# Patient Record
Sex: MALE | Race: WHITE | Employment: OTHER | URBAN - METROPOLITAN AREA
[De-identification: names, ages, dates, MRNs, and addresses within clinical notes are randomized per-mention and may not be internally consistent; named-entity substitution may affect disease eponyms.]

---

## 2017-08-23 ENCOUNTER — HOSPITAL ENCOUNTER (INPATIENT)
Age: 72
LOS: 21 days | Discharge: NURSING, REHAB OR LONG-TERM CARE | DRG: 308 | End: 2017-09-13
Attending: EMERGENCY MEDICINE | Admitting: INTERNAL MEDICINE
Payer: MEDICARE

## 2017-08-23 ENCOUNTER — APPOINTMENT (OUTPATIENT)
Dept: GENERAL RADIOLOGY | Age: 72
DRG: 308 | End: 2017-08-23
Attending: EMERGENCY MEDICINE
Payer: MEDICARE

## 2017-08-23 DIAGNOSIS — I51.9 LV DYSFUNCTION: Chronic | ICD-10-CM

## 2017-08-23 DIAGNOSIS — E87.1 HYPONATREMIA: ICD-10-CM

## 2017-08-23 DIAGNOSIS — I95.9 HYPOTENSION, UNSPECIFIED HYPOTENSION TYPE: ICD-10-CM

## 2017-08-23 DIAGNOSIS — J43.2 CENTRILOBULAR EMPHYSEMA (HCC): ICD-10-CM

## 2017-08-23 DIAGNOSIS — Z87.891 HISTORY OF TOBACCO USE: Chronic | ICD-10-CM

## 2017-08-23 DIAGNOSIS — D69.6 THROMBOCYTOPENIA (HCC): ICD-10-CM

## 2017-08-23 DIAGNOSIS — D69.3 ACUTE ITP (HCC): ICD-10-CM

## 2017-08-23 DIAGNOSIS — Z22.322 MRSA NASAL COLONIZATION: ICD-10-CM

## 2017-08-23 DIAGNOSIS — I73.9 PERIPHERAL VASCULAR DISEASE (HCC): Chronic | ICD-10-CM

## 2017-08-23 DIAGNOSIS — I25.5 ISCHEMIC CARDIOMYOPATHY: Chronic | ICD-10-CM

## 2017-08-23 DIAGNOSIS — Z79.01 ANTICOAGULANT LONG-TERM USE: Chronic | ICD-10-CM

## 2017-08-23 DIAGNOSIS — J96.02 ACUTE RESPIRATORY FAILURE WITH HYPERCAPNIA (HCC): ICD-10-CM

## 2017-08-23 DIAGNOSIS — I50.23 ACUTE ON CHRONIC SYSTOLIC CONGESTIVE HEART FAILURE (HCC): ICD-10-CM

## 2017-08-23 DIAGNOSIS — I48.92 ATRIAL FLUTTER WITH RAPID VENTRICULAR RESPONSE (HCC): Primary | ICD-10-CM

## 2017-08-23 DIAGNOSIS — J44.9 CHRONIC OBSTRUCTIVE PULMONARY DISEASE, UNSPECIFIED COPD TYPE (HCC): ICD-10-CM

## 2017-08-23 DIAGNOSIS — Z95.2 HISTORY OF MECHANICAL AORTIC VALVE REPLACEMENT: Chronic | ICD-10-CM

## 2017-08-23 DIAGNOSIS — G93.40 ACUTE ENCEPHALOPATHY: ICD-10-CM

## 2017-08-23 LAB
ALBUMIN SERPL-MCNC: 3.6 G/DL (ref 3.2–4.6)
ALBUMIN/GLOB SERPL: 0.9 {RATIO} (ref 1.2–3.5)
ALP SERPL-CCNC: 86 U/L (ref 50–136)
ALT SERPL-CCNC: 30 U/L (ref 12–65)
ANION GAP SERPL CALC-SCNC: 11 MMOL/L (ref 7–16)
AST SERPL-CCNC: 25 U/L (ref 15–37)
ATRIAL RATE: 151 BPM
ATRIAL RATE: 294 BPM
ATRIAL RATE: 357 BPM
BASOPHILS # BLD: 0 K/UL (ref 0–0.2)
BASOPHILS NFR BLD: 0 % (ref 0–2)
BILIRUB SERPL-MCNC: 0.8 MG/DL (ref 0.2–1.1)
BNP SERPL-MCNC: 371 PG/ML
BUN SERPL-MCNC: 26 MG/DL (ref 8–23)
CALCIUM SERPL-MCNC: 9.1 MG/DL (ref 8.3–10.4)
CALCULATED P AXIS, ECG09: -81 DEGREES
CALCULATED P AXIS, ECG09: 88 DEGREES
CALCULATED R AXIS, ECG10: 117 DEGREES
CALCULATED R AXIS, ECG10: 122 DEGREES
CALCULATED R AXIS, ECG10: 125 DEGREES
CALCULATED T AXIS, ECG11: -24 DEGREES
CALCULATED T AXIS, ECG11: -33 DEGREES
CALCULATED T AXIS, ECG11: -55 DEGREES
CHLORIDE SERPL-SCNC: 100 MMOL/L (ref 98–107)
CO2 SERPL-SCNC: 29 MMOL/L (ref 21–32)
CREAT SERPL-MCNC: 1.5 MG/DL (ref 0.8–1.5)
DIAGNOSIS, 93000: NORMAL
DIFFERENTIAL METHOD BLD: ABNORMAL
EOSINOPHIL # BLD: 0 K/UL (ref 0–0.8)
EOSINOPHIL NFR BLD: 1 % (ref 0.5–7.8)
ERYTHROCYTE [DISTWIDTH] IN BLOOD BY AUTOMATED COUNT: 15.9 % (ref 11.9–14.6)
GLOBULIN SER CALC-MCNC: 3.8 G/DL (ref 2.3–3.5)
GLUCOSE BLD STRIP.AUTO-MCNC: 111 MG/DL (ref 65–100)
GLUCOSE BLD STRIP.AUTO-MCNC: 78 MG/DL (ref 65–100)
GLUCOSE SERPL-MCNC: 117 MG/DL (ref 65–100)
HCT VFR BLD AUTO: 32.3 % (ref 41.1–50.3)
HGB BLD-MCNC: 10.2 G/DL (ref 13.6–17.2)
IMM GRANULOCYTES # BLD: 0 K/UL (ref 0–0.5)
IMM GRANULOCYTES NFR BLD: 0.3 % (ref 0–5)
INR PPP: 3.4 (ref 0.9–1.2)
LYMPHOCYTES # BLD: 0.7 K/UL (ref 0.5–4.6)
LYMPHOCYTES NFR BLD: 12 % (ref 13–44)
MAGNESIUM SERPL-MCNC: 2.1 MG/DL (ref 1.8–2.4)
MCH RBC QN AUTO: 28.8 PG (ref 26.1–32.9)
MCHC RBC AUTO-ENTMCNC: 31.6 G/DL (ref 31.4–35)
MCV RBC AUTO: 91.2 FL (ref 79.6–97.8)
MONOCYTES # BLD: 0.6 K/UL (ref 0.1–1.3)
MONOCYTES NFR BLD: 9 % (ref 4–12)
NEUTS SEG # BLD: 4.5 K/UL (ref 1.7–8.2)
NEUTS SEG NFR BLD: 78 % (ref 43–78)
PLATELET # BLD AUTO: 53 K/UL (ref 150–450)
PMV BLD AUTO: ABNORMAL FL (ref 10.8–14.1)
POTASSIUM SERPL-SCNC: 4.4 MMOL/L (ref 3.5–5.1)
PROT SERPL-MCNC: 7.4 G/DL (ref 6.3–8.2)
PROTHROMBIN TIME: 37 SEC (ref 9.6–12)
Q-T INTERVAL, ECG07: 346 MS
Q-T INTERVAL, ECG07: 350 MS
Q-T INTERVAL, ECG07: 358 MS
QRS DURATION, ECG06: 144 MS
QRS DURATION, ECG06: 144 MS
QRS DURATION, ECG06: 150 MS
QTC CALCULATION (BEZET), ECG08: 509 MS
QTC CALCULATION (BEZET), ECG08: 514 MS
QTC CALCULATION (BEZET), ECG08: 547 MS
RBC # BLD AUTO: 3.54 M/UL (ref 4.23–5.67)
SODIUM SERPL-SCNC: 140 MMOL/L (ref 136–145)
TROPONIN I BLD-MCNC: 0.1 NG/ML (ref 0–0.08)
TROPONIN I SERPL-MCNC: 0.12 NG/ML (ref 0.02–0.05)
VENTRICULAR RATE, ECG03: 124 BPM
VENTRICULAR RATE, ECG03: 130 BPM
VENTRICULAR RATE, ECG03: 147 BPM
WBC # BLD AUTO: 5.9 K/UL (ref 4.3–11.1)

## 2017-08-23 PROCEDURE — 99153 MOD SED SAME PHYS/QHP EA: CPT

## 2017-08-23 PROCEDURE — 74011250636 HC RX REV CODE- 250/636: Performed by: EMERGENCY MEDICINE

## 2017-08-23 PROCEDURE — 96365 THER/PROPH/DIAG IV INF INIT: CPT | Performed by: EMERGENCY MEDICINE

## 2017-08-23 PROCEDURE — 85025 COMPLETE CBC W/AUTO DIFF WBC: CPT | Performed by: EMERGENCY MEDICINE

## 2017-08-23 PROCEDURE — 74011250637 HC RX REV CODE- 250/637: Performed by: INTERNAL MEDICINE

## 2017-08-23 PROCEDURE — 82962 GLUCOSE BLOOD TEST: CPT

## 2017-08-23 PROCEDURE — 74011000258 HC RX REV CODE- 258: Performed by: EMERGENCY MEDICINE

## 2017-08-23 PROCEDURE — 83735 ASSAY OF MAGNESIUM: CPT | Performed by: EMERGENCY MEDICINE

## 2017-08-23 PROCEDURE — 77030009397 HC ELECTRD ECG PACE ZOLL -B

## 2017-08-23 PROCEDURE — B24BZZ4 ULTRASONOGRAPHY OF HEART WITH AORTA, TRANSESOPHAGEAL: ICD-10-PCS | Performed by: INTERNAL MEDICINE

## 2017-08-23 PROCEDURE — 71010 XR CHEST PORT: CPT

## 2017-08-23 PROCEDURE — 5A2204Z RESTORATION OF CARDIAC RHYTHM, SINGLE: ICD-10-PCS | Performed by: INTERNAL MEDICINE

## 2017-08-23 PROCEDURE — 93312 ECHO TRANSESOPHAGEAL: CPT

## 2017-08-23 PROCEDURE — 65660000000 HC RM CCU STEPDOWN

## 2017-08-23 PROCEDURE — 74011000250 HC RX REV CODE- 250: Performed by: EMERGENCY MEDICINE

## 2017-08-23 PROCEDURE — 93005 ELECTROCARDIOGRAM TRACING: CPT | Performed by: EMERGENCY MEDICINE

## 2017-08-23 PROCEDURE — 99285 EMERGENCY DEPT VISIT HI MDM: CPT | Performed by: EMERGENCY MEDICINE

## 2017-08-23 PROCEDURE — 94760 N-INVAS EAR/PLS OXIMETRY 1: CPT

## 2017-08-23 PROCEDURE — 77010033678 HC OXYGEN DAILY

## 2017-08-23 PROCEDURE — 74011250636 HC RX REV CODE- 250/636

## 2017-08-23 PROCEDURE — 74011000250 HC RX REV CODE- 250: Performed by: INTERNAL MEDICINE

## 2017-08-23 PROCEDURE — 83880 ASSAY OF NATRIURETIC PEPTIDE: CPT | Performed by: EMERGENCY MEDICINE

## 2017-08-23 PROCEDURE — 85610 PROTHROMBIN TIME: CPT | Performed by: EMERGENCY MEDICINE

## 2017-08-23 PROCEDURE — 77030013140 HC MSK NEB VYRM -A

## 2017-08-23 PROCEDURE — 92960 CARDIOVERSION ELECTRIC EXT: CPT | Performed by: NURSE PRACTITIONER

## 2017-08-23 PROCEDURE — 99152 MOD SED SAME PHYS/QHP 5/>YRS: CPT | Performed by: NURSE PRACTITIONER

## 2017-08-23 PROCEDURE — 80053 COMPREHEN METABOLIC PANEL: CPT | Performed by: EMERGENCY MEDICINE

## 2017-08-23 PROCEDURE — 84484 ASSAY OF TROPONIN QUANT: CPT | Performed by: EMERGENCY MEDICINE

## 2017-08-23 PROCEDURE — 94640 AIRWAY INHALATION TREATMENT: CPT

## 2017-08-23 PROCEDURE — 74011000258 HC RX REV CODE- 258: Performed by: INTERNAL MEDICINE

## 2017-08-23 PROCEDURE — 74011250636 HC RX REV CODE- 250/636: Performed by: INTERNAL MEDICINE

## 2017-08-23 RX ORDER — SODIUM CHLORIDE 9 MG/ML
125 INJECTION, SOLUTION INTRAVENOUS CONTINUOUS
Status: DISCONTINUED | OUTPATIENT
Start: 2017-08-23 | End: 2017-08-23

## 2017-08-23 RX ORDER — GABAPENTIN 300 MG/1
600 CAPSULE ORAL 3 TIMES DAILY
Status: DISCONTINUED | OUTPATIENT
Start: 2017-08-23 | End: 2017-09-13 | Stop reason: HOSPADM

## 2017-08-23 RX ORDER — PANTOPRAZOLE SODIUM 40 MG/1
40 TABLET, DELAYED RELEASE ORAL
Status: DISCONTINUED | OUTPATIENT
Start: 2017-08-24 | End: 2017-08-30

## 2017-08-23 RX ORDER — MORPHINE SULFATE 2 MG/ML
2 INJECTION, SOLUTION INTRAMUSCULAR; INTRAVENOUS
Status: DISCONTINUED | OUTPATIENT
Start: 2017-08-23 | End: 2017-09-03 | Stop reason: SDUPTHER

## 2017-08-23 RX ORDER — ONDANSETRON 2 MG/ML
4 INJECTION INTRAMUSCULAR; INTRAVENOUS
Status: DISCONTINUED | OUTPATIENT
Start: 2017-08-23 | End: 2017-09-13 | Stop reason: HOSPADM

## 2017-08-23 RX ORDER — DILTIAZEM HYDROCHLORIDE 5 MG/ML
10 INJECTION INTRAVENOUS ONCE
Status: COMPLETED | OUTPATIENT
Start: 2017-08-23 | End: 2017-08-23

## 2017-08-23 RX ORDER — INSULIN LISPRO 100 [IU]/ML
INJECTION, SOLUTION INTRAVENOUS; SUBCUTANEOUS
Status: DISCONTINUED | OUTPATIENT
Start: 2017-08-23 | End: 2017-09-01 | Stop reason: ALTCHOICE

## 2017-08-23 RX ORDER — GUAIFENESIN 100 MG/5ML
81 LIQUID (ML) ORAL DAILY
Status: DISCONTINUED | OUTPATIENT
Start: 2017-08-24 | End: 2017-08-28

## 2017-08-23 RX ORDER — MIDAZOLAM HYDROCHLORIDE 1 MG/ML
.5-5 INJECTION, SOLUTION INTRAMUSCULAR; INTRAVENOUS
Status: DISCONTINUED | OUTPATIENT
Start: 2017-08-23 | End: 2017-08-25 | Stop reason: HOSPADM

## 2017-08-23 RX ORDER — GLIPIZIDE 5 MG/1
10 TABLET ORAL 2 TIMES DAILY
Status: DISCONTINUED | OUTPATIENT
Start: 2017-08-23 | End: 2017-08-25

## 2017-08-23 RX ORDER — SODIUM CHLORIDE 0.9 % (FLUSH) 0.9 %
5-10 SYRINGE (ML) INJECTION AS NEEDED
Status: DISCONTINUED | OUTPATIENT
Start: 2017-08-23 | End: 2017-09-13 | Stop reason: HOSPADM

## 2017-08-23 RX ORDER — FENTANYL CITRATE 50 UG/ML
25-50 INJECTION, SOLUTION INTRAMUSCULAR; INTRAVENOUS
Status: DISCONTINUED | OUTPATIENT
Start: 2017-08-23 | End: 2017-08-25 | Stop reason: HOSPADM

## 2017-08-23 RX ORDER — ALBUTEROL SULFATE 90 UG/1
2 AEROSOL, METERED RESPIRATORY (INHALATION)
Status: DISCONTINUED | OUTPATIENT
Start: 2017-08-23 | End: 2017-09-13 | Stop reason: HOSPADM

## 2017-08-23 RX ORDER — NITROGLYCERIN 0.4 MG/1
0.4 TABLET SUBLINGUAL
Status: DISCONTINUED | OUTPATIENT
Start: 2017-08-23 | End: 2017-09-13 | Stop reason: HOSPADM

## 2017-08-23 RX ORDER — ALBUTEROL SULFATE 2.5 MG/.5ML
2.5 SOLUTION RESPIRATORY (INHALATION)
Status: DISCONTINUED | OUTPATIENT
Start: 2017-08-23 | End: 2017-09-13 | Stop reason: HOSPADM

## 2017-08-23 RX ORDER — FUROSEMIDE 10 MG/ML
40 INJECTION INTRAMUSCULAR; INTRAVENOUS ONCE
Status: COMPLETED | OUTPATIENT
Start: 2017-08-23 | End: 2017-08-23

## 2017-08-23 RX ORDER — BUDESONIDE 0.5 MG/2ML
500 INHALANT ORAL
Status: DISCONTINUED | OUTPATIENT
Start: 2017-08-23 | End: 2017-09-13 | Stop reason: HOSPADM

## 2017-08-23 RX ORDER — DILTIAZEM HYDROCHLORIDE 5 MG/ML
10 INJECTION INTRAVENOUS ONCE
Status: DISCONTINUED | OUTPATIENT
Start: 2017-08-23 | End: 2017-08-23

## 2017-08-23 RX ORDER — AMIODARONE HYDROCHLORIDE 150 MG/3ML
150 INJECTION, SOLUTION INTRAVENOUS
Status: DISCONTINUED | OUTPATIENT
Start: 2017-08-23 | End: 2017-08-25 | Stop reason: HOSPADM

## 2017-08-23 RX ORDER — SODIUM CHLORIDE 0.9 % (FLUSH) 0.9 %
5-10 SYRINGE (ML) INJECTION EVERY 8 HOURS
Status: DISCONTINUED | OUTPATIENT
Start: 2017-08-23 | End: 2017-09-13 | Stop reason: HOSPADM

## 2017-08-23 RX ADMIN — AMIODARONE HYDROCHLORIDE 150 MG: 50 INJECTION, SOLUTION INTRAVENOUS at 16:07

## 2017-08-23 RX ADMIN — SODIUM CHLORIDE 1000 ML: 900 INJECTION, SOLUTION INTRAVENOUS at 13:31

## 2017-08-23 RX ADMIN — GABAPENTIN 600 MG: 300 CAPSULE ORAL at 21:04

## 2017-08-23 RX ADMIN — SODIUM CHLORIDE 125 ML/HR: 900 INJECTION, SOLUTION INTRAVENOUS at 17:58

## 2017-08-23 RX ADMIN — Medication 10 ML: at 14:42

## 2017-08-23 RX ADMIN — MIDAZOLAM HYDROCHLORIDE 3 MG: 1 INJECTION, SOLUTION INTRAMUSCULAR; INTRAVENOUS at 16:07

## 2017-08-23 RX ADMIN — Medication 10 ML: at 21:11

## 2017-08-23 RX ADMIN — AMIODARONE HYDROCHLORIDE 1 MG/MIN: 50 INJECTION, SOLUTION INTRAVENOUS at 17:57

## 2017-08-23 RX ADMIN — SODIUM CHLORIDE 5 MG/HR: 900 INJECTION, SOLUTION INTRAVENOUS at 13:29

## 2017-08-23 RX ADMIN — ALBUTEROL SULFATE 2.5 MG: 2.5 SOLUTION RESPIRATORY (INHALATION) at 20:12

## 2017-08-23 RX ADMIN — FENTANYL CITRATE 75 MCG: 50 INJECTION, SOLUTION INTRAMUSCULAR; INTRAVENOUS at 16:07

## 2017-08-23 RX ADMIN — GLIPIZIDE 10 MG: 5 TABLET ORAL at 18:17

## 2017-08-23 RX ADMIN — FUROSEMIDE 40 MG: 10 INJECTION, SOLUTION INTRAMUSCULAR; INTRAVENOUS at 21:04

## 2017-08-23 RX ADMIN — DILTIAZEM HYDROCHLORIDE 10 MG: 5 INJECTION INTRAVENOUS at 13:18

## 2017-08-23 RX ADMIN — BUDESONIDE 500 MCG: 0.5 INHALANT RESPIRATORY (INHALATION) at 20:12

## 2017-08-23 NOTE — ED PROVIDER NOTES
HPI Comments: Presents complaining to shortness of breath. He has a history of a \"irregular\" heartbeat. He also has a history of COPD  And noted on his pulse oximeter that his heart rate was 148. He states it stayed that way all day and all night. He thought he was having a COPD exacerbation so used albuterol nebs yesterday. Presented today because his heart rate was still high and he felt very short of breath. He denies chest pain nausea or vomiting. He feels like his heart is racing. Patient is a 67 y.o. male presenting with shortness of breath and rapid heart beat. The history is provided by the patient. Shortness of Breath   This is a new problem. The problem occurs continuously. The current episode started yesterday. The problem has not changed since onset. Pertinent negatives include no fever, no cough, no wheezing, no chest pain, no rash, no leg pain and no leg swelling. He has tried beta-agonist inhalers for the symptoms. The treatment provided no relief. He has had prior hospitalizations. He has had prior ED visits. Associated medical issues include COPD, chronic lung disease, CAD and heart failure. Associated medical issues do not include past MI. Rapid Heart Rate   Associated symptoms include shortness of breath. Pertinent negatives include no chest pain. Past Medical History:   Diagnosis Date    Arthritis     Chicken pox     Coronary artery disease 4/16/2014    DJD (degenerative joint disease)     Emphysema     Hx of aortic valve replacement, mechanical       doing well,. Had to hold 3 days for removal of skin ca. Will have to have add. resection.  Hypercholesterolemia     Hypertension     Palpitations      Holter showed  PVC's, PAC's, one short run SVT.     Pneumonia        Past Surgical History:   Procedure Laterality Date    HX AORTIC VALVE REPLACEMENT N/A 2000    Aortic Valve Replacement w/ Mechanical Valve    HX HEART CATHETERIZATION  07/21/2004    S         No family history on file. Social History     Social History    Marital status:      Spouse name: N/A    Number of children: N/A    Years of education: N/A     Occupational History    Not on file. Social History Main Topics    Smoking status: Former Smoker     Packs/day: 1.50     Years: 40.00    Smokeless tobacco: Never Used    Alcohol use No    Drug use: No    Sexual activity: No     Other Topics Concern    Not on file     Social History Narrative         ALLERGIES: Levaquin [levofloxacin] and Metformin    Review of Systems   Constitutional: Negative for chills and fever. Respiratory: Positive for shortness of breath. Negative for cough and wheezing. Cardiovascular: Negative for chest pain and leg swelling. Skin: Negative for rash and wound. All other systems reviewed and are negative. Vitals:    08/23/17 1317 08/23/17 1318 08/23/17 1329 08/23/17 1344   BP: 131/79 131/79 (!) 83/53 90/61   Pulse: (!) 150 (!) 148  (!) 119   Resp:    17   Temp:       SpO2: 100%   100%   Weight:       Height:                Physical Exam   Constitutional: He is oriented to person, place, and time. He appears well-developed and well-nourished. He appears distressed. HENT:   Head: Normocephalic and atraumatic. Neck: Normal range of motion. Neck supple. Cardiovascular: Regular rhythm. Tachycardia present. Pulmonary/Chest: He is in respiratory distress. He has wheezes. Tight wheezes   Abdominal: Soft. He exhibits no distension. There is no tenderness. There is no rebound and no guarding. Musculoskeletal: Normal range of motion. He exhibits no edema. Neurological: He is alert and oriented to person, place, and time. No cranial nerve deficit. Skin: Skin is warm and dry. He is not diaphoretic. Psychiatric: He has a normal mood and affect. His behavior is normal.   Nursing note and vitals reviewed.        MDM  Number of Diagnoses or Management Options  Acute on chronic systolic congestive heart failure [facility]):   Atrial flutter with rapid ventricular response [facility]):   Chronic obstructive pulmonary disease, unspecified COPD type [facility]):   Diagnosis management comments: Patient's lung sounds improved but more crackly with slowing his heart rate. Feels better. Difficulty keeping BP and HR balanced with cardizem. Suspect his COPD/BT tipped him over. Spoke with [employer]. Will take him for cardioversion. CBC hemolyzed. Amount and/or Complexity of Data Reviewed  Clinical lab tests: ordered and reviewed  Review and summarize past medical records: yes (Hx of arrhythmia but no aflutter.   ICM with CAD)  Discuss the patient with other providers: yes  Independent visualization of images, tracings, or specimens: yes (Aflutter with RVR  )    Risk of Complications, Morbidity, and/or Mortality  Presenting problems: high  Diagnostic procedures: moderate  Management options: high    Patient Progress  Patient progress: improved    ED Course       Procedures

## 2017-08-23 NOTE — PROGRESS NOTES
Bedside and Verbal shift change report given to Diana Gutiérrez RN (oncoming nurse) by Diana Archer RN (offgoing nurse). Report included the following information SBAR, Kardex, MAR and Recent Results. Verified Amiodarone gtt with hourly BP's.

## 2017-08-23 NOTE — PROCEDURES
Cheryle Pankaj 43       Name:  Maritza Zhong   MR#:  [id number]   :  1945   Account #:  [de-identified]   Date of Adm:  2017       DATE OF PROCEDURE: 2017    BRIEF HISTORY: The patient is a very pleasant gentleman with   history of mechanical aortic valve replacement who presents with   hypotension and rapid atrial flutter. Given the patient's   unstable nature, he is brought over for MERLYN-guided   cardioversion. He is chronically anticoagulated with warfarin,   but unable to document a continuous anticoagulation with   adequate INRs since the patient's blood is monitored at the [facility]. So MERLYN was felt warranted prior to DC cardioversion. PROCEDURE: After informed consent, the patient underwent   oropharyngeal anesthesia with benzocaine spray and viscous   lidocaine. MERLYN probe was then passed without complications. The   MERLYN demonstrated no evidence of left atrial, left atrial   appendage, right atrium, right atrial appendage thrombus. There   appeared to be normal function of the mechanical aortic valve   and ejection fraction estimated at 40% to 45% with anterior   anteroapical hypokinesis present. Upon completion of the MERLYN, the patient remained adequately   sedated with Versed and fentanyl. He received 200 joules of   synchronized biphasic energy with restoration of sinus rhythm. CONSCIOUS SEDATION     Start time 3:30 p.m., end time 4 p.m. MEDICATIONS: 3 mg of Versed and 75 mcg of fentanyl. MONITORING RN: Betzaida Galvin. CONCLUSION: Successful transesophageal echocardiogram guided   cardioversion with restoration of sinus rhythm with frequent   premature atrial contractions. RECOMMENDATIONS: Continue oral anticoagulation with warfarin and   initiate antiarrhythmic therapy with amiodarone. Thank you for allowing us to participate in the care of this   patient.  If questions or concerns, please feel free to contact me.        MD RODOLFO Melvin / Behzad Lynn   D:  08/23/2017   17:13   T:  08/23/2017   17:42   Job #:  [id number]

## 2017-08-23 NOTE — IP AVS SNAPSHOT
Amy Felix 
 
 
 [address] [facility] 
[phone] Patient: Bennie Ontiveros MRN: [id number] RBC:2/55/2264 You are allergic to the following Allergen Reactions Levaquin (Levofloxacin) Other (comments) GI upset Metformin Other (comments) Gi upset Immunizations Administered for This Admission Name Date  
 TB Skin Test (PPD) Intradermal 8/26/2017 Recent Documentation Height Weight BMI Smoking Status 1.676 m 78.6 kg 27.97 kg/m2 Former Smoker Unresulted Labs Order Current Status CULTURE, BLOOD Preliminary result CULTURE, BLOOD Preliminary result CULTURE, URINE Preliminary result Emergency Contacts Name Discharge Info Relation Home Work Mobile Marci Cluster  Spouse [3] [phone] About your hospitalization You were admitted on:  August 23, 2017 You last received care in the:  [facility] 3 TELEMETRY You were discharged on:  September 13, 2017 Unit phone number:  [phone] Why you were hospitalized Your primary diagnosis was:  Atrial Flutter With Rapid Ventricular Response (Hcc) Your diagnoses also included:  Anticoagulant Long-Term Use, Htn (Hypertension), Benign, Peripheral Vascular Disease (Hcc), Dyslipidemia, History Of Mechanical Aortic Valve Replacement, Centrilobular Emphysema (Hcc), Ischemic Cardiomyopathy, Thrombocytopenia (Hcc), Acute Respiratory Failure With Hypercapnia (Hcc), History Of Tobacco Use, Hyponatremia, Acute Encephalopathy, Copd (Chronic Obstructive Pulmonary Disease) (Hcc), Mrsa Nasal Colonization Providers Seen During Your Hospitalizations Provider Role Specialty Primary office phone Cristine Toledo MD Attending Provider Emergency Medicine [phone] Pura Hankins MD Attending Provider Cardiology [phone] Your Primary Care Physician (PCP) Primary Care Physician Office Phone Office Fax Alley Navarrete [phone] Follow-up Information Follow up With Details Comments Contact Info Sommre Barnes MD   [address] [place] 
[phone] Shelbie Sanchez MD  call this afternoon for a follow up appointment to see Dr. Bhupinder Gonzalez in one week. (9th floor rehab to call when discharged) [address] [facility] [address] [facility] [address] 
[address] [facility] OFFICE Call make appointment for 2-3 weeks after discharge from 9th floor rehab [address] 
[address] [address] [facility] 
[phone] Current Discharge Medication List  
  
START taking these medications Dose & Instructions Dispensing Information Comments Morning Noon Evening Bedtime  
 aztreonam 2 gram 2 g IVPB Dose:  2 g  
2 g by IntraVENous route every eight (8) hours. Indications: +GNR in [facility] Quantity:  1 Dose Refills:  0  
     
   
   
   
  
 carvedilol 3.125 mg tablet Commonly known as:  Maybelle Pallas Dose:  3.125 mg Take 1 Tab by mouth two (2) times daily (with meals). Quantity:  60 Tab Refills:  1  
     
  
   
   
  
   
  
 digoxin 0.25 mg tablet Commonly known as:  LANOXIN Dose:  0.25 mg Take 1 Tab by mouth daily for 30 days. Quantity:  30 Tab Refills:  2  
     
   
   
   
  
 ferrous sulfate 325 mg (65 mg iron) tablet Dose:  325 mg Take 1 Tab by mouth daily (with breakfast). Quantity:  30 Tab Refills:  0  
     
  
   
   
   
  
 guaiFENesin 1,200 mg Ta12 ER tablet Commonly known as:  Obie & Obie Dose:  1200 mg Take 1 Tab by mouth two (2) times a day for 14 days. Quantity:  28 Tab Refills:  1  
     
  
   
   
  
   
  
 insulin lispro 100 unit/mL injection Commonly known as:  HUMALOG See above Quantity:  1 Vial  
Refills:  0 povidone-iodine 10 % external solution Commonly known as:  BETADINE Dose:  1 mL Apply 1 mL to affected area daily for 7 days. Quantity:  1 Bottle Refills:  0  
     
   
   
   
  
 * predniSONE 20 mg tablet Commonly known as:  Graydon George Dose:  40 mg Take 2 Tabs by mouth daily (with breakfast) for 1 day. Quantity:  2 Tab Refills:  0  
     
  
   
   
   
  
 * predniSONE 10 mg tablet Commonly known as:  Graydon Zenda Start taking on:  9/15/2017 Dose:  30 mg Take 3 Tabs by mouth daily (with breakfast) for 6 days. Quantity:  18 Tab Refills:  0  
     
  
   
   
   
  
 * predniSONE 20 mg tablet Commonly known as:  Graydon Zenda Start taking on:  9/22/2017 Dose:  20 mg Take 1 Tab by mouth daily (with breakfast) for 6 days. Quantity:  6 Tab Refills:  0  
     
  
   
   
   
  
 * predniSONE 10 mg tablet Commonly known as:  Graydon George Start taking on:  9/29/2017 Dose:  10 mg Take 1 Tab by mouth daily (with breakfast) for 6 days. Quantity:  6 Tab Refills:  0  
     
  
   
   
   
  
 * sodium chloride 0.9 % Commonly known as:  NS  
   
 Dose:  20 mL  
20 mL by InterCATHeter route every eight (8) hours. Quantity:  1 Syringe Refills:  0  
     
   
   
   
  
 * sodium chloride 0.9 % Commonly known as:  NS  
   
 Dose:  20 mL  
20 mL by InterCATHeter route as needed. Quantity:  1 Syringe Refills:  0  
     
   
   
   
  
 traZODone 50 mg tablet Commonly known as:  Samule Grills Dose:  50 mg Take 1 Tab by mouth nightly as needed for Sleep. Quantity:  40 Tab Refills:  0  
     
   
   
   
  
  
 vancomycin in 0.9% Sodium Cl 1.25 gram/250 mL Soln Dose:  1250 mg  
250 mL by IntraVENous route every twelve (12) hours every twelve (12) hours. Indications: +GNR in [facility] Quantity:  1 mL Refills:  0  
     
  
   
   
   
  
 * Notice:   This list has 6 medication(s) that are the same as other medications prescribed for you. Read the directions carefully, and ask your doctor or other care provider to review them with you. CONTINUE these medications which have NOT CHANGED Dose & Instructions Dispensing Information Comments Morning Noon Evening Bedtime  
 budesonide-formoterol 160-4.5 mcg/actuation HFA inhaler Commonly known as:  SYMBICORT Dose:  2 Puff Take 2 Puffs by inhalation. Refills:  0  
     
   
   
   
  
 flunisolide 25 mcg (0.025 %) Spry Commonly known as:  NASAREL Dose:  2 Spray 2 Sprays by Nasal route. Refills:  0  
     
   
   
   
  
 furosemide 40 mg tablet Commonly known as:  LASIX Dose:  40 mg Take 1 Tab by mouth daily. Quantity:  30 Tab Refills:  1  
     
  
   
   
   
  
 gabapentin 300 mg capsule Commonly known as:  NEURONTIN Dose:  600 mg Take 2 Caps by mouth three (3) times daily for 30 days. Quantity:  180 Cap Refills:  2 Omeprazole delayed release 20 mg tablet Commonly known as:  PRILOSEC D/R Dose:  20 mg Take 20 mg by mouth. Refills:  0  
     
  
   
   
   
  
 potassium chloride 10 mEq tablet Commonly known as:  KLOR-CON Dose:  10 mEq Take 1 Tab by mouth daily. Quantity:  30 Tab Refills:  1 PROVENTIL HFA 90 mcg/actuation inhaler Generic drug:  albuterol Dose:  2 Puff Take 2 Puffs by inhalation. Refills:  0  
     
   
   
   
  
 simvastatin 80 mg tablet Commonly known as:  ZOCOR Dose:  80 mg Take 1 Tab by mouth nightly. Quantity:  90 Tab Refills:  1  
     
   
   
   
  
  
 tiotropium 18 mcg inhalation capsule Commonly known as:  Kassandra Leavens Dose:  1 Puff Take 1 Puff by inhalation. Refills:  0 STOP taking these medications   
 glipiZIDE 10 mg tablet Commonly known as:  GLUCOTROL  
   
  
 hydroCHLOROthiazide 12.5 mg tablet Commonly known as:  HYDRODIURIL  
   
  
 losartan 100 mg tablet Commonly known as:  COZAAR  
   
  
 warfarin 5 mg tablet Commonly known as:  COUMADIN Where to Get Your Medications Information on where to get these meds will be given to you by the nurse or doctor. ! Ask your nurse or doctor about these medications  
  aztreonam 2 gram 2 g IVPB  
 carvedilol 3.125 mg tablet  
 digoxin 0.25 mg tablet  
 ferrous sulfate 325 mg (65 mg iron) tablet  
 gabapentin 300 mg capsule  
 guaiFENesin 1,200 mg Ta12 ER tablet  
 insulin lispro 100 unit/mL injection  
 povidone-iodine 10 % external solution  
 predniSONE 10 mg tablet  
 predniSONE 10 mg tablet  
 predniSONE 20 mg tablet  
 predniSONE 20 mg tablet  
 sodium chloride 0.9 %  
 sodium chloride 0.9 %  
 traZODone 50 mg tablet  
 vancomycin in 0.9% Sodium Cl 1.25 gram/250 mL Soln Discharge Instructions DISCHARGE SUMMARY from Nurse The following personal items are in your possession at time of discharge: 
 
Dental Appliances: Uppers, Lowers, With patient Visual Aid: With patient, Glasses Clothing: Pants, Shirt, Undergarments, With patient PATIENT INSTRUCTIONS: 
 
 
F-face looks uneven A-arms unable to move or move unevenly S-speech slurred or non-existent T-time-call 911 as soon as signs and symptoms begin-DO NOT go Back to bed or wait to see if you get better-TIME IS BRAIN. Warning Signs of HEART ATTACK Call 911 if you have these symptoms: 
? Chest discomfort. Most heart attacks involve discomfort in the center of the chest that lasts more than a few minutes, or that goes away and comes back. It can feel like uncomfortable pressure, squeezing, fullness, or pain. ? Discomfort in other areas of the upper body.  Symptoms can include pain or discomfort in one or both arms, the back, neck, jaw, or stomach. ? Shortness of breath with or without chest discomfort. ? Other signs may include breaking out in a cold sweat, nausea, or lightheadedness. Don't wait more than five minutes to call [address]! Fast action can save your life. Calling 911 is almost always the fastest way to get lifesaving treatment. Emergency Medical Services staff can begin treatment when they arrive  up to an hour sooner than if someone gets to the hospital by car. Atrial Fibrillation: Care Instructions Your Care Instructions Atrial fibrillation is an irregular and often fast heartbeat. Treating this condition is important for several reasons. It can cause blood clots, which can travel from your heart to your brain and cause a stroke. If you have a fast heartbeat, you may feel lightheaded, dizzy, and weak. An irregular heartbeat can also increase your risk for heart failure. Atrial fibrillation is often the result of another heart condition, such as high blood pressure or coronary artery disease. Making changes to improve your heart condition will help you stay healthy and active. Follow-up care is a key part of your treatment and safety. Be sure to make and go to all appointments, and call your doctor if you are having problems. It's also a good idea to know your test results and keep a list of the medicines you take. How can you care for yourself at home? Medicines · Take your medicines exactly as prescribed. Call your doctor if you think you are having a problem with your medicine. You will get more details on the specific medicines your doctor prescribes. · If your doctor has given you a blood thinner to prevent a stroke, be sure you get instructions about how to take your medicine safely. Blood thinners can cause serious bleeding problems.  
· Do not take any vitamins, over-the-counter drugs, or herbal products without talking to your doctor first. 
Lifestyle changes · Do not smoke. Smoking can increase your chance of a stroke and heart attack. If you need help quitting, talk to your doctor about stop-smoking programs and medicines. These can increase your chances of quitting for good. · Eat a heart-healthy diet. · Stay at a healthy weight. Lose weight if you need to. · Limit alcohol to 2 drinks a day for men and 1 drink a day for women. Too much alcohol can cause health problems. · Avoid colds and flu. Get a pneumococcal vaccine shot. If you have had one before, ask your doctor whether you need another dose. Get a flu shot every year. If you must be around people with colds or flu, wash your hands often. Activity · If your doctor recommends it, get more exercise. Walking is a good choice. Bit by bit, increase the amount you walk every day. Try for at least 30 minutes on most days of the week. You also may want to swim, bike, or do other activities. Your doctor may suggest that you join a cardiac rehabilitation program so that you can have help increasing your physical activity safely. · Start light exercise if your doctor says it is okay. Even a small amount will help you get stronger, have more energy, and manage stress. Walking is an easy way to get exercise. Start out by walking a little more than you did in the hospital. Gradually increase the amount you walk. · When you exercise, watch for signs that your heart is working too hard. You are pushing too hard if you cannot talk while you are exercising. If you become short of breath or dizzy or have chest pain, sit down and rest immediately. · Check your pulse regularly. Place two fingers on the artery at the palm side of your wrist, in line with your thumb. If your heartbeat seems uneven or fast, talk to your doctor. When should you call for help? Call 911 anytime you think you may need emergency care. For example, call if: · You have symptoms of a heart attack. These may include: ¨ Chest pain or pressure, or a strange feeling in the chest. 
¨ Sweating. ¨ Shortness of breath. ¨ Nausea or vomiting. ¨ Pain, pressure, or a strange feeling in the back, neck, jaw, or upper belly or in one or both shoulders or arms. ¨ Lightheadedness or sudden weakness. ¨ A fast or irregular heartbeat. After you call 911, the  may tell you to chew 1 adult-strength or 2 to 4 low-dose aspirin. Wait for an ambulance. Do not try to drive yourself. · You have symptoms of a stroke. These may include: 
¨ Sudden numbness, tingling, weakness, or loss of movement in your face, arm, or leg, especially on only one side of your body. ¨ Sudden vision changes. ¨ Sudden trouble speaking. ¨ Sudden confusion or trouble understanding simple statements. ¨ Sudden problems with walking or balance. ¨ A sudden, severe headache that is different from past headaches. · You passed out (lost consciousness). Call your doctor now or seek immediate medical care if: 
· You have new or increased shortness of breath. · You feel dizzy or lightheaded, or you feel like you may faint. · Your heart rate becomes irregular. · You can feel your heart flutter in your chest or skip heartbeats. Tell your doctor if these symptoms are new or worse. Watch closely for changes in your health, and be sure to contact your doctor if you have any problems. Where can you learn more? Go to http://kristina-karis.info/. Enter U020 in the search box to learn more about \"Atrial Fibrillation: Care Instructions. \" Current as of: September 21, 2016 Content Version: 11.3 © 0453-9677 [employer]. Care instructions adapted under license by [employer] (which disclaims liability or warranty for this information).  If you have questions about a medical condition or this instruction, always ask your healthcare professional. [employer] disclaims any warranty or liability for your use of this information. Heart Failure: Care Instructions Your Care Instructions Heart failure occurs when your heart does not pump as much blood as the body needs. Failure does not mean that the heart has stopped pumping but rather that it is not pumping as well as it should. Over time, this causes fluid buildup in your lungs and other parts of your body. Fluid buildup can cause shortness of breath, fatigue, swollen ankles, and other problems. By taking medicines regularly, reducing sodium (salt) in your diet, checking your weight every day, and making lifestyle changes, you can feel better and live longer. Follow-up care is a key part of your treatment and safety. Be sure to make and go to all appointments, and call your doctor if you are having problems. It's also a good idea to know your test results and keep a list of the medicines you take. How can you care for yourself at home? Medicines · Be safe with medicines. Take your medicines exactly as prescribed. Call your doctor if you think you are having a problem with your medicine. · Do not take any vitamins, over-the-counter medicine, or herbal products without talking to your doctor first. Francis Rogerss not take ibuprofen (Advil or Motrin) and naproxen (Aleve) without talking to your doctor first. They could make your heart failure worse. · You may be taking some of the following medicine. ¨ Beta-blockers can slow heart rate, decrease blood pressure, and improve your condition. Taking a beta-blocker may lower your chance of needing to be hospitalized. ¨ Angiotensin-converting enzyme inhibitors (ACEIs) reduce the heart's workload, lower blood pressure, and reduce swelling. Taking an ACEI may lower your chance of needing to be hospitalized again. ¨ Angiotensin II receptor blockers (ARBs) work like ACEIs. Your doctor may prescribe them instead of ACEIs. ¨ Diuretics, also called water pills, reduce swelling. ¨ Potassium supplements replace this important mineral, which is sometimes lost with diuretics. ¨ Aspirin and other blood thinners prevent blood clots, which can cause a stroke or heart attack. You will get more details on the specific medicines your doctor prescribes. Diet · Your doctor may suggest that you limit sodium to 2,000 milligrams (mg) a day or less. That is less than 1 teaspoon of salt a day, including all the salt you eat in cooking or in packaged foods. People get most of their sodium from processed foods. Fast food and restaurant meals also tend to be very high in sodium. · Ask your doctor how much liquid you can drink each day. You may have to limit liquids. Weight · Weigh yourself without clothing at the same time each day. Record your weight. Call your doctor if you have a sudden weight gain, such as more than 2 to 3 pounds in a day or 5 pounds in a week. (Your doctor may suggest a different range of weight gain.) A sudden weight gain may mean that your heart failure is getting worse. Activity level · Start light exercise (if your doctor says it is okay). Even if you can only do a small amount, exercise will help you get stronger, have more energy, and manage your weight and your stress. Walking is an easy way to get exercise. Start out by walking a little more than you did before. Bit by bit, increase the amount you walk. · When you exercise, watch for signs that your heart is working too hard. You are pushing yourself too hard if you cannot talk while you are exercising. If you become short of breath or dizzy or have chest pain, stop, sit down, and rest. 
· If you feel \"wiped out\" the day after you exercise, walk slower or for a shorter distance until you can work up to a better pace. · Get enough rest at night. Sleeping with 1 or 2 pillows under your upper body and head may help you breathe easier. Lifestyle changes · Do not smoke. Smoking can make a heart condition worse. If you need help quitting, talk to your doctor about stop-smoking programs and medicines. These can increase your chances of quitting for good. Quitting smoking may be the most important step you can take to protect your heart. · Limit alcohol to 2 drinks a day for men and 1 drink a day for women. Too much alcohol can cause health problems. · Avoid getting sick from colds and the flu. Get a pneumococcal vaccine shot. If you have had one before, ask your doctor whether you need another dose. Get a flu shot each year. If you must be around people with colds or the flu, wash your hands often. When should you call for help? Call 911 if you have symptoms of sudden heart failure such as: 
· You have severe trouble breathing. · You cough up pink, foamy mucus. · You have a new irregular or rapid heartbeat. Call your doctor now or seek immediate medical care if: 
· You have new or increased shortness of breath. · You are dizzy or lightheaded, or you feel like you may faint. · You have sudden weight gain, such as more than 2 to 3 pounds in a day or 5 pounds in a week. (Your doctor may suggest a different range of weight gain.) · You have increased swelling in your legs, ankles, or feet. · You are suddenly so tired or weak that you cannot do your usual activities. Watch closely for changes in your health, and be sure to contact your doctor if: 
· You develop new symptoms. Avoiding Triggers With Heart Failure: Care Instructions Your Care Instructions Triggers are anything that make your heart failure flare up. A flare-up is also called \"sudden heart failure\" or \"acute heart failure. \" When you have a flare-up, fluid builds up in your lungs, and you have problems breathing. You might need to go to the hospital. By watching for changes in your condition and avoiding triggers, you can prevent heart failure flare-ups. Follow-up care is a key part of your treatment and safety. Be sure to make and go to all appointments, and call your doctor if you are having problems. It's also a good idea to know your test results and keep a list of the medicines you take. How can you care for yourself at home? Watch for changes in your weight and condition · Weigh yourself without clothing at the same time each day. Record your weight. Call your doctor if you have sudden weight gain, such as more than 2 to 3 pounds in a day or 5 pounds in a week. (Your doctor may suggest a different range of weight gain.) A sudden weight gain may mean that your heart failure is getting worse. · Keep a daily record of your symptoms. Write down any changes in how you feel, such as new shortness of breath, cough, or problems eating. Also record if your ankles are more swollen than usual and if you feel more tired than usual. Note anything that you ate or did that could have triggered these changes. Limit sodium Sodium causes your body to hold on to extra water. This may cause your heart failure symptoms to get worse. People get most of their sodium from processed foods. Fast food and restaurant meals also tend to be very high in sodium. · Your doctor may suggest that you limit sodium to 2,000 milligrams (mg) a day or less. That is less than 1 teaspoon of salt a day, including all the salt you eat in cooking or in packaged foods. · Read food labels on cans and food packages. They tell you how much sodium you get in one serving. Check the serving size. If you eat more than one serving, you are getting more sodium. · Be aware that sodium can come in forms other than salt, including monosodium glutamate (MSG), sodium citrate, and sodium bicarbonate (baking soda). MSG is often added to Asian food. You can sometimes ask for food without MSG or salt. · Slowly reducing salt will help you adjust to the taste. Take the salt shaker off the table. · Flavor your food with garlic, lemon juice, onion, vinegar, herbs, and spices instead of salt. Do not use soy sauce, steak sauce, onion salt, garlic salt, mustard, or ketchup on your food, unless it is labeled \"low-sodium\" or \"low-salt. \" 
· Make your own salad dressings, sauces, and ketchup without adding salt. · Use fresh or frozen ingredients, instead of canned ones, whenever you can. Choose low-sodium canned goods. · Eat less processed food and food from restaurants, including fast food. Exercise as directed Moderate, regular exercise is very good for your heart. It improves your blood flow and helps control your weight. But too much exercise can stress your heart and cause a heart failure flare-up. · Check with your doctor before you start an exercise program. 
· Walking is an easy way to get exercise. Start out slowly. Gradually increase the length and pace of your walk. Swimming, riding a bike, and using a treadmill are also good forms of exercise. · When you exercise, watch for signs that your heart is working too hard. You are pushing yourself too hard if you cannot talk while you are exercising. If you become short of breath or dizzy or have chest pain, stop, sit down, and rest. 
· Do not exercise when you do not feel well. Take medicines correctly · Take your medicines exactly as prescribed. Call your doctor if you think you are having a problem with your medicine. · Make a list of all the medicines you take. Include those prescribed to you by other doctors and any over-the-counter medicines, vitamins, or supplements you take. Take this list with you when you go to any doctor. · Take your medicines at the same time every day. It may help you to post a list of all the medicines you take every day and what time of day you take them. · Make taking your medicine as simple as you can.  Plan times to take your medicines when you are doing other things, such as eating a meal or getting ready for bed. This will make it easier to remember to take your medicines. · Get organized. Use helpful tools, such as daily or weekly pill containers. When should you call for help? Call 911 if you have symptoms of sudden heart failure such as: 
· You have severe trouble breathing. · You cough up pink, foamy mucus. · You have a new irregular or rapid heartbeat. Call your doctor now or seek immediate medical care if: 
· You have new or increased shortness of breath. · You are dizzy or lightheaded, or you feel like you may faint. · You have sudden weight gain, such as more than 2 to 3 pounds in a day or 5 pounds in a week. (Your doctor may suggest a different range of weight gain.) · You have increased swelling in your legs, ankles, or feet. · You are suddenly so tired or weak that you cannot do your usual activities. Watch closely for changes in your health, and be sure to contact your doctor if you develop new symptoms. Where can you learn more? Go to http://kristina-karis.info/. Enter M051 in the search box to learn more about \"Avoiding Triggers With Heart Failure: Care Instructions. \" Current as of: February 23, 2017 Content Version: 11.3 © 9059-9098 [employer]. Care instructions adapted under license by [employer] (which disclaims liability or warranty for this information). If you have questions about a medical condition or this instruction, always ask your healthcare professional. [place] [address] any warranty or liability for your use of this information. Discharge Orders None ACO Transitions of Care Introducing [employer] [address] Jayla Sparks offers a voluntary care coordination program to provide high quality service and care to [facility] fee-for-service beneficiaries.   
 
Joshua Patton was designed to help you enhance your health and well-being through the following services: ? Transitions of Care  support for individuals who are transitioning from one care setting to another (example: Hospital to home). ? Chronic and Complex Care Coordination  support for individuals and caregivers of those with serious or chronic illnesses or with more than one chronic (ongoing) condition and those who take a number of different medications. If you meet specific medical criteria, a [facility] [address] may call you directly to coordinate your care with your primary care physician and your other care providers. For questions about the [facility] programs, please, contact your physicians office. For general questions or additional information about Accountable Care Organizations: 
Please visit www.medicare.gov/acos. html or call 1-800-MEDICARE (0-708.165.2894) TTY users should call 5-185.639.2566. MUBI Announcement We are excited to announce that we are making your provider's discharge notes available to you in MUBI. You will see these notes when they are completed and signed by the physician that discharged you from your recent hospital stay. If you have any questions or concerns about any information you see in MUBI, please call the Health Information Department where you were seen or reach out to your Primary Care Provider for more information about your plan of care. Introducing [facility] [place] [facility]! Jorge Santana introduces MUBI patient portal. Now you can access parts of your medical record, email your doctor's office, and request medication refills online. 1. In your internet browser, go to [email or web address]/Freak'n Geniust 2. Click on the First Time User? Click Here link in the Sign In box. You will see the New Member Sign Up page. 3. Enter your MUBI Access Code exactly as it appears below. You will not need to use this code after youve completed the sign-up process.  If you do not sign up before the expiration date, you must request a new code. · Cureatr Access Code: [email or web address] Expires: 11/16/2017 10:44 AM 
 
4. Enter the last four digits of your Social Security Number (xxxx) and Date of Birth (mm/dd/yyyy) as indicated and click Submit. You will be taken to the next sign-up page. 5. Create a Cureatr ID. This will be your Cureatr login ID and cannot be changed, so think of one that is secure and easy to remember. 6. Create a Cureatr password. You can change your password at any time. 7. Enter your Password Reset Question and Answer. This can be used at a later time if you forget your password. 8. Enter your e-mail address. You will receive e-mail notification when new information is available in [address] 9. Click Sign Up. You can now view and download portions of your medical record. 10. Click the Download Summary menu link to download a portable copy of your medical information. If you have questions, please visit the Frequently Asked Questions section of the Cureatr website. Remember, Cureatr is NOT to be used for urgent needs. For medical emergencies, dial 911. Now available from your iPhone and Android! General Information Please provide this summary of care documentation to your next provider. Patient Signature:  ____________________________________________________________ Date:  ____________________________________________________________  
  
Nilda New Provider Signature:  ____________________________________________________________ Date:  ____________________________________________________________

## 2017-08-23 NOTE — PROGRESS NOTES
Skin assessment completed. Sacrum intact. Scar from previous incision down mid sternum. Patient has devan colored skin which wife states is normal. Patient resting in bed, educated to call for assistance before getting OOB. Encouraged repositioning.

## 2017-08-23 NOTE — PROGRESS NOTES
Bedside and Verbal shift change report given to self (oncoming nurse) by Terell Hoskins (offgoing nurse). Report included the following information SBAR, Kardex, MAR and Recent Results.

## 2017-08-23 NOTE — PROGRESS NOTES
TRANSFER - IN REPORT:    Verbal report received from Chay Weinberg RN on Flor Olson being received from [facility] for routine progression of care      Report consisted of patients Situation, Background, Assessment and Recommendations(SBAR). Information from the following report(s) SBAR, Kardex, [facility] and Recent Results was reviewed with the receiving nurse. Opportunity for questions and clarification was provided.

## 2017-08-23 NOTE — PROGRESS NOTES
TRANSFER - OUT REPORT:    Verbal report given to 3rd Floor Tele(name) on Gokul Pitch  being transferred to 3rd Floor Tele(unit) for routine progression of care       Report consisted of patients Situation, Background, Assessment and   Recommendations(SBAR). Information from the following report(s) SBAR and Procedure Summary was reviewed with the receiving nurse. Opportunity for questions and clarification was provided. Procedure: MERLYN/CVN   Finding Summary: CVN synced 200 j x1 attempted(cath/pci/pacer settings)        Intra Procedure Meds:    Versed: 3 mg  Fentanyl: 75 mcg  Amiodarone: 150 mg             Peripheral IV 08/23/17 Left Antecubital (Active)   Site Assessment Clean, dry, & intact 8/23/2017  1:18 PM   Phlebitis Assessment 0 8/23/2017  1:18 PM   Infiltration Assessment 0 8/23/2017  1:18 PM   Dressing Status Clean, dry, & intact 8/23/2017  1:18 PM   Hub Color/Line Status Pink 8/23/2017  1:18 PM                                is allergic to levaquin [levofloxacin] and metformin. Past Medical History:   Diagnosis Date    Arthritis     Chicken pox     Coronary artery disease 4/16/2014    DJD (degenerative joint disease)     Emphysema     Hx of aortic valve replacement, mechanical       doing well,. Had to hold 3 days for removal of skin ca. Will have to have add. resection.  Hypercholesterolemia     Hypertension     Palpitations      Holter showed  PVC's, PAC's, one short run SVT.     Pneumonia      Visit Vitals    BP 96/63    Pulse (!) 148    Temp 97.9 °F (36.6 °C)    Resp 22    Ht 5' 6\" (1.676 m)    Wt 78 kg (172 lb)    SpO2 98%    BMI 27.76 kg/m2

## 2017-08-23 NOTE — ED TRIAGE NOTES
Pt. Complains of shortness of breath and chest pain starting last night. Pt. Is a pt. Of [facility] cardiology.

## 2017-08-23 NOTE — IP AVS SNAPSHOT
[address] [place] [address] 
[phone] Patient: Heather Gillespie MRN: [id number] SARAH:[id number] Current Discharge Medication List  
  
START taking these medications Dose & Instructions Dispensing Information Comments Morning Noon Evening Bedtime  
 aztreonam 2 gram 2 g IVPB Dose:  2 g  
2 g by IntraVENous route every eight (8) hours. Indications: +GNR in [facility] Quantity:  1 Dose Refills:  0  
     
   
   
   
  
 carvedilol 3.125 mg tablet Commonly known as:  Jim Lacrosse Dose:  3.125 mg Take 1 Tab by mouth two (2) times daily (with meals). Quantity:  60 Tab Refills:  1  
     
  
   
   
  
   
  
 digoxin 0.25 mg tablet Commonly known as:  LANOXIN Dose:  0.25 mg Take 1 Tab by mouth daily for 30 days. Quantity:  30 Tab Refills:  2  
     
   
   
   
  
 ferrous sulfate 325 mg (65 mg iron) tablet Dose:  325 mg Take 1 Tab by mouth daily (with breakfast). Quantity:  30 Tab Refills:  0  
     
  
   
   
   
  
 guaiFENesin 1,200 mg Ta12 ER tablet Commonly known as:  Obie & Obie Dose:  1200 mg Take 1 Tab by mouth two (2) times a day for 14 days. Quantity:  28 Tab Refills:  1  
     
  
   
   
  
   
  
 insulin lispro 100 unit/mL injection Commonly known as:  HUMALOG See above Quantity:  1 Vial  
Refills:  0  
     
   
   
   
  
 povidone-iodine 10 % external solution Commonly known as:  BETADINE Dose:  1 mL Apply 1 mL to affected area daily for 7 days. Quantity:  1 Bottle Refills:  0  
     
   
   
   
  
 * predniSONE 20 mg tablet Commonly known as:  Graydon George Dose:  40 mg Take 2 Tabs by mouth daily (with breakfast) for 1 day. Quantity:  2 Tab Refills:  0  
     
  
   
   
   
  
 * predniSONE 10 mg tablet Commonly known as:  Graydon George Start taking on:  9/15/2017  Dose:  30 mg  
 Take 3 Tabs by mouth daily (with breakfast) for 6 days. Quantity:  18 Tab Refills:  0  
     
  
   
   
   
  
 * predniSONE 20 mg tablet Commonly known as:  Kolby Hauser Start taking on:  9/22/2017 Dose:  20 mg Take 1 Tab by mouth daily (with breakfast) for 6 days. Quantity:  6 Tab Refills:  0  
     
  
   
   
   
  
 * predniSONE 10 mg tablet Commonly known as:  Kolby Hauser Start taking on:  9/29/2017 Dose:  10 mg Take 1 Tab by mouth daily (with breakfast) for 6 days. Quantity:  6 Tab Refills:  0  
     
  
   
   
   
  
 * sodium chloride 0.9 % Commonly known as:  NS  
   
 Dose:  20 mL  
20 mL by InterCATHeter route every eight (8) hours. Quantity:  1 Syringe Refills:  0  
     
   
   
   
  
 * sodium chloride 0.9 % Commonly known as:  NS  
   
 Dose:  20 mL  
20 mL by InterCATHeter route as needed. Quantity:  1 Syringe Refills:  0  
     
   
   
   
  
 traZODone 50 mg tablet Commonly known as:  Brooke Deeds Dose:  50 mg Take 1 Tab by mouth nightly as needed for Sleep. Quantity:  40 Tab Refills:  0  
     
   
   
   
  
  
 vancomycin in 0.9% Sodium Cl 1.25 gram/250 mL Soln Dose:  1250 mg  
250 mL by IntraVENous route every twelve (12) hours every twelve (12) hours. Indications: +GNR in [facility] Quantity:  1 mL Refills:  0  
     
  
   
   
   
  
 * Notice: This list has 6 medication(s) that are the same as other medications prescribed for you. Read the directions carefully, and ask your doctor or other care provider to review them with you. CONTINUE these medications which have NOT CHANGED Dose & Instructions Dispensing Information Comments Morning Noon Evening Bedtime  
 budesonide-formoterol 160-4.5 mcg/actuation HFA inhaler Commonly known as:  SYMBICORT Dose:  2 Puff Take 2 Puffs by inhalation. Refills:  0  
     
   
   
   
  
 flunisolide 25 mcg (0.025 %) Spry Commonly known as:  NASAREL  
   
 Dose:  2 Spray 2 Sprays by Nasal route. Refills:  0  
     
   
   
   
  
 furosemide 40 mg tablet Commonly known as:  LASIX Dose:  40 mg Take 1 Tab by mouth daily. Quantity:  30 Tab Refills:  1  
     
  
   
   
   
  
 gabapentin 300 mg capsule Commonly known as:  NEURONTIN Dose:  600 mg Take 2 Caps by mouth three (3) times daily for 30 days. Quantity:  180 Cap Refills:  2 Omeprazole delayed release 20 mg tablet Commonly known as:  PRILOSEC D/R Dose:  20 mg Take 20 mg by mouth. Refills:  0  
     
  
   
   
   
  
 potassium chloride 10 mEq tablet Commonly known as:  KLOR-CON Dose:  10 mEq Take 1 Tab by mouth daily. Quantity:  30 Tab Refills:  1 PROVENTIL HFA 90 mcg/actuation inhaler Generic drug:  albuterol Dose:  2 Puff Take 2 Puffs by inhalation. Refills:  0  
     
   
   
   
  
 simvastatin 80 mg tablet Commonly known as:  ZOCOR Dose:  80 mg Take 1 Tab by mouth nightly. Quantity:  90 Tab Refills:  1  
     
   
   
   
  
  
 tiotropium 18 mcg inhalation capsule Commonly known as:  Otilio Busman Dose:  1 Puff Take 1 Puff by inhalation. Refills:  0 STOP taking these medications   
 glipiZIDE 10 mg tablet Commonly known as:  GLUCOTROL  
   
  
 hydroCHLOROthiazide 12.5 mg tablet Commonly known as:  HYDRODIURIL  
   
  
 losartan 100 mg tablet Commonly known as:  COZAAR  
   
  
 warfarin 5 mg tablet Commonly known as:  COUMADIN Where to Get Your Medications Information on where to get these meds will be given to you by the nurse or doctor. ! Ask your nurse or doctor about these medications  
  aztreonam 2 gram 2 g IVPB  
 carvedilol 3.125 mg tablet  
 digoxin 0.25 mg tablet  
 ferrous sulfate 325 mg (65 mg iron) tablet  
 gabapentin 300 mg capsule  
 guaiFENesin 1,200 mg Ta12 ER tablet insulin lispro 100 unit/mL injection  
 povidone-iodine 10 % external solution  
 predniSONE 10 mg tablet  
 predniSONE 10 mg tablet  
 predniSONE 20 mg tablet  
 predniSONE 20 mg tablet  
 sodium chloride 0.9 %  
 sodium chloride 0.9 %  
 traZODone 50 mg tablet  
 vancomycin in 0.9% Sodium Cl 1.25 gram/250 mL Soln

## 2017-08-23 NOTE — H&P
[facility] History & Physical      Date of  Admission: 8/23/2017  1:11 PM     Primary Care Physician: [address] [place] [address]  Primary Cardiologist: Dr. Torrey Jones  Referring Physician: Dr. Noe Shock  Admitting Physician: Dr. Moriah Avilez    CC: dyspnea    HPI:  Trinidad Snow is a 67 y.o. WM with h/o mechanical AVR 2000 on chronic AC with coumadin, chronic systolic CHF/ICM EF 48%, COPD, PVD, DM II, HTN and dyslipidemia who developed worsening dyspnea on monday 8/21. He presented to the hospital today reporting worsening SOB and was found to be in atrial flutter with 's/ He was given Cardizem 10 mg IV bolus followed by IV drip 5 mg/hr. He is still in 140's with lower BP and [facility]iology was called to evaluate for admission. Pt reports his pulse oximetry monitor showed  on Monday and remained high until today. He felt he had COPD exacerbation and treated it with repeat inhaler and nebulizer treatments. He also reports h/o PRATIK with iron started last year by the 2000 [facility]. He has been having dark stools but yesterday had rapid onset of uncontrollable copious amount of diarrhea that was black. CBC still pending. He reportedly had a LHC prior to AVR in 2000 that showed no CAD. He had nuclear stress test 11/2016 showing EF 31%, large inferolateral and anteroapical scar. Past Medical History:   Diagnosis Date    Arthritis     Chicken pox     Coronary artery disease 4/16/2014    DJD (degenerative joint disease)     Emphysema     Hx of aortic valve replacement, mechanical       doing well,. Had to hold 3 days for removal of skin ca. Will have to have add. resection.  Hypercholesterolemia     Hypertension     Palpitations      Holter showed  PVC's, PAC's, one short run SVT.     Pneumonia       Past Surgical History:   Procedure Laterality Date    HX AORTIC VALVE REPLACEMENT N/A 2000    Aortic Valve Replacement w/ Mechanical Valve    HX HEART CATHETERIZATION  07/21/2004    [facility]       Allergies Allergen Reactions    Levaquin [Levofloxacin] Other (comments)     GI upset    Metformin Other (comments)     Gi upset      Social History     Social History    Marital status:      Spouse name: N/A    Number of children: N/A    Years of education: N/A     Occupational History    Not on file. Social History Main Topics    Smoking status: Former Smoker     Packs/day: 1.50     Years: 40.00    Smokeless tobacco: Never Used    Alcohol use No    Drug use: No    Sexual activity: No     Other Topics Concern    Not on file     Social History Narrative     No family history on file. Current Facility-Administered Medications   Medication Dose Route Frequency    dilTIAZem (CARDIZEM) injection 10 mg  10 mg IntraVENous ONCE    dilTIAZem (CARDIZEM) 100 mg in 0.9% sodium chloride (MBP/ADV) 100 mL infusion  0-15 mg/hr IntraVENous TITRATE    . PHARMACY TO SUBSTITUTE PER PROTOCOL    Per Protocol    gabapentin (NEURONTIN) capsule 600 mg  600 mg Oral TID    glipiZIDE (GLUCOTROL) tablet 10 mg  10 mg Oral BID    albuterol (PROVENTIL HFA, VENTOLIN HFA, PROAIR HFA) inhaler 2 Puff  2 Puff Inhalation Q4H PRN    [START ON 8/24/2017] pantoprazole (PROTONIX) tablet 40 mg  40 mg Oral ACB    [START ON 8/24/2017] tiotropium (SPIRIVA) inhalation capsule 18 mcg  1 Cap Inhalation DAILY    sodium chloride (NS) flush 5-10 mL  5-10 mL IntraVENous Q8H    sodium chloride (NS) flush 5-10 mL  5-10 mL IntraVENous PRN    [START ON 8/24/2017] aspirin chewable tablet 81 mg  81 mg Oral DAILY    nitroglycerin (NITROSTAT) tablet 0.4 mg  0.4 mg SubLINGual Q5MIN PRN    morphine injection 2 mg  2 mg IntraVENous Q4H PRN    ondansetron (ZOFRAN) injection 4 mg  4 mg IntraVENous Q4H PRN    insulin lispro (HUMALOG) injection   SubCUTAneous AC&HS    0.9% sodium chloride infusion  75 mL/hr IntraVENous CONTINUOUS     Current Outpatient Prescriptions   Medication Sig    gabapentin (NEURONTIN) 300 mg capsule Take 600 mg by mouth three (3) times daily.  glipiZIDE (GLUCOTROL) 10 mg tablet Take 10 mg by mouth two (2) times a day.  furosemide (LASIX) 40 mg tablet Take 1 Tab by mouth daily.  potassium chloride (K-DUR, KLOR-CON) 10 mEq tablet Take 1 Tab by mouth daily.  budesonide-formoterol (SYMBICORT) 160-4.5 mcg/actuation HFA inhaler Take 2 Puffs by inhalation.  Omeprazole delayed release (PRILOSEC D/R) 20 mg tablet Take 20 mg by mouth.  flunisolide (NASAREL) 25 mcg (0.025 %) spry 2 Sprays by Nasal route.  albuterol (PROVENTIL HFA) 90 mcg/actuation inhaler Take 2 Puffs by inhalation.  tiotropium (SPIRIVA) 18 mcg inhalation capsule Take 1 Puff by inhalation.  warfarin (COUMADIN) 5 mg tablet Take one 5 mg tablet daily (except 1-1/2 tablet on Wednesday    losartan (COZAAR) 100 mg tablet Take 1 Tab by mouth daily.  Hydrochlorothiazide 12.5 mg tablet Take 12.5 mg by mouth daily.  simvastatin (ZOCOR) 80 mg tablet Take 1 Tab by mouth nightly.        Review of symptoms:  General: no recent weight loss/gain, +weakness/fatigue, no fever or chills   Skin: no rashes, lumps, or other skin changes   HEENT: no headache, dizziness, lightheadedness, vision changes, hearing changes, tinnitus, vertigo, sinus pressure/pain, bleeding gums, sore throat, or hoarseness   Neck: no swollen glands, goiter, pain or stiffness   Respiratory: no cough, sputum, hemoptysis, +dyspnea, +wheezing   Cardiovascular: no chest pain or discomfort, +palpitations, +dyspnea, +orthopnea, no paroxysmal nocturnal dyspnea,+ peripheral edema   Gastrointestinal: no trouble swallowing, heartburn, change of appetite, nausea, change in bowel habits, pain with defecation, rectal bleeding or +black/tarry stools, hemorrhoids, constipation, +diarrhea, abdominal pain, jaundice, liver or gallbladder problems   Urinary: no frequency, urgency , hematuria, burning/pain with urination, recent flank pain, polyuria, nocturia, or difficulty urinating   Peripheral Vascular: +claudication, leg cramps, prior DVTs, swelling of calves, legs, or feet, color change, or swelling with redness or tenderness   Musculoskeletal: no muscle or joint pain/stiffness, joint swelling, erythema of joints, or back pain   Psychiatric: no depression, mental disorders, or excessive stress   Neurological: no history of CVA, dizziness, no sensory or motor loss, seizures, syncope, tremors, numbness, tingling, no changes in mood, attention, or speech, no changes in orientation, memory, insight, or judgment. no headache, vertigo. Hematologic: + ID anemia, easy bruising or bleeding   Endocrine: +diabetes, thyroid problems, heat or cold intolerance, excessive sweating, polyuria, polydipsia      Subjective:   Physical Exam    Visit Vitals    BP 96/63    Pulse (!) 148    Temp 97.9 °F (36.6 °C)    Resp 22    Ht 5' 6\" (1.676 m)    Wt 78 kg (172 lb)    SpO2 98%    BMI 27.76 kg/m2     General Appearance:  Well developed, well nourished, alert and oriented x 3, and individual in no acute distress. Appears dyspneic   Ears/Nose/Mouth/Throat:   Hearing grossly normal.         Neck: Supple. Chest:   Lungs with diminished BS bilaterally. Mild wheezing   Cardiovascular:  Rapid regular rate and rhythm, S1, S2 normal, +mechanical click   Abdomen:   Soft, non-tender, bowel sounds are active. Extremities: 1+ edema bilaterally. Skin: Warm and dry.                  Cardiographics  Telemetry: atrial flutter  ECG: atrial flutter with RVR RBBB  Echocardiogram: 8/2017 EF 40%    Labs:   Recent Results (from the past 24 hour(s))   EKG, 12 LEAD, INITIAL    Collection Time: 08/23/17 12:58 PM   Result Value Ref Range    Ventricular Rate 147 BPM    Atrial Rate 294 BPM    QRS Duration 144 ms    Q-T Interval 350 ms    QTC Calculation (Bezet) 547 ms    Calculated P Axis -81 degrees    Calculated R Axis 117 degrees    Calculated T Axis -55 degrees    Diagnosis       Atrial flutter with 2:1 A-V conduction  Right bundle branch block  Abnormal ECG  No previous ECGs available     METABOLIC PANEL, COMPREHENSIVE    Collection Time: 08/23/17  1:05 PM   Result Value Ref Range    Sodium 140 136 - 145 mmol/L    Potassium 4.4 3.5 - 5.1 mmol/L    Chloride 100 98 - 107 mmol/L    CO2 29 21 - 32 mmol/L    Anion gap 11 7 - 16 mmol/L    Glucose 117 (H) 65 - 100 mg/dL    BUN 26 (H) 8 - 23 MG/DL    Creatinine 1.50 0.8 - 1.5 MG/DL    GFR est AA 59 (L) >60 ml/min/1.73m2    GFR est non-AA 49 (L) >60 ml/min/1.73m2    Calcium 9.1 8.3 - 10.4 MG/DL    Bilirubin, total 0.8 0.2 - 1.1 MG/DL    ALT (SGPT) 30 12 - 65 U/L    AST (SGOT) 25 15 - 37 U/L    Alk. phosphatase 86 50 - 136 U/L    Protein, total 7.4 6.3 - 8.2 g/dL    Albumin 3.6 3.2 - 4.6 g/dL    Globulin 3.8 (H) 2.3 - 3.5 g/dL    A-G Ratio 0.9 (L) 1.2 - 3.5     TROPONIN I    Collection Time: 08/23/17  1:05 PM   Result Value Ref Range    Troponin-I, Qt. 0.12 (HH) 0.02 - 0.05 NG/ML   BNP    Collection Time: 08/23/17  1:05 PM   Result Value Ref Range     pg/mL   PROTHROMBIN TIME + INR    Collection Time: 08/23/17  1:05 PM   Result Value Ref Range    Prothrombin time 37.0 (H) 9.6 - 12.0 sec    INR 3.4 (H) 0.9 - 1.2     POC TROPONIN-I    Collection Time: 08/23/17  1:10 PM   Result Value Ref Range    Troponin-I (POC) 0.1 (H) 0.0 - 0.08 ng/ml   EKG, 12 LEAD, INITIAL    Collection Time: 08/23/17  1:27 PM   Result Value Ref Range    Ventricular Rate 130 BPM    Atrial Rate 357 BPM    QRS Duration 144 ms    Q-T Interval 346 ms    QTC Calculation (Bezet) 509 ms    Calculated R Axis 122 degrees    Calculated T Axis -24 degrees    Diagnosis       !! AGE AND GENDER SPECIFIC ECG ANALYSIS !! Atrial fibrillation with rapid ventricular response with premature   ventricular or aberrantly conducted complexes  Right bundle branch block  Left posterior fascicular block  !!!  Bifascicular block !!!  T wave abnormality, consider inferior ischemia or digitalis effect  Abnormal ECG  When compared with ECG of 23-AUG-2017 12:58,  Atrial fibrillation has replaced Atrial flutter  ST no longer depressed in Inferior leads  T wave inversion more evident in Inferior leads     EKG, 12 LEAD, INITIAL    Collection Time: 08/23/17  1:29 PM   Result Value Ref Range    Ventricular Rate 124 BPM    Atrial Rate 151 BPM    QRS Duration 150 ms    Q-T Interval 358 ms    QTC Calculation (Bezet) 514 ms    Calculated P Axis 88 degrees    Calculated R Axis 125 degrees    Calculated T Axis -33 degrees    Diagnosis       !! AGE AND GENDER SPECIFIC ECG ANALYSIS !! Sinus tachycardia  Right bundle branch block  Left posterior fascicular block  !!! Bifascicular block !!!   Possible Inferior infarct , age undetermined  Abnormal ECG  When compared with ECG of 23-AUG-2017 13:27,  Sinus rhythm has replaced Atrial fibrillation         Pt has been seen and examined by Dr. Marycruz Vasquez and he agrees with the following assessment and plan:     Assessment/Plan:          Diagnosis    Atrial flutter with rapid ventricular response ([facility] [address]- new diagnosis, admit to telemetry with plan for MERLYN eCV today, on IV Cardizem, may treat with Sotalol post eCV, on coumadin with INR 3.4 today will hold for possible LHC, heparin with KQJ<9.2    Systolic CHF, chronic ([address]- EF 40%- holding ARB and lasix with cardizem drip and dehydration with diarrhea, monitor    Ischemic cardiomyopathy EF 40%- holding ARB and lasix with cardizem drip and dehydration with diarrhea, monitor    History of mechanical aortic valve replacement- on coumadin with INR 3.4 today will hold for possible LHC soon, will need heparin when INR<2.5    Diarrhea- dark stools yesterday, IVF, CBC pending    Chronic bronchitis ([facility] [address]- continue home Spiriva, inhaler and nebs    Dyslipidemia- statin    HTN (hypertension), benign- low BP with cardizem bolus and drip, IVF, hold home Cozaar and diuretics    Peripheral vascular disease ([address]- ASA    PRATIK- on iron with black stools, CBC pending       Marilu Santos PA-C    Patient seen and examined by me. Agree with above note by physician extender. Key findings are:    CV- IRR with mechanical click  Lungs- Clear bilaterally  Abdomen- soft, non-tender, normal bowel sounds  Extremities- no edema    Plan:  MERLYN/eCV as hypotensive with rate control therapy.   Start amio if converts      Alicja Rios MD

## 2017-08-24 LAB
GLUCOSE BLD STRIP.AUTO-MCNC: 109 MG/DL (ref 65–100)
GLUCOSE BLD STRIP.AUTO-MCNC: 72 MG/DL (ref 65–100)
GLUCOSE BLD STRIP.AUTO-MCNC: 94 MG/DL (ref 65–100)
GLUCOSE BLD STRIP.AUTO-MCNC: 99 MG/DL (ref 65–100)
INR PPP: 3.4 (ref 0.9–1.2)
PROTHROMBIN TIME: 37 SEC (ref 9.6–12)

## 2017-08-24 PROCEDURE — 74011250636 HC RX REV CODE- 250/636: Performed by: INTERNAL MEDICINE

## 2017-08-24 PROCEDURE — 94760 N-INVAS EAR/PLS OXIMETRY 1: CPT

## 2017-08-24 PROCEDURE — 65660000000 HC RM CCU STEPDOWN

## 2017-08-24 PROCEDURE — 82962 GLUCOSE BLOOD TEST: CPT

## 2017-08-24 PROCEDURE — 74011000250 HC RX REV CODE- 250: Performed by: INTERNAL MEDICINE

## 2017-08-24 PROCEDURE — 36415 COLL VENOUS BLD VENIPUNCTURE: CPT | Performed by: PHYSICIAN ASSISTANT

## 2017-08-24 PROCEDURE — 85610 PROTHROMBIN TIME: CPT | Performed by: PHYSICIAN ASSISTANT

## 2017-08-24 PROCEDURE — 94640 AIRWAY INHALATION TREATMENT: CPT

## 2017-08-24 PROCEDURE — 74011250637 HC RX REV CODE- 250/637: Performed by: INTERNAL MEDICINE

## 2017-08-24 PROCEDURE — 74011250637 HC RX REV CODE- 250/637: Performed by: NURSE PRACTITIONER

## 2017-08-24 PROCEDURE — 74011000258 HC RX REV CODE- 258: Performed by: INTERNAL MEDICINE

## 2017-08-24 PROCEDURE — 77010033678 HC OXYGEN DAILY

## 2017-08-24 RX ORDER — WARFARIN 2.5 MG/1
5 TABLET ORAL
Status: DISCONTINUED | OUTPATIENT
Start: 2017-08-24 | End: 2017-08-30

## 2017-08-24 RX ORDER — AMIODARONE HYDROCHLORIDE 200 MG/1
200 TABLET ORAL 2 TIMES DAILY
Status: DISCONTINUED | OUTPATIENT
Start: 2017-08-24 | End: 2017-08-30

## 2017-08-24 RX ORDER — ACETAMINOPHEN 325 MG/1
650 TABLET ORAL
Status: DISCONTINUED | OUTPATIENT
Start: 2017-08-24 | End: 2017-09-11

## 2017-08-24 RX ORDER — FUROSEMIDE 10 MG/ML
40 INJECTION INTRAMUSCULAR; INTRAVENOUS 2 TIMES DAILY
Status: DISCONTINUED | OUTPATIENT
Start: 2017-08-24 | End: 2017-08-27

## 2017-08-24 RX ADMIN — ALBUTEROL SULFATE 2.5 MG: 2.5 SOLUTION RESPIRATORY (INHALATION) at 19:20

## 2017-08-24 RX ADMIN — GLIPIZIDE 10 MG: 5 TABLET ORAL at 08:50

## 2017-08-24 RX ADMIN — ACETAMINOPHEN 650 MG: 325 TABLET ORAL at 11:58

## 2017-08-24 RX ADMIN — ALBUTEROL SULFATE 2.5 MG: 2.5 SOLUTION RESPIRATORY (INHALATION) at 06:07

## 2017-08-24 RX ADMIN — AMIODARONE HYDROCHLORIDE 200 MG: 200 TABLET ORAL at 21:25

## 2017-08-24 RX ADMIN — GABAPENTIN 600 MG: 300 CAPSULE ORAL at 21:23

## 2017-08-24 RX ADMIN — WARFARIN SODIUM 5 MG: 2.5 TABLET ORAL at 21:26

## 2017-08-24 RX ADMIN — AMIODARONE HYDROCHLORIDE 200 MG: 200 TABLET ORAL at 09:01

## 2017-08-24 RX ADMIN — FUROSEMIDE 40 MG: 10 INJECTION, SOLUTION INTRAMUSCULAR; INTRAVENOUS at 09:01

## 2017-08-24 RX ADMIN — GABAPENTIN 600 MG: 300 CAPSULE ORAL at 14:48

## 2017-08-24 RX ADMIN — WARFARIN SODIUM 5 MG: 2.5 TABLET ORAL at 00:15

## 2017-08-24 RX ADMIN — Medication 5 ML: at 21:27

## 2017-08-24 RX ADMIN — BUDESONIDE 500 MCG: 0.5 INHALANT RESPIRATORY (INHALATION) at 06:06

## 2017-08-24 RX ADMIN — ALBUTEROL SULFATE 2.5 MG: 2.5 SOLUTION RESPIRATORY (INHALATION) at 13:48

## 2017-08-24 RX ADMIN — BUDESONIDE 500 MCG: 0.5 INHALANT RESPIRATORY (INHALATION) at 19:20

## 2017-08-24 RX ADMIN — AMIODARONE HYDROCHLORIDE 1 MG/MIN: 50 INJECTION, SOLUTION INTRAVENOUS at 02:07

## 2017-08-24 RX ADMIN — GABAPENTIN 600 MG: 300 CAPSULE ORAL at 05:40

## 2017-08-24 RX ADMIN — GLIPIZIDE 10 MG: 5 TABLET ORAL at 17:29

## 2017-08-24 RX ADMIN — FUROSEMIDE 40 MG: 10 INJECTION, SOLUTION INTRAMUSCULAR; INTRAVENOUS at 17:29

## 2017-08-24 RX ADMIN — PANTOPRAZOLE SODIUM 40 MG: 40 TABLET, DELAYED RELEASE ORAL at 09:01

## 2017-08-24 RX ADMIN — Medication 10 ML: at 14:00

## 2017-08-24 NOTE — PROGRESS NOTES
8/24/2017 6:42 AM    Admit Date: 8/23/2017    Admit Diagnosis: Atrial flutter with rapid ventricular response (HCC)      Subjective:    Patient remains in nsr. He is sob.     Objective:      Visit Vitals    /79 (BP 1 Location: Left arm, BP Patient Position: At rest)    Pulse 84    Temp 97.5 °F (36.4 °C)    Resp 16    Ht 5' 6\" (1.676 m)    Wt 79.4 kg (175 lb)    SpO2 98%    BMI 28.25 kg/m2       ROS:  General ROS: negative for - chills  Hematological and Lymphatic ROS: negative for - blood clots or jaundice  Respiratory ROS: positive for - shortness of breath  Cardiovascular ROS: positive for - dyspnea on exertion  Gastrointestinal ROS: no abdominal pain, change in bowel habits, or black or bloody stools  Neurological ROS: no TIA or stroke symptoms    Physical Exam:    Physical Examination: General appearance - alert, well appearing, and in no distress  Mental status - alert, oriented to person, place, and time  Eyes - pupils equal and reactive, extraocular eye movements intact  Neck/lymph - supple, no significant adenopathy  Chest/CV - clear to auscultation, no wheezes, rales or rhonchi, symmetric air entry  Heart - normal rate, regular rhythm, normal S1, S2, no murmurs, rubs, clicks or gallops  Abdomen/GI - soft, nontender, nondistended, no masses or organomegaly  Musculoskeletal - no joint tenderness, deformity or swelling  Extremities - peripheral pulses normal, no pedal edema, no clubbing or cyanosis  Skin - normal coloration and turgor, no rashes, no suspicious skin lesions noted    Current Facility-Administered Medications   Medication Dose Route Frequency    warfarin (COUMADIN) tablet 5 mg  5 mg Oral QHS    gabapentin (NEURONTIN) capsule 600 mg  600 mg Oral TID    glipiZIDE (GLUCOTROL) tablet 10 mg  10 mg Oral BID    albuterol (PROVENTIL HFA, VENTOLIN HFA, PROAIR HFA) inhaler 2 Puff  2 Puff Inhalation Q4H PRN    pantoprazole (PROTONIX) tablet 40 mg  40 mg Oral ACB    tiotropium (SPIRIVA) inhalation capsule 18 mcg  1 Cap Inhalation DAILY    sodium chloride (NS) flush 5-10 mL  5-10 mL IntraVENous Q8H    sodium chloride (NS) flush 5-10 mL  5-10 mL IntraVENous PRN    aspirin chewable tablet 81 mg  81 mg Oral DAILY    nitroglycerin (NITROSTAT) tablet 0.4 mg  0.4 mg SubLINGual Q5MIN PRN    morphine injection 2 mg  2 mg IntraVENous Q4H PRN    ondansetron (ZOFRAN) injection 4 mg  4 mg IntraVENous Q4H PRN    insulin lispro (HUMALOG) injection   SubCUTAneous AC&HS    midazolam (VERSED) injection 0.5-5 mg  0.5-5 mg IntraVENous Multiple    fentaNYL citrate (PF) injection 25-50 mcg  25-50 mcg IntraVENous Multiple    amiodarone (CORDARONE) injection 150 mg  150 mg IntraVENous Multiple    budesonide (PULMICORT) 500 mcg/2 ml nebulizer suspension  500 mcg Nebulization BID RT    And    albuterol CONCENTRATE 2.5mg/0.5 mL neb soln  2.5 mg Nebulization Q6H RT    amiodarone (CORDARONE) 450 mg in dextrose 5% 250 mL infusion  1 mg/min IntraVENous CONTINUOUS       Data Review:   @LABRCNT(Na,K,BUN,CREA,WBC,HGB,HCT,PLT,INR,TRP,TCHOL*,Triglyceride*,LDL*,LDLCPOC HDL*,HDL])@    TELEMETRY: nsr PAC    Assessment/Plan:     Principal Problem:    Atrial flutter with rapid ventricular response (HCC) (8/23/2017) sp cardioversion. Amiodarone load    Active Problems:    Anticoagulant long-term use (4/15/2014)      HTN (hypertension), benign (4/15/2014)The current medical regimen is effective;  continue present plan and medications.         Peripheral vascular disease ([facility] [address] (4/15/2014)      Dyslipidemia (4/16/2014)      History of mechanical aortic valve replacement (9/15/2016) on coumadin      Chronic bronchitis ([facility] [address] (9/15/2016)      Ischemic cardiomyopathy (11/15/2016)      Atrial flutter with rapid ventricular response ([facility] [address] (8/23/2017)      Change to po amiodarone and add lasix for sob    Venus Delaney MD

## 2017-08-24 NOTE — PROGRESS NOTES
Bedside and Verbal shift change report given to rAt Carver (oncoming nurse) by self (offgoing nurse). Report included the following information SBAR, Kardex, MAR and Recent Results.

## 2017-08-24 NOTE — PROGRESS NOTES
Bedside and Verbal shift change report given to Vin Sotelo RN (oncoming nurse) by Shawn Yarbrough RN (offgoing nurse). Report included the following information SBAR, Kardex, MAR and Recent Results.

## 2017-08-24 NOTE — PROGRESS NOTES
Bedside and Verbal shift change report given to self (oncoming nurse) by Claudia Tobias (offgoing nurse). Report included the following information SBAR, Kardex, MAR and Recent Results.

## 2017-08-24 NOTE — PROGRESS NOTES
Problem: Falls - Risk of  Goal: *Absence of Falls  Document Lauren Fall Risk and appropriate interventions in the flowsheet. Outcome: Progressing Towards Goal  Fall precautions in place. Gripper socks on. Instructed to only get OOB with assistance. Voiced understanding. Call light in reach.      Fall Risk Interventions:  Mobility Interventions: Bed/chair exit alarm, Communicate number of staff needed for ambulation/transfer, Patient to call before getting OOB, PT Consult for mobility concerns, PT Consult for assist device competence, Strengthening exercises (ROM-active/passive), Utilize walker, cane, or other assitive device     Medication Interventions: Bed/chair exit alarm, Patient to call before getting OOB, Evaluate medications/consider consulting pharmacy, Teach patient to arise slowly     Elimination Interventions: Bed/chair exit alarm, Call light in reach, Patient to call for help with toileting needs, Toilet paper/wipes in reach, Toileting schedule/hourly rounds, Urinal in reach

## 2017-08-24 NOTE — PROGRESS NOTES
Patient had MERLYN/CV today and was previously on coumadin, which was on hold for possible heart cath. Per the last note from MD Macario Sparks patient was to be put back on warfarin, however warfarin had not been started back. NISREEN Celaya Monday notified, order to start warfarin 5 mg daily and include a now dose, orders for amiodarone clarified and patient is to run at 1mg/min continuously.

## 2017-08-24 NOTE — PROGRESS NOTES
Spiritual Care visit. Initial Visit. Visited and prayed with patient and family member.     Visit by Joan Choe M.Ed., Th.B. ,Staff

## 2017-08-25 ENCOUNTER — APPOINTMENT (OUTPATIENT)
Dept: ULTRASOUND IMAGING | Age: 72
DRG: 308 | End: 2017-08-25
Attending: NURSE PRACTITIONER
Payer: MEDICARE

## 2017-08-25 LAB
ANION GAP SERPL CALC-SCNC: 7 MMOL/L (ref 7–16)
APPEARANCE UR: ABNORMAL
BACTERIA URNS QL MICRO: 0 /HPF
BILIRUB UR QL: NEGATIVE
BUN SERPL-MCNC: 34 MG/DL (ref 8–23)
CALCIUM SERPL-MCNC: 8.8 MG/DL (ref 8.3–10.4)
CASTS URNS QL MICRO: ABNORMAL /LPF
CHLORIDE SERPL-SCNC: 96 MMOL/L (ref 98–107)
CO2 SERPL-SCNC: 31 MMOL/L (ref 21–32)
COLOR UR: YELLOW
CREAT SERPL-MCNC: 1.83 MG/DL (ref 0.8–1.5)
D DIMER PPP FEU-MCNC: 0.33 UG/ML(FEU)
EPI CELLS #/AREA URNS HPF: 0 /HPF
ERYTHROCYTE [DISTWIDTH] IN BLOOD BY AUTOMATED COUNT: 16 % (ref 11.9–14.6)
FERRITIN SERPL-MCNC: 96 NG/ML (ref 8–388)
FIBRINOGEN PPP-MCNC: 498 MG/DL (ref 172–437)
GLUCOSE BLD STRIP.AUTO-MCNC: 108 MG/DL (ref 65–100)
GLUCOSE BLD STRIP.AUTO-MCNC: 38 MG/DL (ref 65–100)
GLUCOSE BLD STRIP.AUTO-MCNC: 44 MG/DL (ref 65–100)
GLUCOSE BLD STRIP.AUTO-MCNC: 47 MG/DL (ref 65–100)
GLUCOSE BLD STRIP.AUTO-MCNC: 82 MG/DL (ref 65–100)
GLUCOSE BLD STRIP.AUTO-MCNC: 88 MG/DL (ref 65–100)
GLUCOSE BLD STRIP.AUTO-MCNC: 97 MG/DL (ref 65–100)
GLUCOSE SERPL-MCNC: 77 MG/DL (ref 65–100)
GLUCOSE UR STRIP.AUTO-MCNC: NEGATIVE MG/DL
HAPTOGLOB SERPL-MCNC: 47 MG/DL (ref 30–200)
HCT VFR BLD AUTO: 40.3 % (ref 41.1–50.3)
HGB BLD-MCNC: 12.7 G/DL (ref 13.6–17.2)
HGB UR QL STRIP: ABNORMAL
INR PPP: 4 (ref 0.9–1.2)
IRON SATN MFR SERPL: 13 %
IRON SERPL-MCNC: 47 UG/DL (ref 35–150)
KETONES UR QL STRIP.AUTO: NEGATIVE MG/DL
LDH SERPL L TO P-CCNC: 444 U/L (ref 110–210)
LEUKOCYTE ESTERASE UR QL STRIP.AUTO: NEGATIVE
MCH RBC QN AUTO: 29 PG (ref 26.1–32.9)
MCHC RBC AUTO-ENTMCNC: 31.5 G/DL (ref 31.4–35)
MCV RBC AUTO: 92 FL (ref 79.6–97.8)
NITRITE UR QL STRIP.AUTO: NEGATIVE
PH UR STRIP: 6 [PH] (ref 5–9)
PLATELET # BLD AUTO: 30 K/UL (ref 150–450)
PMV BLD AUTO: ABNORMAL FL (ref 10.8–14.1)
POTASSIUM SERPL-SCNC: 4.5 MMOL/L (ref 3.5–5.1)
PROT UR STRIP-MCNC: NEGATIVE MG/DL
PROTHROMBIN TIME: 43.6 SEC (ref 9.6–12)
RBC # BLD AUTO: 4.38 M/UL (ref 4.23–5.67)
RBC #/AREA URNS HPF: ABNORMAL /HPF
SODIUM SERPL-SCNC: 134 MMOL/L (ref 136–145)
SP GR UR REFRACTOMETRY: 1.02 (ref 1–1.02)
TIBC SERPL-MCNC: 369 UG/DL (ref 250–450)
UROBILINOGEN UR QL STRIP.AUTO: 0.2 EU/DL (ref 0.2–1)
WBC # BLD AUTO: 8.3 K/UL (ref 4.3–11.1)
WBC URNS QL MICRO: ABNORMAL /HPF

## 2017-08-25 PROCEDURE — 76700 US EXAM ABDOM COMPLETE: CPT

## 2017-08-25 PROCEDURE — 87040 BLOOD CULTURE FOR BACTERIA: CPT | Performed by: PHYSICIAN ASSISTANT

## 2017-08-25 PROCEDURE — 85610 PROTHROMBIN TIME: CPT | Performed by: PHYSICIAN ASSISTANT

## 2017-08-25 PROCEDURE — 80048 BASIC METABOLIC PNL TOTAL CA: CPT | Performed by: PHYSICIAN ASSISTANT

## 2017-08-25 PROCEDURE — 74011000250 HC RX REV CODE- 250: Performed by: INTERNAL MEDICINE

## 2017-08-25 PROCEDURE — 74011250637 HC RX REV CODE- 250/637: Performed by: INTERNAL MEDICINE

## 2017-08-25 PROCEDURE — 83010 ASSAY OF HAPTOGLOBIN QUANT: CPT | Performed by: NURSE PRACTITIONER

## 2017-08-25 PROCEDURE — 85384 FIBRINOGEN ACTIVITY: CPT | Performed by: NURSE PRACTITIONER

## 2017-08-25 PROCEDURE — 81001 URINALYSIS AUTO W/SCOPE: CPT | Performed by: PHYSICIAN ASSISTANT

## 2017-08-25 PROCEDURE — 97162 PT EVAL MOD COMPLEX 30 MIN: CPT

## 2017-08-25 PROCEDURE — 85027 COMPLETE CBC AUTOMATED: CPT | Performed by: PHYSICIAN ASSISTANT

## 2017-08-25 PROCEDURE — 94640 AIRWAY INHALATION TREATMENT: CPT

## 2017-08-25 PROCEDURE — 83540 ASSAY OF IRON: CPT | Performed by: NURSE PRACTITIONER

## 2017-08-25 PROCEDURE — 77010033678 HC OXYGEN DAILY

## 2017-08-25 PROCEDURE — 77030012341 HC CHMB SPCR OPTC MDI VYRM -A

## 2017-08-25 PROCEDURE — 36415 COLL VENOUS BLD VENIPUNCTURE: CPT | Performed by: PHYSICIAN ASSISTANT

## 2017-08-25 PROCEDURE — 74011000250 HC RX REV CODE- 250

## 2017-08-25 PROCEDURE — 85379 FIBRIN DEGRADATION QUANT: CPT | Performed by: NURSE PRACTITIONER

## 2017-08-25 PROCEDURE — 82728 ASSAY OF FERRITIN: CPT | Performed by: NURSE PRACTITIONER

## 2017-08-25 PROCEDURE — 65660000000 HC RM CCU STEPDOWN

## 2017-08-25 PROCEDURE — 87086 URINE CULTURE/COLONY COUNT: CPT | Performed by: PHYSICIAN ASSISTANT

## 2017-08-25 PROCEDURE — 94760 N-INVAS EAR/PLS OXIMETRY 1: CPT

## 2017-08-25 PROCEDURE — 99223 1ST HOSP IP/OBS HIGH 75: CPT | Performed by: INTERNAL MEDICINE

## 2017-08-25 PROCEDURE — 82962 GLUCOSE BLOOD TEST: CPT

## 2017-08-25 PROCEDURE — 97110 THERAPEUTIC EXERCISES: CPT

## 2017-08-25 PROCEDURE — 83615 LACTATE (LD) (LDH) ENZYME: CPT | Performed by: NURSE PRACTITIONER

## 2017-08-25 RX ORDER — LANOLIN ALCOHOL/MO/W.PET/CERES
1 CREAM (GRAM) TOPICAL
Status: DISCONTINUED | OUTPATIENT
Start: 2017-08-26 | End: 2017-09-13 | Stop reason: HOSPADM

## 2017-08-25 RX ORDER — GLIPIZIDE 5 MG/1
5 TABLET ORAL 2 TIMES DAILY
Status: DISCONTINUED | OUTPATIENT
Start: 2017-08-25 | End: 2017-08-25

## 2017-08-25 RX ORDER — DEXTROSE 50 % IN WATER (D50W) INTRAVENOUS SYRINGE
25 AS NEEDED
Status: DISCONTINUED | OUTPATIENT
Start: 2017-08-25 | End: 2017-09-13 | Stop reason: HOSPADM

## 2017-08-25 RX ORDER — DEXTROSE 50 % IN WATER (D50W) INTRAVENOUS SYRINGE
Status: COMPLETED
Start: 2017-08-25 | End: 2017-08-25

## 2017-08-25 RX ORDER — CARVEDILOL 3.12 MG/1
3.12 TABLET ORAL 2 TIMES DAILY WITH MEALS
Status: DISCONTINUED | OUTPATIENT
Start: 2017-08-25 | End: 2017-08-26

## 2017-08-25 RX ADMIN — DEXTROSE MONOHYDRATE 12.5 G: 25 INJECTION, SOLUTION INTRAVENOUS at 11:45

## 2017-08-25 RX ADMIN — ALBUTEROL SULFATE 2.5 MG: 2.5 SOLUTION RESPIRATORY (INHALATION) at 21:18

## 2017-08-25 RX ADMIN — AMIODARONE HYDROCHLORIDE 200 MG: 200 TABLET ORAL at 08:43

## 2017-08-25 RX ADMIN — SACUBITRIL AND VALSARTAN 1 TABLET: 24; 26 TABLET, FILM COATED ORAL at 08:42

## 2017-08-25 RX ADMIN — BUDESONIDE 500 MCG: 0.5 INHALANT RESPIRATORY (INHALATION) at 21:18

## 2017-08-25 RX ADMIN — Medication 5 ML: at 06:03

## 2017-08-25 RX ADMIN — ALBUTEROL SULFATE 2.5 MG: 2.5 SOLUTION RESPIRATORY (INHALATION) at 13:34

## 2017-08-25 RX ADMIN — ALBUTEROL SULFATE 2.5 MG: 2.5 SOLUTION RESPIRATORY (INHALATION) at 08:10

## 2017-08-25 RX ADMIN — SACUBITRIL AND VALSARTAN 1 TABLET: 24; 26 TABLET, FILM COATED ORAL at 22:13

## 2017-08-25 RX ADMIN — GABAPENTIN 600 MG: 300 CAPSULE ORAL at 06:03

## 2017-08-25 RX ADMIN — CARVEDILOL 3.12 MG: 3.12 TABLET, FILM COATED ORAL at 08:43

## 2017-08-25 RX ADMIN — ALBUTEROL SULFATE 2.5 MG: 2.5 SOLUTION RESPIRATORY (INHALATION) at 04:15

## 2017-08-25 RX ADMIN — BUDESONIDE 500 MCG: 0.5 INHALANT RESPIRATORY (INHALATION) at 08:10

## 2017-08-25 RX ADMIN — GABAPENTIN 600 MG: 300 CAPSULE ORAL at 17:24

## 2017-08-25 RX ADMIN — GABAPENTIN 600 MG: 300 CAPSULE ORAL at 22:13

## 2017-08-25 RX ADMIN — PANTOPRAZOLE SODIUM 40 MG: 40 TABLET, DELAYED RELEASE ORAL at 08:43

## 2017-08-25 RX ADMIN — DEXTROSE MONOHYDRATE 25 G: 25 INJECTION, SOLUTION INTRAVENOUS at 16:18

## 2017-08-25 RX ADMIN — Medication 5 ML: at 22:14

## 2017-08-25 RX ADMIN — CARVEDILOL 3.12 MG: 3.12 TABLET, FILM COATED ORAL at 17:24

## 2017-08-25 RX ADMIN — GLIPIZIDE 10 MG: 5 TABLET ORAL at 08:43

## 2017-08-25 RX ADMIN — TIOTROPIUM BROMIDE 18 MCG: 18 CAPSULE ORAL; RESPIRATORY (INHALATION) at 08:11

## 2017-08-25 RX ADMIN — AMIODARONE HYDROCHLORIDE 200 MG: 200 TABLET ORAL at 22:12

## 2017-08-25 NOTE — PROGRESS NOTES
Verbal orders received from THE [facility] to hold PM Lasix dose, tonight's Coumadin dose, and to decrease Glipizide dose to 5mg BID.

## 2017-08-25 NOTE — CONSULTS
[address] Hematology Oncology    Inpatient Hematology / Oncology Consult Note    Reason for Consult:  Atrial flutter with rapid ventricular response [facility])  Referring Physician:  Akshat Torres MD    History of Present Illness:  Mr. Richard Beaver is a 67 y.o. male admitted on 8/23/2017 with a primary diagnosis of atrial flutter with RVR and acute on chronic systolic congestive heart failure. He has a history of mechanical AVR in 2000. He continues on chronic anticoagulation with coumadin, chronic systolic CHF/ICM EF 70%, COPD, PVD, DM II, HTN, iron deficiency, and dyslipidemia who developed worsening dyspnea at the beginning of the week. He presented to the ER and was found to be in atrial flutter with RVR now status post cardioversion and amiodarone. Of note, his platelets were 33,074 on admission and 30,000 today. No other lab comparisons here but can see through care everywhere that his platelets were 54,353 at [facility] about a year ago. He has no recollection of ever being told he has low platelets. He takes iron daily for his history of PRATIK and reports black stools due to this. Hgb on admission was 10.2 and 12.7 today. We have been consulted for his thrombocytopenia. Review of Systems:  Constitutional Denies fever, chills, weight loss, appetite changes, fatigue, night sweats. HEENT Denies trauma, blurry vision, hearing loss, ear pain, nosebleeds, sore throat, neck pain and ear discharge. Skin Denies lesions or rashes. Lungs Denies dyspnea, cough, sputum production or hemoptysis. Cardiovascular Denies chest pain, palpitations. + lower extremity edema. Gastrointestinal Denies nausea, vomiting, changes in bowel habits, bloody or abdominal pain. + chronic black stools due to iron.  Denies dysuria, frequency or hesitancy of urination. Neuro Denies headaches, visual changes or ataxia. Denies dizziness, tingling, tremors, sensory change, speech change, focal weakness or headaches. Hematology Denies gingival bleeding or epistaxis. + easy bruising/bleeding. Endo Denies heat/cold intolerance, denies thyroid abnormalities. + history of DM II.    MSK Denies back pain, arthralgias, myalgias or frequent falls. Psychiatric/Behavioral Denies depression and substance abuse. The patient is not nervous/anxious. Allergies   Allergen Reactions    Levaquin [Levofloxacin] Other (comments)     GI upset    Metformin Other (comments)     Gi upset     Past Medical History:   Diagnosis Date    Arthritis     Chicken pox     Coronary artery disease 4/16/2014    DJD (degenerative joint disease)     Emphysema     Hx of aortic valve replacement, mechanical       doing well,. Had to hold 3 days for removal of skin ca. Will have to have add. resection.  Hypercholesterolemia     Hypertension     Palpitations      Holter showed  PVC's, PAC's, one short run SVT.  Pneumonia      Past Surgical History:   Procedure Laterality Date    HX AORTIC VALVE REPLACEMENT N/A 2000    Aortic Valve Replacement w/ Mechanical Valve    HX HEART CATHETERIZATION  07/21/2004    [facility]     No family history on file. Social History     Social History    Marital status:      Spouse name: N/A    Number of children: N/A    Years of education: N/A     Occupational History    Not on file.      Social History Main Topics    Smoking status: Former Smoker     Packs/day: 1.50     Years: 40.00    Smokeless tobacco: Never Used    Alcohol use No    Drug use: No    Sexual activity: No     Other Topics Concern    Not on file     Social History Narrative     Current Facility-Administered Medications   Medication Dose Route Frequency Provider Last Rate Last Dose    carvedilol (COREG) tablet 3.125 mg  3.125 mg Oral BID WITH MEALS Jacqui Garner MD   3.125 mg at 08/25/17 0843    sacubitril-valsartan (ENTRESTO) 24-26 mg tablet 1 Tab  1 Tab Oral Q12H Jacqui Garner MD   1 Tab at 08/25/17 0842    dextrose (D50W) injection syrg 25 g  25 g IntraVENous PRN Gabriela Bhakta MD   12.5 g at 08/25/17 1145    glipiZIDE (GLUCOTROL) tablet 5 mg  5 mg Oral BID Felicity Livers, PA        warfarin (COUMADIN) tablet 5 mg  5 mg Oral QHS Tracy Pepe NP   Stopped at 08/25/17 2200    amiodarone (CORDARONE) tablet 200 mg  200 mg Oral BID Gabriela Bhakta MD   200 mg at 08/25/17 8055    furosemide (LASIX) injection 40 mg  40 mg IntraVENous BID Gabriela Bhakta MD   Stopped at 08/25/17 0900    acetaminophen (TYLENOL) tablet 650 mg  650 mg Oral Q6H PRN Gabriela Bhakta MD   650 mg at 08/24/17 1158    gabapentin (NEURONTIN) capsule 600 mg  600 mg Oral TID Livia Brown MD   600 mg at 08/25/17 0603    albuterol (PROVENTIL HFA, VENTOLIN HFA, PROAIR HFA) inhaler 2 Puff  2 Puff Inhalation Q4H PRN Livia Brown MD        pantoprazole (PROTONIX) tablet 40 mg  40 mg Oral ACB Livia Brown MD   40 mg at 08/25/17 0843    tiotropium (SPIRIVA) inhalation capsule 18 mcg  1 Cap Inhalation DAILY Livia Brown MD   18 mcg at 08/25/17 8140    sodium chloride (NS) flush 5-10 mL  5-10 mL IntraVENous Reinaldo Nayak MD   5 mL at 08/25/17 0603    sodium chloride (NS) flush 5-10 mL  5-10 mL IntraVENous PRN Livia Brown MD        aspirin chewable tablet 81 mg  81 mg Oral DAILY Livia Brown MD   Stopped at 08/25/17 0900    nitroglycerin (NITROSTAT) tablet 0.4 mg  0.4 mg SubLINGual Q5MIN PRN Livia Brown MD        morphine injection 2 mg  2 mg IntraVENous Q4H PRN Livia Brown MD        ondansetron [facility]) injection 4 mg  4 mg IntraVENous Q4H PRN Livia Brown MD        insulin lispro (HUMALOG) injection   SubCUTAneous AC&HS Livia Brown MD   Stopped at 08/23/17 1630    budesonide (PULMICORT) 500 mcg/2 ml nebulizer suspension  500 mcg Nebulization BID RT Livia Brown MD   500 mcg at 08/25/17 0810    And    albuterol CONCENTRATE 2.5mg/0.5 mL neb soln  2.5 mg Nebulization Teresita Franco MD   2.5 mg at 17 1334       OBJECTIVE:  Patient Vitals for the past 8 hrs:   BP Temp Pulse Resp SpO2   17 1336 - - - - 100 %   17 1330 123/59 97.6 °F (36.4 °C) 78 22 100 %   17 0841 133/68 98.3 °F (36.8 °C) 93 18 99 %   17 0813 - - - - 95 %     Temp (24hrs), Av.9 °F (36.6 °C), Min:97.2 °F (36.2 °C), Max:98.6 °F (37 °C)     0701 -  1900  In: -   Out: 300 [Urine:300]    Physical Exam:  Constitutional: Well developed, well nourished male in no acute distress, sitting comfortably in the bedside chair. HEENT: Normocephalic and atraumatic. Oropharynx is clear, mucous membranes are moist. Extraocular muscles are intact. Sclerae anicteric. Neck supple. Lymph node   No palpable submandibular, cervical, supraclavicular, axillary lymph nodes. Skin Warm and dry. No bruising and no rash noted. No erythema. No pallor. Respiratory Lungs are clear to auscultation bilaterally without wheezes, rales or rhonchi, normal air exchange without accessory muscle use. CVS Normal rate, regular rhythm and normal S1 and S2.  + mechanical click. Abdomen Soft, nontender and nondistended, normoactive bowel sounds. No palpable mass. No hepatosplenomegaly. Neuro Grossly nonfocal with no obvious sensory or motor deficits. MSK Normal range of motion in general.  + edema in lower extremities. No tenderness. Psych Appropriate mood and affect.         Labs:    Recent Results (from the past 24 hour(s))   GLUCOSE, POC    Collection Time: 17  4:35 PM   Result Value Ref Range    Glucose (POC) 72 65 - 100 mg/dL   GLUCOSE, POC    Collection Time: 17  9:10 PM   Result Value Ref Range    Glucose (POC) 94 65 - 100 mg/dL   GLUCOSE, POC    Collection Time: 17  6:32 AM   Result Value Ref Range    Glucose (POC) 47 (L) 65 - 100 mg/dL   GLUCOSE, POC    Collection Time: 17  6:58 AM   Result Value Ref Range    Glucose (POC) 97 65 - 100 mg/dL METABOLIC PANEL, BASIC    Collection Time: 08/25/17  7:07 AM   Result Value Ref Range    Sodium 134 (L) 136 - 145 mmol/L    Potassium 4.5 3.5 - 5.1 mmol/L    Chloride 96 (L) 98 - 107 mmol/L    CO2 31 21 - 32 mmol/L    Anion gap 7 7 - 16 mmol/L    Glucose 77 65 - 100 mg/dL    BUN 34 (H) 8 - 23 MG/DL    Creatinine 1.83 (H) 0.8 - 1.5 MG/DL    GFR est AA 47 (L) >60 ml/min/1.73m2    GFR est non-AA 39 (L) >60 ml/min/1.73m2    Calcium 8.8 8.3 - 10.4 MG/DL   CBC W/O DIFF    Collection Time: 08/25/17  7:07 AM   Result Value Ref Range    WBC 8.3 4.3 - 11.1 K/uL    RBC 4.38 4.23 - 5.67 M/uL    HGB 12.7 (L) 13.6 - 17.2 g/dL    HCT 40.3 (L) 41.1 - 50.3 %    MCV 92.0 79.6 - 97.8 FL    MCH 29.0 26.1 - 32.9 PG    MCHC 31.5 31.4 - 35.0 g/dL    RDW 16.0 (H) 11.9 - 14.6 %    PLATELET 30 (LL) 270 - 450 K/uL    MPV Cannot be calculated 10.8 - 14.1 FL   PROTHROMBIN TIME + INR    Collection Time: 08/25/17  7:07 AM   Result Value Ref Range    Prothrombin time 43.6 (H) 9.6 - 12.0 sec    INR 4.0 (HH) 0.9 - 1.2     LD    Collection Time: 08/25/17  7:07 AM   Result Value Ref Range     (H) 110 - 210 U/L   HAPTOGLOBIN    Collection Time: 08/25/17  7:07 AM   Result Value Ref Range    Haptoglobin 47 30 - 200 mg/dL   FERRITIN    Collection Time: 08/25/17  7:07 AM   Result Value Ref Range    Ferritin 96 8 - 388 NG/ML   TRANSFERRIN SATURATION    Collection Time: 08/25/17  7:07 AM   Result Value Ref Range    Iron 47 35 - 150 ug/dL    TIBC 369 250 - 450 ug/dL    Transferrin Saturation 13 (L) >20 %   GLUCOSE, POC    Collection Time: 08/25/17 11:37 AM   Result Value Ref Range    Glucose (POC) 38 (LL) 65 - 100 mg/dL   GLUCOSE, POC    Collection Time: 08/25/17 12:15 PM   Result Value Ref Range    Glucose (POC) 108 (H) 65 - 100 mg/dL       Imaging:     Exam: portable chest 08/23/17     Comparison: None     Findings: Median sternotomy wires are present without focal alveolar infiltrate  or pleural effusion.  There is mild coarsening of the interstitial lung markings. No pneumothorax.     Impressions: Postsurgical change without acute abnormality.       ASSESSMENT:  Problem List  Date Reviewed: 8/18/2017          Codes Class Noted    Atrial flutter with rapid ventricular response ([facility] ICD-10-CM: I48.92  ICD-9-CM: 427.32  8/23/2017        * (Principal)Atrial flutter with rapid ventricular response ([facility] ICD-10-CM: I48.92  ICD-9-CM: 427.32  4/63/4569        Systolic CHF, acute ([facility] ICD-10-CM: I50.21  ICD-9-CM: 428.21, 428.0  11/15/2016        Ischemic cardiomyopathy ICD-10-CM: I25.5  ICD-9-CM: 414.8  11/15/2016        LV dysfunction ICD-10-CM: I51.9  ICD-9-CM: 429.9  10/19/2016        History of mechanical aortic valve replacement ICD-10-CM: Z95.2  ICD-9-CM: V43.3  9/15/2016        Acute diastolic CHF (congestive heart failure) ([facility] ICD-10-CM: I50.31  ICD-9-CM: 428.31, 428.0  9/15/2016        Chronic bronchitis ([place] [address].) ICD-10-CM: J42  ICD-9-CM: 491.9  9/15/2016        Dyslipidemia ICD-10-CM: E78.5  ICD-9-CM: 272.4  4/16/2014        Coronary artery disease ICD-10-CM: I25.10  ICD-9-CM: 414.00  4/16/2014        Arrhythmia ICD-10-CM: I49.9  ICD-9-CM: 427.9  4/16/2014        Acute sinusitis ICD-10-CM: J01.90  ICD-9-CM: 461.9  4/16/2014        Anticoagulant long-term use ICD-10-CM: Z79.01  ICD-9-CM: V58.61  4/15/2014        HLD (hyperlipidemia) ICD-10-CM: E78.5  ICD-9-CM: 272.4  4/15/2014        HTN (hypertension), benign ICD-10-CM: I10  ICD-9-CM: 401.1  4/15/2014        Peripheral vascular disease ([facility] ICD-10-CM: I73.9  ICD-9-CM: 443.9  4/15/2014        Atherosclerosis of native arteries of the extremities with intermittent claudication ICD-10-CM: I70.219  ICD-9-CM: 440.21  4/15/2014                RECOMMENDATIONS:  - Thrombocytopenia  * Appears possibly chronic in nature. Only CBC found for comparison is from [facility] a year ago with platelets of 63,992. No bleeding noted. * Check fibrinogen, d-dimer to complete DIC labs. * Haptoglobin, LDH.    * Check peripheral smear. Could have abnormalities related to his artifical valve though. * Ultrasound liver/spleen  * CBC daily     - Iron Deficiency Anemia  * Iron stores sufficient. Continue daily iron supplement. Lab studies and imaging studies were personally reviewed. Pertinent old records were reviewed. Thank you for allowing us to participate in the care of Mr. Jessy Bragg.       Armin Collado NP   [address] Hematology Oncology  [address] [place] [address]  Office : [phone]  Fax : [phone]

## 2017-08-25 NOTE — PROGRESS NOTES
8/25/2017 6:42 AM    Admit Date: 8/23/2017    Admit Diagnosis: Atrial flutter with rapid ventricular response (HCC)      Subjective:    Patient remains in nsr. He is sob.     Objective:      Visit Vitals    /68 (BP 1 Location: Right arm, BP Patient Position: At rest)    Pulse 95    Temp 98.2 °F (36.8 °C)    Resp 18    Ht 5' 6\" (1.676 m)    Wt 80.9 kg (178 lb 4.8 oz)    SpO2 100%    BMI 28.78 kg/m2       ROS:  General ROS: negative for - chills  Hematological and Lymphatic ROS: negative for - blood clots or jaundice  Respiratory ROS: positive for - shortness of breath  Cardiovascular ROS: positive for - dyspnea on exertion  Gastrointestinal ROS: no abdominal pain, change in bowel habits, or black or bloody stools  Neurological ROS: no TIA or stroke symptoms    Physical Exam:    Physical Examination: General appearance - alert, well appearing, and in no distress  Mental status - alert, oriented to person, place, and time  Eyes - pupils equal and reactive, extraocular eye movements intact  Neck/lymph - supple, no significant adenopathy  Chest/CV - clear to auscultation, no wheezes, rales or rhonchi, symmetric air entry  Heart - normal rate, regular rhythm, normal S1, S2, no murmurs, rubs, clicks or gallops  Abdomen/GI - soft, nontender, nondistended, no masses or organomegaly  Musculoskeletal - no joint tenderness, deformity or swelling  Extremities - peripheral pulses normal, no pedal edema, no clubbing or cyanosis  Skin - normal coloration and turgor, no rashes, no suspicious skin lesions noted    Current Facility-Administered Medications   Medication Dose Route Frequency    warfarin (COUMADIN) tablet 5 mg  5 mg Oral QHS    amiodarone (CORDARONE) tablet 200 mg  200 mg Oral BID    furosemide (LASIX) injection 40 mg  40 mg IntraVENous BID    acetaminophen (TYLENOL) tablet 650 mg  650 mg Oral Q6H PRN    gabapentin (NEURONTIN) capsule 600 mg  600 mg Oral TID    glipiZIDE (GLUCOTROL) tablet 10 mg  10 mg Oral BID    albuterol (PROVENTIL HFA, VENTOLIN HFA, PROAIR HFA) inhaler 2 Puff  2 Puff Inhalation Q4H PRN    pantoprazole (PROTONIX) tablet 40 mg  40 mg Oral ACB    tiotropium (SPIRIVA) inhalation capsule 18 mcg  1 Cap Inhalation DAILY    sodium chloride (NS) flush 5-10 mL  5-10 mL IntraVENous Q8H    sodium chloride (NS) flush 5-10 mL  5-10 mL IntraVENous PRN    aspirin chewable tablet 81 mg  81 mg Oral DAILY    nitroglycerin (NITROSTAT) tablet 0.4 mg  0.4 mg SubLINGual Q5MIN PRN    morphine injection 2 mg  2 mg IntraVENous Q4H PRN    ondansetron (ZOFRAN) injection 4 mg  4 mg IntraVENous Q4H PRN    insulin lispro (HUMALOG) injection   SubCUTAneous AC&HS    midazolam (VERSED) injection 0.5-5 mg  0.5-5 mg IntraVENous Multiple    fentaNYL citrate (PF) injection 25-50 mcg  25-50 mcg IntraVENous Multiple    amiodarone (CORDARONE) injection 150 mg  150 mg IntraVENous Multiple    budesonide (PULMICORT) 500 mcg/2 ml nebulizer suspension  500 mcg Nebulization BID RT    And    albuterol CONCENTRATE 2.5mg/0.5 mL neb soln  2.5 mg Nebulization Q6H RT       Data Review:   @LABRCNT(Na,K,BUN,CREA,WBC,HGB,HCT,PLT,INR,TRP,TCHOL*,Triglyceride*,LDL*,LDLCPOC HDL*,HDL])@    TELEMETRY: nsr PAC    Assessment/Plan:     Principal Problem:    Atrial flutter with rapid ventricular response (HCC) (8/23/2017) sp cardioversion. Amiodarone load    Active Problems:    Anticoagulant long-term use (4/15/2014)      HTN (hypertension), benign (4/15/2014)The current medical regimen is effective;  continue present plan and medications. Peripheral vascular disease ([facility] (4/15/2014)      Dyslipidemia (4/16/2014)      History of mechanical aortic valve replacement (9/15/2016) on coumadin      Chronic bronchitis ([address] (9/15/2016)      Ischemic cardiomyopathy (11/15/2016)The current medical regimen is effective;  continue present plan and medications.         Atrial flutter with rapid ventricular response ([address] (8/23/2017)      Change to po amiodarone and add lasix for sob    Seems less sob today.  Lasix may be working    Will add chf meds coreg and Charanjit Trujillo MD

## 2017-08-25 NOTE — PROGRESS NOTES
Patient states he feels like he \"can't get enough air. \" 0200 breathing treatment not given. Respiratory notified patient feels he needs treatment now.

## 2017-08-25 NOTE — PROGRESS NOTES
Critical labs of Platelets 30 and INR 4.0. Verbal order by THE [facility] to hold Aspirin 81mg this AM. Hematology consult placed. Creat 1.83- order to hold AM lasix.

## 2017-08-25 NOTE — PROGRESS NOTES
Verbal bedside report given to Laura Gallo RN and Mike Mcmahon RN, oncoming. Patient's situation, background, assessment and recommendations provided. Opportunity for questions provided. Oncoming RN assumed care of patient.

## 2017-08-25 NOTE — PROGRESS NOTES
Bedside and Verbal shift change report given to Jessica Desouza (oncoming nurse) by self (offgoing nurse). Report included the following information SBAR, Kardex, MAR and Recent Results.

## 2017-08-25 NOTE — PROGRESS NOTES
SQBS 38. 8oz Orange Juice, Peanut Butter/Mati crackers given, as well at 12.5g D50. Pt states he feels \"jittery inside, shaky, and has a headache. \" LONNIE Solorio notified. Repeat SQBS 108. Pt states he isn't very hungry. Encouraged him to try and eat something on his tray.

## 2017-08-25 NOTE — PROGRESS NOTES
Bedside and Verbal shift change report given to self (oncoming nurse) by Gemma Check (offgoing nurse). Report included the following information SBAR, Kardex, MAR and Recent Results.

## 2017-08-26 ENCOUNTER — APPOINTMENT (OUTPATIENT)
Dept: CT IMAGING | Age: 72
DRG: 308 | End: 2017-08-26
Attending: INTERNAL MEDICINE
Payer: MEDICARE

## 2017-08-26 PROBLEM — D69.6 THROMBOCYTOPENIA (HCC): Status: ACTIVE | Noted: 2017-08-26

## 2017-08-26 LAB
ANION GAP SERPL CALC-SCNC: 4 MMOL/L (ref 7–16)
BUN SERPL-MCNC: 27 MG/DL (ref 8–23)
CALCIUM SERPL-MCNC: 8.9 MG/DL (ref 8.3–10.4)
CHLORIDE SERPL-SCNC: 96 MMOL/L (ref 98–107)
CO2 SERPL-SCNC: 34 MMOL/L (ref 21–32)
CREAT SERPL-MCNC: 1.03 MG/DL (ref 0.8–1.5)
ERYTHROCYTE [DISTWIDTH] IN BLOOD BY AUTOMATED COUNT: 15.6 % (ref 11.9–14.6)
GLUCOSE BLD STRIP.AUTO-MCNC: 115 MG/DL (ref 65–100)
GLUCOSE BLD STRIP.AUTO-MCNC: 126 MG/DL (ref 65–100)
GLUCOSE BLD STRIP.AUTO-MCNC: 129 MG/DL (ref 65–100)
GLUCOSE BLD STRIP.AUTO-MCNC: 130 MG/DL (ref 65–100)
GLUCOSE BLD STRIP.AUTO-MCNC: 162 MG/DL (ref 65–100)
GLUCOSE SERPL-MCNC: 119 MG/DL (ref 65–100)
HCT VFR BLD AUTO: 41.5 % (ref 41.1–50.3)
HGB BLD-MCNC: 12.4 G/DL (ref 13.6–17.2)
INR PPP: 3.5 (ref 0.9–1.2)
MCH RBC QN AUTO: 28.4 PG (ref 26.1–32.9)
MCHC RBC AUTO-ENTMCNC: 29.9 G/DL (ref 31.4–35)
MCV RBC AUTO: 95.2 FL (ref 79.6–97.8)
PLATELET # BLD AUTO: 49 K/UL (ref 150–450)
PMV BLD AUTO: ABNORMAL FL (ref 10.8–14.1)
POTASSIUM SERPL-SCNC: 5.1 MMOL/L (ref 3.5–5.1)
PROTHROMBIN TIME: 38.2 SEC (ref 9.6–12)
RBC # BLD AUTO: 4.36 M/UL (ref 4.23–5.67)
SODIUM SERPL-SCNC: 134 MMOL/L (ref 136–145)
WBC # BLD AUTO: 7.6 K/UL (ref 4.3–11.1)

## 2017-08-26 PROCEDURE — 94640 AIRWAY INHALATION TREATMENT: CPT

## 2017-08-26 PROCEDURE — 74011000258 HC RX REV CODE- 258: Performed by: INTERNAL MEDICINE

## 2017-08-26 PROCEDURE — 70450 CT HEAD/BRAIN W/O DYE: CPT

## 2017-08-26 PROCEDURE — 36415 COLL VENOUS BLD VENIPUNCTURE: CPT | Performed by: PHYSICIAN ASSISTANT

## 2017-08-26 PROCEDURE — 74011250636 HC RX REV CODE- 250/636: Performed by: INTERNAL MEDICINE

## 2017-08-26 PROCEDURE — 74011250637 HC RX REV CODE- 250/637: Performed by: NURSE PRACTITIONER

## 2017-08-26 PROCEDURE — 77010033678 HC OXYGEN DAILY

## 2017-08-26 PROCEDURE — 74011250637 HC RX REV CODE- 250/637: Performed by: INTERNAL MEDICINE

## 2017-08-26 PROCEDURE — 74011000250 HC RX REV CODE- 250: Performed by: INTERNAL MEDICINE

## 2017-08-26 PROCEDURE — 85027 COMPLETE CBC AUTOMATED: CPT | Performed by: PHYSICIAN ASSISTANT

## 2017-08-26 PROCEDURE — 80048 BASIC METABOLIC PNL TOTAL CA: CPT | Performed by: PHYSICIAN ASSISTANT

## 2017-08-26 PROCEDURE — 85610 PROTHROMBIN TIME: CPT | Performed by: PHYSICIAN ASSISTANT

## 2017-08-26 PROCEDURE — 94760 N-INVAS EAR/PLS OXIMETRY 1: CPT

## 2017-08-26 PROCEDURE — 86580 TB INTRADERMAL TEST: CPT | Performed by: INTERNAL MEDICINE

## 2017-08-26 PROCEDURE — 82962 GLUCOSE BLOOD TEST: CPT

## 2017-08-26 PROCEDURE — 65660000000 HC RM CCU STEPDOWN

## 2017-08-26 PROCEDURE — 99232 SBSQ HOSP IP/OBS MODERATE 35: CPT | Performed by: INTERNAL MEDICINE

## 2017-08-26 PROCEDURE — 74011000302 HC RX REV CODE- 302: Performed by: INTERNAL MEDICINE

## 2017-08-26 RX ORDER — DILTIAZEM HYDROCHLORIDE 5 MG/ML
20 INJECTION INTRAVENOUS ONCE
Status: COMPLETED | OUTPATIENT
Start: 2017-08-26 | End: 2017-08-26

## 2017-08-26 RX ORDER — CARVEDILOL 3.12 MG/1
3.12 TABLET ORAL ONCE
Status: COMPLETED | OUTPATIENT
Start: 2017-08-26 | End: 2017-08-26

## 2017-08-26 RX ORDER — CARVEDILOL 6.25 MG/1
6.25 TABLET ORAL 2 TIMES DAILY WITH MEALS
Status: DISCONTINUED | OUTPATIENT
Start: 2017-08-26 | End: 2017-08-27

## 2017-08-26 RX ADMIN — IMMUNE GLOBULIN (HUMAN) 65 G: 10 INJECTION INTRAVENOUS; SUBCUTANEOUS at 21:24

## 2017-08-26 RX ADMIN — Medication 10 ML: at 21:23

## 2017-08-26 RX ADMIN — IMMUNE GLOBULIN (HUMAN) 65 G: 10 INJECTION INTRAVENOUS; SUBCUTANEOUS at 14:54

## 2017-08-26 RX ADMIN — CARVEDILOL 3.12 MG: 3.12 TABLET, FILM COATED ORAL at 08:54

## 2017-08-26 RX ADMIN — CARVEDILOL 6.25 MG: 6.25 TABLET, FILM COATED ORAL at 17:37

## 2017-08-26 RX ADMIN — AMIODARONE HYDROCHLORIDE 200 MG: 200 TABLET ORAL at 21:26

## 2017-08-26 RX ADMIN — BUDESONIDE 500 MCG: 0.5 INHALANT RESPIRATORY (INHALATION) at 07:34

## 2017-08-26 RX ADMIN — SACUBITRIL AND VALSARTAN 1 TABLET: 24; 26 TABLET, FILM COATED ORAL at 08:54

## 2017-08-26 RX ADMIN — GABAPENTIN 600 MG: 300 CAPSULE ORAL at 21:26

## 2017-08-26 RX ADMIN — ALBUTEROL SULFATE 2.5 MG: 2.5 SOLUTION RESPIRATORY (INHALATION) at 14:42

## 2017-08-26 RX ADMIN — DILTIAZEM HYDROCHLORIDE 20 MG: 5 INJECTION INTRAVENOUS at 12:16

## 2017-08-26 RX ADMIN — BUDESONIDE 500 MCG: 0.5 INHALANT RESPIRATORY (INHALATION) at 19:41

## 2017-08-26 RX ADMIN — TIOTROPIUM BROMIDE 18 MCG: 18 CAPSULE ORAL; RESPIRATORY (INHALATION) at 07:34

## 2017-08-26 RX ADMIN — AMIODARONE HYDROCHLORIDE 200 MG: 200 TABLET ORAL at 08:54

## 2017-08-26 RX ADMIN — SACUBITRIL AND VALSARTAN 1 TABLET: 24; 26 TABLET, FILM COATED ORAL at 21:26

## 2017-08-26 RX ADMIN — SODIUM CHLORIDE 2.5 MG/HR: 900 INJECTION, SOLUTION INTRAVENOUS at 23:49

## 2017-08-26 RX ADMIN — Medication 5 ML: at 05:28

## 2017-08-26 RX ADMIN — GABAPENTIN 600 MG: 300 CAPSULE ORAL at 12:17

## 2017-08-26 RX ADMIN — CARVEDILOL 3.12 MG: 3.12 TABLET, FILM COATED ORAL at 12:17

## 2017-08-26 RX ADMIN — ALBUTEROL SULFATE 2.5 MG: 2.5 SOLUTION RESPIRATORY (INHALATION) at 19:41

## 2017-08-26 RX ADMIN — FERROUS SULFATE TAB 325 MG (65 MG ELEMENTAL FE) 325 MG: 325 (65 FE) TAB at 08:54

## 2017-08-26 RX ADMIN — PANTOPRAZOLE SODIUM 40 MG: 40 TABLET, DELAYED RELEASE ORAL at 08:54

## 2017-08-26 RX ADMIN — FUROSEMIDE 40 MG: 10 INJECTION, SOLUTION INTRAMUSCULAR; INTRAVENOUS at 08:53

## 2017-08-26 RX ADMIN — ALBUTEROL SULFATE 2.5 MG: 2.5 SOLUTION RESPIRATORY (INHALATION) at 02:19

## 2017-08-26 RX ADMIN — FUROSEMIDE 40 MG: 10 INJECTION, SOLUTION INTRAMUSCULAR; INTRAVENOUS at 17:36

## 2017-08-26 RX ADMIN — Medication 10 ML: at 12:23

## 2017-08-26 RX ADMIN — TUBERCULIN PURIFIED PROTEIN DERIVATIVE 5 UNITS: 5 INJECTION, SOLUTION INTRADERMAL at 17:36

## 2017-08-26 RX ADMIN — GABAPENTIN 600 MG: 300 CAPSULE ORAL at 05:28

## 2017-08-26 RX ADMIN — ALBUTEROL SULFATE 2.5 MG: 2.5 SOLUTION RESPIRATORY (INHALATION) at 07:34

## 2017-08-26 NOTE — PROGRESS NOTES
Patient with continued altered thoughts on occasion. Delayed in his speech and actions. Unable to  things well. Oriented to person and sometimes place. Dr. Francesco Powell paged.  Order received for CT Head without contrast.

## 2017-08-26 NOTE — PROGRESS NOTES
8/26/2017 8:24 AM    Admit Date: 8/23/2017        Subjective:     Fernando Callahan denies chest pain, palpitations, He is weak Has not been up. Objective:      Visit Vitals    /60 (BP 1 Location: Right arm, BP Patient Position: At rest)    Pulse 78    Temp 97.4 °F (36.3 °C)    Resp 18    Ht 5' 6\" (1.676 m)    Wt 82.5 kg (181 lb 14.4 oz)    SpO2 98%    BMI 29.36 kg/m2       Physical Exam:  Heart: regular rate and rhythm  Lungs: clear to auscultation bilaterally  Abdomen: soft, non-tender.  Bowel sounds normal. No masses,  no organomegaly    Data Review:   Labs:    Recent Results (from the past 24 hour(s))   GLUCOSE, POC    Collection Time: 08/25/17 11:37 AM   Result Value Ref Range    Glucose (POC) 38 (LL) 65 - 100 mg/dL   GLUCOSE, POC    Collection Time: 08/25/17 12:15 PM   Result Value Ref Range    Glucose (POC) 108 (H) 65 - 100 mg/dL   FIBRINOGEN    Collection Time: 08/25/17  2:29 PM   Result Value Ref Range    Fibrinogen 498 (H) 172 - 437 mg/dL   D DIMER    Collection Time: 08/25/17  2:29 PM   Result Value Ref Range    D DIMER 0.33 <0.55 ug/ml(FEU)   CULTURE, BLOOD    Collection Time: 08/25/17  4:00 PM   Result Value Ref Range    Special Requests: LEFT HAND      Culture result: NO GROWTH AFTER 12 HOURS     GLUCOSE, POC    Collection Time: 08/25/17  4:15 PM   Result Value Ref Range    Glucose (POC) 44 (L) 65 - 100 mg/dL   GLUCOSE, POC    Collection Time: 08/25/17  5:04 PM   Result Value Ref Range    Glucose (POC) 88 65 - 100 mg/dL   URINALYSIS W/ RFLX MICROSCOPIC    Collection Time: 08/25/17  8:11 PM   Result Value Ref Range    Color YELLOW      Appearance CLOUDY      Specific gravity 1.019 1.001 - 1.023      pH (UA) 6.0 5.0 - 9.0      Protein NEGATIVE  NEG mg/dL    Glucose NEGATIVE  mg/dL    Ketone NEGATIVE  NEG mg/dL    Bilirubin NEGATIVE  NEG      Blood LARGE (A) NEG      Urobilinogen 0.2 0.2 - 1.0 EU/dL    Nitrites NEGATIVE  NEG      Leukocyte Esterase NEGATIVE  NEG      WBC 0-3 0 /hpf RBC  0 /hpf    Epithelial cells 0 0 /hpf    Bacteria 0 0 /hpf    Casts 0-3 0 /lpf   CULTURE, BLOOD    Collection Time: 08/25/17  9:45 PM   Result Value Ref Range    Special Requests: RIGHT HAND      Culture result: NO GROWTH AFTER 9 HOURS     GLUCOSE, POC    Collection Time: 08/25/17  9:47 PM   Result Value Ref Range    Glucose (POC) 82 65 - 100 mg/dL   GLUCOSE, POC    Collection Time: 08/26/17  2:17 AM   Result Value Ref Range    Glucose (POC) 130 (H) 65 - 100 mg/dL   GLUCOSE, POC    Collection Time: 08/26/17  6:17 AM   Result Value Ref Range    Glucose (POC) 115 (H) 65 - 408 mg/dL   METABOLIC PANEL, BASIC    Collection Time: 08/26/17  6:18 AM   Result Value Ref Range    Sodium 134 (L) 136 - 145 mmol/L    Potassium 5.1 3.5 - 5.1 mmol/L    Chloride 96 (L) 98 - 107 mmol/L    CO2 34 (H) 21 - 32 mmol/L    Anion gap 4 (L) 7 - 16 mmol/L    Glucose 119 (H) 65 - 100 mg/dL    BUN 27 (H) 8 - 23 MG/DL    Creatinine 1.03 0.8 - 1.5 MG/DL    GFR est AA >60 >60 ml/min/1.73m2    GFR est non-AA >60 >60 ml/min/1.73m2    Calcium 8.9 8.3 - 10.4 MG/DL   PROTHROMBIN TIME + INR    Collection Time: 08/26/17  6:18 AM   Result Value Ref Range    Prothrombin time 38.2 (H) 9.6 - 12.0 sec    INR 3.5 (H) 0.9 - 1.2     CBC W/O DIFF    Collection Time: 08/26/17  6:18 AM   Result Value Ref Range    WBC 7.6 4.3 - 11.1 K/uL    RBC 4.36 4.23 - 5.67 M/uL    HGB 12.4 (L) 13.6 - 17.2 g/dL    HCT 41.5 41.1 - 50.3 %    MCV 95.2 79.6 - 97.8 FL    MCH 28.4 26.1 - 32.9 PG    MCHC 29.9 (L) 31.4 - 35.0 g/dL    RDW 15.6 (H) 11.9 - 14.6 %    PLATELET 49 (L) 775 - 450 K/uL    MPV Cannot be calculated 10.8 - 14.1 FL       Telemetry: normal sinus rhythm    ECHO:EF 40-45% stable AVR        Assessment:     Patient Active Problem List    Diagnosis Date Noted    Atrial flutter with rapid ventricular response ([address] converted to sinus On amio 08/23/2017    Atrial flutter with rapid ventricular response ([address] 63/36/6592    Systolic CHF, acute ([address] 11/15/2016  Ischemic cardiomyopathy 11/15/2016    LV dysfunction 10/19/2016    History of mechanical aortic valve replacement stable 09/15/2016    Acute diastolic CHF (congestive heart failure) (HCC) 09/15/2016    Chronic bronchitis ([facility] 09/15/2016    Dyslipidemia 04/16/2014    Coronary artery disease 04/16/2014    Arrhythmia 04/16/2014    Acute sinusitis 04/16/2014    Anticoagulant long-term use 04/15/2014    HLD (hyperlipidemia) 04/15/2014    HTN (hypertension), benign 04/15/2014    Peripheral vascular disease ([facility] 04/15/2014    Atherosclerosis of native arteries of the extremities with intermittent claudication 04/15/2014   Thrombocytopenia Nl spleen on US Hem seeing    Plan:     ambulate

## 2017-08-26 NOTE — PROGRESS NOTES
[facility] CRITICAL CARE OUTREACH NURSE PROGRESS REPORT      SUBJECTIVE: Called to assess patient secondary to nurse concern. MEWS Score: 5 (08/26/17 1628)  Vitals:    08/26/17 1459 08/26/17 1503 08/26/17 1509 08/26/17 1628   BP: 97/50 102/60 100/62 100/57   Pulse: (!) 116 (!) 117 (!) 118 (!) 118   Resp:    24   Temp:    97.4 °F (36.3 °C)   SpO2:    100%   Weight:       Height:              LAB DATA:    Recent Labs      08/26/17   0618  08/25/17   0707   NA  134*  134*   K  5.1  4.5   CL  96*  96*   CO2  34*  31   AGAP  4*  7   GLU  119*  77   BUN  27*  34*   CREA  1.03  1.83*   GFRAA  >60  47*   GFRNA  >60  39*   CA  8.9  8.8        Recent Labs      08/26/17   0618  08/25/17   0707   WBC  7.6  8.3   HGB  12.4*  12.7*   HCT  41.5  40.3*   PLT  49*  30*          OBJECTIVE: On arrival to room, I found patient to be resting in bed. Pain Assessment  Pain Intensity 1: 0 (08/26/17 1509)  Pain Location 1:  (headache)  Pain Intervention(s) 1: Medication (see MAR)  Patient Stated Pain Goal: 0                                 ASSESSMENT:  Called to room d/t RN concern of pt mental status changing progressively throughout day. Upon arrival pt is alert and oriented to person, can answer questions correctly most of the time (thought he was in [place] at one point),  equal bilaterally as well as pedal movements but requires multiple prompts to do simple tasks. Seems slightly \"foggy\". Per RN pt slid out of bed last night and takes blood thinners at home, PT 38.2, INR 3.5, platelets 49. Cardiologist paged, STAT head CT ordered at this time. Pt currently hemodynamically stable and is on 3L NC. Will add patient to rounding list and await CT results. Possible tele-neuro consult? Will follow along with primary MD decision. PLAN:        Will continue to follow up per outreach protocol.     Signed By:   Jl Navarro RN    August 26, 2017 7:01 PM

## 2017-08-26 NOTE — PROGRESS NOTES
Verbal bedside report given to Ny Alston oncoming RN. Patient's situation, background, assessment and recommendations provided. Opportunity for questions provided. Oncoming RN assumed care of patient. IVIG continues to infuse at 78mL/hr.

## 2017-08-26 NOTE — PROGRESS NOTES
Problem: Falls - Risk of  Goal: *Absence of Falls  Document Lauren Fall Risk and appropriate interventions in the flowsheet.    Outcome: Progressing Towards Goal  Fall Risk Interventions:  Mobility Interventions: Communicate number of staff needed for ambulation/transfer, Patient to call before getting OOB           Medication Interventions: Evaluate medications/consider consulting pharmacy, Patient to call before getting OOB, Teach patient to arise slowly     Elimination Interventions: Call light in reach, Patient to call for help with toileting needs

## 2017-08-26 NOTE — PROGRESS NOTES
Bedside and Verbal shift change report given to Gemma Check (oncoming nurse) by self (offgoing nurse). Report included the following information SBAR, Kardex, MAR and Recent Results.

## 2017-08-26 NOTE — PROGRESS NOTES
Monitor room called to say that patient is back in Atrial Fibrillation in 120s. Patient states he \"just doesn't feel right. \"-- can't explain it. VSS. /78, T 97.7 oral, O2 sat 98% on 2L. Denies any pain or difficulty breathing. Patient oriented x4 when questions are asked, but also says things that are out of the norm. Wife agrees that patient is just not himself and she is worried about him. Verbal order received to increase Coreg to 6.25mg BID, with additional 3.125mg to be given now, as well as Cardizem 20mg bolus. Will enter orders and continue to monitor.

## 2017-08-26 NOTE — PROGRESS NOTES
Daily Progress Note -- Hematology/ Medical Oncology        Patient Name: Flor Olson    Date of Visit: 2017  : 1945  Age:72 y.o. Initial consult HPI:   He has a history of mechanical AVR in . He continues on chronic anticoagulation with coumadin, chronic systolic CHF/ICM EF 99%, COPD, PVD, DM II, HTN, iron deficiency, and dyslipidemia who developed worsening dyspnea at the beginning of the week. He presented to the ER and was found to be in atrial flutter with RVR now status post cardioversion and amiodarone. Of note, his platelets were 25,290 on admission and 30,000 today. No other lab comparisons here but can see through care everywhere that his platelets were 46,601 at [facility] about a year ago. He has no recollection of ever being told he has low platelets. He takes iron daily for his history of PRATIK and reports black stools due to this. Hgb on admission was 10.2 and 12.7 today. We have been consulted for his thrombocytopenia    Interval history:  No major new issues. Our workup has not been particularly enlightening   There is no clear cause for the thrombocytopenia on labs. He has a confirmed platelet count of 53,493 from last year with few intervening labs. No overt bleeding. He had a fall since last visit.    Medications:   Current Facility-Administered Medications   Medication Dose Route Frequency Provider Last Rate Last Dose    carvedilol (COREG) tablet 3.125 mg  3.125 mg Oral BID WITH MEALS Franklin Bey MD   3.125 mg at 17 0854    sacubitril-valsartan (ENTRESTO) 24-26 mg tablet 1 Tab  1 Tab Oral Q12H Franklin Bey MD   1 Tab at 17 0854    dextrose (D50W) injection syrg 25 g  25 g IntraVENous PRN Franklin Bey MD   25 g at 17 1618    ferrous sulfate tablet 325 mg  1 Tab Oral DAILY WITH BREAKFAST Pat Tong NP   325 mg at 17 0634    warfarin (COUMADIN) tablet 5 mg  5 mg Oral QHS Shayna Thomas NP   Stopped at 17 2200    amiodarone (CORDARONE) tablet 200 mg  200 mg Oral BID Jacqui Garner MD   200 mg at 08/26/17 0854    furosemide (LASIX) injection 40 mg  40 mg IntraVENous BID Jacqui Garner MD   40 mg at 08/26/17 0868    acetaminophen (TYLENOL) tablet 650 mg  650 mg Oral Q6H PRN Jacqui Garner MD   650 mg at 08/24/17 1158    gabapentin (NEURONTIN) capsule 600 mg  600 mg Oral TID Pearl Jacinto MD   600 mg at 08/26/17 0528    albuterol (PROVENTIL HFA, VENTOLIN HFA, PROAIR HFA) inhaler 2 Puff  2 Puff Inhalation Q4H PRN Pearl Jacinto MD        pantoprazole (PROTONIX) tablet 40 mg  40 mg Oral ACB Pearl Jacinto MD   40 mg at 08/26/17 0854    tiotropium (SPIRIVA) inhalation capsule 18 mcg  1 Cap Inhalation DAILY Pearl Jacinto MD   18 mcg at 08/26/17 0734    sodium chloride (NS) flush 5-10 mL  5-10 mL IntraVENous Leonela Venegas MD   5 mL at 08/26/17 0528    sodium chloride (NS) flush 5-10 mL  5-10 mL IntraVENous PRN Pearl Jacinto MD        aspirin chewable tablet 81 mg  81 mg Oral DAILY Pearl Jacinto MD   Stopped at 08/25/17 0900    nitroglycerin (NITROSTAT) tablet 0.4 mg  0.4 mg SubLINGual Q5MIN PRN Pearl Jacinto MD        morphine injection 2 mg  2 mg IntraVENous Q4H PRN Pearl Jacinto MD        ondansetron [facility]) injection 4 mg  4 mg IntraVENous Q4H PRN Pearl Jacinto MD        insulin lispro (HUMALOG) injection   SubCUTAneous AC&HS Pearl Jacinto MD   Stopped at 08/23/17 1630    budesonide (PULMICORT) 500 mcg/2 ml nebulizer suspension  500 mcg Nebulization BID RT Pearl Jacinto MD   500 mcg at 08/26/17 0734    And    albuterol CONCENTRATE 2.5mg/0.5 mL neb soln  2.5 mg Nebulization Q6H RT Pearl Jacinto MD   2.5 mg at 08/26/17 0734       Allergies: Allergies   Allergen Reactions    Levaquin [Levofloxacin] Other (comments)     GI upset    Metformin Other (comments)     Gi upset       Review of Systems:   The Review of Systems is documented in full in the internal medical record. All systems are negative other than for those noted above. Physical Examination:  General Appearance: Mildly ill appearing patient in no acute distress. Vital signs:   Visit Vitals    /71 (BP 1 Location: Right arm, BP Patient Position: At rest)    Pulse 75    Temp 97.6 °F (36.4 °C)    Resp 18    Ht 5' 6\" (1.676 m)    Wt 181 lb 14.4 oz (82.5 kg)    SpO2 96%    BMI 29.36 kg/m2     HEENT: No oral or pharyngeal masses. There is no ulceration or thrush. Lymph nodes: There is no cervical, supraclavicular, axillary or inguinal adenopathy. Lungs: There lungs are clear to auscultation and percussion. There is no egophony. Heart: There is no jugular venous distention. Irregular  Abdomen: Soft, non-tender, bowel sounds present and normal, no appreciated hepatosplenomegaly. No palpable masses. Skin: No rash, petechiae or ecchymoses. No evidence of malignancy. .     Labs/Imaging:  Lab Results   Component Value Date/Time    WBC 7.6 08/26/2017 06:18 AM    HGB 12.4 08/26/2017 06:18 AM    HCT 41.5 08/26/2017 06:18 AM    PLATELET 49 12/39/1431 06:18 AM    MCV 95.2 08/26/2017 06:18 AM       Lab Results   Component Value Date/Time    Sodium 134 08/26/2017 06:18 AM    Potassium 5.1 08/26/2017 06:18 AM    Chloride 96 08/26/2017 06:18 AM    CO2 34 08/26/2017 06:18 AM    Anion gap 4 08/26/2017 06:18 AM    Glucose 119 08/26/2017 06:18 AM    BUN 27 08/26/2017 06:18 AM    Creatinine 1.03 08/26/2017 06:18 AM    GFR est AA >60 08/26/2017 06:18 AM    GFR est non-AA >60 08/26/2017 06:18 AM    Calcium 8.9 08/26/2017 06:18 AM    AST (SGOT) 25 08/23/2017 01:05 PM    Alk.  phosphatase 86 08/23/2017 01:05 PM    Protein, total 7.4 08/23/2017 01:05 PM    Albumin 3.6 08/23/2017 01:05 PM    Globulin 3.8 08/23/2017 01:05 PM    A-G Ratio 0.9 08/23/2017 01:05 PM    ALT (SGPT) 30 08/23/2017 01:05 PM           ASSESSMENT:  This gentleman has a thrombocytopenia which is isolated (normal RBC and WBC) that has been present for at least one year. The most likely diagnosis is ITP, valvular consumption and/or medications. The level of platelet count in and of itself is not concerning. It is the requirement for warfarin given his mechanical valve which creates difficulties when combined with this thrombocytopenia. His risk for bleeding is substantially increased especially with platelet counts below 50,000. Couple that with falling episodes and there is cause for concern. PLAN:  We can consider a trial to increase his platelet counts with either steroids or IVIG. Unfortunately he is an insulin requiring diabetic and the former is not ideal while the latter may provide only transient benefit. I will place him on IVIG to determine how responsive his platelets are to therapy. Be aware that this will be a fluid load for him ,.                   Reynaldo [facility]. and Hematology   [address]  P [phone]  F [phone]  [email or web address]    Elements of this note have been dictated using speech recognition software. As a result, errors of speech recognition may have occurred.

## 2017-08-26 NOTE — PROGRESS NOTES
Verbal bedside report received from Bianka Reyna RN and Otilio Garcia RN. Assumed care of patient. Patient oriented x4 upon waking him up. Denies any complaints at this time.

## 2017-08-26 NOTE — PROGRESS NOTES
In the process of 0200 rounds, RT found patient in floor by the bed and called out for help. Upon entering room, patient was in the floor, leaning against bottom of bed, between bed and restroom, alert x 3. Patient states he was trying to go to the bathroom. Patient had urinated and defecated on himself. Patient stated he slid to floor. When asked if he hit his head, patient stated no, but has a skin tear to right elbow. ROM completed and WNL. Vital signs /55, HR 89, SQBS 130, O2 sat 85 with out oxygen, patient placed on 4L of Oxygen O2 sat 95%. Patient helped back into bed, was cleaned up and bed alarm engaged. Patient instructed to call if he needs to get up and verbalized understanding. Call light within reach, BLL. Falls form completed, [place] [address] will be completed, and MD notified.

## 2017-08-27 LAB
ERYTHROCYTE [DISTWIDTH] IN BLOOD BY AUTOMATED COUNT: 15.3 % (ref 11.9–14.6)
GLUCOSE BLD STRIP.AUTO-MCNC: 111 MG/DL (ref 65–100)
GLUCOSE BLD STRIP.AUTO-MCNC: 140 MG/DL (ref 65–100)
GLUCOSE BLD STRIP.AUTO-MCNC: 164 MG/DL (ref 65–100)
GLUCOSE BLD STRIP.AUTO-MCNC: 200 MG/DL (ref 65–100)
HCT VFR BLD AUTO: 39.6 % (ref 41.1–50.3)
HGB BLD-MCNC: 11.8 G/DL (ref 13.6–17.2)
INR PPP: 2.5 (ref 0.9–1.2)
MCH RBC QN AUTO: 28.3 PG (ref 26.1–32.9)
MCHC RBC AUTO-ENTMCNC: 29.8 G/DL (ref 31.4–35)
MCV RBC AUTO: 95 FL (ref 79.6–97.8)
MM INDURATION POC: 0 MM (ref 0–5)
PLATELET # BLD AUTO: 49 K/UL (ref 150–450)
PMV BLD AUTO: ABNORMAL FL (ref 10.8–14.1)
PPD POC: NEGATIVE NEGATIVE
PROTHROMBIN TIME: 27.5 SEC (ref 9.6–12)
RBC # BLD AUTO: 4.17 M/UL (ref 4.23–5.67)
WBC # BLD AUTO: 6.3 K/UL (ref 4.3–11.1)

## 2017-08-27 PROCEDURE — 74011250637 HC RX REV CODE- 250/637: Performed by: NURSE PRACTITIONER

## 2017-08-27 PROCEDURE — 85610 PROTHROMBIN TIME: CPT | Performed by: PHYSICIAN ASSISTANT

## 2017-08-27 PROCEDURE — 82962 GLUCOSE BLOOD TEST: CPT

## 2017-08-27 PROCEDURE — 74011250637 HC RX REV CODE- 250/637: Performed by: INTERNAL MEDICINE

## 2017-08-27 PROCEDURE — 99231 SBSQ HOSP IP/OBS SF/LOW 25: CPT | Performed by: INTERNAL MEDICINE

## 2017-08-27 PROCEDURE — 94640 AIRWAY INHALATION TREATMENT: CPT

## 2017-08-27 PROCEDURE — 94760 N-INVAS EAR/PLS OXIMETRY 1: CPT

## 2017-08-27 PROCEDURE — 85027 COMPLETE CBC AUTOMATED: CPT | Performed by: PHYSICIAN ASSISTANT

## 2017-08-27 PROCEDURE — 74011636637 HC RX REV CODE- 636/637: Performed by: INTERNAL MEDICINE

## 2017-08-27 PROCEDURE — 77010033678 HC OXYGEN DAILY

## 2017-08-27 PROCEDURE — 74011250636 HC RX REV CODE- 250/636: Performed by: INTERNAL MEDICINE

## 2017-08-27 PROCEDURE — 65660000000 HC RM CCU STEPDOWN

## 2017-08-27 PROCEDURE — 74011000258 HC RX REV CODE- 258: Performed by: INTERNAL MEDICINE

## 2017-08-27 PROCEDURE — 74011000250 HC RX REV CODE- 250: Performed by: INTERNAL MEDICINE

## 2017-08-27 PROCEDURE — 36415 COLL VENOUS BLD VENIPUNCTURE: CPT | Performed by: PHYSICIAN ASSISTANT

## 2017-08-27 RX ORDER — FUROSEMIDE 40 MG/1
40 TABLET ORAL DAILY
Status: DISCONTINUED | OUTPATIENT
Start: 2017-08-27 | End: 2017-08-30

## 2017-08-27 RX ORDER — CARVEDILOL 12.5 MG/1
12.5 TABLET ORAL 2 TIMES DAILY WITH MEALS
Status: DISCONTINUED | OUTPATIENT
Start: 2017-08-27 | End: 2017-08-30

## 2017-08-27 RX ADMIN — SODIUM CHLORIDE 5 MG/HR: 900 INJECTION, SOLUTION INTRAVENOUS at 09:03

## 2017-08-27 RX ADMIN — ALBUTEROL SULFATE 2.5 MG: 2.5 SOLUTION RESPIRATORY (INHALATION) at 13:12

## 2017-08-27 RX ADMIN — FUROSEMIDE 40 MG: 40 TABLET ORAL at 12:41

## 2017-08-27 RX ADMIN — GABAPENTIN 600 MG: 300 CAPSULE ORAL at 14:06

## 2017-08-27 RX ADMIN — IMMUNE GLOBULIN (HUMAN) 60 G: 10 INJECTION INTRAVENOUS; SUBCUTANEOUS at 12:20

## 2017-08-27 RX ADMIN — INSULIN LISPRO 4 UNITS: 100 INJECTION, SOLUTION INTRAVENOUS; SUBCUTANEOUS at 22:03

## 2017-08-27 RX ADMIN — ASPIRIN 81 MG 81 MG: 81 TABLET ORAL at 08:56

## 2017-08-27 RX ADMIN — AMIODARONE HYDROCHLORIDE 200 MG: 200 TABLET ORAL at 09:00

## 2017-08-27 RX ADMIN — TIOTROPIUM BROMIDE 18 MCG: 18 CAPSULE ORAL; RESPIRATORY (INHALATION) at 07:20

## 2017-08-27 RX ADMIN — CARVEDILOL 12.5 MG: 12.5 TABLET, FILM COATED ORAL at 17:32

## 2017-08-27 RX ADMIN — SACUBITRIL AND VALSARTAN 1 TABLET: 24; 26 TABLET, FILM COATED ORAL at 21:49

## 2017-08-27 RX ADMIN — SACUBITRIL AND VALSARTAN 1 TABLET: 24; 26 TABLET, FILM COATED ORAL at 08:56

## 2017-08-27 RX ADMIN — FUROSEMIDE 40 MG: 10 INJECTION, SOLUTION INTRAMUSCULAR; INTRAVENOUS at 09:01

## 2017-08-27 RX ADMIN — WARFARIN SODIUM 5 MG: 2.5 TABLET ORAL at 21:49

## 2017-08-27 RX ADMIN — ALBUTEROL SULFATE 2.5 MG: 2.5 SOLUTION RESPIRATORY (INHALATION) at 20:32

## 2017-08-27 RX ADMIN — BUDESONIDE 500 MCG: 0.5 INHALANT RESPIRATORY (INHALATION) at 20:32

## 2017-08-27 RX ADMIN — FERROUS SULFATE TAB 325 MG (65 MG ELEMENTAL FE) 325 MG: 325 (65 FE) TAB at 09:00

## 2017-08-27 RX ADMIN — CARVEDILOL 6.25 MG: 6.25 TABLET, FILM COATED ORAL at 09:00

## 2017-08-27 RX ADMIN — AMIODARONE HYDROCHLORIDE 200 MG: 200 TABLET ORAL at 21:49

## 2017-08-27 RX ADMIN — GABAPENTIN 600 MG: 300 CAPSULE ORAL at 05:18

## 2017-08-27 RX ADMIN — GABAPENTIN 600 MG: 300 CAPSULE ORAL at 21:49

## 2017-08-27 RX ADMIN — ALBUTEROL SULFATE 2.5 MG: 2.5 SOLUTION RESPIRATORY (INHALATION) at 07:20

## 2017-08-27 RX ADMIN — BUDESONIDE 500 MCG: 0.5 INHALANT RESPIRATORY (INHALATION) at 07:19

## 2017-08-27 RX ADMIN — PANTOPRAZOLE SODIUM 40 MG: 40 TABLET, DELAYED RELEASE ORAL at 05:19

## 2017-08-27 RX ADMIN — ALBUTEROL SULFATE 2.5 MG: 2.5 SOLUTION RESPIRATORY (INHALATION) at 02:07

## 2017-08-27 NOTE — PROGRESS NOTES
Verbal bedside report received from Ny Alston RN. Assumed care of patient. Cardizem IV drip verified at bedside with outgoing RN.

## 2017-08-27 NOTE — PROGRESS NOTES
[facility] CRITICAL CARE OUTREACH NURSE PROGRESS REPORT      SUBJECTIVE: Called to assess patient secondary to nurse concern. MEWS Score: 2 (08/27/17 1307)  Vitals:    08/27/17 1242 08/27/17 1244 08/27/17 1307 08/27/17 1313   BP: 99/40 91/59 93/45    Pulse: 80 82 90    Resp:   18    Temp:   96.6 °F (35.9 °C)    SpO2:   94% 95%   Weight:       Height:            LAB DATA:    Recent Labs      08/26/17   0618  08/25/17   0707   NA  134*  134*   K  5.1  4.5   CL  96*  96*   CO2  34*  31   AGAP  4*  7   GLU  119*  77   BUN  27*  34*   CREA  1.03  1.83*   GFRAA  >60  47*   GFRNA  >60  39*   CA  8.9  8.8        Recent Labs      08/27/17   0549  08/26/17   0618  08/25/17   0707   WBC  6.3  7.6  8.3   HGB  11.8*  12.4*  12.7*   HCT  39.6*  41.5  40.3*   PLT  49*  49*  30*          OBJECTIVE: On arrival to room, I found patient to be sitting up eating lunch. Pain Assessment  Pain Intensity 1: 0 (08/27/17 1220)  Pain Location 1:  (headache)  Pain Intervention(s) 1: Medication (see MAR)  Patient Stated Pain Goal: 0      ASSESSMENT: Patient alert. Respirations even and unlabored. Speech clear and appropriate for age. Doing better today per primary RN. About to receive IVIG. Labs and vitals reviewed. PLAN:  Will continue to follow per outreach protocol.

## 2017-08-27 NOTE — PROGRESS NOTES
Date of Outreach Update:  Tali Almodovar was seen and assessed. MEWS Score: 2 (08/27/17 1307)    Vitals:    08/27/17 1307 08/27/17 1313 08/27/17 1652 08/27/17 1732   BP: 93/45  90/44 97/60   Pulse: 90  71 75   Resp: 18  18    Temp: 96.6 °F (35.9 °C)  96.8 °F (36 °C)    SpO2: 94% 95% 100%    Weight:       Height:            Pain Intensity 1: 0 (08/27/17 1220)  Pain Location 1:  (headache)  Pain Intervention(s) 1: Medication (see MAR)  Patient Stated Pain Goal: 0    Previous Outreach assessment has been reviewed. There have been no significant clinical changes since the completion of the last dated Outreach assessment. Will continue to follow up per outreach protocol.     Signed By:   Tessa Salomon RN    August 27, 2017 6:09 PM

## 2017-08-27 NOTE — PROGRESS NOTES
[facility] CRITICAL CARE OUTREACH NURSE PROGRESS REPORT      SUBJECTIVE: Called to assess patient secondary to nursing concern. MEWS Score: 2 (08/26/17 2133)  Vitals:    08/26/17 1628 08/26/17 1941 08/26/17 2047 08/26/17 2133   BP: 100/57  114/66 112/56   Pulse: (!) 118  (!) 124 (!) 106   Resp: 24  22 20   Temp: 97.4 °F (36.3 °C)  99.2 °F (37.3 °C) 98.9 °F (37.2 °C)   SpO2: 100% 96% 99% 98%   Weight:       Height:          LAB DATA:    Recent Labs      08/26/17 0618 08/25/17   0707   NA  134*  134*   K  5.1  4.5   CL  96*  96*   CO2  34*  31   AGAP  4*  7   GLU  119*  77   BUN  27*  34*   CREA  1.03  1.83*   GFRAA  >60  47*   GFRNA  >60  39*   CA  8.9  8.8        Recent Labs      08/26/17 0618 08/25/17   0707   WBC  7.6  8.3   HGB  12.4*  12.7*   HCT  41.5  40.3*   PLT  49*  30*          OBJECTIVE: On arrival to room, I found patient to be resting in bed. ASSESSMENT:  Pt resting in bed NAD. Pt alert, confused. Pt disoriented to time and situation. Respirations even and unlabored. Pt with no complaints at this time. PLAN:  Continue to follow per outreach protocol.

## 2017-08-27 NOTE — PROGRESS NOTES
Daily Progress Note -- Hematology/ Medical Oncology        Patient Name: Ambreen Duran    Date of Visit: 2017  : 1945  Age:72 y.o. Initial consult HPI:   He has a history of mechanical AVR in . He continues on chronic anticoagulation with coumadin, chronic systolic CHF/ICM EF 39%, COPD, PVD, DM II, HTN, iron deficiency, and dyslipidemia who developed worsening dyspnea at the beginning of the week. He presented to the ER and was found to be in atrial flutter with RVR now status post cardioversion and amiodarone. Of note, his platelets were 54,618 on admission and 30,000 today. No other lab comparisons here but can see through care everywhere that his platelets were 69,402 at [facility] about a year ago. He has no recollection of ever being told he has low platelets. He takes iron daily for his history of PRATIK and reports black stools due to this. Hgb on admission was 10.2 and 12.7 today. We have been consulted for his thrombocytopenia    Interval history:  Head CT negative following fall. A fib on cardizem  Plts 49,000-received first dose of IVIG yesterday and tolerated it well.        Medications:   Current Facility-Administered Medications   Medication Dose Route Frequency Provider Last Rate Last Dose    immune globulin 10% (GAMUNEX-C) infusion 60 g  60 g IntraVENous ONCE Gurpreet David MD        carvedilol (COREG) tablet 12.5 mg  12.5 mg Oral BID WITH MEALS Kisha Mccormick MD        furosemide (LASIX) tablet 40 mg  40 mg Oral DAILY Kisha Mccormick MD        dilTIAZem (CARDIZEM) 100 mg in 0.9% sodium chloride (MBP/ADV) 100 mL infusion  0-15 mg/hr IntraVENous TITRATE Kisha Mccormick MD 5 mL/hr at 17 0903 5 mg/hr at 17 0903    sacubitril-valsartan (ENTRESTO) 24-26 mg tablet 1 Tab  1 Tab Oral Q12H Nghia Goldstein MD   1 Tab at 17 0856    dextrose (D50W) injection syrg 25 g  25 g IntraVENous PRN Nghia Goldstein MD   25 g at 17 1618    ferrous sulfate tablet 325 mg  1 Tab Oral DAILY WITH BREAKFAST Pam Dorado NP   325 mg at 08/27/17 0900    warfarin (COUMADIN) tablet 5 mg  5 mg Oral QHS Sly Galarza NP   Stopped at 08/25/17 2200    amiodarone (CORDARONE) tablet 200 mg  200 mg Oral BID Dino Licona MD   200 mg at 08/27/17 0900    acetaminophen (TYLENOL) tablet 650 mg  650 mg Oral Q6H PRN Dino Licona MD   650 mg at 08/24/17 1158    gabapentin (NEURONTIN) capsule 600 mg  600 mg Oral TID Maureen King MD   600 mg at 08/27/17 0518    albuterol (PROVENTIL HFA, VENTOLIN HFA, PROAIR HFA) inhaler 2 Puff  2 Puff Inhalation Q4H PRN Maureen King MD        pantoprazole (PROTONIX) tablet 40 mg  40 mg Oral ACB Maureen King MD   40 mg at 08/27/17 0519    tiotropium (SPIRIVA) inhalation capsule 18 mcg  1 Cap Inhalation DAILY Maureen King MD   18 mcg at 08/27/17 0720    sodium chloride (NS) flush 5-10 mL  5-10 mL IntraVENous Berta Dooley MD   10 mL at 08/26/17 2123    sodium chloride (NS) flush 5-10 mL  5-10 mL IntraVENous PRN Maureen King MD        aspirin chewable tablet 81 mg  81 mg Oral DAILY Maureen King MD   81 mg at 08/27/17 0856    nitroglycerin (NITROSTAT) tablet 0.4 mg  0.4 mg SubLINGual Q5MIN PRN Maureen King MD        morphine injection 2 mg  2 mg IntraVENous Q4H PRN Maureen King MD        ondansetron [facility]) injection 4 mg  4 mg IntraVENous Q4H PRN Maureen King MD        insulin lispro (HUMALOG) injection   SubCUTAneous AC&HS Maureen King MD   Stopped at 08/23/17 1630    budesonide (PULMICORT) 500 mcg/2 ml nebulizer suspension  500 mcg Nebulization BID RT Maureen King MD   500 mcg at 08/27/17 0719    And    albuterol CONCENTRATE 2.5mg/0.5 mL neb soln  2.5 mg Nebulization Q6H RT Maureen King MD   2.5 mg at 08/27/17 0720       Allergies:   Allergies   Allergen Reactions    Levaquin [Levofloxacin] Other (comments)     GI upset    Metformin Other (comments)     Gi upset Review of Systems: The Review of Systems is documented in full in the internal medical record. All systems are negative other than for those noted above. Physical Examination:  General Appearance: Mildly ill appearing patient in no acute distress. Vital signs:   Visit Vitals    /56    Pulse 95    Temp 96.8 °F (36 °C)    Resp 18    Ht 5' 6\" (1.676 m)    Wt 181 lb 14.4 oz (82.5 kg)    SpO2 97%    BMI 29.36 kg/m2     HEENT: No oral or pharyngeal masses. There is no ulceration or thrush. Lymph nodes: There is no cervical, supraclavicular, axillary or inguinal adenopathy. Lungs: There lungs are clear to auscultation and percussion. There is no egophony. Heart: There is no jugular venous distention. Irregular  Abdomen: Soft, non-tender, bowel sounds present and normal, no appreciated hepatosplenomegaly. No palpable masses. Skin: No rash, petechiae or ecchymoses. No evidence of malignancy. .     Labs/Imaging:  Lab Results   Component Value Date/Time    WBC 6.3 08/27/2017 05:49 AM    HGB 11.8 08/27/2017 05:49 AM    HCT 39.6 08/27/2017 05:49 AM    PLATELET 49 02/27/9819 05:49 AM    MCV 95.0 08/27/2017 05:49 AM       Lab Results   Component Value Date/Time    Sodium 134 08/26/2017 06:18 AM    Potassium 5.1 08/26/2017 06:18 AM    Chloride 96 08/26/2017 06:18 AM    CO2 34 08/26/2017 06:18 AM    Anion gap 4 08/26/2017 06:18 AM    Glucose 119 08/26/2017 06:18 AM    BUN 27 08/26/2017 06:18 AM    Creatinine 1.03 08/26/2017 06:18 AM    GFR est AA >60 08/26/2017 06:18 AM    GFR est non-AA >60 08/26/2017 06:18 AM    Calcium 8.9 08/26/2017 06:18 AM    AST (SGOT) 25 08/23/2017 01:05 PM    Alk.  phosphatase 86 08/23/2017 01:05 PM    Protein, total 7.4 08/23/2017 01:05 PM    Albumin 3.6 08/23/2017 01:05 PM    Globulin 3.8 08/23/2017 01:05 PM    A-G Ratio 0.9 08/23/2017 01:05 PM    ALT (SGPT) 30 08/23/2017 01:05 PM           ASSESSMENT:  This gentleman has a thrombocytopenia which is isolated (normal RBC and WBC) that has been present for at least one year. The most likely diagnosis is ITP, valvular consumption and/or medications. The level of platelet count in and of itself is not concerning. It is the requirement for warfarin given his mechanical valve which creates difficulties when combined with this thrombocytopenia. His risk for bleeding is substantially increased especially with platelet counts below 50,000. Situation currently stable with platelet count of 25,524 after one dose of planned two of IVIG. PLAN:  Proceed with day 2 of IVIG today. Should he respond, the major issue is how to maintain the counts above 50,000, since responses are almost always temporary to IVIG. Sue Nieto MD FACP    Oncology and Hematology   [address]  P [phone]  F [phone]  [email or web address]    Elements of this note have been dictated using speech recognition software. As a result, errors of speech recognition may have occurred.

## 2017-08-27 NOTE — PROGRESS NOTES
Patient repeatedly removing heart monitor, gown, and pulling at iv site. Monitor and gown replaced. IV site wrapped with kerlex. Patient oriented to self only. Will continue to monitor.

## 2017-08-27 NOTE — PROGRESS NOTES
Date of Outreach Update:  Jackqulyn Paget was seen and assessed. MEWS Score: 5 (08/27/17 0050)  Vitals:    08/26/17 2133 08/27/17 0050 08/27/17 0057 08/27/17 0207   BP: 112/56 117/75 121/64    Pulse: (!) 106 (!) 137 (!) 105    Resp: 20 22     Temp: 98.9 °F (37.2 °C) 99.3 °F (37.4 °C)     SpO2: 98% 92%  98%   Weight:       Height:             Pain Assessment  Pain Intensity 1: 0 (08/27/17 0010)  Pain Location 1:  (headache)  Pain Intervention(s) 1: Medication (see MAR)  Patient Stated Pain Goal: 0      Previous Outreach assessment has been reviewed. There have been no significant clinical changes since the completion of the last dated Outreach assessment. Pt responding well to Cardizem gtt. Will continue to follow up per outreach protocol.     Signed By:   Negra Oliveros RN    August 27, 2017 2:48 AM

## 2017-08-27 NOTE — PROGRESS NOTES
8/27/2017 9:51 AM    Admit Date: 8/23/2017        Subjective:     Sandra Cassette reports feeling better this AM Last night had confusion CT was done which was neg for bleeding Very weak He went back into a fib . Objective:      Visit Vitals    /56    Pulse 95    Temp 96.8 °F (36 °C)    Resp 18    Ht 5' 6\" (1.676 m)    Wt 82.5 kg (181 lb 14.4 oz)    SpO2 97%    BMI 29.36 kg/m2       Physical Exam:  Heart: irregularly irregular rhythm  Lungs: clear to auscultation bilaterally  Abdomen: soft, non-tender.  Bowel sounds normal. No masses,  no organomegaly  Extremities: no edema    Data Review:   Labs:    Recent Results (from the past 24 hour(s))   GLUCOSE, POC    Collection Time: 08/26/17 11:37 AM   Result Value Ref Range    Glucose (POC) 162 (H) 65 - 100 mg/dL   GLUCOSE, POC    Collection Time: 08/26/17  4:21 PM   Result Value Ref Range    Glucose (POC) 129 (H) 65 - 100 mg/dL   GLUCOSE, POC    Collection Time: 08/26/17  9:31 PM   Result Value Ref Range    Glucose (POC) 126 (H) 65 - 100 mg/dL   PROTHROMBIN TIME + INR    Collection Time: 08/27/17  5:49 AM   Result Value Ref Range    Prothrombin time 27.5 (H) 9.6 - 12.0 sec    INR 2.5 (H) 0.9 - 1.2     CBC W/O DIFF    Collection Time: 08/27/17  5:49 AM   Result Value Ref Range    WBC 6.3 4.3 - 11.1 K/uL    RBC 4.17 (L) 4.23 - 5.67 M/uL    HGB 11.8 (L) 13.6 - 17.2 g/dL    HCT 39.6 (L) 41.1 - 50.3 %    MCV 95.0 79.6 - 97.8 FL    MCH 28.3 26.1 - 32.9 PG    MCHC 29.8 (L) 31.4 - 35.0 g/dL    RDW 15.3 (H) 11.9 - 14.6 %    PLATELET 49 (L) 724 - 450 K/uL    MPV Cannot be calculated 10.8 - 14.1 FL   GLUCOSE, POC    Collection Time: 08/27/17  6:34 AM   Result Value Ref Range    Glucose (POC) 111 (H) 65 - 100 mg/dL       Telemetry: AFIB    Radiology: CT neg      Assessment:     Patient Active Problem List    Diagnosis Date Noted    Thrombocytopenia ([facility] appreciate Hem Onc 08/26/2017    Atrial flutter with rapid ventricular response ([facility] did not stay in sinus after cardioversion Will continue to load with amio and later ? Cardiovert again 08/23/2017    Atrial flutter with rapid ventricular response ([address] 58/64/7452    Systolic CHF, acute ([address] will stop IV lasix 11/15/2016    Ischemic cardiomyopathy 11/15/2016    LV dysfunction 10/19/2016    History of mechanical aortic valve replacement stable 09/15/2016    Acute diastolic CHF (congestive heart failure) ([address] 09/15/2016    Chronic bronchitis ([address] 09/15/2016    Dyslipidemia 04/16/2014    Coronary artery disease 04/16/2014    Arrhythmia 04/16/2014    Acute sinusitis 04/16/2014    Anticoagulant long-term use 04/15/2014    HLD (hyperlipidemia) 04/15/2014    HTN (hypertension), benign 04/15/2014    Peripheral vascular disease ([address] 04/15/2014    Atherosclerosis of native arteries of the extremities with intermittent claudication 04/15/2014   Confusion ?  Cause CT head OK better now    Plan:   Taper cardiazem Increase coreg

## 2017-08-27 NOTE — PROGRESS NOTES
Problem: Falls - Risk of  Goal: *Absence of Falls  Document Lauren Fall Risk and appropriate interventions in the flowsheet.    Outcome: Progressing Towards Goal  Fall Risk Interventions:  Mobility Interventions: Bed/chair exit alarm     Mentation Interventions: Adequate sleep, hydration, pain control, Bed/chair exit alarm     Medication Interventions: Patient to call before getting OOB     Elimination Interventions: Bed/chair exit alarm, Call light in reach     History of Falls Interventions: Bed/chair exit alarm

## 2017-08-27 NOTE — PROGRESS NOTES
Date of Outreach Update:  Dimitris Hamilton was seen and assessed. MEWS Score: 3 (08/27/17 0446)  Vitals:    08/27/17 0447 08/27/17 0456 08/27/17 0458 08/27/17 0519   BP: 111/68 113/65  113/65   Pulse:   89 97   Resp:       Temp:       SpO2:       Weight:       Height:             Pain Assessment  Pain Intensity 1: 0 (08/27/17 0433)  Pain Location 1:  (headache)  Pain Intervention(s) 1: Medication (see MAR)  Patient Stated Pain Goal: 0      Previous Outreach assessment has been reviewed. There have been no significant clinical changes since the completion of the last dated Outreach assessment. Will continue to follow up per outreach protocol.     Signed By:   Santiago Norwood RN    August 27, 2017 5:39 AM

## 2017-08-27 NOTE — PROGRESS NOTES
Spoke with Dr Lyle Acevedo to discuss patient. Patient HR has been sustaining 130s-140s despite med changes earlier today and patient remains very confused. Verbal order with readback to start [place] gtt without bolus.

## 2017-08-27 NOTE — PROGRESS NOTES
Patient remains confused, consistently removing heart monitor, pulling off brief, and pulling off wrap around IV site. VSS. Cardizem gtt infusing at 10mh/hr. No changes to neuro checks. Bed alarm in place. Will continue to monitor.

## 2017-08-27 NOTE — PROGRESS NOTES
Verbal bedside report given to Terry Kincaid oncoming RN. Patient's situation, background, assessment and recommendations provided. Opportunity for questions provided. Oncoming RN assumed care of patient.

## 2017-08-28 ENCOUNTER — APPOINTMENT (OUTPATIENT)
Dept: MRI IMAGING | Age: 72
DRG: 308 | End: 2017-08-28
Attending: PHYSICAL MEDICINE & REHABILITATION
Payer: MEDICARE

## 2017-08-28 LAB
ANION GAP SERPL CALC-SCNC: 2 MMOL/L (ref 7–16)
BACTERIA SPEC CULT: NORMAL
BUN SERPL-MCNC: 45 MG/DL (ref 8–23)
CALCIUM SERPL-MCNC: 8.3 MG/DL (ref 8.3–10.4)
CHLORIDE SERPL-SCNC: 89 MMOL/L (ref 98–107)
CO2 SERPL-SCNC: 37 MMOL/L (ref 21–32)
CREAT SERPL-MCNC: 1.48 MG/DL (ref 0.8–1.5)
ERYTHROCYTE [DISTWIDTH] IN BLOOD BY AUTOMATED COUNT: 15.6 % (ref 11.9–14.6)
GLUCOSE BLD STRIP.AUTO-MCNC: 127 MG/DL (ref 65–100)
GLUCOSE BLD STRIP.AUTO-MCNC: 149 MG/DL (ref 65–100)
GLUCOSE BLD STRIP.AUTO-MCNC: 163 MG/DL (ref 65–100)
GLUCOSE BLD STRIP.AUTO-MCNC: 208 MG/DL (ref 65–100)
GLUCOSE SERPL-MCNC: 147 MG/DL (ref 65–100)
HCT VFR BLD AUTO: 39.6 % (ref 41.1–50.3)
HGB BLD-MCNC: 12.8 G/DL (ref 13.6–17.2)
INR PPP: 2.3 (ref 0.9–1.2)
MCH RBC QN AUTO: 28.9 PG (ref 26.1–32.9)
MCHC RBC AUTO-ENTMCNC: 32.2 G/DL (ref 31.4–35)
MCV RBC AUTO: 89.8 FL (ref 79.6–97.8)
MM INDURATION POC: NORMAL MM (ref 0–5)
PLATELET # BLD AUTO: 65 K/UL (ref 150–450)
PMV BLD AUTO: 11.4 FL (ref 10.8–14.1)
POTASSIUM SERPL-SCNC: 4.4 MMOL/L (ref 3.5–5.1)
PPD POC: NEGATIVE NEGATIVE
PROTHROMBIN TIME: 24.8 SEC (ref 9.6–12)
RBC # BLD AUTO: 4.41 M/UL (ref 4.23–5.67)
SERVICE CMNT-IMP: NORMAL
SODIUM SERPL-SCNC: 128 MMOL/L (ref 136–145)
WBC # BLD AUTO: 5.2 K/UL (ref 4.3–11.1)

## 2017-08-28 PROCEDURE — 74011250637 HC RX REV CODE- 250/637: Performed by: INTERNAL MEDICINE

## 2017-08-28 PROCEDURE — 97110 THERAPEUTIC EXERCISES: CPT

## 2017-08-28 PROCEDURE — 77010033678 HC OXYGEN DAILY

## 2017-08-28 PROCEDURE — 74011636637 HC RX REV CODE- 636/637: Performed by: INTERNAL MEDICINE

## 2017-08-28 PROCEDURE — 77030019605

## 2017-08-28 PROCEDURE — 80048 BASIC METABOLIC PNL TOTAL CA: CPT | Performed by: PHYSICIAN ASSISTANT

## 2017-08-28 PROCEDURE — 85610 PROTHROMBIN TIME: CPT | Performed by: PHYSICIAN ASSISTANT

## 2017-08-28 PROCEDURE — 92610 EVALUATE SWALLOWING FUNCTION: CPT

## 2017-08-28 PROCEDURE — 82962 GLUCOSE BLOOD TEST: CPT

## 2017-08-28 PROCEDURE — 85027 COMPLETE CBC AUTOMATED: CPT | Performed by: PHYSICIAN ASSISTANT

## 2017-08-28 PROCEDURE — 70551 MRI BRAIN STEM W/O DYE: CPT

## 2017-08-28 PROCEDURE — 94760 N-INVAS EAR/PLS OXIMETRY 1: CPT

## 2017-08-28 PROCEDURE — 99233 SBSQ HOSP IP/OBS HIGH 50: CPT | Performed by: INTERNAL MEDICINE

## 2017-08-28 PROCEDURE — 74011636637 HC RX REV CODE- 636/637: Performed by: NURSE PRACTITIONER

## 2017-08-28 PROCEDURE — 74011250637 HC RX REV CODE- 250/637: Performed by: NURSE PRACTITIONER

## 2017-08-28 PROCEDURE — 74011000250 HC RX REV CODE- 250: Performed by: INTERNAL MEDICINE

## 2017-08-28 PROCEDURE — 94640 AIRWAY INHALATION TREATMENT: CPT

## 2017-08-28 PROCEDURE — 74011250636 HC RX REV CODE- 250/636: Performed by: NURSE PRACTITIONER

## 2017-08-28 PROCEDURE — 36415 COLL VENOUS BLD VENIPUNCTURE: CPT | Performed by: PHYSICIAN ASSISTANT

## 2017-08-28 PROCEDURE — 97530 THERAPEUTIC ACTIVITIES: CPT

## 2017-08-28 PROCEDURE — 97165 OT EVAL LOW COMPLEX 30 MIN: CPT

## 2017-08-28 PROCEDURE — 65660000000 HC RM CCU STEPDOWN

## 2017-08-28 RX ORDER — PREDNISONE 20 MG/1
80 TABLET ORAL
Status: DISCONTINUED | OUTPATIENT
Start: 2017-08-28 | End: 2017-08-30

## 2017-08-28 RX ADMIN — BUDESONIDE 500 MCG: 0.5 INHALANT RESPIRATORY (INHALATION) at 08:22

## 2017-08-28 RX ADMIN — SACUBITRIL AND VALSARTAN 1 TABLET: 24; 26 TABLET, FILM COATED ORAL at 09:19

## 2017-08-28 RX ADMIN — GABAPENTIN 600 MG: 300 CAPSULE ORAL at 05:00

## 2017-08-28 RX ADMIN — ALBUTEROL SULFATE 2.5 MG: 2.5 SOLUTION RESPIRATORY (INHALATION) at 20:37

## 2017-08-28 RX ADMIN — Medication 10 ML: at 14:49

## 2017-08-28 RX ADMIN — IMMUNE GLOBULIN (HUMAN) 60 G: 10 INJECTION INTRAVENOUS; SUBCUTANEOUS at 16:45

## 2017-08-28 RX ADMIN — PREDNISONE 80 MG: 20 TABLET ORAL at 14:49

## 2017-08-28 RX ADMIN — INSULIN LISPRO 4 UNITS: 100 INJECTION, SOLUTION INTRAVENOUS; SUBCUTANEOUS at 21:08

## 2017-08-28 RX ADMIN — CARVEDILOL 12.5 MG: 12.5 TABLET, FILM COATED ORAL at 16:47

## 2017-08-28 RX ADMIN — ALBUTEROL SULFATE 2.5 MG: 2.5 SOLUTION RESPIRATORY (INHALATION) at 08:22

## 2017-08-28 RX ADMIN — FERROUS SULFATE TAB 325 MG (65 MG ELEMENTAL FE) 325 MG: 325 (65 FE) TAB at 09:19

## 2017-08-28 RX ADMIN — SACUBITRIL AND VALSARTAN 1 TABLET: 24; 26 TABLET, FILM COATED ORAL at 20:55

## 2017-08-28 RX ADMIN — ALBUTEROL SULFATE 2.5 MG: 2.5 SOLUTION RESPIRATORY (INHALATION) at 13:46

## 2017-08-28 RX ADMIN — TIOTROPIUM BROMIDE 18 MCG: 18 CAPSULE ORAL; RESPIRATORY (INHALATION) at 08:23

## 2017-08-28 RX ADMIN — PANTOPRAZOLE SODIUM 40 MG: 40 TABLET, DELAYED RELEASE ORAL at 05:00

## 2017-08-28 RX ADMIN — CARVEDILOL 12.5 MG: 12.5 TABLET, FILM COATED ORAL at 09:19

## 2017-08-28 RX ADMIN — Medication 10 ML: at 20:59

## 2017-08-28 RX ADMIN — GABAPENTIN 600 MG: 300 CAPSULE ORAL at 14:49

## 2017-08-28 RX ADMIN — FUROSEMIDE 40 MG: 40 TABLET ORAL at 09:18

## 2017-08-28 RX ADMIN — GABAPENTIN 600 MG: 300 CAPSULE ORAL at 20:55

## 2017-08-28 RX ADMIN — ASPIRIN 81 MG 81 MG: 81 TABLET ORAL at 09:19

## 2017-08-28 RX ADMIN — AMIODARONE HYDROCHLORIDE 200 MG: 200 TABLET ORAL at 20:55

## 2017-08-28 RX ADMIN — WARFARIN SODIUM 5 MG: 2.5 TABLET ORAL at 20:55

## 2017-08-28 RX ADMIN — Medication 5 ML: at 05:00

## 2017-08-28 RX ADMIN — BUDESONIDE 500 MCG: 0.5 INHALANT RESPIRATORY (INHALATION) at 20:37

## 2017-08-28 RX ADMIN — ALBUTEROL SULFATE 2.5 MG: 2.5 SOLUTION RESPIRATORY (INHALATION) at 01:57

## 2017-08-28 RX ADMIN — AMIODARONE HYDROCHLORIDE 200 MG: 200 TABLET ORAL at 09:18

## 2017-08-28 NOTE — PROGRESS NOTES
Verbal bedside report received from Kristin Zepeda and Maribel Horn RN. Assumed care of patient. IGG IV drip verified at bedside with outgoing RN.

## 2017-08-28 NOTE — PROGRESS NOTES
Date of Outreach Update:  Rosa George was seen and assessed. MEWS Score: 1 (08/28/17 1700)  Vitals:    08/28/17 1650 08/28/17 1655 08/28/17 1700 08/28/17 1740   BP: 94/67 104/60 105/56 122/72   Pulse: 93 98 94 (!) 110   Resp: 17 18 17 17   Temp:   97.1 °F (36.2 °C)    SpO2: 100% 98% 98% 95%   Weight:       Height:             Pain Assessment  Pain Intensity 1: 0 (08/28/17 1659)  Pain Location 1:  (headache)  Pain Intervention(s) 1: Medication (see MAR)  Patient Stated Pain Goal: 0      Previous Outreach assessment has been reviewed. There have been no significant clinical changes since the completion of the last dated Outreach assessment. Will discharge patient from outreach program, call back if needed.    Signed By:   Sharon Warner RN    August 28, 2017 6:04 PM

## 2017-08-28 NOTE — PROGRESS NOTES
Date of Outreach Update:  Daniel Courser was seen and assessed. MEWS Score: 2 (08/28/17 0108)  Vitals:    08/27/17 1732 08/27/17 2032 08/27/17 2100 08/28/17 0108   BP: 97/60  101/51 95/47   Pulse: 75  73 89   Resp:   18 17   Temp:   97.4 °F (36.3 °C) 97.4 °F (36.3 °C)   SpO2:  97% 94% 100%   Weight:       Height:             Pain Assessment  Pain Intensity 1: 0 (08/28/17 0033)  Pain Location 1:  (headache)  Pain Intervention(s) 1: Medication (see MAR)  Patient Stated Pain Goal: 0      Previous Outreach assessment has been reviewed. There have been no significant clinical changes since the completion of the last dated Outreach assessment. Will continue to follow up per outreach protocol.     Signed By:   Evelina Tejeda RN    August 28, 2017 1:26 AM

## 2017-08-28 NOTE — PROGRESS NOTES
Care Management Interventions  PCP Verified by CM: Yes  Transition of Care Consult (CM Consult): Discharge Planning  Physical Therapy Consult: Yes  Occupational Therapy Consult: Yes  Speech Therapy Consult: Yes  Current Support Network: Lives with Spouse  Confirm Follow Up Transport: Family    SW dc screening. Alert and oriented in all spheres. Indep and active PTA. Has a rollator at home. Has a PCP in the community but gets all his meds at the [address] Now with noticeable weakness, tremors since admission. Dr. Yudi Brandt evaluated for 9th floor today. Thinks he's appropriate but requested an MRI. Will await MRI results.

## 2017-08-28 NOTE — PROGRESS NOTES
[facility] CRITICAL CARE OUTREACH NURSE PROGRESS REPORT      SUBJECTIVE: Called to assess patient secondary to nurse concern. MEWS Score: 1 (08/28/17 6981)  Vitals:    08/28/17 0157 08/28/17 0505 08/28/17 0826 08/28/17 0856   BP:  92/56  133/66   Pulse:  82  98   Resp:  18  18   Temp:  97.3 °F (36.3 °C)  96.5 °F (35.8 °C)   SpO2: 98% 98% 98% 94%   Weight:  81.9 kg (180 lb 9.6 oz)     Height:          EKG: unchanged from previous tracings, atrial fibrillation, rate 90's. LAB DATA:    Recent Labs      08/28/17   0853  08/26/17   0618   NA  128*  134*   K  4.4  5.1   CL  89*  96*   CO2  37*  34*   AGAP  2*  4*   GLU  147*  119*   BUN  45*  27*   CREA  1.48  1.03   GFRAA  60*  >60   GFRNA  50*  >60   CA  8.3  8.9        Recent Labs      08/28/17   0853  08/27/17   0549  08/26/17   0618   WBC  5.2  6.3  7.6   HGB  12.8*  11.8*  12.4*   HCT  39.6*  39.6*  41.5   PLT  65*  49*  49*          OBJECTIVE: On arrival to room, I found patient to be in bed resting, spouse present. Visit Vitals    /66 (BP 1 Location: Left arm, BP Patient Position: Head of bed elevated (Comment degrees))    Pulse 98    Temp 96.5 °F (35.8 °C)    Resp 18    Ht 5' 6\" (1.676 m)    Wt 81.9 kg (180 lb 9.6 oz)    SpO2 94%    BMI 29.15 kg/m2     General appearance: cooperative, slight confusion  Lungs: clear to auscultation bilaterally       Pain Assessment  Pain Intensity 1: 0 (08/28/17 0722)  Pain Location 1:  (headache)  Pain Intervention(s) 1: Medication (see MAR)  Patient Stated Pain Goal: 0                                 ASSESSMENT:  Patient currently in 2l nc, sats 98%. Patient able to follow commands, equal bilateral movements. Patient orient to self and type of facility, but wrong hospital.    PLAN:  Spoke with spouse about patient's course of stay.  Will continue to follow patient per outreach program.

## 2017-08-28 NOTE — PROGRESS NOTES
Verbal bedside report given to Antonina Crowe oncoming RN. Patient's situation, background, assessment and recommendations provided. Opportunity for questions provided. Oncoming RN assumed care of patient.

## 2017-08-28 NOTE — PROGRESS NOTES
Problem: Mobility Impaired (Adult and Pediatric)  Goal: *Acute Goals and Plan of Care (Insert Text)  STG:  (1.)Mr. Jeffrey Madrid will move from supine to sit and sit to supine , scoot up and down and roll side to side with CONTACT GUARD ASSIST within 7 day(s). (2.)Mr. Jeffrey Madrid will transfer from bed to chair and chair to bed with STAND BY ASSIST using the least restrictive device within 7 day(s). (3.)Mr. Jeffrey Madrid will ambulate with CONTACT GUARD ASSIST for 100 feet with the least restrictive device within 7 day(s). LTG:  (1.)Mr. Jeffrey Madrid will move from supine to sit and sit to supine , scoot up and down and roll side to side in bed with SUPERVISION within 7 day(s). (2.)Mr. Jeffrey Madrid will transfer from bed to chair and chair to bed with SUPERVISION using the least restrictive device within 7 day(s). (3.)Mr. Jeffrey Madrid will ambulate with SUPERVISION for 250+ feet with the least restrictive device within 7 day(s). ________________________________________________________________________________________________      PHYSICAL THERAPY: Daily Note, Treatment Day: 1st and AM 8/28/2017  INPATIENT: Hospital Day: 6  [place] / Product Type: Medicare /      NAME/AGE/GENDER: Greyson Ruiz is a 67 y.o. male     PRIMARY DIAGNOSIS: Atrial flutter with rapid ventricular response (HCC) Atrial flutter with rapid ventricular response (HCC) Atrial flutter with rapid ventricular response (HCC)        ICD-10: Treatment Diagnosis:       · Generalized Muscle Weakness (M62.81)  · Difficulty in walking, Not elsewhere classified (R26.2)   Precaution/Allergies:  Levaquin [levofloxacin] and Metformin       ASSESSMENT:      Mr. Jeffrey Madrid was supine in bed and very drowsy. He required stimulus to wake up and was lethargic throughout treatment. Pt performed bed mobility with SBA and stood with CGA-[place]ar,  He has fair balance and was given [place] for ambulating in room with RW.   Pt shows decreased attention and command following today. He required max cues for TE in bedside chair due to drowsiness. Pt has progressed in activity tolerance. Will continue POC. This section established at most recent assessment   PROBLEM LIST (Impairments causing functional limitations):  1. Decreased Strength  2. Decreased ADL/Functional Activities  3. Decreased Transfer Abilities  4. Decreased Ambulation Ability/Technique  5. Decreased Balance  6. Decreased Activity Tolerance  7. Decreased Pacing Skills  8. Increased Fatigue  9. Increased Shortness of Breath  10. Decreased [place] with Home Exercise Program    INTERVENTIONS PLANNED: (Benefits and precautions of physical therapy have been discussed with the patient.)  1. Balance Exercise  2. Bed Mobility  3. Family Education  4. Gait Training  5. Home Exercise Program (HEP)  6. Range of Motion (ROM)  7. Therapeutic Activites  8. Therapeutic Exercise/Strengthening  9. Transfer Training  10. Group Therapy      TREATMENT PLAN: Frequency/Duration: 3 times a week for duration of hospital stay  Rehabilitation Potential For Stated Goals: FAIR      RECOMMENDED REHABILITATION/EQUIPMENT: (at time of discharge pending progress): Continue Skilled Therapy and rehab and HHPT- dependent on pt progress. HISTORY:   History of Present Injury/Illness (Reason for Referral):  See H&P below  Ronak Hanson is a 67 y.o. WM with h/o mechanical AVR 2000 on chronic AC with coumadin, chronic systolic CHF/ICM EF 00%, COPD, PVD, DM II, HTN and dyslipidemia who developed worsening dyspnea on monday 8/21. He presented to the hospital today reporting worsening SOB and was found to be in atrial flutter with 's/ He was given Cardizem 10 mg IV bolus followed by IV drip 5 mg/hr. He is still in 140's with lower BP and [facility] was called to evaluate for admission. Pt reports his pulse oximetry monitor showed  on Monday and remained high until today.  He felt he had COPD exacerbation and treated it with repeat inhaler and nebulizer treatments. He also reports h/o PRATIK with iron started last year by the [place]. He has been having dark stools but yesterday had rapid onset of uncontrollable copious amount of diarrhea that was black. CBC still pending. He reportedly had a LHC prior to AVR in 2000 that showed no CAD. He had nuclear stress test 11/2016 showing EF 31%, large inferolateral and anteroapical scar. Past Medical History/Comorbidities:   Mr. Marimar Rodas  has a past medical history of Arthritis; Chicken pox; Coronary artery disease (4/16/2014); DJD (degenerative joint disease); Emphysema; aortic valve replacement, mechanical; Hypercholesterolemia; Hypertension; Palpitations; and Pneumonia. Mr. Marimar Rodas  has a past surgical history that includes aortic valve replacement (N/A, 2000) and heart catheterization (07/21/2004).   Social History/Living Environment:   Home Environment: Private residence  # Steps to Enter: 3  Rails to Enter: Yes  Hand Rails : Bilateral  One/Two Story Residence: One story  Living Alone: No  Support Systems: Spouse/Significant Other/Partner, Child(devyn)  Patient Expects to be Discharged to[de-identified] Private residence  Current DME Used/Available at Home: Walker, rollator  Tub or Shower Type: Tub/Shower combination  Prior Level of Function/Work/Activity:  Lives with wife, indep with gait and ADLs, 0 falls, driving   Number of Personal Factors/Comorbidities that affect the Plan of Care: 3+: HIGH COMPLEXITY   EXAMINATION:   Most Recent Physical Functioning:   Gross Assessment:                  Posture:     Balance:  Sitting: Intact  Standing: Impaired  Standing - Static: Fair  Standing - Dynamic : Fair Bed Mobility:     Wheelchair Mobility:     Transfers:  Sit to Stand: Contact guard assistance;Minimum assistance  Stand to Sit: Contact guard assistance  Bed to Chair: Contact guard assistance;Minimum assistance  Interventions: Safety awareness training;Verbal cues;Visual cues;Manual cues  Gait:     Base of Support: Center of gravity altered  Speed/Evelin: Slow;Shuffled  Step Length: Right shortened;Left shortened  Gait Abnormalities: Decreased step clearance;Shuffling gait  Distance (ft): 24 Feet (ft)  Assistive Device: Walker, rolling  Ambulation - Level of Assistance: Minimal assistance  Interventions: Safety awareness training;Manual cues; Verbal cues; Visual/Demos       Body Structures Involved:  1. Nerves  2. Heart  3. Lungs  4. Bones  5. Joints  6. Muscles Body Functions Affected:  1. Cardio  2. Respiratory  3. Neuromusculoskeletal  4. Movement Related Activities and Participation Affected:  1. General Tasks and Demands  2. Mobility  3. Self Care  4. Domestic Life  5. Interpersonal Interactions and Relationships  6. Community, Social and [place]   Number of elements that affect the Plan of Care: 4+: HIGH COMPLEXITY   CLINICAL PRESENTATION:   Presentation: Evolving clinical presentation with changing clinical characteristics: MODERATE COMPLEXITY   CLINICAL DECISION MAKIN [facility] Mobility Inpatient Short Form  How much difficulty does the patient currently have. .. Unable A Lot A Little None   1. Turning over in bed (including adjusting bedclothes, sheets and blankets)? [ ] 1   [ ] 2   [X] 3   [ ] 4   2. Sitting down on and standing up from a chair with arms ( e.g., wheelchair, bedside commode, etc.)   [ ] 1   [ ] 2   [X] 3   [ ] 4   3. Moving from lying on back to sitting on the side of the bed? [ ] 1   [ ] 2   [X] 3   [ ] 4   How much help from another person does the patient currently need. .. Total A Lot A Little None   4. Moving to and from a bed to a chair (including a wheelchair)? [ ] 1   [ ] 2   [X] 3   [ ] 4   5. Need to walk in hospital room? [ ] 1   [X] 2   [ ] 3   [ ] 4   6. Climbing 3-5 steps with a railing?    [ ] 1   [X] 2   [ ] 3   [ ] 4   © , Trustees of [address] [place] [address] [address] [id number], under license to [employer]. All rights reserved    Score:  Initial: 16 Most Recent: X (Date: -- )     Interpretation of Tool:  Represents activities that are increasingly more difficult (i.e. Bed mobility, Transfers, Gait). Score 24 23 22-20 19-15 14-10 9-7 6       Modifier CH CI CJ CK CL CM CN         · Mobility - Walking and Moving Around:               - CURRENT STATUS:    CK - 40%-59% impaired, limited or restricted               - GOAL STATUS:           CJ - 20%-39% impaired, limited or restricted               - D/C STATUS:                       ---------------To be determined---------------  [place] / Product Type: Medicare /       Medical Necessity:     · Patient is expected to demonstrate progress in strength, balance, coordination and functional technique to decrease assistance required with gait, transfers, and functional mobility. Reason for Services/Other Comments:  · Patient continues to require skilled intervention due to decreased strength, decreased balance, decreased functional tolerance, decreased cardiopulmonary endurance affecting participation in basic ADLs and functional tasks. Use of outcome tool(s) and clinical judgement create a POC that gives a: Questionable prediction of patient's progress: MODERATE COMPLEXITY                 TREATMENT:   (In addition to Assessment/Re-Assessment sessions the following treatments were rendered)   Pre-treatment Symptoms/Complaints:  none  Pain: Initial:   Pain Intensity 1: 0  Post Session:  None      Therapeutic Activity: (    10 minutes): Therapeutic activities including Bed transfers, Chair transfers and Ambulation on level ground to improve mobility, strength, balance and coordination. Required minimal Safety awareness training;Manual cues; Verbal cues; Visual/Demos to promote static and dynamic balance in standing and promote coordination of bilateral, upper extremity(s), lower extremity(s). Therapeutic Exercise: (13 Minutes):  Exercises per grid below to improve mobility and strength. Required mod-max visual, verbal and tactile cues to promote proper body alignment and promote proper body mechanics. Progressed range and repetitions as indicated. Date:  8/25/17 Date:   8/28/17 Date:      Activity/Exercise Parameters Parameters Parameters   LAQ 10 X B  2 x 20 B     Alternating marches 10 X B       Ankle pumps 10 X B   2 X 20 B     Quad set 10 X B       Glute set 10 X        Heel slides 10 X B       Hip abduction 10 X B 1 x 20 B    Heel taps  2 x 20 B    Toe taps  1 x 20 B    Marching  2 x 20 B                           Braces/Orthotics/Lines/Etc:   · O2 Device: Nasal cannula  Treatment/Session Assessment:    · Response to Treatment:  See above. · Interdisciplinary Collaboration:  · Physical Therapist, Registered Nurse and Physician  · After treatment position/precautions:  · Up in chair, Bed/Chair-wheels locked, Call light within reach, RN notified, Family at bedside, MD at bedside and [place] alarm activated  · Compliance with Program/Exercises: Will assess as treatment progresses. · Recommendations/Intent for next treatment session: \"Next visit will focus on advancements to more challenging activities and reduction in assistance provided\".   Total Treatment Duration:  PT Patient Time In/Time Out  Time In: 1005  Time Out: [address] Puja Kumar, PT, DPT

## 2017-08-28 NOTE — PROGRESS NOTES
Daily Progress Note -- Hematology/ Medical Oncology        Patient Name: Joel Hernandez    Date of Visit: 2017  : 1945  Age:72 y.o. Initial consult HPI:   He has a history of mechanical AVR in . He continues on chronic anticoagulation with coumadin, chronic systolic CHF/ICM EF 06%, COPD, PVD, DM II, HTN, iron deficiency, and dyslipidemia who developed worsening dyspnea at the beginning of the week. He presented to the ER and was found to be in atrial flutter with RVR now status post cardioversion and amiodarone. Of note, his platelets were 81,636 on admission and 30,000 today. No other lab comparisons here but can see through care everywhere that his platelets were 65,385 at [facility] about a year ago. He has no recollection of ever being told he has low platelets. He takes iron daily for his history of PRATIK and reports black stools due to this. Hgb on admission was 10.2 and 12.7 today. We have been consulted for his thrombocytopenia    Interval history:  A-fib on cardizem. Plts 65,000 today - status post IVIG x 2.   Lethargic this morning.         Medications:   Current Facility-Administered Medications   Medication Dose Route Frequency Provider Last Rate Last Dose    immune globulin 10% infusion 60 g  60 g IntraVENous ONCE TITR Luis Blocker, NP        predniSONE (DELTASONE) tablet 80 mg  80 mg Oral DAILY WITH BREAKFAST Luis Blocker, NP        carvedilol (COREG) tablet 12.5 mg  12.5 mg Oral BID WITH MEALS Jael Timmons MD   12.5 mg at 17 0919    furosemide (LASIX) tablet 40 mg  40 mg Oral DAILY Jael Timmons MD   40 mg at 17 0918    Read PPD  1 Each Other Q24H Livia Brown MD        sacubitril-valsartan (ENTRESTO) 24-26 mg tablet 1 Tab  1 Tab Oral Q12H Gabriela Bhakta MD   1 Tab at 17 0919    dextrose (D50W) injection syrg 25 g  25 g IntraVENous PRN Gabriela Bhakta MD   25 g at 17 1618    ferrous sulfate tablet 325 mg  1 Tab Oral DAILY WITH BREAKFAST Tyler Solid, NP   325 mg at 08/28/17 0731    warfarin (COUMADIN) tablet 5 mg  5 mg Oral QHS Evi Jett, NP   5 mg at 08/27/17 2149    amiodarone (CORDARONE) tablet 200 mg  200 mg Oral BID Blaine Espino MD   200 mg at 08/28/17 0202    acetaminophen (TYLENOL) tablet 650 mg  650 mg Oral Q6H PRN Blaine Espino MD   650 mg at 08/24/17 1158    gabapentin (NEURONTIN) capsule 600 mg  600 mg Oral TID Concha Park MD   600 mg at 08/28/17 0500    albuterol (PROVENTIL HFA, VENTOLIN HFA, PROAIR HFA) inhaler 2 Puff  2 Puff Inhalation Q4H PRN Concha Park MD        pantoprazole (PROTONIX) tablet 40 mg  40 mg Oral ACB Concha Park MD   40 mg at 08/28/17 0500    tiotropium (SPIRIVA) inhalation capsule 18 mcg  1 Cap Inhalation DAILY Concha Park MD   18 mcg at 08/28/17 0405    sodium chloride (NS) flush 5-10 mL  5-10 mL IntraVENous Vonda Martínez MD   5 mL at 08/28/17 0500    sodium chloride (NS) flush 5-10 mL  5-10 mL IntraVENous PRN Concha Park MD        nitroglycerin (NITROSTAT) tablet 0.4 mg  0.4 mg SubLINGual Q5MIN PRN Concha Park MD        morphine injection 2 mg  2 mg IntraVENous Q4H PRN Concha Park MD        ondansetron [facility]) injection 4 mg  4 mg IntraVENous Q4H PRN Concha Park MD        insulin lispro (HUMALOG) injection   SubCUTAneous AC&HS Concha Park MD   Stopped at 08/28/17 0730    budesonide (PULMICORT) 500 mcg/2 ml nebulizer suspension  500 mcg Nebulization BID RT Concha Park MD   500 mcg at 08/28/17 8693    And    albuterol CONCENTRATE 2.5mg/0.5 mL neb soln  2.5 mg Nebulization Q6H RT Concha Park MD   2.5 mg at 08/28/17 7273       Allergies: Allergies   Allergen Reactions    Levaquin [Levofloxacin] Other (comments)     GI upset    Metformin Other (comments)     Gi upset       Review of Systems: The Review of Systems is documented in full in the internal medical record.  All systems are negative other than for those noted above. Physical Examination:  General Appearance: Mildly ill appearing patient in no acute distress. Vital signs:   Visit Vitals    BP 96/49 (BP 1 Location: Left arm, BP Patient Position: Sitting)    Pulse (!) 113    Temp 97.6 °F (36.4 °C)    Resp 18    Ht 5' 6\" (1.676 m)    Wt 180 lb 9.6 oz (81.9 kg)    SpO2 100%    BMI 29.15 kg/m2     HEENT: No oral or pharyngeal masses. There is no ulceration or thrush. Lymph nodes: There is no cervical, supraclavicular, axillary or inguinal adenopathy. Lungs: There lungs are clear to auscultation. Heart: There is no jugular venous distention. Irregular, irregular. Abdomen: Soft, non-tender, bowel sounds present and normal, no appreciated hepatosplenomegaly. No palpable masses. Skin: No rash, petechiae or ecchymoses. No evidence of malignancy. Neuro: Lethargic today. .     Labs/Imaging:  Lab Results   Component Value Date/Time    WBC 5.2 08/28/2017 08:53 AM    HGB 12.8 08/28/2017 08:53 AM    HCT 39.6 08/28/2017 08:53 AM    PLATELET 65 20/91/8279 08:53 AM    MCV 89.8 08/28/2017 08:53 AM       Lab Results   Component Value Date/Time    Sodium 128 08/28/2017 08:53 AM    Potassium 4.4 08/28/2017 08:53 AM    Chloride 89 08/28/2017 08:53 AM    CO2 37 08/28/2017 08:53 AM    Anion gap 2 08/28/2017 08:53 AM    Glucose 147 08/28/2017 08:53 AM    BUN 45 08/28/2017 08:53 AM    Creatinine 1.48 08/28/2017 08:53 AM    GFR est AA 60 08/28/2017 08:53 AM    GFR est non-AA 50 08/28/2017 08:53 AM    Calcium 8.3 08/28/2017 08:53 AM    AST (SGOT) 25 08/23/2017 01:05 PM    Alk.  phosphatase 86 08/23/2017 01:05 PM    Protein, total 7.4 08/23/2017 01:05 PM    Albumin 3.6 08/23/2017 01:05 PM    Globulin 3.8 08/23/2017 01:05 PM    A-G Ratio 0.9 08/23/2017 01:05 PM    ALT (SGPT) 30 08/23/2017 01:05 PM           ASSESSMENT:  This gentleman has a thrombocytopenia which is isolated (normal RBC and WBC) that has been present for at least one year. The most likely diagnosis is ITP, valvular consumption and/or medications. The level of platelet count in and of itself is not concerning. It is the requirement for warfarin given his mechanical valve which creates difficulties when combined with this thrombocytopenia. His risk for bleeding is substantially increased especially with platelet counts below 50,000. Situation currently stable with platelet count improving up to 65,000 after two doses of a planned three of IVIG. PLAN:  Proceed with day 3 of IVIG today. Start Prednisone 80 mg daily today - already on GI prophylaxis with Protonix. Monitor blood sugars closely - may need sliding scale. Pat Tong NP  [address]  P [phone]      Attending Addendum:  I personally evaluated the patient with Kiki Moya N.P.,  and agree with the assessment, findings and plan as documented. Appears stable, heart regular without murmur, lungs clear, abdomen benign. Proceed w Dose 3 IVIG today. DM history discussed in detail, will initiate prednisone w close monitoring of his BS. PPI for GI prophylaxis.                Kaleb Quezada MD  [facility]  [address] [facility]  Office : [phone]  Fax : [phone]

## 2017-08-28 NOTE — PROGRESS NOTES
Problem: Self Care Deficits Care Plan (Adult)  Goal: *Acute Goals and Plan of Care (Insert Text)  1. Ronak Hanson will follow 1-2 step commands at least 75% of the time in prep for ADL. 2. Ronak Hanson will complete self-feeding with standby assistance. 3. Ronak Hanson will complete grooming/oral care with standby assistance. 4. Ronak Hanson will complete toilet transfers with minimal assistance. Time frame: 4 - 7 visits      OCCUPATIONAL THERAPY: Initial Assessment 8/28/2017  INPATIENT: Hospital Day: 6  [place] / [place] / Product Type: Medicare /      NAME/AGE/GENDER: Ronak Hanson is a 67 y.o. male     PRIMARY DIAGNOSIS:  Atrial flutter with rapid ventricular response (HCC) Atrial flutter with rapid ventricular response (HCC) Atrial flutter with rapid ventricular response (HCC)        ICD-10: Treatment Diagnosis:        · Generalized Muscle Weakness (M62.81)  · Other lack of cordination (R27.8)   Precautions/Allergies:         Levaquin [levofloxacin] and Metformin       ASSESSMENT:      Mr. Cristi Moctezuma presents for the above and very somnolent this AM - headed to MRI Brain due to confusion? Assessment was limited due to somnolence, though he was able to lean forward and maintain sitting balance with occasional assistance. He currently requires significant assistance with activities of daily living and instrumental activities of daily living  (normally independent with these). Ronak Hanson presents with the following problems and would benefit from occupational therapy to maximize independence with self-care,instrumental activities of daily living, and functional transfers/mobility for activities of daily living/instrumental activities of daily living via the stated goals. This section established at most recent assessment   PROBLEM LIST (Impairments causing functional limitations):  1. Decreased Strength  2.  Decreased ADL/Functional Activities  3. Decreased Transfer Abilities  4. Decreased Balance  5. Decreased Activity Tolerance  6. Decreased Flexibility/Joint Mobility  7. Decreased Cognition    INTERVENTIONS PLANNED: (Benefits and precautions of occupational therapy have been discussed with the patient.)  1. Activities of daily living training  2. Cognitive training  3. Neuromuscular re-eduation  4. Therapeutic activity  5. Therapeutic exercise      TREATMENT PLAN: Frequency/Duration: Follow patient 3 times/week to address above goals. Rehabilitation Potential For Stated Goals: GOOD      RECOMMENDED REHABILITATION/EQUIPMENT: (at time of discharge pending progress): Continue Skilled Therapy. OCCUPATIONAL PROFILE AND HISTORY:   History of Present Injury/Illness (Reason for Referral):  Per MD H & P: Joel Hernandez is a 67 y.o. WM with h/o mechanical AVR 2000 on chronic AC with coumadin, chronic systolic CHF/ICM EF 73%, COPD, PVD, DM II, HTN and dyslipidemia who developed worsening dyspnea on monday 8/21. He presented to the hospital today reporting worsening SOB and was found to be in atrial flutter with 's/ He was given Cardizem 10 mg IV bolus followed by IV drip 5 mg/hr. He is still in 140's with lower BP and [address] Cardiology was called to evaluate for admission. Pt reports his pulse oximetry monitor showed  on Monday and remained high until today. He felt he had COPD exacerbation and treated it with repeat inhaler and nebulizer treatments. He also reports h/o PRATIK with iron started last year by the [place]. He has been having dark stools but yesterday had rapid onset of uncontrollable copious amount of diarrhea that was black. CBC still pending. He reportedly had a LHC prior to AVR in 2000 that showed no CAD. He had nuclear stress test 11/2016 showing EF 31%, large inferolateral and anteroapical scar. Past Medical History/Comorbidities:   Mr. Nyla Jackson  has a past medical history of Arthritis;  Chicken pox; Coronary artery disease (4/16/2014); DJD (degenerative joint disease); Emphysema; aortic valve replacement, mechanical; Hypercholesterolemia; Hypertension; Palpitations; and Pneumonia. Mr. Marjorie Arevalo  has a past surgical history that includes aortic valve replacement (N/A, 2000) and heart catheterization (07/21/2004). Social History/Living Environment: Lives with wife. Home Environment: Private residence  # Steps to Enter: 3  Rails to Enter: Yes  Hand Rails : Bilateral  One/Two Story Residence: One story  Living Alone: No  Support Systems: Spouse/Significant Other/Partner, Child(devyn)  Patient Expects to be Discharged to[de-identified] Private residence  Current DME Used/Available at Home: Monica Lang, rollator  Tub or Shower Type: Tub/Shower combination  Prior Level of Function/Work/Activity:  Typically independent with ADL/instrumental ADL including driving. Number of Personal Factors/Comorbidities that affect the Plan of Care: Brief history (0):  LOW COMPLEXITY   ASSESSMENT OF OCCUPATIONAL PERFORMANCE[de-identified]   Activities of Daily Living:           Basic ADLs (From Assessment) Complex ADLs (From Assessment)   Basic ADL  Feeding: Total assistance  Oral Facial Hygiene/Grooming: Total assistance  Bathing: Total assistance  Upper Body Dressing: Total assistance  Lower Body Dressing: Total assistance  Toileting: Total assistance Instrumental ADL  Meal Preparation: Total assistance  Homemaking:  Total assistance  Medication Management: Total assistance  Financial Management: Total assistance   Grooming/Bathing/Dressing Activities of Daily Living     Cognitive Retraining  Safety/Judgement: Fall prevention;Decreased insight into deficits                                  Most Recent Physical Functioning:   Gross Assessment:  AROM: Generally decreased, functional               Posture:     Balance:  Sitting: Impaired  Sitting - Static: Fair (occasional)  Sitting - Dynamic: Fair (occasional)  Standing: (P) Impaired  Standing - Static: (P) Fair  Standing - Dynamic : (P) Fair Bed Mobility:     Wheelchair Mobility:     Transfers:                       Patient Vitals for the past 6 hrs:       BP BP Patient Position SpO2 O2 Flow Rate (L/min) Pulse   17 0722 - - - 3 l/min -   17 0826 - - 98 % 1 l/min -   17 0856 133/66 Head of bed elevated (Comment degrees) 94 % - 98   17 1005 - - 94 % - -        Mental Status  Neurologic State: Drowsy, Eyes open to stimulus  Orientation Level: Oriented to place (disoriented to date)  Cognition: Decreased attention/concentration, Decreased command following  Perception: Appears intact  Perseveration: No perseveration noted  Safety/Judgement: Fall prevention, Decreased insight into deficits                               Physical Skills Involved:  1. Range of Motion  2. Balance  3. Strength  4. Activity Tolerance  5. Gross Motor Control Cognitive Skills Affected (resulting in the inability to perform in a timely and safe manner):  1. Executive Function  2. Sustained Attention  3. Divided Attention Psychosocial Skills Affected:  1. Habits/Routines  2. Social Interaction  3. Awareness of Others   Number of elements that affect the Plan of Care: 3-5:  MODERATE COMPLEXITY   CLINICAL DECISION MAKIN [facility] [address] [address] AM-PAC 6 Clicks   Daily Activity Inpatient Short Form  How much help from another person does the patient currently need. .. Total A Lot A Little None   1. Putting on and taking off regular lower body clothing? [X] 1   [ ] 2   [ ] 3   [ ] 4   2. Bathing (including washing, rinsing, drying)? [X] 1   [ ] 2   [ ] 3   [ ] 4   3. Toileting, which includes using toilet, bedpan or urinal?   [X] 1   [ ] 2   [ ] 3   [ ] 4   4. Putting on and taking off regular upper body clothing? [X] 1   [ ] 2   [ ] 3   [ ] 4   5. Taking care of personal grooming such as brushing teeth? [X] 1   [ ] 2   [ ] 3   [ ] 4   6. Eating meals?    [ ] 1   [X] 2   [ ] 3   [ ] 4   © , Trustees of [employer], under license to [employer]. All rights reserved    Score:  Initial: 7 Most Recent: X (Date: -- )     Interpretation of Tool:  Represents activities that are increasingly more difficult (i.e. Bed mobility, Transfers, Gait). Score 24 23 22-20 19-15 14-10 9-7 6       Modifier CH CI CJ CK CL CM CN         · Self Care:               - CURRENT STATUS:    CM - 80%-99% impaired, limited or restricted               - GOAL STATUS:           CK - 40%-59% impaired, limited or restricted               - D/C STATUS:                       ---------------To be determined---------------  [place] / [place] / Product Type: Medicare /       Medical Necessity:     · Patient demonstrates good rehab potential due to higher previous functional level. Reason for Services/Other Comments:  · Patient continues to require skilled intervention due to decreased independence with ADL, instrumental ADL, and functional mobility for ADL. Use of outcome tool(s) and clinical judgement create a POC that gives a: MODERATE COMPLEXITY             TREATMENT:   (In addition to Assessment/Re-Assessment sessions the following treatments were rendered)      Pre-treatment Symptoms/Complaints:    Pain: Initial:   Pain Intensity 1:  (no complaints of pain)  Post Session:  same      Assessment/Reassessment only, no treatment provided today     Braces/Orthotics/Lines/Etc:   · IV  · O2 Device: Nasal cannula  Treatment/Session Assessment:    · Response to Treatment:  No treatment rendered. Assessment only. · Interdisciplinary Collaboration:  · Occupational Therapist  · Registered Nurse  · After treatment position/precautions:  · Up in chair  · Bed/Chair-wheels locked  · Call light within reach  · RN notified  · Family at bedside  · Compliance with Program/Exercises: Will assess as treatment progresses. · Recommendations/Intent for next treatment session:   \"Next visit will focus on advancements to more challenging activities\".   Total Treatment Duration:  OT Patient Time In/Time Out  Time In: 1157  Time Out: KAVITHA Bae, OTR/L

## 2017-08-28 NOTE — PROGRESS NOTES
Problem: Falls - Risk of  Goal: *Absence of Falls  Document Lauren Fall Risk and appropriate interventions in the flowsheet.    Outcome: Progressing Towards Goal  Fall Risk Interventions:  Mobility Interventions: Bed/chair exit alarm     Mentation Interventions: Bed/chair exit alarm     Medication Interventions: Bed/chair exit alarm     Elimination Interventions: Bed/chair exit alarm     History of Falls Interventions: Bed/chair exit alarm

## 2017-08-28 NOTE — PROGRESS NOTES
No cp or inc sob. VSS, afib hr ok but somnolent and less responsive than on admission. Valve sounds crisp. Labs reviewed  Imp:  Afib, recurrent after amy eCv 8/23  mAVR  Icm  Low platelets  ? Iron deficient  Plan:  Stop order for iv cardiazem  Watch K+  Stop aspirin  ?  Iv iron  Consult social service

## 2017-08-28 NOTE — PROGRESS NOTES
LTG: Patient will tolerate least restrictive diet without overt signs or symptoms of airway compromise. STG: Patient will tolerate mechanical soft and thin liquids without overt signs or symptoms of airway compromise. STG: Patient will participate in modified barium swallow study as clinically indicated. Speech language pathology: bedside swallow note: Initial Assessment    NAME/AGE/GENDER: Stevenson Lyons is a 67 y.o. male  DATE: 8/28/2017  PRIMARY DIAGNOSIS: Atrial flutter with rapid ventricular response ([facility] [address]       ICD-10: Treatment Diagnosis: R13.12 Oropharyngeal Dysphagia. INTERDISCIPLINARY COLLABORATION: Registered Nurse  PRECAUTIONS/ALLERGIES: Levaquin [levofloxacin] and Metformin ASSESSMENT:Per RN report, patient with decline in functional abilities over last several days. Wife at bedside and states patient is less responsive and with reduced po intake. She occasionally grinds meats at home due to ill fitting dentures. He was oriented to self, but perseverated on name when asked birthdate. Unable to states place, situation, or time. Slow with following commands. Based on the objective data described below, Mr. Deborah Gatica presents with mild-moderate oropharyngeal dysphagia. Patient was presented with thin via cup and straw, puree, and mixed consistency. Patient unable to feed self due to UE tremor. Immediate throat clear and eventual cough with thin via straw. No throat clear, cough, or change in vocal quality with single sips of thin via cup. No difficulty with puree observed. Increased mastication time with mixed consistency, resulting in fatigue with po intake. Solids deferred secondary to increased mastication with soft chewable textures. Recommend  Mechanical soft diet and thin liquids. No straws. Medications one at a time as tolerated. Upright seating with all po intake. Patient may benefit from nutritional supplements due to poor po intake.  ST follow regarding diet tolerance and assessment for upgrade. Patient will also benefit from full speech-language/cognitive assessment. Patient will benefit from skilled intervention to address the below impairments. ?????? ? ? This section established at most recent assessment??????????  PROBLEM LIST (Impairments causing functional limitations):  1. Dysphagia  REHABILITATION POTENTIAL FOR STATED GOALS: Good  PLAN OF CARE:   Patient will benefit from skilled intervention to address the following impairments. RECOMMENDATIONS AND PLANNED INTERVENTIONS (Benefits and precautions of therapy have been discussed with the patient.):  · PO:  Mechanical soft  · Liquids:  regular thin  MEDICATIONS:  · one at a time with thin liquid  COMPENSATORY STRATEGIES/MODIFICATIONS INCLUDING:  · Alternate liquids/solids  · Small sips and bites  OTHER RECOMMENDATIONS (including follow up treatment recommendations): · Family training/education  · Patient education  RECOMMENDED DIET MODIFICATIONS DISCUSSED WITH:  · Nursing  · Family  · Patient  FREQUENCY/DURATION: Continue to follow patient 3 times a week for duration of hospital stay to address above goals. RECOMMENDED REHABILITATION/EQUIPMENT: (at time of discharge pending progress):   Continue Skilled Therapy. SUBJECTIVE:   Drowsy, confused  History of Present Injury/Illness: Mr. Maria Elena Vogel  has a past medical history of Arthritis; Chicken pox; Coronary artery disease (4/16/2014); DJD (degenerative joint disease); Emphysema; aortic valve replacement, mechanical; Hypercholesterolemia; Hypertension; Palpitations; and Pneumonia. Hugh Michael He also  has a past surgical history that includes aortic valve replacement (N/A, 2000) and heart catheterization (07/21/2004). Present Symptoms: Coughing with thin liquids   Pain Intensity 1: 0  Pain Location 1:  (headache)  Pain Intervention(s) 1: Medication (see MAR)  Current Medications:   No current facility-administered medications on file prior to encounter.       Current Outpatient Prescriptions on File Prior to Encounter   Medication Sig Dispense Refill    gabapentin (NEURONTIN) 300 mg capsule Take 600 mg by mouth three (3) times daily.  glipiZIDE (GLUCOTROL) 10 mg tablet Take 10 mg by mouth two (2) times a day.  furosemide (LASIX) 40 mg tablet Take 1 Tab by mouth daily. 30 Tab 1    potassium chloride (K-DUR, KLOR-CON) 10 mEq tablet Take 1 Tab by mouth daily. 30 Tab 1    budesonide-formoterol (SYMBICORT) 160-4.5 mcg/actuation HFA inhaler Take 2 Puffs by inhalation.  Omeprazole delayed release (PRILOSEC D/R) 20 mg tablet Take 20 mg by mouth.  flunisolide (NASAREL) 25 mcg (0.025 %) spry 2 Sprays by Nasal route.  albuterol (PROVENTIL HFA) 90 mcg/actuation inhaler Take 2 Puffs by inhalation.  tiotropium (SPIRIVA) 18 mcg inhalation capsule Take 1 Puff by inhalation.  warfarin (COUMADIN) 5 mg tablet Take one 5 mg tablet daily (except 1-1/2 tablet on Wednesday 90 Tab 1    losartan (COZAAR) 100 mg tablet Take 1 Tab by mouth daily. 90 Tab 1    Hydrochlorothiazide 12.5 mg tablet Take 12.5 mg by mouth daily. 90 Tab 1    simvastatin (ZOCOR) 80 mg tablet Take 1 Tab by mouth nightly.  90 Tab 1     Current Dietary Status:  Regular/thin      Social History/Home Situation:    Home Environment: Private residence  # Steps to Enter: 3  Rails to Enter: Yes  Hand Rails : Bilateral  One/Two Story Residence: One story  Living Alone: No  Support Systems: Spouse/Significant Other/Partner, Child(devyn)  Patient Expects to be Discharged to[de-identified] Private residence  Current DME Used/Available at Home: Perla An, rollator  Tub or Shower Type: Tub/Shower combination  OBJECTIVE:   Respiratory Status:  Nasal cannula  1 l/min  CXR Results:Postsurgical change without acute abnormality  MRI/CT Results:MRI pending  Oral Motor Structure/Speech:  Oral-Motor Structure/Motor Speech  Labial: No impairment  Dentition: Upper & lower dentures  Oral Hygiene: Adeuate  Lingual: No impairment    Cognitive and Communication Status:  Neurologic State: Drowsy  Orientation Level: Oriented to person;Disoriented to place; Disoriented to time;Disoriented to situation  Cognition: Decreased attention/concentration; Follows commands  Perception: Appears intact  Perseveration: No perseveration noted  Safety/Judgement: Fall prevention;Decreased insight into deficits    BEDSIDE SWALLOW EVALUATION  Oral Assessment:  Oral Assessment  Labial: No impairment  Dentition: Upper & lower dentures  Oral Hygiene: Adeuate  Lingual: No impairment  P.O. Trials:  Patient Position: Bedside chair    The patient was given tsp-small bite amounts of the following:   Consistency Presented: Thin liquid;Mechanical soft;Mixed consistency;Puree  How Presented: SLP-fed/presented;Spoon;Straw;Cup/sip    ORAL PHASE:  Bolus Acceptance: No impairment  Bolus Formation/Control: Impaired  Propulsion: Delayed (# of seconds); Discoordination  Type of Impairment: Delayed;Mastication  Oral Residue: None    PHARYNGEAL PHASE:  Initiation of Swallow: No impairment  Laryngeal Elevation: Functional  Aspiration Signs/Symptoms: Weak cough;Clear throat  Vocal Quality: No impairment           Pharyngeal Phase Characteristics: Easily fatigued ; Poor endurance    OTHER OBSERVATIONS:  Rate/bite size: Impaired   Endurance:  Impaired   Comments:       Tool Used: Dysphagia Outcome and Severity Scale (KERRIE)    Score Comments   Normal Diet  [] 7 With no strategies or extra time needed   Functional Swallow  [] 6 May have mild oral or pharyngeal delay       Mild Dysphagia    [] 5 Which may require one diet consistency restricted (those who demonstrate penetration which is entirely cleared on MBS would be included)   Mild-Moderate Dysphagia  [x] 4 With 1-2 diet consistencies restricted       Moderate Dysphagia  [] 3 With 2 or more diet consistencies restricted       Moderately Severe Dysphagia  [] 2 With partial PO strategies (trials with ST only)       Severe Dysphagia  [] 1 With inability to tolerate any PO safely          Score:  Initial: 4 Most Recent: X (Date: -- )   Interpretation of Tool: The Dysphagia Outcome and Severity Scale (KERRIE) is a simple, easy-to-use, 7-point scale developed to systematically rate the functional severity of dysphagia based on objective assessment and make recommendations for diet level, independence level, and type of nutrition. Score 7 6 5 4 3 2 1   Modifier CH CI CJ CK CL CM CN   ? Swallowing:     - CURRENT STATUS: CK - 40%-59% impaired, limited or restricted    - GOAL STATUS:  CI - 1%-19% impaired, limited or restricted    - D/C STATUS:  ---------------To be determined---------------  [place] / [place] / Product Type: Medicare /     TREATMENT:    (In addition to Assessment/Re-Assessment sessions the following treatments were rendered)  Assessment/Reassessment only, no treatment provided today  MODALITIES:                                                                    ORAL MOTOR  EXERCISES:                                                                                                                                                                      LARYNGEAL / PHARYNGEAL EXERCISES:                                                                                                                                     __________________________________________________________________________________________________  Safety:   After treatment position/precautions:  · Upright in Bed    Progression/Medical Necessity:   · Patient is expected to demonstrate progress in swallow function, diet tolerance and swallow safety to improve swallow safety and work toward diet advancement. Compliance with Program/Exercises: Will assess as treatment progresses. Reason for Continuation of Services/Other Comments:  · Patient continues to require skilled intervention due to dysphagia.   Recommendations/Intent for next treatment session: \"Treatment next visit will focus on diet tolerance, assessment for upgrade, speech-language/cognitive assessment\".     Total Treatment Duration:  Time In: 1305  Time Out: 1325    ABBEY Elizalde, CCC-SLP, CBIS

## 2017-08-28 NOTE — CONSULTS
MD Gilberto,   Medical Director  [address] [place] [address], [address] [facility]  Tel: [phone]     Physical Medicine & Rehabilitation Note-consult    Patient: Trinidad Snow MRN: [id number]  SSN: [id number]    YOB: 1945  Age: 67 y.o. Sex: male      Admit Date: 8/23/2017  Admitting Physician: Yamile Carver MD    Medical Decision Making/Plan/Recommend: Functional deficit, mobility, gait impairment. Patient appears confused, encephalopathic. He appears to have significant UMN signs and symptoms. Will obtain a MRI. Likely he will benefit from admission to inpatient rehab program is reasonable and necessary due to ongoing medical conditions described below and functional deficits. Continue acute PT, OT. Will ask ST to evaluate and treat. Continue gait training, transfer training, balance activities, exercises to improve strength and balance to maximize ADLs and functional mobility. Will follow closely. Thank you for the opportunity to participate in the care of this patient. Chief Complaint : Gait dysfunction secondary to below. Admit Diagnosis: Atrial flutter with rapid ventricular response (HCC)  Atrial flutter with rapid ventricular response (HCC) (8/23/2017) sp cardioversion  Anticoagulant long-term use  HTN (hypertension)  Peripheral vascular disease   Dyslipidemia   History of mechanical aortic valve replacement 2000, on coumadin  Ischemic cardiomyopathy    Thrombocytopenia/ ITP  Hypoglycemia  COPD  DM II   iron deficiency anemia  hyponatremia  Acute Rehab Dx:  cognitive deficit  Weakness  spasticity  Debility    deconditioning  Mobility and ambulation deficits  Self Care/ADL deficits    Medical Dx:  Past Medical History:   Diagnosis Date    Arthritis     Chicken pox     Coronary artery disease 4/16/2014    DJD (degenerative joint disease)     Emphysema     Hx of aortic valve replacement, mechanical       doing well,.  Had to hold 3 days for removal of skin ca. Will have to have add. resection.  Hypercholesterolemia     Hypertension     Palpitations      Holter showed  PVC's, PAC's, one short run SVT.  Pneumonia      Subjective:     Date of Evaluation:  August 28, 2017    HPI: Les Ma is a 67 y.o. male patient at [address] [place] [address] who was admitted on 8/23/2017  by Luma Valerio MD with below mentioned medical history ,is being seen for Physical Medicine and Rehabilitation consult. Les Ma presented with worsening dyspnea. He was found to be in atrial flutter with 's, was started on treatment and was admitted. Patient continued to have a.fib with RVR, therefore underwent a MERLYN-guided   Cardioversion with success. EE demonstrated no evidence of left atrial, left atrial appendage, right atrium, right atrial appendage thrombus. Patient's admission course was complicated by thrombocytopenia. Hematology diagnosed possible ITP, and started on trial IVIG. On third day of admission. He was noted to be more confused, weak andunable to carry out his normal activity. We are consulted to assist with rehab needs and placement. Patient has been seen and evaluated by acute PT. he requires CGA, minimum assist for transfers and gait. started to participate in therapies with acute PT, OT and ST. He shows significant right sided deficits, limiting his mobility, ambulation and ADLs. He is managing his gait with minimal assist.    Les Ma is seen and examined today. Medical Records reviewed. Patient was active and independent with all activities. He had no gait  Problems prior to this admission. Current Level of Function:  bed mobility - min A, transfers - CGA, decreased balance , ambulating 30' with RW and min A.     Prior Level of Function/Work/Activity:     Lives with wife, indep with gait and ADLs, 0 falls, driving      No family history on file.    Social History   Substance Use Topics    Smoking status: Former Smoker     Packs/day: 1.50     Years: 40.00    Smokeless tobacco: Never Used    Alcohol use No     Past Surgical History:   Procedure Laterality Date    HX AORTIC VALVE REPLACEMENT N/A 2000    Aortic Valve Replacement w/ Mechanical Valve    HX HEART CATHETERIZATION  07/21/2004    S      Prior to Admission medications    Medication Sig Start Date End Date Taking? Authorizing Provider   gabapentin (NEURONTIN) 300 mg capsule Take 600 mg by mouth three (3) times daily. Historical Provider   glipiZIDE (GLUCOTROL) 10 mg tablet Take 10 mg by mouth two (2) times a day. Historical Provider   furosemide (LASIX) 40 mg tablet Take 1 Tab by mouth daily. 9/15/16   Anel Perez MD   potassium chloride (K-DUR, KLOR-CON) 10 mEq tablet Take 1 Tab by mouth daily. 9/15/16   Anel Perez MD   budesonide-formoterol [facility]) 160-4.5 mcg/actuation HFA inhaler Take 2 Puffs by inhalation. Historical Provider   Omeprazole delayed release (PRILOSEC D/R) 20 mg tablet Take 20 mg by mouth. Historical Provider   flunisolide (NASAREL) 25 mcg (0.025 %) spry 2 Sprays by Nasal route. Historical Provider   albuterol (PROVENTIL HFA) 90 mcg/actuation inhaler Take 2 Puffs by inhalation. Historical Provider   tiotropium (SPIRIVA) 18 mcg inhalation capsule Take 1 Puff by inhalation. Historical Provider   warfarin (COUMADIN) 5 mg tablet Take one 5 mg tablet daily (except 1-1/2 tablet on Wednesday 6/23/14   Rikki Cadet MD   losartan (COZAAR) 100 mg tablet Take 1 Tab by mouth daily. 6/23/14   Rikki Caedt MD   Hydrochlorothiazide 12.5 mg tablet Take 12.5 mg by mouth daily. 6/23/14   Rikki Cadet MD   simvastatin (ZOCOR) 80 mg tablet Take 1 Tab by mouth nightly.  6/23/14   Rikki Cadet MD     Allergies   Allergen Reactions    Levaquin [Levofloxacin] Other (comments)     GI upset    Metformin Other (comments)     Gi upset        Review of Systems: Denies chest pain, shortness of breath, cough, headache, visual problems, abdominal pain, dysurea, calf pain. Pertinent positives are as noted in the medical records and unremarkable otherwise. Objective:     Vitals:  Blood pressure 133/66, pulse 98, temperature 96.5 °F (35.8 °C), resp. rate 18, height 5' 6\" (1.676 m), weight 180 lb 9.6 oz (81.9 kg), SpO2 94 %. Temp (24hrs), Av °F (36.1 °C), Min:96.5 °F (35.8 °C), Max:97.4 °F (36.3 °C)    BMI (calculated): 29.2 (17 0505)   Intake and Output:   1901 -  0700  In: 0 [P.O.:50; I.V.:600]  Out: 200 [Urine:200]    Physical Exam:   General: Alert and age appropriately oriented x 2. No acute cardio respiratory distress. HEENT: Normocephalic,no scleral icterus  Oral mucosa moist without cyanosis, No bruit, No JVD. Lungs: Clear to auscultation  bilaterally. Respiration even and unlabored   Heart: Regular rate and rhythm, S1, S2   No  murmurs, clicks, rub or gallops   Abdomen: Soft, non-tender, nondistended. Bowel sounds present. No organomegaly. Genitourinary: defered   Neuromuscular:      PERRL, EOMI  Speech slow, dysarthric. Focus, attention poor. Follows simple commands consistently, with delay, corrections. Able to identify, recall repeat. LUE     Shoulder abduction  5- /5              Elbow flexion:  5- /5               Wrist extension:   5-/5              Finger flexion;  5- /5   ADMQ:   4+/5  RUE    Shoulder abduction:  5-/5                Elbow flexion:   5-/5    Wrist extension:  5-/5   Finger flexion:   /5-5   ADMQ:  5- /5  LLE     Hip flexion:  3 /5              Knee extension:   4/5    Ankle dorsiflexion: 4+ /5   Ankle plantarflexion:  5- /5        RLE     Hip flexion:  3 /5   Knee extension:  4+ /5    Ankle dorsiflexion:  5- /5   Ankle plantarflexion:   5/5  Sensory - intact  No cerebellar signs. Plantars - down going  DTR Right  B 3+ T 3, BR 3, Knee 3, Ach 2           Left    B 3+, T 3, BR 3, Knee 3, Ach 2  + spasticity, + ton BUE, BLE, greatest LUE.    No atrophy, no fasciculations, no tremors. Skin/extremity: No rashes, no erythema. Calf non tender BLE.                                                                                         Labs/Studies:  Recent Results (from the past 72 hour(s))   GLUCOSE, POC    Collection Time: 08/25/17 12:15 PM   Result Value Ref Range    Glucose (POC) 108 (H) 65 - 100 mg/dL   FIBRINOGEN    Collection Time: 08/25/17  2:29 PM   Result Value Ref Range    Fibrinogen 498 (H) 172 - 437 mg/dL   D DIMER    Collection Time: 08/25/17  2:29 PM   Result Value Ref Range    D DIMER 0.33 <0.55 ug/ml(FEU)   CULTURE, BLOOD    Collection Time: 08/25/17  4:00 PM   Result Value Ref Range    Special Requests: LEFT HAND      Culture result: NO GROWTH 3 DAYS     GLUCOSE, POC    Collection Time: 08/25/17  4:15 PM   Result Value Ref Range    Glucose (POC) 44 (L) 65 - 100 mg/dL   GLUCOSE, POC    Collection Time: 08/25/17  5:04 PM   Result Value Ref Range    Glucose (POC) 88 65 - 100 mg/dL   CULTURE, URINE    Collection Time: 08/25/17  8:11 PM   Result Value Ref Range    Special Requests: NO SPECIAL REQUESTS      Culture result: 12051 COLONIES/mL MIXED SKIN VANNESSA ISOLATED     URINALYSIS W/ RFLX MICROSCOPIC    Collection Time: 08/25/17  8:11 PM   Result Value Ref Range    Color YELLOW      Appearance CLOUDY      Specific gravity 1.019 1.001 - 1.023      pH (UA) 6.0 5.0 - 9.0      Protein NEGATIVE  NEG mg/dL    Glucose NEGATIVE  mg/dL    Ketone NEGATIVE  NEG mg/dL    Bilirubin NEGATIVE  NEG      Blood LARGE (A) NEG      Urobilinogen 0.2 0.2 - 1.0 EU/dL    Nitrites NEGATIVE  NEG      Leukocyte Esterase NEGATIVE  NEG      WBC 0-3 0 /hpf    RBC  0 /hpf    Epithelial cells 0 0 /hpf    Bacteria 0 0 /hpf    Casts 0-3 0 /lpf   CULTURE, BLOOD    Collection Time: 08/25/17  9:45 PM   Result Value Ref Range    Special Requests: RIGHT HAND      Culture result: NO GROWTH 3 DAYS     GLUCOSE, POC    Collection Time: 08/25/17  9:47 PM   Result Value Ref Range    Glucose (POC) 82 65 - 100 mg/dL   GLUCOSE, POC    Collection Time: 08/26/17  2:17 AM   Result Value Ref Range    Glucose (POC) 130 (H) 65 - 100 mg/dL   GLUCOSE, POC    Collection Time: 08/26/17  6:17 AM   Result Value Ref Range    Glucose (POC) 115 (H) 65 - 500 mg/dL   METABOLIC PANEL, BASIC    Collection Time: 08/26/17  6:18 AM   Result Value Ref Range    Sodium 134 (L) 136 - 145 mmol/L    Potassium 5.1 3.5 - 5.1 mmol/L    Chloride 96 (L) 98 - 107 mmol/L    CO2 34 (H) 21 - 32 mmol/L    Anion gap 4 (L) 7 - 16 mmol/L    Glucose 119 (H) 65 - 100 mg/dL    BUN 27 (H) 8 - 23 MG/DL    Creatinine 1.03 0.8 - 1.5 MG/DL    GFR est AA >60 >60 ml/min/1.73m2    GFR est non-AA >60 >60 ml/min/1.73m2    Calcium 8.9 8.3 - 10.4 MG/DL   PROTHROMBIN TIME + INR    Collection Time: 08/26/17  6:18 AM   Result Value Ref Range    Prothrombin time 38.2 (H) 9.6 - 12.0 sec    INR 3.5 (H) 0.9 - 1.2     CBC W/O DIFF    Collection Time: 08/26/17  6:18 AM   Result Value Ref Range    WBC 7.6 4.3 - 11.1 K/uL    RBC 4.36 4.23 - 5.67 M/uL    HGB 12.4 (L) 13.6 - 17.2 g/dL    HCT 41.5 41.1 - 50.3 %    MCV 95.2 79.6 - 97.8 FL    MCH 28.4 26.1 - 32.9 PG    MCHC 29.9 (L) 31.4 - 35.0 g/dL    RDW 15.6 (H) 11.9 - 14.6 %    PLATELET 49 (L) 988 - 450 K/uL    MPV Cannot be calculated 10.8 - 14.1 FL   GLUCOSE, POC    Collection Time: 08/26/17 11:37 AM   Result Value Ref Range    Glucose (POC) 162 (H) 65 - 100 mg/dL   GLUCOSE, POC    Collection Time: 08/26/17  4:21 PM   Result Value Ref Range    Glucose (POC) 129 (H) 65 - 100 mg/dL   GLUCOSE, POC    Collection Time: 08/26/17  9:31 PM   Result Value Ref Range    Glucose (POC) 126 (H) 65 - 100 mg/dL   PROTHROMBIN TIME + INR    Collection Time: 08/27/17  5:49 AM   Result Value Ref Range    Prothrombin time 27.5 (H) 9.6 - 12.0 sec    INR 2.5 (H) 0.9 - 1.2     CBC W/O DIFF    Collection Time: 08/27/17  5:49 AM   Result Value Ref Range    WBC 6.3 4.3 - 11.1 K/uL    RBC 4.17 (L) 4.23 - 5.67 M/uL    HGB 11.8 (L) 13.6 - 17.2 g/dL HCT 39.6 (L) 41.1 - 50.3 %    MCV 95.0 79.6 - 97.8 FL    MCH 28.3 26.1 - 32.9 PG    MCHC 29.8 (L) 31.4 - 35.0 g/dL    RDW 15.3 (H) 11.9 - 14.6 %    PLATELET 49 (L) 526 - 450 K/uL    MPV Cannot be calculated 10.8 - 14.1 FL   GLUCOSE, POC    Collection Time: 08/27/17  6:34 AM   Result Value Ref Range    Glucose (POC) 111 (H) 65 - 100 mg/dL   GLUCOSE, POC    Collection Time: 08/27/17 11:52 AM   Result Value Ref Range    Glucose (POC) 140 (H) 65 - 100 mg/dL   GLUCOSE, POC    Collection Time: 08/27/17  5:02 PM   Result Value Ref Range    Glucose (POC) 164 (H) 65 - 100 mg/dL   PLEASE READ & DOCUMENT PPD TEST IN 24 HRS    Collection Time: 08/27/17  6:28 PM   Result Value Ref Range    PPD negative Negative    mm Induration 0 mm   GLUCOSE, POC    Collection Time: 08/27/17 10:00 PM   Result Value Ref Range    Glucose (POC) 200 (H) 65 - 100 mg/dL   GLUCOSE, POC    Collection Time: 08/28/17  6:18 AM   Result Value Ref Range    Glucose (POC) 127 (H) 65 - 100 mg/dL   PROTHROMBIN TIME + INR    Collection Time: 08/28/17  8:53 AM   Result Value Ref Range    Prothrombin time 24.8 (H) 9.6 - 12.0 sec    INR 2.3 (H) 0.9 - 1.2     CBC W/O DIFF    Collection Time: 08/28/17  8:53 AM   Result Value Ref Range    WBC 5.2 4.3 - 11.1 K/uL    RBC 4.41 4.23 - 5.67 M/uL    HGB 12.8 (L) 13.6 - 17.2 g/dL    HCT 39.6 (L) 41.1 - 50.3 %    MCV 89.8 79.6 - 97.8 FL    MCH 28.9 26.1 - 32.9 PG    MCHC 32.2 31.4 - 35.0 g/dL    RDW 15.6 (H) 11.9 - 14.6 %    PLATELET 65 (L) 371 - 450 K/uL    MPV 11.4 10.8 - 05.0 FL   METABOLIC PANEL, BASIC    Collection Time: 08/28/17  8:53 AM   Result Value Ref Range    Sodium 128 (L) 136 - 145 mmol/L    Potassium 4.4 3.5 - 5.1 mmol/L    Chloride 89 (L) 98 - 107 mmol/L    CO2 37 (H) 21 - 32 mmol/L    Anion gap 2 (L) 7 - 16 mmol/L    Glucose 147 (H) 65 - 100 mg/dL    BUN 45 (H) 8 - 23 MG/DL    Creatinine 1.48 0.8 - 1.5 MG/DL    GFR est AA 60 (L) >60 ml/min/1.73m2    GFR est non-AA 50 (L) >60 ml/min/1.73m2    Calcium 8.3 8.3 - 10.4 MG/DL   GLUCOSE, POC    Collection Time: 08/28/17 10:55 AM   Result Value Ref Range    Glucose (POC) 163 (H) 65 - 100 mg/dL       Functional Assessment:  Reviewed participation and progress in therapies  Gross Assessment  AROM: Generally decreased, functional (08/25/17 1200)  Strength: Generally decreased, functional (08/25/17 1200)  Coordination: Generally decreased, functional (08/25/17 1200)  Sensation: Intact (08/25/17 1200)       Balance  Sitting: Intact; Without support (08/25/17 1200)  Standing: Impaired; With support (08/25/17 1200)               Bed/Mat Mobility  Sit to Stand: Contact guard assistance (08/25/17 1200)     Ambulation:  Gait  Base of Support: Narrowed; Center of gravity altered (08/25/17 1200)  Speed/Evelin: Fluctuations (08/25/17 1200)  Step Length: Left shortened;Right shortened (08/25/17 1200)  Gait Abnormalities: Decreased step clearance; Path deviations;Trunk sway increased (08/25/17 1200)  Ambulation - Level of Assistance: Minimal assistance;Contact guard assistance (08/25/17 1200)  Distance (ft): 30 Feet (ft) (08/25/17 1200)  Assistive Device: Walker, rolling (08/25/17 1200)  Interventions: Safety awareness training; Tactile cues; Verbal cues (08/25/17 1200)    Impression/Plan:     Principal Problem:    Atrial flutter with rapid ventricular response ([address] (8/23/2017)    Active Problems:    Anticoagulant long-term use (4/15/2014)      HTN (hypertension), benign (4/15/2014)      Peripheral vascular disease ([address] (4/15/2014)      Dyslipidemia (4/16/2014)      History of mechanical aortic valve replacement (9/15/2016)      Chronic bronchitis ([address] (9/15/2016)      Ischemic cardiomyopathy (11/15/2016)      Atrial flutter with rapid ventricular response ([address] (8/23/2017)      Thrombocytopenia ([address] (8/26/2017)        Current Facility-Administered Medications   Medication Dose Route Frequency Provider Last Rate Last Dose    carvedilol (COREG) tablet 12.5 mg  12.5 mg Oral BID WITH MEALS Rodolfo Hernandez MD   12.5 mg at 08/28/17 0919    furosemide (LASIX) tablet 40 mg  40 mg Oral DAILY Rodolfo Hernandez MD   40 mg at 08/28/17 3152    Read PPD  1 Each Other Q24H Fatmata Alvarez MD        sacubitril-valsartan (ENTRESTO) 24-26 mg tablet 1 Tab  1 Tab Oral Q12H Denise Snow MD   1 Tab at 08/28/17 0919    dextrose (D50W) injection syrg 25 g  25 g IntraVENous PRN Denise Snow MD   25 g at 08/25/17 1618    ferrous sulfate tablet 325 mg  1 Tab Oral DAILY WITH BREAKFAST Josephine Monae, NP   325 mg at 08/28/17 7545    warfarin (COUMADIN) tablet 5 mg  5 mg Oral QHS Yvodarline Mcelroy NP   5 mg at 08/27/17 2149    amiodarone (CORDARONE) tablet 200 mg  200 mg Oral BID Denise Snow MD   200 mg at 08/28/17 0432    acetaminophen (TYLENOL) tablet 650 mg  650 mg Oral Q6H PRN Denise Snow MD   650 mg at 08/24/17 1158    gabapentin (NEURONTIN) capsule 600 mg  600 mg Oral TID Fatmata Alvarez MD   600 mg at 08/28/17 0500    albuterol (PROVENTIL HFA, VENTOLIN HFA, PROAIR HFA) inhaler 2 Puff  2 Puff Inhalation Q4H PRN Fatmata Alvarez MD        pantoprazole (PROTONIX) tablet 40 mg  40 mg Oral ACB Fatmata Alvarez MD   40 mg at 08/28/17 0500    tiotropium (SPIRIVA) inhalation capsule 18 mcg  1 Cap Inhalation DAILY Fatmata Alvarez MD   18 mcg at 08/28/17 6694    sodium chloride (NS) flush 5-10 mL  5-10 mL IntraVENous Samantha Pascal MD   5 mL at 08/28/17 0500    sodium chloride (NS) flush 5-10 mL  5-10 mL IntraVENous PRN Fatmata Alvarez MD        nitroglycerin (NITROSTAT) tablet 0.4 mg  0.4 mg SubLINGual Q5MIN PRN Fatmata Alvarez MD        morphine injection 2 mg  2 mg IntraVENous Q4H PRN Fatmata Alvarez MD        ondansetron [facility]) injection 4 mg  4 mg IntraVENous Q4H PRN Fatmata Alvarez MD        insulin lispro (HUMALOG) injection   SubCUTAneous AC&HS Fatmata Alvarez MD   Stopped at 08/28/17 0730    budesonide (PULMICORT) 500 mcg/2 ml nebulizer suspension 500 mcg Nebulization BID RT Concha Park MD   500 mcg at 08/28/17 0214    And    albuterol CONCENTRATE 2.5mg/0.5 mL neb soln  2.5 mg Nebulization Q6H RT Concha Park MD   2.5 mg at 08/28/17 9770        Recommendations: Recommend acute rehab at [address] [place] [address].  Will follow along with you for PM&R issues and provide you follow up. Will follow with SW/ /Admissions Coordinators regarding insurance approvals and acceptance. Rehabilitation Management: 1. Devices: Adaptive equipments, Walkers, Type: Rolling Walker  2. Consult:Rehab team including PT, OT, recreational therapy, and  and rehab team including PT,OT, recreational therapy, and   3. Disposition Rehab-discussed with patient/  4. Plexipulse when in bed  5. Thigh-high or knee-high DAVID's when out of bed  6. DVT Prophylaxis per primary     Medical Management: Per primary  1. Vital signs per routine  2. Incentive spirometer Q1H while awake  3. Potential for neurogenic bladder. -Check post-void residual every shift; In and Out catheterize if post-void residual is morethan 400ml  4. Potential for neurogenic bowel - adequate bowel program recommmended. keep stool soft. Hilaria colace bid, suppositories prn.   4. Activity: as tolerated; fall precautions. 5. Pain Control: stable, mild-to-moderate joint symptoms intermittently, reasonably well controlled by PRN meds     Follow up with Dr Louie Foster  2 weeks after discharge from rehab. Thank you for the opportunity to participate in the care of this patient.   Signed By: Ryley Kim MD     August 28, 2017

## 2017-08-28 NOTE — PROGRESS NOTES
Verbal bedside report received from Kati Menjivar [place]. Assumed care of patient.

## 2017-08-29 ENCOUNTER — APPOINTMENT (OUTPATIENT)
Dept: GENERAL RADIOLOGY | Age: 72
DRG: 308 | End: 2017-08-29
Attending: INTERNAL MEDICINE
Payer: MEDICARE

## 2017-08-29 PROBLEM — J96.00 ACUTE RESPIRATORY FAILURE (HCC): Status: ACTIVE | Noted: 2017-08-29

## 2017-08-29 PROBLEM — E87.1 HYPONATREMIA: Status: ACTIVE | Noted: 2017-08-29

## 2017-08-29 PROBLEM — G93.40 ACUTE ENCEPHALOPATHY: Status: ACTIVE | Noted: 2017-08-29

## 2017-08-29 PROBLEM — J96.02 ACUTE RESPIRATORY FAILURE WITH HYPERCAPNIA (HCC): Status: ACTIVE | Noted: 2017-08-29

## 2017-08-29 PROBLEM — Z87.891 HISTORY OF TOBACCO USE: Chronic | Status: ACTIVE | Noted: 2017-08-29

## 2017-08-29 LAB
ANION GAP SERPL CALC-SCNC: 5 MMOL/L (ref 7–16)
ARTERIAL PATENCY WRIST A: POSITIVE
ARTERIAL PATENCY WRIST A: POSITIVE
BACTERIA SPEC CULT: ABNORMAL
BACTERIA SPEC CULT: ABNORMAL
BASE EXCESS BLDA CALC-SCNC: 7.9 MMOL/L (ref 0–3)
BASE EXCESS BLDA CALC-SCNC: 9.4 MMOL/L (ref 0–3)
BDY SITE: ABNORMAL
BDY SITE: ABNORMAL
BUN SERPL-MCNC: 47 MG/DL (ref 8–23)
CALCIUM SERPL-MCNC: 8.5 MG/DL (ref 8.3–10.4)
CHLORIDE SERPL-SCNC: 88 MMOL/L (ref 98–107)
CO2 SERPL-SCNC: 35 MMOL/L (ref 21–32)
COHGB MFR BLD: 1.1 % (ref 0.5–1.5)
COHGB MFR BLD: 1.2 % (ref 0.5–1.5)
CREAT SERPL-MCNC: 1.34 MG/DL (ref 0.8–1.5)
DO-HGB BLD-MCNC: 1 % (ref 0–5)
DO-HGB BLD-MCNC: 6 % (ref 0–5)
ERYTHROCYTE [DISTWIDTH] IN BLOOD BY AUTOMATED COUNT: 15.5 % (ref 11.9–14.6)
FIO2 ON VENT: 25 %
GAS FLOW.O2 O2 DELIVERY SYS: 4 L/MIN
GLUCOSE BLD STRIP.AUTO-MCNC: 138 MG/DL (ref 65–100)
GLUCOSE BLD STRIP.AUTO-MCNC: 152 MG/DL (ref 65–100)
GLUCOSE BLD STRIP.AUTO-MCNC: 173 MG/DL (ref 65–100)
GLUCOSE BLD STRIP.AUTO-MCNC: 175 MG/DL (ref 65–100)
GLUCOSE SERPL-MCNC: 204 MG/DL (ref 65–100)
HCO3 BLDA-SCNC: 37 MMOL/L (ref 22–26)
HCO3 BLDA-SCNC: 37 MMOL/L (ref 22–26)
HCT VFR BLD AUTO: 36.4 % (ref 41.1–50.3)
HGB BLD-MCNC: 12.1 G/DL (ref 13.6–17.2)
HGB BLDMV-MCNC: 12.7 GM/DL (ref 11.7–15)
HGB BLDMV-MCNC: 13.5 GM/DL (ref 11.7–15)
INR PPP: 2.3 (ref 0.9–1.2)
MCH RBC QN AUTO: 29.5 PG (ref 26.1–32.9)
MCHC RBC AUTO-ENTMCNC: 33.2 G/DL (ref 31.4–35)
MCV RBC AUTO: 88.8 FL (ref 79.6–97.8)
METHGB MFR BLD: 0.4 % (ref 0–1.5)
METHGB MFR BLD: 0.5 % (ref 0–1.5)
MM INDURATION POC: 0 MM (ref 0–5)
OXYHGB MFR BLDA: 92.1 % (ref 94–97)
OXYHGB MFR BLDA: 97.1 % (ref 94–97)
PCO2 BLDA: 65 MMHG (ref 35–45)
PCO2 BLDA: 76 MMHG (ref 35–45)
PH BLDA: 7.3 [PH] (ref 7.35–7.45)
PH BLDA: 7.37 [PH] (ref 7.35–7.45)
PLATELET # BLD AUTO: 50 K/UL (ref 150–450)
PMV BLD AUTO: ABNORMAL FL (ref 10.8–14.1)
PO2 BLDA: 148 MMHG (ref 80–105)
PO2 BLDA: 68 MMHG (ref 80–105)
POTASSIUM SERPL-SCNC: 5.4 MMOL/L (ref 3.5–5.1)
PPD POC: NEGATIVE NEGATIVE
PROTHROMBIN TIME: 25.4 SEC (ref 9.6–12)
RBC # BLD AUTO: 4.1 M/UL (ref 4.23–5.67)
SAO2 % BLD: 94 % (ref 92–98.5)
SAO2 % BLD: 99 % (ref 92–98.5)
SERVICE CMNT-IMP: ABNORMAL
SODIUM SERPL-SCNC: 128 MMOL/L (ref 136–145)
TSH SERPL DL<=0.005 MIU/L-ACNC: 0.57 UIU/ML (ref 0.36–3.74)
VENTILATION MODE VENT: ABNORMAL
VENTILATION MODE VENT: ABNORMAL
VIT B12 SERPL-MCNC: 487 PG/ML (ref 193–986)
WBC # BLD AUTO: 4 K/UL (ref 4.3–11.1)

## 2017-08-29 PROCEDURE — 77010033678 HC OXYGEN DAILY

## 2017-08-29 PROCEDURE — 87641 MR-STAPH DNA AMP PROBE: CPT | Performed by: INTERNAL MEDICINE

## 2017-08-29 PROCEDURE — 92526 ORAL FUNCTION THERAPY: CPT

## 2017-08-29 PROCEDURE — 77030019605

## 2017-08-29 PROCEDURE — 85027 COMPLETE CBC AUTOMATED: CPT | Performed by: PHYSICIAN ASSISTANT

## 2017-08-29 PROCEDURE — 65620000000 HC RM CCU GENERAL

## 2017-08-29 PROCEDURE — 71010 XR CHEST PORT: CPT

## 2017-08-29 PROCEDURE — 74011636637 HC RX REV CODE- 636/637: Performed by: NURSE PRACTITIONER

## 2017-08-29 PROCEDURE — 94760 N-INVAS EAR/PLS OXIMETRY 1: CPT

## 2017-08-29 PROCEDURE — 36415 COLL VENOUS BLD VENIPUNCTURE: CPT | Performed by: PHYSICIAN ASSISTANT

## 2017-08-29 PROCEDURE — 77030013140 HC MSK NEB VYRM -A

## 2017-08-29 PROCEDURE — 74011250637 HC RX REV CODE- 250/637: Performed by: INTERNAL MEDICINE

## 2017-08-29 PROCEDURE — 74011000250 HC RX REV CODE- 250: Performed by: INTERNAL MEDICINE

## 2017-08-29 PROCEDURE — 99233 SBSQ HOSP IP/OBS HIGH 50: CPT | Performed by: INTERNAL MEDICINE

## 2017-08-29 PROCEDURE — 36600 WITHDRAWAL OF ARTERIAL BLOOD: CPT

## 2017-08-29 PROCEDURE — 74011250637 HC RX REV CODE- 250/637: Performed by: NURSE PRACTITIONER

## 2017-08-29 PROCEDURE — 85610 PROTHROMBIN TIME: CPT | Performed by: PHYSICIAN ASSISTANT

## 2017-08-29 PROCEDURE — 74011250636 HC RX REV CODE- 250/636: Performed by: NURSE PRACTITIONER

## 2017-08-29 PROCEDURE — 82607 VITAMIN B-12: CPT | Performed by: INTERNAL MEDICINE

## 2017-08-29 PROCEDURE — 74011636637 HC RX REV CODE- 636/637: Performed by: INTERNAL MEDICINE

## 2017-08-29 PROCEDURE — 80048 BASIC METABOLIC PNL TOTAL CA: CPT | Performed by: INTERNAL MEDICINE

## 2017-08-29 PROCEDURE — 82803 BLOOD GASES ANY COMBINATION: CPT

## 2017-08-29 PROCEDURE — 84443 ASSAY THYROID STIM HORMONE: CPT | Performed by: INTERNAL MEDICINE

## 2017-08-29 PROCEDURE — 99223 1ST HOSP IP/OBS HIGH 75: CPT | Performed by: INTERNAL MEDICINE

## 2017-08-29 PROCEDURE — 82962 GLUCOSE BLOOD TEST: CPT

## 2017-08-29 PROCEDURE — 94640 AIRWAY INHALATION TREATMENT: CPT

## 2017-08-29 RX ORDER — LORAZEPAM 2 MG/ML
0.5 INJECTION INTRAMUSCULAR ONCE
Status: ACTIVE | OUTPATIENT
Start: 2017-08-29 | End: 2017-08-30

## 2017-08-29 RX ORDER — MORPHINE SULFATE 2 MG/ML
2 INJECTION, SOLUTION INTRAMUSCULAR; INTRAVENOUS ONCE
Status: DISPENSED | OUTPATIENT
Start: 2017-08-29 | End: 2017-08-30

## 2017-08-29 RX ADMIN — SACUBITRIL AND VALSARTAN 1 TABLET: 24; 26 TABLET, FILM COATED ORAL at 20:32

## 2017-08-29 RX ADMIN — CARVEDILOL 12.5 MG: 12.5 TABLET, FILM COATED ORAL at 17:51

## 2017-08-29 RX ADMIN — SACUBITRIL AND VALSARTAN 1 TABLET: 24; 26 TABLET, FILM COATED ORAL at 10:19

## 2017-08-29 RX ADMIN — INSULIN LISPRO 2 UNITS: 100 INJECTION, SOLUTION INTRAVENOUS; SUBCUTANEOUS at 22:22

## 2017-08-29 RX ADMIN — GABAPENTIN 600 MG: 300 CAPSULE ORAL at 05:26

## 2017-08-29 RX ADMIN — INSULIN LISPRO 2 UNITS: 100 INJECTION, SOLUTION INTRAVENOUS; SUBCUTANEOUS at 17:32

## 2017-08-29 RX ADMIN — WARFARIN SODIUM 5 MG: 2.5 TABLET ORAL at 22:16

## 2017-08-29 RX ADMIN — Medication 10 ML: at 06:00

## 2017-08-29 RX ADMIN — ACETAMINOPHEN 650 MG: 325 TABLET ORAL at 05:26

## 2017-08-29 RX ADMIN — AMIODARONE HYDROCHLORIDE 200 MG: 200 TABLET ORAL at 20:32

## 2017-08-29 RX ADMIN — ALBUTEROL SULFATE 2.5 MG: 2.5 SOLUTION RESPIRATORY (INHALATION) at 01:12

## 2017-08-29 RX ADMIN — ALBUTEROL SULFATE 2.5 MG: 2.5 SOLUTION RESPIRATORY (INHALATION) at 06:06

## 2017-08-29 RX ADMIN — TIOTROPIUM BROMIDE 18 MCG: 18 CAPSULE ORAL; RESPIRATORY (INHALATION) at 07:12

## 2017-08-29 RX ADMIN — Medication 10 ML: at 16:58

## 2017-08-29 RX ADMIN — ALBUTEROL SULFATE 2.5 MG: 2.5 SOLUTION RESPIRATORY (INHALATION) at 13:47

## 2017-08-29 RX ADMIN — CARVEDILOL 12.5 MG: 12.5 TABLET, FILM COATED ORAL at 10:17

## 2017-08-29 RX ADMIN — FUROSEMIDE 40 MG: 40 TABLET ORAL at 10:18

## 2017-08-29 RX ADMIN — FERROUS SULFATE TAB 325 MG (65 MG ELEMENTAL FE) 325 MG: 325 (65 FE) TAB at 10:18

## 2017-08-29 RX ADMIN — MORPHINE SULFATE 2 MG: 2 INJECTION, SOLUTION INTRAMUSCULAR; INTRAVENOUS at 20:31

## 2017-08-29 RX ADMIN — PANTOPRAZOLE SODIUM 40 MG: 40 TABLET, DELAYED RELEASE ORAL at 10:17

## 2017-08-29 RX ADMIN — BUDESONIDE 500 MCG: 0.5 INHALANT RESPIRATORY (INHALATION) at 06:06

## 2017-08-29 RX ADMIN — GABAPENTIN 600 MG: 300 CAPSULE ORAL at 22:16

## 2017-08-29 RX ADMIN — BUDESONIDE 500 MCG: 0.5 INHALANT RESPIRATORY (INHALATION) at 20:40

## 2017-08-29 RX ADMIN — Medication 10 ML: at 22:38

## 2017-08-29 RX ADMIN — PREDNISONE 80 MG: 20 TABLET ORAL at 10:18

## 2017-08-29 RX ADMIN — AMIODARONE HYDROCHLORIDE 200 MG: 200 TABLET ORAL at 10:18

## 2017-08-29 RX ADMIN — ALBUTEROL SULFATE 2.5 MG: 2.5 SOLUTION RESPIRATORY (INHALATION) at 20:40

## 2017-08-29 NOTE — PROGRESS NOTES
Problem: Falls - Risk of  Goal: *Absence of Falls  Document Luaren Fall Risk and appropriate interventions in the flowsheet. Outcome: Progressing Towards Goal  Fall precautions in place. Yellow gripper socks on. Instructed to only get OOB with assistance. Voiced understanding. Call light in reach.      Fall Risk Interventions:  Mobility Interventions: Bed/chair exit alarm, Communicate number of staff needed for ambulation/transfer, Patient to call before getting OOB, PT Consult for mobility concerns, PT Consult for assist device competence, Strengthening exercises (ROM-active/passive), Utilize walker, cane, or other assitive device     Mentation Interventions: Adequate sleep, hydration, pain control, Bed/chair exit alarm, Door open when patient unattended, Evaluate medications/consider consulting pharmacy, Family/sitter at bedside, Familiar objects from home, Increase mobility, More frequent rounding, Reorient patient, Toileting rounds, Update white board     Medication Interventions: Bed/chair exit alarm, Evaluate medications/consider consulting pharmacy, Patient to call before getting OOB, Teach patient to arise slowly     Elimination Interventions: Bed/chair exit alarm, Call light in reach, Elevated toilet seat, Patient to call for help with toileting needs, Toilet paper/wipes in reach, Toileting schedule/hourly rounds, Urinal in reach     History of Falls Interventions: Bed/chair exit alarm, Consult care management for discharge planning, Door open when patient unattended, Evaluate medications/consider consulting pharmacy, Investigate reason for fall, Room close to nurse's station, Utilize gait belt for transfer/ambulation

## 2017-08-29 NOTE — PROGRESS NOTES
TRANSFER - IN REPORT:    Verbal report received from [facility] [place]  being received from tele for routine progression of care      Report consisted of patients Situation, Background, Assessment and   Recommendations(SBAR). Information from the following report(s) SBAR, Kardex, MAR, Med Rec Status and Cardiac Rhythm A. fib was reviewed with the receiving nurse. Opportunity for questions and clarification was provided. Assessment completed upon patients arrival to unit and care assumed.

## 2017-08-29 NOTE — PROGRESS NOTES
PT Note:  Therapist is discontinuing physical therapy at this time due to decline in medical status and transfer to unit. Mr. Dhillon Sivacharles physical therapy goals were not met. Please reorder PT when our services are again appropriate. Thank you.   Te Hannah, PT, DPT  8/29/2017

## 2017-08-29 NOTE — PROGRESS NOTES
8/29/2017 6:39 AM    Admit Date: 8/23/2017    Admit Diagnosis: Atrial flutter with rapid ventricular response (HCC)      Subjective:    Patient not feeling well. Hot uncomfortable.      Objective:      Visit Vitals    /82 (BP 1 Location: Left arm, BP Patient Position: At rest)    Pulse (!) 115    Temp 97.5 °F (36.4 °C)    Resp 18    Ht 5' 6\" (1.676 m)    Wt 82.3 kg (181 lb 6.4 oz)    SpO2 100%    BMI 29.28 kg/m2       ROS:  General ROS: negative for - chills  Hematological and Lymphatic ROS: negative for - blood clots or jaundice  Respiratory ROS: no cough, shortness of breath, or wheezing  Cardiovascular ROS: no chest pain or dyspnea on exertion  Gastrointestinal ROS: no abdominal pain, change in bowel habits, or black or bloody stools  Neurological ROS: no TIA or stroke symptoms    Physical Exam:    Physical Examination: General appearance - alert, well appearing, and in no distress  Mental status - alert, oriented to person, place, and time  Eyes - pupils equal and reactive, extraocular eye movements intact  Neck/lymph - supple, no significant adenopathy  Chest/CV - clear to auscultation, no wheezes, rales or rhonchi, symmetric air entry  Heart - normal rate, regular rhythm, normal S1, S2, no murmurs, rubs, clicks or gallops  Abdomen/GI - soft, nontender, nondistended, no masses or organomegaly  Musculoskeletal - no joint tenderness, deformity or swelling  Extremities - peripheral pulses normal, no pedal edema, no clubbing or cyanosis  Skin - normal coloration and turgor, no rashes, no suspicious skin lesions noted    Current Facility-Administered Medications   Medication Dose Route Frequency    predniSONE (DELTASONE) tablet 80 mg  80 mg Oral DAILY WITH BREAKFAST    carvedilol (COREG) tablet 12.5 mg  12.5 mg Oral BID WITH MEALS    furosemide (LASIX) tablet 40 mg  40 mg Oral DAILY    Read PPD  1 Each Other Q24H    sacubitril-valsartan (ENTRESTO) 24-26 mg tablet 1 Tab  1 Tab Oral Q12H    dextrose (D50W) injection syrg 25 g  25 g IntraVENous PRN    ferrous sulfate tablet 325 mg  1 Tab Oral DAILY WITH BREAKFAST    warfarin (COUMADIN) tablet 5 mg  5 mg Oral QHS    amiodarone (CORDARONE) tablet 200 mg  200 mg Oral BID    acetaminophen (TYLENOL) tablet 650 mg  650 mg Oral Q6H PRN    gabapentin (NEURONTIN) capsule 600 mg  600 mg Oral TID    albuterol (PROVENTIL HFA, VENTOLIN HFA, PROAIR HFA) inhaler 2 Puff  2 Puff Inhalation Q4H PRN    pantoprazole (PROTONIX) tablet 40 mg  40 mg Oral ACB    tiotropium (SPIRIVA) inhalation capsule 18 mcg  1 Cap Inhalation DAILY    sodium chloride (NS) flush 5-10 mL  5-10 mL IntraVENous Q8H    sodium chloride (NS) flush 5-10 mL  5-10 mL IntraVENous PRN    nitroglycerin (NITROSTAT) tablet 0.4 mg  0.4 mg SubLINGual Q5MIN PRN    morphine injection 2 mg  2 mg IntraVENous Q4H PRN    ondansetron (ZOFRAN) injection 4 mg  4 mg IntraVENous Q4H PRN    insulin lispro (HUMALOG) injection   SubCUTAneous AC&HS    budesonide (PULMICORT) 500 mcg/2 ml nebulizer suspension  500 mcg Nebulization BID RT    And    albuterol CONCENTRATE 2.5mg/0.5 mL neb soln  2.5 mg Nebulization Q6H RT       Data Review:   @LABRCNT(Na,K,BUN,CREA,WBC,HGB,HCT,PLT,INR,TRP,TCHOL*,Triglyceride*,LDL*,LDLCPOC HDL*,HDL])@    TELEMETRY: nsr    Assessment/Plan:     Principal Problem:    Atrial flutter with rapid ventricular response (HCC) (8/23/2017)    Active Problems:    Anticoagulant long-term use (4/15/2014)      HTN (hypertension), benign (4/15/2014) The current medical regimen is effective;  continue present plan and medications. Peripheral vascular disease ([facility] [address] (4/15/2014)      Dyslipidemia (4/16/2014)      History of mechanical aortic valve replacement (9/15/2016) The current medical regimen is effective;  continue present plan and medications.         Chronic bronchitis ([facility] [address] (9/15/2016)      Ischemic cardiomyopathy (11/15/2016)      Atrial flutter with rapid ventricular response ([address] (8/23/2017)      Thrombocytopenia ([facility] (8/26/2017) getting infusion          Gabriela Bhakta MD

## 2017-08-29 NOTE — PROGRESS NOTES
Patient transferred into [facility] [id number], placed on monitors and BiPap. Sinus Tach on monitor, mechanical valve click on auscultation. O2 sat 95%. Patient is confused but can follow some commands. Bed alarm activated, patient's wife at bedside. Dual skin assessment performed, bruise noted to right abd, scab to right elbow. Sacrum assessed, no breakdown noted, Allevyn present and intact.

## 2017-08-29 NOTE — PROGRESS NOTES
TRANSFER - OUT REPORT:    Verbal report given to PEREZ Gautam on Rosa George  being transferred to [facility] [id number] for change in patient condition(Order for bi-pap after change in mental status)       Report consisted of patients Situation, Background, Assessment and Recommendations(SBAR). Information from the following report(s) SBAR, Kardex, MAR, Recent Results and Cardiac Rhythm A fib was reviewed with the receiving nurse. Oncoming RN notified of patient's bone marrow biopsy scheduled for 8/30/17 at 9 AM.     Patient order NPO (medications crushed with applesauce) until ST does a swallow study. Opportunity for questions and clarification was provided.

## 2017-08-29 NOTE — PROGRESS NOTES
LTG: Patient will tolerate least restrictive diet without overt signs or symptoms of airway compromise. STG: Patient will tolerate mechanical soft and thin liquids without overt signs or symptoms of airway compromise. STG: Patient will participate in modified barium swallow study as clinically indicated. Speech language pathology: bedside swallow note: Daily Note 1    NAME/AGE/GENDER: Giuseppe Montelongo is a 67 y.o. male  DATE: 8/29/2017  PRIMARY DIAGNOSIS: Atrial flutter with rapid ventricular response ([facility]       ICD-10: Treatment Diagnosis: R13.12 Oropharyngeal Dysphagia. INTERDISCIPLINARY COLLABORATION: Registered Nurse  PRECAUTIONS/ALLERGIES: Levaquin [levofloxacin] and Metformin ASSESSMENT:Patient seen this date for follow up swallowing assessment. Patient with decreased responsiveness this AM. Per wife's report, patient minimally conversing with her this morning. Minimal po intake. He required moderate verbal and tactile prompts to communicate with clinician. He communicated at single word/short phrase level. Not oriented to place or time this date. Decline in functional status this date. RN at bedside throughout session. MRI completed 8/28/17 and revealed \"Mild supratentorial microischemia\". Patient was presented with thin and nectar thick liquid via cup sip. Max assist with self-feeding. Incoordinated with taking sip from cup. Delayed swallow initiation. Patient with audible, wet breath sounds immediately following thin and nectar thick liquid sips. No involuntary cough, but wet cued cough. No additional trials presented secondary to high concern for aspiration. Recommend  NPO at this time. Modified barium swallow study is recommended to further evaluate swallow function. Discussed recommendations with patient's wife, who expressed concern with patient's decline in status and poor nutritional intake. Educated her on NPO recommendations to identify safest po options.  Also discussed case with Dr. Alex Ascencio. Recommend hospitalist consult secondary to progressive decline in functional status over last several days. ?????? ? ? This section established at most recent assessment??????????  PROBLEM LIST (Impairments causing functional limitations):  1. Dysphagia  REHABILITATION POTENTIAL FOR STATED GOALS: Good  PLAN OF CARE:   Patient will benefit from skilled intervention to address the following impairments. RECOMMENDATIONS AND PLANNED INTERVENTIONS (Benefits and precautions of therapy have been discussed with the patient.):  · NPO  MEDICATIONS:  · Non-oral  COMPENSATORY STRATEGIES/MODIFICATIONS INCLUDING:  · Alternate liquids/solids  · Small sips and bites  OTHER RECOMMENDATIONS (including follow up treatment recommendations): · Family training/education  · Patient education  RECOMMENDED DIET MODIFICATIONS DISCUSSED WITH:  · Nursing  · Family  · Patient  FREQUENCY/DURATION: Continue to follow patient 3 times a week for duration of hospital stay to address above goals. RECOMMENDED REHABILITATION/EQUIPMENT: (at time of discharge pending progress):   Continue Skilled Therapy. SUBJECTIVE:   Decreased alertness this date  History of Present Injury/Illness: Mr. Claudell Circle  has a past medical history of Arthritis; Chicken pox; Coronary artery disease (4/16/2014); DJD (degenerative joint disease); Emphysema; aortic valve replacement, mechanical; Hypercholesterolemia; Hypertension; Palpitations; and Pneumonia. 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 Present Symptoms: Coughing with thin liquids   Pain Intensity 1: 0  Pain Location 1:  (headache)  Pain Intervention(s) 1: Medication (see MAR)  Current Medications:   No current facility-administered medications on file prior to encounter.       Current Outpatient Prescriptions on File Prior to Encounter   Medication Sig Dispense Refill    gabapentin (NEURONTIN) 300 mg capsule Take 600 mg by mouth three (3) times daily.  glipiZIDE (GLUCOTROL) 10 mg tablet Take 10 mg by mouth two (2) times a day.  furosemide (LASIX) 40 mg tablet Take 1 Tab by mouth daily. 30 Tab 1    potassium chloride (K-DUR, KLOR-CON) 10 mEq tablet Take 1 Tab by mouth daily. 30 Tab 1    budesonide-formoterol (SYMBICORT) 160-4.5 mcg/actuation HFA inhaler Take 2 Puffs by inhalation.  Omeprazole delayed release (PRILOSEC D/R) 20 mg tablet Take 20 mg by mouth.  flunisolide (NASAREL) 25 mcg (0.025 %) spry 2 Sprays by Nasal route.  albuterol (PROVENTIL HFA) 90 mcg/actuation inhaler Take 2 Puffs by inhalation.  tiotropium (SPIRIVA) 18 mcg inhalation capsule Take 1 Puff by inhalation.  warfarin (COUMADIN) 5 mg tablet Take one 5 mg tablet daily (except 1-1/2 tablet on Wednesday 90 Tab 1    losartan (COZAAR) 100 mg tablet Take 1 Tab by mouth daily. 90 Tab 1    Hydrochlorothiazide 12.5 mg tablet Take 12.5 mg by mouth daily. 90 Tab 1    simvastatin (ZOCOR) 80 mg tablet Take 1 Tab by mouth nightly. 90 Tab 1     Current Dietary Status:  Regular/thin      Social History/Home Situation:    Home Environment: Private residence  # Steps to Enter: 3  Rails to Enter: Yes  Hand Rails : Bilateral  One/Two Story Residence: One story  Living Alone: No  Support Systems: Spouse/Significant Other/Partner, Child(devyn)  Patient Expects to be Discharged to[de-identified] Private residence  Current DME Used/Available at Home: Maren Gu, rollator  Tub or Shower Type: Tub/Shower combination  OBJECTIVE:   Respiratory Status:  Nasal cannula  2 l/min  CXR Results:Postsurgical change without acute abnormality  MRI/CT Results:MRI pending  Oral Motor Structure/Speech:  Oral-Motor Structure/Motor Speech  Labial: Decreased rate, Decreased seal  Dentition: Upper & lower dentures  Oral Hygiene: adequate  Lingual: No impairment    Cognitive and Communication Status:  Neurologic State: Drowsy; Confused  Orientation Level: Oriented to person;Disoriented to place; Disoriented to situation;Disoriented to time  Cognition: Decreased attention/concentration;Decreased command following     Perseveration: No perseveration noted  Safety/Judgement: Decreased insight into deficits    BEDSIDE SWALLOW EVALUATION  Oral Assessment:  Oral Assessment  Labial: Decreased rate;Decreased seal  Dentition: Upper & lower dentures  Oral Hygiene: adequate  Lingual: No impairment  P.O. Trials:  Patient Position: Upright in bed    The patient was given tsp-small bite amounts of the following:   Consistency Presented: Thin liquid; Nectar thick liquid  How Presented: SLP-fed/presented;Cup/sip    ORAL PHASE:  Bolus Acceptance: Impaired  Bolus Formation/Control: Impaired  Propulsion: Delayed (# of seconds)  Type of Impairment: Delayed  Oral Residue: None    PHARYNGEAL PHASE:  Initiation of Swallow: Delayed (# of seconds)  Laryngeal Elevation: Decreased; Other (comment)  Aspiration Signs/Symptoms: Weak cough (audible breath sounds)  Vocal Quality: Low volume                OTHER OBSERVATIONS:  Rate/bite size: Impaired   Endurance:  Impaired   Comments:       Tool Used: Dysphagia Outcome and Severity Scale (KERRIE)    Score Comments   Normal Diet  [] 7 With no strategies or extra time needed   Functional Swallow  [] 6 May have mild oral or pharyngeal delay       Mild Dysphagia    [] 5 Which may require one diet consistency restricted (those who demonstrate penetration which is entirely cleared on MBS would be included)   Mild-Moderate Dysphagia  [x] 4 With 1-2 diet consistencies restricted       Moderate Dysphagia  [] 3 With 2 or more diet consistencies restricted       Moderately Severe Dysphagia  [] 2 With partial PO strategies (trials with ST only)       Severe Dysphagia  [] 1 With inability to tolerate any PO safely          Score:  Initial: 4 Most Recent: X (Date: -- )   Interpretation of Tool: The Dysphagia Outcome and Severity Scale (KERRIE) is a simple, easy-to-use, 7-point scale developed to systematically rate the functional severity of dysphagia based on objective assessment and make recommendations for diet level, independence level, and type of nutrition. Score 7 6 5 4 3 2 1   Modifier CH CI CJ CK CL CM CN   ? Swallowing:     - CURRENT STATUS: CM - 80%-99% impaired, limited or restricted    - GOAL STATUS:  CI - 1%-19% impaired, limited or restricted    - D/C STATUS:  ---------------To be determined---------------  [place] / [place] / Product Type: Medicare /     TREATMENT:    (In addition to Assessment/Re-Assessment sessions the following treatments were rendered)  Assessment/Reassessment only, no treatment provided today  MODALITIES:                                                                    ORAL MOTOR  EXERCISES:                                                                                                                                                                      LARYNGEAL / PHARYNGEAL EXERCISES:                                                                                                                                     __________________________________________________________________________________________________  Safety:   After treatment position/precautions:  · Upright in Bed    Progression/Medical Necessity:   · Patient is expected to demonstrate progress in swallow function, diet tolerance and swallow safety to improve swallow safety and work toward diet advancement. Compliance with Program/Exercises: Will assess as treatment progresses. Reason for Continuation of Services/Other Comments:  · Patient continues to require skilled intervention due to dysphagia. Recommendations/Intent for next treatment session: \"Treatment next visit will focus on diet tolerance, assessment for upgrade, speech-language/cognitive assessment\".     Total Treatment Duration:  Time In: 0920  Time Out: [address], MSP, CCC-SLP, CBIS

## 2017-08-29 NOTE — PROGRESS NOTES
Problem: Falls - Risk of  Goal: *Absence of Falls  Document Lauren Fall Risk and appropriate interventions in the flowsheet.    Outcome: Progressing Towards Goal  Fall Risk Interventions:  Mobility Interventions: Bed/chair exit alarm     Mentation Interventions: Bed/chair exit alarm     Medication Interventions: Bed/chair exit alarm     Elimination Interventions: Call light in reach     History of Falls Interventions: Bed/chair exit alarm

## 2017-08-29 NOTE — PROGRESS NOTES
SPEECH PATHOLOGY NOTE:    Modified barium swallow study cancelled for today as patient is experiencing difficulty breathing. Continue NPO. Plan for modified barium swallow study to be completed on 8/30/17; however, ST will re-assess patient at bedside before completing study.      ABBEY Galvan, CCC-SLP, CBIS

## 2017-08-29 NOTE — PROGRESS NOTES
SPEECH PATHOLOGY NOTE:    Discontinuing ST orders at this time due to decline in medical status requiring transfer to CCU. Please re-consult as appropriate. Thank you.     Tahira Hanna MS, CCC-SLP

## 2017-08-29 NOTE — CONSULTS
PULMONARY/CCM CONSULT :  8/29/2017    Date of Admission:  8/23/2017    The patient's chart has been reviewed and the chart has been discussed with nursing staff. Subjective: This patient has been seen and evaluated at the request of Dr. Lenard Skelton. Patient is a 67 y.o.  male presents with atrial flutter with RVR and hypotension. He was seen by cardiology and had a MERLYN and cardioverted. He has had intermittent confusion since a fall with head CT and MRI showing no acute abnormality. He has been on lasix and his sodium is now 128. He has since converted to atrial fibrillation. His heart rate is controlled. He was scheduled today for MBS but was more confused prompting an ABG. ABG showed hypercapnic respiratory failure for which he is being transferred to the ICU. He also has thrombocytopenia and been followed by Hem/Onc while here. He is anticoagulated on coumadin. He has reported COPD managed at [place] and long term history of tobacco abuse. Past Medical History:   Diagnosis Date    Acute diastolic CHF (congestive heart failure) ([facility] 9/15/2016    Arthritis     Chicken pox     Coronary artery disease 4/16/2014    DJD (degenerative joint disease)     Emphysema     Hx of aortic valve replacement, mechanical       doing well,. Had to hold 3 days for removal of skin ca. Will have to have add. resection.  Hypercholesterolemia     Hypertension     Palpitations      Holter showed  PVC's, PAC's, one short run SVT.  Pneumonia     Systolic CHF, acute ([address] 11/15/2016      Past Surgical History:   Procedure Laterality Date    HX AORTIC VALVE REPLACEMENT N/A 2000    Aortic Valve Replacement w/ Mechanical Valve    HX HEART CATHETERIZATION  07/21/2004    [facility]      Social History   Substance Use Topics    Smoking status: Former Smoker     Packs/day: 1.50     Years: 40.00    Smokeless tobacco: Never Used    Alcohol use No      No family history on file.    Allergies   Allergen Reactions  Levaquin [Levofloxacin] Other (comments)     GI upset    Metformin Other (comments)     Gi upset      Prior to Admission Medications   Prescriptions Last Dose Informant Patient Reported? Taking? Hydrochlorothiazide 12.5 mg tablet   No No   Sig: Take 12.5 mg by mouth daily. Omeprazole delayed release (PRILOSEC D/R) 20 mg tablet   Yes No   Sig: Take 20 mg by mouth. albuterol (PROVENTIL HFA) 90 mcg/actuation inhaler   Yes No   Sig: Take 2 Puffs by inhalation. budesonide-formoterol (SYMBICORT) 160-4.5 mcg/actuation HFA inhaler   Yes No   Sig: Take 2 Puffs by inhalation. flunisolide (NASAREL) 25 mcg (0.025 %) spry   Yes No   Si Sprays by Nasal route. furosemide (LASIX) 40 mg tablet   No No   Sig: Take 1 Tab by mouth daily. gabapentin (NEURONTIN) 300 mg capsule   Yes No   Sig: Take 600 mg by mouth three (3) times daily. glipiZIDE (GLUCOTROL) 10 mg tablet   Yes No   Sig: Take 10 mg by mouth two (2) times a day. losartan (COZAAR) 100 mg tablet   No No   Sig: Take 1 Tab by mouth daily. potassium chloride (K-DUR, KLOR-CON) 10 mEq tablet   No No   Sig: Take 1 Tab by mouth daily. simvastatin (ZOCOR) 80 mg tablet   No No   Sig: Take 1 Tab by mouth nightly. tiotropium (SPIRIVA) 18 mcg inhalation capsule   Yes No   Sig: Take 1 Puff by inhalation.    warfarin (COUMADIN) 5 mg tablet   No No   Sig: Take one 5 mg tablet daily (except 1-1/2 tablet on Wednesday      Facility-Administered Medications: None       MEDS SCHEDULED:    Current Facility-Administered Medications   Medication Dose Route Frequency    LORazepam (ATIVAN) injection 0.5 mg  0.5 mg IntraVENous ONCE    morphine injection 2 mg  2 mg IntraVENous ONCE    predniSONE (DELTASONE) tablet 80 mg  80 mg Oral DAILY WITH BREAKFAST    carvedilol (COREG) tablet 12.5 mg  12.5 mg Oral BID WITH MEALS    furosemide (LASIX) tablet 40 mg  40 mg Oral DAILY    Read PPD  1 Each Other Q24H    sacubitril-valsartan (ENTRESTO) 24-26 mg tablet 1 Tab  1 Tab Oral Q12H    dextrose (D50W) injection syrg 25 g  25 g IntraVENous PRN    ferrous sulfate tablet 325 mg  1 Tab Oral DAILY WITH BREAKFAST    warfarin (COUMADIN) tablet 5 mg  5 mg Oral QHS    amiodarone (CORDARONE) tablet 200 mg  200 mg Oral BID    acetaminophen (TYLENOL) tablet 650 mg  650 mg Oral Q6H PRN    gabapentin (NEURONTIN) capsule 600 mg  600 mg Oral TID    albuterol (PROVENTIL HFA, VENTOLIN HFA, PROAIR HFA) inhaler 2 Puff  2 Puff Inhalation Q4H PRN    pantoprazole (PROTONIX) tablet 40 mg  40 mg Oral ACB    tiotropium (SPIRIVA) inhalation capsule 18 mcg  1 Cap Inhalation DAILY    sodium chloride (NS) flush 5-10 mL  5-10 mL IntraVENous Q8H    sodium chloride (NS) flush 5-10 mL  5-10 mL IntraVENous PRN    nitroglycerin (NITROSTAT) tablet 0.4 mg  0.4 mg SubLINGual Q5MIN PRN    morphine injection 2 mg  2 mg IntraVENous Q4H PRN    ondansetron (ZOFRAN) injection 4 mg  4 mg IntraVENous Q4H PRN    insulin lispro (HUMALOG) injection   SubCUTAneous AC&HS    budesonide (PULMICORT) 500 mcg/2 ml nebulizer suspension  500 mcg Nebulization BID RT    And    albuterol CONCENTRATE 2.5mg/0.5 mL neb soln  2.5 mg Nebulization Q6H RT         Review of Systems  Unable to obtain due to the patient's condition. Objective:     Vitals:    08/29/17 1338 08/29/17 1349 08/29/17 1558 08/29/17 1600   BP:    116/79   Pulse:    (!) 117   Resp:    28   Temp:    97.9 °F (36.6 °C)   SpO2:  93% 100% 95%   Weight: 181 lb (82.1 kg)   177 lb 14.6 oz (80.7 kg)   Height: 5' 6\" (1.676 m)        08/29 0701 - 08/29 1900  In: 180 [P.O.:180]  Out: 400 [Urine:400]         PHYSICAL EXAM     Physical Exam:   General:  Alert, cooperative, no acute distress, appears stated age. Eyes:  Conjunctivae/corneas clear. Nose: Nares patent and moist. Septum midline. Mouth/Throat: Lips, mucosa, and tongue pink and intact. Neck: Supple, symmetrical.   Respiratory:   Mild R basal crackles. Scattered anterior rhonchi.     Cardiovascular: Regular rhythm, tachy. , S1, S2, no murmur, rub or gallop. GI:   Abdomen soft, non-tender. Bowel sounds active X 4 Q. Musculoskeletal: Extremities symmetrical, atraumatic, no cyanosis, noedema. Pulses: 2+ and symmetric all extremities. Skin: Skin color, texture, turgor normal. No rashes or lesions       Neurologic: 2+ strength bilateral upper and lower extremities, sensation throughout appropriate. Alert but oriented only to person. Activity: limited  Nutrition: NPO, speech following.  MBS was planned for today    CHEST X-RAYS: ordered    CULTURES:  Blood - ngtd  Urine- skin deidre    LABS    Recent Labs      08/29/17   0739  08/28/17   0853  08/27/17   0549   WBC  4.0*  5.2  6.3   HGB  12.1*  12.8*  11.8*   HCT  36.4*  39.6*  39.6*   PLT  50*  65*  49*     Recent Labs      08/29/17   1348  08/29/17   0739  08/28/17   0853  08/27/17   0549   NA  128*   --   128*   --    K  5.4*   --   4.4   --    CL  88*   --   89*   --    GLU  204*   --   147*   --    CO2  35*   --   37*   --    BUN  47*   --   45*   --    CREA  1.34   --   1.48   --    INR   --   2.3*  2.3*  2.5*     Recent Labs      08/29/17   1200   PH  7.30*   PCO2  76*   PO2  148*   HCO3  37*       Assessment:     Hospital Problems  Date Reviewed: 8/18/2017          Codes Class Noted POA    Acute respiratory failure with hypercapnia ([facility] ICD-10-CM: J96.02  ICD-9-CM: 518.81  8/29/2017 No        History of tobacco use (Chronic) ICD-10-CM: T73.281  ICD-9-CM: V15.82  8/29/2017 Yes        Hyponatremia ICD-10-CM: E87.1  ICD-9-CM: 276.1  8/29/2017 Yes        Acute encephalopathy ICD-10-CM: G93.40  ICD-9-CM: 348.30  8/29/2017 Yes        Thrombocytopenia ([facility] ICD-10-CM: D69.6  ICD-9-CM: 287.5  8/26/2017 Yes        * (Principal)Atrial flutter with rapid ventricular response (HCC) ICD-10-CM: I48.92  ICD-9-CM: 427.32  8/23/2017 Yes        Ischemic cardiomyopathy (Chronic) ICD-10-CM: I25.5  ICD-9-CM: 414.8  11/15/2016 Yes        History of mechanical aortic valve replacement (Chronic) ICD-10-CM: Z95.2  ICD-9-CM: V43.3  9/15/2016 Yes        Centrilobular emphysema ([facility] [address] ICD-10-CM: J43.2  ICD-9-CM: 492.8  9/15/2016 Yes        Dyslipidemia (Chronic) ICD-10-CM: E78.5  ICD-9-CM: 272.4  4/16/2014 Yes        Anticoagulant long-term use (Chronic) ICD-10-CM: Z79.01  ICD-9-CM: V58.61  4/15/2014 Yes        HTN (hypertension), benign (Chronic) ICD-10-CM: I10  ICD-9-CM: 401.1  4/15/2014 Yes        Peripheral vascular disease ([facility] [address] (Chronic) ICD-10-CM: I73.9  ICD-9-CM: 443.9  4/15/2014 Yes            Patient with AMS and hypercarbia. Recent fall but CT and MRI without acute changes to explain his mental status. Feel like most likely causes are hypercarbia and hyponatremia. Does not appear to have received any sedating medications recently. No wheeze on exam to suggest COPD exacerbation. Plan:     Limit sedatives  BiPAP for hypercapnic respiratory failure. Repeat ABG in 1 hour. CXR now  Continue diuresis (on lasix 40 mg daily orally) for now but with no LE edema if CXR does not show edema may want to try gental NS for hyponatremia. If this does not work or unable to give fluids, could consider tolvaptan. Daily BMP's. Prednisone 80 mg oral daily per hem/onc for ITP    [employer], NP-C    More than 50% of time documented was spent in face-to-face contact with the patient and in the care of the patient on the floor/unit where the patient is located. Lungs:  R basal crackles. Scattered rhonchi. Heart:  Tachy, regular. with no Murmur/Rubs/Gallops    Additional Comments:  Agree with above plan. I have spoken with and examined the patient. I agree with the above assessment and plan as documented.     Erwin Breen MD

## 2017-08-29 NOTE — PROGRESS NOTES
Transfer orders placed by Dr. Steffen Buitrago to ICU for bi-pap. Pasquale Banerjee with Hematology notified of transfer. Hematology lab notified of transfer.

## 2017-08-29 NOTE — PROGRESS NOTES
Bedside shift change report given to Bailey Gill RN (oncoming nurse) by 1200 Albert Wiggins RN and Julia Wilde RN (offgoing nurse). Report included the following information SBAR, Kardex, ED Summary, Procedure Summary, Intake/Output, MAR, Recent Results and Cardiac Rhythm sinus tach with BBB.

## 2017-08-29 NOTE — PROGRESS NOTES
Daily Progress Note -- Hematology/ Medical Oncology        Patient Name: Joel Hernandez    Date of Visit: 2017  : 1945  Age:72 y.o. Initial consult HPI:   He has a history of mechanical AVR in . He continues on chronic anticoagulation with coumadin, chronic systolic CHF/ICM EF 08%, COPD, PVD, DM II, HTN, iron deficiency, and dyslipidemia who developed worsening dyspnea at the beginning of the week. He presented to the ER and was found to be in atrial flutter with RVR now status post cardioversion and amiodarone. Of note, his platelets were 55,959 on admission and 30,000 today. No other lab comparisons here but can see through care everywhere that his platelets were 31,670 at [facility] about a year ago. He has no recollection of ever being told he has low platelets. He takes iron daily for his history of PRATIK and reports black stools due to this. Hgb on admission was 10.2 and 12.7 today. We have been consulted for his thrombocytopenia    Interval history:  A-fib on cardizem. Plts back down to 50,000 today from 65,000 yesterday - status post IVIG x 3. Started steroids as well. Lethargic this morning.         Medications:   Current Facility-Administered Medications   Medication Dose Route Frequency Provider Last Rate Last Dose    LORazepam (ATIVAN) injection 0.5 mg  0.5 mg IntraVENous ONCE Luis Blocker, NP        morphine injection 2 mg  2 mg IntraVENous ONCE Luis Blocker, NP        predniSONE (DELTASONE) tablet 80 mg  80 mg Oral DAILY WITH BREAKFAST Luis Blocker, NP   80 mg at 17 1018    carvedilol (COREG) tablet 12.5 mg  12.5 mg Oral BID WITH MEALS Jael Timmons MD   12.5 mg at 17 1017    furosemide (LASIX) tablet 40 mg  40 mg Oral DAILY Jael Timmons MD   40 mg at 17 1018    Read PPD  1 Each Other Q24H Livia Brown MD        sacubitril-valsartan (ENTRESTO) 24-26 mg tablet 1 Tab  1 Tab Oral Q12H Gabriela Bhakta MD   1 Tab at 17 1019    dextrose (D50W) injection syrg 25 g  25 g IntraVENous PRN Salvatore Parker MD   25 g at 08/25/17 1618    ferrous sulfate tablet 325 mg  1 Tab Oral DAILY WITH BREAKFAST Alveda Angel, NP   325 mg at 08/29/17 1018    warfarin (COUMADIN) tablet 5 mg  5 mg Oral QHS Juanis Garcia, NP   5 mg at 08/28/17 2055    amiodarone (CORDARONE) tablet 200 mg  200 mg Oral BID Salvatore Parker MD   200 mg at 08/29/17 1018    acetaminophen (TYLENOL) tablet 650 mg  650 mg Oral Q6H PRN Salvatore Parker MD   650 mg at 08/29/17 6773    gabapentin (NEURONTIN) capsule 600 mg  600 mg Oral TID Pura Hankins MD   600 mg at 08/29/17 0526    albuterol (PROVENTIL HFA, VENTOLIN HFA, PROAIR HFA) inhaler 2 Puff  2 Puff Inhalation Q4H PRN Pura Hankins MD        pantoprazole (PROTONIX) tablet 40 mg  40 mg Oral ACB Pura Hankins MD   40 mg at 08/29/17 1017    tiotropium (SPIRIVA) inhalation capsule 18 mcg  1 Cap Inhalation DAILY Pura Hankins MD   18 mcg at 08/29/17 9618    sodium chloride (NS) flush 5-10 mL  5-10 mL IntraVENous Genaro Nugent MD   10 mL at 08/29/17 0600    sodium chloride (NS) flush 5-10 mL  5-10 mL IntraVENous PRN Pura Hankins MD        nitroglycerin (NITROSTAT) tablet 0.4 mg  0.4 mg SubLINGual Q5MIN PRN Pura Hankins MD        morphine injection 2 mg  2 mg IntraVENous Q4H PRN Pura Hankins MD        ondansetron [facility]) injection 4 mg  4 mg IntraVENous Q4H PRN Pura Hankins MD        insulin lispro (HUMALOG) injection   SubCUTAneous AC&HS Pura Hankins MD   4 Units at 08/28/17 2108    budesonide (PULMICORT) 500 mcg/2 ml nebulizer suspension  500 mcg Nebulization BID RT Pura Hankins MD   500 mcg at 08/29/17 0606    And    albuterol CONCENTRATE 2.5mg/0.5 mL neb soln  2.5 mg Nebulization Q6H RT Pura Hankins MD   2.5 mg at 08/29/17 4237       Allergies:   Allergies   Allergen Reactions    Levaquin [Levofloxacin] Other (comments)     GI upset    Metformin Other (comments)     Gi upset       Review of Systems: The Review of Systems is documented in full in the internal medical record. All systems are negative other than for those noted above. Physical Examination:  General Appearance: Mildly ill appearing patient in no acute distress. Vital signs:   Visit Vitals    /54 (BP 1 Location: Left arm, BP Patient Position: Head of bed elevated (Comment degrees))    Pulse (!) 110    Temp 96.2 °F (35.7 °C)    Resp 18    Ht 5' 6\" (1.676 m)    Wt 181 lb 6.4 oz (82.3 kg)    SpO2 91%    BMI 29.28 kg/m2     HEENT: No oral or pharyngeal masses. There is no ulceration or thrush. Lymph nodes: There is no cervical, supraclavicular, axillary adenopathy. Lungs: There lungs are clear to auscultation. Heart: There is no jugular venous distention. Irregular, irregular. Abdomen: Soft, non-tender, bowel sounds present and normal, no appreciated hepatosplenomegaly. No palpable masses. Skin: No rash, petechiae or ecchymoses. No evidence of malignancy. Neuro: Lethargic today. .     Labs/Imaging:  Lab Results   Component Value Date/Time    WBC 4.0 08/29/2017 07:39 AM    HGB 12.1 08/29/2017 07:39 AM    HCT 36.4 08/29/2017 07:39 AM    PLATELET 50 77/17/8158 07:39 AM    MCV 88.8 08/29/2017 07:39 AM       Lab Results   Component Value Date/Time    Sodium 128 08/28/2017 08:53 AM    Potassium 4.4 08/28/2017 08:53 AM    Chloride 89 08/28/2017 08:53 AM    CO2 37 08/28/2017 08:53 AM    Anion gap 2 08/28/2017 08:53 AM    Glucose 147 08/28/2017 08:53 AM    BUN 45 08/28/2017 08:53 AM    Creatinine 1.48 08/28/2017 08:53 AM    GFR est AA 60 08/28/2017 08:53 AM    GFR est non-AA 50 08/28/2017 08:53 AM    Calcium 8.3 08/28/2017 08:53 AM    AST (SGOT) 25 08/23/2017 01:05 PM    Alk.  phosphatase 86 08/23/2017 01:05 PM    Protein, total 7.4 08/23/2017 01:05 PM    Albumin 3.6 08/23/2017 01:05 PM    Globulin 3.8 08/23/2017 01:05 PM    A-G Ratio 0.9 08/23/2017 01:05 PM ALT (SGPT) 30 08/23/2017 01:05 PM           ASSESSMENT:  This gentleman has a thrombocytopenia which is isolated (normal RBC and WBC) that has been present for at least one year. The most likely diagnosis is ITP, valvular consumption and/or medications. The level of platelet count in and of itself is not concerning. It is the requirement for warfarin given his mechanical valve which creates difficulties when combined with this thrombocytopenia. His risk for bleeding is substantially increased especially with platelet counts below 50,000. Situation currently stable overall with platelet count down to 50,000 today despite three doses of IVIG and steroids. PLAN:  Completed 3 days of IVIG. Started Prednisone 80 mg daily on 08/29. Monitoring blood sugars closely - may need sliding scale. Will need BMBx to rule out other causes of thrombocytopenia. Discussed with Dr. Ivor Duverney and staff, will perform at 1:00 today at the bedside. Morphine and Ativan prior to procedure. Pat Tong NP  [address] [phone]    Attending Addendum:  I personally evaluated the patient with Kiki Moya N.P.,  and agree with the assessment, findings and plan as documented. Appears stable, heart regular without murmur, lungs clear, abdomen benign. Continue current care. Will get BM biopsy.                Kaleb Quezada MD  [facility]  [address] [address]  Office : [phone]  Fax : [phone]

## 2017-08-29 NOTE — CONSULTS
Hospitalist Consult    Patient: Grisel Lai MRN: [id number]  SSN: [id number]    YOB: 1945  Age: 67 y.o. Sex: male      Subjective:      Grisel Lai is a 67 y.o. male who is being seen for metabolic encephalopathy. The patient was admitted for atrial flutter with RVR and hypotension. He subsequently got a MERLYN and dCV by cardiology. He took a fall the night of hospital day #2 and has been slowly getting more lethargic and confused over the past 4 days. Per nursing and the patient's wife, the patient was lucid and laughing at admission and since the fall has not been getting better. A CT and MRI of the brain was done, which was unremarkable. Currently, the patient is sleepy, but wakes up and answers questions. He is confused, but does not have any acute complaints. Past Medical History:   Diagnosis Date    Arthritis     Chicken pox     Coronary artery disease 4/16/2014    DJD (degenerative joint disease)     Emphysema     Hx of aortic valve replacement, mechanical       doing well,. Had to hold 3 days for removal of skin ca. Will have to have add. resection.  Hypercholesterolemia     Hypertension     Palpitations      Holter showed  PVC's, PAC's, one short run SVT.  Pneumonia      Past Surgical History:   Procedure Laterality Date    HX AORTIC VALVE REPLACEMENT N/A 2000    Aortic Valve Replacement w/ Mechanical Valve    [facility]  [facility]/21/[facility]    [facility]      No family history on file.   Social History   Substance Use Topics    Smoking status: Former Smoker     Packs/day: 1.50     Years: 40.00    Smokeless tobacco: Never Used    Alcohol use No      Current Facility-Administered Medications   Medication Dose Route Frequency Provider Last Rate Last Dose    predniSONE (DELTASONE) tablet 80 mg  80 mg Oral DAILY WITH BREAKFAST Ham Monreal NP   80 mg at 08/29/17 1018    carvedilol (COREG) tablet 12.5 mg  12.5 mg Oral BID WITH Joann Ramirez MD 12.5 mg at 08/29/17 1017    furosemide (LASIX) tablet 40 mg  40 mg Oral DAILY Yolie Flores MD   40 mg at 08/29/17 1018    Read PPD  1 Each Other Q24H Amy Capps MD        sacubitril-valsartan (ENTRESTO) 24-26 mg tablet 1 Tab  1 Tab Oral Q12H Marshall Santos MD   1 Tab at 08/29/17 1019    dextrose (D50W) injection syrg 25 g  25 g IntraVENous PRN Marshall Santos MD   25 g at 08/25/17 1618    ferrous sulfate tablet 325 mg  1 Tab Oral DAILY WITH BREAKFAST Isabela Doss NP   325 mg at 08/29/17 1018    warfarin (COUMADIN) tablet 5 mg  5 mg Oral QHS Dusty Mccoy NP   5 mg at 08/28/17 2055    amiodarone (CORDARONE) tablet 200 mg  200 mg Oral BID Marshall Santos MD   200 mg at 08/29/17 1018    acetaminophen (TYLENOL) tablet 650 mg  650 mg Oral Q6H PRN Marshall Santos MD   650 mg at 08/29/17 0526    gabapentin (NEURONTIN) capsule 600 mg  600 mg Oral TID Amy Capps MD   600 mg at 08/29/17 0526    albuterol (PROVENTIL HFA, VENTOLIN HFA, PROAIR HFA) inhaler 2 Puff  2 Puff Inhalation Q4H PRN Amy Capps MD        pantoprazole (PROTONIX) tablet 40 mg  40 mg Oral ACB Amy Capps MD   40 mg at 08/29/17 1017    tiotropium (SPIRIVA) inhalation capsule 18 mcg  1 Cap Inhalation DAILY Amy Capps MD   18 mcg at 08/29/17 3429    sodium chloride (NS) flush 5-10 mL  5-10 mL IntraVENous Trey Monroy MD   10 mL at 08/29/17 0600    sodium chloride (NS) flush 5-10 mL  5-10 mL IntraVENous PRN Amy Capps MD        nitroglycerin (NITROSTAT) tablet 0.4 mg  0.4 mg SubLINGual Q5MIN PRN Amy Capps MD        morphine injection 2 mg  2 mg IntraVENous Q4H PRN Amy Capps MD        ondansetron [facility]) injection 4 mg  4 mg IntraVENous Q4H PRN Amy Capps MD        insulin lispro (HUMALOG) injection   SubCUTAneous AC&HS Amy Capps MD   4 Units at 08/28/17 2108    budesonide (PULMICORT) 500 mcg/2 ml nebulizer suspension  500 mcg Nebulization BID RT Cindy Nazario MD   500 mcg at 08/29/17 8152    And    albuterol CONCENTRATE 2.5mg/0.5 mL neb soln  2.5 mg Nebulization Q6H RT Cindy Nazario MD   2.5 mg at 08/29/17 0606        Allergies   Allergen Reactions    Levaquin [Levofloxacin] Other (comments)     GI upset    Metformin Other (comments)     Gi upset       Review of Systems:  A comprehensive review of systems was negative except for that written in the History of Present Illness. Objective:     Vitals:    08/29/17 0606 08/29/17 0715 08/29/17 0919 08/29/17 1017   BP:   124/60 137/78   Pulse:   (!) 116 (!) 116   Resp:   18    Temp:   96.7 °F (35.9 °C)    SpO2: 100% 96% 98%    Weight:       Height:            Physical Exam:  GENERAL: alert, cooperative, no distress, appears stated age  EYE: conjunctivae/corneas clear. PERRL, EOM's intact.  Fundi benign  LUNG: clear to auscultation bilaterally  CARDIAC: RRR, no murmur  GI: soft, non-tender, non-distended, bowel sounds normal  : No suprapubic tenderness  EXTREMITIES: Trace edema  SKIN: No rashes  NEURO: A&Ox2, CN II-XII intact        Assessment:     Hospital Problems  Date Reviewed: 8/18/2017          Codes Class Noted POA    Thrombocytopenia ([facility] ICD-10-CM: D69.6  ICD-9-CM: 287.5  8/26/2017 Unknown        Atrial flutter with rapid ventricular response ([facility] ICD-10-CM: I48.92  ICD-9-CM: 427.32  8/23/2017 Unknown        * (Principal)Atrial flutter with rapid ventricular response ([facility] ICD-10-CM: I48.92  ICD-9-CM: 427.32  8/23/2017 Unknown        Ischemic cardiomyopathy ICD-10-CM: I25.5  ICD-9-CM: 414.8  11/15/2016 Yes        History of mechanical aortic valve replacement ICD-10-CM: Z95.2  ICD-9-CM: V43.3  9/15/2016 Yes        Chronic bronchitis ([facility] ICD-10-CM: J42  ICD-9-CM: 491.9  9/15/2016 Yes        Dyslipidemia ICD-10-CM: E78.5  ICD-9-CM: 272.4  4/16/2014 Yes        Anticoagulant long-term use ICD-10-CM: Z79.01  ICD-9-CM: V58.61  4/15/2014 Yes        HTN (hypertension), benign ICD-10-CM: I10  ICD-9-CM: 401.1  4/15/2014 Yes        Peripheral vascular disease ([facility] ICD-10-CM: I73.9  ICD-9-CM: 443.9  4/15/2014 Yes              Plan:     1. Metabolic Encephalopathy - likely multifactorial - check ABG, BMP HFP, B12, TSH. MRI Brain ok. 2. Hyponatremia - Mild - Repeat BMP. May start some fluids and stop Lasix if worse today    3. Prerenal Azotemia - likely from Lasix. May stop if worse today. 4. Iron Deficiency Anemia - Stable. Hematology following    5. Thrombocytopenia - Hematology following. May do BMB soon. 6. Chronic sCHF - Cardiology primary. Continue Entresto & Carvedilol. Lasix. 7. Aflutter - was in RVR at admit. No stable. Monitor.     Signed By: Usman Washington DO     August 29, 2017

## 2017-08-29 NOTE — PROGRESS NOTES
Patient was transferred to [facility] by transport and PCT for scheduled swallow study. Prior to leaving floor, patient sats were 91%, pale/ purple skin coloration, dyspnea at rest, audible crackles and wheezing present. Patient was assessed by primary RN and brought back to room. HOB elevated. Dr. Jose A Baker notified that swallow study was not completed and current changes in patient condition. No orders received at this time. MD to reevaluate patient.

## 2017-08-29 NOTE — PROGRESS NOTES
Bedside and Verbal shift change report given to self (oncoming nurse) by Fariba Nielsen RN (offgoing nurse). Report included the following information SBAR, Kardex, MAR and Recent Results.

## 2017-08-30 ENCOUNTER — APPOINTMENT (OUTPATIENT)
Dept: GENERAL RADIOLOGY | Age: 72
DRG: 308 | End: 2017-08-30
Attending: INTERNAL MEDICINE
Payer: MEDICARE

## 2017-08-30 PROBLEM — I25.5 ISCHEMIC CARDIOMYOPATHY: Chronic | Status: ACTIVE | Noted: 2017-08-23

## 2017-08-30 PROBLEM — J43.2 CENTRILOBULAR EMPHYSEMA (HCC): Chronic | Status: ACTIVE | Noted: 2017-08-23

## 2017-08-30 PROBLEM — I10 HTN (HYPERTENSION), BENIGN: Chronic | Status: ACTIVE | Noted: 2017-08-23

## 2017-08-30 PROBLEM — Z79.01 ANTICOAGULANT LONG-TERM USE: Chronic | Status: ACTIVE | Noted: 2017-08-23

## 2017-08-30 PROBLEM — I73.9 PERIPHERAL VASCULAR DISEASE (HCC): Chronic | Status: ACTIVE | Noted: 2017-08-23

## 2017-08-30 PROBLEM — Z95.2 HISTORY OF MECHANICAL AORTIC VALVE REPLACEMENT: Chronic | Status: ACTIVE | Noted: 2017-08-23

## 2017-08-30 PROBLEM — E78.5 DYSLIPIDEMIA: Chronic | Status: ACTIVE | Noted: 2017-08-23

## 2017-08-30 PROBLEM — J44.9 COPD (CHRONIC OBSTRUCTIVE PULMONARY DISEASE) (HCC): Chronic | Status: ACTIVE | Noted: 2017-08-30

## 2017-08-30 LAB
ABO + RH BLD: NORMAL
AMMONIA PLAS-SCNC: 23 UMOL/L (ref 11–32)
ANION GAP SERPL CALC-SCNC: 3 MMOL/L (ref 7–16)
ARTERIAL PATENCY WRIST A: POSITIVE
BACTERIA SPEC CULT: NORMAL
BACTERIA SPEC CULT: NORMAL
BASE EXCESS BLDA CALC-SCNC: 8.7 MMOL/L (ref 0–3)
BDY SITE: ABNORMAL
BUN SERPL-MCNC: 46 MG/DL (ref 8–23)
CALCIUM SERPL-MCNC: 8.8 MG/DL (ref 8.3–10.4)
CHLORIDE SERPL-SCNC: 91 MMOL/L (ref 98–107)
CO2 SERPL-SCNC: 36 MMOL/L (ref 21–32)
COHGB MFR BLD: 1.3 % (ref 0.5–1.5)
CREAT SERPL-MCNC: 1.26 MG/DL (ref 0.8–1.5)
DO-HGB BLD-MCNC: 3 % (ref 0–5)
ERYTHROCYTE [DISTWIDTH] IN BLOOD BY AUTOMATED COUNT: 15.8 % (ref 11.9–14.6)
GAS FLOW.O2 O2 DELIVERY SYS: 2 L/MIN
GLUCOSE BLD STRIP.AUTO-MCNC: 102 MG/DL (ref 65–100)
GLUCOSE BLD STRIP.AUTO-MCNC: 102 MG/DL (ref 65–100)
GLUCOSE BLD STRIP.AUTO-MCNC: 148 MG/DL (ref 65–100)
GLUCOSE SERPL-MCNC: 121 MG/DL (ref 65–100)
HCO3 BLDA-SCNC: 38 MMOL/L (ref 22–26)
HCT VFR BLD AUTO: 39.1 % (ref 41.1–50.3)
HGB BLD-MCNC: 13 G/DL (ref 13.6–17.2)
HGB BLDMV-MCNC: 13.3 GM/DL (ref 11.7–15)
INR PPP: 3.2 (ref 0.9–1.2)
MCH RBC QN AUTO: 29.5 PG (ref 26.1–32.9)
MCHC RBC AUTO-ENTMCNC: 33.2 G/DL (ref 31.4–35)
MCV RBC AUTO: 88.9 FL (ref 79.6–97.8)
METHGB MFR BLD: 0.4 % (ref 0–1.5)
OXYHGB MFR BLDA: 95 % (ref 94–97)
PCO2 BLDA: 75 MMHG (ref 35–45)
PH BLDA: 7.32 [PH] (ref 7.35–7.45)
PLATELET # BLD AUTO: 18 K/UL (ref 150–450)
PLATELET # BLD AUTO: 19 K/UL (ref 150–450)
PMV BLD AUTO: ABNORMAL FL (ref 10.8–14.1)
PO2 BLDA: 95 MMHG (ref 80–105)
POTASSIUM SERPL-SCNC: 5.4 MMOL/L (ref 3.5–5.1)
PROTHROMBIN TIME: 35.4 SEC (ref 9.6–12)
RBC # BLD AUTO: 4.4 M/UL (ref 4.23–5.67)
SAO2 % BLD: 97 % (ref 92–98.5)
SERVICE CMNT-IMP: ABNORMAL
SERVICE CMNT-IMP: NORMAL
SERVICE CMNT-IMP: NORMAL
SODIUM SERPL-SCNC: 130 MMOL/L (ref 136–145)
VENTILATION MODE VENT: ABNORMAL
WBC # BLD AUTO: 5.3 K/UL (ref 4.3–11.1)

## 2017-08-30 PROCEDURE — 74011250637 HC RX REV CODE- 250/637: Performed by: INTERNAL MEDICINE

## 2017-08-30 PROCEDURE — 36569 INSJ PICC 5 YR+ W/O IMAGING: CPT | Performed by: INTERNAL MEDICINE

## 2017-08-30 PROCEDURE — 65620000000 HC RM CCU GENERAL

## 2017-08-30 PROCEDURE — 02HV33Z INSERTION OF INFUSION DEVICE INTO SUPERIOR VENA CAVA, PERCUTANEOUS APPROACH: ICD-10-PCS | Performed by: INTERNAL MEDICINE

## 2017-08-30 PROCEDURE — 82803 BLOOD GASES ANY COMBINATION: CPT

## 2017-08-30 PROCEDURE — 77030018719 HC DRSG PTCH ANTIMIC J&J -A

## 2017-08-30 PROCEDURE — 86900 BLOOD TYPING SEROLOGIC ABO: CPT | Performed by: INTERNAL MEDICINE

## 2017-08-30 PROCEDURE — 82140 ASSAY OF AMMONIA: CPT | Performed by: INTERNAL MEDICINE

## 2017-08-30 PROCEDURE — 74011000258 HC RX REV CODE- 258: Performed by: INTERNAL MEDICINE

## 2017-08-30 PROCEDURE — 85027 COMPLETE CBC AUTOMATED: CPT | Performed by: PHYSICIAN ASSISTANT

## 2017-08-30 PROCEDURE — 74011000250 HC RX REV CODE- 250: Performed by: INTERNAL MEDICINE

## 2017-08-30 PROCEDURE — 36415 COLL VENOUS BLD VENIPUNCTURE: CPT | Performed by: PHYSICIAN ASSISTANT

## 2017-08-30 PROCEDURE — 99233 SBSQ HOSP IP/OBS HIGH 50: CPT | Performed by: INTERNAL MEDICINE

## 2017-08-30 PROCEDURE — 36600 WITHDRAWAL OF ARTERIAL BLOOD: CPT

## 2017-08-30 PROCEDURE — 77030013131 HC IV BLD ST ICUM -A

## 2017-08-30 PROCEDURE — 94660 CPAP INITIATION&MGMT: CPT

## 2017-08-30 PROCEDURE — P9037 PLATE PHERES LEUKOREDU IRRAD: HCPCS | Performed by: NURSE PRACTITIONER

## 2017-08-30 PROCEDURE — 71010 XR CHEST SNGL V: CPT

## 2017-08-30 PROCEDURE — 85610 PROTHROMBIN TIME: CPT | Performed by: PHYSICIAN ASSISTANT

## 2017-08-30 PROCEDURE — 82962 GLUCOSE BLOOD TEST: CPT

## 2017-08-30 PROCEDURE — 74011250636 HC RX REV CODE- 250/636: Performed by: NURSE PRACTITIONER

## 2017-08-30 PROCEDURE — 76937 US GUIDE VASCULAR ACCESS: CPT

## 2017-08-30 PROCEDURE — 74011000250 HC RX REV CODE- 250: Performed by: HOSPITALIST

## 2017-08-30 PROCEDURE — 94640 AIRWAY INHALATION TREATMENT: CPT

## 2017-08-30 PROCEDURE — 85049 AUTOMATED PLATELET COUNT: CPT | Performed by: INTERNAL MEDICINE

## 2017-08-30 PROCEDURE — 80048 BASIC METABOLIC PNL TOTAL CA: CPT | Performed by: PHYSICIAN ASSISTANT

## 2017-08-30 PROCEDURE — 74011250636 HC RX REV CODE- 250/636: Performed by: HOSPITALIST

## 2017-08-30 PROCEDURE — 77030010547 HC BG URIN W/UMETER -A

## 2017-08-30 PROCEDURE — 74011250636 HC RX REV CODE- 250/636: Performed by: INTERNAL MEDICINE

## 2017-08-30 PROCEDURE — 30233R1 TRANSFUSION OF NONAUTOLOGOUS PLATELETS INTO PERIPHERAL VEIN, PERCUTANEOUS APPROACH: ICD-10-PCS | Performed by: NURSE PRACTITIONER

## 2017-08-30 PROCEDURE — 36430 TRANSFUSION BLD/BLD COMPNT: CPT

## 2017-08-30 PROCEDURE — C1751 CATH, INF, PER/CENT/MIDLINE: HCPCS

## 2017-08-30 PROCEDURE — 86644 CMV ANTIBODY: CPT | Performed by: NURSE PRACTITIONER

## 2017-08-30 PROCEDURE — 77030018786 HC NDL GD F/USND BARD -B

## 2017-08-30 RX ORDER — FUROSEMIDE 10 MG/ML
40 INJECTION INTRAMUSCULAR; INTRAVENOUS DAILY
Status: DISCONTINUED | OUTPATIENT
Start: 2017-08-30 | End: 2017-09-05

## 2017-08-30 RX ORDER — SODIUM CHLORIDE 0.9 % (FLUSH) 0.9 %
20 SYRINGE (ML) INJECTION AS NEEDED
Status: DISCONTINUED | OUTPATIENT
Start: 2017-08-30 | End: 2017-09-13 | Stop reason: HOSPADM

## 2017-08-30 RX ORDER — WARFARIN 1 MG/1
2 TABLET ORAL
Status: DISCONTINUED | OUTPATIENT
Start: 2017-08-30 | End: 2017-09-03

## 2017-08-30 RX ORDER — AMIODARONE HYDROCHLORIDE 200 MG/1
400 TABLET ORAL 2 TIMES DAILY
Status: DISCONTINUED | OUTPATIENT
Start: 2017-08-30 | End: 2017-08-30

## 2017-08-30 RX ORDER — HEPARIN 100 UNIT/ML
600 SYRINGE INTRAVENOUS AS NEEDED
Status: DISCONTINUED | OUTPATIENT
Start: 2017-08-30 | End: 2017-09-13 | Stop reason: HOSPADM

## 2017-08-30 RX ORDER — CARVEDILOL 25 MG/1
25 TABLET ORAL 2 TIMES DAILY WITH MEALS
Status: DISCONTINUED | OUTPATIENT
Start: 2017-08-30 | End: 2017-09-04

## 2017-08-30 RX ORDER — FUROSEMIDE 10 MG/ML
40 INJECTION INTRAMUSCULAR; INTRAVENOUS DAILY
Status: DISCONTINUED | OUTPATIENT
Start: 2017-08-31 | End: 2017-08-30

## 2017-08-30 RX ORDER — ACETAMINOPHEN 650 MG/1
650 SUPPOSITORY RECTAL
Status: DISCONTINUED | OUTPATIENT
Start: 2017-08-30 | End: 2017-09-13 | Stop reason: HOSPADM

## 2017-08-30 RX ORDER — HEPARIN 100 UNIT/ML
600 SYRINGE INTRAVENOUS EVERY 8 HOURS
Status: DISCONTINUED | OUTPATIENT
Start: 2017-08-30 | End: 2017-09-13 | Stop reason: HOSPADM

## 2017-08-30 RX ORDER — MUPIROCIN 20 MG/G
OINTMENT TOPICAL 2 TIMES DAILY
Status: DISCONTINUED | OUTPATIENT
Start: 2017-08-30 | End: 2017-09-10

## 2017-08-30 RX ORDER — ALBUTEROL SULFATE 0.83 MG/ML
2.5 SOLUTION RESPIRATORY (INHALATION)
Status: DISCONTINUED | OUTPATIENT
Start: 2017-08-30 | End: 2017-09-13 | Stop reason: HOSPADM

## 2017-08-30 RX ORDER — SODIUM CHLORIDE 0.9 % (FLUSH) 0.9 %
20 SYRINGE (ML) INJECTION EVERY 8 HOURS
Status: DISCONTINUED | OUTPATIENT
Start: 2017-08-30 | End: 2017-09-13 | Stop reason: HOSPADM

## 2017-08-30 RX ORDER — SODIUM CHLORIDE 9 MG/ML
250 INJECTION, SOLUTION INTRAVENOUS AS NEEDED
Status: DISCONTINUED | OUTPATIENT
Start: 2017-08-30 | End: 2017-09-13 | Stop reason: HOSPADM

## 2017-08-30 RX ADMIN — FUROSEMIDE 40 MG: 10 INJECTION, SOLUTION INTRAMUSCULAR; INTRAVENOUS at 13:53

## 2017-08-30 RX ADMIN — WARFARIN SODIUM 2 MG: 1 TABLET ORAL at 22:00

## 2017-08-30 RX ADMIN — SODIUM CHLORIDE 12.5 MG/HR: 900 INJECTION, SOLUTION INTRAVENOUS at 23:46

## 2017-08-30 RX ADMIN — SODIUM CHLORIDE 5 MG/HR: 900 INJECTION, SOLUTION INTRAVENOUS at 18:08

## 2017-08-30 RX ADMIN — GABAPENTIN 600 MG: 300 CAPSULE ORAL at 05:08

## 2017-08-30 RX ADMIN — MUPIROCIN: 20 OINTMENT TOPICAL at 12:51

## 2017-08-30 RX ADMIN — MORPHINE SULFATE 2 MG: 2 INJECTION, SOLUTION INTRAMUSCULAR; INTRAVENOUS at 05:08

## 2017-08-30 RX ADMIN — FAMOTIDINE 20 MG: 10 INJECTION, SOLUTION INTRAVENOUS at 13:52

## 2017-08-30 RX ADMIN — BUDESONIDE 500 MCG: 0.5 INHALANT RESPIRATORY (INHALATION) at 19:43

## 2017-08-30 RX ADMIN — FAMOTIDINE 20 MG: 10 INJECTION, SOLUTION INTRAVENOUS at 21:59

## 2017-08-30 RX ADMIN — ALBUTEROL SULFATE 2.5 MG: 2.5 SOLUTION RESPIRATORY (INHALATION) at 13:08

## 2017-08-30 RX ADMIN — ALBUTEROL SULFATE 2.5 MG: 2.5 SOLUTION RESPIRATORY (INHALATION) at 08:09

## 2017-08-30 RX ADMIN — Medication 20 ML: at 16:14

## 2017-08-30 RX ADMIN — Medication 10 ML: at 05:07

## 2017-08-30 RX ADMIN — SODIUM CHLORIDE 10 MG/HR: 900 INJECTION, SOLUTION INTRAVENOUS at 21:18

## 2017-08-30 RX ADMIN — Medication 20 ML: at 22:00

## 2017-08-30 RX ADMIN — AMIODARONE HYDROCHLORIDE 1 MG/MIN: 50 INJECTION, SOLUTION INTRAVENOUS at 09:59

## 2017-08-30 RX ADMIN — SODIUM CHLORIDE, PRESERVATIVE FREE 600 UNITS: 5 INJECTION INTRAVENOUS at 22:00

## 2017-08-30 RX ADMIN — SODIUM CHLORIDE 5 MG/HR: 900 INJECTION, SOLUTION INTRAVENOUS at 15:45

## 2017-08-30 RX ADMIN — METHYLPREDNISOLONE SODIUM SUCCINATE 60 MG: 125 INJECTION, POWDER, FOR SOLUTION INTRAMUSCULAR; INTRAVENOUS at 13:52

## 2017-08-30 RX ADMIN — Medication 10 ML: at 16:13

## 2017-08-30 RX ADMIN — GABAPENTIN 600 MG: 300 CAPSULE ORAL at 21:59

## 2017-08-30 RX ADMIN — ALBUTEROL SULFATE 2.5 MG: 2.5 SOLUTION RESPIRATORY (INHALATION) at 02:40

## 2017-08-30 RX ADMIN — SODIUM CHLORIDE 10 MG/HR: 900 INJECTION, SOLUTION INTRAVENOUS at 18:37

## 2017-08-30 RX ADMIN — SACUBITRIL AND VALSARTAN 1 TABLET: 24; 26 TABLET, FILM COATED ORAL at 21:59

## 2017-08-30 RX ADMIN — ACETAMINOPHEN 650 MG: 650 SUPPOSITORY RECTAL at 15:55

## 2017-08-30 RX ADMIN — MUPIROCIN: 20 OINTMENT TOPICAL at 18:38

## 2017-08-30 RX ADMIN — Medication 10 ML: at 22:00

## 2017-08-30 RX ADMIN — AMIODARONE HYDROCHLORIDE 150 MG: 50 INJECTION, SOLUTION INTRAVENOUS at 09:58

## 2017-08-30 RX ADMIN — MORPHINE SULFATE 2 MG: 2 INJECTION, SOLUTION INTRAMUSCULAR; INTRAVENOUS at 07:44

## 2017-08-30 RX ADMIN — MORPHINE SULFATE 2 MG: 2 INJECTION, SOLUTION INTRAMUSCULAR; INTRAVENOUS at 18:57

## 2017-08-30 RX ADMIN — ALBUTEROL SULFATE 2 PUFF: 90 AEROSOL, METERED RESPIRATORY (INHALATION) at 17:24

## 2017-08-30 RX ADMIN — ALBUTEROL SULFATE 2.5 MG: 2.5 SOLUTION RESPIRATORY (INHALATION) at 19:43

## 2017-08-30 RX ADMIN — BUDESONIDE 500 MCG: 0.5 INHALANT RESPIRATORY (INHALATION) at 08:09

## 2017-08-30 NOTE — PROGRESS NOTES
Pt more alert and oriented at this time, states name, , and that he is in the hospital although he is not sure which hospital or why he was admitted. VSS. Resting comfortably.

## 2017-08-30 NOTE — PROGRESS NOTES
Problem: Nutrition Deficit  Goal: *Optimize nutritional status  Nutrition:  Assessment based on LOS. Assessment:  Anthropometrics:  Ht - 5'6\", wgt - 79.2 kg (CCU bed 8/30/17), BMI - 28.2 c/w \"overweight\", edema - trace, non-pitting. Macronutrient Needs:  Estimated calorie needs - 1246-3167 rina/day (20-25 rina/kg/day)   Estimated protein needs - 72-78 gm pro/day (1.1-1.2 gm pro/kgIBW/day) (GFR >60 ml/min)  Max CHO/day - 247 gm CHO/day (50% rina/day)  Fluid/day - 1.6-2 liters/day (1 ml/rina/day)  Intake/Comparative Standards: This patient's average intake of CCHO diet for the past 4 days/6 meals: 50%. This potentially meets 56% of calorie and 63% of protein goals. Pertinent Labs:              Sodium 130, potassium 5.4, BUN 46, creatinine 1.26. Pertinent Medications:              Lasix, Solumedrol; Cordarone drip. Diet:              NPO. Food/Nutrition History:              67year old gentleman with a h/o diabetes admitted with AF with RVR. Mental status has deteriorated to the point that he is now having difficulty swallowing. His MBS was cancelled yesterday when his respiratory status declined requiring admission to the CCU. He has been NPO since yesterday. Diagnosis (Nutrition):  Difficulty swallowing related to decline in mental status as evidenced by need for MBS to determine correct diet texture/consistency to minimize the risk of aspiration. Intervention:  Meals and Snacks: NPO. Enteral Nutrition: If TF becomes the necessary delivery route of nutrition, his needs should be met with 55 ml/hr Glucerna 1.2 with 20 ml/hr water flush - 1584 calories/day (100% calorie goal), 79 grams protein/day (100% protein goal), 152 grams CHO/day (does not exceed max CHO limit) and 1589 ml water/day (100% fluid goal). Coordination of Nutrition Care by a Nutrition Professional: AM CCU rounds, collaboration with Mira Trejo RN. Nutrition Discharge Plan: Too soon to determine. Emerald Hatfield.  Surinder Krishna SILVERIO,CARLO,[facility]  [phone]

## 2017-08-30 NOTE — PROGRESS NOTES
Bedside and Verbal shift change report given to Archana Worthignton RN by Rehan Ma RN. Report included the following information SBAR, Kardex, ED Summary, Procedure Summary, Intake/Output, MAR, Accordion, Recent Results, Med Rec Status and Cardiac Rhythm Archana Worthington noted pt to be in Afib.

## 2017-08-30 NOTE — INTERDISCIPLINARY ROUNDS
Interdisciplinary team rounds were held 8/30/2017 with the following team members:Nursing, Nurse Practitioner, Nutrition, Palliative Care, Pastoral Care, Pharmacy, Physician, Quality, Respiratory Therapy and Clinical Coordinator and the patient. Plan of care discussed. See clinical pathway and/or care plan for interventions and desired outcomes.

## 2017-08-30 NOTE — PROGRESS NOTES
OT NOTE:    Therapist is discontinuing occupational therapy at this time due to decline in medical status and transfer to unit. Mr. Dhillon Sivacharles occupational therapy goals were not met. Please reorder OT when our services are again appropriate. Thank you.     Kamila Huntley, MS, OTR/L  8/30/2017

## 2017-08-30 NOTE — PROGRESS NOTES
Report received from Augusta Oropeza RN;  Bedside handoff completed. Care assumed; assessment completed, see doc flowsheets.

## 2017-08-30 NOTE — PROGRESS NOTES
Critical Care Daily Progress Note: 8/30/2017    [place] Courser   Admission Date: 8/23/2017         The patient's chart is reviewed and the patient is discussed with the staff.    67 y.o.  CM presents with atrial flutter with RVR and hypotension. Was seen by cardiology, had a MERLYN and cardioverted. Has had intermittent confusion since a fall with head CT and MRI showing no acute abnormality. Had AVR 2000 and been on Coumadin. Has been on Lasix and his Na is 128. Has converted to afib and HR is controlled. Was scheduled for MBS but was more confused prompting an ABG showing hypercapnic respiratory failure and transferred to the ICU. Placed on BIPAP but kept pulling off mask and changed to NC. He also has thrombocytopenia and been followed by Hem/Onc while here. He is anticoagulated on coumadin.  Virgen Sparks has reported COPD managed at [place] and long term history of tobacco abuse. Subjective:     Lying in bed and with ongoing confusion. Back on BIPAP and wife at the bedside. Sleeping, arouses and following some commands. Hard of hearing.     Current Facility-Administered Medications   Medication Dose Route Frequency    mupirocin (BACTROBAN) 2 % ointment   Both Nostrils BID    warfarin (COUMADIN) tablet 2 mg  2 mg Oral QHS    carvedilol (COREG) tablet 25 mg  25 mg Oral BID WITH MEALS    amiodarone (CORDARONE) 450 mg in dextrose 5% 250 mL infusion  0.5-1 mg/min IntraVENous TITRATE    predniSONE (DELTASONE) tablet 80 mg  80 mg Oral DAILY WITH BREAKFAST    furosemide (LASIX) tablet 40 mg  40 mg Oral DAILY    Read PPD  1 Each Other Q24H    sacubitril-valsartan (ENTRESTO) 24-26 mg tablet 1 Tab  1 Tab Oral Q12H    dextrose (D50W) injection syrg 25 g  25 g IntraVENous PRN    ferrous sulfate tablet 325 mg  1 Tab Oral DAILY WITH BREAKFAST    acetaminophen (TYLENOL) tablet 650 mg  650 mg Oral Q6H PRN    gabapentin (NEURONTIN) capsule 600 mg  600 mg Oral TID    albuterol (PROVENTIL HFA, VENTOLIN HFA, PROAIR HFA) inhaler 2 Puff  2 Puff Inhalation Q4H PRN    pantoprazole (PROTONIX) tablet 40 mg  40 mg Oral ACB    tiotropium (SPIRIVA) inhalation capsule 18 mcg  1 Cap Inhalation DAILY    sodium chloride (NS) flush 5-10 mL  5-10 mL IntraVENous Q8H    sodium chloride (NS) flush 5-10 mL  5-10 mL IntraVENous PRN    nitroglycerin (NITROSTAT) tablet 0.4 mg  0.4 mg SubLINGual Q5MIN PRN    morphine injection 2 mg  2 mg IntraVENous Q4H PRN    ondansetron (ZOFRAN) injection 4 mg  4 mg IntraVENous Q4H PRN    insulin lispro (HUMALOG) injection   SubCUTAneous AC&HS    budesonide (PULMICORT) 500 mcg/2 ml nebulizer suspension  500 mcg Nebulization BID RT    And    albuterol CONCENTRATE 2.5mg/0.5 mL neb soln  2.5 mg Nebulization Q6H RT       Review of Systems  Unobtainable due to patient status. Objective:     Vitals:    08/30/17 0731 08/30/17 0759 08/30/17 0810 08/30/17 0813   BP: 152/81 110/69     Pulse: (!) 116 (!) 121     Resp: 26 14     Temp:       SpO2: 93% 95% 96% 97%   Weight:       Height:           Intake and Output:   08/28 1901 - 08/30 0700  In: 230 [P.O.:230]  Out: 400 [Urine:400]       Physical Exam:          Constitutional:  the patient is well developed and in no acute distress, on BIPAP 30%, sat 97%   EENMT:  Sclera clear, pupils equal, oral mucosa moist, hard of hearing  Respiratory: few anterior crackles, no wheezing  Cardiovascular:  Irregular, tachycardia with -130s without M,G,R  Gastrointestinal: soft and non-tender; with positive bowel sounds. Musculoskeletal: warm without cyanosis. There is trace lower leg edema.   Skin:  no jaundice or rashes, no wounds   Neurologic: no gross neuro deficits     Psychiatric:  Arouses and following some commands    LINES:  peripheral site    DRIPS:   Amiodarone    CXR 8/30/17:       LAB  Recent Labs      08/30/17   0850  08/29/17   2214  08/29/17   1716  08/29/17   1110  08/29/17   0614   GLUCPOC  102*  175*  173*  138*  152*      Recent Labs 08/30/17   0523  08/29/17   0739  08/28/17   0853   WBC  5.3  4.0*  5.2   HGB  13.0*  12.1*  12.8*   HCT  39.1*  36.4*  39.6*   PLT  18*  50*  65*   INR  3.2*  2.3*  2.3*     Recent Labs      08/30/17   0523  08/29/17   1348  08/28/17   0853   NA  130*  128*  128*   K  5.4*  5.4*  4.4   CL  91*  88*  89*   CO2  36*  35*  37*   GLU  121*  204*  147*   BUN  46*  47*  45*   CREA  1.26  1.34  1.48   CA  8.8  8.5  8.3     Recent Labs      08/30/17   0738  08/29/17   1742  08/29/17   1200   PH  7.32*  7.37  7.30*   PCO2  75*  65*  76*   PO2  95  68*  148*   HCO3  38*  37*  37*     No results for input(s): LCAD, LAC in the last 72 hours.     Assessment:  (Medical Decision Making)     Patient Active Problem List   Diagnosis Code    Anticoagulant long-term use Z79.01    HTN (hypertension), benign I10    Peripheral vascular disease ([facility] I73.9    Atherosclerosis of native arteries of the extremities with intermittent claudication I70.219    Dyslipidemia E78.5    History of mechanical aortic valve replacement Z95.2    Centrilobular emphysema (HCC) J43.2    LV dysfunction I51.9    Ischemic cardiomyopathy I25.5    Atrial flutter with rapid ventricular response (HCC) I48.92    Thrombocytopenia (HCC) D69.6    Acute respiratory failure with hypercapnia (HCC) J96.02    History of tobacco use Z87.891    Hyponatremia E87.1    Acute encephalopathy G93.40    COPD (chronic obstructive pulmonary disease) (HCC) J44.9         Plan:  (Medical Decision Making)     Hospital Problems  Date Reviewed: 8/30/2017          Codes Class Noted POA    COPD (chronic obstructive pulmonary disease) (HCC) (Chronic) ICD-10-CM: J44.9  ICD-9-CM: 426  8/30/2017 Yes    Chronic--on Albuterol and Pulmicort, chronically followed at the [address] [place] [address]    Acute respiratory failure with hypercapnia ([facility] ICD-10-CM: J96.02  ICD-9-CM: 518.81  8/29/2017 No    On BIPAP    History of tobacco use (Chronic) ICD-10-CM: X58.389  ICD-9-CM: V15.82  8/29/2017 Yes    chronic Hyponatremia ICD-10-CM: E87.1  ICD-9-CM: 276.1  8/29/2017 Yes    Na up to 130    Acute encephalopathy ICD-10-CM: G93.40  ICD-9-CM: 348.30  8/29/2017 Yes    Ongoing confusion    Thrombocytopenia ([facility] ICD-10-CM: D69.6  ICD-9-CM: 287.5  8/26/2017 Yes    Platelets dropped to 18--per nurse has been follosed by [place] for low platelets, obtaining records. On Prednisone 80mg daily and IVIG x 3 days  Considering bone marrow biopsy    Anticoagulant long-term use (Chronic) ICD-10-CM: Z79.01  ICD-9-CM: V58.61  8/23/2017 Yes    Coumadin for mechanical aortic valve--INR 3.2 today    HTN (hypertension), benign (Chronic) ICD-10-CM: I10  ICD-9-CM: 401.1  8/23/2017 Yes    chronic    Peripheral vascular disease ([facility] (Chronic) ICD-10-CM: I73.9  ICD-9-CM: 443.9  8/23/2017 Yes        Dyslipidemia (Chronic) ICD-10-CM: E78.5  ICD-9-CM: 272.4  8/23/2017 Yes    Chronic--on Zocor prior to admission per chat    History of mechanical aortic valve replacement (Chronic) ICD-10-CM: Z95.2  ICD-9-CM: V43.3  8/23/2017 Yes     Placement 2000, on chronicCoumadin         Chronic--on Coumadin     Centrilobular emphysema ([facility] (Chronic) ICD-10-CM: J43.2  ICD-9-CM: 492.8  8/23/2017 Yes        Ischemic cardiomyopathy (Chronic) ICD-10-CM: I25.5  ICD-9-CM: 414.8  8/23/2017 Yes     8/23/17 ECHO:  EF 40 % to 45 %. There were no regional wall motion abnormalities. Moderate hypokinesis of the mid-apical anterior and apical wall(s). * (Principal)Atrial flutter with rapid ventricular response (HCC) ICD-10-CM: I48.92  ICD-9-CM: 427.32  8/23/2017 Yes    Back in AFib--on Amiodarone and Coreg added        More than 50% of the time documented was spent in face-to-face contact with the patient and in the care of the patient on the floor/unit where the patient is located. Wendie Rueda NP      The patient has been seen and examined by me personally, the chart,labs, and radiographic studies have been reviewed.     Chest: CTA on NIPPV  Extremities: Trace edema    I agree with the above assessment and plan.     Yudi Darden MD.

## 2017-08-30 NOTE — PROGRESS NOTES
Bedside and Verbal shift change report given to Steffanie Moses by Hernan Torres RN. Report included the following information SBAR, Kardex, ED Summary, Procedure Summary, Intake/Output, MAR, Accordion, Recent Results, Med Rec Status and Cardiac Rhythm ST, PVC. Upon entering room, pt had removed nasal cannula and O2 sat 71%, placed on BIPAP 30% fio2 and sat now 94%. Diaphoretic skin. Pt confused at this time, able to state name and follow simple commands. Brief and linen changed after BM. ST on monitor. Will continue to monitor.

## 2017-08-30 NOTE — PROGRESS NOTES
Pt in more visible resp distress. BLS very diminished. Pt is confused and tachycardic. NC at 2L and SpO2 94%. Dr Sree Villalta notified and orders received for ABG and CXR. Replaced Bipap on previous settings and notifed Dr Hernán Chavarria re HR controlling meds, order for IV Amio.

## 2017-08-30 NOTE — PROGRESS NOTES
Per Cardiology, pt has to maintain Coumadin for mechanical heart valve.   Also, if HR maintains above 110, D/C Coreg and start cardizem infusion for HR

## 2017-08-30 NOTE — PROGRESS NOTES
PICC PLACEMENT NOTE    PRE-PROCEDURE VERIFICATION    Correct Patient: Yes (Time out preformed)BENJAMÍN VEGA RN IN AGREEMENT WITH TIME OUT. Consent Procedure: Yes  Appropriate Site: Yes    Temperature: Temp: 97.5 °F (36.4 °C), Temperature Source: Temp Source: Oral    Recent Labs      08/30/17   0523   BUN  46*   CREA  1.26   PLT  18*   INR  3.2*   WBC  5.3       Allergies: Levaquin [levofloxacin] and Metformin    Education materials for PICC Care given to family: yes. See Patient Education activity for further details. PICC Booklet placed on bedside table. PROCEDURE DETAIL  A double lumen PICC line was started for vascular access. The following documentation is in addition to the PICC properties in the lines/airways flowsheet :  Lot #: [id number]  xylocaine used: yes  Mid-Arm Circumference: 30 (cm)  Internal Catheter Length: 39 (cm)  Internal Catheter Total Length: 39 (cm)  Vein Selection for PICC:right basilic  Central Line Bundle followed yes  Complication Related to Insertion: none  Ports flushed with positive blood return in each port. Guidewires removed intact. The placement was verified by ecg technology: yes. The ecg technology results state the tip location is on the right side and the tip overlies the lower  superior vena cava. Primary nurse notified.     Line is okay to use: yes      Rosendo Song RN

## 2017-08-30 NOTE — PROGRESS NOTES
Hospitalist Progress Note    2017  Admit Date: 2017  1:11 PM   NAME: Ace Worrell   :  1945   MRN:  [id number]   Attending: Crissy Elizondo MD  PCP:  Reed Hoover MD    SUBJECTIVE:   As previously documented:  'Ace Worrell is a 67 y.o. male who is being seen for metabolic encephalopathy.   The patient was admitted for atrial flutter with RVR and hypotension. He subsequently got a MERLYN and dCV by cardiology. He took a fall the night of hospital day #2 and has been slowly getting more lethargic and confused over the past 4 days. Per nursing and the patient's wife, the patient was lucid and laughing at admission and since the fall has not been getting better. A CT and MRI of the brain was done, which was unremarkable.'    17- he was noted to be very lethargic and confused yesterday. He had ABG showing acute hypercapnic respiratory failure and was transferred to CCU for BiPAP. He is also being evaluated for possible ITP per hematology and is on prednisone and has received 3 days of IVIG. Today, he briefly awakens. Nods yes to breathing okay. Will not try to talk over BiPAP. Followed commands to give thumbs up.       Review of Systems negative with exception of pertinent positives noted above  PHYSICAL EXAM     Visit Vitals    /69    Pulse (!) 121    Temp 97.5 °F (36.4 °C)    Resp 14    Ht 5' 6\" (1.676 m)    Wt 79.2 kg (174 lb 9.7 oz)    SpO2 97%    BMI 28.18 kg/m2      Temp (24hrs), Av.1 °F (36.2 °C), Min:96.2 °F (35.7 °C), Max:97.9 °F (36.6 °C)    Oxygen Therapy  O2 Sat (%): 97 % (17)  Pulse via Oximetry: 121 beats per minute (17)  O2 Device: BIPAP (17)  O2 Flow Rate (L/min): 2 l/min (17 0600)  FIO2 (%): 30 % (17)    Intake/Output Summary (Last 24 hours) at 17 0860  Last data filed at 17 1731   Gross per 24 hour   Intake              230 ml   Output              400 ml   Net             -170 ml General: No acute distress, BiPAP mask in place   Lungs:  Diminished breath sounds but clear. Heart:  Tachycardic  Abdomen: Soft, Non distended, Non tender, Positive bowel sounds  Extremities: No cyanosis, clubbing or edema, changes consistent with chronic venous insufficiency  Neurologic:  Lethargic    Recent Results (from the past 24 hour(s))   GLUCOSE, POC    Collection Time: 08/29/17 11:10 AM   Result Value Ref Range    Glucose (POC) 138 (H) 65 - 100 mg/dL   BLOOD GAS, ARTERIAL    Collection Time: 08/29/17 12:00 PM   Result Value Ref Range    pH 7.30 (L) 7.35 - 7.45      PCO2 76 (H) 35 - 45 mmHg    PO2 148 (H) 80 - 105 mmHg    BICARBONATE 37 (H) 22 - 26 mmol/L    BASE EXCESS 7.9 (H) 0 - 3 mmol/L    TOTAL HEMOGLOBIN 12.7 11.7 - 15.0 GM/DL    O2 SAT 99 (H) 92 - 98.5 %    Arterial O2 Hgb 97.1 (H) 94 - 97 %    CARBOXYHEMOGLOBIN 1.1 0.5 - 1.5 %    METHEMOGLOBIN 0.5 0.0 - 1.5 %    DEOXYHEMOGLOBIN 1 0.0 - 5.0 %    SITE LR     ALLENS TEST POSITIVE      MODE NC     O2 FLOW 4.00 L/min    Respiratory comment: Dr. Sanya Varela at 8 29 2017 12 08 41 PM. Read back.     METABOLIC PANEL, BASIC    Collection Time: 08/29/17  1:48 PM   Result Value Ref Range    Sodium 128 (L) 136 - 145 mmol/L    Potassium 5.4 (H) 3.5 - 5.1 mmol/L    Chloride 88 (L) 98 - 107 mmol/L    CO2 35 (H) 21 - 32 mmol/L    Anion gap 5 (L) 7 - 16 mmol/L    Glucose 204 (H) 65 - 100 mg/dL    BUN 47 (H) 8 - 23 MG/DL    Creatinine 1.34 0.8 - 1.5 MG/DL    GFR est AA >60 >60 ml/min/1.73m2    GFR est non-AA 56 (L) >60 ml/min/1.73m2    Calcium 8.5 8.3 - 10.4 MG/DL   VITAMIN B12    Collection Time: 08/29/17  1:48 PM   Result Value Ref Range    Vitamin B12 487 193 - 986 pg/mL   TSH 3RD GENERATION    Collection Time: 08/29/17  1:48 PM   Result Value Ref Range    TSH 0.566 0.358 - 3.740 uIU/mL   MRSA SCREEN - PCR (NASAL)    Collection Time: 08/29/17  3:56 PM   Result Value Ref Range    Special Requests: NO SPECIAL REQUESTS      Culture result: (A)       MRSA target DNA is detected (presumptive positive for MRSA colonization). Culture result:        RESULTS VERIFIED, PHONED TO AND READ BACK BY  DAMARIS GARNICA RN ON 08/29/2017 AT 1851 WMR     GLUCOSE, POC    Collection Time: 08/29/17  5:16 PM   Result Value Ref Range    Glucose (POC) 173 (H) 65 - 100 mg/dL   PLEASE READ & DOCUMENT PPD TEST IN 72 HRS    Collection Time: 08/29/17  5:36 PM   Result Value Ref Range    PPD negative Negative    mm Induration 0 mm   BLOOD GAS, ARTERIAL    Collection Time: 08/29/17  5:42 PM   Result Value Ref Range    pH 7.37 7.35 - 7.45      PCO2 65 (H) 35 - 45 mmHg    PO2 68 (L) 80 - 105 mmHg    BICARBONATE 37 (H) 22 - 26 mmol/L    BASE EXCESS 9.4 (H) 0 - 3 mmol/L    TOTAL HEMOGLOBIN 13.5 11.7 - 15.0 GM/DL    O2 SAT 94 92 - 98.5 %    Arterial O2 Hgb 92.1 (L) 94 - 97 %    CARBOXYHEMOGLOBIN 1.2 0.5 - 1.5 %    METHEMOGLOBIN 0.4 0.0 - 1.5 %    DEOXYHEMOGLOBIN 6 (H) 0.0 - 5.0 %    SITE LR     ALLENS TEST POSITIVE      MODE BIPAP14 8     FIO2 25.0 %    Respiratory comment: Dr. Dylan Vizcarra at 8 29 2017 5 52 29 PM. Read back.     GLUCOSE, POC    Collection Time: 08/29/17 10:14 PM   Result Value Ref Range    Glucose (POC) 175 (H) 65 - 100 mg/dL   PROTHROMBIN TIME + INR    Collection Time: 08/30/17  5:23 AM   Result Value Ref Range    Prothrombin time 35.4 (H) 9.6 - 12.0 sec    INR 3.2 (H) 0.9 - 1.2     METABOLIC PANEL, BASIC    Collection Time: 08/30/17  5:23 AM   Result Value Ref Range    Sodium 130 (L) 136 - 145 mmol/L    Potassium 5.4 (H) 3.5 - 5.1 mmol/L    Chloride 91 (L) 98 - 107 mmol/L    CO2 36 (H) 21 - 32 mmol/L    Anion gap 3 (L) 7 - 16 mmol/L    Glucose 121 (H) 65 - 100 mg/dL    BUN 46 (H) 8 - 23 MG/DL    Creatinine 1.26 0.8 - 1.5 MG/DL    GFR est AA >60 >60 ml/min/1.73m2    GFR est non-AA 60 (L) >60 ml/min/1.73m2    Calcium 8.8 8.3 - 10.4 MG/DL   CBC W/O DIFF    Collection Time: 08/30/17  5:23 AM   Result Value Ref Range    WBC 5.3 4.3 - 11.1 K/uL    RBC 4.40 4.23 - 5.67 M/uL    HGB 13.0 (L) 13.6 - 17.2 g/dL HCT 39.1 (L) 41.1 - 50.3 %    MCV 88.9 79.6 - 97.8 FL    MCH 29.5 26.1 - 32.9 PG    MCHC 33.2 31.4 - 35.0 g/dL    RDW 15.8 (H) 11.9 - 14.6 %    PLATELET 18 (LL) 677 - 450 K/uL    MPV Cannot be calculated 10.8 - 14.1 FL   BLOOD GAS, ARTERIAL    Collection Time: 08/30/17  7:38 AM   Result Value Ref Range    pH 7.32 (L) 7.35 - 7.45      PCO2 75 (H) 35 - 45 mmHg    PO2 95 80 - 105 mmHg    BICARBONATE 38 (H) 22 - 26 mmol/L    BASE EXCESS 8.7 (H) 0 - 3 mmol/L    TOTAL HEMOGLOBIN 13.3 11.7 - 15.0 GM/DL    O2 SAT 97 92 - 98.5 %    Arterial O2 Hgb 95.0 94 - 97 %    CARBOXYHEMOGLOBIN 1.3 0.5 - 1.5 %    METHEMOGLOBIN 0.4 0.0 - 1.5 %    DEOXYHEMOGLOBIN 3 0.0 - 5.0 %    SITE RR     ALLENS TEST POSITIVE      MODE NC     O2 FLOW 2.00 L/min    Respiratory comment: Tereso Sheffield at 8 30 2017 7 42 31 AM. Read back. Imaging:    XR CHEST SNGL V   Final Result   IMPRESSION: No new consolidation. XR CHEST PORT   Final Result   IMPRESSION: Cardiac silhouette remains enlarged. MRI BRAIN WO CONT   Final Result   IMPRESSION: Mild supratentorial microischemia      CT HEAD WO CONT   Final Result   Impression:    1. No acute intracranial process. US ABD COMP   Final Result   IMPRESSION:      1. No significant splenomegaly to account for reported thrombocytopenia. 2.  Technically limited evaluation of the gallbladder and biliary tree on this   nonfasting examination, with no definite abnormality identified.             XR CHEST PORT   Final Result            ASSESSMENT      Active Hospital Problems    Diagnosis Date Noted    Acute respiratory failure with hypercapnia ([address] 08/29/2017    History of tobacco use 08/29/2017    Hyponatremia 08/29/2017    Acute encephalopathy 08/29/2017    Thrombocytopenia (HCC) 08/26/2017    Atrial flutter with rapid ventricular response ([address] 08/23/2017    Ischemic cardiomyopathy 11/15/2016    History of mechanical aortic valve replacement 09/15/2016    Centrilobular emphysema ([facility] 09/15/2016    Dyslipidemia 04/16/2014    Anticoagulant long-term use 04/15/2014    HTN (hypertension), benign 04/15/2014    Peripheral vascular disease ([facility] 04/15/2014     Plan:  · AMS- likely multifactorial but acute hypercapnia most likely contributor. Check ammonia level. · Acute hypercapnic respiratory failure- Continue BiPAP per pulmonology. · Thrombocytopenia- platelets continue to drop. Management per heme/onc. · Hyponatremia- mild and stable. · Chronic systolic CHF- Entesto, carvedilol, lasix per cards (primary). · Atrial flutter- per cardiology. S/p MERLYN cardioversion but back in atrial flutter. Amio per cards. DVT Prophylaxis: Coumadin    Signed By: Abril Leija.  Malik Salter MD     August 30, 2017

## 2017-08-30 NOTE — PROGRESS NOTES
Pt pulled off bipap and is visably SOB, but refuses bipap and morphine. NC placed at 2L.   Pt shakes head to wanting a ventilator, but wife at bedside worried he's not in right mind

## 2017-08-30 NOTE — PROGRESS NOTES
[facility] CARDIOLOGY PROGRESS NOTE    8/30/2017 7:33 AM    Admit Date: 8/23/2017    Admit Diagnosis: Atrial flutter with rapid ventricular response (HCC)      Subjective:   Back in AF with mildly increased HR. Stable otherwise overnight without angina, CHF, or palpitations. No other complaints overnight. Tolerating meds well. Objective:      Vitals:    08/30/17 0530 08/30/17 0600 08/30/17 0638 08/30/17 0659   BP: 154/79 144/83 135/71 139/87   Pulse: (!) 119 (!) 116 (!) 113 (!) 113   Resp: 17 18 22 20   Temp:       SpO2: 95% 95% 94% 96%   Weight:       Height:           Physical Exam:  Neck- supple, no JVD at 60 deg  CV- irregular rate and rhythm no MRG  Lung- clear bilaterally anteriorly, decreased bibasilar  Abd- soft, nontender, nondistended  Ext- no edema  Skin- warm and dry    Data Review:   Recent Labs      08/30/17   0523  08/29/17   1348  08/29/17   0739   NA  130*  128*   --    K  5.4*  5.4*   --    BUN  46*  47*   --    CREA  1.26  1.34   --    GLU  121*  204*   --    WBC  5.3   --   4.0*   HGB  13.0*   --   12.1*   HCT  39.1*   --   36.4*   PLT  18*   --   50*   INR  3.2*   --   2.3*       Assessment and Plan:     Principal Problem:    Atrial flutter with rapid ventricular response (HCC) (8/23/2017)- s/p MERLYN/cardioversion, but now back in AF with 's now- INR 3.2 on warfarin 5mg QHS.    Increase amiodarone, decrease coumadin to 2.5mg QHS today, recheck INR in AM.      Active Problems:    Anticoagulant long-term use (4/15/2014)       HTN (hypertension), benign- stable, continue meds, titrate as needed          Peripheral vascular disease ([facility] (4/15/2014)       Dyslipidemia- stable, continue meds       History of mechanical aortic valve replacement - see above, decrease warfarin, recheck INR in AM       Chronic bronchitis (HCC)-coarse, continue meds       Ischemic cardiomyopathy (11/15/2016)       Atrial flutter with rapid ventricular response ([facility] (8/23/2017)       [facility] (8/26/2017)- recheck in AM, per heme, no spontaneous bleeding at present-  getting infusion per Heme    Decrease coumadin to 2mg QHS on higher amio dose  Increase amio to 400mg bid today  Increase coreg to 25mg BID today  Recheck labs in AM       KRISTEL Masters MD  [facility] Cardiology  Pager [phone]

## 2017-08-30 NOTE — PROGRESS NOTES
Daily Progress Note -- Hematology/ Medical Oncology        Patient Name: Jas Mills    Date of Visit: 2017  : 1945  Age:72 y.o. Initial consult HPI:   He has a history of mechanical AVR in . He continues on chronic anticoagulation with coumadin, chronic systolic CHF/ICM EF 27%, COPD, PVD, DM II, HTN, iron deficiency, and dyslipidemia who developed worsening dyspnea at the beginning of the week. He presented to the ER and was found to be in atrial flutter with RVR now status post cardioversion and amiodarone. Of note, his platelets were 21,052 on admission and 30,000 today. No other lab comparisons here but can see through care everywhere that his platelets were 52,706 at [facility] about a year ago. He has no recollection of ever being told he has low platelets. He takes iron daily for his history of PRATIK and reports black stools due to this. Hgb on admission was 10.2 and 12.7 today. We have been consulted for his thrombocytopenia    Interval history:  A-fib on cardizem. Plts 18,000 today - status post IVIG x 3.    Transferred to ICU for hypercapnic resp failure requiring BiPAP  BMBx unable to be performed yesterday due to patient's declining condition      Medications:   Current Facility-Administered Medications   Medication Dose Route Frequency Provider Last Rate Last Dose    mupirocin (BACTROBAN) 2 % ointment   Both Nostrils BID Sabas Buck MD        warfarin (COUMADIN) tablet 2 mg  2 mg Oral QHS Lyle Mei MD        carvedilol (COREG) tablet 25 mg  25 mg Oral BID WITH MEALS Lyle Mei MD   Stopped at 17 0800    amiodarone (CORDARONE) 450 mg in dextrose 5% 250 mL infusion  0.5-1 mg/min IntraVENous TITRATE Lyle Mei MD 33.3 mL/hr at 17 0959 1 mg/min at 17 0959    methylPREDNISolone (PF) (SOLU-MEDROL) injection 60 mg  60 mg IntraVENous DAILY Eliel Dacosta NP        0.9% sodium chloride infusion 250 mL  250 mL IntraVENous PRN Estrella Rivera, NISREEN        famotidine (PF) (PEPCID) 20 mg in sodium chloride 0.9 % 10 mL injection  20 mg IntraVENous Q12H Fransisca Valladares MD        furosemide (LASIX) injection 40 mg  40 mg IntraVENous DAILY Fransisca Valladares MD        Read PPD  1 Each Other Q24H Ynes Daily MD   1 Each at 08/29/17 1800    sacubitril-valsartan (ENTRESTO) 24-26 mg tablet 1 Tab  1 Tab Oral Q12H Sandra Cherry MD   Stopped at 08/30/17 0900    dextrose (D50W) injection syrg 25 g  25 g IntraVENous PRN Sandra Cherry MD   25 g at 08/25/17 1618    ferrous sulfate tablet 325 mg  1 Tab Oral DAILY WITH BREAKFAST Tre Gentile NP   Stopped at 08/30/17 0800    acetaminophen (TYLENOL) tablet 650 mg  650 mg Oral Q6H PRN Sandra Cherry MD   650 mg at 08/29/17 0526    gabapentin (NEURONTIN) capsule 600 mg  600 mg Oral TID Ynes Daily MD   600 mg at 08/30/17 0508    albuterol (PROVENTIL HFA, VENTOLIN HFA, PROAIR HFA) inhaler 2 Puff  2 Puff Inhalation Q4H PRN Ynes Daily MD        tiotropium (SPIRIVA) inhalation capsule 18 mcg  1 Cap Inhalation DAILY Ynes Daily MD   Stopped at 08/30/17 0900    sodium chloride (NS) flush 5-10 mL  5-10 mL IntraVENous Freedom Oviedo MD   10 mL at 08/30/17 0507    sodium chloride (NS) flush 5-10 mL  5-10 mL IntraVENous PRN Ynes Daily MD        nitroglycerin (NITROSTAT) tablet 0.4 mg  0.4 mg SubLINGual Q5MIN PRN Ynes Daily MD        morphine injection 2 mg  2 mg IntraVENous Q4H PRN Ynes Daily MD   2 mg at 08/30/17 0744    ondansetron (ZOFRAN) injection 4 mg  4 mg IntraVENous Q4H PRN Ynes Daily MD        insulin lispro (HUMALOG) injection   SubCUTAneous AC&HS Ynes Daily MD   Stopped at 08/30/17 0730    budesonide (PULMICORT) 500 mcg/2 ml nebulizer suspension  500 mcg Nebulization BID RT Ynes Daily MD   500 mcg at 08/30/17 0809    And    albuterol CONCENTRATE 2.5mg/0.5 mL neb soln  2.5 mg Nebulization Q6H RT Sarbjit A MD Nhan   2.5 mg at 08/30/17 1308       Allergies: Allergies   Allergen Reactions    Levaquin [Levofloxacin] Other (comments)     GI upset    Metformin Other (comments)     Gi upset       Review of Systems: The Review of Systems is documented in full in the internal medical record. All systems are negative other than for those noted above. Physical Examination:  General Appearance: Mildly ill appearing patient in no acute distress. Vital signs:   Visit Vitals    BP (!) 88/67    Pulse (!) 108    Temp 97.5 °F (36.4 °C)    Resp 19    Ht 5' 6\" (1.676 m)    Wt 174 lb 9.7 oz (79.2 kg)    SpO2 94%    BMI 28.18 kg/m2     HEENT: No oral or pharyngeal masses. There is no ulceration or thrush. Lymph nodes: There is no cervical, supraclavicular, axillary or inguinal adenopathy. Lungs: Scattered crackles bilaterally. On BiPAP. Heart: There is no jugular venous distention. Irregular, irregular. Tachycardic. Abdomen: Soft, non-tender, bowel sounds present and normal, no appreciated hepatosplenomegaly. No palpable masses. Skin: No rash, petechiae or ecchymoses. No evidence of malignancy. Neuro: Lethargic today. .     Labs/Imaging:  Lab Results   Component Value Date/Time    WBC 5.3 08/30/2017 05:23 AM    HGB 13.0 08/30/2017 05:23 AM    HCT 39.1 08/30/2017 05:23 AM    PLATELET 18 12/38/4509 05:23 AM    MCV 88.9 08/30/2017 05:23 AM       Lab Results   Component Value Date/Time    Sodium 130 08/30/2017 05:23 AM    Potassium 5.4 08/30/2017 05:23 AM    Chloride 91 08/30/2017 05:23 AM    CO2 36 08/30/2017 05:23 AM    Anion gap 3 08/30/2017 05:23 AM    Glucose 121 08/30/2017 05:23 AM    BUN 46 08/30/2017 05:23 AM    Creatinine 1.26 08/30/2017 05:23 AM    GFR est AA >60 08/30/2017 05:23 AM    GFR est non-AA 60 08/30/2017 05:23 AM    Calcium 8.8 08/30/2017 05:23 AM    AST (SGOT) 25 08/23/2017 01:05 PM    Alk.  phosphatase 86 08/23/2017 01:05 PM    Protein, total 7.4 08/23/2017 01:05 PM    Albumin 3.6 08/23/2017 01:05 PM    Globulin 3.8 08/23/2017 01:05 PM    A-G Ratio 0.9 08/23/2017 01:05 PM    ALT (SGPT) 30 08/23/2017 01:05 PM           ASSESSMENT:  This gentleman has a thrombocytopenia which is isolated (normal RBC and WBC) that has been present for at least one year. The most likely diagnosis is ITP, valvular consumption and/or medications. The level of platelet count in and of itself is not concerning. It is the requirement for warfarin given his mechanical valve which creates difficulties when combined with this thrombocytopenia. His risk for bleeding is substantially increased especially with platelet counts below 50,000. Dr. Maria L Hussein was unable to perform bedside BMbx yesterday due to the decline in patient's condition. He developed hypercapnic respiratiory failure requiring him to be placed on BiPAP. CXR showed no new consolidation. Platelets down to 81,536 today s/p IVIG x 3 and steroids. Unable to swallow oral prednisone today. PLAN:  Change oral prednisone to solumedrol IV. Monitor blood sugars closely - may need sliding scale. Transfuse 1 unit platelets. Check platelet level BID and transfuse with platelets as needed. Will defer to cardiology if patient needs to remain on Coumadin. If he needs to remain on Coumadin, then transfuse platelets prn to keep plt >50,000. If Coumadin is DC'd, then will only need to transfuse to keep platelets >69I unless he has active bleeding. Eliel Dacosta NP  [facility] Hematology & Oncology  [address] [place] [address]  P [phone]      Attending Addendum:  I personally evaluated the patient with Eliel Dacosta N.P.,  and agree with the assessment, findings and plan as documented. Appears stable, heart regular without murmur, lungs clear, abdomen benign. Continue steroids. Thrombocytopenia worse likely related to current clinical decline. BM biopsy could not be performed.  Clarify w cardiology need for continued a/c given significant thrombocytopenia. Transfuse 1 unit plt today.                Samantha Infante MD  [address]  Office : [phone]  Fax : [phone]

## 2017-08-31 ENCOUNTER — APPOINTMENT (OUTPATIENT)
Dept: GENERAL RADIOLOGY | Age: 72
DRG: 308 | End: 2017-08-31
Attending: INTERNAL MEDICINE
Payer: MEDICARE

## 2017-08-31 LAB
ANION GAP SERPL CALC-SCNC: 3 MMOL/L (ref 7–16)
ARTERIAL PATENCY WRIST A: POSITIVE
BASE EXCESS BLDA CALC-SCNC: 11.9 MMOL/L (ref 0–3)
BASOPHILS # BLD: 0 K/UL (ref 0–0.2)
BASOPHILS NFR BLD: 0 % (ref 0–2)
BDY SITE: ABNORMAL
BUN SERPL-MCNC: 58 MG/DL (ref 8–23)
CALCIUM SERPL-MCNC: 9 MG/DL (ref 8.3–10.4)
CHLORIDE SERPL-SCNC: 89 MMOL/L (ref 98–107)
CO2 SERPL-SCNC: 39 MMOL/L (ref 21–32)
COHGB MFR BLD: 1.8 % (ref 0.5–1.5)
CREAT SERPL-MCNC: 1.42 MG/DL (ref 0.8–1.5)
DIFFERENTIAL METHOD BLD: ABNORMAL
DO-HGB BLD-MCNC: 1 % (ref 0–5)
EOSINOPHIL # BLD: 0 K/UL (ref 0–0.8)
EOSINOPHIL NFR BLD: 0 % (ref 0.5–7.8)
ERYTHROCYTE [DISTWIDTH] IN BLOOD BY AUTOMATED COUNT: 15.8 % (ref 11.9–14.6)
FIO2 ON VENT: 32 %
GAS FLOW.O2 O2 DELIVERY SYS: 3 L/MIN
GLUCOSE BLD STRIP.AUTO-MCNC: 134 MG/DL (ref 65–100)
GLUCOSE BLD STRIP.AUTO-MCNC: 151 MG/DL (ref 65–100)
GLUCOSE BLD STRIP.AUTO-MCNC: 169 MG/DL (ref 65–100)
GLUCOSE BLD STRIP.AUTO-MCNC: 172 MG/DL (ref 65–100)
GLUCOSE SERPL-MCNC: 154 MG/DL (ref 65–100)
HCO3 BLDA-SCNC: 41 MMOL/L (ref 22–26)
HCT VFR BLD AUTO: 36.3 % (ref 41.1–50.3)
HGB BLD-MCNC: 11.9 G/DL (ref 13.6–17.2)
HGB BLDMV-MCNC: 11.9 GM/DL (ref 11.7–15)
IMM GRANULOCYTES # BLD: 0 K/UL (ref 0–0.5)
IMM GRANULOCYTES NFR BLD: 0.2 % (ref 0–5)
INR PPP: 2.9 (ref 0.9–1.2)
LYMPHOCYTES # BLD: 0.3 K/UL (ref 0.5–4.6)
LYMPHOCYTES NFR BLD: 5 % (ref 13–44)
MAGNESIUM SERPL-MCNC: 2.7 MG/DL (ref 1.8–2.4)
MCH RBC QN AUTO: 29 PG (ref 26.1–32.9)
MCHC RBC AUTO-ENTMCNC: 32.7 G/DL (ref 31.4–35)
MCV RBC AUTO: 88.5 FL (ref 79.6–97.8)
METHGB MFR BLD: 0.3 % (ref 0–1.5)
MONOCYTES # BLD: 0.3 K/UL (ref 0.1–1.3)
MONOCYTES NFR BLD: 7 % (ref 4–12)
NEUTS SEG # BLD: 4.5 K/UL (ref 1.7–8.2)
NEUTS SEG NFR BLD: 88 % (ref 43–78)
OXYHGB MFR BLDA: 97.2 % (ref 94–97)
PCO2 BLDA: 82 MMHG (ref 35–45)
PH BLDA: 7.32 [PH] (ref 7.35–7.45)
PLATELET # BLD AUTO: 208 K/UL (ref 150–450)
PLATELET # BLD AUTO: 208 K/UL (ref 150–450)
PMV BLD AUTO: 10.8 FL (ref 10.8–14.1)
PO2 BLDA: 187 MMHG (ref 80–105)
POTASSIUM SERPL-SCNC: 5.5 MMOL/L (ref 3.5–5.1)
PROTHROMBIN TIME: 31.4 SEC (ref 9.6–12)
RBC # BLD AUTO: 4.1 M/UL (ref 4.23–5.67)
SAO2 % BLD: 99 % (ref 92–98.5)
SERVICE CMNT-IMP: ABNORMAL
SODIUM SERPL-SCNC: 131 MMOL/L (ref 136–145)
VENTILATION MODE VENT: ABNORMAL
WBC # BLD AUTO: 5.6 K/UL (ref 4.3–11.1)

## 2017-08-31 PROCEDURE — 74011636637 HC RX REV CODE- 636/637: Performed by: INTERNAL MEDICINE

## 2017-08-31 PROCEDURE — 74011000258 HC RX REV CODE- 258: Performed by: INTERNAL MEDICINE

## 2017-08-31 PROCEDURE — 74011250636 HC RX REV CODE- 250/636: Performed by: NURSE PRACTITIONER

## 2017-08-31 PROCEDURE — 77030018798 HC PMP KT ENTRL FED COVD -A

## 2017-08-31 PROCEDURE — 74011250637 HC RX REV CODE- 250/637: Performed by: INTERNAL MEDICINE

## 2017-08-31 PROCEDURE — 74011000250 HC RX REV CODE- 250: Performed by: HOSPITALIST

## 2017-08-31 PROCEDURE — 74011250636 HC RX REV CODE- 250/636: Performed by: INTERNAL MEDICINE

## 2017-08-31 PROCEDURE — 94660 CPAP INITIATION&MGMT: CPT

## 2017-08-31 PROCEDURE — 74011250636 HC RX REV CODE- 250/636: Performed by: HOSPITALIST

## 2017-08-31 PROCEDURE — 82962 GLUCOSE BLOOD TEST: CPT

## 2017-08-31 PROCEDURE — 36600 WITHDRAWAL OF ARTERIAL BLOOD: CPT

## 2017-08-31 PROCEDURE — 65620000000 HC RM CCU GENERAL

## 2017-08-31 PROCEDURE — 85049 AUTOMATED PLATELET COUNT: CPT | Performed by: INTERNAL MEDICINE

## 2017-08-31 PROCEDURE — 77030004950 HC CATH ENTRL NG COVD -A

## 2017-08-31 PROCEDURE — 85610 PROTHROMBIN TIME: CPT | Performed by: PHYSICIAN ASSISTANT

## 2017-08-31 PROCEDURE — 80048 BASIC METABOLIC PNL TOTAL CA: CPT | Performed by: PHYSICIAN ASSISTANT

## 2017-08-31 PROCEDURE — 99233 SBSQ HOSP IP/OBS HIGH 50: CPT | Performed by: INTERNAL MEDICINE

## 2017-08-31 PROCEDURE — 74011000250 HC RX REV CODE- 250: Performed by: INTERNAL MEDICINE

## 2017-08-31 PROCEDURE — 74000 XR ABD (KUB): CPT

## 2017-08-31 PROCEDURE — 36415 COLL VENOUS BLD VENIPUNCTURE: CPT | Performed by: INTERNAL MEDICINE

## 2017-08-31 PROCEDURE — 82803 BLOOD GASES ANY COMBINATION: CPT

## 2017-08-31 PROCEDURE — 83735 ASSAY OF MAGNESIUM: CPT | Performed by: PHYSICIAN ASSISTANT

## 2017-08-31 PROCEDURE — 92526 ORAL FUNCTION THERAPY: CPT

## 2017-08-31 PROCEDURE — 85025 COMPLETE CBC W/AUTO DIFF WBC: CPT | Performed by: PHYSICIAN ASSISTANT

## 2017-08-31 PROCEDURE — 94640 AIRWAY INHALATION TREATMENT: CPT

## 2017-08-31 PROCEDURE — 77010033678 HC OXYGEN DAILY

## 2017-08-31 RX ADMIN — MORPHINE SULFATE 2 MG: 2 INJECTION, SOLUTION INTRAMUSCULAR; INTRAVENOUS at 01:22

## 2017-08-31 RX ADMIN — ALBUTEROL SULFATE 2.5 MG: 2.5 SOLUTION RESPIRATORY (INHALATION) at 02:17

## 2017-08-31 RX ADMIN — ALBUTEROL SULFATE 2.5 MG: 2.5 SOLUTION RESPIRATORY (INHALATION) at 07:40

## 2017-08-31 RX ADMIN — GABAPENTIN 600 MG: 300 CAPSULE ORAL at 22:50

## 2017-08-31 RX ADMIN — Medication 10 ML: at 23:03

## 2017-08-31 RX ADMIN — BUDESONIDE 500 MCG: 0.5 INHALANT RESPIRATORY (INHALATION) at 20:27

## 2017-08-31 RX ADMIN — INSULIN LISPRO 2 UNITS: 100 INJECTION, SOLUTION INTRAVENOUS; SUBCUTANEOUS at 17:03

## 2017-08-31 RX ADMIN — SODIUM CHLORIDE, PRESERVATIVE FREE 600 UNITS: 5 INJECTION INTRAVENOUS at 05:49

## 2017-08-31 RX ADMIN — BUDESONIDE 500 MCG: 0.5 INHALANT RESPIRATORY (INHALATION) at 07:41

## 2017-08-31 RX ADMIN — FAMOTIDINE 20 MG: 10 INJECTION, SOLUTION INTRAVENOUS at 08:59

## 2017-08-31 RX ADMIN — ALBUTEROL SULFATE 2.5 MG: 2.5 SOLUTION RESPIRATORY (INHALATION) at 20:27

## 2017-08-31 RX ADMIN — MUPIROCIN: 20 OINTMENT TOPICAL at 17:09

## 2017-08-31 RX ADMIN — WARFARIN SODIUM 2 MG: 1 TABLET ORAL at 22:50

## 2017-08-31 RX ADMIN — AMIODARONE HYDROCHLORIDE 0.5 MG/MIN: 50 INJECTION, SOLUTION INTRAVENOUS at 11:52

## 2017-08-31 RX ADMIN — SACUBITRIL AND VALSARTAN 1 TABLET: 24; 26 TABLET, FILM COATED ORAL at 22:50

## 2017-08-31 RX ADMIN — SODIUM CHLORIDE 10 MG/HR: 900 INJECTION, SOLUTION INTRAVENOUS at 09:13

## 2017-08-31 RX ADMIN — SODIUM CHLORIDE, PRESERVATIVE FREE 600 UNITS: 5 INJECTION INTRAVENOUS at 22:50

## 2017-08-31 RX ADMIN — Medication 20 ML: at 15:36

## 2017-08-31 RX ADMIN — Medication 10 ML: at 05:49

## 2017-08-31 RX ADMIN — Medication 20 ML: at 22:50

## 2017-08-31 RX ADMIN — SODIUM CHLORIDE, PRESERVATIVE FREE 600 UNITS: 5 INJECTION INTRAVENOUS at 15:35

## 2017-08-31 RX ADMIN — Medication 20 ML: at 05:49

## 2017-08-31 RX ADMIN — MUPIROCIN: 20 OINTMENT TOPICAL at 09:07

## 2017-08-31 RX ADMIN — METHYLPREDNISOLONE SODIUM SUCCINATE 60 MG: 125 INJECTION, POWDER, FOR SOLUTION INTRAMUSCULAR; INTRAVENOUS at 09:00

## 2017-08-31 RX ADMIN — GABAPENTIN 600 MG: 300 CAPSULE ORAL at 05:48

## 2017-08-31 RX ADMIN — INSULIN LISPRO 2 UNITS: 100 INJECTION, SOLUTION INTRAVENOUS; SUBCUTANEOUS at 08:54

## 2017-08-31 RX ADMIN — ALBUTEROL SULFATE 2.5 MG: 2.5 SOLUTION RESPIRATORY (INHALATION) at 14:17

## 2017-08-31 RX ADMIN — INSULIN LISPRO 2 UNITS: 100 INJECTION, SOLUTION INTRAVENOUS; SUBCUTANEOUS at 23:09

## 2017-08-31 RX ADMIN — CARVEDILOL 25 MG: 25 TABLET, FILM COATED ORAL at 17:03

## 2017-08-31 RX ADMIN — CEFTRIAXONE 1 G: 1 INJECTION, POWDER, FOR SOLUTION INTRAMUSCULAR; INTRAVENOUS at 11:45

## 2017-08-31 RX ADMIN — FUROSEMIDE 40 MG: 10 INJECTION, SOLUTION INTRAMUSCULAR; INTRAVENOUS at 09:00

## 2017-08-31 NOTE — PROGRESS NOTES
Attempted to wean diltiazem gtt from 10mg/hr to 7.5mg/hr. Pts heartrate 58-59 at 10mg/hr.   When decreased to 7.5, pts heartrate increased to 115bpm.  gttrate increased back to 10mg/hr

## 2017-08-31 NOTE — PROGRESS NOTES
Daily Progress Note -- Hematology/ Medical Oncology        Patient Name: Selena Ortiz    Date of Visit: 2017  : 1945  Age:72 y.o. Initial consult HPI:   He has a history of mechanical AVR in . He continues on chronic anticoagulation with coumadin, chronic systolic CHF/ICM EF 58%, COPD, PVD, DM II, HTN, iron deficiency, and dyslipidemia who developed worsening dyspnea at the beginning of the week. He presented to the ER and was found to be in atrial flutter with RVR now status post cardioversion and amiodarone. Of note, his platelets were 26,548 on admission and 30,000 today. No other lab comparisons here but can see through care everywhere that his platelets were 74,432 at [facility] about a year ago. He has no recollection of ever being told he has low platelets. He takes iron daily for his history of PRATIK and reports black stools due to this. Hgb on admission was 10.2 and 12.7 today. We have been consulted for his thrombocytopenia    Interval history:  A-fib on cardizem. Plts supposedly 18,000 yesterday - status post IVIG x 3, steroids. One unit platelets given and today 208,000. Transferred to ICU for hypercapnic resp failure requiring BiPAP. Just de-escalated to high flow. BMBx unable to be performed yesterday due to patient's condition. Medications:   Current Facility-Administered Medications   Medication Dose Route Frequency Provider Last Rate Last Dose    cefTRIAXone (ROCEPHIN) 1 g in 0.9% sodium chloride (MBP/ADV) 50 mL  1 g IntraVENous Q24H Brook Luz.  Susanne Can  mL/hr at 17 1145 1 g at 17 1145    [START ON 2017] famotidine (PF) (PEPCID) 20 mg in sodium chloride 0.9 % 10 mL injection  20 mg IntraVENous DAILY Erwin Breen MD        mupirocin (BACTROBAN) 2 % ointment   Both Nostrils BID Nathalia Valencia MD        warfarin (COUMADIN) tablet 2 mg  2 mg Oral QHS Giuseppe Bosch MD   2 mg at 17 2200    carvedilol (COREG) tablet 25 mg  25 mg Oral BID WITH MEALS Willian Garnica MD   Stopped at 08/30/17 0800    amiodarone (CORDARONE) 450 mg in dextrose 5% 250 mL infusion  0.5-1 mg/min IntraVENous TITRATE Willian Garnica MD 16.7 mL/hr at 08/31/17 1152 0.5 mg/min at 08/31/17 1152    methylPREDNISolone (PF) (SOLU-MEDROL) injection 60 mg  60 mg IntraVENous DAILY Thien Bauer NP   60 mg at 08/31/17 0900    0.9% sodium chloride infusion 250 mL  250 mL IntraVENous PRN Thien Bauer NP        furosemide (LASIX) injection 40 mg  40 mg IntraVENous DAILY Eulogio Mark MD   40 mg at 08/31/17 0900    dilTIAZem (CARDIZEM) 100 mg in 0.9% sodium chloride (MBP/ADV) 100 mL infusion  0-15 mg/hr IntraVENous TITRATE Willian Garnica MD 10 mL/hr at 08/31/17 0955 10 mg/hr at 08/31/17 0955    acetaminophen (TYLENOL) suppository 650 mg  650 mg Rectal Q4H PRN Howard Alves MD   650 mg at 08/30/17 1555    sodium chloride (NS) flush 20 mL  20 mL InterCATHeter Chula Russo MD   20 mL at 08/31/17 0549    heparin (porcine) pf 600 Units  600 Units InterCATHeter Q8H Corene Spurling, MD   600 Units at 08/31/17 0549    sodium chloride (NS) flush 20 mL  20 mL InterCATHeter PRN Corene Spurling, MD        heparin (porcine) pf 600 Units  600 Units InterCATHeter PRN Corene Spurling, MD        albuterol (PROVENTIL VENTOLIN) nebulizer solution 2.5 mg  2.5 mg Nebulization Q4H PRN Iván Del Real MD        sacubitril-valsartan (ENTRESTO) 24-26 mg tablet 1 Tab  1 Tab Oral Q12H Amador Buitrago MD   Stopped at 08/31/17 0900    dextrose (D50W) injection syrg 25 g  25 g IntraVENous PRN Amador Buitrago MD   25 g at 08/25/17 1618    ferrous sulfate tablet 325 mg  1 Tab Oral DAILY WITH BREAKFAST Parish Jimenez NP   Stopped at 08/30/17 0800    acetaminophen (TYLENOL) tablet 650 mg  650 mg Oral Q6H PRN Amador Buitrago MD   650 mg at 08/29/17 0526    gabapentin (NEURONTIN) capsule 600 mg  600 mg Oral TID Misael Christopher MD   600 mg at 08/31/17 0548    albuterol (PROVENTIL HFA, VENTOLIN HFA, PROAIR HFA) inhaler 2 Puff  2 Puff Inhalation Q4H PRN Farshad Rowley MD   2 Puff at 08/30/17 1724    tiotropium (SPIRIVA) inhalation capsule 18 mcg  1 Cap Inhalation DAILY Farshad Rowley MD   Stopped at 08/30/17 0900    sodium chloride (NS) flush 5-10 mL  5-10 mL IntraVENous Monie Toribio MD   10 mL at 08/31/17 0549    sodium chloride (NS) flush 5-10 mL  5-10 mL IntraVENous PRN Farshad Rowley MD        nitroglycerin (NITROSTAT) tablet 0.4 mg  0.4 mg SubLINGual Q5MIN PRN Farshad Rowley MD        morphine injection 2 mg  2 mg IntraVENous Q4H PRN Farshad Rowley MD   2 mg at 08/31/17 0122    ondansetron (ZOFRAN) injection 4 mg  4 mg IntraVENous Q4H PRN Farshad Rowley MD        insulin lispro (HUMALOG) injection   SubCUTAneous AC&HS Farshad Rowley MD   Stopped at 08/31/17 1130    budesonide (PULMICORT) 500 mcg/2 ml nebulizer suspension  500 mcg Nebulization BID RT Farshad Rowley MD   500 mcg at 08/31/17 0741    And    albuterol CONCENTRATE 2.5mg/0.5 mL neb soln  2.5 mg Nebulization Q6H RT Farshad Rowley MD   2.5 mg at 08/31/17 0740       Allergies: Allergies   Allergen Reactions    Levaquin [Levofloxacin] Other (comments)     GI upset    Metformin Other (comments)     Gi upset       Review of Systems: The Review of Systems is documented in full in the internal medical record. All systems are negative other than for those noted above. Physical Examination:  General Appearance: Mildly ill appearing patient in no acute distress. Vital signs:   Visit Vitals    /86    Pulse 79    Temp 97 °F (36.1 °C)    Resp (!) 38    Ht 5' 6\" (1.676 m)    Wt 168 lb 14 oz (76.6 kg)    SpO2 97%    BMI 27.26 kg/m2     HEENT: No oral or pharyngeal masses. There is no ulceration or thrush. Lymph nodes: There is no cervical, supraclavicular, axillary adenopathy. Lungs: Scattered crackles bilaterally.  Off BiPAP, on hi-flow. Heart: There is no jugular venous distention. Regular, irregular. Abdomen: Soft, non-tender, bowel sounds present and normal, no appreciated hepatosplenomegaly. No palpable masses. Skin: No rash, petechiae or ecchymoses. No evidence of malignancy. Neuro: Lethargic today. .     Labs/Imaging:  Lab Results   Component Value Date/Time    WBC 5.6 08/31/2017 02:59 AM    HGB 11.9 08/31/2017 02:59 AM    HCT 36.3 08/31/2017 02:59 AM    PLATELET 968 76/73/1437 11:44 AM    MCV 88.5 08/31/2017 02:59 AM       Lab Results   Component Value Date/Time    Sodium 131 08/31/2017 02:59 AM    Potassium 5.5 08/31/2017 02:59 AM    Chloride 89 08/31/2017 02:59 AM    CO2 39 08/31/2017 02:59 AM    Anion gap 3 08/31/2017 02:59 AM    Glucose 154 08/31/2017 02:59 AM    BUN 58 08/31/2017 02:59 AM    Creatinine 1.42 08/31/2017 02:59 AM    GFR est AA >60 08/31/2017 02:59 AM    GFR est non-AA 52 08/31/2017 02:59 AM    Calcium 9.0 08/31/2017 02:59 AM    AST (SGOT) 25 08/23/2017 01:05 PM    Alk. phosphatase 86 08/23/2017 01:05 PM    Protein, total 7.4 08/23/2017 01:05 PM    Albumin 3.6 08/23/2017 01:05 PM    Globulin 3.8 08/23/2017 01:05 PM    A-G Ratio 0.9 08/23/2017 01:05 PM    ALT (SGPT) 30 08/23/2017 01:05 PM           ASSESSMENT:  This gentleman has a thrombocytopenia which is isolated (normal RBC and WBC) that has been present for at least one year. The most likely diagnosis is ITP, valvular consumption and/or medications. The level of platelet count in and of itself is not concerning. It is the requirement for warfarin given his mechanical valve which creates difficulties when combined with this thrombocytopenia. His risk for bleeding is substantially increased especially with platelet counts below 50,000. Dr. Wesley Tapia was unable to be performed at bedside BMbx due to the decline in patient's condition on Tuesday. He developed hypercapnic respiratiory failure requiring him to be placed on BiPAP.   Platelets were down to 18,000 yesterday so he was given one unit of platelets. Today 208,000 which seems like a big just, repeated platelet count was 672,874 again. Yesterday's level was likely a mistake. He is s/p IVIG x 3. Steroids switched to IV. Now off BiPAP and on hi-flow oxygen. PLAN:  Continue solumedrol IV. Monitor blood sugars closely - may need sliding scale. Check platelet level BID and transfuse with platelets as needed - keep above 50,000. Coumadin necessary due to mechanical heart valve. Continue daily CBC to monitor platelet count. BMBx can be done as outpatient or when out of ICU. [facility], [facility]REEN [facility] Hematology & Oncology  [address] [place] [address], [address] [place] [address]  P [phone]      Attending Addendum:  I personally evaluated the patient with Ana Moreno N.P.,  and agree with the assessment, findings and plan as documented. Appears stable, heart regular without murmur, lungs clear, abdomen benign. Continue steroids, Plt count significantly improved (more so than would be expected from a single unit plt transfusion).                Iram Martin MD  [address] [facility]  [address]  [address] [facility]  Office : [phone]  Fax : [phone]

## 2017-08-31 NOTE — PROGRESS NOTES
Physical Therapy Note:    Participated in interdisciplinary rounds including Diane, Wound Care, Palliative Care NP, RN staff, MD, Respiratory therapy, and Nutrition. Patient at this time is on BIPAP and not appropriate for PT. Will continue to participate in rounds to determine appropriateness of PT/OT.     Thank you,  Baldev Kim, PT, DPT

## 2017-08-31 NOTE — PROGRESS NOTES
Tube feeding started per Dr Claudy Posey at nutritionist recommendation. Tube feeding rate 55ml/hr Glucerna 1.2.   H20 flushes 20ml Qhr.

## 2017-08-31 NOTE — PROGRESS NOTES
LTG: Patient will tolerate least restrictive diet without overt signs or symptoms of airway compromise. STG: Patient will tolerate mechanical soft and thin liquids without overt signs or symptoms of airway compromise. STG: Patient will participate in modified barium swallow study as clinically indicated. Speech language pathology: bedside swallow note: Re-evaluation    NAME/AGE/GENDER: Jas Mills is a 67 y.o. male  DATE: 8/31/2017  PRIMARY DIAGNOSIS: Atrial flutter with rapid ventricular response ([facility] [address]       ICD-10: Treatment Diagnosis: R13.12 Oropharyngeal Dysphagia. INTERDISCIPLINARY COLLABORATION: Registered Nurse  PRECAUTIONS/ALLERGIES: Levaquin [levofloxacin] and Metformin ASSESSMENT:Patient's swallowing function re-assessed this date. Speech therapy previously followed patient during this admission. At last session on 8/29/17, NPO and modified barium swallow study were recommended. Unable to complete swallow study due to respiratory decline and transfer to CCU. Patient is currently on airvo and requesting po. He was presented with thin via tsp and cup, nectar by cup, honey by cup, and puree. O2 at 97% and respiratory rate at 35 prior to presentation of po trials. He exhibited wet vocal quality following thin liquid trials. Delayed throat clear with nectar thick liquids. Patient's work of breathing was noted to increase with throughout session, increasing to 66 during honey thick and puree trials. He complained of difficulty breathing, requesting to recline in bed. No additional trials were presented due to respiratory changes with po. Recommend  Continue NPO at this time. Modified barium swallow study is recommended to further evaluate swallow function. Results and recommendations communicated to patient, wife, and RN. Wife expressed concerns regarding nutrition with continued NPO status. ST to follow up with MD regarding recommendations.  Plan for modified barium swallow study to be completed on 9/1/17; however, patient must be off airvo in order to participate in evaluation. ?????? ? ? This section established at most recent assessment??????????  PROBLEM LIST (Impairments causing functional limitations):  1. Dysphagia  REHABILITATION POTENTIAL FOR STATED GOALS: Good  PLAN OF CARE:   Patient will benefit from skilled intervention to address the following impairments. RECOMMENDATIONS AND PLANNED INTERVENTIONS (Benefits and precautions of therapy have been discussed with the patient.):  · NPO  MEDICATIONS:  · Non-oral  COMPENSATORY STRATEGIES/MODIFICATIONS INCLUDING:  · Alternate liquids/solids  · Small sips and bites  OTHER RECOMMENDATIONS (including follow up treatment recommendations): · Family training/education  · Patient education  RECOMMENDED DIET MODIFICATIONS DISCUSSED WITH:  · Nursing  · Family  · Patient  FREQUENCY/DURATION: Continue to follow patient 3 times a week for duration of hospital stay to address above goals. RECOMMENDED REHABILITATION/EQUIPMENT: (at time of discharge pending progress):   Continue Skilled Therapy. SUBJECTIVE:   Alert, requesting po. Wife at bedside. History of Present Injury/Illness: Mr. Carter Roth  has a past medical history of Acute diastolic CHF (congestive heart failure) ([facility] [address] (9/15/2016); Arthritis; Chicken pox; Coronary artery disease (4/16/2014); DJD (degenerative joint disease); Emphysema; aortic valve replacement, mechanical; Hypercholesterolemia; Hypertension; Palpitations; Pneumonia; and Systolic CHF, acute ([facility] [address] (11/15/2016). Sultana Marks He also  has a past surgical history that includes aortic valve replacement (N/A, 2000) and heart catheterization (07/21/2004). Present Symptoms: Coughing with thin liquids   Pain Intensity 1: 0  Pain Location 1:  (headache)  Pain Intervention(s) 1: Medication (see MAR)  Current Medications:   No current facility-administered medications on file prior to encounter.       Current Outpatient Prescriptions on File Prior to Encounter   Medication Sig Dispense Refill    gabapentin (NEURONTIN) 300 mg capsule Take 600 mg by mouth three (3) times daily.  glipiZIDE (GLUCOTROL) 10 mg tablet Take 10 mg by mouth two (2) times a day.  furosemide (LASIX) 40 mg tablet Take 1 Tab by mouth daily. 30 Tab 1    potassium chloride (K-DUR, KLOR-CON) 10 mEq tablet Take 1 Tab by mouth daily. 30 Tab 1    budesonide-formoterol (SYMBICORT) 160-4.5 mcg/actuation HFA inhaler Take 2 Puffs by inhalation.  Omeprazole delayed release (PRILOSEC D/R) 20 mg tablet Take 20 mg by mouth.  flunisolide (NASAREL) 25 mcg (0.025 %) spry 2 Sprays by Nasal route.  albuterol (PROVENTIL HFA) 90 mcg/actuation inhaler Take 2 Puffs by inhalation.  tiotropium (SPIRIVA) 18 mcg inhalation capsule Take 1 Puff by inhalation.  warfarin (COUMADIN) 5 mg tablet Take one 5 mg tablet daily (except 1-1/2 tablet on Wednesday 90 Tab 1    losartan (COZAAR) 100 mg tablet Take 1 Tab by mouth daily. 90 Tab 1    Hydrochlorothiazide 12.5 mg tablet Take 12.5 mg by mouth daily. 90 Tab 1    simvastatin (ZOCOR) 80 mg tablet Take 1 Tab by mouth nightly. 90 Tab 1     Current Dietary Status:  Regular/thin      Social History/Home Situation:    Home Environment: Private residence  # Steps to Enter: 3  Rails to Enter: Yes  Hand Rails : Bilateral  One/Two Story Residence: One story  Living Alone: No  Support Systems: Spouse/Significant Other/Partner, Child(devyn)  Patient Expects to be Discharged to[de-identified] Private residence  Current DME Used/Available at Home: Mendy Chung, rollator  Tub or Shower Type: Tub/Shower combination  OBJECTIVE:   Respiratory Status:  Heated; Hi flow nasal cannula;Humidifier (placed patient on opti flow at 40L/40% per doc)  40 l/min  CXR Results:Postsurgical change without acute abnormality  MRI/CT Results:MRI pending  Oral Motor Structure/Speech:  Oral-Motor Structure/Motor Speech  Labial: Impaired coordination, Decreased rate  Dentition: Upper & lower dentures  Oral Hygiene: dry  Lingual: No impairment    Cognitive and Communication Status:  Neurologic State: Alert;Confused; Restless  Orientation Level: Disoriented to situation;Disoriented to place; Disoriented to time;Oriented to person  Cognition: (P) Follows commands;Poor safety awareness  Perception: Appears intact  Perseveration: No perseveration noted  Safety/Judgement: Decreased insight into deficits    BEDSIDE SWALLOW EVALUATION  Oral Assessment:  Oral Assessment  Labial: Impaired coordination;Decreased rate  Dentition: Upper & lower dentures  Oral Hygiene: dry  Lingual: No impairment  P.O. Trials:  Patient Position: upright in bed    The patient was given tsp-small bite amounts of the following:   Consistency Presented: Thin liquid; Nectar thick liquid;Honey thick liquid;Puree  How Presented: SLP-fed/presented;Spoon;Cup/sip    ORAL PHASE:  Bolus Acceptance: No impairment  Bolus Formation/Control: Impaired  Propulsion: Delayed (# of seconds)  Type of Impairment: Delayed  Oral Residue: None    PHARYNGEAL PHASE:  Initiation of Swallow: Delayed (# of seconds)  Laryngeal Elevation: Decreased;Weak  Aspiration Signs/Symptoms: Clear throat; Change vocal quality  Vocal Quality: No impairment           Pharyngeal Phase Characteristics: Poor endurance;Easily fatigued     OTHER OBSERVATIONS:  Rate/bite size: Impaired   Endurance:  Impaired   Comments:       Tool Used: Dysphagia Outcome and Severity Scale (KERRIE)    Score Comments   Normal Diet  [] 7 With no strategies or extra time needed   Functional Swallow  [] 6 May have mild oral or pharyngeal delay       Mild Dysphagia    [] 5 Which may require one diet consistency restricted (those who demonstrate penetration which is entirely cleared on MBS would be included)   Mild-Moderate Dysphagia  [x] 4 With 1-2 diet consistencies restricted       Moderate Dysphagia  [] 3 With 2 or more diet consistencies restricted       Moderately Severe Dysphagia  [] 2 With partial PO strategies (trials with ST only)       Severe Dysphagia  [] 1 With inability to tolerate any PO safely          Score:  Initial: 4 Most Recent: X (Date: -- )   Interpretation of Tool: The Dysphagia Outcome and Severity Scale (KERRIE) is a simple, easy-to-use, 7-point scale developed to systematically rate the functional severity of dysphagia based on objective assessment and make recommendations for diet level, independence level, and type of nutrition. Score 7 6 5 4 3 2 1   Modifier CH CI CJ CK CL CM CN   ? Swallowing:     - CURRENT STATUS: CM - 80%-99% impaired, limited or restricted    - GOAL STATUS:  CI - 1%-19% impaired, limited or restricted    - D/C STATUS:  ---------------To be determined---------------  [place] / [place] / Product Type: Medicare /     TREATMENT:    (In addition to Assessment/Re-Assessment sessions the following treatments were rendered)  Assessment/Reassessment only, no treatment provided today  MODALITIES:                                                                    ORAL MOTOR  EXERCISES:                                                                                                                                                                      LARYNGEAL / PHARYNGEAL EXERCISES:                                                                                                                                     __________________________________________________________________________________________________  Safety:   After treatment position/precautions:  · Upright in Bed    Progression/Medical Necessity:   · Patient is expected to demonstrate progress in swallow function, diet tolerance and swallow safety to improve swallow safety and work toward diet advancement. Compliance with Program/Exercises: Will assess as treatment progresses.    Reason for Continuation of Services/Other Comments:  · Patient continues to require skilled intervention due to dysphagia. Recommendations/Intent for next treatment session: \"Treatment next visit will focus on diet tolerance, assessment for upgrade, speech-language/cognitive assessment\".     Total Treatment Duration:  Time In: 1250  Time Out: 1313    [place] ABBEY Moise, CCC-SLP, CBIS

## 2017-08-31 NOTE — PROGRESS NOTES
Dobhoff tube placed for tube feeding. External renetta at 65cm. KUB reading suggested advancement of 10cm.   Tub e advanced to 75cm

## 2017-08-31 NOTE — PROGRESS NOTES
Patient pulled optiflow nasal cannula out of his nose and quickly desaturated into the 60s and became extremely cyanotic. BVM used immediately with 100% O2 and respiratory therapy called. Patient HR became bradycardic in 30s while hypoxic. O2 saturation quickly rebounded into high 90s and skin tone returned to normal.  Cardizem gtt stopped d/t persistent bradycardia in low 50s with occasional short pauses. Pt now responding appropriately but still lethargic. RT placed patient on BiPAP.

## 2017-08-31 NOTE — PROGRESS NOTES
8/31/2017 6:32 AM    Admit Date: 8/23/2017    Admit Diagnosis: Atrial flutter with rapid ventricular response (HCC)      Subjective:    Patient with BiPAP. Seems less confused. AF rate controlled.      Objective:      Visit Vitals    /80    Pulse 60    Temp 97.2 °F (36.2 °C)    Resp 17    Ht 5' 6\" (1.676 m)    Wt 76.6 kg (168 lb 14 oz)    SpO2 93%    BMI 27.26 kg/m2       ROS:  General ROS: negative for - chills  Hematological and Lymphatic ROS: negative for - blood clots or jaundice  Respiratory ROS: no cough, shortness of breath, or wheezing  Cardiovascular ROS: no chest pain or dyspnea on exertion  Gastrointestinal ROS: no abdominal pain, change in bowel habits, or black or bloody stools  Neurological ROS: no TIA or stroke symptoms    Physical Exam:    Physical Examination: General appearance - alert, well appearing, and in no distress  Mental status - alert, oriented to person, place, and time  Eyes - pupils equal and reactive, extraocular eye movements intact  Neck/lymph - supple, no significant adenopathy  Chest/CV - clear to auscultation, no wheezes, rales or rhonchi, symmetric air entry  Heart - normal rate, regular rhythm, normal S1, S2, no murmurs, rubs, clicks or gallops  Abdomen/GI - soft, nontender, nondistended, no masses or organomegaly  Musculoskeletal - no joint tenderness, deformity or swelling  Extremities - peripheral pulses normal, no pedal edema, no clubbing or cyanosis  Skin - normal coloration and turgor, no rashes, no suspicious skin lesions noted    Current Facility-Administered Medications   Medication Dose Route Frequency    mupirocin (BACTROBAN) 2 % ointment   Both Nostrils BID    warfarin (COUMADIN) tablet 2 mg  2 mg Oral QHS    carvedilol (COREG) tablet 25 mg  25 mg Oral BID WITH MEALS    amiodarone (CORDARONE) 450 mg in dextrose 5% 250 mL infusion  0.5-1 mg/min IntraVENous TITRATE    methylPREDNISolone (PF) (SOLU-MEDROL) injection 60 mg  60 mg IntraVENous DAILY    0.9% sodium chloride infusion 250 mL  250 mL IntraVENous PRN    famotidine (PF) (PEPCID) 20 mg in sodium chloride 0.9 % 10 mL injection  20 mg IntraVENous Q12H    furosemide (LASIX) injection 40 mg  40 mg IntraVENous DAILY    dilTIAZem (CARDIZEM) 100 mg in 0.9% sodium chloride (MBP/ADV) 100 mL infusion  0-15 mg/hr IntraVENous TITRATE    acetaminophen (TYLENOL) suppository 650 mg  650 mg Rectal Q4H PRN    sodium chloride (NS) flush 20 mL  20 mL InterCATHeter Q8H    heparin (porcine) pf 600 Units  600 Units InterCATHeter Q8H    sodium chloride (NS) flush 20 mL  20 mL InterCATHeter PRN    heparin (porcine) pf 600 Units  600 Units InterCATHeter PRN    albuterol (PROVENTIL VENTOLIN) nebulizer solution 2.5 mg  2.5 mg Nebulization Q4H PRN    sacubitril-valsartan (ENTRESTO) 24-26 mg tablet 1 Tab  1 Tab Oral Q12H    dextrose (D50W) injection syrg 25 g  25 g IntraVENous PRN    ferrous sulfate tablet 325 mg  1 Tab Oral DAILY WITH BREAKFAST    acetaminophen (TYLENOL) tablet 650 mg  650 mg Oral Q6H PRN    gabapentin (NEURONTIN) capsule 600 mg  600 mg Oral TID    albuterol (PROVENTIL HFA, VENTOLIN HFA, PROAIR HFA) inhaler 2 Puff  2 Puff Inhalation Q4H PRN    tiotropium (SPIRIVA) inhalation capsule 18 mcg  1 Cap Inhalation DAILY    sodium chloride (NS) flush 5-10 mL  5-10 mL IntraVENous Q8H    sodium chloride (NS) flush 5-10 mL  5-10 mL IntraVENous PRN    nitroglycerin (NITROSTAT) tablet 0.4 mg  0.4 mg SubLINGual Q5MIN PRN    morphine injection 2 mg  2 mg IntraVENous Q4H PRN    ondansetron (ZOFRAN) injection 4 mg  4 mg IntraVENous Q4H PRN    insulin lispro (HUMALOG) injection   SubCUTAneous AC&HS    budesonide (PULMICORT) 500 mcg/2 ml nebulizer suspension  500 mcg Nebulization BID RT    And    albuterol CONCENTRATE 2.5mg/0.5 mL neb soln  2.5 mg Nebulization Q6H RT       Data Review:   @LABRCNT(Na,K,BUN,CREA,WBC,HGB,HCT,PLT,INR,TRP,TCHOL*,Triglyceride*,LDL*,LDLCPOC HDL*,HDL])@    TELEMETRY: AF    Assessment/Plan:     Principal Problem:    Atrial flutter with rapid ventricular response ([address] (8/23/2017) rate control    Active Problems:    Anticoagulant long-term use (8/23/2017)      HTN (hypertension), benign (8/23/2017)The current medical regimen is effective;  continue present plan and medications. Peripheral vascular disease ([address] (8/23/2017)      Dyslipidemia (8/23/2017)      History of mechanical aortic valve replacement (8/23/2017)      Overview: Placement 2000, on chronicCoumadin      Centrilobular emphysema ([address] (8/23/2017)      Ischemic cardiomyopathy (8/23/2017)      Overview: 8/23/17 ECHO:      EF 40 % to 45 %. There were no regional wall motion abnormalities. Moderate hypokinesis of the mid-apical anterior and apical wall(s). Thrombocytopenia ([address] (8/26/2017)      Acute respiratory failure with hypercapnia ([address] (8/29/2017)getting Bipap      History of tobacco use (8/29/2017)      Hyponatremia (8/29/2017) monitor labs.  Up a little      Acute encephalopathy (8/29/2017)      COPD (chronic obstructive pulmonary disease) ([address] (8/30/2017)          Blaine Espino MD

## 2017-08-31 NOTE — PROGRESS NOTES
Hospitalist Progress Note    2017  Admit Date: 2017  1:11 PM   NAME: Jackqulyn Paget   :  1945   MRN:  [id number]   Attending: Concha Park MD  PCP:  Ronald Barreto MD    SUBJECTIVE:   As previously documented:  'Jackqulyn Paget is a 67 y.o. male who is being seen for metabolic encephalopathy.   The patient was admitted for atrial flutter with RVR and hypotension. He subsequently got a MERLYN and dCV by cardiology. He took a fall the night of hospital day #2 and has been slowly getting more lethargic and confused over the past 4 days. Per nursing and the patient's wife, the patient was lucid and laughing at admission and since the fall has not been getting better. A CT and MRI of the brain was done, which was unremarkable.'    17- he was noted to be very lethargic and confused yesterday. He had ABG showing acute hypercapnic respiratory failure and was transferred to CCU for BiPAP. He is also being evaluated for possible ITP per hematology and is on prednisone and has received 3 days of IVIG. Today, he briefly awakens. Nods yes to breathing okay. Will not try to talk over BiPAP. Followed commands to give thumbs up.  - seen with his wife at bedside. He is much more alert today. BiPAP mask removed during evaluation and he states he does not feel well. States 'I feel like I'm dying.'  He complains of some back discomfort and weakness but generally denies other concerns. He states he is hungry and has not eaten for several days due to being NPO for modified barium swallow that was never done due to respiratory decompensation. He is alert and oriented to self/location/year.       Review of Systems negative with exception of pertinent positives noted above  PHYSICAL EXAM     Visit Vitals    /68    Pulse 60    Temp 97 °F (36.1 °C)    Resp 15    Ht 5' 6\" (1.676 m)    Wt 76.6 kg (168 lb 14 oz)    SpO2 97%    BMI 27.26 kg/m2      Temp (24hrs), Av.2 °F (36.2 °C), Min:96.8 °F (36 °C), Max:97.8 °F (36.6 °C)    Oxygen Therapy  O2 Sat (%): 97 % (08/31/17 0859)  Pulse via Oximetry: 68 beats per minute (08/31/17 0859)  O2 Device: BIPAP (08/31/17 0743)  O2 Flow Rate (L/min): 3 l/min (08/31/17 0400)  O2 Temperature: 87.8 °F (31 °C) (08/31/17 0743)  FIO2 (%): 30 % (08/31/17 0743)    Intake/Output Summary (Last 24 hours) at 08/31/17 0950  Last data filed at 08/31/17 0445   Gross per 24 hour   Intake          1543.83 ml   Output              425 ml   Net          1118.83 ml      General: No acute distress, BiPAP mask in place   Lungs:  Diminished breath sounds, wheezing noted posteriorly.    Heart:  Tachycardic  Abdomen: Soft, Non distended, Non tender, Positive bowel sounds  Extremities: No cyanosis, clubbing or edema, changes consistent with chronic venous insufficiency  Neurologic:  Awake, more alert, oriented to self/location/year    Recent Results (from the past 24 hour(s))   AMMONIA    Collection Time: 08/30/17 10:29 AM   Result Value Ref Range    Ammonia 23 11 - 32 UMOL/L   PLATELETS, ALLOCATE    Collection Time: 08/30/17 12:30 PM   Result Value Ref Range    Unit number [id number]     Blood component type PLTPH LRIR,1     Unit division 00     Status of unit TRANSFUSED    GLUCOSE, POC    Collection Time: 08/30/17 12:34 PM   Result Value Ref Range    Glucose (POC) 102 (H) 65 - 100 mg/dL   TYPE, ABO & RH    Collection Time: 08/30/17  6:41 PM   Result Value Ref Range    ABO/Rh(D) A NEGATIVE    PLATELET    Collection Time: 08/30/17  6:41 PM   Result Value Ref Range    PLATELET 19 (LL) 698 - 450 K/uL   GLUCOSE, POC    Collection Time: 08/30/17  9:55 PM   Result Value Ref Range    Glucose (POC) 148 (H) 65 - 100 mg/dL   PROTHROMBIN TIME + INR    Collection Time: 08/31/17  2:59 AM   Result Value Ref Range    Prothrombin time 31.4 (H) 9.6 - 12.0 sec    INR 2.9 (H) 0.9 - 1.2     METABOLIC PANEL, BASIC    Collection Time: 08/31/17  2:59 AM   Result Value Ref Range    Sodium 131 (L) 136 - 145 mmol/L    Potassium 5.5 (H) 3.5 - 5.1 mmol/L    Chloride 89 (L) 98 - 107 mmol/L    CO2 39 (H) 21 - 32 mmol/L    Anion gap 3 (L) 7 - 16 mmol/L    Glucose 154 (H) 65 - 100 mg/dL    BUN 58 (H) 8 - 23 MG/DL    Creatinine 1.42 0.8 - 1.5 MG/DL    GFR est AA >60 >60 ml/min/1.73m2    GFR est non-AA 52 (L) >60 ml/min/1.73m2    Calcium 9.0 8.3 - 10.4 MG/DL   MAGNESIUM    Collection Time: 08/31/17  2:59 AM   Result Value Ref Range    Magnesium 2.7 (H) 1.8 - 2.4 mg/dL   CBC WITH AUTOMATED DIFF    Collection Time: 08/31/17  2:59 AM   Result Value Ref Range    WBC 5.6 4.3 - 11.1 K/uL    RBC 4.10 (L) 4.23 - 5.67 M/uL    HGB 11.9 (L) 13.6 - 17.2 g/dL    HCT 36.3 (L) 41.1 - 50.3 %    MCV 88.5 79.6 - 97.8 FL    MCH 29.0 26.1 - 32.9 PG    MCHC 32.7 31.4 - 35.0 g/dL    RDW 15.8 (H) 11.9 - 14.6 %    PLATELET 907 741 - 881 K/uL    MPV 10.8 10.8 - 14.1 FL    DF AUTOMATED      NEUTROPHILS 88 (H) 43 - 78 %    LYMPHOCYTES 5 (L) 13 - 44 %    MONOCYTES 7 4.0 - 12.0 %    EOSINOPHILS 0 (L) 0.5 - 7.8 %    BASOPHILS 0 0.0 - 2.0 %    IMMATURE GRANULOCYTES 0.2 0.0 - 5.0 %    ABS. NEUTROPHILS 4.5 1.7 - 8.2 K/UL    ABS. LYMPHOCYTES 0.3 (L) 0.5 - 4.6 K/UL    ABS. MONOCYTES 0.3 0.1 - 1.3 K/UL    ABS. EOSINOPHILS 0.0 0.0 - 0.8 K/UL    ABS. BASOPHILS 0.0 0.0 - 0.2 K/UL    ABS. IMM. GRANS. 0.0 0.0 - 0.5 K/UL   BLOOD GAS, ARTERIAL    Collection Time: 08/31/17  3:57 AM   Result Value Ref Range    pH 7.32 (L) 7.35 - 7.45      PCO2 82 (H) 35 - 45 mmHg    PO2 187 (H) 80 - 105 mmHg    BICARBONATE 41 (H) 22 - 26 mmol/L    BASE EXCESS 11.9 (H) 0 - 3 mmol/L    TOTAL HEMOGLOBIN 11.9 11.7 - 15.0 GM/DL    O2 SAT 99 (H) 92 - 98.5 %    Arterial O2 Hgb 97.2 (H) 94 - 97 %    CARBOXYHEMOGLOBIN 1.8 (H) 0.5 - 1.5 %    METHEMOGLOBIN 0.3 0.0 - 1.5 %    DEOXYHEMOGLOBIN 1 0.0 - 5.0 %    SITE LR     ALLENS TEST POSITIVE      MODE Nasal Cannula     FIO2 32.0 %    O2 FLOW 3.00 L/min    Respiratory comment: OSMANI Wooten at 8 31 2017 4 02 59 AM. Read back.     GLUCOSE, POC Collection Time: 08/31/17  8:52 AM   Result Value Ref Range    Glucose (POC) 151 (H) 65 - 100 mg/dL     Imaging:    XR CHEST SNGL V   Final Result   IMPRESSION: No new consolidation. XR CHEST PORT   Final Result   IMPRESSION: Cardiac silhouette remains enlarged. MRI BRAIN WO CONT   Final Result   IMPRESSION: Mild supratentorial microischemia      CT HEAD WO CONT   Final Result   Impression:    1. No acute intracranial process. US ABD COMP   Final Result   IMPRESSION:      1. No significant splenomegaly to account for reported thrombocytopenia. 2.  Technically limited evaluation of the gallbladder and biliary tree on this   nonfasting examination, with no definite abnormality identified. XR CHEST PORT   Final Result            ASSESSMENT      Active Hospital Problems    Diagnosis Date Noted    COPD (chronic obstructive pulmonary disease) ([address] 08/30/2017    Acute respiratory failure with hypercapnia (HCC) 08/29/2017    History of tobacco use 08/29/2017    Hyponatremia 08/29/2017    Acute encephalopathy 08/29/2017    Thrombocytopenia ([address] 08/26/2017    Anticoagulant long-term use 08/23/2017    HTN (hypertension), benign 08/23/2017    Peripheral vascular disease ([address] 08/23/2017    Dyslipidemia 08/23/2017    History of mechanical aortic valve replacement 08/23/2017     Placement 2000, on chronicCoumadin      Centrilobular emphysema ([address] 08/23/2017    Ischemic cardiomyopathy 08/23/2017 8/23/17 ECHO:  EF 40 % to 45 %. There were no regional wall motion abnormalities. Moderate hypokinesis of the mid-apical anterior and apical wall(s).  Atrial flutter with rapid ventricular response ([address] 08/23/2017     Plan:  · AMS- likely multifactorial but acute hypercapnia most likely contributor. Check ammonia level. · Mental status improving. · COPD exacerbation/Acute hypercapnic respiratory failure- Continue BiPAP per pulmonology. On steroids. Not on abx.   Due to continued wheezing, will add on   · Start rocephin to see if this improves his respiratory status. · Thrombocytopenia- platelets continue to drop. Management per heme/onc. Considering bone marrow bx. Platelets impro  · Hyponatremia- mild and stable. · Chronic systolic CHF- Entesto, carvedilol, lasix per cards (primary). · Atrial flutter- per cardiology. S/p MERLYN cardioversion but back in atrial flutter. Amio per cards. · FEN- discussed with RN to have speech therapy re-evaluate him to see if he can safely swallow/eat. He is much more alert today. DVT Prophylaxis: Coumadin    Signed By: Florecita Jeffrey.  Nadia Heller MD     August 31, 2017

## 2017-08-31 NOTE — PROGRESS NOTES
Spiritual Care visit. Follow up visit.  visited at bedside. Patient was not sufficiently awake to respond; however, his wife and  were there.  gave support to them, and left patient and his wife in the hands of the .     Visit by Kyle Morales M.Ed., Th.B. ,Staff

## 2017-08-31 NOTE — PROGRESS NOTES
Critical Care Daily Progress Note: 8/31/2017    Baylee Layton   Admission Date: 8/23/2017         The patient's chart is reviewed and the patient is discussed with the staff.    67 y.o.  CM presents with atrial flutter with RVR and hypotension. Was seen by cardiology, had a MERLYN and cardioverted. Has had intermittent confusion since a fall with head CT and MRI showing no acute abnormality. Had mechanical AVR 2000 and on Coumadin. Has been on Lasix and his Na 128. Has converted to afib and HR is controlled. Was scheduled for MBS but was more confused prompting an ABG showing hypercapnic respiratory failure and transferred to the ICU. Placed on BIPAP but kept pulling off mask and changed to NC. He also has thrombocytopenia and been followed by Hem/Onc while here. He is anticoagulated on Coumadin for mechanical valve. Has severe COPD. Thrombocytopenia management through [place]. Long term history of tobacco abuse. Subjective:     More alert today and conversant. Remains on BIPAP but is with jerking movements and states he cannot get his breath. \"I'm not getting enough oxygen\". Wife at the bedside. Hard of hearing.     Current Facility-Administered Medications   Medication Dose Route Frequency    cefTRIAXone (ROCEPHIN) 1 g in 0.9% sodium chloride (MBP/ADV) 50 mL  1 g IntraVENous Q24H    mupirocin (BACTROBAN) 2 % ointment   Both Nostrils BID    warfarin (COUMADIN) tablet 2 mg  2 mg Oral QHS    carvedilol (COREG) tablet 25 mg  25 mg Oral BID WITH MEALS    amiodarone (CORDARONE) 450 mg in dextrose 5% 250 mL infusion  0.5-1 mg/min IntraVENous TITRATE    methylPREDNISolone (PF) (SOLU-MEDROL) injection 60 mg  60 mg IntraVENous DAILY    0.9% sodium chloride infusion 250 mL  250 mL IntraVENous PRN    famotidine (PF) (PEPCID) 20 mg in sodium chloride 0.9 % 10 mL injection  20 mg IntraVENous Q12H    furosemide (LASIX) injection 40 mg  40 mg IntraVENous DAILY    dilTIAZem (CARDIZEM) 100 mg in 0.9% sodium chloride (MBP/ADV) 100 mL infusion  0-15 mg/hr IntraVENous TITRATE    acetaminophen (TYLENOL) suppository 650 mg  650 mg Rectal Q4H PRN    sodium chloride (NS) flush 20 mL  20 mL InterCATHeter Q8H    heparin (porcine) pf 600 Units  600 Units InterCATHeter Q8H    sodium chloride (NS) flush 20 mL  20 mL InterCATHeter PRN    heparin (porcine) pf 600 Units  600 Units InterCATHeter PRN    albuterol (PROVENTIL VENTOLIN) nebulizer solution 2.5 mg  2.5 mg Nebulization Q4H PRN    sacubitril-valsartan (ENTRESTO) 24-26 mg tablet 1 Tab  1 Tab Oral Q12H    dextrose (D50W) injection syrg 25 g  25 g IntraVENous PRN    ferrous sulfate tablet 325 mg  1 Tab Oral DAILY WITH BREAKFAST    acetaminophen (TYLENOL) tablet 650 mg  650 mg Oral Q6H PRN    gabapentin (NEURONTIN) capsule 600 mg  600 mg Oral TID    albuterol (PROVENTIL HFA, VENTOLIN HFA, PROAIR HFA) inhaler 2 Puff  2 Puff Inhalation Q4H PRN    tiotropium (SPIRIVA) inhalation capsule 18 mcg  1 Cap Inhalation DAILY    sodium chloride (NS) flush 5-10 mL  5-10 mL IntraVENous Q8H    sodium chloride (NS) flush 5-10 mL  5-10 mL IntraVENous PRN    nitroglycerin (NITROSTAT) tablet 0.4 mg  0.4 mg SubLINGual Q5MIN PRN    morphine injection 2 mg  2 mg IntraVENous Q4H PRN    ondansetron (ZOFRAN) injection 4 mg  4 mg IntraVENous Q4H PRN    insulin lispro (HUMALOG) injection   SubCUTAneous AC&HS    budesonide (PULMICORT) 500 mcg/2 ml nebulizer suspension  500 mcg Nebulization BID RT    And    albuterol CONCENTRATE 2.5mg/0.5 mL neb soln  2.5 mg Nebulization Q6H RT       Review of Systems  Constitutional: negative for fever, chills, sweats  Cardiovascular: negative for chest pain, palpitations, syncope, edema  Gastrointestinal: negative for dysphagia, reflux, vomiting, diarrhea, abdominal pain, or melena  Neurologic: negative for focal weakness, numbness, headache      Objective:     Vitals:    08/31/17 0814 08/31/17 0830 08/31/17 0844 08/31/17 0859   BP: 146/63 151/78 145/67 165/68   Pulse: (!) 57 62 61 60   Resp: 20 21 21 15   Temp:       SpO2: 97% 97% 98% 97%   Weight:       Height:           Intake and Output:   08/29 1901 - 08/31 0700  In: 1543.8 [I.V.:1305]  Out: 425 [Urine:425]       Physical Exam:          Constitutional:  the patient is well developed and in no acute distress, on BIPAP 30%, sat 97%   EENMT:  Sclera clear, pupils equal, oral mucosa moist, hard of hearing  Respiratory: anterior crackles, no wheezing  Cardiovascular:  Irregular, without M,G,R  Gastrointestinal: soft and non-tender; with positive bowel sounds. Musculoskeletal: warm without cyanosis. There is trace lower leg edema. Skin:  no jaundice or rashes, no wounds   Neurologic: no gross neuro deficits     Psychiatric:  Awake, conversant and following commands    LINES:  Right PICC, peripheral site, condom catheter    DRIPS:   Amiodarone, Cardizem    CXR 8/30/17:       LAB  Recent Labs      08/31/17   0852  08/30/17   2155  08/30/17   1234  08/30/17   0850  08/29/17   2214   GLUCPOC  151*  148*  102*  102*  175*      Recent Labs      08/31/17   0259  08/30/17   1841  08/30/17   0523  08/29/17   0739   WBC  5.6   --   5.3  4.0*   HGB  11.9*   --   13.0*  12.1*   HCT  36.3*   --   39.1*  36.4*   PLT  208  19*  18*  50*   INR  2.9*   --   3.2*  2.3*     Recent Labs      08/31/17   0259  08/30/17   0523  08/29/17   1348   NA  131*  130*  128*   K  5.5*  5.4*  5.4*   CL  89*  91*  88*   CO2  39*  36*  35*   GLU  154*  121*  204*   BUN  58*  46*  47*   CREA  1.42  1.26  1.34   MG  2.7*   --    --    CA  9.0  8.8  8.5     Recent Labs      08/31/17   0357  08/30/17   0738  08/29/17   1742   PH  7.32*  7.32*  7.37   PCO2  82*  75*  65*   PO2  187*  95  68*   HCO3  41*  38*  37*     No results for input(s): LCAD, LAC in the last 72 hours.     Assessment:  (Medical Decision Making)     Patient Active Problem List   Diagnosis Code    Anticoagulant long-term use Z79.01    HTN (hypertension), benign I10    Peripheral vascular disease (HCC) I73.9    Atherosclerosis of native arteries of the extremities with intermittent claudication I70.219    Dyslipidemia E78.5    History of mechanical aortic valve replacement Z95.2    Centrilobular emphysema (HCC) J43.2    LV dysfunction I51.9    Ischemic cardiomyopathy I25.5    Atrial flutter with rapid ventricular response (HCC) I48.92    Thrombocytopenia (HCC) D69.6    Acute respiratory failure with hypercapnia (HCC) J96.02    History of tobacco use Z87.891    Hyponatremia E87.1    Acute encephalopathy G93.40    COPD (chronic obstructive pulmonary disease) (HCC) J44.9         Plan:  (Medical Decision Making)     Hospital Problems  Date Reviewed: 8/31/2017          Codes Class Noted POA    COPD (chronic obstructive pulmonary disease) (HCC) (Chronic) ICD-10-CM: J44.9  ICD-9-CM: 496  8/30/2017 Yes    Albuterol, Pulmicort, Spiriva    Acute respiratory failure with hypercapnia (HCC) ICD-10-CM: J96.02  ICD-9-CM: 518.81  8/29/2017 No    On BIPAP but feels he cannot breath and not getting enough O2    History of tobacco use (Chronic) ICD-10-CM: F03.663  ICD-9-CM: V15.82  8/29/2017 Yes    chronic    Hyponatremia ICD-10-CM: E87.1  ICD-9-CM: 276.1  8/29/2017 Yes    Na trending up--131 today    Acute encephalopathy ICD-10-CM: G93.40  ICD-9-CM: 348.30  8/29/2017 Yes    Less confusion, conversant and following commands    Thrombocytopenia (HCC) ICD-10-CM: D69.6  ICD-9-CM: 287.5  8/26/2017 Yes    Platelets today  241  Solu Medrol 60mg daily and had IVIG x 3 days  Heme/onc following and planning bone marrow biopsy    Anticoagulant long-term use (Chronic) ICD-10-CM: Z79.01  ICD-9-CM: V58.61  8/23/2017 Yes    Coumadin for mechanical aortic valve--INR 2.9 today    HTN (hypertension), benign (Chronic) ICD-10-CM: I10  ICD-9-CM: 401.1  8/23/2017 Yes    chronic    Peripheral vascular disease (HCC) (Chronic) ICD-10-CM: I73.9  ICD-9-CM: 443.9  8/23/2017 Yes    chronic    Dyslipidemia (Chronic) ICD-10-CM: E78.5  ICD-9-CM: 272.4  8/23/2017 Yes    Chronic--on Zocor prior to admission per chart    History of mechanical aortic valve replacement (Chronic) ICD-10-CM: Z95.2  ICD-9-CM: V43.3  8/23/2017 Yes     Placement 2000, on chronic Coumadin         INR  2.9 today    Centrilobular emphysema (HCC) (Chronic) ICD-10-CM: J43.2  ICD-9-CM: 492.8  8/23/2017 Yes    chronic    Ischemic cardiomyopathy (Chronic) ICD-10-CM: I25.5  ICD-9-CM: 414.8  8/23/2017 Yes     8/23/17 ECHO:  EF 40 % to 45 %. There were no regional wall motion abnormalities. Moderate hypokinesis of the mid-apical anterior and apical wall(s). Per cardiology    * (Principal)Atrial flutter with rapid ventricular response [facility]) ICD-10-CM: I48.92  ICD-9-CM: 427.32  8/23/2017 Yes    AFib--on Amiodarone and Cardizem drips, rate controlled        --Rocephin added by hospitalist  --Change to Opti-flow    More than 50% of the time documented was spent in face-to-face contact with the patient and in the care of the patient on the floor/unit where the patient is located. Marko Toscano NP    The patient has been seen and examined by me personally, the chart,labs, and radiographic studies have been reviewed. Chest: CTA  Extremities: trace edema  Feels much better- alert and oriented, taken off NIPPV- continue supportive care- discussed with oncology  For now no plans for BMBx during this admission. Responding to Rx for ITP  I agree with the above assessment and plan.     Crow Luna MD.

## 2017-08-31 NOTE — PROGRESS NOTES
Bedside and Verbal shift change report given to Davian RN by Malaika Kiran RN. Report included the following information SBAR, Kardex, ED Summary, Procedure Summary, Intake/Output, MAR, Accordion, Recent Results, Med Rec Status and Cardiac Rhythm Afib. Pt is awake and alert at this time, still confused. Able to state name, , and that he is in the hospital but confused to situation. VSS, on cardizem and amiodarone IV. PICC placed today.

## 2017-09-01 ENCOUNTER — APPOINTMENT (OUTPATIENT)
Dept: GENERAL RADIOLOGY | Age: 72
DRG: 308 | End: 2017-09-01
Attending: EMERGENCY MEDICINE
Payer: MEDICARE

## 2017-09-01 LAB
ANION GAP SERPL CALC-SCNC: 3 MMOL/L (ref 7–16)
ARTERIAL PATENCY WRIST A: ABNORMAL
BASE EXCESS BLDA CALC-SCNC: 12.2 MMOL/L (ref 0–3)
BASOPHILS # BLD: 0 K/UL (ref 0–0.2)
BASOPHILS NFR BLD: 0 % (ref 0–2)
BDY SITE: ABNORMAL
BLD PROD TYP BPU: NORMAL
BPU ID: NORMAL
BUN SERPL-MCNC: 72 MG/DL (ref 8–23)
CALCIUM SERPL-MCNC: 8.8 MG/DL (ref 8.3–10.4)
CHLORIDE SERPL-SCNC: 91 MMOL/L (ref 98–107)
CO2 SERPL-SCNC: 39 MMOL/L (ref 21–32)
COHGB MFR BLD: 0.8 % (ref 0.5–1.5)
CREAT SERPL-MCNC: 1.43 MG/DL (ref 0.8–1.5)
DIFFERENTIAL METHOD BLD: ABNORMAL
DO-HGB BLD-MCNC: 2 % (ref 0–5)
EOSINOPHIL # BLD: 0 K/UL (ref 0–0.8)
EOSINOPHIL NFR BLD: 0 % (ref 0.5–7.8)
ERYTHROCYTE [DISTWIDTH] IN BLOOD BY AUTOMATED COUNT: 16.1 % (ref 11.9–14.6)
FIO2 ON VENT: 40 %
GLUCOSE BLD STRIP.AUTO-MCNC: 146 MG/DL (ref 65–100)
GLUCOSE BLD STRIP.AUTO-MCNC: 174 MG/DL (ref 65–100)
GLUCOSE BLD STRIP.AUTO-MCNC: 186 MG/DL (ref 65–100)
GLUCOSE BLD STRIP.AUTO-MCNC: 189 MG/DL (ref 65–100)
GLUCOSE SERPL-MCNC: 159 MG/DL (ref 65–100)
HCO3 BLDA-SCNC: 40 MMOL/L (ref 22–26)
HCT VFR BLD AUTO: 34.5 % (ref 41.1–50.3)
HGB BLD-MCNC: 11.4 G/DL (ref 13.6–17.2)
HGB BLDMV-MCNC: 12.2 GM/DL (ref 11.7–15)
IMM GRANULOCYTES # BLD: 0 K/UL (ref 0–0.5)
IMM GRANULOCYTES NFR BLD: 0.2 % (ref 0–5)
INR PPP: 3.4 (ref 0.9–1.2)
LYMPHOCYTES # BLD: 0.4 K/UL (ref 0.5–4.6)
LYMPHOCYTES NFR BLD: 8 % (ref 13–44)
MCH RBC QN AUTO: 29.5 PG (ref 26.1–32.9)
MCHC RBC AUTO-ENTMCNC: 33.1 G/DL (ref 31.4–35)
MCV RBC AUTO: 89 FL (ref 79.6–97.8)
METHGB MFR BLD: 0.4 % (ref 0–1.5)
MONOCYTES # BLD: 0.7 K/UL (ref 0.1–1.3)
MONOCYTES NFR BLD: 13 % (ref 4–12)
NEUTS SEG # BLD: 3.9 K/UL (ref 1.7–8.2)
NEUTS SEG NFR BLD: 79 % (ref 43–78)
OXYHGB MFR BLDA: 96.8 % (ref 94–97)
PCO2 BLDA: 71 MMHG (ref 35–45)
PEEP RESPIRATORY: 8 CM[H2O]
PH BLDA: 7.37 [PH] (ref 7.35–7.45)
PLATELET # BLD AUTO: 196 K/UL (ref 150–450)
PMV BLD AUTO: 10.5 FL (ref 10.8–14.1)
PO2 BLDA: 121 MMHG (ref 80–105)
POTASSIUM SERPL-SCNC: 5.4 MMOL/L (ref 3.5–5.1)
PROTHROMBIN TIME: 36.9 SEC (ref 9.6–12)
RBC # BLD AUTO: 3.88 M/UL (ref 4.23–5.67)
RESP RATE: 16
SAO2 % BLD: 98 % (ref 92–98.5)
SERVICE CMNT-IMP: ABNORMAL
SODIUM SERPL-SCNC: 133 MMOL/L (ref 136–145)
STATUS OF UNIT,%ST: NORMAL
UNIT DIVISION, %UDIV: 0
VENTILATION MODE VENT: ABNORMAL
WBC # BLD AUTO: 5.5 K/UL (ref 4.3–11.1)

## 2017-09-01 PROCEDURE — 82962 GLUCOSE BLOOD TEST: CPT

## 2017-09-01 PROCEDURE — 94640 AIRWAY INHALATION TREATMENT: CPT

## 2017-09-01 PROCEDURE — 74011250637 HC RX REV CODE- 250/637: Performed by: INTERNAL MEDICINE

## 2017-09-01 PROCEDURE — 85025 COMPLETE CBC W/AUTO DIFF WBC: CPT | Performed by: INTERNAL MEDICINE

## 2017-09-01 PROCEDURE — 77010033711 HC HIGH FLOW OXYGEN

## 2017-09-01 PROCEDURE — 74011000250 HC RX REV CODE- 250: Performed by: INTERNAL MEDICINE

## 2017-09-01 PROCEDURE — 36600 WITHDRAWAL OF ARTERIAL BLOOD: CPT

## 2017-09-01 PROCEDURE — 65620000000 HC RM CCU GENERAL

## 2017-09-01 PROCEDURE — 74011636637 HC RX REV CODE- 636/637: Performed by: NURSE PRACTITIONER

## 2017-09-01 PROCEDURE — 99233 SBSQ HOSP IP/OBS HIGH 50: CPT | Performed by: INTERNAL MEDICINE

## 2017-09-01 PROCEDURE — 99232 SBSQ HOSP IP/OBS MODERATE 35: CPT | Performed by: INTERNAL MEDICINE

## 2017-09-01 PROCEDURE — 74011250637 HC RX REV CODE- 250/637: Performed by: NURSE PRACTITIONER

## 2017-09-01 PROCEDURE — 74011250636 HC RX REV CODE- 250/636: Performed by: INTERNAL MEDICINE

## 2017-09-01 PROCEDURE — 71010 XR CHEST SNGL V: CPT

## 2017-09-01 PROCEDURE — 77030018798 HC PMP KT ENTRL FED COVD -A

## 2017-09-01 PROCEDURE — 80048 BASIC METABOLIC PNL TOTAL CA: CPT | Performed by: PHYSICIAN ASSISTANT

## 2017-09-01 PROCEDURE — 94660 CPAP INITIATION&MGMT: CPT

## 2017-09-01 PROCEDURE — 85610 PROTHROMBIN TIME: CPT | Performed by: PHYSICIAN ASSISTANT

## 2017-09-01 PROCEDURE — 82803 BLOOD GASES ANY COMBINATION: CPT

## 2017-09-01 PROCEDURE — 77030011256 HC DRSG MEPILEX <16IN NO BORD MOLN -A

## 2017-09-01 PROCEDURE — 74011250636 HC RX REV CODE- 250/636: Performed by: HOSPITALIST

## 2017-09-01 PROCEDURE — 74011250636 HC RX REV CODE- 250/636: Performed by: NURSE PRACTITIONER

## 2017-09-01 PROCEDURE — 74011000258 HC RX REV CODE- 258: Performed by: INTERNAL MEDICINE

## 2017-09-01 RX ORDER — INSULIN LISPRO 100 [IU]/ML
INJECTION, SOLUTION INTRAVENOUS; SUBCUTANEOUS EVERY 6 HOURS
Status: DISCONTINUED | OUTPATIENT
Start: 2017-09-01 | End: 2017-09-11

## 2017-09-01 RX ORDER — GUAIFENESIN 600 MG/1
1200 TABLET, EXTENDED RELEASE ORAL EVERY 12 HOURS
Status: DISCONTINUED | OUTPATIENT
Start: 2017-09-01 | End: 2017-09-04

## 2017-09-01 RX ADMIN — CEFTRIAXONE 1 G: 1 INJECTION, POWDER, FOR SOLUTION INTRAMUSCULAR; INTRAVENOUS at 12:05

## 2017-09-01 RX ADMIN — FAMOTIDINE 20 MG: 10 INJECTION, SOLUTION INTRAVENOUS at 08:56

## 2017-09-01 RX ADMIN — GABAPENTIN 600 MG: 300 CAPSULE ORAL at 14:06

## 2017-09-01 RX ADMIN — SODIUM CHLORIDE, PRESERVATIVE FREE 300 UNITS: 5 INJECTION INTRAVENOUS at 21:35

## 2017-09-01 RX ADMIN — GUAIFENESIN 1200 MG: 600 TABLET, EXTENDED RELEASE ORAL at 12:05

## 2017-09-01 RX ADMIN — CARVEDILOL 25 MG: 25 TABLET, FILM COATED ORAL at 08:56

## 2017-09-01 RX ADMIN — WARFARIN SODIUM 2 MG: 1 TABLET ORAL at 21:35

## 2017-09-01 RX ADMIN — MUPIROCIN: 20 OINTMENT TOPICAL at 18:09

## 2017-09-01 RX ADMIN — Medication 10 ML: at 06:06

## 2017-09-01 RX ADMIN — ALBUTEROL SULFATE 2.5 MG: 2.5 SOLUTION RESPIRATORY (INHALATION) at 13:26

## 2017-09-01 RX ADMIN — BUDESONIDE 500 MCG: 0.5 INHALANT RESPIRATORY (INHALATION) at 07:48

## 2017-09-01 RX ADMIN — INSULIN LISPRO 2 UNITS: 100 INJECTION, SOLUTION INTRAVENOUS; SUBCUTANEOUS at 23:45

## 2017-09-01 RX ADMIN — BUDESONIDE 500 MCG: 0.5 INHALANT RESPIRATORY (INHALATION) at 19:32

## 2017-09-01 RX ADMIN — METHYLPREDNISOLONE SODIUM SUCCINATE 60 MG: 125 INJECTION, POWDER, FOR SOLUTION INTRAMUSCULAR; INTRAVENOUS at 08:57

## 2017-09-01 RX ADMIN — SODIUM CHLORIDE, PRESERVATIVE FREE 600 UNITS: 5 INJECTION INTRAVENOUS at 13:57

## 2017-09-01 RX ADMIN — ALBUTEROL SULFATE 2.5 MG: 2.5 SOLUTION RESPIRATORY (INHALATION) at 01:20

## 2017-09-01 RX ADMIN — Medication 20 ML: at 21:34

## 2017-09-01 RX ADMIN — Medication 20 ML: at 13:56

## 2017-09-01 RX ADMIN — GUAIFENESIN 1200 MG: 600 TABLET, EXTENDED RELEASE ORAL at 21:35

## 2017-09-01 RX ADMIN — SACUBITRIL AND VALSARTAN 1 TABLET: 24; 26 TABLET, FILM COATED ORAL at 21:35

## 2017-09-01 RX ADMIN — ALBUTEROL SULFATE 2.5 MG: 2.5 SOLUTION RESPIRATORY (INHALATION) at 19:32

## 2017-09-01 RX ADMIN — Medication 10 ML: at 21:36

## 2017-09-01 RX ADMIN — CARVEDILOL 25 MG: 25 TABLET, FILM COATED ORAL at 18:07

## 2017-09-01 RX ADMIN — AMIODARONE HYDROCHLORIDE 0.5 MG/MIN: 50 INJECTION, SOLUTION INTRAVENOUS at 02:15

## 2017-09-01 RX ADMIN — GABAPENTIN 600 MG: 300 CAPSULE ORAL at 06:05

## 2017-09-01 RX ADMIN — FERROUS SULFATE TAB 325 MG (65 MG ELEMENTAL FE) 325 MG: 325 (65 FE) TAB at 08:55

## 2017-09-01 RX ADMIN — ALBUTEROL SULFATE 2.5 MG: 2.5 SOLUTION RESPIRATORY (INHALATION) at 07:48

## 2017-09-01 RX ADMIN — GABAPENTIN 600 MG: 300 CAPSULE ORAL at 21:34

## 2017-09-01 RX ADMIN — INSULIN LISPRO 2 UNITS: 100 INJECTION, SOLUTION INTRAVENOUS; SUBCUTANEOUS at 18:07

## 2017-09-01 RX ADMIN — SODIUM CHLORIDE, PRESERVATIVE FREE 600 UNITS: 5 INJECTION INTRAVENOUS at 06:05

## 2017-09-01 RX ADMIN — MUPIROCIN: 20 OINTMENT TOPICAL at 08:57

## 2017-09-01 RX ADMIN — AMIODARONE HYDROCHLORIDE 0.5 MG/MIN: 50 INJECTION, SOLUTION INTRAVENOUS at 19:59

## 2017-09-01 RX ADMIN — FUROSEMIDE 40 MG: 10 INJECTION, SOLUTION INTRAMUSCULAR; INTRAVENOUS at 08:57

## 2017-09-01 RX ADMIN — TIOTROPIUM BROMIDE 18 MCG: 18 CAPSULE ORAL; RESPIRATORY (INHALATION) at 09:00

## 2017-09-01 RX ADMIN — Medication 20 ML: at 06:05

## 2017-09-01 RX ADMIN — INSULIN LISPRO 2 UNITS: 100 INJECTION, SOLUTION INTRAVENOUS; SUBCUTANEOUS at 12:11

## 2017-09-01 RX ADMIN — SACUBITRIL AND VALSARTAN 1 TABLET: 24; 26 TABLET, FILM COATED ORAL at 08:56

## 2017-09-01 NOTE — PROGRESS NOTES
SPEECH PATHOLOGY NOTE:    Modified barium swallow study planned for this date; however, patient remains on optiflow and cannot participate in study. Will hold ST this date per Dr. Franny Webb. ST to continue to follow regarding dysphagia.      ABBEY Hyatt, CCC-SLP, CBIS

## 2017-09-01 NOTE — PROGRESS NOTES
Small skin tear on bridge on Pt's Nose. Pt in now on Opti flow (Pt says he is comfortable) and New BIPAP mask ready for use.

## 2017-09-01 NOTE — PROGRESS NOTES
[facility] CARDIOLOGY PROGRESS NOTE    9/1/2017 8:03 AM    Admit Date: 8/23/2017    Admit Diagnosis: Atrial flutter with rapid ventricular response (HCC)      Subjective:   Stable overnight in AF, rate controlled, INR therapeutic. On IV amiodarone. BP stable. Still requiring BiPAP for resp support but denies angina, SOB,     Objective:      Vitals:    09/01/17 0614 09/01/17 0629 09/01/17 0659 09/01/17 0759   BP: 131/68 151/72 126/69    Pulse: 66 88 (!) 110    Resp: 29 17 (!) 46    Temp:   97.4 °F (36.3 °C)    SpO2: 98% 98% 98% 97%   Weight:       Height:           Physical Exam:  Neck- supple, no JVD  CV- irregular rate and rhythm, crisp mech AVR click  Lung- fairly clear bilaterally anteriorly, decreased bibasilar  Abd- soft, nontender, nondistended  Ext- no edema  Skin- warm and dry    Data Review:   Recent Labs      09/01/17   0512  09/01/17   0406  08/31/17   1144  08/31/17   0259   NA   --   133*   --   131*   K   --   5.4*   --   5.5*   MG   --    --    --   2.7*   BUN   --   72*   --   58*   CREA   --   1.43   --   1.42   GLU   --   159*   --   154*   WBC  5.5   --    --   5.6   HGB  11.4*   --    --   11.9*   HCT  34.5*   --    --   36.3*   PLT  196   --   208  208   INR   --   3.4*   --   2.9*       Assessment and Plan:     Principal Problem:    Atrial flutter with rapid ventricular response (HCC) (8/23/2017)- s/p MERLYN/cardioversion last week, but now back in AF with 's now- INR 3.4 on warfarin 5mg QHS.   Off cardizem gtt now,  continue IV amiodarone for now, recheck INR in AM.       Active Problems:    Anticoagulant long-term use (4/15/2014)- daily INR's        HTN (hypertension), benign- stable, continue meds, titrate as needed        Peripheral vascular disease ([facility] [address] [facility] (4/15/2014)        Dyslipidemia- stable, continue meds        History of mechanical aortic valve replacement - see above, decrease warfarin, recheck INR in AM        Chronic bronchitis (HCC)-coarse, continue meds        Ischemic cardiomyopathy (11/15/2016)        Atrial flutter with rapid ventricular response ([address] (8/23/2017)        Thrombocytopenia ([facility] [address] (8/26/2017)- recheck in AM, per heme, no spontaneous bleeding at present-  getting infusion per Heme    Decreased coumadin to 2mg QHS on higher amio dose  Convert amio to 400mg BID with oral taper when off BiPAP- ? Consider repeat MERLYN/cardioversion next week if resp status improves? Recheck labs in AM    A.  Sydney Lobato MD  [facility] Cardiology  Pager [phone]

## 2017-09-01 NOTE — PROGRESS NOTES
Patient is resting quietly on BiPAP, responds to voice, follows commands, remains confused. Slow a flutter, rate 50s on monitor, Amiodarone gtt infusing @ 0.5 mg/min via PICC line JUANJOSE. /59, O2 sat 100%. Lung sounds coarse upper, diminished lower. Abdomen appears distended, hypoactive bowel sounds, but tolerating Glucerna 1.2 @ 55 mL/hr via dobhoff, no gastric residuals. BLE cool with varicose veins, pedal pulses palpable. Sacrum intact, allevyn in place. Condom cath in place, draining clear yellow urine. No family present at this time. Will continue to monitor.

## 2017-09-01 NOTE — PROGRESS NOTES
Daily Progress Note -- Hematology/ Medical Oncology        Patient Name: Joel Hernandez    Date of Visit: 2017  : 1945  Age:72 y.o. Initial consult HPI:   He has a history of mechanical AVR in . He continues on chronic anticoagulation with coumadin, chronic systolic CHF/ICM EF 42%, COPD, PVD, DM II, HTN, iron deficiency, and dyslipidemia who developed worsening dyspnea at the beginning of the week. He presented to the ER and was found to be in atrial flutter with RVR now status post cardioversion and amiodarone. Of note, his platelets were 81,234 on admission and 30,000 today. No other lab comparisons here but can see through care everywhere that his platelets were 76,000 at [facility] about a year ago. He has no recollection of ever being told he has low platelets. He takes iron daily for his history of PRATIK and reports black stools due to this. Hgb on admission was 10.2 and 12.7 today. We have been consulted for his thrombocytopenia    Interval history:  A-fib on amiodarone. Rate controlled. Plts stable today 196,000 - status post IVIG x 3, steroids. One unit platelets given . Still requiring BiPAP. BMBx unable to be performed due to patient's condition. Medications:   Current Facility-Administered Medications   Medication Dose Route Frequency Provider Last Rate Last Dose    cefTRIAXone (ROCEPHIN) 1 g in 0.9% sodium chloride (MBP/ADV) 50 mL  1 g IntraVENous Q24H L[place] Oar.  Josie Waterman  mL/hr at 17 1145 1 g at 17 1145    famotidine (PF) (PEPCID) 20 mg in sodium chloride 0.9 % 10 mL injection  20 mg IntraVENous DAILY Ebb MD Kimberly   20 mg at 17 0856    mupirocin (BACTROBAN) 2 % ointment   Both Nostrils BID Livia Brown MD        warfarin (COUMADIN) tablet 2 mg  2 mg Oral QHS Kyra Hidalgo MD   2 mg at 17 8790    carvedilol (COREG) tablet 25 mg  25 mg Oral BID WITH MEALS Kyra Hidalgo MD   25 mg at 17 5246    amiodarone (CORDARONE) 450 mg in dextrose 5% 250 mL infusion  0.5-1 mg/min IntraVENous TITRATE Louie Lima MD 16.7 mL/hr at 09/01/17 0215 0.5 mg/min at 09/01/17 0215    methylPREDNISolone (PF) (SOLU-MEDROL) injection 60 mg  60 mg IntraVENous DAILY Uma Cuadra NP   60 mg at 09/01/17 0857    0.9% sodium chloride infusion 250 mL  250 mL IntraVENous PRN Uma Cuadra NP        furosemide (LASIX) injection 40 mg  40 mg IntraVENous DAILY Lionel Blake MD   40 mg at 09/01/17 0857    dilTIAZem (CARDIZEM) 100 mg in 0.9% sodium chloride (MBP/ADV) 100 mL infusion  0-15 mg/hr IntraVENous TITRATE Louie Lima MD   Stopped at 08/31/17 1836    acetaminophen (TYLENOL) suppository 650 mg  650 mg Rectal Q4H PRN Howard Alves MD   650 mg at 08/30/17 1555    sodium chloride (NS) flush 20 mL  20 mL InterCATHeter Alok Carroll MD   20 mL at 09/01/17 0605    heparin (porcine) pf 600 Units  600 Units InterCATHeter Q8H Jay Putnam MD   600 Units at 09/01/17 0273    sodium chloride (NS) flush 20 mL  20 mL InterCATHeter PRN Jay Putnam MD        heparin (porcine) pf 600 Units  600 Units InterCATHeter PRN Jay Putnam MD        albuterol (PROVENTIL VENTOLIN) nebulizer solution 2.5 mg  2.5 mg Nebulization Q4H PRN Salvador Suazo MD        sacubitril-valsartan (ENTRESTO) 24-26 mg tablet 1 Tab  1 Tab Oral Q12H Elena Moss MD   1 Tab at 09/01/17 0856    dextrose (D50W) injection syrg 25 g  25 g IntraVENous PRN Elena Moss MD   25 g at 08/25/17 1618    ferrous sulfate tablet 325 mg  1 Tab Oral DAILY WITH BREAKFAST Onelia Cordova NP   325 mg at 09/01/17 0855    acetaminophen (TYLENOL) tablet 650 mg  650 mg Oral Q6H PRN Elena Moss MD   650 mg at 08/29/17 0526    gabapentin (NEURONTIN) capsule 600 mg  600 mg Oral TID Rodolfo Correa MD   600 mg at 09/01/17 0605    albuterol (PROVENTIL HFA, VENTOLIN HFA, PROAIR HFA) inhaler 2 Puff  2 Puff Inhalation Q4H PRN Lui Martinez Manju Betts MD   2 Puff at 08/30/17 1724    tiotropium (SPIRIVA) inhalation capsule 18 mcg  1 Cap Inhalation DAILY Pura Hankins MD   Stopped at 08/30/17 0900    sodium chloride (NS) flush 5-10 mL  5-10 mL IntraVENous Genaro Nugent MD   10 mL at 09/01/17 0606    sodium chloride (NS) flush 5-10 mL  5-10 mL IntraVENous PRN Pura Hankins MD        nitroglycerin (NITROSTAT) tablet 0.4 mg  0.4 mg SubLINGual Q5MIN PRN Pura Hankins MD        morphine injection 2 mg  2 mg IntraVENous Q4H PRN Pura Hankins MD   2 mg at 08/31/17 0122    ondansetron (ZOFRAN) injection 4 mg  4 mg IntraVENous Q4H PRN Pura Hankins MD        insulin lispro (HUMALOG) injection   SubCUTAneous AC&HS Pura Hankins MD   Stopped at 09/01/17 0730    budesonide (PULMICORT) 500 mcg/2 ml nebulizer suspension  500 mcg Nebulization BID RT Pura Hankins MD   500 mcg at 09/01/17 0748    And    albuterol CONCENTRATE 2.5mg/0.5 mL neb soln  2.5 mg Nebulization Q6H RT Pura Hankins MD   2.5 mg at 09/01/17 0748       Allergies: Allergies   Allergen Reactions    Levaquin [Levofloxacin] Other (comments)     GI upset    Metformin Other (comments)     Gi upset       Review of Systems: The Review of Systems is documented in full in the internal medical record. All systems are negative other than for those noted above. Physical Examination:  General Appearance: Ill appearing patient in no acute distress. Vital signs:   Visit Vitals    /81    Pulse (!) 110    Temp 97.4 °F (36.3 °C)    Resp 19    Ht 5' 6\" (1.676 m)    Wt 172 lb 13.5 oz (78.4 kg)    SpO2 98%    BMI 27.9 kg/m2     HEENT: No oral or pharyngeal masses. There is no ulceration or thrush. Lymph nodes: There is no cervical, supraclavicular, axillary adenopathy. Lungs: Scattered crackles bilaterally. On BiPAP. Heart: There is no jugular venous distention. Regular, irregular. Generalized edema.    Abdomen: Soft, non-tender, bowel sounds present and normal, no appreciated hepatosplenomegaly. No palpable masses. Skin: No rash, petechiae or ecchymoses. No evidence of malignancy. Neuro: Alert today with no obvious focal deficits. .     Labs/Imaging:  Lab Results   Component Value Date/Time    WBC 5.5 09/01/2017 05:12 AM    HGB 11.4 09/01/2017 05:12 AM    HCT 34.5 09/01/2017 05:12 AM    PLATELET 683 37/64/0708 05:12 AM    MCV 89.0 09/01/2017 05:12 AM       Lab Results   Component Value Date/Time    Sodium 133 09/01/2017 04:06 AM    Potassium 5.4 09/01/2017 04:06 AM    Chloride 91 09/01/2017 04:06 AM    CO2 39 09/01/2017 04:06 AM    Anion gap 3 09/01/2017 04:06 AM    Glucose 159 09/01/2017 04:06 AM    BUN 72 09/01/2017 04:06 AM    Creatinine 1.43 09/01/2017 04:06 AM    GFR est AA >60 09/01/2017 04:06 AM    GFR est non-AA 52 09/01/2017 04:06 AM    Calcium 8.8 09/01/2017 04:06 AM    AST (SGOT) 25 08/23/2017 01:05 PM    Alk. phosphatase 86 08/23/2017 01:05 PM    Protein, total 7.4 08/23/2017 01:05 PM    Albumin 3.6 08/23/2017 01:05 PM    Globulin 3.8 08/23/2017 01:05 PM    A-G Ratio 0.9 08/23/2017 01:05 PM    ALT (SGPT) 30 08/23/2017 01:05 PM           ASSESSMENT:  This gentleman has a thrombocytopenia which is isolated (normal RBC and WBC) that has been present for at least one year. The most likely diagnosis is ITP, valvular consumption and/or medications. The level of platelet count in and of itself is not concerning. It is the requirement for warfarin given his mechanical valve which creates difficulties when combined with this thrombocytopenia. His risk for bleeding is substantially increased especially with platelet counts below 50,000. Dr. Milton Grullon was unable to be performed at bedside BMbx due to the decline in patient's condition on Tuesday. He developed hypercapnic respiratiory failure requiring him to be placed on BiPAP - continues BiPAP. Platelets were down to 18,000 on 08/29 so he was given one unit of platelets.   Today they remain normal at 196,000. He is s/p IVIG x 3 and he continues IV steroids daily. PLAN:  Continue solumedrol IV. Monitor blood sugars closely - may need sliding scale. Platelet level stable at 196,000. Transfuse platelets as needed - keep above 50,000. Coumadin necessary due to mechanical heart valve. Continue daily CBC to monitor platelet count. BMBx can be done as outpatient or when out of ICU. Constantine Morris NP  [facility]  [address] [place] [address]  P [phone]          Attending Addendum:  I personally evaluated the patient with Steven Jefferson N.P.,  and agree with the assessment, findings and plan as documented. Appears stable, heart regular without murmur, lungs clear, abdomen benign. Thrombocytopenia much improved. Continue current steroids.  Remains on coumadin              Ace Buchanan MD  [address] [place] [address]  Office : [phone]  Fax : [phone]

## 2017-09-01 NOTE — PROGRESS NOTES
Care assumed, bedside report received from [facility]. Pt resting quietly on Bipap. Wakes and nods/answers yes/no questions appropriately. Also follows commands. HR 58 remains in Aflutter, Amio gtt at 0.5 mg/min.

## 2017-09-01 NOTE — PROGRESS NOTES
Problem: Nutrition Deficit  Goal: *Optimize nutritional status  Nutrition F/U:  TF Management for [facility]. Assessment:  Weight 78.4 kg (CCU bed 9/1/17), edema - trace. Noted the results of his interaction with ST yesterday and he remains unable to participate in the MBS scheduled for today. TF was started yesterday with my suggestion of Glucerna 1.2 @ 55 ml/hr with a 20 ml/hr water flush. This regimen over a 24-hour period would contribute 68 meq potassium/day. Since it appears that he is remaining hyperkalemic, will change TF formula to Nepro at 40 ml/hr with a 45 ml/hr water flush. Labs are remarkable for AM glucose 159 (currently on steroids), BUN 72, creatinine 1.43 and potassium 5.4. Macronutrient Needs:  Estimated calorie needs - 1301-1424 rina/day (20-25 rina/kg/day)   Estimated protein needs - 72-78 gm pro/day (1.1-1.2 gm pro/kgIBW/day) (GFR 52 ml/min)  Max CHO/day - 247 gm CHO/day (50% rina/day)  Fluid/day - 1.6-2 liters/day (1 ml/rina/day)  Intake/Comparative Standards:  Current intake of TF (Glucerna 1.2 with 20 ml/hr water flush) provides 1584 calories/day (100% calorie goal), 79 grams protein/day (100% protein goal), 152 grams CHO/day (does not exceed max CHO limit) and 1589 ml water/day (100% fluid goal). Potassium - 68 meq/day. Intervention:   Meals and Snacks: NPO. Enteral Nutrition: Change TF to Nepro @ 40 ml/hr with a 45 ml/hr water flush - 1728 calories/day (100% calorie goal), 78 grams protein/day (100% protein goal), 160 grams CHO/day (does not exceed max CHO limit) and 1776 ml water/day (100% fluid goal). Potassium - 26 meq/day. Mineral Supplement Therapy: Nutrition Support Orders/Electrolyte Management Replacement Protocols are active on the MAR. Coordination of Nutrition Care by a Nutrition Professional: AM CCU rounds, collaboration with Adeline Falcon, [place]. Nutrition Discharge Plan: Too soon to determine. Subha Tarango.  Orlando Massed  [phone]

## 2017-09-01 NOTE — PROGRESS NOTES
Critical Care Daily Progress Note: 9/1/2017    Jackqulyn Paget   Admission Date: 8/23/2017         The patient's chart is reviewed and the patient is discussed with the staff.    72 y.o.  CM presents with atrial flutter with RVR and hypotension. Was seen by cardiology, had a MERLYN and cardioverted.  Has had intermittent confusion since a fall with head CT and MRI showing no acute abnormality.  Had mechanical AVR 2000 and on Coumadin. Has been on Lasix and his Na 128.  Has converted to afib and HR is controlled. Was scheduled for MBS but was more confused prompting an ABG showing hypercapnic respiratory failure and transferred to the ICU. Placed on BIPAP but kept pulling off mask and changed to NC. He also has thrombocytopenia and been followed by Hem/Onc while here. He is anticoagulated on Coumadin for mechanical valve. Has severe COPD managed on Spiriva, Symbicort and Albuterol at home. Thrombocytopenia management through [place]. Long term history of tobacco abuse. Subjective:     Resting in bed, on Optiflow 40L, 35% with O2 sat 95%. Cough with milky green sputum.  Wife at bedside     Current Facility-Administered Medications   Medication Dose Route Frequency    cefTRIAXone (ROCEPHIN) 1 g in 0.9% sodium chloride (MBP/ADV) 50 mL  1 g IntraVENous Q24H    famotidine (PF) (PEPCID) 20 mg in sodium chloride 0.9 % 10 mL injection  20 mg IntraVENous DAILY    mupirocin (BACTROBAN) 2 % ointment   Both Nostrils BID    warfarin (COUMADIN) tablet 2 mg  2 mg Oral QHS    carvedilol (COREG) tablet 25 mg  25 mg Oral BID WITH MEALS    amiodarone (CORDARONE) 450 mg in dextrose 5% 250 mL infusion  0.5-1 mg/min IntraVENous TITRATE    methylPREDNISolone (PF) (SOLU-MEDROL) injection 60 mg  60 mg IntraVENous DAILY    0.9% sodium chloride infusion 250 mL  250 mL IntraVENous PRN    furosemide (LASIX) injection 40 mg  40 mg IntraVENous DAILY    dilTIAZem (CARDIZEM) 100 mg in 0.9% sodium chloride (MBP/ADV) 100 mL infusion  0-15 mg/hr IntraVENous TITRATE    acetaminophen (TYLENOL) suppository 650 mg  650 mg Rectal Q4H PRN    sodium chloride (NS) flush 20 mL  20 mL InterCATHeter Q8H    heparin (porcine) pf 600 Units  600 Units InterCATHeter Q8H    sodium chloride (NS) flush 20 mL  20 mL InterCATHeter PRN    heparin (porcine) pf 600 Units  600 Units InterCATHeter PRN    albuterol (PROVENTIL VENTOLIN) nebulizer solution 2.5 mg  2.5 mg Nebulization Q4H PRN    sacubitril-valsartan (ENTRESTO) 24-26 mg tablet 1 Tab  1 Tab Oral Q12H    dextrose (D50W) injection syrg 25 g  25 g IntraVENous PRN    ferrous sulfate tablet 325 mg  1 Tab Oral DAILY WITH BREAKFAST    acetaminophen (TYLENOL) tablet 650 mg  650 mg Oral Q6H PRN    gabapentin (NEURONTIN) capsule 600 mg  600 mg Oral TID    albuterol (PROVENTIL HFA, VENTOLIN HFA, PROAIR HFA) inhaler 2 Puff  2 Puff Inhalation Q4H PRN    tiotropium (SPIRIVA) inhalation capsule 18 mcg  1 Cap Inhalation DAILY    sodium chloride (NS) flush 5-10 mL  5-10 mL IntraVENous Q8H    sodium chloride (NS) flush 5-10 mL  5-10 mL IntraVENous PRN    nitroglycerin (NITROSTAT) tablet 0.4 mg  0.4 mg SubLINGual Q5MIN PRN    morphine injection 2 mg  2 mg IntraVENous Q4H PRN    ondansetron (ZOFRAN) injection 4 mg  4 mg IntraVENous Q4H PRN    insulin lispro (HUMALOG) injection   SubCUTAneous AC&HS    budesonide (PULMICORT) 500 mcg/2 ml nebulizer suspension  500 mcg Nebulization BID RT    And    albuterol CONCENTRATE 2.5mg/0.5 mL neb soln  2.5 mg Nebulization Q6H RT       Review of Systems  Constitutional:  negative for fever, chills, sweats  Cardiovascular:  negative for chest pain, palpitations, syncope, edema  Gastrointestinal:  negative for dysphagia, reflux, vomiting, diarrhea, abdominal pain, or melena  Neurologic:  negative for focal weakness, numbness, headache      Objective:     Vitals:    09/01/17 0629 09/01/17 0659 09/01/17 0759 09/01/17 0844   BP: 151/72 126/69  140/81   Pulse: 88 (!) 110 (!) 110   Resp: 17 20 19   Temp:  97.4 °F (36.3 °C)     SpO2: 98% 98% 97% 98%   Weight:       Height:           Intake and Output:   08/30 1901 - 09/01 0700  In: 3087.3 [I.V.:1887.5]  Out: 2782 [Urine:1075]  09/01 0701 - 09/01 1900  In: -   Out: 400 [Urine:400]    Physical Exam:          Constitutional:  the patient is well developed and in no acute distress, on Optiflow 40L, 35% with O2 sat 95%  EENMT:  Sclera clear, pupils equal, oral mucosa moist  Respiratory: decreased breath sounds, no wheezing, few scattered crackles. Cardiovascular:  RRR without M,G,R  Gastrointestinal: soft and non-tender; with positive bowel sounds. Musculoskeletal: warm without cyanosis. There is no lower leg edema. Skin:  no jaundice or rashes, no open wounds   Neurologic: no gross neuro deficits     Psychiatric:  alert and oriented x 3     LINES:  PICC, peripheral, condom cath, NG tube    DRIPS:   Amiodarone     CXR: 8/30/17        LAB  Recent Labs      09/01/17   0708  08/31/17   2308  08/31/17   1701  08/31/17   1138  08/31/17   0852   GLUCPOC  146*  172*  169*  134*  151*      Recent Labs      09/01/17   0512  09/01/17   0406  08/31/17   1144  08/31/17   0259   08/30/17   0523   WBC  5.5   --    --   5.6   --   5.3   HGB  11.4*   --    --   11.9*   --   13.0*   HCT  34.5*   --    --   36.3*   --   39.1*   PLT  196   --   208  208   < >  18*   INR   --   3.4*   --   2.9*   --   3.2*    < > = values in this interval not displayed. Recent Labs      09/01/17   0406  08/31/17   0259  08/30/17   0523   NA  133*  131*  130*   K  5.4*  5.5*  5.4*   CL  91*  89*  91*   CO2  39*  39*  36*   GLU  159*  154*  121*   BUN  72*  58*  46*   CREA  1.43  1.42  1.26   MG   --   2.7*   --    CA  8.8  9.0  8.8     Recent Labs      09/01/17   0318  08/31/17   0357  08/30/17   0738   PH  7.37  7.32*  7.32*   PCO2  71*  82*  75*   PO2  121*  187*  95   HCO3  40*  41*  38*     No results for input(s): LCAD, LAC in the last 72 hours.     Assessment: (Medical Decision Making)      Patient Active Problem List   Diagnosis Code    Anticoagulant long-term use Z79.01    HTN (hypertension), benign I10    Peripheral vascular disease ([facility] [address] I73.9    Atherosclerosis of native arteries of the extremities with intermittent claudication I70.219    Dyslipidemia E78.5    History of mechanical aortic valve replacement Z95.2    Centrilobular emphysema (HCC) J43.2    LV dysfunction I51.9    Ischemic cardiomyopathy I25.5    Atrial flutter with rapid ventricular response (HCC) I48.92    Thrombocytopenia (HCC) D69.6    Acute respiratory failure with hypercapnia (HCC) J96.02    History of tobacco use Z87.891    Hyponatremia E87.1    Acute encephalopathy G93.40    COPD (chronic obstructive pulmonary disease) (HCC) J44.9         Plan:  (Medical Decision Making)     Hospital Problems  Date Reviewed: 9/1/2017          Codes Class Noted POA    COPD (chronic obstructive pulmonary disease) (HCC) (Chronic) ICD-10-CM: J44.9  ICD-9-CM: 496  8/30/2017 Yes    Continue Albuterol, Pulmicort, Spiriva. Continue Rocephin, repeat CXR to assess for new infiltrate   Solu-Medrol 60mg IV daily-- will wean  Add mucinex       Acute respiratory failure with hypercapnia ([facility] ICD-10-CM: J96.02  ICD-9-CM: 518.81  8/29/2017 No    Weaned to Optiflow 35%, 40L     History of tobacco use (Chronic) ICD-10-CM: E54.026  ICD-9-CM: V15.82  8/29/2017 Yes    Unchanged     Hyponatremia ICD-10-CM: E87.1  ICD-9-CM: 276.1  8/29/2017 Yes    Na up to 133 today from 131 yesterday    Acute encephalopathy ICD-10-CM: G93.40  ICD-9-CM: 348.30  8/29/2017 Yes    Better, alert and conversant, answering questions appropriately     Thrombocytopenia ([facility] ICD-10-CM: D69.6  ICD-9-CM: 287.5  8/26/2017 Yes    Hematology following, platelets 927 .  Not planning bone marrow biopsy this admission    Anticoagulant long-term use (Chronic) ICD-10-CM: Z79.01  ICD-9-CM: V58.61  8/23/2017 Yes    On coumadin    HTN (hypertension), benign (Chronic) ICD-10-CM: I10  ICD-9-CM: 401.1  8/23/2017 Yes    chronic     Peripheral vascular disease ([facility] (Chronic) ICD-10-CM: I73.9  ICD-9-CM: 443.9  8/23/2017 Yes    Chronic     Dyslipidemia (Chronic) ICD-10-CM: E78.5  ICD-9-CM: 272.4  8/23/2017 Yes    Chronic     History of mechanical aortic valve replacement (Chronic) ICD-10-CM: Z95.2  ICD-9-CM: V43.3  8/23/2017 Yes     Placement 2000, on chronicCoumadin             Centrilobular emphysema ([facility] (Chronic) ICD-10-CM: J43.2  ICD-9-CM: 492.8  8/23/2017 Yes    With last PFTs FVC 1.70 L or 44% predicted, FEV1 0.86 L or 29% predicted, FEV1/FVC 51%. This study was a postbronchodilator study [facility] 8/22/2016      Ischemic cardiomyopathy (Chronic) ICD-10-CM: I25.5  ICD-9-CM: 414.8  8/23/2017 Yes     8/23/17 ECHO:  EF 40 % to 45 %. There were no regional wall motion abnormalities. Moderate hypokinesis of the mid-apical anterior and apical wall(s). On daily lasix, cardiology following     * (Principal)Atrial flutter with rapid ventricular response ([facility] ICD-10-CM: I48.92  ICD-9-CM: 427.32  8/23/2017 Yes    On IV amiodarone and coumadin, per cardiology         More than 50% of the time documented was spent in face-to-face contact with the patient and in the care of the patient on the floor/unit where the patient is located. Kimberlee Rodríguez NP    The patient has been seen and examined by me personally, the chart,labs, and radiographic studies have been reviewed. Chest: Faint end expiratory wheezing and prolonged expiratory phase  Extremities: 1+ edema    I agree with the above assessment and plan.     Mayo Sanchez MD.

## 2017-09-01 NOTE — PROGRESS NOTES
Shift report received from Britt Ferrara, [facility]. Pt resting in bed quietly. VSS. See flowsheet for full assessment.

## 2017-09-01 NOTE — PROGRESS NOTES
Hospitalist Progress Note    2017  Admit Date: 2017  1:11 PM   NAME: Giuseppe Montelongo   :  1945   MRN:  [id number]   Attending: Jonh Lerma MD  PCP:  Nasim Mar MD    SUBJECTIVE:   As previously documented:  'Giuseppe Montelongo is a 67 y.o. male who is being seen for metabolic encephalopathy.   The patient was admitted for atrial flutter with RVR and hypotension. He subsequently got a MERLYN and dCV by cardiology. He took a fall the night of hospital day #2 and has been slowly getting more lethargic and confused over the past 4 days. Per nursing and the patient's wife, the patient was lucid and laughing at admission and since the fall has not been getting better. A CT and MRI of the brain was done, which was unremarkable.'    17- he was noted to be very lethargic and confused yesterday. He had ABG showing acute hypercapnic respiratory failure and was transferred to CCU for BiPAP. He is also being evaluated for possible ITP per hematology and is on prednisone and has received 3 days of IVIG. Today, he briefly awakens. Nods yes to breathing okay. Will not try to talk over BiPAP. Followed commands to give thumbs up.  - seen with his wife at bedside. He is much more alert today. BiPAP mask removed during evaluation and he states he does not feel well. States 'I feel like I'm dying.'  He complains of some back discomfort and weakness but generally denies other concerns. He states he is hungry and has not eaten for several days due to being NPO for modified barium swallow that was never done due to respiratory decompensation. He is alert and oriented to self/location/year. - seen with wife at bedside. He reports he is feeling about the same. Off BiPAP and on optiflow. According to RN, he desats quickly into the 62s when optiflow removed. He reports worsening cough now productive of yellow-green sputum.       Review of Systems negative with exception of pertinent positives noted above  PHYSICAL EXAM     Visit Vitals    /65    Pulse (!) 129    Temp 97.4 °F (36.3 °C)    Resp 25    Ht 5' 6\" (1.676 m)    Wt 78.4 kg (172 lb 13.5 oz)    SpO2 93%    BMI 27.9 kg/m2      Temp (24hrs), Av.7 °F (36.5 °C), Min:97.3 °F (36.3 °C), Max:98.3 °F (36.8 °C)    Oxygen Therapy  O2 Sat (%): 93 % (17)  Pulse via Oximetry: 129 beats per minute (17)  O2 Device: Hi flow nasal cannula;Humidifier (17)  O2 Flow Rate (L/min): 40 l/min (17)  O2 Temperature: 87.8 °F (31 °C) (17 0334)  FIO2 (%): 35 % (17)    Intake/Output Summary (Last 24 hours) at 17 1147  Last data filed at 17 0952   Gross per 24 hour   Intake          1543.43 ml   Output             1050 ml   Net           493.43 ml      General: No acute distress, on optiflow   Lungs:  Diminished breath sounds, wheezing noted posteriorly.    Heart:  Tachycardic  Abdomen: Soft, Non distended, Non tender, Positive bowel sounds  Extremities: No cyanosis, clubbing or edema, changes consistent with chronic venous insufficiency  Neurologic:  Alert and oriented    Recent Results (from the past 24 hour(s))   GLUCOSE, POC    Collection Time: 17  5:01 PM   Result Value Ref Range    Glucose (POC) 169 (H) 65 - 100 mg/dL   GLUCOSE, POC    Collection Time: 17 11:08 PM   Result Value Ref Range    Glucose (POC) 172 (H) 65 - 100 mg/dL   BLOOD GAS, ARTERIAL    Collection Time: 17  3:18 AM   Result Value Ref Range    pH 7.37 7.35 - 7.45      PCO2 71 (H) 35 - 45 mmHg    PO2 121 (H) 80 - 105 mmHg    BICARBONATE 40 (H) 22 - 26 mmol/L    BASE EXCESS 12.2 (H) 0 - 3 mmol/L    TOTAL HEMOGLOBIN 12.2 11.7 - 15.0 GM/DL    O2 SAT 98 92 - 98.5 %    Arterial O2 Hgb 96.8 94 - 97 %    CARBOXYHEMOGLOBIN 0.8 0.5 - 1.5 %    METHEMOGLOBIN 0.4 0.0 - 1.5 %    DEOXYHEMOGLOBIN 2 0.0 - 5.0 %    SITE RB     ALLENS TEST NA     MODE BIPAP 14 8     FIO2 40.0 %    RATE 16.0      PEEP/CPAP 8.0 Respiratory comment: Carissa Garza, RRT at 9 1 2017 3 23 28 AM. Read back. PROTHROMBIN TIME + INR    Collection Time: 09/01/17  4:06 AM   Result Value Ref Range    Prothrombin time 36.9 (H) 9.6 - 12.0 sec    INR 3.4 (H) 0.9 - 1.2     METABOLIC PANEL, BASIC    Collection Time: 09/01/17  4:06 AM   Result Value Ref Range    Sodium 133 (L) 136 - 145 mmol/L    Potassium 5.4 (H) 3.5 - 5.1 mmol/L    Chloride 91 (L) 98 - 107 mmol/L    CO2 39 (H) 21 - 32 mmol/L    Anion gap 3 (L) 7 - 16 mmol/L    Glucose 159 (H) 65 - 100 mg/dL    BUN 72 (H) 8 - 23 MG/DL    Creatinine 1.43 0.8 - 1.5 MG/DL    GFR est AA >60 >60 ml/min/1.73m2    GFR est non-AA 52 (L) >60 ml/min/1.73m2    Calcium 8.8 8.3 - 10.4 MG/DL   CBC WITH AUTOMATED DIFF    Collection Time: 09/01/17  5:12 AM   Result Value Ref Range    WBC 5.5 4.3 - 11.1 K/uL    RBC 3.88 (L) 4.23 - 5.67 M/uL    HGB 11.4 (L) 13.6 - 17.2 g/dL    HCT 34.5 (L) 41.1 - 50.3 %    MCV 89.0 79.6 - 97.8 FL    MCH 29.5 26.1 - 32.9 PG    MCHC 33.1 31.4 - 35.0 g/dL    RDW 16.1 (H) 11.9 - 14.6 %    PLATELET 311 236 - 141 K/uL    MPV 10.5 (L) 10.8 - 14.1 FL    DF AUTOMATED      NEUTROPHILS 79 (H) 43 - 78 %    LYMPHOCYTES 8 (L) 13 - 44 %    MONOCYTES 13 (H) 4.0 - 12.0 %    EOSINOPHILS 0 (L) 0.5 - 7.8 %    BASOPHILS 0 0.0 - 2.0 %    IMMATURE GRANULOCYTES 0.2 0.0 - 5.0 %    ABS. NEUTROPHILS 3.9 1.7 - 8.2 K/UL    ABS. LYMPHOCYTES 0.4 (L) 0.5 - 4.6 K/UL    ABS. MONOCYTES 0.7 0.1 - 1.3 K/UL    ABS. EOSINOPHILS 0.0 0.0 - 0.8 K/UL    ABS. BASOPHILS 0.0 0.0 - 0.2 K/UL    ABS. IMM. GRANS. 0.0 0.0 - 0.5 K/UL   GLUCOSE, POC    Collection Time: 09/01/17  7:08 AM   Result Value Ref Range    Glucose (POC) 146 (H) 65 - 100 mg/dL     Imaging:    XR ABD (KUB)   Final Result   IMPRESSION: Dobbhoff tube in a slightly proximal position in the left upper   abdominal quadrant. Recommend advancement of at least 10 cm. XR CHEST SNGL V   Final Result   IMPRESSION: No new consolidation.       XR CHEST PORT   Final Result IMPRESSION: Cardiac silhouette remains enlarged. MRI BRAIN WO CONT   Final Result   IMPRESSION: Mild supratentorial microischemia      CT HEAD WO CONT   Final Result   Impression:    1. No acute intracranial process. US ABD COMP   Final Result   IMPRESSION:      1. No significant splenomegaly to account for reported thrombocytopenia. 2.  Technically limited evaluation of the gallbladder and biliary tree on this   nonfasting examination, with no definite abnormality identified. XR CHEST PORT   Final Result      XR CHEST SNGL V    (Results Pending)         [place] Problems    Diagnosis Date Noted    COPD (chronic obstructive pulmonary disease) ([address] 08/30/2017    Acute respiratory failure with hypercapnia (HCC) 08/29/2017    History of tobacco use 08/29/2017    Hyponatremia 08/29/2017    Acute encephalopathy 08/29/2017    Thrombocytopenia ([address] 08/26/2017    Anticoagulant long-term use 08/23/2017    HTN (hypertension), benign 08/23/2017    Peripheral vascular disease ([address] 08/23/2017    Dyslipidemia 08/23/2017    History of mechanical aortic valve replacement 08/23/2017     Placement 2000, on chronicCoumadin      Centrilobular emphysema ([address] 08/23/2017    Ischemic cardiomyopathy 08/23/2017 8/23/17 ECHO:  EF 40 % to 45 %. There were no regional wall motion abnormalities. Moderate hypokinesis of the mid-apical anterior and apical wall(s).  Atrial flutter with rapid ventricular response ([address] 08/23/2017     Plan:  · AMS- Mental status improved. · COPD exacerbation/Acute hypercapnic respiratory failure- Continue BiPAP per pulmonology. On steroids. Not on abx. · Rocephin day 2. · Thrombocytopenia- Platelets improved after transfusion on 8/30 and relatively stable. · Bone marrow bx apparently on hold. · Hyponatremia- mild and stable. · Chronic systolic CHF- Entresto, carvedilol, lasix per cards (primary).   · Atrial flutter- per cardiology. S/p MERLYN cardioversion but back in atrial flutter. Amio per cards. · FEN- failed speech therapy again on 8/31. Started on tube feeds. DVT Prophylaxis: Coumadin    Signed By: Tejas Calhoun.  Radha Berry MD     September 1, 2017

## 2017-09-02 LAB
ANION GAP SERPL CALC-SCNC: 3 MMOL/L (ref 7–16)
BUN SERPL-MCNC: 66 MG/DL (ref 8–23)
CALCIUM SERPL-MCNC: 9.4 MG/DL (ref 8.3–10.4)
CHLORIDE SERPL-SCNC: 90 MMOL/L (ref 98–107)
CO2 SERPL-SCNC: 42 MMOL/L (ref 21–32)
CREAT SERPL-MCNC: 1.26 MG/DL (ref 0.8–1.5)
ERYTHROCYTE [DISTWIDTH] IN BLOOD BY AUTOMATED COUNT: 16.2 % (ref 11.9–14.6)
GLUCOSE BLD STRIP.AUTO-MCNC: 161 MG/DL (ref 65–100)
GLUCOSE BLD STRIP.AUTO-MCNC: 170 MG/DL (ref 65–100)
GLUCOSE BLD STRIP.AUTO-MCNC: 177 MG/DL (ref 65–100)
GLUCOSE SERPL-MCNC: 155 MG/DL (ref 65–100)
HCT VFR BLD AUTO: 34.1 % (ref 41.1–50.3)
HGB BLD-MCNC: 11.1 G/DL (ref 13.6–17.2)
INR PPP: 2.5 (ref 0.9–1.2)
MAGNESIUM SERPL-MCNC: 2.7 MG/DL (ref 1.8–2.4)
MCH RBC QN AUTO: 29.3 PG (ref 26.1–32.9)
MCHC RBC AUTO-ENTMCNC: 32.6 G/DL (ref 31.4–35)
MCV RBC AUTO: 89.9 FL (ref 79.6–97.8)
PHOSPHATE SERPL-MCNC: 3.5 MG/DL (ref 2.3–3.7)
PLATELET # BLD AUTO: 84 K/UL (ref 150–450)
PMV BLD AUTO: 11 FL (ref 10.8–14.1)
POTASSIUM SERPL-SCNC: 5 MMOL/L (ref 3.5–5.1)
PROTHROMBIN TIME: 27.5 SEC (ref 9.6–12)
RBC # BLD AUTO: 3.8 M/UL (ref 4.23–5.67)
SODIUM SERPL-SCNC: 135 MMOL/L (ref 136–145)
WBC # BLD AUTO: 8.4 K/UL (ref 4.3–11.1)

## 2017-09-02 PROCEDURE — 83735 ASSAY OF MAGNESIUM: CPT | Performed by: PHYSICIAN ASSISTANT

## 2017-09-02 PROCEDURE — 74011000250 HC RX REV CODE- 250: Performed by: INTERNAL MEDICINE

## 2017-09-02 PROCEDURE — 74011250637 HC RX REV CODE- 250/637: Performed by: INTERNAL MEDICINE

## 2017-09-02 PROCEDURE — 74011000258 HC RX REV CODE- 258: Performed by: INTERNAL MEDICINE

## 2017-09-02 PROCEDURE — 80048 BASIC METABOLIC PNL TOTAL CA: CPT | Performed by: PHYSICIAN ASSISTANT

## 2017-09-02 PROCEDURE — 82962 GLUCOSE BLOOD TEST: CPT

## 2017-09-02 PROCEDURE — 94660 CPAP INITIATION&MGMT: CPT

## 2017-09-02 PROCEDURE — 74011250636 HC RX REV CODE- 250/636: Performed by: INTERNAL MEDICINE

## 2017-09-02 PROCEDURE — 74011636637 HC RX REV CODE- 636/637: Performed by: NURSE PRACTITIONER

## 2017-09-02 PROCEDURE — 85027 COMPLETE CBC AUTOMATED: CPT | Performed by: INTERNAL MEDICINE

## 2017-09-02 PROCEDURE — 74011250636 HC RX REV CODE- 250/636: Performed by: NURSE PRACTITIONER

## 2017-09-02 PROCEDURE — 65620000000 HC RM CCU GENERAL

## 2017-09-02 PROCEDURE — 94640 AIRWAY INHALATION TREATMENT: CPT

## 2017-09-02 PROCEDURE — 36592 COLLECT BLOOD FROM PICC: CPT

## 2017-09-02 PROCEDURE — 85610 PROTHROMBIN TIME: CPT | Performed by: PHYSICIAN ASSISTANT

## 2017-09-02 PROCEDURE — 99232 SBSQ HOSP IP/OBS MODERATE 35: CPT | Performed by: INTERNAL MEDICINE

## 2017-09-02 PROCEDURE — 74011250636 HC RX REV CODE- 250/636: Performed by: HOSPITALIST

## 2017-09-02 PROCEDURE — 77030010547 HC BG URIN W/UMETER -A

## 2017-09-02 PROCEDURE — 84100 ASSAY OF PHOSPHORUS: CPT | Performed by: PHYSICIAN ASSISTANT

## 2017-09-02 PROCEDURE — 77010033711 HC HIGH FLOW OXYGEN

## 2017-09-02 PROCEDURE — 77030018798 HC PMP KT ENTRL FED COVD -A

## 2017-09-02 PROCEDURE — 74011250637 HC RX REV CODE- 250/637: Performed by: NURSE PRACTITIONER

## 2017-09-02 RX ORDER — AMIODARONE HYDROCHLORIDE 200 MG/1
400 TABLET ORAL EVERY 12 HOURS
Status: DISCONTINUED | OUTPATIENT
Start: 2017-09-02 | End: 2017-09-10

## 2017-09-02 RX ADMIN — ALBUTEROL SULFATE 2.5 MG: 2.5 SOLUTION RESPIRATORY (INHALATION) at 07:57

## 2017-09-02 RX ADMIN — INSULIN LISPRO 2 UNITS: 100 INJECTION, SOLUTION INTRAVENOUS; SUBCUTANEOUS at 13:00

## 2017-09-02 RX ADMIN — CARVEDILOL 25 MG: 25 TABLET, FILM COATED ORAL at 18:30

## 2017-09-02 RX ADMIN — METHYLPREDNISOLONE SODIUM SUCCINATE 40 MG: 125 INJECTION, POWDER, FOR SOLUTION INTRAMUSCULAR; INTRAVENOUS at 09:09

## 2017-09-02 RX ADMIN — SODIUM CHLORIDE, PRESERVATIVE FREE 300 UNITS: 5 INJECTION INTRAVENOUS at 05:28

## 2017-09-02 RX ADMIN — AMIODARONE HYDROCHLORIDE 400 MG: 200 TABLET ORAL at 21:29

## 2017-09-02 RX ADMIN — TIOTROPIUM BROMIDE 18 MCG: 18 CAPSULE ORAL; RESPIRATORY (INHALATION) at 07:58

## 2017-09-02 RX ADMIN — MORPHINE SULFATE 2 MG: 2 INJECTION, SOLUTION INTRAMUSCULAR; INTRAVENOUS at 14:52

## 2017-09-02 RX ADMIN — GABAPENTIN 600 MG: 300 CAPSULE ORAL at 05:27

## 2017-09-02 RX ADMIN — MORPHINE SULFATE 2 MG: 2 INJECTION, SOLUTION INTRAMUSCULAR; INTRAVENOUS at 10:49

## 2017-09-02 RX ADMIN — BUDESONIDE 500 MCG: 0.5 INHALANT RESPIRATORY (INHALATION) at 07:57

## 2017-09-02 RX ADMIN — INSULIN LISPRO 2 UNITS: 100 INJECTION, SOLUTION INTRAVENOUS; SUBCUTANEOUS at 17:23

## 2017-09-02 RX ADMIN — FAMOTIDINE 20 MG: 10 INJECTION, SOLUTION INTRAVENOUS at 09:09

## 2017-09-02 RX ADMIN — FERROUS SULFATE TAB 325 MG (65 MG ELEMENTAL FE) 325 MG: 325 (65 FE) TAB at 09:08

## 2017-09-02 RX ADMIN — FUROSEMIDE 40 MG: 10 INJECTION, SOLUTION INTRAMUSCULAR; INTRAVENOUS at 09:09

## 2017-09-02 RX ADMIN — MUPIROCIN: 20 OINTMENT TOPICAL at 09:10

## 2017-09-02 RX ADMIN — SODIUM CHLORIDE, PRESERVATIVE FREE 600 UNITS: 5 INJECTION INTRAVENOUS at 21:29

## 2017-09-02 RX ADMIN — SACUBITRIL AND VALSARTAN 1 TABLET: 24; 26 TABLET, FILM COATED ORAL at 09:10

## 2017-09-02 RX ADMIN — ACETAMINOPHEN 650 MG: 325 TABLET ORAL at 21:29

## 2017-09-02 RX ADMIN — CEFTRIAXONE 1 G: 1 INJECTION, POWDER, FOR SOLUTION INTRAMUSCULAR; INTRAVENOUS at 10:41

## 2017-09-02 RX ADMIN — Medication 20 ML: at 05:27

## 2017-09-02 RX ADMIN — BUDESONIDE 500 MCG: 0.5 INHALANT RESPIRATORY (INHALATION) at 19:54

## 2017-09-02 RX ADMIN — Medication 10 ML: at 05:28

## 2017-09-02 RX ADMIN — ALBUTEROL SULFATE 2.5 MG: 2.5 SOLUTION RESPIRATORY (INHALATION) at 13:49

## 2017-09-02 RX ADMIN — ALBUTEROL SULFATE 2.5 MG: 2.5 SOLUTION RESPIRATORY (INHALATION) at 01:58

## 2017-09-02 RX ADMIN — Medication 10 ML: at 13:48

## 2017-09-02 RX ADMIN — INSULIN LISPRO 2 UNITS: 100 INJECTION, SOLUTION INTRAVENOUS; SUBCUTANEOUS at 05:27

## 2017-09-02 RX ADMIN — Medication 20 ML: at 21:29

## 2017-09-02 RX ADMIN — SACUBITRIL AND VALSARTAN 1 TABLET: 24; 26 TABLET, FILM COATED ORAL at 21:29

## 2017-09-02 RX ADMIN — WARFARIN SODIUM 2 MG: 1 TABLET ORAL at 21:37

## 2017-09-02 RX ADMIN — GABAPENTIN 600 MG: 300 CAPSULE ORAL at 13:44

## 2017-09-02 RX ADMIN — Medication 20 ML: at 13:48

## 2017-09-02 RX ADMIN — GABAPENTIN 600 MG: 300 CAPSULE ORAL at 21:37

## 2017-09-02 RX ADMIN — CARVEDILOL 25 MG: 25 TABLET, FILM COATED ORAL at 09:08

## 2017-09-02 RX ADMIN — Medication 10 ML: at 21:29

## 2017-09-02 RX ADMIN — ALBUTEROL SULFATE 2.5 MG: 2.5 SOLUTION RESPIRATORY (INHALATION) at 19:54

## 2017-09-02 RX ADMIN — MUPIROCIN: 20 OINTMENT TOPICAL at 17:09

## 2017-09-02 RX ADMIN — AMIODARONE HYDROCHLORIDE 400 MG: 200 TABLET ORAL at 13:00

## 2017-09-02 RX ADMIN — SODIUM CHLORIDE, PRESERVATIVE FREE 600 UNITS: 5 INJECTION INTRAVENOUS at 13:44

## 2017-09-02 NOTE — PROGRESS NOTES
Hospitalist Progress Note     Admit Date:  2017  1:11 PM   Name:  Sugar Sherwood   Age:  67 y.o.  :  1945   MRN:  [id number]   PCP:  Fredrich Sandhoff, MD  Treatment Team: Attending Provider: Jennyfer Murray MD; Consulting Provider: Jennyfer Murray MD; Utilization Review: Jodi Emmanuel; Consulting Provider: Stephanie Villela MD; Consulting Provider: Rey Xavier MD; Care Manager: Sepideh Baltazar; Consulting Provider: Romaine Oviedo MD; Consulting Provider: Luana Salinas MD    Subjective:     Charley Rahman is a 66 yo male who presented with dyspnea on a background of mechanical AVR in , chronic anticoagulation with coumadin, chronic systolic CHF/ICM EF 50%, COPD, PVD, DM II, HTN, iron deficiency, and dyslipidemia. He is currently being treated for atrial fibrillation with RVR. This morning, he was able to nod yes or no to questions and denied the following: chest pain, fever or chills. He did report some dyspnea (unchanged from yesterday).     Objective:   Patient Vitals for the past 24 hrs:   Temp Pulse Resp BP SpO2   17 1359 - 76 21 109/61 98 %   17 1353 - - - - 95 %   17 1329 - 76 18 117/57 97 %   17 1300 97.5 °F (36.4 °C) 62 14 107/53 96 %   17 1159 - 77 14 (!) 85/53 98 %   17 1130 - (!) 58 15 (!) 89/54 97 %   17 1100 - (!) 58 19 104/56 97 %   17 1029 - (!) 58 29 (!) 88/54 96 %   17 0959 - (!) 59 28 91/52 97 %   17 0929 - 78 22 106/59 97 %   17 0908 - 81 - 123/76 -   17 0859 - 92 18 123/76 96 %   17 0830 - 90 22 121/59 99 %   17 0814 - (!) 113 22 95/55 99 %   17 0801 - - - - 96 %   17 0759 97.7 °F (36.5 °C) 97 29 117/63 96 %   17 0745 - 91 16 108/51 96 %   17 0730 - 78 21 103/65 94 %   17 0715 - 83 25 95/54 95 %   17 0700 - 97 17 129/69 95 %   17 0629 - 92 17 110/64 96 %   17 0545 - 99 19 114/57 97 %   17 0530 - 90 20 102/67 97 %   17 0514 97.4 °F (36.3 °C) 95 17 91/55 97 %   09/02/17 0459 - 94 21 114/62 96 %   09/02/17 0444 - 90 18 99/56 95 %   09/02/17 0429 - 88 18 119/61 96 %   09/02/17 0414 - 87 18 108/68 96 %   09/02/17 0359 - 90 26 112/59 96 %   09/02/17 0350 - 88 19 115/66 96 %   09/02/17 0330 - 86 16 130/66 97 %   09/02/17 0244 - 95 17 128/71 97 %   09/02/17 0229 - 98 17 119/75 97 %   09/02/17 0214 - 89 17 140/81 97 %   09/02/17 0200 - (!) 108 15 132/78 97 %   09/02/17 0158 - - - - 95 %   09/02/17 0145 - 66 15 112/55 94 %   09/02/17 0129 - (!) 57 14 101/50 94 %   09/02/17 0114 - (!) 57 15 101/54 95 %   09/02/17 0059 - 62 15 102/53 94 %   09/02/17 0044 - 66 14 100/50 94 %   09/02/17 0029 - 64 15 95/52 95 %   09/02/17 0015 - 67 14 (!) 88/52 95 %   09/02/17 0000 96.8 °F (36 °C) 75 16 128/59 97 %   09/01/17 2344 - 89 (!) 32 123/70 97 %   09/01/17 2329 - 85 22 115/66 97 %   09/01/17 2314 - 94 (!) 34 135/67 (!) 84 %   09/01/17 2300 - 86 17 132/68 98 %   09/01/17 2253 - - - - 98 %   09/01/17 2245 - 95 17 123/61 97 %   09/01/17 2229 - 89 30 130/73 98 %   09/01/17 2215 - 72 18 115/59 98 %   09/01/17 2200 - 89 17 116/64 98 %   09/01/17 2144 - 90 22 125/64 97 %   09/01/17 2129 - 88 17 126/60 96 %   09/01/17 2114 - 88 24 111/79 98 %   09/01/17 2059 - 83 17 99/58 97 %   09/01/17 2044 - 84 21 118/53 98 %   09/01/17 2030 - 91 18 119/56 99 %   09/01/17 2015 - 73 18 118/65 98 %   09/01/17 1959 - 73 26 106/71 98 %   09/1945 - 76 27 117/66 99 %   09/01/17 1932 - - - - 96 %   09/01/17 1929 - 60 (!) 53 95/55 98 %   09/01/17 1914 96.8 °F (36 °C) (!) 58 19 109/57 99 %   09/01/17 1859 - 75 19 98/62 99 %   09/01/17 1844 - 66 (!) 32 110/60 98 %   09/01/17 1830 - 90 (!) 34 117/59 98 %   09/01/17 1814 - (!) 109 18 128/69 98 %   09/01/17 1807 - 94 - 125/78 -   09/01/17 1759 - (!) 102 20 125/78 98 %   09/01/17 1744 - (!) 109 18 133/66 98 %   09/01/17 1729 - (!) 109 20 119/63 98 %   09/01/17 1714 - (!) 109 18 127/67 99 %   09/01/17 1700 - (!) 107 21 129/65 95 % 09/01/17 1645 - 89 16 136/71 98 %   09/01/17 1630 - 98 29 103/57 97 %     Oxygen Therapy  O2 Sat (%): 98 % (09/02/17 1359)  Pulse via Oximetry: 81 beats per minute (09/02/17 1359)  O2 Device: Humidifier;Nasal airway;Nasal mask (09/02/17 1353)  O2 Flow Rate (L/min): 40 l/min (09/02/17 1353)  O2 Temperature: 87.8 °F (31 °C) (09/02/17 1300)  FIO2 (%): 35 % (09/02/17 1353)    Intake/Output Summary (Last 24 hours) at 09/02/17 1627  Last data filed at 09/02/17 1330   Gross per 24 hour   Intake          2564.01 ml   Output             2135 ml   Net           429.01 ml           General appearance: NAD, conversant  Eyes: anicteric sclerae, moist conjunctivae; no lid-lag  ENT: Atraumatic; oropharynx clear with moist mucous membranes and no mucosal ulcerations; normal hard and soft palate, on Optiflow  Neck: Trachea midline   FROM, supple, no thyromegaly or lymphadenopathy  Lungs: CTA, with normal respiratory effort and no intercostal retractions +decreased breath sounds, +right sided expiratory wheezing  CV: S1,S2 present, no added murmurs  Abdomen: Soft, non-tender; no masses   Extremities: No peripheral edema or extremity lymphadenopathy  Skin: Normal temperature, turgor and texture; no subcutaneous nodules  Psych: Appropriate affect, alert and oriented to person, place and time    Data Review:  I have reviewed all labs, meds, telemetry events, and studies from the last 24 hours.     Recent Results (from the past 24 hour(s))   GLUCOSE, POC    Collection Time: 09/01/17  6:05 PM   Result Value Ref Range    Glucose (POC) 189 (H) 65 - 100 mg/dL   GLUCOSE, POC    Collection Time: 09/01/17 11:37 PM   Result Value Ref Range    Glucose (POC) 186 (H) 65 - 100 mg/dL   PROTHROMBIN TIME + INR    Collection Time: 09/02/17  4:00 AM   Result Value Ref Range    Prothrombin time 27.5 (H) 9.6 - 12.0 sec    INR 2.5 (H) 0.9 - 1.2     METABOLIC PANEL, BASIC    Collection Time: 09/02/17  4:00 AM   Result Value Ref Range    Sodium 135 (L) 136 - 145 mmol/L    Potassium 5.0 3.5 - 5.1 mmol/L    Chloride 90 (L) 98 - 107 mmol/L    CO2 42 (HH) 21 - 32 mmol/L    Anion gap 3 (L) 7 - 16 mmol/L    Glucose 155 (H) 65 - 100 mg/dL    BUN 66 (H) 8 - 23 MG/DL    Creatinine 1.26 0.8 - 1.5 MG/DL    GFR est AA >60 >60 ml/min/1.73m2    GFR est non-AA 60 (L) >60 ml/min/1.73m2    Calcium 9.4 8.3 - 10.4 MG/DL   MAGNESIUM    Collection Time: 09/02/17  4:00 AM   Result Value Ref Range    Magnesium 2.7 (H) 1.8 - 2.4 mg/dL   PHOSPHORUS    Collection Time: 09/02/17  4:00 AM   Result Value Ref Range    Phosphorus 3.5 2.3 - 3.7 MG/DL   CBC W/O DIFF    Collection Time: 09/02/17  4:27 AM   Result Value Ref Range    WBC 8.4 4.3 - 11.1 K/uL    RBC 3.80 (L) 4.23 - 5.67 M/uL    HGB 11.1 (L) 13.6 - 17.2 g/dL    HCT 34.1 (L) 41.1 - 50.3 %    MCV 89.9 79.6 - 97.8 FL    MCH 29.3 26.1 - 32.9 PG    MCHC 32.6 31.4 - 35.0 g/dL    RDW 16.2 (H) 11.9 - 14.6 %    PLATELET 84 (L) 471 - 450 K/uL    MPV 11.0 10.8 - 14.1 FL   GLUCOSE, POC    Collection Time: 09/02/17  5:11 AM   Result Value Ref Range    Glucose (POC) 161 (H) 65 - 100 mg/dL   GLUCOSE, POC    Collection Time: 09/02/17  1:43 PM   Result Value Ref Range    Glucose (POC) 177 (H) 65 - 100 mg/dL        All Micro Results     Procedure Component Value Units Date/Time    CULTURE, BLOOD [[id number]] Collected:  08/25/17 1600    Order Status:  Completed Specimen:  Blood from Blood Updated:  08/30/17 0674     Special Requests: LEFT HAND        Culture result: NO GROWTH 5 DAYS       CULTURE, BLOOD [[id number]] Collected:  08/25/17 2145    Order Status:  Completed Specimen:  Blood from Blood Updated:  08/30/17 0643     Special Requests: RIGHT HAND        Culture result: NO GROWTH 5 DAYS       MRSA SCREEN - PCR (NASAL) [[id number]]  (Abnormal) Collected:  08/29/17 1556    Order Status:  Completed Specimen:  Nasal from Nasal Swab Updated:  08/29/17 1900     Special Requests: NO SPECIAL REQUESTS        Culture result:         MRSA target DNA is detected (presumptive positive for MRSA colonization).  (A)            RESULTS VERIFIED, PHONED TO AND READ BACK BY  DAMARIS GARNICA RN ON 08/29/2017 AT [address] [facility]      CULTURE, URINE [[id number]] Collected:  08/25/17 2011    Order Status:  Completed Specimen:  Urine from Clean catch Updated:  08/28/17 0653     Special Requests: NO SPECIAL REQUESTS        Culture result:         [id number] COLONIES/mL MIXED SKIN VANNESSA ISOLATED          Current Meds:  Current Facility-Administered Medications   Medication Dose Route Frequency    amiodarone (CORDARONE) tablet 400 mg  400 mg Oral Q12H    [START ON 9/3/2017] methylPREDNISolone (PF) (SOLU-MEDROL) injection 60 mg  60 mg IntraVENous DAILY    insulin lispro (HUMALOG) injection   SubCUTAneous Q6H    guaiFENesin ER (MUCINEX) tablet 1,200 mg  1,200 mg Oral Q12H    NUTRITIONAL SUPPORT ELECTROLYTE PRN ORDERS   Does Not Apply PRN    cefTRIAXone (ROCEPHIN) 1 g in 0.9% sodium chloride (MBP/ADV) 50 mL  1 g IntraVENous Q24H    famotidine (PF) (PEPCID) 20 mg in sodium chloride 0.9 % 10 mL injection  20 mg IntraVENous DAILY    mupirocin (BACTROBAN) 2 % ointment   Both Nostrils BID    warfarin (COUMADIN) tablet 2 mg  2 mg Oral QHS    carvedilol (COREG) tablet 25 mg  25 mg Oral BID WITH MEALS    0.9% sodium chloride infusion 250 mL  250 mL IntraVENous PRN    furosemide (LASIX) injection 40 mg  40 mg IntraVENous DAILY    dilTIAZem (CARDIZEM) 100 mg in 0.9% sodium chloride (MBP/ADV) 100 mL infusion  0-15 mg/hr IntraVENous TITRATE    acetaminophen (TYLENOL) suppository 650 mg  650 mg Rectal Q4H PRN    sodium chloride (NS) flush 20 mL  20 mL InterCATHeter Q8H    heparin (porcine) pf 600 Units  600 Units InterCATHeter Q8H    sodium chloride (NS) flush 20 mL  20 mL InterCATHeter PRN    heparin (porcine) pf 600 Units  600 Units InterCATHeter PRN    albuterol (PROVENTIL VENTOLIN) nebulizer solution 2.5 mg  2.5 mg Nebulization Q4H PRN    sacubitril-valsartan (ENTRESTO) 24-26 mg tablet 1 Tab  1 Tab Oral Q12H    dextrose (D50W) injection syrg 25 g  25 g IntraVENous PRN    ferrous sulfate tablet 325 mg  1 Tab Oral DAILY WITH BREAKFAST    acetaminophen (TYLENOL) tablet 650 mg  650 mg Oral Q6H PRN    gabapentin (NEURONTIN) capsule 600 mg  600 mg Oral TID    albuterol (PROVENTIL HFA, VENTOLIN HFA, PROAIR HFA) inhaler 2 Puff  2 Puff Inhalation Q4H PRN    tiotropium (SPIRIVA) inhalation capsule 18 mcg  1 Cap Inhalation DAILY    sodium chloride (NS) flush 5-10 mL  5-10 mL IntraVENous Q8H    sodium chloride (NS) flush 5-10 mL  5-10 mL IntraVENous PRN    nitroglycerin (NITROSTAT) tablet 0.4 mg  0.4 mg SubLINGual Q5MIN PRN    morphine injection 2 mg  2 mg IntraVENous Q4H PRN    ondansetron (ZOFRAN) injection 4 mg  4 mg IntraVENous Q4H PRN    budesonide (PULMICORT) 500 mcg/2 ml nebulizer suspension  500 mcg Nebulization BID RT    And    albuterol CONCENTRATE 2.5mg/0.5 mL neb soln  2.5 mg Nebulization Q6H RT       Other Studies (last 24 hours):  No results found.     Assessment and Plan:     Hospital Problems as of 9/2/2017  Date Reviewed: 9/1/2017          Codes Class Noted - Resolved POA    COPD (chronic obstructive pulmonary disease) (HCC) (Chronic) ICD-10-CM: J44.9  ICD-9-CM: 496  8/30/2017 - Present Yes        Acute respiratory failure with hypercapnia ([facility] ICD-10-CM: J96.02  ICD-9-CM: 518.81  8/29/2017 - Present No        History of tobacco use (Chronic) ICD-10-CM: S73.146  ICD-9-CM: V15.82  8/29/2017 - Present Yes        Hyponatremia ICD-10-CM: E87.1  ICD-9-CM: 276.1  8/29/2017 - Present Yes        Acute encephalopathy ICD-10-CM: G93.40  ICD-9-CM: 348.30  8/29/2017 - Present Yes        Thrombocytopenia ([facility] ICD-10-CM: D69.6  ICD-9-CM: 287.5  8/26/2017 - Present Yes        Anticoagulant long-term use (Chronic) ICD-10-CM: Z79.01  ICD-9-CM: V58.61  8/23/2017 - Present Yes        HTN (hypertension), benign (Chronic) ICD-10-CM: I10  ICD-9-CM: 401.1  8/23/2017 - Present Yes        Peripheral vascular disease ([facility] [address] (Chronic) ICD-10-CM: I73.9  ICD-9-CM: 443.9  8/23/2017 - Present Yes        Dyslipidemia (Chronic) ICD-10-CM: E78.5  ICD-9-CM: 272.4  8/23/2017 - Present Yes        History of mechanical aortic valve replacement (Chronic) ICD-10-CM: Z95.2  ICD-9-CM: V43.3  8/23/2017 - Present Yes    Overview Signed 8/30/2017 10:30 AM by Sabrina Harrison NP     Placement 2000, on chronicCoumadin             Centrilobular emphysema ([facility] (Chronic) ICD-10-CM: J43.2  ICD-9-CM: 492.8  8/23/2017 - Present Yes        Ischemic cardiomyopathy (Chronic) ICD-10-CM: I25.5  ICD-9-CM: 414.8  8/23/2017 - Present Yes    Overview Signed 8/30/2017 10:29 AM by Sabrina Harrison NP     8/23/17 ECHO:  EF 40 % to 45 %. There were no regional wall motion abnormalities. Moderate hypokinesis of the mid-apical anterior and apical wall(s). * (Principal)Atrial flutter with rapid ventricular response [facility]) ICD-10-CM: I48.92  ICD-9-CM: 427.32  8/23/2017 - Present Yes              PLAN:    Atrial fibrillation with RVR  S/p Cardioversion and amiodarone  Plan  Continue amiodarone, Coumadin    Acute COPD exacerbation  +wheezing   Decreased O2 requirements  Plan  Continue albuterol, pulmicort, prednisone   Continue ceftriaxone    Thrombocytopenia  Daily CBC    Chronic systolic CHF  Continue Entresto, Carvedilol, lasix   Cards following    FEN- failed speech therapy again on 8/31. Started on tube feeds.     DC planning/Dispo:  home  DVT ppx:  heparin    Signed:  Silvia James MD

## 2017-09-02 NOTE — PROGRESS NOTES
Wife at bedside Patient placed on OptiFlow by RT. Placed in sitting position in bed. No distress noted.

## 2017-09-02 NOTE — PROGRESS NOTES
Bedside and Verbal shift change report given to [address] (oncoming nurse) by Emmie Licea RN (offgoing nurse). Report included the following information SBAR, Kardex, Intake/Output, MAR, Recent Results, Med Rec Status and Cardiac Rhythm AFib 79. Remains on Opti Flow 35% 40 liters SpO2 96%. No distress noted.

## 2017-09-02 NOTE — PROGRESS NOTES
Critical Care Daily Progress Note: 9/2/2017    Diane Go   Admission Date: 8/23/2017         The patient's chart is reviewed and the patient is discussed with the staff.    72 y.o.  CM presents with atrial flutter with RVR and hypotension. Was seen by cardiology, had a MERLYN and cardioverted.  Has had intermittent confusion since a fall with head CT and MRI showing no acute abnormality.  Had mechanical AVR 2000 and on Coumadin. Has been on Lasix and his Na 128.  Has converted to afib and HR is controlled. Was scheduled for MBS but was more confused prompting an ABG showing hypercapnic respiratory failure and transferred to the ICU. Placed on BIPAP but kept pulling off mask and changed to NC. He also has thrombocytopenia and been followed by Hem/Onc while here. He is anticoagulated on Coumadin for mechanical valve. Has severe COPD managed on Spiriva, Symbicort and Albuterol at home. Thrombocytopenia management through [place]. Long term history of tobacco abuse. Subjective:     Resting in bed, on Optiflow 40L, 35% with O2 sat 95%.   No events overnight slept with BiPAP    Current Facility-Administered Medications   Medication Dose Route Frequency    amiodarone (CORDARONE) tablet 400 mg  400 mg Oral Q12H    insulin lispro (HUMALOG) injection   SubCUTAneous Q6H    methylPREDNISolone (PF) (SOLU-MEDROL) injection 40 mg  40 mg IntraVENous DAILY    guaiFENesin ER (MUCINEX) tablet 1,200 mg  1,200 mg Oral Q12H    NUTRITIONAL SUPPORT ELECTROLYTE PRN ORDERS   Does Not Apply PRN    cefTRIAXone (ROCEPHIN) 1 g in 0.9% sodium chloride (MBP/ADV) 50 mL  1 g IntraVENous Q24H    famotidine (PF) (PEPCID) 20 mg in sodium chloride 0.9 % 10 mL injection  20 mg IntraVENous DAILY    mupirocin (BACTROBAN) 2 % ointment   Both Nostrils BID    warfarin (COUMADIN) tablet 2 mg  2 mg Oral QHS    carvedilol (COREG) tablet 25 mg  25 mg Oral BID WITH MEALS    0.9% sodium chloride infusion 250 mL  250 mL IntraVENous PRN    furosemide (LASIX) injection 40 mg  40 mg IntraVENous DAILY    dilTIAZem (CARDIZEM) 100 mg in 0.9% sodium chloride (MBP/ADV) 100 mL infusion  0-15 mg/hr IntraVENous TITRATE    acetaminophen (TYLENOL) suppository 650 mg  650 mg Rectal Q4H PRN    sodium chloride (NS) flush 20 mL  20 mL InterCATHeter Q8H    heparin (porcine) pf 600 Units  600 Units InterCATHeter Q8H    sodium chloride (NS) flush 20 mL  20 mL InterCATHeter PRN    heparin (porcine) pf 600 Units  600 Units InterCATHeter PRN    albuterol (PROVENTIL VENTOLIN) nebulizer solution 2.5 mg  2.5 mg Nebulization Q4H PRN    sacubitril-valsartan (ENTRESTO) 24-26 mg tablet 1 Tab  1 Tab Oral Q12H    dextrose (D50W) injection syrg 25 g  25 g IntraVENous PRN    ferrous sulfate tablet 325 mg  1 Tab Oral DAILY WITH BREAKFAST    acetaminophen (TYLENOL) tablet 650 mg  650 mg Oral Q6H PRN    gabapentin (NEURONTIN) capsule 600 mg  600 mg Oral TID    albuterol (PROVENTIL HFA, VENTOLIN HFA, PROAIR HFA) inhaler 2 Puff  2 Puff Inhalation Q4H PRN    tiotropium (SPIRIVA) inhalation capsule 18 mcg  1 Cap Inhalation DAILY    sodium chloride (NS) flush 5-10 mL  5-10 mL IntraVENous Q8H    sodium chloride (NS) flush 5-10 mL  5-10 mL IntraVENous PRN    nitroglycerin (NITROSTAT) tablet 0.4 mg  0.4 mg SubLINGual Q5MIN PRN    morphine injection 2 mg  2 mg IntraVENous Q4H PRN    ondansetron (ZOFRAN) injection 4 mg  4 mg IntraVENous Q4H PRN    budesonide (PULMICORT) 500 mcg/2 ml nebulizer suspension  500 mcg Nebulization BID RT    And    albuterol CONCENTRATE 2.5mg/0.5 mL neb soln  2.5 mg Nebulization Q6H RT       Review of Systems  Constitutional:  negative for fever, chills, sweats  Cardiovascular:  negative for chest pain, palpitations, syncope, edema  Gastrointestinal:  negative for dysphagia, reflux, vomiting, diarrhea, abdominal pain, or melena  Neurologic:  negative for focal weakness, numbness, headache      Objective:     Vitals:    09/02/17 0908 09/02/17 0929 09/02/17 0959 09/02/17 1029   BP: 123/76 106/59 91/52 (!) 88/54   Pulse: 81 78 (!) 59 (!) 58   Resp:  22 28 29   Temp:       SpO2:  97% 97% 96%   Weight:       Height:           Intake and Output:   08/31 1901 - 09/02 0700  In: 4383.8 [I.V.:1212.8]  Out: 8482 [Urine:2235]  09/02 0701 - 09/02 1900  In: 120 [I.V.:50]  Out: 500 [Urine:500]    Physical Exam:          Constitutional:  the patient is well developed and in no acute distress, on Optiflow 40L, 35% with O2 sat 95%  EENMT:  Sclera clear, pupils equal, oral mucosa moist  Respiratory: decreased breath sounds, no wheezing, few scattered crackles. Cardiovascular:  RRR without M,G,R  Gastrointestinal: soft and non-tender; with positive bowel sounds. Musculoskeletal: warm without cyanosis. There is no lower leg edema.   Skin:  no jaundice or rashes, no open wounds   Neurologic: no gross neuro deficits     Psychiatric:  alert and oriented x 3     LINES:  PICC, peripheral, condom cath, NG tube    DRIPS:   Amiodarone     CXR: 8/30/17        LAB  Recent Labs      09/02/17   0511  09/01/17   2337  09/01/17   1805  09/01/17   1209  09/01/17   0708   GLUCPOC  161*  186*  189*  174*  146*      Recent Labs      09/02/17   0427  09/02/17   0400  09/01/17   0512  09/01/17   0406  08/31/17   1144  08/31/17   0259   WBC  8.4   --   5.5   --    --   5.6   HGB  11.1*   --   11.4*   --    --   11.9*   HCT  34.1*   --   34.5*   --    --   36.3*   PLT  84*   --   196   --   208  208   INR   --   2.5*   --   3.4*   --   2.9*     Recent Labs      09/02/17   0400  09/01/17   0406  08/31/17   0259   NA  135*  133*  131*   K  5.0  5.4*  5.5*   CL  90*  91*  89*   CO2  42*  39*  39*   GLU  155*  159*  154*   BUN  66*  72*  58*   CREA  1.26  1.43  1.42   MG  2.7*   --   2.7*   PHOS  3.5   --    --    CA  9.4  8.8  9.0     Recent Labs      09/01/17 0318  08/31/17   0357   PH  7.37  7.32*   PCO2  71*  82*   PO2  121*  187*   HCO3  40*  41*     No results for input(s): LCAD, LAC in the last 72 hours. Assessment:  (Medical Decision Making)      Patient Active Problem List   Diagnosis Code    Anticoagulant long-term use Z79.01    HTN (hypertension), benign I10    Peripheral vascular disease ([facility] I73.9    Atherosclerosis of native arteries of the extremities with intermittent claudication I70.219    Dyslipidemia E78.5    History of mechanical aortic valve replacement Z95.2    Centrilobular emphysema (HCC) J43.2    LV dysfunction I51.9    Ischemic cardiomyopathy I25.5    Atrial flutter with rapid ventricular response (HCC) I48.92    Thrombocytopenia (HCC) D69.6    Acute respiratory failure with hypercapnia (HCC) J96.02    History of tobacco use Z87.891    Hyponatremia E87.1    Acute encephalopathy G93.40    COPD (chronic obstructive pulmonary disease) ([facility]) J44.9         Plan:  (Medical Decision Making)     Hospital Problems  Date Reviewed: 9/1/2017          Codes Class Noted POA    COPD (chronic obstructive pulmonary disease) (HCC) (Chronic) ICD-10-CM: J44.9  ICD-9-CM: 496  8/30/2017 Yes    Continue Albuterol, Pulmicort, Spiriva. Continue Rocephin,   Solu-Medrol 40mg IV daily  mucinex       Acute respiratory failure with hypercapnia (HCC) ICD-10-CM: J96.02  ICD-9-CM: 518.81  8/29/2017 No    Weaned to Optiflow 35%, 40L     History of tobacco use (Chronic) ICD-10-CM: K75.102  ICD-9-CM: V15.82  8/29/2017 Yes    Unchanged     Hyponatremia ICD-10-CM: E87.1  ICD-9-CM: 276.1  8/29/2017 Yes    Na up to 133 today from 131 yesterday    Acute encephalopathy ICD-10-CM: G93.40  ICD-9-CM: 348.30  8/29/2017 Yes    Better, alert and conversant, answering questions appropriately     Thrombocytopenia ([facility] ICD-10-CM: D69.6  ICD-9-CM: 287.5  8/26/2017 Yes    Hematology following, platelets 400 .  Not planning bone marrow biopsy this admission    Anticoagulant long-term use (Chronic) ICD-10-CM: Z79.01  ICD-9-CM: V58.61  8/23/2017 Yes    On coumadin    HTN (hypertension), benign (Chronic) ICD-10-CM: I10  ICD-9-CM: 401.1  8/23/2017 Yes    chronic     Peripheral vascular disease ([facility] (Chronic) ICD-10-CM: I73.9  ICD-9-CM: 443.9  8/23/2017 Yes    Chronic     Dyslipidemia (Chronic) ICD-10-CM: E78.5  ICD-9-CM: 272.4  8/23/2017 Yes    Chronic     History of mechanical aortic valve replacement (Chronic) ICD-10-CM: Z95.2  ICD-9-CM: V43.3  8/23/2017 Yes     Placement 2000, on chronicCoumadin             Centrilobular emphysema ([facility] (Chronic) ICD-10-CM: J43.2  ICD-9-CM: 492.8  8/23/2017 Yes    With last PFTs FVC 1.70 L or 44% predicted, FEV1 0.86 L or 29% predicted, FEV1/FVC 51%. This study was a postbronchodilator study performed at the [facility] in [facility] on 8/22/2016      Ischemic cardiomyopathy (Chronic) ICD-10-CM: I25.5  ICD-9-CM: 414.8  8/23/2017 Yes     8/23/17 ECHO:  EF 40 % to 45 %. There were no regional wall motion abnormalities. Moderate hypokinesis of the mid-apical anterior and apical wall(s). On daily lasix, cardiology following     * (Principal)Atrial flutter with rapid ventricular response ([facility] ICD-10-CM: I48.92  ICD-9-CM: 427.32  8/23/2017 Yes    On IV amiodarone and coumadin, per cardiology         More than 50% of the time documented was spent in face-to-face contact with the patient and in the care of the patient on the floor/unit where the patient is located.     Hira Lebron MD

## 2017-09-02 NOTE — PROGRESS NOTES
[facility] CARDIOLOGY PROGRESS NOTE      9/2/2017 10:39 AM    Admit Date: 8/23/2017    Admit Diagnosis: Atrial flutter with rapid ventricular response (HCC)      Subjective:   He has back pain sitting with continued dyspnea    no chest pains       Objective:      Vitals:    09/02/17 0814 09/02/17 0830 09/02/17 0859 09/02/17 0908   BP: 95/55 121/59 123/76 123/76   Pulse: (!) 113 90 92 81   Resp: 22 22 18    Temp:       SpO2: 99% 99% 96%    Weight:       Height:           Physical Exam:  Neck-   CV- rr+r  rate is 60  this am   Lungs-diminished   Ext-  Skin- warm and dry        Data Review:   Recent Labs      09/02/17   0427  09/02/17   0400  09/01/17   0512  09/01/17   0406   08/31/17   0259   NA   --   135*   --   133*   --   131*   K   --   5.0   --   5.4*   --   5.5*   MG   --   2.7*   --    --    --   2.7*   BUN   --   66*   --   72*   --   58*   CREA   --   1.26   --   1.43   --   1.42   GLU   --   155*   --   159*   --   154*   WBC  8.4   --   5.5   --    --   5.6   HGB  11.1*   --   11.4*   --    --   11.9*   HCT  34.1*   --   34.5*   --    --   36.3*   PLT  84*   --   196   --    < >  208   INR   --   2.5*   --   3.4*   --   2.9*    < > = values in this interval not displayed. Assessment and Plan:     Principal Problem:    Atrial flutter with rapid ventricular response ([address] (8/23/2017)   rate well controlled currently     Active Problems:    Anticoagulant long-term use (8/23/2017)   mechanical aortic valve   continue depending on platelet counts       HTN (hypertension), benign (8/23/2017)      Peripheral vascular disease ([address] (8/23/2017)      Dyslipidemia (8/23/2017)      History of mechanical aortic valve replacement (8/23/2017)      Overview: Placement 2000, on chronicCoumadin      Centrilobular emphysema ([address] (8/23/2017)      Ischemic cardiomyopathy (8/23/2017)      Overview: 8/23/17 ECHO:      EF 40 % to 45 %. There were no regional wall motion abnormalities.        Moderate hypokinesis of the mid-apical anterior and apical wall(s). Thrombocytopenia ([address] (8/26/2017)   ? etiology    80 K   is    satisfactory    INR 2.5        Acute respiratory failure with hypercapnia ([address] (8/29/2017)      History of tobacco use (8/29/2017)      Hyponatremia (8/29/2017)      Acute encephalopathy (8/29/2017)      COPD (chronic obstructive pulmonary disease) ([address] (8/30/2017)  some improvement in O2 requirements sitting up   Decreased coumadin to 2mg QHS on higher amio dose  Converted amio to 400mg BID with oral taper  off BiPAP- ? Consider repeat MERLYN/cardioversion next week if resp status improves?   Recheck labs in AM      Demetra Hawk MD

## 2017-09-03 ENCOUNTER — APPOINTMENT (OUTPATIENT)
Dept: GENERAL RADIOLOGY | Age: 72
DRG: 308 | End: 2017-09-03
Attending: INTERNAL MEDICINE
Payer: MEDICARE

## 2017-09-03 LAB
ANION GAP SERPL CALC-SCNC: ABNORMAL MMOL/L (ref 7–16)
BASOPHILS # BLD: 0 K/UL (ref 0–0.2)
BASOPHILS NFR BLD: 0 % (ref 0–2)
BUN SERPL-MCNC: 63 MG/DL (ref 8–23)
CALCIUM SERPL-MCNC: 9.4 MG/DL (ref 8.3–10.4)
CHLORIDE SERPL-SCNC: 90 MMOL/L (ref 98–107)
CO2 SERPL-SCNC: >45 MMOL/L (ref 21–32)
CREAT SERPL-MCNC: 1.16 MG/DL (ref 0.8–1.5)
DIFFERENTIAL METHOD BLD: ABNORMAL
EOSINOPHIL # BLD: 0 K/UL (ref 0–0.8)
EOSINOPHIL NFR BLD: 0 % (ref 0.5–7.8)
ERYTHROCYTE [DISTWIDTH] IN BLOOD BY AUTOMATED COUNT: 16.4 % (ref 11.9–14.6)
GLUCOSE BLD STRIP.AUTO-MCNC: 140 MG/DL (ref 65–100)
GLUCOSE BLD STRIP.AUTO-MCNC: 168 MG/DL (ref 65–100)
GLUCOSE BLD STRIP.AUTO-MCNC: 175 MG/DL (ref 65–100)
GLUCOSE BLD STRIP.AUTO-MCNC: 188 MG/DL (ref 65–100)
GLUCOSE SERPL-MCNC: 128 MG/DL (ref 65–100)
HCT VFR BLD AUTO: 34.3 % (ref 41.1–50.3)
HGB BLD-MCNC: 11.1 G/DL (ref 13.6–17.2)
IMM GRANULOCYTES # BLD: 0 K/UL (ref 0–0.5)
IMM GRANULOCYTES NFR BLD: 0.5 % (ref 0–5)
INR PPP: 2 (ref 0.9–1.2)
LYMPHOCYTES # BLD: 0.3 K/UL (ref 0.5–4.6)
LYMPHOCYTES NFR BLD: 4 % (ref 13–44)
MCH RBC QN AUTO: 29.5 PG (ref 26.1–32.9)
MCHC RBC AUTO-ENTMCNC: 32.4 G/DL (ref 31.4–35)
MCV RBC AUTO: 91.2 FL (ref 79.6–97.8)
MONOCYTES # BLD: 1 K/UL (ref 0.1–1.3)
MONOCYTES NFR BLD: 12 % (ref 4–12)
NEUTS SEG # BLD: 7.1 K/UL (ref 1.7–8.2)
NEUTS SEG NFR BLD: 84 % (ref 43–78)
PLATELET # BLD AUTO: 132 K/UL (ref 150–450)
PMV BLD AUTO: 11.4 FL (ref 10.8–14.1)
POTASSIUM SERPL-SCNC: 4.8 MMOL/L (ref 3.5–5.1)
PROTHROMBIN TIME: 21.4 SEC (ref 9.6–12)
RBC # BLD AUTO: 3.76 M/UL (ref 4.23–5.67)
SODIUM SERPL-SCNC: 137 MMOL/L (ref 136–145)
WBC # BLD AUTO: 8.4 K/UL (ref 4.3–11.1)

## 2017-09-03 PROCEDURE — 77030018798 HC PMP KT ENTRL FED COVD -A

## 2017-09-03 PROCEDURE — 74011250637 HC RX REV CODE- 250/637: Performed by: INTERNAL MEDICINE

## 2017-09-03 PROCEDURE — 74011250636 HC RX REV CODE- 250/636: Performed by: INTERNAL MEDICINE

## 2017-09-03 PROCEDURE — 80048 BASIC METABOLIC PNL TOTAL CA: CPT | Performed by: PHYSICIAN ASSISTANT

## 2017-09-03 PROCEDURE — 94640 AIRWAY INHALATION TREATMENT: CPT

## 2017-09-03 PROCEDURE — 74011636637 HC RX REV CODE- 636/637: Performed by: NURSE PRACTITIONER

## 2017-09-03 PROCEDURE — 65620000000 HC RM CCU GENERAL

## 2017-09-03 PROCEDURE — 82962 GLUCOSE BLOOD TEST: CPT

## 2017-09-03 PROCEDURE — 74011000250 HC RX REV CODE- 250: Performed by: INTERNAL MEDICINE

## 2017-09-03 PROCEDURE — 99233 SBSQ HOSP IP/OBS HIGH 50: CPT | Performed by: INTERNAL MEDICINE

## 2017-09-03 PROCEDURE — 74011250636 HC RX REV CODE- 250/636: Performed by: HOSPITALIST

## 2017-09-03 PROCEDURE — 77010033711 HC HIGH FLOW OXYGEN

## 2017-09-03 PROCEDURE — 85610 PROTHROMBIN TIME: CPT | Performed by: PHYSICIAN ASSISTANT

## 2017-09-03 PROCEDURE — 71010 XR CHEST SNGL V: CPT

## 2017-09-03 PROCEDURE — 94660 CPAP INITIATION&MGMT: CPT

## 2017-09-03 PROCEDURE — 99232 SBSQ HOSP IP/OBS MODERATE 35: CPT | Performed by: INTERNAL MEDICINE

## 2017-09-03 PROCEDURE — 36592 COLLECT BLOOD FROM PICC: CPT

## 2017-09-03 PROCEDURE — 74011000258 HC RX REV CODE- 258: Performed by: INTERNAL MEDICINE

## 2017-09-03 PROCEDURE — 85025 COMPLETE CBC W/AUTO DIFF WBC: CPT | Performed by: PHYSICIAN ASSISTANT

## 2017-09-03 PROCEDURE — 74011250637 HC RX REV CODE- 250/637: Performed by: NURSE PRACTITIONER

## 2017-09-03 RX ORDER — WARFARIN 2.5 MG/1
2.5 TABLET ORAL
Status: DISCONTINUED | OUTPATIENT
Start: 2017-09-03 | End: 2017-09-05

## 2017-09-03 RX ORDER — MORPHINE SULFATE 4 MG/ML
2 INJECTION, SOLUTION INTRAMUSCULAR; INTRAVENOUS
Status: DISCONTINUED | OUTPATIENT
Start: 2017-09-03 | End: 2017-09-13 | Stop reason: HOSPADM

## 2017-09-03 RX ADMIN — GABAPENTIN 600 MG: 300 CAPSULE ORAL at 14:30

## 2017-09-03 RX ADMIN — FUROSEMIDE 40 MG: 10 INJECTION, SOLUTION INTRAMUSCULAR; INTRAVENOUS at 08:20

## 2017-09-03 RX ADMIN — BUDESONIDE 500 MCG: 0.5 INHALANT RESPIRATORY (INHALATION) at 08:05

## 2017-09-03 RX ADMIN — MUPIROCIN: 20 OINTMENT TOPICAL at 08:20

## 2017-09-03 RX ADMIN — CARVEDILOL 25 MG: 25 TABLET, FILM COATED ORAL at 08:19

## 2017-09-03 RX ADMIN — SODIUM CHLORIDE, PRESERVATIVE FREE 600 UNITS: 5 INJECTION INTRAVENOUS at 05:59

## 2017-09-03 RX ADMIN — METHYLPREDNISOLONE SODIUM SUCCINATE 60 MG: 125 INJECTION, POWDER, FOR SOLUTION INTRAMUSCULAR; INTRAVENOUS at 08:19

## 2017-09-03 RX ADMIN — AMIODARONE HYDROCHLORIDE 400 MG: 200 TABLET ORAL at 08:18

## 2017-09-03 RX ADMIN — INSULIN LISPRO 2 UNITS: 100 INJECTION, SOLUTION INTRAVENOUS; SUBCUTANEOUS at 12:14

## 2017-09-03 RX ADMIN — TIOTROPIUM BROMIDE 18 MCG: 18 CAPSULE ORAL; RESPIRATORY (INHALATION) at 08:12

## 2017-09-03 RX ADMIN — SODIUM CHLORIDE, PRESERVATIVE FREE 600 UNITS: 5 INJECTION INTRAVENOUS at 14:30

## 2017-09-03 RX ADMIN — INSULIN LISPRO 2 UNITS: 100 INJECTION, SOLUTION INTRAVENOUS; SUBCUTANEOUS at 18:09

## 2017-09-03 RX ADMIN — BUDESONIDE 500 MCG: 0.5 INHALANT RESPIRATORY (INHALATION) at 19:58

## 2017-09-03 RX ADMIN — MORPHINE SULFATE 2 MG: 4 INJECTION, SOLUTION INTRAMUSCULAR; INTRAVENOUS at 13:42

## 2017-09-03 RX ADMIN — FAMOTIDINE 20 MG: 10 INJECTION, SOLUTION INTRAVENOUS at 08:19

## 2017-09-03 RX ADMIN — GABAPENTIN 600 MG: 300 CAPSULE ORAL at 05:59

## 2017-09-03 RX ADMIN — Medication 10 ML: at 21:33

## 2017-09-03 RX ADMIN — INSULIN LISPRO 2 UNITS: 100 INJECTION, SOLUTION INTRAVENOUS; SUBCUTANEOUS at 00:12

## 2017-09-03 RX ADMIN — CARVEDILOL 25 MG: 25 TABLET, FILM COATED ORAL at 17:41

## 2017-09-03 RX ADMIN — FERROUS SULFATE TAB 325 MG (65 MG ELEMENTAL FE) 325 MG: 325 (65 FE) TAB at 08:19

## 2017-09-03 RX ADMIN — WARFARIN SODIUM 2.5 MG: 2.5 TABLET ORAL at 21:32

## 2017-09-03 RX ADMIN — CEFTRIAXONE 1 G: 1 INJECTION, POWDER, FOR SOLUTION INTRAMUSCULAR; INTRAVENOUS at 12:00

## 2017-09-03 RX ADMIN — AMIODARONE HYDROCHLORIDE 400 MG: 200 TABLET ORAL at 21:32

## 2017-09-03 RX ADMIN — ALBUTEROL SULFATE 2.5 MG: 2.5 SOLUTION RESPIRATORY (INHALATION) at 14:04

## 2017-09-03 RX ADMIN — ALBUTEROL SULFATE 2.5 MG: 2.5 SOLUTION RESPIRATORY (INHALATION) at 02:22

## 2017-09-03 RX ADMIN — Medication 20 ML: at 05:59

## 2017-09-03 RX ADMIN — SACUBITRIL AND VALSARTAN 1 TABLET: 24; 26 TABLET, FILM COATED ORAL at 08:19

## 2017-09-03 RX ADMIN — Medication 20 ML: at 21:32

## 2017-09-03 RX ADMIN — GABAPENTIN 600 MG: 300 CAPSULE ORAL at 21:32

## 2017-09-03 RX ADMIN — ALBUTEROL SULFATE 2.5 MG: 2.5 SOLUTION RESPIRATORY (INHALATION) at 19:58

## 2017-09-03 RX ADMIN — Medication 20 ML: at 14:30

## 2017-09-03 RX ADMIN — SODIUM CHLORIDE, PRESERVATIVE FREE 600 UNITS: 5 INJECTION INTRAVENOUS at 21:32

## 2017-09-03 RX ADMIN — MUPIROCIN: 20 OINTMENT TOPICAL at 18:10

## 2017-09-03 RX ADMIN — Medication 10 ML: at 14:30

## 2017-09-03 RX ADMIN — ONDANSETRON 4 MG: 2 INJECTION INTRAMUSCULAR; INTRAVENOUS at 17:40

## 2017-09-03 RX ADMIN — Medication 10 ML: at 05:59

## 2017-09-03 RX ADMIN — SACUBITRIL AND VALSARTAN 1 TABLET: 24; 26 TABLET, FILM COATED ORAL at 21:32

## 2017-09-03 RX ADMIN — ALBUTEROL SULFATE 2.5 MG: 2.5 SOLUTION RESPIRATORY (INHALATION) at 08:05

## 2017-09-03 NOTE — PROGRESS NOTES
Problem: Falls - Risk of  Goal: *Absence of Falls  Document Lauren Fall Risk and appropriate interventions in the flowsheet.    Outcome: Progressing Towards Goal  Fall Risk Interventions:  Mobility Interventions: Bed/chair exit alarm, Patient to call before getting OOB     Mentation Interventions: Family/sitter at bedside, More frequent rounding, Increase mobility     Medication Interventions: Bed/chair exit alarm, Patient to call before getting OOB     Elimination Interventions: Call light in reach, Patient to call for help with toileting needs, Toileting schedule/hourly rounds     History of Falls Interventions: Bed/chair exit alarm, Door open when patient unattended

## 2017-09-03 NOTE — PROGRESS NOTES
Critical Care Daily Progress Note: 9/3/2017    Diane Go   Admission Date: 8/23/2017         The patient's chart is reviewed and the patient is discussed with the staff.    72 y.o.  CM presents with atrial flutter with RVR and hypotension. Was seen by cardiology, had a MERLYN and cardioverted.  Has had intermittent confusion since a fall with head CT and MRI showing no acute abnormality.  Had mechanical AVR 2000 and on Coumadin. Has been on Lasix and his Na 128.  Has converted to afib and HR is controlled. Was scheduled for MBS but was more confused prompting an ABG showing hypercapnic respiratory failure and transferred to the ICU. Placed on BIPAP but kept pulling off mask and changed to NC. He also has thrombocytopenia and been followed by Hem/Onc while here. He is anticoagulated on Coumadin for mechanical valve. Has severe COPD managed on Spiriva, Symbicort and Albuterol at home. Thrombocytopenia management through [place]. Long term history of tobacco abuse. Has ITP and received INIG and high dose steroids which are effective and managed by hematology.        Subjective:     No events overnight  AAOx3 off NIPPV  Current Facility-Administered Medications   Medication Dose Route Frequency    warfarin (COUMADIN) tablet 2.5 mg  2.5 mg Oral QHS    amiodarone (CORDARONE) tablet 400 mg  400 mg Oral Q12H    methylPREDNISolone (PF) (SOLU-MEDROL) injection 60 mg  60 mg IntraVENous DAILY    insulin lispro (HUMALOG) injection   SubCUTAneous Q6H    guaiFENesin ER (MUCINEX) tablet 1,200 mg  1,200 mg Oral Q12H    NUTRITIONAL SUPPORT ELECTROLYTE PRN ORDERS   Does Not Apply PRN    cefTRIAXone (ROCEPHIN) 1 g in 0.9% sodium chloride (MBP/ADV) 50 mL  1 g IntraVENous Q24H    famotidine (PF) (PEPCID) 20 mg in sodium chloride 0.9 % 10 mL injection  20 mg IntraVENous DAILY    mupirocin (BACTROBAN) 2 % ointment   Both Nostrils BID    carvedilol (COREG) tablet 25 mg  25 mg Oral BID WITH MEALS    0.9% sodium chloride infusion 250 mL  250 mL IntraVENous PRN    furosemide (LASIX) injection 40 mg  40 mg IntraVENous DAILY    dilTIAZem (CARDIZEM) 100 mg in 0.9% sodium chloride (MBP/ADV) 100 mL infusion  0-15 mg/hr IntraVENous TITRATE    acetaminophen (TYLENOL) suppository 650 mg  650 mg Rectal Q4H PRN    sodium chloride (NS) flush 20 mL  20 mL InterCATHeter Q8H    heparin (porcine) pf 600 Units  600 Units InterCATHeter Q8H    sodium chloride (NS) flush 20 mL  20 mL InterCATHeter PRN    heparin (porcine) pf 600 Units  600 Units InterCATHeter PRN    albuterol (PROVENTIL VENTOLIN) nebulizer solution 2.5 mg  2.5 mg Nebulization Q4H PRN    sacubitril-valsartan (ENTRESTO) 24-26 mg tablet 1 Tab  1 Tab Oral Q12H    dextrose (D50W) injection syrg 25 g  25 g IntraVENous PRN    ferrous sulfate tablet 325 mg  1 Tab Oral DAILY WITH BREAKFAST    acetaminophen (TYLENOL) tablet 650 mg  650 mg Oral Q6H PRN    gabapentin (NEURONTIN) capsule 600 mg  600 mg Oral TID    albuterol (PROVENTIL HFA, VENTOLIN HFA, PROAIR HFA) inhaler 2 Puff  2 Puff Inhalation Q4H PRN    tiotropium (SPIRIVA) inhalation capsule 18 mcg  1 Cap Inhalation DAILY    sodium chloride (NS) flush 5-10 mL  5-10 mL IntraVENous Q8H    sodium chloride (NS) flush 5-10 mL  5-10 mL IntraVENous PRN    nitroglycerin (NITROSTAT) tablet 0.4 mg  0.4 mg SubLINGual Q5MIN PRN    morphine injection 2 mg  2 mg IntraVENous Q4H PRN    ondansetron (ZOFRAN) injection 4 mg  4 mg IntraVENous Q4H PRN    budesonide (PULMICORT) 500 mcg/2 ml nebulizer suspension  500 mcg Nebulization BID RT    And    albuterol CONCENTRATE 2.5mg/0.5 mL neb soln  2.5 mg Nebulization Q6H RT       Review of Systems  Constitutional:  negative for fever, chills, sweats  Cardiovascular:  negative for chest pain, palpitations, syncope, edema  Gastrointestinal:  negative for dysphagia, reflux, vomiting, diarrhea, abdominal pain, or melena  Neurologic:  negative for focal weakness, numbness, headache      Objective:     Vitals:    09/03/17 0818 09/03/17 0959 09/03/17 1109 09/03/17 1200   BP: 117/71 106/55 115/55 123/61   Pulse: 77 77 69 79   Resp:  14 27 21   Temp:    96.8 °F (36 °C)   SpO2:  97% 95% 97%   Weight:       Height:           Intake and Output:   09/01 1901 - 09/03 0700  In: 4206.6 [I.V.:568.6]  Out: 7300 [Urine:2635]  09/03 0701 - 09/03 1900  In: 100   Out: 450 [Urine:450]    Physical Exam:          Constitutional:  the patient is well developed and in no acute distress, on Optiflow 40L, 35% with O2 sat 95%  EENMT:  Sclera clear, pupils equal, oral mucosa moist  Respiratory: decreased breath sounds, no wheezing. Cardiovascular:  RRR without M,G,R  Gastrointestinal: soft and non-tender; with positive bowel sounds. Musculoskeletal: warm without cyanosis. There is no lower leg edema. Skin:  no jaundice or rashes, no open wounds   Neurologic: no gross neuro deficits     Psychiatric:  alert and oriented x 3     LINES:  PICC, peripheral, condom cath, NG tube    DRIPS:   Amiodarone     CXR: 8/30/17        LAB  Recent Labs      09/03/17   1212  09/03/17   0600  09/03/17   0010  09/02/17   1723  09/02/17   1343   GLUCPOC  168*  140*  188*  170*  177*      Recent Labs      09/03/17   0447  09/02/17   0427  09/02/17   0400  09/01/17   0512  09/01/17   0406   WBC  8.4  8.4   --   5.5   --    HGB  11.1*  11.1*   --   11.4*   --    HCT  34.3*  34.1*   --   34.5*   --    PLT  132*  84*   --   196   --    INR  2.0*   --   2.5*   --   3.4*     Recent Labs      09/03/17   0447  09/02/17   0400  09/01/17   0406   NA  137  135*  133*   K  4.8  5.0  5.4*   CL  90*  90*  91*   CO2  >45*  42*  39*   GLU  128*  155*  159*   BUN  63*  66*  72*   CREA  1.16  1.26  1.43   MG   --   2.7*   --    PHOS   --   3.5   --    CA  9.4  9.4  8.8     Recent Labs      09/01/17   0318   PH  7.37   PCO2  71*   PO2  121*   HCO3  40*     No results for input(s): LCAD, LAC in the last 72 hours.     Assessment:  (Medical Decision Making)      Patient Active Problem List   Diagnosis Code    Anticoagulant long-term use Z79.01    HTN (hypertension), benign I10    Peripheral vascular disease ([facility] [address] I73.9    Atherosclerosis of native arteries of the extremities with intermittent claudication I70.219    Dyslipidemia E78.5    History of mechanical aortic valve replacement Z95.2    Centrilobular emphysema (HCC) J43.2    LV dysfunction I51.9    Ischemic cardiomyopathy I25.5    Atrial flutter with rapid ventricular response (HCC) I48.92    Thrombocytopenia (HCC) D69.6    Acute respiratory failure with hypercapnia ([facility]) J96.02    History of tobacco use Z87.891    Hyponatremia E87.1    Acute encephalopathy G93.40    COPD (chronic obstructive pulmonary disease) ([facility]) J44.9         Plan:  (Medical Decision Making)     Hospital Problems  Date Reviewed: 9/1/2017          Codes Class Noted POA    COPD (chronic obstructive pulmonary disease) ([facility]) (Chronic) ICD-10-CM: J44.9  ICD-9-CM: 496  8/30/2017 Yes    Continue Albuterol, Pulmicort, Spiriva. Continue Rocephin,   mucinex   On solumedrol for ITP see below    Acute respiratory failure with hypercapnia ([facility] ICD-10-CM: J96.02  ICD-9-CM: 518.81  8/29/2017 No    Weaned to Optiflow 35%, 40L     History of tobacco use (Chronic) ICD-10-CM: T27.352  ICD-9-CM: V15.82  8/29/2017 Yes    Unchanged     Hyponatremia ICD-10-CM: E87.1  ICD-9-CM: 276.1  8/29/2017 Yes    Na up to 133 today from 131 yesterday    Acute encephalopathy ICD-10-CM: G93.40  ICD-9-CM: 348.30  8/29/2017 Yes    Better, alert and conversant, answering questions appropriately     Thrombocytopenia ([facility] ICD-10-CM: D69.6  ICD-9-CM: 287.5  8/26/2017 Yes    Hematology following, platelets 281>>>40>>915 .  Not planning bone marrow biopsy this admission, on solumedrol(dose increased) for ITP continue and adjust dosing per hematology    Anticoagulant long-term use (Chronic) ICD-10-CM: Z79.01  ICD-9-CM: V58.61  8/23/2017 Yes    On coumadin HTN (hypertension), benign (Chronic) ICD-10-CM: I10  ICD-9-CM: 401.1  8/23/2017 Yes    chronic     Peripheral vascular disease ([facility] [address] (Chronic) ICD-10-CM: I73.9  ICD-9-CM: 443.9  8/23/2017 Yes    Chronic     Dyslipidemia (Chronic) ICD-10-CM: E78.5  ICD-9-CM: 272.4  8/23/2017 Yes    Chronic     History of mechanical aortic valve replacement (Chronic) ICD-10-CM: Z95.2  ICD-9-CM: V43.3  8/23/2017 Yes     Placement 2000, on chronicCoumadin             Centrilobular emphysema ([facility] [address] (Chronic) ICD-10-CM: J43.2  ICD-9-CM: 492.8  8/23/2017 Yes    With last PFTs FVC 1.70 L or 44% predicted, FEV1 0.86 L or 29% predicted, FEV1/FVC 51%. This study was a postbronchodilator study performed at the [place] in [facility] on 8/22/2016    The above results were discussed with both patient and his wife- they were not aware of the severity of his COPD which is ES GOLD stage IV and he will likely need O2 supplementation at home once discharged. Engage PT and OT   Proceed with MBS as previously planned. He can likely got to floor in AM    Ischemic cardiomyopathy (Chronic) ICD-10-CM: I25.5  ICD-9-CM: 414.8  8/23/2017 Yes     8/23/17 ECHO:  EF 40 % to 45 %. There were no regional wall motion abnormalities. Moderate hypokinesis of the mid-apical anterior and apical wall(s). On daily lasix, cardiology following     * (Principal)Atrial flutter with rapid ventricular response ([address] ICD-10-CM: I48.92  ICD-9-CM: 427.32  8/23/2017 Yes    On IV amiodarone and coumadin, per cardiology         More than 50% of the time documented was spent in face-to-face contact with the patient and in the care of the patient on the floor/unit where the patient is located.     Nathan Borjas MD

## 2017-09-03 NOTE — PROGRESS NOTES
Daily Progress Note -- Hematology/ Medical Oncology        Patient Name: Baylee Layton    Date of Visit: 9/3/2017  : 1945  Age:72 y.o. Initial consult HPI:   He has a history of mechanical AVR in . He continues on chronic anticoagulation with coumadin, chronic systolic CHF/ICM EF 97%, COPD, PVD, DM II, HTN, iron deficiency, and dyslipidemia who developed worsening dyspnea at the beginning of the week. He presented to the ER and was found to be in atrial flutter with RVR now status post cardioversion and amiodarone. Of note, his platelets were 28,257 on admission and 30,000 today. No other lab comparisons here but can see through care everywhere that his platelets were 37,412 at [facility] about a year ago. He has no recollection of ever being told he has low platelets. He takes iron daily for his history of PRATIK and reports black stools due to this. Hgb on admission was 10.2 and 12.7 today. We have been consulted for his thrombocytopenia    Interval history:  A-fib on amiodarone. Rate controlled. Thrombocytopenia better - status post IVIG x 3, steroids. One unit platelets given 70/63. BMBx unable to be performed due to patient's condition.        Medications:   Current Facility-Administered Medications   Medication Dose Route Frequency Provider Last Rate Last Dose    warfarin (COUMADIN) tablet 2.5 mg  2.5 mg Oral QHS Ruth Purvis MD        morphine injection 2 mg  2 mg IntraVENous Q4H PRN Hernandez Quick MD   2 mg at 17 1342    amiodarone (CORDARONE) tablet 400 mg  400 mg Oral Q12H Ruth Purvis MD   400 mg at 17 0818    methylPREDNISolone (PF) (SOLU-MEDROL) injection 60 mg  60 mg IntraVENous DAILY Ac Luque MD   60 mg at 17 0819    insulin lispro (HUMALOG) injection   SubCUTAneous Q6H Tita Abebe NP   2 Units at 17 1214    guaiFENesin ER (MUCINEX) tablet 1,200 mg  1,200 mg Oral Q12H Tita Abebe NP   Stopped at 17 0900    NUTRITIONAL SUPPORT ELECTROLYTE PRN ORDERS   Does Not Apply PRN Isidore [place]. Caro Dorado MD        cefTRIAXone (ROCEPHIN) 1 g in 0.9% sodium chloride (MBP/ADV) 50 mL  1 g IntraVENous Q24H Isidore [place].  Caro Dorado  mL/hr at 09/03/17 1200 1 g at 09/03/17 1200    famotidine (PF) (PEPCID) 20 mg in sodium chloride 0.9 % 10 mL injection  20 mg IntraVENous DAILY Yoon Orosco MD   20 mg at 09/03/17 0739    mupirocin (BACTROBAN) 2 % ointment   Both Nostrils BID Farshad Rowley MD        carvedilol (COREG) tablet 25 mg  25 mg Oral BID WITH MEALS Remy Coppola MD   25 mg at 09/03/17 2477    0.9% sodium chloride infusion 250 mL  250 mL IntraVENous PRN Catrachitayrchetna Herrera NP        furosemide (LASIX) injection 40 mg  40 mg IntraVENous DAILY Conner Macias MD   40 mg at 09/03/17 0820    dilTIAZem (CARDIZEM) 100 mg in 0.9% sodium chloride (MBP/ADV) 100 mL infusion  0-15 mg/hr IntraVENous TITRATE Remy Coppola MD   Stopped at 08/31/17 1836    acetaminophen (TYLENOL) suppository 650 mg  650 mg Rectal Q4H PRN Howard Alves MD   650 mg at 08/30/17 1555    sodium chloride (NS) flush 20 mL  20 mL InterCATHeter Flower Israel MD   20 mL at 09/03/17 1430    heparin (porcine) pf 600 Units  600 Units InterCATHeter Q8H Yoon Orosco MD   600 Units at 09/03/17 1430    sodium chloride (NS) flush 20 mL  20 mL InterCATHeter PRN Yoon Orosco MD        heparin (porcine) pf 600 Units  600 Units InterCATHeter PRN Yoon Orosco MD        albuterol (PROVENTIL VENTOLIN) nebulizer solution 2.5 mg  2.5 mg Nebulization Q4H PRN Karolina Butler MD        sacubitril-valsartan (ENTRESTO) 24-26 mg tablet 1 Tab  1 Tab Oral Q12H Naya Goldstein MD   1 Tab at 09/03/17 2199    dextrose (D50W) injection syrg 25 g  25 g IntraVENous PRN Naya Goldstein MD   25 g at 08/25/17 1618    ferrous sulfate tablet 325 mg  1 Tab Oral DAILY WITH BREAKFAST Boyd Gilman NP   325 mg at 09/03/17 0819    acetaminophen (TYLENOL) tablet 650 mg  650 mg Oral Q6H PRN Derik Garces MD   650 mg at 09/02/17 2129    gabapentin (NEURONTIN) capsule 600 mg  600 mg Oral TID Jesusita Hart MD   600 mg at 09/03/17 1430    albuterol (PROVENTIL HFA, VENTOLIN HFA, PROAIR HFA) inhaler 2 Puff  2 Puff Inhalation Q4H PRN Jesusita Hart MD   2 Puff at 08/30/17 1724    tiotropium (SPIRIVA) inhalation capsule 18 mcg  1 Cap Inhalation DAILY Jesusita Hart MD   18 mcg at 09/03/17 2545    sodium chloride (NS) flush 5-10 mL  5-10 mL IntraVENous Rolahlita Anderson MD   10 mL at 09/03/17 1430    sodium chloride (NS) flush 5-10 mL  5-10 mL IntraVENous PRN Jesusita Hart MD        nitroglycerin (NITROSTAT) tablet 0.4 mg  0.4 mg SubLINGual Q5MIN PRN Jesusita Hart MD        ondansetron [facility]) injection 4 mg  4 mg IntraVENous Q4H PRN Jesusita Hart MD        budesonide (PULMICORT) 500 mcg/2 ml nebulizer suspension  500 mcg Nebulization BID RT Jesusita Hart MD   500 mcg at 09/03/17 0805    And    albuterol CONCENTRATE 2.5mg/0.5 mL neb soln  2.5 mg Nebulization Q6H RT Jesusita Hart MD   2.5 mg at 09/03/17 1404       Allergies: Allergies   Allergen Reactions    Levaquin [Levofloxacin] Other (comments)     GI upset    Metformin Other (comments)     Gi upset       Review of Systems: The Review of Systems is documented in full in the internal medical record. All systems are negative other than for those noted above. Physical Examination:  General Appearance: Ill appearing patient in no acute distress. Vital signs:   Visit Vitals    /68 (BP 1 Location: Left arm, BP Patient Position: At rest)    Pulse 77    Temp 97.4 °F (36.3 °C)    Resp 17    Ht 5' 6\" (1.676 m)    Wt 173 lb 1 oz (78.5 kg)    SpO2 98%    BMI 27.93 kg/m2     HEENT: No oral or pharyngeal masses. There is no ulceration or thrush. Lymph nodes: There is no cervical, supraclavicular, axillary adenopathy. Lungs: Scattered crackles bilaterally. On BiPAP.   Heart: There is no jugular venous distention. Regular, irregular. Generalized edema. Abdomen: Soft, non-tender, bowel sounds present and normal, no appreciated hepatosplenomegaly. No palpable masses. Skin: No rash, petechiae or ecchymoses. No evidence of malignancy. Neuro: Alert today with no obvious focal deficits. .     Labs/Imaging:  Lab Results   Component Value Date/Time    WBC 8.4 09/03/2017 04:47 AM    HGB 11.1 09/03/2017 04:47 AM    HCT 34.3 09/03/2017 04:47 AM    PLATELET 096 46/93/1212 04:47 AM    MCV 91.2 09/03/2017 04:47 AM       Lab Results   Component Value Date/Time    Sodium 137 09/03/2017 04:47 AM    Potassium 4.8 09/03/2017 04:47 AM    Chloride 90 09/03/2017 04:47 AM    CO2 >45 09/03/2017 04:47 AM    Anion gap Cannot be calculated 09/03/2017 04:47 AM    Glucose 128 09/03/2017 04:47 AM    BUN 63 09/03/2017 04:47 AM    Creatinine 1.16 09/03/2017 04:47 AM    GFR est AA >60 09/03/2017 04:47 AM    GFR est non-AA >60 09/03/2017 04:47 AM    Calcium 9.4 09/03/2017 04:47 AM    AST (SGOT) 25 08/23/2017 01:05 PM    Alk. phosphatase 86 08/23/2017 01:05 PM    Protein, total 7.4 08/23/2017 01:05 PM    Albumin 3.6 08/23/2017 01:05 PM    Globulin 3.8 08/23/2017 01:05 PM    A-G Ratio 0.9 08/23/2017 01:05 PM    ALT (SGPT) 30 08/23/2017 01:05 PM           ASSESSMENT:  This gentleman has a thrombocytopenia which is isolated (normal RBC and WBC) that has been present for at least one year. The most likely diagnosis is ITP, valvular consumption and/or medications. The level of platelet count in and of itself is not concerning. It is the requirement for warfarin given his mechanical valve which creates difficulties when combined with this thrombocytopenia. His risk for bleeding is substantially increased especially with platelet counts below 50,000. Dr. Mariella Collins was unable to be performed at bedside BMbx due to the decline in patient's condition on Tuesday.   He developed hypercapnic respiratiory failure requiring him to be placed on BiPAP - continues BiPAP. Platelets were down to 18,000 on 08/29 so he was given one unit of platelets. Today they remain normal at 196,000. He is s/p IVIG x 3 and he continues IV steroids daily. PLAN:  Continue solumedrol IV. Monitor blood sugars closely - may need sliding scale. Platelet level stable at 196,000. Transfuse platelets as needed - keep above 50,000. Coumadin necessary due to mechanical heart valve. Continue daily CBC to monitor platelet count. BMBx can be done as outpatient or when out of ICU.     - Presumed ITP: Continue solumedrol (continue 60 mg daily). Monitor thrombocytopenia. Monitor blood sugars. Remains on coumadin.             MD Jacob [facility]  [address] [place] [address]  P [phone]

## 2017-09-03 NOTE — PROGRESS NOTES
Bedside and Verbal shift change report given to Danii TODD (oncoming nurse) by Angelita Concepcion (offgoing nurse). Report included the following information SBAR, Kardex, ED Summary, Intake/Output, MAR, Recent Results, Med Rec Status and Cardiac Rhythm Afib/flutter 59. Resting in bed sleeping no distress noted. Remains on Opti Flow 35% 40 liters.

## 2017-09-03 NOTE — PROGRESS NOTES
Hospitalist Progress Note     Admit Date:  2017  1:11 PM   Name:  Selena Ortiz   Age:  67 y.o.  :  1945   MRN:  [id number]   PCP:  Texie Holstein, MD  Treatment Team: Attending Provider: Nathalia Valencia MD; Consulting Provider: Nathalia Valencia MD; Utilization Review: Nhi ; Consulting Provider: Yashira Huston MD; Consulting Provider: Madeline Leary MD; Care Manager: Reyna Molina; Consulting Provider: Sindy Ng MD; Consulting Provider: Erwin Breen MD    Subjective:     Candido Ortiz is a 66 yo male who presented with dyspnea on a background of mechanical AVR in , chronic anticoagulation with coumadin, chronic systolic CHF/ICM EF 96%, COPD, PVD, DM II, HTN, iron deficiency, and dyslipidemia. He is currently being treated for atrial fibrillation with RVR. This morning, he denied the following: chest pain, cough. He did report dyspnea.     Objective:     Patient Vitals for the past 24 hrs:   Temp Pulse Resp BP SpO2   17 0818 - 77 - 117/71 -   17 0808 - - - - 94 %   17 0759 - 78 16 117/71 98 %   17 0729 - 63 16 116/62 98 %   17 0659 - 64 18 116/71 97 %   17 0630 - 77 15 147/63 96 %   17 0600 - 64 15 134/60 97 %   17 0530 - 83 16 134/64 96 %   17 0459 - 84 16 (!) 128/102 95 %   17 0429 97.8 °F (36.6 °C) 91 18 139/90 94 %   17 0359 - (!) 58 14 120/60 94 %   17 0259 - 73 23 116/58 96 %   17 0222 - - - - 97 %   17 0200 - 75 16 105/62 94 %   17 0059 - 76 14 113/54 94 %   17 0030 - 75 14 97/52 94 %   17 0012 96.9 °F (36.1 °C) - - - -   17 0000 - 76 15 117/65 95 %   17 2330 - 73 - 112/54 97 %   17 2300 - - - - 95 %   17 2259 - 75 17 128/59 100 %   17 2200 - (!) 57 13 (!) 85/50 96 %   170 - 72 18 98/53 96 %   17 - 71 14 113/53 96 %   17 - 64 13 109/58 96 %   17 -  14 122/58 96 %   17 - - - - 96 %   09/02/17 1929 97.7 °F (36.5 °C) 78 (!) 44 95/50 96 %   09/02/17 1859 - (!) 58 13 90/53 97 %   09/02/17 1830 - 79 18 112/65 97 %   09/02/17 1759 - 86 19 141/66 95 %   09/02/17 1736 - 92 24 123/68 94 %   09/02/17 1708 - 99 23 117/59 96 %   09/02/17 1700 97.8 °F (36.6 °C) 72 13 (!) 78/46 96 %   09/02/17 1630 - 71 13 108/53 96 %   09/02/17 1600 - 69 13 96/55 95 %   09/02/17 1529 - (!) 58 13 101/50 95 %   09/02/17 1459 - (!) 58 14 106/52 95 %   09/02/17 1359 - 76 21 109/61 98 %   09/02/17 1353 - - - - 95 %   09/02/17 1329 - 76 18 117/57 97 %   09/02/17 1300 97.5 °F (36.4 °C) 62 14 107/53 96 %   09/02/17 1159 - 77 14 (!) 85/53 98 %   09/02/17 1130 - (!) 58 15 (!) 89/54 97 %   09/02/17 1100 - (!) 58 19 104/56 97 %     Oxygen Therapy  O2 Sat (%): 94 % (09/03/17 0808)  Pulse via Oximetry: 63 beats per minute (09/03/17 0808)  O2 Device: Humidifier; Hi flow nasal cannula; Heated (09/03/17 0808)  O2 Flow Rate (L/min): 40 l/min (09/03/17 0808)  O2 Temperature: 87.8 °F (31 °C) (09/02/17 1954)  FIO2 (%): 35 % (09/03/17 0808)    Intake/Output Summary (Last 24 hours) at 09/03/17 1030  Last data filed at 09/03/17 4853   Gross per 24 hour   Intake          2231.61 ml   Output             1850 ml   Net           381.61 ml           General appearance: NAD, conversant  Eyes: anicteric sclerae, moist conjunctivae; no lid-lag  ENT: Atraumatic; oropharynx clear with moist mucous membranes and no mucosal ulcerations; normal hard and soft palate, on Optiflow  Neck: Trachea midline   FROM, supple, no thyromegaly or lymphadenopathy  Lungs: CTA, with normal respiratory effort and no intercostal retractions +decreased breath sounds, +right sided expiratory wheezing  CV: S1,S2 present, no added murmurs  Abdomen: Soft, non-tender; no masses   Extremities: No peripheral edema or extremity lymphadenopathy  Skin: Normal temperature, turgor and texture; no subcutaneous nodules  Psych: Appropriate affect, alert and oriented to person, place and time    Data Review:  I have reviewed all labs, meds, telemetry events, and studies from the last 24 hours. Recent Results (from the past 24 hour(s))   GLUCOSE, POC    Collection Time: 09/02/17  1:43 PM   Result Value Ref Range    Glucose (POC) 177 (H) 65 - 100 mg/dL   GLUCOSE, POC    Collection Time: 09/02/17  5:23 PM   Result Value Ref Range    Glucose (POC) 170 (H) 65 - 100 mg/dL   GLUCOSE, POC    Collection Time: 09/03/17 12:10 AM   Result Value Ref Range    Glucose (POC) 188 (H) 65 - 100 mg/dL   PROTHROMBIN TIME + INR    Collection Time: 09/03/17  4:47 AM   Result Value Ref Range    Prothrombin time 21.4 (H) 9.6 - 12.0 sec    INR 2.0 (H) 0.9 - 1.2     METABOLIC PANEL, BASIC    Collection Time: 09/03/17  4:47 AM   Result Value Ref Range    Sodium 137 136 - 145 mmol/L    Potassium 4.8 3.5 - 5.1 mmol/L    Chloride 90 (L) 98 - 107 mmol/L    CO2 >45 (HH) 21 - 32 mmol/L    Anion gap Cannot be calculated 7 - 16 mmol/L    Glucose 128 (H) 65 - 100 mg/dL    BUN 63 (H) 8 - 23 MG/DL    Creatinine 1.16 0.8 - 1.5 MG/DL    GFR est AA >60 >60 ml/min/1.73m2    GFR est non-AA >60 >60 ml/min/1.73m2    Calcium 9.4 8.3 - 10.4 MG/DL   CBC WITH AUTOMATED DIFF    Collection Time: 09/03/17  4:47 AM   Result Value Ref Range    WBC 8.4 4.3 - 11.1 K/uL    RBC 3.76 (L) 4.23 - 5.67 M/uL    HGB 11.1 (L) 13.6 - 17.2 g/dL    HCT 34.3 (L) 41.1 - 50.3 %    MCV 91.2 79.6 - 97.8 FL    MCH 29.5 26.1 - 32.9 PG    MCHC 32.4 31.4 - 35.0 g/dL    RDW 16.4 (H) 11.9 - 14.6 %    PLATELET 681 (L) 908 - 450 K/uL    MPV 11.4 10.8 - 14.1 FL    DF AUTOMATED      NEUTROPHILS 84 (H) 43 - 78 %    LYMPHOCYTES 4 (L) 13 - 44 %    MONOCYTES 12 4.0 - 12.0 %    EOSINOPHILS 0 (L) 0.5 - 7.8 %    BASOPHILS 0 0.0 - 2.0 %    IMMATURE GRANULOCYTES 0.5 0.0 - 5.0 %    ABS. NEUTROPHILS 7.1 1.7 - 8.2 K/UL    ABS. LYMPHOCYTES 0.3 (L) 0.5 - 4.6 K/UL    ABS. MONOCYTES 1.0 0.1 - 1.3 K/UL    ABS. EOSINOPHILS 0.0 0.0 - 0.8 K/UL    ABS. BASOPHILS 0.0 0.0 - 0.2 K/UL    ABS. IMM. GRANS. 0.0 0.0 - 0.5 K/UL   GLUCOSE, POC    Collection Time: 09/03/17  6:00 AM   Result Value Ref Range    Glucose (POC) 140 (H) 65 - 100 mg/dL        All Micro Results     Procedure Component Value Units Date/Time    CULTURE, BLOOD [[id number]] Collected:  08/25/17 1600    Order Status:  Completed Specimen:  Blood from Blood Updated:  08/30/17 0643     Special Requests: LEFT HAND        Culture result: NO GROWTH 5 DAYS       CULTURE, BLOOD [[id number]] Collected:  08/25/17 2145    Order Status:  Completed Specimen:  Blood from Blood Updated:  08/30/17 0643     Special Requests: RIGHT HAND        Culture result: NO GROWTH 5 DAYS       MRSA SCREEN - PCR (NASAL) [[id number]]  (Abnormal) Collected:  08/29/17 1556    Order Status:  Completed Specimen:  Nasal from Nasal Swab Updated:  08/29/17 1900     Special Requests: NO SPECIAL REQUESTS        Culture result:         MRSA target DNA is detected (presumptive positive for MRSA colonization).  (A)            RESULTS VERIFIED, PHONED TO AND READ BACK BY  DAMARIS GARNICA RN ON 08/29/2017 AT 1851 [facility]      CULTURE, URINE [[id number]] Collected:  08/25/17 2011    Order Status:  Completed Specimen:  Urine from Clean catch Updated:  08/28/17 0653     Special Requests: NO SPECIAL REQUESTS        Culture result:         [id number] COLONIES/mL MIXED SKIN VANNESSA ISOLATED          Current Meds:  Current Facility-Administered Medications   Medication Dose Route Frequency    warfarin (COUMADIN) tablet 2.5 mg  2.5 mg Oral QHS    amiodarone (CORDARONE) tablet 400 mg  400 mg Oral Q12H    methylPREDNISolone (PF) (SOLU-MEDROL) injection 60 mg  60 mg IntraVENous DAILY    insulin lispro (HUMALOG) injection   SubCUTAneous Q6H    guaiFENesin ER (MUCINEX) tablet 1,200 mg  1,200 mg Oral Q12H    NUTRITIONAL SUPPORT ELECTROLYTE PRN ORDERS   Does Not Apply PRN    cefTRIAXone (ROCEPHIN) 1 g in 0.9% sodium chloride (MBP/ADV) 50 mL  1 g IntraVENous Q24H    famotidine (PF) (PEPCID) 20 mg in sodium chloride 0.9 % 10 mL injection  20 mg IntraVENous DAILY    mupirocin (BACTROBAN) 2 % ointment   Both Nostrils BID    carvedilol (COREG) tablet 25 mg  25 mg Oral BID WITH MEALS    0.9% sodium chloride infusion 250 mL  250 mL IntraVENous PRN    furosemide (LASIX) injection 40 mg  40 mg IntraVENous DAILY    dilTIAZem (CARDIZEM) 100 mg in 0.9% sodium chloride (MBP/ADV) 100 mL infusion  0-15 mg/hr IntraVENous TITRATE    acetaminophen (TYLENOL) suppository 650 mg  650 mg Rectal Q4H PRN    sodium chloride (NS) flush 20 mL  20 mL InterCATHeter Q8H    heparin (porcine) pf 600 Units  600 Units InterCATHeter Q8H    sodium chloride (NS) flush 20 mL  20 mL InterCATHeter PRN    heparin (porcine) pf 600 Units  600 Units InterCATHeter PRN    albuterol (PROVENTIL VENTOLIN) nebulizer solution 2.5 mg  2.5 mg Nebulization Q4H PRN    sacubitril-valsartan (ENTRESTO) 24-26 mg tablet 1 Tab  1 Tab Oral Q12H    dextrose (D50W) injection syrg 25 g  25 g IntraVENous PRN    ferrous sulfate tablet 325 mg  1 Tab Oral DAILY WITH BREAKFAST    acetaminophen (TYLENOL) tablet 650 mg  650 mg Oral Q6H PRN    gabapentin (NEURONTIN) capsule 600 mg  600 mg Oral TID    albuterol (PROVENTIL HFA, VENTOLIN HFA, PROAIR HFA) inhaler 2 Puff  2 Puff Inhalation Q4H PRN    tiotropium (SPIRIVA) inhalation capsule 18 mcg  1 Cap Inhalation DAILY    sodium chloride (NS) flush 5-10 mL  5-10 mL IntraVENous Q8H    sodium chloride (NS) flush 5-10 mL  5-10 mL IntraVENous PRN    nitroglycerin (NITROSTAT) tablet 0.4 mg  0.4 mg SubLINGual Q5MIN PRN    morphine injection 2 mg  2 mg IntraVENous Q4H PRN    ondansetron (ZOFRAN) injection 4 mg  4 mg IntraVENous Q4H PRN    budesonide (PULMICORT) 500 mcg/2 ml nebulizer suspension  500 mcg Nebulization BID RT    And    albuterol CONCENTRATE 2.5mg/0.5 mL neb soln  2.5 mg Nebulization Q6H RT       Other Studies (last 24 hours):  No results found.     Assessment and Plan:     Hospital Problems as of 9/3/2017  Date Reviewed: 9/1/2017          Codes Class Noted - Resolved POA    COPD (chronic obstructive pulmonary disease) ([facility] (Chronic) ICD-10-CM: J44.9  ICD-9-CM: 918  8/30/2017 - Present Yes        Acute respiratory failure with hypercapnia ([facility] ICD-10-CM: J96.02  ICD-9-CM: 518.81  8/29/2017 - Present No        History of tobacco use (Chronic) ICD-10-CM: M07.670  ICD-9-CM: V15.82  8/29/2017 - Present Yes        Hyponatremia ICD-10-CM: E87.1  ICD-9-CM: 276.1  8/29/2017 - Present Yes        Acute encephalopathy ICD-10-CM: G93.40  ICD-9-CM: 348.30  8/29/2017 - Present Yes        Thrombocytopenia ([facility] ICD-10-CM: D69.6  ICD-9-CM: 287.5  8/26/2017 - Present Yes        Anticoagulant long-term use (Chronic) ICD-10-CM: Z79.01  ICD-9-CM: V58.61  8/23/2017 - Present Yes        HTN (hypertension), benign (Chronic) ICD-10-CM: I10  ICD-9-CM: 401.1  8/23/2017 - Present Yes        Peripheral vascular disease ([facility] (Chronic) ICD-10-CM: I73.9  ICD-9-CM: 443.9  8/23/2017 - Present Yes        Dyslipidemia (Chronic) ICD-10-CM: E78.5  ICD-9-CM: 272.4  8/23/2017 - Present Yes        History of mechanical aortic valve replacement (Chronic) ICD-10-CM: Z95.2  ICD-9-CM: V43.3  8/23/2017 - Present Yes    Overview Signed 8/30/2017 10:30 AM by Noe Jacobsen NP     Placement 2000, on chronicCoumadin             Centrilobular emphysema ([facility] (Chronic) ICD-10-CM: J43.2  ICD-9-CM: 492.8  8/23/2017 - Present Yes        Ischemic cardiomyopathy (Chronic) ICD-10-CM: I25.5  ICD-9-CM: 414.8  8/23/2017 - Present Yes    Overview Signed 8/30/2017 10:29 AM by Noe Jacobsen NP     8/23/17 ECHO:  EF 40 % to 45 %. There were no regional wall motion abnormalities. Moderate hypokinesis of the mid-apical anterior and apical wall(s).              * (Principal)Atrial flutter with rapid ventricular response (HCC) ICD-10-CM: I48.92  ICD-9-CM: 427.32  8/23/2017 - Present Yes              PLAN:    Atrial fibrillation with RVR  S/p Cardioversion and amiodarone  Plan  Continue amiodarone, Coumadin    Acute COPD exacerbation  +wheezing   Decreased O2 requirements  Plan  Continue albuterol, pulmicort, prednisone   Continue ceftriaxone  Repeat CXR Pending    Thrombocytopenia  Daily CBC    Chronic systolic CHF  Continue Entresto, Carvedilol, lasix   Cards following    FEN- failed speech therapy again on 8/31. Started on tube feeds.     DC planning/Dispo:  home  DVT ppx:  heparin    Signed:  Bonnie Sage MD

## 2017-09-03 NOTE — PROGRESS NOTES
[facility] [place] CARDIOLOGY PROGRESS NOTE      9/3/2017 7:44 AM    Admit Date: 8/23/2017    Admit Diagnosis: Atrial flutter with rapid ventricular response (HCC)      Subjective:   dyspnea  no chest pains    does not speak much       Objective:      Vitals:    09/03/17 0259 09/03/17 0359 09/03/17 0429 09/03/17 0443   BP: 116/58 120/60 139/90    Pulse: 73 (!) 58 91    Resp: 23 14 18    Temp:   97.8 °F (36.6 °C)    SpO2: 96% 94% 94%    Weight:    78.5 kg (173 lb 1 oz)   Height:           Physical Exam:  Neck-   CV-i rr+r  distant  Lungs- clear anteriorly   Ext-  Skin- warm and dry        Data Review:   Recent Labs      09/03/17 0447 09/02/17 0427 09/02/17   0400   NA  137   --   135*   K  4.8   --   5.0   MG   --    --   2.7*   BUN  63*   --   66*   CREA  1.16   --   1.26   GLU  128*   --   155*   WBC  8.4  8.4   --    HGB  11.1*  11.1*   --    HCT  34.3*  34.1*   --    PLT  132*  84*   --    INR  2.0*   --   2.5*       Assessment and Plan:     Principal Problem:    Atrial flutter with rapid ventricular response (HCC) (8/23/2017)  rate is now controlled on amiodarone loading ? ? cardioversion timing     Active Problems:    Anticoagulant long-term use (8/23/2017)      HTN (hypertension), benign (8/23/2017)      Peripheral vascular disease ([address] (8/23/2017)      Dyslipidemia (8/23/2017)      History of mechanical aortic valve replacement (8/23/2017)   INR only 2    increase warfarin       Overview: Placement 2000, on chronicCoumadin      Centrilobular emphysema ([address] (8/23/2017)      Ischemic cardiomyopathy (8/23/2017)      Overview: 8/23/17 ECHO:      EF 40 % to 45 %. There were no regional wall motion abnormalities. Moderate hypokinesis of the mid-apical anterior and apical wall(s).       Thrombocytopenia ([address] (8/26/2017)  resolved       Acute respiratory failure with hypercapnia ([address] (8/29/2017)    seems better   wean per pulmonary   CXR today       History of tobacco use (8/29/2017) Hyponatremia (8/29/2017)      Acute encephalopathy (8/29/2017)      COPD (chronic obstructive pulmonary disease) ([facility] (8/30/2017)        Dwaine Hare MD

## 2017-09-03 NOTE — PROGRESS NOTES
Patient is drowsy, oriented x3, follows commands, voice weak and hoarse. A fib with BBB rate 70s on monitor, BP 95/50, O2 sat 96% on OptiFlow. Lung sounds coarse with crackles, dyspnea on exertion. Abdomen distended, bowel sounds active, tolerating Nepro @ 40mL/hr via dobhoff L nare. Voiding clear niranjan urine. No family present at this time. Will continue to monitor.

## 2017-09-04 ENCOUNTER — APPOINTMENT (OUTPATIENT)
Dept: GENERAL RADIOLOGY | Age: 72
DRG: 308 | End: 2017-09-04
Attending: INTERNAL MEDICINE
Payer: MEDICARE

## 2017-09-04 LAB
ANION GAP SERPL CALC-SCNC: ABNORMAL MMOL/L (ref 7–16)
ARTERIAL PATENCY WRIST A: ABNORMAL
ARTERIAL PATENCY WRIST A: POSITIVE
BASE EXCESS BLDA CALC-SCNC: 16.6 MMOL/L (ref 0–3)
BASE EXCESS BLDA CALC-SCNC: 18.5 MMOL/L (ref 0–3)
BASE EXCESS BLDA CALC-SCNC: 18.5 MMOL/L (ref 0–3)
BASE EXCESS BLDA CALC-SCNC: 18.6 MMOL/L (ref 0–3)
BASE EXCESS BLDA CALC-SCNC: 19.1 MMOL/L (ref 0–3)
BASE EXCESS BLDA CALC-SCNC: 20.5 MMOL/L (ref 0–3)
BASOPHILS # BLD: 0 K/UL (ref 0–0.2)
BASOPHILS NFR BLD: 0 % (ref 0–2)
BDY SITE: ABNORMAL
BNP SERPL-MCNC: 642 PG/ML
BUN SERPL-MCNC: 74 MG/DL (ref 8–23)
CA-I BLD-SCNC: 1.24 MMOL/L (ref 1–1.3)
CALCIUM SERPL-MCNC: 9.4 MG/DL (ref 8.3–10.4)
CHLORIDE BLDA-SCNC: 85 MMOL/L (ref 98–106)
CHLORIDE SERPL-SCNC: 87 MMOL/L (ref 98–107)
CO2 SERPL-SCNC: >45 MMOL/L (ref 21–32)
COHGB MFR BLD: 1.6 % (ref 0.5–1.5)
COHGB MFR BLD: 1.8 % (ref 0.5–1.5)
COHGB MFR BLD: 2.3 % (ref 0.5–1.5)
COHGB MFR BLD: 2.4 % (ref 0.5–1.5)
COHGB MFR BLD: 2.4 % (ref 0.5–1.5)
COHGB MFR BLD: 2.5 % (ref 0.5–1.5)
CREAT SERPL-MCNC: 1.58 MG/DL (ref 0.8–1.5)
DIFFERENTIAL METHOD BLD: ABNORMAL
DO-HGB BLD-MCNC: 10 % (ref 0–5)
DO-HGB BLD-MCNC: 2 % (ref 0–5)
DO-HGB BLD-MCNC: 3 % (ref 0–5)
DO-HGB BLD-MCNC: 3 % (ref 0–5)
DO-HGB BLD-MCNC: 5 % (ref 0–5)
DO-HGB BLD-MCNC: 5 % (ref 0–5)
EOSINOPHIL # BLD: 0 K/UL (ref 0–0.8)
EOSINOPHIL NFR BLD: 0 % (ref 0.5–7.8)
ERYTHROCYTE [DISTWIDTH] IN BLOOD BY AUTOMATED COUNT: 16.4 % (ref 11.9–14.6)
FIO2 ON VENT: 30 %
FIO2 ON VENT: 35 %
FIO2 ON VENT: 40 %
GAS FLOW.O2 O2 DELIVERY SYS: 45 L/MIN
GLUCOSE BLD STRIP.AUTO-MCNC: 140 MG/DL (ref 65–100)
GLUCOSE BLD STRIP.AUTO-MCNC: 141 MG/DL (ref 65–100)
GLUCOSE BLD STRIP.AUTO-MCNC: 158 MG/DL (ref 65–100)
GLUCOSE BLD STRIP.AUTO-MCNC: 171 MG/DL (ref 65–100)
GLUCOSE BLD STRIP.AUTO-MCNC: 175 MG/DL (ref 65–100)
GLUCOSE SERPL-MCNC: 165 MG/DL (ref 65–100)
HCO3 BLDA-SCNC: 44 MMOL/L (ref 22–26)
HCO3 BLDA-SCNC: 45 MMOL/L (ref 22–26)
HCO3 BLDA-SCNC: 51 MMOL/L (ref 22–26)
HCO3 BLDA-SCNC: 53 MMOL/L (ref 22–26)
HCT VFR BLD AUTO: 33.7 % (ref 41.1–50.3)
HGB BLD-MCNC: 10.6 G/DL (ref 13.6–17.2)
HGB BLDMV-MCNC: 11.8 GM/DL (ref 11.7–15)
HGB BLDMV-MCNC: 12 GM/DL (ref 11.7–15)
HGB BLDMV-MCNC: 12.2 GM/DL (ref 11.7–15)
HGB BLDMV-MCNC: 12.3 GM/DL (ref 11.7–15)
HGB BLDMV-MCNC: 12.4 GM/DL (ref 11.7–15)
HGB BLDMV-MCNC: 12.5 GM/DL (ref 11.7–15)
IMM GRANULOCYTES # BLD: 0 K/UL (ref 0–0.5)
IMM GRANULOCYTES NFR BLD: 0.3 % (ref 0–5)
INR PPP: 1.6 (ref 0.9–1.2)
LYMPHOCYTES # BLD: 0.4 K/UL (ref 0.5–4.6)
LYMPHOCYTES NFR BLD: 5 % (ref 13–44)
MCH RBC QN AUTO: 29.6 PG (ref 26.1–32.9)
MCHC RBC AUTO-ENTMCNC: 31.5 G/DL (ref 31.4–35)
MCV RBC AUTO: 94.1 FL (ref 79.6–97.8)
METHGB MFR BLD: 0 % (ref 0–1.5)
METHGB MFR BLD: 0.3 % (ref 0–1.5)
METHGB MFR BLD: 0.5 % (ref 0–1.5)
MONOCYTES # BLD: 0.7 K/UL (ref 0.1–1.3)
MONOCYTES NFR BLD: 8 % (ref 4–12)
NEUTS SEG # BLD: 7.7 K/UL (ref 1.7–8.2)
NEUTS SEG NFR BLD: 87 % (ref 43–78)
OXYHGB MFR BLDA: 88 % (ref 94–97)
OXYHGB MFR BLDA: 92.7 % (ref 94–97)
OXYHGB MFR BLDA: 92.8 % (ref 94–97)
OXYHGB MFR BLDA: 94.3 % (ref 94–97)
OXYHGB MFR BLDA: 94.7 % (ref 94–97)
OXYHGB MFR BLDA: 95.7 % (ref 94–97)
PCO2 BLDA: 111 MMHG (ref 35–45)
PCO2 BLDA: 112 MMHG (ref 35–45)
PCO2 BLDA: 119 MMHG (ref 35–45)
PCO2 BLDA: 126 MMHG (ref 35–45)
PCO2 BLDA: 57 MMHG (ref 35–45)
PCO2 BLDA: 78 MMHG (ref 35–45)
PEEP RESPIRATORY: 10 CM[H2O]
PEEP RESPIRATORY: 10 CM[H2O]
PH BLDA: 7.23 [PH] (ref 7.35–7.45)
PH BLDA: 7.27 [PH] (ref 7.35–7.45)
PH BLDA: 7.27 [PH] (ref 7.35–7.45)
PH BLDA: 7.28 [PH] (ref 7.35–7.45)
PH BLDA: 7.38 [PH] (ref 7.35–7.45)
PH BLDA: 7.51 [PH] (ref 7.35–7.45)
PLATELET # BLD AUTO: 135 K/UL (ref 150–450)
PMV BLD AUTO: 11 FL (ref 10.8–14.1)
PO2 BLDA: 62 MMHG (ref 80–105)
PO2 BLDA: 77 MMHG (ref 80–105)
PO2 BLDA: 83 MMHG (ref 80–105)
PO2 BLDA: 86 MMHG (ref 80–105)
PO2 BLDA: 86 MMHG (ref 80–105)
PO2 BLDA: 90 MMHG (ref 80–105)
POTASSIUM BLDA-SCNC: 5.46 MMOL/L (ref 3.5–5.3)
POTASSIUM SERPL-SCNC: 5.4 MMOL/L (ref 3.5–5.1)
PROCALCITONIN SERPL-MCNC: 0.1 NG/ML
PROTHROMBIN TIME: 18 SEC (ref 9.6–12)
RBC # BLD AUTO: 3.58 M/UL (ref 4.23–5.67)
RESP RATE: 18
RESP RATE: 18
RESP RATE: 22
RESP RATE: 22
SAO2 % BLD: 90 % (ref 92–98.5)
SAO2 % BLD: 95 % (ref 92–98.5)
SAO2 % BLD: 95 % (ref 92–98.5)
SAO2 % BLD: 97 % (ref 92–98.5)
SAO2 % BLD: 97 % (ref 92–98.5)
SAO2 % BLD: 98 % (ref 92–98.5)
SERVICE CMNT-IMP: ABNORMAL
SODIUM BLDA-SCNC: 133.5 MMOL/L (ref 135–148)
SODIUM SERPL-SCNC: 135 MMOL/L (ref 136–145)
VENTILATION MODE VENT: 22 5
VENTILATION MODE VENT: ABNORMAL
VT SETTING VENT: 387 ML
WBC # BLD AUTO: 8.9 K/UL (ref 4.3–11.1)

## 2017-09-04 PROCEDURE — 0BJ08ZZ INSPECTION OF TRACHEOBRONCHIAL TREE, VIA NATURAL OR ARTIFICIAL OPENING ENDOSCOPIC: ICD-10-PCS | Performed by: INTERNAL MEDICINE

## 2017-09-04 PROCEDURE — 74011250636 HC RX REV CODE- 250/636: Performed by: INTERNAL MEDICINE

## 2017-09-04 PROCEDURE — 82803 BLOOD GASES ANY COMBINATION: CPT

## 2017-09-04 PROCEDURE — 94660 CPAP INITIATION&MGMT: CPT

## 2017-09-04 PROCEDURE — 71010 XR CHEST SNGL V: CPT

## 2017-09-04 PROCEDURE — 74011000250 HC RX REV CODE- 250: Performed by: NURSE PRACTITIONER

## 2017-09-04 PROCEDURE — 31500 INSERT EMERGENCY AIRWAY: CPT | Performed by: INTERNAL MEDICINE

## 2017-09-04 PROCEDURE — 65620000000 HC RM CCU GENERAL

## 2017-09-04 PROCEDURE — 76010000379 HC BRONCHOSCOPY DIAG/THERAPEUTIC

## 2017-09-04 PROCEDURE — 74011250637 HC RX REV CODE- 250/637: Performed by: INTERNAL MEDICINE

## 2017-09-04 PROCEDURE — 0T9B70Z DRAINAGE OF BLADDER WITH DRAINAGE DEVICE, VIA NATURAL OR ARTIFICIAL OPENING: ICD-10-PCS | Performed by: INTERNAL MEDICINE

## 2017-09-04 PROCEDURE — 74011000258 HC RX REV CODE- 258: Performed by: INTERNAL MEDICINE

## 2017-09-04 PROCEDURE — 77030037378 HC BRONCHOSCOPE DISP TRAN -D

## 2017-09-04 PROCEDURE — 77030011943

## 2017-09-04 PROCEDURE — 83880 ASSAY OF NATRIURETIC PEPTIDE: CPT | Performed by: INTERNAL MEDICINE

## 2017-09-04 PROCEDURE — 5A1935Z RESPIRATORY VENTILATION, LESS THAN 24 CONSECUTIVE HOURS: ICD-10-PCS | Performed by: INTERNAL MEDICINE

## 2017-09-04 PROCEDURE — 31622 DX BRONCHOSCOPE/WASH: CPT | Performed by: INTERNAL MEDICINE

## 2017-09-04 PROCEDURE — 84145 PROCALCITONIN (PCT): CPT | Performed by: INTERNAL MEDICINE

## 2017-09-04 PROCEDURE — 74011636637 HC RX REV CODE- 636/637: Performed by: NURSE PRACTITIONER

## 2017-09-04 PROCEDURE — 77030018798 HC PMP KT ENTRL FED COVD -A

## 2017-09-04 PROCEDURE — 94640 AIRWAY INHALATION TREATMENT: CPT

## 2017-09-04 PROCEDURE — 85025 COMPLETE CBC W/AUTO DIFF WBC: CPT | Performed by: PHYSICIAN ASSISTANT

## 2017-09-04 PROCEDURE — 99291 CRITICAL CARE FIRST HOUR: CPT | Performed by: INTERNAL MEDICINE

## 2017-09-04 PROCEDURE — 36600 WITHDRAWAL OF ARTERIAL BLOOD: CPT

## 2017-09-04 PROCEDURE — 74011000250 HC RX REV CODE- 250: Performed by: INTERNAL MEDICINE

## 2017-09-04 PROCEDURE — 74011250637 HC RX REV CODE- 250/637: Performed by: NURSE PRACTITIONER

## 2017-09-04 PROCEDURE — 94002 VENT MGMT INPAT INIT DAY: CPT

## 2017-09-04 PROCEDURE — 74011000250 HC RX REV CODE- 250

## 2017-09-04 PROCEDURE — 77030010547 HC BG URIN W/UMETER -A

## 2017-09-04 PROCEDURE — 80048 BASIC METABOLIC PNL TOTAL CA: CPT | Performed by: PHYSICIAN ASSISTANT

## 2017-09-04 PROCEDURE — 80051 ELECTROLYTE PANEL: CPT

## 2017-09-04 PROCEDURE — 51798 US URINE CAPACITY MEASURE: CPT

## 2017-09-04 PROCEDURE — 74011250636 HC RX REV CODE- 250/636

## 2017-09-04 PROCEDURE — 0BH17EZ INSERTION OF ENDOTRACHEAL AIRWAY INTO TRACHEA, VIA NATURAL OR ARTIFICIAL OPENING: ICD-10-PCS | Performed by: INTERNAL MEDICINE

## 2017-09-04 PROCEDURE — 82962 GLUCOSE BLOOD TEST: CPT

## 2017-09-04 PROCEDURE — 85610 PROTHROMBIN TIME: CPT | Performed by: PHYSICIAN ASSISTANT

## 2017-09-04 RX ORDER — MIDAZOLAM HYDROCHLORIDE 1 MG/ML
2 INJECTION, SOLUTION INTRAMUSCULAR; INTRAVENOUS
Status: DISCONTINUED | OUTPATIENT
Start: 2017-09-04 | End: 2017-09-13 | Stop reason: HOSPADM

## 2017-09-04 RX ORDER — ETOMIDATE 2 MG/ML
INJECTION INTRAVENOUS
Status: COMPLETED
Start: 2017-09-04 | End: 2017-09-04

## 2017-09-04 RX ORDER — CARVEDILOL 12.5 MG/1
12.5 TABLET ORAL 2 TIMES DAILY WITH MEALS
Status: DISCONTINUED | OUTPATIENT
Start: 2017-09-04 | End: 2017-09-05

## 2017-09-04 RX ORDER — FENTANYL CITRATE 50 UG/ML
75 INJECTION, SOLUTION INTRAMUSCULAR; INTRAVENOUS ONCE
Status: COMPLETED | OUTPATIENT
Start: 2017-09-04 | End: 2017-09-04

## 2017-09-04 RX ORDER — NALOXONE HYDROCHLORIDE 0.4 MG/ML
0.4 INJECTION, SOLUTION INTRAMUSCULAR; INTRAVENOUS; SUBCUTANEOUS ONCE
Status: COMPLETED | OUTPATIENT
Start: 2017-09-04 | End: 2017-09-04

## 2017-09-04 RX ORDER — METOPROLOL TARTRATE 5 MG/5ML
5 INJECTION INTRAVENOUS ONCE
Status: COMPLETED | OUTPATIENT
Start: 2017-09-04 | End: 2017-09-04

## 2017-09-04 RX ORDER — NOREPINEPHRINE BITARTRATE/D5W 4MG/250ML
PLASTIC BAG, INJECTION (ML) INTRAVENOUS
Status: COMPLETED
Start: 2017-09-04 | End: 2017-09-04

## 2017-09-04 RX ORDER — NALOXONE HYDROCHLORIDE 0.4 MG/ML
INJECTION, SOLUTION INTRAMUSCULAR; INTRAVENOUS; SUBCUTANEOUS
Status: COMPLETED
Start: 2017-09-04 | End: 2017-09-04

## 2017-09-04 RX ORDER — FENTANYL CITRATE 50 UG/ML
25-50 INJECTION, SOLUTION INTRAMUSCULAR; INTRAVENOUS
Status: DISCONTINUED | OUTPATIENT
Start: 2017-09-04 | End: 2017-09-04

## 2017-09-04 RX ORDER — FENTANYL CITRATE 50 UG/ML
25 INJECTION, SOLUTION INTRAMUSCULAR; INTRAVENOUS ONCE
Status: COMPLETED | OUTPATIENT
Start: 2017-09-04 | End: 2017-09-04

## 2017-09-04 RX ORDER — CHLORHEXIDINE GLUCONATE 1.2 MG/ML
15 RINSE ORAL 2 TIMES DAILY
Status: DISCONTINUED | OUTPATIENT
Start: 2017-09-04 | End: 2017-09-08

## 2017-09-04 RX ORDER — NOREPINEPHRINE BITARTRATE/D5W 4MG/250ML
2-16 PLASTIC BAG, INJECTION (ML) INTRAVENOUS
Status: DISCONTINUED | OUTPATIENT
Start: 2017-09-04 | End: 2017-09-06

## 2017-09-04 RX ORDER — FENTANYL CITRATE 50 UG/ML
INJECTION, SOLUTION INTRAMUSCULAR; INTRAVENOUS
Status: COMPLETED
Start: 2017-09-04 | End: 2017-09-04

## 2017-09-04 RX ORDER — ETOMIDATE 2 MG/ML
20 INJECTION INTRAVENOUS ONCE
Status: COMPLETED | OUTPATIENT
Start: 2017-09-04 | End: 2017-09-04

## 2017-09-04 RX ADMIN — AMIODARONE HYDROCHLORIDE 400 MG: 200 TABLET ORAL at 08:46

## 2017-09-04 RX ADMIN — GABAPENTIN 600 MG: 300 CAPSULE ORAL at 06:01

## 2017-09-04 RX ADMIN — SACUBITRIL AND VALSARTAN 1 TABLET: 24; 26 TABLET, FILM COATED ORAL at 21:09

## 2017-09-04 RX ADMIN — INSULIN LISPRO 2 UNITS: 100 INJECTION, SOLUTION INTRAVENOUS; SUBCUTANEOUS at 18:51

## 2017-09-04 RX ADMIN — FENTANYL CITRATE 75 MCG: 50 INJECTION, SOLUTION INTRAMUSCULAR; INTRAVENOUS at 12:29

## 2017-09-04 RX ADMIN — FERROUS SULFATE TAB 325 MG (65 MG ELEMENTAL FE) 325 MG: 325 (65 FE) TAB at 08:47

## 2017-09-04 RX ADMIN — GABAPENTIN 600 MG: 300 CAPSULE ORAL at 14:00

## 2017-09-04 RX ADMIN — SODIUM CHLORIDE, PRESERVATIVE FREE 600 UNITS: 5 INJECTION INTRAVENOUS at 06:00

## 2017-09-04 RX ADMIN — NALOXONE HYDROCHLORIDE 0.4 MG: 0.4 INJECTION, SOLUTION INTRAMUSCULAR; INTRAVENOUS; SUBCUTANEOUS at 13:04

## 2017-09-04 RX ADMIN — Medication 20 ML: at 14:00

## 2017-09-04 RX ADMIN — Medication 10 ML: at 21:12

## 2017-09-04 RX ADMIN — ETOMIDATE 20 MG: 2 INJECTION INTRAVENOUS at 12:32

## 2017-09-04 RX ADMIN — MIDAZOLAM 2 MG: 1 INJECTION INTRAMUSCULAR; INTRAVENOUS at 19:47

## 2017-09-04 RX ADMIN — Medication 20 ML: at 21:12

## 2017-09-04 RX ADMIN — SODIUM CHLORIDE 250 ML: 900 INJECTION, SOLUTION INTRAVENOUS at 12:41

## 2017-09-04 RX ADMIN — SODIUM CHLORIDE 250 ML: 900 INJECTION, SOLUTION INTRAVENOUS at 10:00

## 2017-09-04 RX ADMIN — FAMOTIDINE 20 MG: 10 INJECTION, SOLUTION INTRAVENOUS at 08:45

## 2017-09-04 RX ADMIN — INSULIN LISPRO 2 UNITS: 100 INJECTION, SOLUTION INTRAVENOUS; SUBCUTANEOUS at 05:59

## 2017-09-04 RX ADMIN — BUDESONIDE 500 MCG: 0.5 INHALANT RESPIRATORY (INHALATION) at 07:35

## 2017-09-04 RX ADMIN — Medication 20 ML: at 06:00

## 2017-09-04 RX ADMIN — MUPIROCIN: 20 OINTMENT TOPICAL at 08:47

## 2017-09-04 RX ADMIN — Medication 10 ML: at 06:00

## 2017-09-04 RX ADMIN — ETOMIDATE 20 MG: 2 INJECTION, SOLUTION INTRAVENOUS at 12:32

## 2017-09-04 RX ADMIN — INSULIN LISPRO 2 UNITS: 100 INJECTION, SOLUTION INTRAVENOUS; SUBCUTANEOUS at 11:54

## 2017-09-04 RX ADMIN — MUPIROCIN: 20 OINTMENT TOPICAL at 18:47

## 2017-09-04 RX ADMIN — FENTANYL CITRATE 25 MCG: 50 INJECTION, SOLUTION INTRAMUSCULAR; INTRAVENOUS at 12:31

## 2017-09-04 RX ADMIN — GABAPENTIN 600 MG: 300 CAPSULE ORAL at 21:09

## 2017-09-04 RX ADMIN — Medication 5 MCG/MIN: at 12:50

## 2017-09-04 RX ADMIN — SACUBITRIL AND VALSARTAN 1 TABLET: 24; 26 TABLET, FILM COATED ORAL at 08:48

## 2017-09-04 RX ADMIN — AMIODARONE HYDROCHLORIDE 400 MG: 200 TABLET ORAL at 21:09

## 2017-09-04 RX ADMIN — MIDAZOLAM 2 MG: 1 INJECTION INTRAMUSCULAR; INTRAVENOUS at 23:41

## 2017-09-04 RX ADMIN — METHYLPREDNISOLONE SODIUM SUCCINATE 60 MG: 125 INJECTION, POWDER, FOR SOLUTION INTRAMUSCULAR; INTRAVENOUS at 08:46

## 2017-09-04 RX ADMIN — SODIUM CHLORIDE, PRESERVATIVE FREE 600 UNITS: 5 INJECTION INTRAVENOUS at 14:00

## 2017-09-04 RX ADMIN — ALBUTEROL SULFATE 2.5 MG: 2.5 SOLUTION RESPIRATORY (INHALATION) at 01:56

## 2017-09-04 RX ADMIN — CHLORHEXIDINE GLUCONATE 15 ML: 1.2 RINSE ORAL at 22:34

## 2017-09-04 RX ADMIN — CEFTRIAXONE 1 G: 1 INJECTION, POWDER, FOR SOLUTION INTRAMUSCULAR; INTRAVENOUS at 11:15

## 2017-09-04 RX ADMIN — ALBUTEROL SULFATE 2.5 MG: 2.5 SOLUTION RESPIRATORY (INHALATION) at 07:35

## 2017-09-04 RX ADMIN — MIDAZOLAM 2 MG: 1 INJECTION INTRAMUSCULAR; INTRAVENOUS at 14:40

## 2017-09-04 RX ADMIN — Medication 10 ML: at 14:00

## 2017-09-04 RX ADMIN — WARFARIN SODIUM 2.5 MG: 2.5 TABLET ORAL at 21:09

## 2017-09-04 RX ADMIN — METOPROLOL TARTRATE 5 MG: 5 INJECTION INTRAVENOUS at 22:20

## 2017-09-04 RX ADMIN — SODIUM CHLORIDE, PRESERVATIVE FREE 600 UNITS: 5 INJECTION INTRAVENOUS at 21:12

## 2017-09-04 NOTE — H&P (VIEW-ONLY)
PULMONARY/CCM CONSULT :  8/29/2017    Date of Admission:  8/23/2017    The patient's chart has been reviewed and the chart has been discussed with nursing staff. Subjective: This patient has been seen and evaluated at the request of Dr. Merlinda Acre. Patient is a 67 y.o.  male presents with atrial flutter with RVR and hypotension. He was seen by cardiology and had a MERLYN and cardioverted. He has had intermittent confusion since a fall with head CT and MRI showing no acute abnormality. He has been on lasix and his sodium is now 128. He has since converted to atrial fibrillation. His heart rate is controlled. He was scheduled today for MBS but was more confused prompting an ABG. ABG showed hypercapnic respiratory failure for which he is being transferred to the ICU. He also has thrombocytopenia and been followed by Hem/Onc while here. He is anticoagulated on coumadin. He has reported COPD managed at [place] and long term history of tobacco abuse. Past Medical History:   Diagnosis Date    Acute diastolic CHF (congestive heart failure) ([address] 9/15/2016    Arthritis     Chicken pox     Coronary artery disease 4/16/2014    DJD (degenerative joint disease)     Emphysema     Hx of aortic valve replacement, mechanical       doing well,. Had to hold 3 days for removal of skin ca. Will have to have add. resection.  Hypercholesterolemia     Hypertension     Palpitations      Holter showed  PVC's, PAC's, one short run SVT.  Pneumonia     Systolic CHF, acute ([address] 11/15/2016      Past Surgical History:   Procedure Laterality Date    HX AORTIC VALVE REPLACEMENT N/A 2000    Aortic Valve Replacement w/ Mechanical Valve    HX HEART CATHETERIZATION  07/21/2004    [facility]      Social History   Substance Use Topics    Smoking status: Former Smoker     Packs/day: 1.50     Years: 40.00    Smokeless tobacco: Never Used    Alcohol use No      No family history on file.    Allergies   Allergen Reactions  Levaquin [Levofloxacin] Other (comments)     GI upset    Metformin Other (comments)     Gi upset      Prior to Admission Medications   Prescriptions Last Dose Informant Patient Reported? Taking? Hydrochlorothiazide 12.5 mg tablet   No No   Sig: Take 12.5 mg by mouth daily. Omeprazole delayed release (PRILOSEC D/R) 20 mg tablet   Yes No   Sig: Take 20 mg by mouth. albuterol (PROVENTIL HFA) 90 mcg/actuation inhaler   Yes No   Sig: Take 2 Puffs by inhalation. budesonide-formoterol (SYMBICORT) 160-4.5 mcg/actuation HFA inhaler   Yes No   Sig: Take 2 Puffs by inhalation. flunisolide (NASAREL) 25 mcg (0.025 %) spry   Yes No   Si Sprays by Nasal route. furosemide (LASIX) 40 mg tablet   No No   Sig: Take 1 Tab by mouth daily. gabapentin (NEURONTIN) 300 mg capsule   Yes No   Sig: Take 600 mg by mouth three (3) times daily. glipiZIDE (GLUCOTROL) 10 mg tablet   Yes No   Sig: Take 10 mg by mouth two (2) times a day. losartan (COZAAR) 100 mg tablet   No No   Sig: Take 1 Tab by mouth daily. potassium chloride (K-DUR, KLOR-CON) 10 mEq tablet   No No   Sig: Take 1 Tab by mouth daily. simvastatin (ZOCOR) 80 mg tablet   No No   Sig: Take 1 Tab by mouth nightly. tiotropium (SPIRIVA) 18 mcg inhalation capsule   Yes No   Sig: Take 1 Puff by inhalation.    warfarin (COUMADIN) 5 mg tablet   No No   Sig: Take one 5 mg tablet daily (except 1-1/2 tablet on Wednesday      Facility-Administered Medications: None       MEDS SCHEDULED:    Current Facility-Administered Medications   Medication Dose Route Frequency    LORazepam (ATIVAN) injection 0.5 mg  0.5 mg IntraVENous ONCE    morphine injection 2 mg  2 mg IntraVENous ONCE    predniSONE (DELTASONE) tablet 80 mg  80 mg Oral DAILY WITH BREAKFAST    carvedilol (COREG) tablet 12.5 mg  12.5 mg Oral BID WITH MEALS    furosemide (LASIX) tablet 40 mg  40 mg Oral DAILY    Read PPD  1 Each Other Q24H    sacubitril-valsartan (ENTRESTO) 24-26 mg tablet 1 Tab  1 Tab Oral Q12H    dextrose (D50W) injection syrg 25 g  25 g IntraVENous PRN    ferrous sulfate tablet 325 mg  1 Tab Oral DAILY WITH BREAKFAST    warfarin (COUMADIN) tablet 5 mg  5 mg Oral QHS    amiodarone (CORDARONE) tablet 200 mg  200 mg Oral BID    acetaminophen (TYLENOL) tablet 650 mg  650 mg Oral Q6H PRN    gabapentin (NEURONTIN) capsule 600 mg  600 mg Oral TID    albuterol (PROVENTIL HFA, VENTOLIN HFA, PROAIR HFA) inhaler 2 Puff  2 Puff Inhalation Q4H PRN    pantoprazole (PROTONIX) tablet 40 mg  40 mg Oral ACB    tiotropium (SPIRIVA) inhalation capsule 18 mcg  1 Cap Inhalation DAILY    sodium chloride (NS) flush 5-10 mL  5-10 mL IntraVENous Q8H    sodium chloride (NS) flush 5-10 mL  5-10 mL IntraVENous PRN    nitroglycerin (NITROSTAT) tablet 0.4 mg  0.4 mg SubLINGual Q5MIN PRN    morphine injection 2 mg  2 mg IntraVENous Q4H PRN    ondansetron (ZOFRAN) injection 4 mg  4 mg IntraVENous Q4H PRN    insulin lispro (HUMALOG) injection   SubCUTAneous AC&HS    budesonide (PULMICORT) 500 mcg/2 ml nebulizer suspension  500 mcg Nebulization BID RT    And    albuterol CONCENTRATE 2.5mg/0.5 mL neb soln  2.5 mg Nebulization Q6H RT         Review of Systems  Unable to obtain due to the patient's condition. Objective:     Vitals:    08/29/17 1338 08/29/17 1349 08/29/17 1558 08/29/17 1600   BP:    116/79   Pulse:    (!) 117   Resp:    28   Temp:    97.9 °F (36.6 °C)   SpO2:  93% 100% 95%   Weight: 181 lb (82.1 kg)   177 lb 14.6 oz (80.7 kg)   Height: 5' 6\" (1.676 m)        08/29 0701 - 08/29 1900  In: 180 [P.O.:180]  Out: 400 [Urine:400]         PHYSICAL EXAM     Physical Exam:   General:  Alert, cooperative, no acute distress, appears stated age. Eyes:  Conjunctivae/corneas clear. Nose: Nares patent and moist. Septum midline. Mouth/Throat: Lips, mucosa, and tongue pink and intact. Neck: Supple, symmetrical.   Respiratory:   Mild R basal crackles. Scattered anterior rhonchi.     Cardiovascular: Regular rhythm, tachy. , S1, S2, no murmur, rub or gallop. GI:   Abdomen soft, non-tender. Bowel sounds active X 4 Q. Musculoskeletal: Extremities symmetrical, atraumatic, no cyanosis, noedema. Pulses: 2+ and symmetric all extremities. Skin: Skin color, texture, turgor normal. No rashes or lesions       Neurologic: 2+ strength bilateral upper and lower extremities, sensation throughout appropriate. Alert but oriented only to person. Activity: limited  Nutrition: NPO, speech following.  MBS was planned for today    CHEST X-RAYS: ordered    CULTURES:  Blood - ngtd  Urine- skin deidre    LABS    Recent Labs      08/29/17   0739  08/28/17   0853  08/27/17   0549   WBC  4.0*  5.2  6.3   HGB  12.1*  12.8*  11.8*   HCT  36.4*  39.6*  39.6*   PLT  50*  65*  49*     Recent Labs      08/29/17   1348  08/29/17   0739  08/28/17   0853  08/27/17   0549   NA  128*   --   128*   --    K  5.4*   --   4.4   --    CL  88*   --   89*   --    GLU  204*   --   147*   --    CO2  35*   --   37*   --    BUN  47*   --   45*   --    CREA  1.34   --   1.48   --    INR   --   2.3*  2.3*  2.5*     Recent Labs      08/29/17   1200   PH  7.30*   PCO2  76*   PO2  148*   HCO3  37*       Assessment:     Hospital Problems  Date Reviewed: 8/18/2017          Codes Class Noted POA    Acute respiratory failure with hypercapnia ([facility] ICD-10-CM: J96.02  ICD-9-CM: 518.81  8/29/2017 No        History of tobacco use (Chronic) ICD-10-CM: Z10.705  ICD-9-CM: V15.82  8/29/2017 Yes        Hyponatremia ICD-10-CM: E87.1  ICD-9-CM: 276.1  8/29/2017 Yes        Acute encephalopathy ICD-10-CM: G93.40  ICD-9-CM: 348.30  8/29/2017 Yes        Thrombocytopenia ([facility] ICD-10-CM: D69.6  ICD-9-CM: 287.5  8/26/2017 Yes        * (Principal)Atrial flutter with rapid ventricular response (HCC) ICD-10-CM: I48.92  ICD-9-CM: 427.32  8/23/2017 Yes        Ischemic cardiomyopathy (Chronic) ICD-10-CM: I25.5  ICD-9-CM: 414.8  11/15/2016 Yes        History of mechanical aortic valve replacement (Chronic) ICD-10-CM: Z95.2  ICD-9-CM: V43.3  9/15/2016 Yes        Centrilobular emphysema ([facility] ICD-10-CM: J43.2  ICD-9-CM: 492.8  9/15/2016 Yes        Dyslipidemia (Chronic) ICD-10-CM: E78.5  ICD-9-CM: 272.4  4/16/2014 Yes        Anticoagulant long-term use (Chronic) ICD-10-CM: Z79.01  ICD-9-CM: V58.61  4/15/2014 Yes        HTN (hypertension), benign (Chronic) ICD-10-CM: I10  ICD-9-CM: 401.1  4/15/2014 Yes        Peripheral vascular disease ([facility] (Chronic) ICD-10-CM: I73.9  ICD-9-CM: 443.9  4/15/2014 Yes            Patient with AMS and hypercarbia. Recent fall but CT and MRI without acute changes to explain his mental status. Feel like most likely causes are hypercarbia and hyponatremia. Does not appear to have received any sedating medications recently. No wheeze on exam to suggest COPD exacerbation. Plan:     Limit sedatives  BiPAP for hypercapnic respiratory failure. Repeat ABG in 1 hour. CXR now  Continue diuresis (on lasix 40 mg daily orally) for now but with no LE edema if CXR does not show edema may want to try gental NS for hyponatremia. If this does not work or unable to give fluids, could consider tolvaptan. Daily BMP's. Prednisone 80 mg oral daily per hem/onc for ITP    [employer], NP-C    More than 50% of time documented was spent in face-to-face contact with the patient and in the care of the patient on the floor/unit where the patient is located. Lungs:  R basal crackles. Scattered rhonchi. Heart:  Tachy, regular. with no Murmur/Rubs/Gallops    Additional Comments:  Agree with above plan. I have spoken with and examined the patient. I agree with the above assessment and plan as documented.     Corene Spurling, MD

## 2017-09-04 NOTE — PROGRESS NOTES
Critical Care Daily Progress Note: 9/4/2017    Baylee Layton   Admission Date: 8/23/2017         The patient's chart is reviewed and the patient is discussed with the staff.    67 y.o.  CM with h/o severe COPD (baseline bicarb 37/PCO2 65), ITP with thrombocytopenia, s/p mechanical AVR 2000, atrial flutter who was admitted on 8/23 with rapid a. Flutter.   His course has been complicated by hyponatremia, confusion, hypercarbic respiratory failure. Subjective:   On BiPAP this morning for respiratory acidosis with PCO2 111.   BP lower with SBP in 60s and now getting small IVF bolus  CXR with small R effusion  + 500mL yesterday  Creatinine up to 1.58    Current Facility-Administered Medications   Medication Dose Route Frequency    warfarin (COUMADIN) tablet 2.5 mg  2.5 mg Oral QHS    morphine injection 2 mg  2 mg IntraVENous Q4H PRN    amiodarone (CORDARONE) tablet 400 mg  400 mg Oral Q12H    methylPREDNISolone (PF) (SOLU-MEDROL) injection 60 mg  60 mg IntraVENous DAILY    insulin lispro (HUMALOG) injection   SubCUTAneous Q6H    guaiFENesin ER (MUCINEX) tablet 1,200 mg  1,200 mg Oral Q12H    NUTRITIONAL SUPPORT ELECTROLYTE PRN ORDERS   Does Not Apply PRN    cefTRIAXone (ROCEPHIN) 1 g in 0.9% sodium chloride (MBP/ADV) 50 mL  1 g IntraVENous Q24H    famotidine (PF) (PEPCID) 20 mg in sodium chloride 0.9 % 10 mL injection  20 mg IntraVENous DAILY    mupirocin (BACTROBAN) 2 % ointment   Both Nostrils BID    carvedilol (COREG) tablet 25 mg  25 mg Oral BID WITH MEALS    0.9% sodium chloride infusion 250 mL  250 mL IntraVENous PRN    furosemide (LASIX) injection 40 mg  40 mg IntraVENous DAILY    dilTIAZem (CARDIZEM) 100 mg in 0.9% sodium chloride (MBP/ADV) 100 mL infusion  0-15 mg/hr IntraVENous TITRATE    acetaminophen (TYLENOL) suppository 650 mg  650 mg Rectal Q4H PRN    sodium chloride (NS) flush 20 mL  20 mL InterCATHeter Q8H    heparin (porcine) pf 600 Units  600 Units InterCATHeter Q8H  sodium chloride (NS) flush 20 mL  20 mL InterCATHeter PRN    heparin (porcine) pf 600 Units  600 Units InterCATHeter PRN    albuterol (PROVENTIL VENTOLIN) nebulizer solution 2.5 mg  2.5 mg Nebulization Q4H PRN    sacubitril-valsartan (ENTRESTO) 24-26 mg tablet 1 Tab  1 Tab Oral Q12H    dextrose (D50W) injection syrg 25 g  25 g IntraVENous PRN    ferrous sulfate tablet 325 mg  1 Tab Oral DAILY WITH BREAKFAST    acetaminophen (TYLENOL) tablet 650 mg  650 mg Oral Q6H PRN    gabapentin (NEURONTIN) capsule 600 mg  600 mg Oral TID    albuterol (PROVENTIL HFA, VENTOLIN HFA, PROAIR HFA) inhaler 2 Puff  2 Puff Inhalation Q4H PRN    tiotropium (SPIRIVA) inhalation capsule 18 mcg  1 Cap Inhalation DAILY    sodium chloride (NS) flush 5-10 mL  5-10 mL IntraVENous Q8H    sodium chloride (NS) flush 5-10 mL  5-10 mL IntraVENous PRN    nitroglycerin (NITROSTAT) tablet 0.4 mg  0.4 mg SubLINGual Q5MIN PRN    ondansetron (ZOFRAN) injection 4 mg  4 mg IntraVENous Q4H PRN    budesonide (PULMICORT) 500 mcg/2 ml nebulizer suspension  500 mcg Nebulization BID RT    And    albuterol CONCENTRATE 2.5mg/0.5 mL neb soln  2.5 mg Nebulization Q6H RT       Review of Systems  Constitutional:  negative for fever, chills, sweats  Cardiovascular:  negative for chest pain, palpitations, syncope, edema  Gastrointestinal:  negative for dysphagia, reflux, vomiting, diarrhea, abdominal pain, or melena  Neurologic:  negative for focal weakness, numbness, headache      Objective:     Vitals:    09/04/17 0929 09/04/17 0959 09/04/17 1010 09/04/17 1013   BP: 100/51 (!) 82/51 (!) 69/35 (!) 64/33   Pulse: 66 (!) 59 (!) 57 (!) 57   Resp: 22 22 22 22   Temp:       SpO2: 98% 98% 98% 97%   Weight:       Height:           Intake and Output:   09/02 1901 - 09/04 0700  In: 2821 [I.V.:50]  Out: 2475 [Urine:2475]  09/04 0701 - 09/04 1900  In: 80   Out: -     Physical Exam:          Constitutional:  the patient is well developed and in no acute distress, on Optiflow 40L, 35% with O2 sat 95%  EENMT:  Sclera clear, pupils equal, oral mucosa moist  Respiratory: decreased breath sounds, no wheezing. Cardiovascular:  RRR without M,G,R  Gastrointestinal: soft and non-tender; with positive bowel sounds. Musculoskeletal: warm without cyanosis. There is no lower leg edema. Skin:  no jaundice or rashes, no open wounds   Neurologic: no gross neuro deficits     Psychiatric:  alert and oriented x 3     LINES:  PICC, peripheral, condom cath, NG tube    DRIPS: none    CXR:   9/4/17- R basilar effusion      8/30/17        LAB  Recent Labs      09/04/17   0559  09/04/17   0110  09/03/17   1808  09/03/17   1212  09/03/17   0600   GLUCPOC  171*  141*  175*  168*  140*      Recent Labs      09/04/17   0408  09/03/17   0447  09/02/17   0427  09/02/17   0400   WBC  8.9  8.4  8.4   --    HGB  10.6*  11.1*  11.1*   --    HCT  33.7*  34.3*  34.1*   --    PLT  135*  132*  84*   --    INR  1.6*  2.0*   --   2.5*     Recent Labs      09/04/17   0408  09/03/17   0447  09/02/17   0400   NA  135*  137  135*   K  5.4*  4.8  5.0   CL  87*  90*  90*   CO2  >45*  >45*  42*   GLU  165*  128*  155*   BUN  74*  63*  66*   CREA  1.58*  1.16  1.26   MG   --    --   2.7*   PHOS   --    --   3.5   CA  9.4  9.4  9.4     Recent Labs      09/04/17   0858  09/04/17   0605  09/04/17   0300   PH  7.28*  7.27*  7.23*   PCO2  111*  112*  126*   PO2  77*  62*  83   HCO3  51*  51*  51*     No results for input(s): LCAD, LAC in the last 72 hours. Assessment:  (Medical Decision Making)          Hospital Problems  Date Reviewed: 9/1/2017          Codes Class Noted POA    COPD (chronic obstructive pulmonary disease) ([facility] (Chronic) ICD-10-CM: J44.9  ICD-9-CM: 874  8/30/2017 Yes    GOLD IV, severe- Continue Albuterol, Pulmicort, Spiriva.    Continue Rocephin,   mucinex   On solumedrol for ITP see below    Acute respiratory failure with hypercapnia (HCC) ICD-10-CM: J96.02  ICD-9-CM: 518.81  8/29/2017 No    Now requiring BiPAP. If no improvement on next gas may need intubation    History of tobacco use (Chronic) ICD-10-CM: V81.789  ICD-9-CM: V15.82  8/29/2017 Yes    Unchanged     Hyponatremia ICD-10-CM: E87.1  ICD-9-CM: 276.1  8/29/2017 Yes    Na up to 135 today    Acute encephalopathy ICD-10-CM: G93.40  ICD-9-CM: 348.30  8/29/2017 Yes    WORSE. Barely answers questions this morning. Thrombocytopenia ([facility] ICD-10-CM: D69.6  ICD-9-CM: 287.5  8/26/2017 Yes    Hematology following, platelets 157>>>66>>879 . Not planning bone marrow biopsy this admission, on solumedrol(dose increased) for ITP continue and adjust dosing per hematology    Anticoagulant long-term use (Chronic) ICD-10-CM: Z79.01  ICD-9-CM: V58.61  8/23/2017 Yes    On coumadin    HTN (hypertension), benign (Chronic) ICD-10-CM: I10  ICD-9-CM: 401.1  8/23/2017 Yes    chronic     Peripheral vascular disease (HCC) (Chronic) ICD-10-CM: I73.9  ICD-9-CM: 443.9  8/23/2017 Yes    Chronic     Dyslipidemia (Chronic) ICD-10-CM: E78.5  ICD-9-CM: 272.4  8/23/2017 Yes    Chronic     History of mechanical aortic valve replacement (Chronic) ICD-10-CM: Z95.2  ICD-9-CM: V43.3  8/23/2017 Yes     Placement 2000, on chronicCoumadin             Centrilobular emphysema ([facility] (Chronic) ICD-10-CM: J43.2  ICD-9-CM: 492.8  8/23/2017 Yes    With last PFTs FVC 1.70 L or 44% predicted, FEV1 0.86 L or 29% predicted, FEV1/FVC 51%. This study was a postbronchodilator study performed at the [place] in [facility] on 8/22/2016    The above results were discussed with both patient and his wife- they were not aware of the severity of his COPD which is ES GOLD stage IV. Discussed with family if he needs mechanical ventilation weaning may not be straightforward. Ischemic cardiomyopathy (Chronic) ICD-10-CM: I25.5  ICD-9-CM: 414.8  8/23/2017 Yes     8/23/17 ECHO:  EF 40 % to 45 %. There were no regional wall motion abnormalities.    Moderate hypokinesis of the mid-apical anterior and apical wall(s). On daily lasix, cardiology following     * (Principal)Atrial flutter with rapid ventricular response ([facility] ICD-10-CM: I48.92  ICD-9-CM: 427.32  8/23/2017 Yes    On IV amiodarone and coumadin, per cardiology       Hypotension:  Unclear etiology. Will support with small bolus and use levophed if needed. Survey for infection with procalcitonin. More than 50% of the time documented was spent in face-to-face contact with the patient and in the care of the patient on the floor/unit where the patient is located.   The patient is critically ill with respiratory failure, circulatory failure and requires high complexity decision making for assessment and support including frequent ventilator adjustment , frequent evaluation and titration of therapies , application of advanced monitoring technologies and extensive interpretation of multiple databases    Critical care time 35 minutes    Bennie Espinoza MD

## 2017-09-04 NOTE — PROCEDURES
Procedure: Endotracheal intubation    Indication: Acute respiratory failure 518.81    Anesthesia:  Rapid sequence:    Fentanyl 100mcg (1.5mcg/kg),  Etomidate 20mg (0.5mg'kg)     After assessing the airway, the patient underwent preoxygenation with 100% FiO2 for 5 min. Fentanyl was then given and after 3 min, and then etomidate  was given sequentially in rapid IV push. The Sellick maneuver was performed. After adequate sedation and paralysis intubation was performed. A Estephanie 3.0 laryngoscope was used to visualize the epiglottis and vocal cords. After positive identification of epiglottis and the vocal cords, a size 7.5 ET tube was placed into the trachea with direct visualization. Confirmation of endotracheal positionin. The ET tube was directly visualized passing through the vocal cords. 2.  CO2 colorimetry was employed immediately to verify tube in airway with appropriate color change indicating detection/lack of CO2.   3.  Water vapor was seen within the ET tube, and auscultation of the abdomen revealed no bubbling sounds. 4.  Auscultation  And inspection of the chest after intubation showed symmetric chest excursion and symmetric air entry bilaterally. 5.  Chest X-ray has been ordered and is pending. The tube was secured at 22cm at the lip with a tube gonzalez. The patient has been placed on a mechanical ventilator. There were no complications.     Fe Walden MD

## 2017-09-04 NOTE — INTERVAL H&P NOTE
H&P Update:  Phong Pham was seen and examined. History and physical has been reviewed. The patient has been examined.  There have been no significant clinical changes since the completion of the originally dated History and Physical.    Signed By: Jia Tony MD     September 4, 2017 12:36 PM

## 2017-09-04 NOTE — INTERVAL H&P NOTE
H&P Update:  Giuseppe Montelongo was seen and examined. History and physical has been reviewed. The patient has been examined.  There have been no significant clinical changes since the completion of the originally dated History and Physical.    Signed By: Arpita Jeff MD     September 4, 2017 1:32 PM

## 2017-09-04 NOTE — PROGRESS NOTES
Placed patient back on BiPAP post ABG, increased BiPAP settings and RR. Patient tolerating well at this time, will continue to monitor.

## 2017-09-04 NOTE — PROGRESS NOTES
[facility] PROGRESS NOTE      9/4/2017 2:36 PM    Admit Date: 8/23/2017    Admit Diagnosis: Atrial flutter with rapid ventricular response (HCC)      Subjective:   deteriorated from respiratory standpoint now intubated       Objective:      Vitals:    09/04/17 1303 09/04/17 1305 09/04/17 1308 09/04/17 1309   BP: 103/54 119/57 165/75 143/64   Pulse: 80 (!) 115 82 79   Resp: 22 19 26 24   Temp:       SpO2: 97% 99% 99% 100%   Weight:       Height:           Physical Exam:  Neck-   CV- irr+r  Lungs- very diminished   Ext-  Skin- warm and dry        Data Review:   Recent Labs      09/04/17   0408  09/03/17   0447   09/02/17   0400   NA  135*  137   --   135*   K  5.4*  4.8   --   5.0   MG   --    --    --   2.7*   BUN  74*  63*   --   66*   CREA  1.58*  1.16   --   1.26   GLU  165*  128*   --   155*   WBC  8.9  8.4   < >   --    HGB  10.6*  11.1*   < >   --    HCT  33.7*  34.3*   < >   --    PLT  135*  132*   < >   --    INR  1.6*  2.0*   --   2.5*    < > = values in this interval not displayed. Assessment and Plan:     Principal Problem:    Atrial flutter with rapid ventricular response ([address] (8/23/2017)  rate controlled on current regimen     Active Problems:    Anticoagulant long-term use (8/23/2017)      HTN (hypertension), benign (8/23/2017)      Peripheral vascular disease ([address] (8/23/2017)      Dyslipidemia (8/23/2017)      History of mechanical aortic valve replacement (8/23/2017)      Overview: Placement 2000, on chronicCoumadin      Centrilobular emphysema ([address] (8/23/2017)      Ischemic cardiomyopathy (8/23/2017)      Overview: 8/23/17 ECHO:      EF 40 % to 45 %. There were no regional wall motion abnormalities. Moderate hypokinesis of the mid-apical anterior and apical wall(s).        hypotensive overnight limiting medical treatment       Thrombocytopenia ([address] (8/26/2017)      Acute respiratory failure with hypercapnia ([facility] (8/29/2017)      History of tobacco use (8/29/2017)      Hyponatremia (8/29/2017)      Acute encephalopathy (8/29/2017)      COPD (chronic obstructive pulmonary disease) ([facility] (8/30/2017)        advanced with worsening respiratory status   now intubated  appreciate pulmonary care               prognosis guarded         WILL REDUCE COREG TO 12.5 BID WITH OVERNIGHT HYPOTENSION      ?  PULMONARY WILLING TO Nancy Larkin MD

## 2017-09-04 NOTE — PROGRESS NOTES
RT having difficulty ventilating patient questionale tube placement. Dr Juanis Abdi at bedside and positioning confirmed with bronchoscope. RT working to find best mode of ventilation for patient.

## 2017-09-04 NOTE — PROGRESS NOTES
SPEECH PATHOLOGY NOTE:    Patient on BiPap at this time and not appropriate for po trials. Will hold ST for today. Speech therapy will continue to follow for po trials and possible modified barium swallow study when patient is able to participate.      ABBEY Kumar, CCC-SLP, CBIS

## 2017-09-04 NOTE — PROGRESS NOTES
Repeat ABG results given verbal to Dr Ruby Foreman, new order to increase settings on BiPAP to 20/8 and repeat ABG in 2 hrs. No other changes at this time.

## 2017-09-04 NOTE — PROGRESS NOTES
Resting in bed presently Responds to verbal stimuli. SpO2 88-89% on 30 % FiO2 on BiPAP increased oxygen to 35%.

## 2017-09-04 NOTE — PROGRESS NOTES
Patient was intubated with a number 7.5 ET Tube. Tube placement verified by auscultation, by CXR and ETCO2 monitor. ET Tube is secured at the 26 cm renetta at the lip and on the bilateral side. Patient was intubated by DR Prashanth Mar on the 1 attempt. Breath sounds are diminished. Patient is Negative for subcutaneous air and chest excursion is symmetric. Trachea is midline. Patient is also Negative for cyanosis and is Negative for pitting edema. Patient placed on ventilator on documented settings. All alarms are set and audible. Resuscitation bag is at the head of the bed.       Ventilator Settings  Mode FIO2 Rate Tidal Volume Pressure PEEP I:E Ratio   Pressure control  40 %          8 cm H20  1:2.8      Peak airway pressure: 38 cm H2O   Minute ventilation: 6.1 l/min     ABG:   Recent Labs      09/04/17   1200  09/04/17   0858  09/04/17   0605   PH  7.27*  7.28*  7.27*   PCO2  119*  111*  112*   PO2  90  77*  62*   HCO3  53*  51*  51*

## 2017-09-04 NOTE — PROGRESS NOTES
PT Note:  PT orders received and chart reviewed. RN reports pt had a bad night and is no longer appropriate for PT. Orders will be cancelled at this time and please re-consult when pt deemed appropriate.   Thanks,  Kamla Garcia, PT, DPT

## 2017-09-04 NOTE — PROGRESS NOTES
Hospitalist Progress Note     Admit Date:  2017  1:11 PM   Name:  Radha Mccoy   Age:  67 y.o.  :  1945   MRN:  [id number]   PCP:  Norbert Caro MD  Treatment Team: Attending Provider: Samaria Jeff MD; Consulting Provider: Samaria Jeff MD; Utilization Review: Elizabeth Garcia; Consulting Provider: Shelbie Sanchez MD; Consulting Provider: Christine Schmitz MD; Care Manager: Tabitha Melvin; Consulting Provider: Davin Wallis MD; Consulting Provider: Trung Olson MD    Subjective:     Tripp Muir is a 66 yo male who presented with dyspnea on a background of mechanical AVR in , chronic anticoagulation with coumadin, chronic systolic CHF/ICM EF 15%, COPD, PVD, DM II, HTN, iron deficiency, and dyslipidemia. He is currently being treated for atrial fibrillation with RVR. This morning, he was intubated for acute respiratory failure.     Objective:   Patient Vitals for the past 24 hrs:   Temp Pulse Resp BP SpO2   17 1309 - 79 24 143/64 100 %   17 1308 - 82 26 165/75 99 %   17 1305 - (!) 115 19 119/57 99 %   17 1303 - 80 22 103/54 97 %   17 1259 - (!) 57 19 (!) 55/33 93 %   17 1255 - 70 15 (!) 65/35 (!) 72 %   17 1253 - (!) 51 8 (!) 57/34 (!) 85 %   17 1249 - 65 30 (!) 63/31 (!) 81 %   17 1244 - 63 20 (!) 56/34 100 %   17 1239 - 65 21 (!) 62/36 99 %   17 1235 - (!) 54 (!) 6 (!) 67/35 98 %   17 1230 - 61 (!) 33 97/51 97 %   17 1215 - 62 22 93/50 97 %   17 1159 - 61 26 105/51 97 %   17 1144 - 62 21 114/56 96 %   17 1140 97.5 °F (36.4 °C) 65 22 103/51 96 %   17 1129 - 66 29 103/51 95 %   17 1114 - 62 (!) 31 108/54 95 %   17 1059 - 61 20 105/51 94 %   17 1044 - 60 29 (!) 82/50 93 %   17 1030 - (!) 56 (!) 32 (!) 68/35 96 %   17 1013 - (!) 57 22 (!) 64/33 97 %   17 1010 - (!) 57 22 (!) 69/35 98 %   17 0959 - (!) 59 22 (!) 82/51 98 %   17 0929 - 66 22 100/51 98 %   09/04/17 0859 - 65 25 90/54 94 %   09/04/17 0845 - 66 25 (!) 89/54 96 %   09/04/17 0829 - 69 21 92/50 95 %   09/04/17 0814 - 68 23 100/53 95 %   09/04/17 0759 - 67 19 98/54 96 %   09/04/17 0744 - 70 24 97/55 97 %   09/04/17 0738 - - - - 92 %   09/04/17 0735 - - - - 93 %   09/04/17 0729 - 72 25 97/50 95 %   09/04/17 0714 - 71 20 97/51 (!) 88 %   09/04/17 0700 - 69 19 (!) 86/52 (!) 89 %   09/04/17 0646 - 68 20 99/54 (!) 89 %   09/04/17 0629 - 69 29 100/50 90 %   09/04/17 0614 - 71 17 90/52 90 %   09/04/17 0559 - 68 21 104/54 90 %   09/04/17 0544 - 65 21 113/54 90 %   09/04/17 0529 - 66 24 121/56 90 %   09/04/17 0514 - 66 19 110/54 90 %   09/04/17 0500 - 67 26 100/58 90 %   09/04/17 0444 - 76 27 112/55 91 %   09/04/17 0429 - 65 15 107/53 90 %   09/04/17 0419 - - - - 95 %   09/04/17 0415 - 66 15 104/59 92 %   09/04/17 0400 - 65 13 110/51 94 %   09/04/17 0359 - - - - 94 %   09/04/17 0358 - 64 14 104/55 96 %   09/04/17 0344 - 64 15 95/53 94 %   09/04/17 0330 - 68 15 107/58 92 %   09/04/17 0303 - 72 17 (!) 76/47 95 %   09/04/17 0301 - 72 28 (!) 76/41 (!) 89 %   09/04/17 0212 - 64 14 100/50 95 %   09/04/17 0156 - - - - 95 %   09/04/17 0059 - 62 13 106/59 97 %   09/04/17 0000 - (!) 58 13 103/55 97 %   09/03/17 2259 - 62 11 (!) 87/51 (!) 87 %   09/03/17 2201 - 63 14 96/54 93 %   09/03/17 2130 96.9 °F (36.1 °C) 60 15 115/57 95 %   09/03/17 2111 - 62 22 96/55 95 %   09/03/17 1959 - (!) 55 15 97/52 92 %   09/03/17 1958 - - - - 92 %   09/03/17 1859 - (!) 54 14 91/53 90 %   09/03/17 1800 - 75 14 91/53 (!) 88 %   09/03/17 1741 - 75 - 119/60 -   09/03/17 1659 - 79 16 119/60 97 %   09/03/17 1559 - 74 26 127/62 97 %   09/03/17 1518 97.4 °F (36.3 °C) 77 17 118/68 98 %   09/03/17 1459 - 89 25 118/68 98 %   09/03/17 1430 - 62 14 - 99 %     Oxygen Therapy  O2 Sat (%): 100 % (09/04/17 1309)  Pulse via Oximetry: 80 beats per minute (09/04/17 1309)  O2 Device: BIPAP (22/5 rate 22) (09/04/17 1140)  O2 Flow Rate (L/min): 45 l/min (09/04/17 0156)  O2 Temperature: 87.6 °F (30.9 °C) (09/04/17 1140)  FIO2 (%): 40 % (09/04/17 1235)    Intake/Output Summary (Last 24 hours) at 09/04/17 1410  Last data filed at 09/04/17 0845   Gross per 24 hour   Intake             2059 ml   Output              600 ml   Net             1459 ml           General appearance: NAD, conversant  Eyes: anicteric sclerae, moist conjunctivae; no lid-lag  ENT: Atraumatic; oropharynx clear with moist mucous membranes and no mucosal ulcerations; normal hard and soft palate  Neck: Trachea midline   FROM, supple, no thyromegaly or lymphadenopathy  Lungs: CTA, with normal respiratory effort and no intercostal retractions  CV: S1,S2 present, no added murmurs  Abdomen: Soft, non-tender; no masses   Extremities: No peripheral edema or extremity lymphadenopathy  Skin: Normal temperature, turgor and texture; no subcutaneous nodules  Psych: Appropriate affect, alert and oriented to person, place and time    Data Review:  I have reviewed all labs, meds, telemetry events, and studies from the last 24 hours.     Recent Results (from the past 24 hour(s))   GLUCOSE, POC    Collection Time: 09/03/17  6:08 PM   Result Value Ref Range    Glucose (POC) 175 (H) 65 - 100 mg/dL   GLUCOSE, POC    Collection Time: 09/04/17  1:10 AM   Result Value Ref Range    Glucose (POC) 141 (H) 65 - 100 mg/dL   BLOOD GAS, ARTERIAL    Collection Time: 09/04/17  3:00 AM   Result Value Ref Range    pH 7.23 (L) 7.35 - 7.45      PCO2 126 (H) 35 - 45 mmHg    PO2 83 80 - 105 mmHg    BICARBONATE 51 (H) 22 - 26 mmol/L    BASE EXCESS 18.5 (H) 0 - 3 mmol/L    TOTAL HEMOGLOBIN 12.2 11.7 - 15.0 GM/DL    O2 SAT 95 92 - 98.5 %    Arterial O2 Hgb 92.7 (L) 94 - 97 %    CARBOXYHEMOGLOBIN 2.4 (H) 0.5 - 1.5 %    METHEMOGLOBIN 0.3 0.0 - 1.5 %    DEOXYHEMOGLOBIN 5 0.0 - 5.0 %    SITE LR     ALLENS TEST POSITIVE      MODE OPTI FLOW     FIO2 40.0 %    O2 FLOW 45.00 L/min    Respiratory comment: Danii TODD at 9 4 2017 3 20 45 AM. Read back. PROTHROMBIN TIME + INR    Collection Time: 09/04/17  4:08 AM   Result Value Ref Range    Prothrombin time 18.0 (H) 9.6 - 12.0 sec    INR 1.6 (H) 0.9 - 1.2     METABOLIC PANEL, BASIC    Collection Time: 09/04/17  4:08 AM   Result Value Ref Range    Sodium 135 (L) 136 - 145 mmol/L    Potassium 5.4 (H) 3.5 - 5.1 mmol/L    Chloride 87 (L) 98 - 107 mmol/L    CO2 >45 (HH) 21 - 32 mmol/L    Anion gap Cannot be calculated 7 - 16 mmol/L    Glucose 165 (H) 65 - 100 mg/dL    BUN 74 (H) 8 - 23 MG/DL    Creatinine 1.58 (H) 0.8 - 1.5 MG/DL    GFR est AA 56 (L) >60 ml/min/1.73m2    GFR est non-AA 46 (L) >60 ml/min/1.73m2    Calcium 9.4 8.3 - 10.4 MG/DL   CBC WITH AUTOMATED DIFF    Collection Time: 09/04/17  4:08 AM   Result Value Ref Range    WBC 8.9 4.3 - 11.1 K/uL    RBC 3.58 (L) 4.23 - 5.67 M/uL    HGB 10.6 (L) 13.6 - 17.2 g/dL    HCT 33.7 (L) 41.1 - 50.3 %    MCV 94.1 79.6 - 97.8 FL    MCH 29.6 26.1 - 32.9 PG    MCHC 31.5 31.4 - 35.0 g/dL    RDW 16.4 (H) 11.9 - 14.6 %    PLATELET 734 (L) 969 - 450 K/uL    MPV 11.0 10.8 - 14.1 FL    DF AUTOMATED      NEUTROPHILS 87 (H) 43 - 78 %    LYMPHOCYTES 5 (L) 13 - 44 %    MONOCYTES 8 4.0 - 12.0 %    EOSINOPHILS 0 (L) 0.5 - 7.8 %    BASOPHILS 0 0.0 - 2.0 %    IMMATURE GRANULOCYTES 0.3 0.0 - 5.0 %    ABS. NEUTROPHILS 7.7 1.7 - 8.2 K/UL    ABS. LYMPHOCYTES 0.4 (L) 0.5 - 4.6 K/UL    ABS. MONOCYTES 0.7 0.1 - 1.3 K/UL    ABS. EOSINOPHILS 0.0 0.0 - 0.8 K/UL    ABS. BASOPHILS 0.0 0.0 - 0.2 K/UL    ABS. IMM.  GRANS. 0.0 0.0 - 0.5 K/UL   PROCALCITONIN    Collection Time: 09/04/17  4:08 AM   Result Value Ref Range    Procalcitonin 0.1 ng/mL   GLUCOSE, POC    Collection Time: 09/04/17  5:59 AM   Result Value Ref Range    Glucose (POC) 171 (H) 65 - 100 mg/dL   BLOOD GAS, ARTERIAL    Collection Time: 09/04/17  6:05 AM   Result Value Ref Range    pH 7.27 (L) 7.35 - 7.45      PCO2 112 (H) 35 - 45 mmHg    PO2 62 (L) 80 - 105 mmHg    BICARBONATE 51 (H) 22 - 26 mmol/L    BASE EXCESS 18.6 (H) 0 - 3 mmol/L    TOTAL HEMOGLOBIN 12.3 11.7 - 15.0 GM/DL    O2 SAT 90 (L) 92 - 98.5 %    Arterial O2 Hgb 88.0 (L) 94 - 97 %    CARBOXYHEMOGLOBIN 2.5 (H) 0.5 - 1.5 %    METHEMOGLOBIN 0.0 0.0 - 1.5 %    DEOXYHEMOGLOBIN 10 (H) 0.0 - 5.0 %    SITE LR     ALLENS TEST POSITIVE      MODE BIPAP20 6     FIO2 30.0 %    Respiratory comment: Danii TODD at 9 4 2017 6 32 32 AM. Read back. BLOOD GAS, ARTERIAL    Collection Time: 09/04/17  8:58 AM   Result Value Ref Range    pH 7.28 (L) 7.35 - 7.45      PCO2 111 (H) 35 - 45 mmHg    PO2 77 (L) 80 - 105 mmHg    BICARBONATE 51 (H) 22 - 26 mmol/L    BASE EXCESS 19.1 (H) 0 - 3 mmol/L    TOTAL HEMOGLOBIN 11.8 11.7 - 15.0 GM/DL    O2 SAT 95 92 - 98.5 %    Arterial O2 Hgb 92.8 (L) 94 - 97 %    CARBOXYHEMOGLOBIN 2.4 (H) 0.5 - 1.5 %    METHEMOGLOBIN 0.3 0.0 - 1.5 %    DEOXYHEMOGLOBIN 5 0.0 - 5.0 %    SITE LR     ALLENS TEST POSITIVE      MODE BIPAP  20 8     FIO2 35.0 %    RATE 22.0      Respiratory comment: Dr. Peri Sims at 9 4 2017 9 03 45 AM. Read back. BNP    Collection Time: 09/04/17 10:54 AM   Result Value Ref Range     pg/mL   GLUCOSE, POC    Collection Time: 09/04/17 11:53 AM   Result Value Ref Range    Glucose (POC) 158 (H) 65 - 100 mg/dL   BLOOD GAS, ARTERIAL    Collection Time: 09/04/17 12:00 PM   Result Value Ref Range    pH 7.27 (L) 7.35 - 7.45      PCO2 119 (H) 35 - 45 mmHg    PO2 90 80 - 105 mmHg    BICARBONATE 53 (H) 22 - 26 mmol/L    BASE EXCESS 20.5 (H) 0 - 3 mmol/L    TOTAL HEMOGLOBIN 12.4 11.7 - 15.0 GM/DL    O2 SAT 97 92 - 98.5 %    Arterial O2 Hgb 94.3 94 - 97 %    CARBOXYHEMOGLOBIN 1.8 (H) 0.5 - 1.5 %    METHEMOGLOBIN 0.5 0.0 - 1.5 %    DEOXYHEMOGLOBIN 3 0.0 - 5.0 %    SITE RR     ALLENS TEST POSITIVE      MODE 22 5     FIO2 40.0 %    Tidal volume 387.0      RATE 22.0      Respiratory comment: Peri Sims MD at 9 4 2017 12 13 25 PM. Not read back.          All Micro Results     Procedure Component Value Units Date/Time    CULTURE, BLOOD [[id number]] Collected: 08/25/17 1600    Order Status:  Completed Specimen:  Blood from Blood Updated:  08/30/17 0643     Special Requests: LEFT HAND        Culture result: NO GROWTH 5 DAYS       CULTURE, BLOOD [[id number]] Collected:  08/25/17 2145    Order Status:  Completed Specimen:  Blood from Blood Updated:  08/30/17 0643     Special Requests: RIGHT HAND        Culture result: NO GROWTH 5 DAYS       MRSA SCREEN - PCR (NASAL) [[id number]]  (Abnormal) Collected:  08/29/17 1556    Order Status:  Completed Specimen:  Nasal from Nasal Swab Updated:  08/29/17 1900     Special Requests: NO SPECIAL REQUESTS        Culture result:         MRSA target DNA is detected (presumptive positive for MRSA colonization).  (A)            RESULTS VERIFIED, PHONED TO AND READ BACK BY  DAMARIS GARNICA RN ON 08/29/2017 AT 1851 [facility]      CULTURE, URINE [[id number]] Collected:  08/25/17 2011    Order Status:  Completed Specimen:  Urine from Clean catch Updated:  08/28/17 0653     Special Requests: NO SPECIAL REQUESTS        Culture result:         [id number] COLONIES/mL MIXED SKIN VANNESSA ISOLATED          Current Meds:  Current Facility-Administered Medications   Medication Dose Route Frequency    midazolam (VERSED) injection 2 mg  2 mg IntraVENous Q2H PRN    norepinephrine (LEVOPHED) 4 mg in dextrose 5% 250 mL infusion  2-16 mcg/min IntraVENous TITRATE    norepinephrine (LEVOPHED) 4 mg 4 mg/250 mL (16 mcg/mL) in dextrose 5% 250 mL infusion        warfarin (COUMADIN) tablet 2.5 mg  2.5 mg Oral QHS    morphine injection 2 mg  2 mg IntraVENous Q4H PRN    amiodarone (CORDARONE) tablet 400 mg  400 mg Oral Q12H    methylPREDNISolone (PF) (SOLU-MEDROL) injection 60 mg  60 mg IntraVENous DAILY    insulin lispro (HUMALOG) injection   SubCUTAneous Q6H    guaiFENesin ER (MUCINEX) tablet 1,200 mg  1,200 mg Oral Q12H    NUTRITIONAL SUPPORT ELECTROLYTE PRN ORDERS   Does Not Apply PRN    cefTRIAXone (ROCEPHIN) 1 g in 0.9% sodium chloride (MBP/ADV) 50 mL  1 g IntraVENous Q24H  famotidine (PF) (PEPCID) 20 mg in sodium chloride 0.9 % 10 mL injection  20 mg IntraVENous DAILY    mupirocin (BACTROBAN) 2 % ointment   Both Nostrils BID    carvedilol (COREG) tablet 25 mg  25 mg Oral BID WITH MEALS    0.9% sodium chloride infusion 250 mL  250 mL IntraVENous PRN    furosemide (LASIX) injection 40 mg  40 mg IntraVENous DAILY    dilTIAZem (CARDIZEM) 100 mg in 0.9% sodium chloride (MBP/ADV) 100 mL infusion  0-15 mg/hr IntraVENous TITRATE    acetaminophen (TYLENOL) suppository 650 mg  650 mg Rectal Q4H PRN    sodium chloride (NS) flush 20 mL  20 mL InterCATHeter Q8H    heparin (porcine) pf 600 Units  600 Units InterCATHeter Q8H    sodium chloride (NS) flush 20 mL  20 mL InterCATHeter PRN    heparin (porcine) pf 600 Units  600 Units InterCATHeter PRN    albuterol (PROVENTIL VENTOLIN) nebulizer solution 2.5 mg  2.5 mg Nebulization Q4H PRN    sacubitril-valsartan (ENTRESTO) 24-26 mg tablet 1 Tab  1 Tab Oral Q12H    dextrose (D50W) injection syrg 25 g  25 g IntraVENous PRN    ferrous sulfate tablet 325 mg  1 Tab Oral DAILY WITH BREAKFAST    acetaminophen (TYLENOL) tablet 650 mg  650 mg Oral Q6H PRN    gabapentin (NEURONTIN) capsule 600 mg  600 mg Oral TID    albuterol (PROVENTIL HFA, VENTOLIN HFA, PROAIR HFA) inhaler 2 Puff  2 Puff Inhalation Q4H PRN    tiotropium (SPIRIVA) inhalation capsule 18 mcg  1 Cap Inhalation DAILY    sodium chloride (NS) flush 5-10 mL  5-10 mL IntraVENous Q8H    sodium chloride (NS) flush 5-10 mL  5-10 mL IntraVENous PRN    nitroglycerin (NITROSTAT) tablet 0.4 mg  0.4 mg SubLINGual Q5MIN PRN    ondansetron (ZOFRAN) injection 4 mg  4 mg IntraVENous Q4H PRN    budesonide (PULMICORT) 500 mcg/2 ml nebulizer suspension  500 mcg Nebulization BID RT    And    albuterol CONCENTRATE 2.5mg/0.5 mL neb soln  2.5 mg Nebulization Q6H RT       Other Studies (last 24 hours):  Xr Chest Sngl V    Result Date: 9/4/2017  Chest X-ray INDICATION:   Respiratory failure A portable AP view of the chest was obtained. FINDINGS: The tip of endotracheal tube is in the distal trachea just above the sudeep. There is improved aeration of both lungs . There are no infiltrates or effusions. There is stable cardiomegaly. Sternotomy changes are present.       IMPRESSION: Improved aeration of the lungs post intubation       Assessment and Plan:     Hospital Problems as of 9/4/2017  Date Reviewed: 9/1/2017          Codes Class Noted - Resolved POA    COPD (chronic obstructive pulmonary disease) (HCC) (Chronic) ICD-10-CM: J44.9  ICD-9-CM: 496  8/30/2017 - Present Yes        Acute respiratory failure with hypercapnia (HCC) ICD-10-CM: J96.02  ICD-9-CM: 518.81  8/29/2017 - Present No        History of tobacco use (Chronic) ICD-10-CM: A58.936  ICD-9-CM: V15.82  8/29/2017 - Present Yes        Hyponatremia ICD-10-CM: E87.1  ICD-9-CM: 276.1  8/29/2017 - Present Yes        Acute encephalopathy ICD-10-CM: G93.40  ICD-9-CM: 348.30  8/29/2017 - Present Yes        Thrombocytopenia ([facility] [address] ICD-10-CM: D69.6  ICD-9-CM: 287.5  8/26/2017 - Present Yes        Anticoagulant long-term use (Chronic) ICD-10-CM: Z79.01  ICD-9-CM: V58.61  8/23/2017 - Present Yes        HTN (hypertension), benign (Chronic) ICD-10-CM: I10  ICD-9-CM: 401.1  8/23/2017 - Present Yes        Peripheral vascular disease ([facility] [address] (Chronic) ICD-10-CM: I73.9  ICD-9-CM: 443.9  8/23/2017 - Present Yes        Dyslipidemia (Chronic) ICD-10-CM: E78.5  ICD-9-CM: 272.4  8/23/2017 - Present Yes        History of mechanical aortic valve replacement (Chronic) ICD-10-CM: Z95.2  ICD-9-CM: V43.3  8/23/2017 - Present Yes    Overview Signed 8/30/2017 10:30 AM by Brien Stern NP     Placement 2000, on chronicCoumadin             Centrilobular emphysema (HCC) (Chronic) ICD-10-CM: J43.2  ICD-9-CM: 492.8  8/23/2017 - Present Yes        Ischemic cardiomyopathy (Chronic) ICD-10-CM: I25.5  ICD-9-CM: 414.8  8/23/2017 - Present Yes    Overview Signed 8/30/2017 10:29 AM by Alisha Plummer NP     8/23/17 ECHO:  EF 40 % to 45 %. There were no regional wall motion abnormalities. Moderate hypokinesis of the mid-apical anterior and apical wall(s). * (Principal)Atrial flutter with rapid ventricular response (HCC) ICD-10-CM: I48.92  ICD-9-CM: 427.32  8/23/2017 - Present Yes              PLAN:    Atrial fibrillation with RVR  S/p Cardioversion and amiodarone  Plan  Continue amiodarone, Coumadin     Acute COPD exacerbation  +wheezing   Decreased O2 requirements  Plan  Continue albuterol, pulmicort, prednisone   Continue ceftriaxone  Intubation today  Pulmonology on board    Thrombocytopenia  Daily CBC     Chronic systolic CHF  Continue Entresto, Carvedilol, lasix   Cards following     ITP  Plan  Continue solumedrol IV  Hematology following    FEN- failed speech therapy again on 8/31.    DC planning/Dispo:  home  DVT ppx:  heparin    Signed:  Marcial Ledbetter MD

## 2017-09-04 NOTE — PROCEDURES
PROCEDURE  Bronchoscopy with airway inspection, check ETT positioning    INDICATION   Acute respiratory failure with hypoxia/hypercarbia    EQUIPMENT:  Olympus Q 180 Bronchoscope. ANESTHESIA  No conscious sedation was used. AIRWAY INSPECTION    Using an ET tube adapter, an disposable bronchoscope was  introduced into the trachea through the ET tube, without complication. RIGHT    LOCATION NORM/ABNORM DESCRIPTION   VOCAL CORDS NA Not visualized, ETT in proper position in trachea   TRACHEA NL    OLGA NL    RMSB NL Small amount of thick secretions, suctioned   RUL NL    BI NL    RML NL    SUP SEGM RLL NL    MED BASAL NL    ANTERIOR BASAL NL    LATERAL BASAL NL    POSTERIOR BASAL NL           LEFT    LOCATION NORMAL/ABNORMAL TYPE   LMSB NL    SHEILA NL    LINGULA NL    SUPERIOR DIVISION NL    SUPERIOR SEG LLL NL    BIRD-MEDIAL LLL NL    LATERAL LLL NL    POSTERIOR LLL NL      The following samples were obtained:    Samples were sent for:  None    Endotracheal position of ETT was confirmed and tube was advanced to 26cm at lip    The procedure was completed  without complication and the patient tolerated the procedure well. Conclusions:  Endotracheal tube positioning appropriate  CXR does not show pneumothorax  Narcan given to assist with chest wall restriction  Exchange ventilator given low volume alarms  Tube cuff testing does not suggest leak.     Will Vivas MD

## 2017-09-04 NOTE — PROGRESS NOTES
Bedside and Verbal shift change report given to [facility] [address] (oncoming nurse) by Roger Hope RN (offgoing nurse). Report included the following information SBAR, Kardex, ED Summary, Procedure Summary, Intake/Output, Recent Results, Med Rec Status and Cardiac Rhythm AFib 101. Levophed drip restarted forBP 80/48. Vent settings noted. Repositioned in bed. Patient responded to tactile stimuli Denies pain.

## 2017-09-05 ENCOUNTER — APPOINTMENT (OUTPATIENT)
Dept: GENERAL RADIOLOGY | Age: 72
DRG: 308 | End: 2017-09-05
Attending: INTERNAL MEDICINE
Payer: MEDICARE

## 2017-09-05 LAB
ANION GAP SERPL CALC-SCNC: 0 MMOL/L (ref 7–16)
ARTERIAL PATENCY WRIST A: POSITIVE
BASE EXCESS BLDA CALC-SCNC: 17.6 MMOL/L (ref 0–3)
BASOPHILS # BLD: 0 K/UL (ref 0–0.2)
BASOPHILS NFR BLD: 0 % (ref 0–2)
BDY SITE: ABNORMAL
BUN SERPL-MCNC: 70 MG/DL (ref 8–23)
CALCIUM SERPL-MCNC: 9 MG/DL (ref 8.3–10.4)
CHLORIDE SERPL-SCNC: 92 MMOL/L (ref 98–107)
CO2 SERPL-SCNC: 45 MMOL/L (ref 21–32)
COHGB MFR BLD: 1 % (ref 0.5–1.5)
CREAT SERPL-MCNC: 1.28 MG/DL (ref 0.8–1.5)
DIFFERENTIAL METHOD BLD: ABNORMAL
DO-HGB BLD-MCNC: 2 % (ref 0–5)
EOSINOPHIL # BLD: 0 K/UL (ref 0–0.8)
EOSINOPHIL NFR BLD: 0 % (ref 0.5–7.8)
ERYTHROCYTE [DISTWIDTH] IN BLOOD BY AUTOMATED COUNT: 16.1 % (ref 11.9–14.6)
FIO2 ON VENT: 40 %
GLUCOSE BLD STRIP.AUTO-MCNC: 152 MG/DL (ref 65–100)
GLUCOSE BLD STRIP.AUTO-MCNC: 157 MG/DL (ref 65–100)
GLUCOSE BLD STRIP.AUTO-MCNC: 159 MG/DL (ref 65–100)
GLUCOSE BLD STRIP.AUTO-MCNC: 189 MG/DL (ref 65–100)
GLUCOSE SERPL-MCNC: 129 MG/DL (ref 65–100)
HCO3 BLDA-SCNC: 44 MMOL/L (ref 22–26)
HCT VFR BLD AUTO: 34 % (ref 41.1–50.3)
HGB BLD-MCNC: 10.8 G/DL (ref 13.6–17.2)
HGB BLDMV-MCNC: 12.2 GM/DL (ref 11.7–15)
IMM GRANULOCYTES # BLD: 0 K/UL (ref 0–0.5)
IMM GRANULOCYTES NFR BLD: 0.4 % (ref 0–5)
INR PPP: 1.7 (ref 0.9–1.2)
LYMPHOCYTES # BLD: 0.3 K/UL (ref 0.5–4.6)
LYMPHOCYTES NFR BLD: 3 % (ref 13–44)
MCH RBC QN AUTO: 28.9 PG (ref 26.1–32.9)
MCHC RBC AUTO-ENTMCNC: 31.7 G/DL (ref 31.4–35)
MCV RBC AUTO: 91.2 FL (ref 79.6–97.8)
METHGB MFR BLD: 0.5 % (ref 0–1.5)
MONOCYTES # BLD: 1.4 K/UL (ref 0.1–1.3)
MONOCYTES NFR BLD: 12 % (ref 4–12)
NEUTS SEG # BLD: 9.2 K/UL (ref 1.7–8.2)
NEUTS SEG NFR BLD: 85 % (ref 43–78)
OXYHGB MFR BLDA: 96.9 % (ref 94–97)
PCO2 BLDA: 62 MMHG (ref 35–45)
PEEP RESPIRATORY: 10 CM[H2O]
PH BLDA: 7.47 [PH] (ref 7.35–7.45)
PLATELET # BLD AUTO: 79 K/UL (ref 150–450)
PMV BLD AUTO: 11.6 FL (ref 10.8–14.1)
PO2 BLDA: 127 MMHG (ref 80–105)
POTASSIUM SERPL-SCNC: 4.8 MMOL/L (ref 3.5–5.1)
PROTHROMBIN TIME: 18.9 SEC (ref 9.6–12)
RBC # BLD AUTO: 3.73 M/UL (ref 4.23–5.67)
RESP RATE: 18
SAO2 % BLD: 98 % (ref 92–98.5)
SERVICE CMNT-IMP: ABNORMAL
SODIUM SERPL-SCNC: 137 MMOL/L (ref 136–145)
VENTILATION MODE VENT: ABNORMAL
VT SETTING VENT: 320 ML
WBC # BLD AUTO: 12 K/UL (ref 4.3–11.1)

## 2017-09-05 PROCEDURE — 94003 VENT MGMT INPAT SUBQ DAY: CPT

## 2017-09-05 PROCEDURE — 36600 WITHDRAWAL OF ARTERIAL BLOOD: CPT

## 2017-09-05 PROCEDURE — 74011250636 HC RX REV CODE- 250/636: Performed by: INTERNAL MEDICINE

## 2017-09-05 PROCEDURE — 74011250637 HC RX REV CODE- 250/637: Performed by: INTERNAL MEDICINE

## 2017-09-05 PROCEDURE — 74011250637 HC RX REV CODE- 250/637: Performed by: NURSE PRACTITIONER

## 2017-09-05 PROCEDURE — 74011000258 HC RX REV CODE- 258: Performed by: INTERNAL MEDICINE

## 2017-09-05 PROCEDURE — 94660 CPAP INITIATION&MGMT: CPT

## 2017-09-05 PROCEDURE — 74011000250 HC RX REV CODE- 250: Performed by: INTERNAL MEDICINE

## 2017-09-05 PROCEDURE — 65620000000 HC RM CCU GENERAL

## 2017-09-05 PROCEDURE — 74011636637 HC RX REV CODE- 636/637: Performed by: NURSE PRACTITIONER

## 2017-09-05 PROCEDURE — 77030018798 HC PMP KT ENTRL FED COVD -A

## 2017-09-05 PROCEDURE — 94640 AIRWAY INHALATION TREATMENT: CPT

## 2017-09-05 PROCEDURE — 85025 COMPLETE CBC W/AUTO DIFF WBC: CPT | Performed by: PHYSICIAN ASSISTANT

## 2017-09-05 PROCEDURE — 99233 SBSQ HOSP IP/OBS HIGH 50: CPT | Performed by: INTERNAL MEDICINE

## 2017-09-05 PROCEDURE — 71010 XR CHEST SNGL V: CPT

## 2017-09-05 PROCEDURE — 80048 BASIC METABOLIC PNL TOTAL CA: CPT | Performed by: PHYSICIAN ASSISTANT

## 2017-09-05 PROCEDURE — 74011000250 HC RX REV CODE- 250: Performed by: NURSE PRACTITIONER

## 2017-09-05 PROCEDURE — 77030011256 HC DRSG MEPILEX <16IN NO BORD MOLN -A

## 2017-09-05 PROCEDURE — 85610 PROTHROMBIN TIME: CPT | Performed by: PHYSICIAN ASSISTANT

## 2017-09-05 PROCEDURE — 82803 BLOOD GASES ANY COMBINATION: CPT

## 2017-09-05 PROCEDURE — 82962 GLUCOSE BLOOD TEST: CPT

## 2017-09-05 RX ORDER — METOPROLOL TARTRATE 5 MG/5ML
5 INJECTION INTRAVENOUS ONCE
Status: COMPLETED | OUTPATIENT
Start: 2017-09-05 | End: 2017-09-05

## 2017-09-05 RX ORDER — WARFARIN 1 MG/1
3 TABLET ORAL
Status: DISCONTINUED | OUTPATIENT
Start: 2017-09-05 | End: 2017-09-07

## 2017-09-05 RX ORDER — CARVEDILOL 6.25 MG/1
6.25 TABLET ORAL 2 TIMES DAILY WITH MEALS
Status: DISCONTINUED | OUTPATIENT
Start: 2017-09-05 | End: 2017-09-06

## 2017-09-05 RX ADMIN — Medication 10 ML: at 21:00

## 2017-09-05 RX ADMIN — BUDESONIDE 500 MCG: 0.5 INHALANT RESPIRATORY (INHALATION) at 20:10

## 2017-09-05 RX ADMIN — Medication 20 ML: at 05:10

## 2017-09-05 RX ADMIN — SODIUM CHLORIDE, PRESERVATIVE FREE 600 UNITS: 5 INJECTION INTRAVENOUS at 21:04

## 2017-09-05 RX ADMIN — CHLORHEXIDINE GLUCONATE 15 ML: 1.2 RINSE ORAL at 08:53

## 2017-09-05 RX ADMIN — INSULIN LISPRO 2 UNITS: 100 INJECTION, SOLUTION INTRAVENOUS; SUBCUTANEOUS at 18:45

## 2017-09-05 RX ADMIN — FAMOTIDINE 20 MG: 10 INJECTION, SOLUTION INTRAVENOUS at 08:53

## 2017-09-05 RX ADMIN — MIDAZOLAM 2 MG: 1 INJECTION INTRAMUSCULAR; INTRAVENOUS at 05:01

## 2017-09-05 RX ADMIN — INSULIN LISPRO 2 UNITS: 100 INJECTION, SOLUTION INTRAVENOUS; SUBCUTANEOUS at 05:24

## 2017-09-05 RX ADMIN — CARVEDILOL 6.25 MG: 6.25 TABLET, FILM COATED ORAL at 18:30

## 2017-09-05 RX ADMIN — CHLORHEXIDINE GLUCONATE 15 ML: 1.2 RINSE ORAL at 18:39

## 2017-09-05 RX ADMIN — MUPIROCIN: 20 OINTMENT TOPICAL at 18:30

## 2017-09-05 RX ADMIN — INSULIN LISPRO 2 UNITS: 100 INJECTION, SOLUTION INTRAVENOUS; SUBCUTANEOUS at 23:35

## 2017-09-05 RX ADMIN — BUDESONIDE 500 MCG: 0.5 INHALANT RESPIRATORY (INHALATION) at 07:26

## 2017-09-05 RX ADMIN — SACUBITRIL AND VALSARTAN 1 TABLET: 24; 26 TABLET, FILM COATED ORAL at 20:59

## 2017-09-05 RX ADMIN — GABAPENTIN 600 MG: 300 CAPSULE ORAL at 05:09

## 2017-09-05 RX ADMIN — Medication 10 ML: at 13:26

## 2017-09-05 RX ADMIN — Medication 20 ML: at 21:00

## 2017-09-05 RX ADMIN — SACUBITRIL AND VALSARTAN 1 TABLET: 24; 26 TABLET, FILM COATED ORAL at 08:53

## 2017-09-05 RX ADMIN — Medication 20 ML: at 13:26

## 2017-09-05 RX ADMIN — SODIUM CHLORIDE, PRESERVATIVE FREE 600 UNITS: 5 INJECTION INTRAVENOUS at 13:26

## 2017-09-05 RX ADMIN — GABAPENTIN 600 MG: 300 CAPSULE ORAL at 13:25

## 2017-09-05 RX ADMIN — CEFTRIAXONE 1 G: 1 INJECTION, POWDER, FOR SOLUTION INTRAMUSCULAR; INTRAVENOUS at 11:10

## 2017-09-05 RX ADMIN — FERROUS SULFATE TAB 325 MG (65 MG ELEMENTAL FE) 325 MG: 325 (65 FE) TAB at 08:53

## 2017-09-05 RX ADMIN — METHYLPREDNISOLONE SODIUM SUCCINATE 60 MG: 125 INJECTION, POWDER, FOR SOLUTION INTRAMUSCULAR; INTRAVENOUS at 08:53

## 2017-09-05 RX ADMIN — AMIODARONE HYDROCHLORIDE 400 MG: 200 TABLET ORAL at 20:59

## 2017-09-05 RX ADMIN — INSULIN LISPRO 2 UNITS: 100 INJECTION, SOLUTION INTRAVENOUS; SUBCUTANEOUS at 11:37

## 2017-09-05 RX ADMIN — SODIUM CHLORIDE, PRESERVATIVE FREE 600 UNITS: 5 INJECTION INTRAVENOUS at 05:09

## 2017-09-05 RX ADMIN — GABAPENTIN 600 MG: 300 CAPSULE ORAL at 21:04

## 2017-09-05 RX ADMIN — MUPIROCIN: 20 OINTMENT TOPICAL at 08:53

## 2017-09-05 RX ADMIN — Medication 10 ML: at 05:10

## 2017-09-05 RX ADMIN — AMIODARONE HYDROCHLORIDE 400 MG: 200 TABLET ORAL at 08:52

## 2017-09-05 RX ADMIN — ALBUTEROL SULFATE 2.5 MG: 2.5 SOLUTION RESPIRATORY (INHALATION) at 14:27

## 2017-09-05 RX ADMIN — ALBUTEROL SULFATE 2.5 MG: 2.5 SOLUTION RESPIRATORY (INHALATION) at 07:26

## 2017-09-05 RX ADMIN — ALBUTEROL SULFATE 2.5 MG: 2.5 SOLUTION RESPIRATORY (INHALATION) at 20:10

## 2017-09-05 RX ADMIN — METHYLPREDNISOLONE SODIUM SUCCINATE 60 MG: 125 INJECTION, POWDER, FOR SOLUTION INTRAMUSCULAR; INTRAVENOUS at 20:59

## 2017-09-05 RX ADMIN — METOPROLOL TARTRATE 5 MG: 5 INJECTION INTRAVENOUS at 05:54

## 2017-09-05 RX ADMIN — WARFARIN SODIUM 3 MG: 1 TABLET ORAL at 21:05

## 2017-09-05 NOTE — PROGRESS NOTES
Patient previously in A fib, rate uncontrolled from  bpm.  Now converted to Sinus Tach, . NISREEN Anderson notified. Orders received for 5 mg dose of lopressor. Will continue to monitor.

## 2017-09-05 NOTE — PROGRESS NOTES
Critical Care Daily Progress Note: 9/5/2017    Radha Mccoy   Admission Date: 8/23/2017         The patient's chart is reviewed and the patient is discussed with the staff.    67 y.o.  CM with h/o severe COPD (baseline bicarb 37/PCO2 65), ITP with thrombocytopenia, s/p mechanical AVR 2000, atrial flutter who was admitted on 8/23 with rapid a. Flutter.   His course has been complicated by hyponatremia, confusion, hypercarbic respiratory failure.      Subjective:   Hypercarbia improved with mechanical ventilation  Levophed weaned off this morning        Current Facility-Administered Medications   Medication Dose Route Frequency    carvedilol (COREG) tablet 6.25 mg  6.25 mg Oral BID WITH MEALS    methylPREDNISolone (PF) (SOLU-MEDROL) injection 60 mg  60 mg IntraVENous Q12H    midazolam (VERSED) injection 2 mg  2 mg IntraVENous Q2H PRN    norepinephrine (LEVOPHED) 4 mg in dextrose 5% 250 mL infusion  2-16 mcg/min IntraVENous TITRATE    chlorhexidine (PERIDEX) 0.12 % mouthwash 15 mL  15 mL Oral BID    warfarin (COUMADIN) tablet 2.5 mg  2.5 mg Oral QHS    morphine injection 2 mg  2 mg IntraVENous Q4H PRN    amiodarone (CORDARONE) tablet 400 mg  400 mg Oral Q12H    insulin lispro (HUMALOG) injection   SubCUTAneous Q6H    NUTRITIONAL SUPPORT ELECTROLYTE PRN ORDERS   Does Not Apply PRN    cefTRIAXone (ROCEPHIN) 1 g in 0.9% sodium chloride (MBP/ADV) 50 mL  1 g IntraVENous Q24H    famotidine (PF) (PEPCID) 20 mg in sodium chloride 0.9 % 10 mL injection  20 mg IntraVENous DAILY    mupirocin (BACTROBAN) 2 % ointment   Both Nostrils BID    0.9% sodium chloride infusion 250 mL  250 mL IntraVENous PRN    dilTIAZem (CARDIZEM) 100 mg in 0.9% sodium chloride (MBP/ADV) 100 mL infusion  0-15 mg/hr IntraVENous TITRATE    acetaminophen (TYLENOL) suppository 650 mg  650 mg Rectal Q4H PRN    sodium chloride (NS) flush 20 mL  20 mL InterCATHeter Q8H    heparin (porcine) pf 600 Units  600 Units InterCATHeter Q8H    sodium chloride (NS) flush 20 mL  20 mL InterCATHeter PRN    heparin (porcine) pf 600 Units  600 Units InterCATHeter PRN    albuterol (PROVENTIL VENTOLIN) nebulizer solution 2.5 mg  2.5 mg Nebulization Q4H PRN    sacubitril-valsartan (ENTRESTO) 24-26 mg tablet 1 Tab  1 Tab Oral Q12H    dextrose (D50W) injection syrg 25 g  25 g IntraVENous PRN    ferrous sulfate tablet 325 mg  1 Tab Oral DAILY WITH BREAKFAST    acetaminophen (TYLENOL) tablet 650 mg  650 mg Oral Q6H PRN    gabapentin (NEURONTIN) capsule 600 mg  600 mg Oral TID    albuterol (PROVENTIL HFA, VENTOLIN HFA, PROAIR HFA) inhaler 2 Puff  2 Puff Inhalation Q4H PRN    tiotropium (SPIRIVA) inhalation capsule 18 mcg  1 Cap Inhalation DAILY    sodium chloride (NS) flush 5-10 mL  5-10 mL IntraVENous Q8H    sodium chloride (NS) flush 5-10 mL  5-10 mL IntraVENous PRN    nitroglycerin (NITROSTAT) tablet 0.4 mg  0.4 mg SubLINGual Q5MIN PRN    ondansetron (ZOFRAN) injection 4 mg  4 mg IntraVENous Q4H PRN    budesonide (PULMICORT) 500 mcg/2 ml nebulizer suspension  500 mcg Nebulization BID RT    And    albuterol CONCENTRATE 2.5mg/0.5 mL neb soln  2.5 mg Nebulization Q6H RT       Review of Systems  Constitutional:  negative for fever, chills, sweats  Cardiovascular:  negative for chest pain, palpitations, syncope, edema  Gastrointestinal:  negative for dysphagia, reflux, vomiting, diarrhea, abdominal pain, or melena  Neurologic:  negative for focal weakness, numbness, headache      Objective:     Vitals:    09/05/17 0945 09/05/17 0959 09/05/17 1014 09/05/17 1114   BP: 105/67 100/66 101/58 105/66   Pulse: (!) 105 81 77 83   Resp: 18 19 18 18   Temp:    97.3 °F (36.3 °C)   SpO2: 93% 93% 92% 92%   Weight:       Height:           Intake and Output:   09/03 1901 - 09/05 0700  In: 4082.1 [I.V.:808.1]  Out: 2150 [Urine:2150]  09/05 0701 - 09/05 1900  In: -   Out: 245 [Urine:245]    Physical Exam:          Constitutional:  the patient is well developed and in no acute distress, on Optiflow 40L, 35% with O2 sat 95%  EENMT:  Sclera clear, pupils equal, oral mucosa moist  Respiratory: decreased breath sounds  Cardiovascular:  RRR without M,G,R  Gastrointestinal: soft and non-tender; with positive bowel sounds. Musculoskeletal: warm without cyanosis. There is no lower leg edema. Skin:  no jaundice or rashes, no open wounds   Neurologic: no gross neuro deficits     Psychiatric:  alert and oriented x 3     LINES:  PICC, peripheral, condom cath, NG tube    DRIPS: none    CXR:   9/4/17- R basilar effusion      8/30/17        LAB  Recent Labs      09/05/17   1104  09/05/17   0523  09/04/17   2337  09/04/17   1851  09/04/17   1153   GLUCPOC  152*  157*  140*  175*  158*      Recent Labs      09/05/17   0352  09/04/17   0408  09/03/17   0447   WBC  12.0*  8.9  8.4   HGB  10.8*  10.6*  11.1*   HCT  34.0*  33.7*  34.3*   PLT  79*  135*  132*   INR  1.7*  1.6*  2.0*     Recent Labs      09/05/17   0352  09/04/17   0408  09/03/17   0447   NA  137  135*  137   K  4.8  5.4*  4.8   CL  92*  87*  90*   CO2  45*  >45*  >45*   GLU  129*  165*  128*   BUN  70*  74*  63*   CREA  1.28  1.58*  1.16   CA  9.0  9.4  9.4     Recent Labs      09/05/17   0320  09/04/17   2130  09/04/17   1445   PH  7.47*  7.51*  7.38   PCO2  62*  57*  78*   PO2  127*  86  86   HCO3  44*  44*  45*     No results for input(s): LCAD, LAC in the last 72 hours. Assessment:  (Medical Decision Making)          Hospital Problems  Date Reviewed: 9/1/2017          Codes Class Noted POA    COPD (chronic obstructive pulmonary disease) ([facility] [address] (Chronic) ICD-10-CM: J44.9  ICD-9-CM: 224  8/30/2017 Yes    GOLD IV, severe- Continue Albuterol, Pulmicort, Spiriva. Continue Rocephin,   mucinex   Increased solumedrol to bid    Acute respiratory failure with hypercapnia (HCC) ICD-10-CM: J96.02  ICD-9-CM: 518.81  8/29/2017 No    Now requiring BiPAP.  If no improvement on next gas may need intubation    History of tobacco use (Chronic) ICD-10-CM: R21.097  ICD-9-CM: V15.82  8/29/2017 Yes    Unchanged     Hyponatremia ICD-10-CM: E87.1  ICD-9-CM: 276.1  8/29/2017 Yes    Na up to 135 today    Acute encephalopathy ICD-10-CM: G93.40  ICD-9-CM: 348.30  8/29/2017 Yes    WORSE. Barely answers questions this morning. Thrombocytopenia ([facility] [address] ICD-10-CM: D69.6  ICD-9-CM: 287.5  8/26/2017 Yes    Hematology following, platelets [phone]>>>[id number] . Not planning bone marrow biopsy this admission, on solumedrol(dose increased) for ITP continue and adjust dosing per hematology    Anticoagulant long-term use (Chronic) ICD-10-CM: Z79.01  ICD-9-CM: V58.61  8/23/2017 Yes    On coumadin    HTN (hypertension), benign (Chronic) ICD-10-CM: I10  ICD-9-CM: 401.1  8/23/2017 Yes    chronic     Peripheral vascular disease (HCC) (Chronic) ICD-10-CM: I73.9  ICD-9-CM: 443.9  8/23/2017 Yes    Chronic     Dyslipidemia (Chronic) ICD-10-CM: E78.5  ICD-9-CM: 272.4  8/23/2017 Yes    Chronic     History of mechanical aortic valve replacement (Chronic) ICD-10-CM: Z95.2  ICD-9-CM: V43.3  8/23/2017 Yes     Placement 2000, on chronicCoumadin             [facility]rilobular [facility]se[facility] (Chronic) ICD-10-CM: J43.2  ICD-9-CM: 492.8  8/23/2017 Yes    With last PFTs FVC 1.70 L or 44% predicted, FEV1 0.86 L or 29% predicted, FEV1/FVC 51%. This study was a postbronchodilator study performed at the [place] in [facility] [place] on 8/22/2016    The above results were discussed with both patient and his wife- they were not aware of the severity of his COPD which is ES GOLD stage IV. Discussed with family if he needs mechanical ventilation weaning may not be straightforward. Ischemic cardiomyopathy (Chronic) ICD-10-CM: I25.5  ICD-9-CM: 414.8  8/23/2017 Yes     8/23/17 ECHO:  EF 40 % to 45 %. There were no regional wall motion abnormalities. Moderate hypokinesis of the mid-apical anterior and apical wall(s).          Lasix stopped yesterday    * (Principal)Atrial flutter with rapid ventricular response (HCC) ICD-10-CM: I48.92  ICD-9-CM: 427.32  8/23/2017 Yes    On IV amiodarone and coumadin, per cardiology       Hypotension: improved off levophed    --extubate to bipap  --increase solumedrol given severe COPD with acute respiratory failure  --stop ceftriaxone tomorrow at day 7    Julio Crooks MD

## 2017-09-05 NOTE — PROGRESS NOTES
Ventilator check complete; patient has a #7.5 ET tube secured at the 23 at the lip. Patient is sedated. Patient is not able to follow commands. Breath sounds are diminished. Trachea is midline, Negative for subcutaneous air, and chest excursion is symmetric. Patient is also Negative for cyanosis and is Negative for pitting edema. All alarms are set and audible. Resuscitation bag is at the head of the bed.       Ventilator Settings  Mode FIO2 Rate Tidal Volume Pressure PEEP I:E Ratio   Pressure control  40 %    350 ml     10 cm H20  1:3.13      Peak airway pressure: 39.9 cm H2O   Minute ventilation: 5.5 l/min     ABG:   Recent Labs      09/04/17   2130  09/04/17   1445  09/04/17   1200   PH  7.51*  7.38  7.27*   PCO2  57*  78*  119*   PO2  86  86  90   HCO3  44*  45*  53*         Padma Nguyen, RT

## 2017-09-05 NOTE — PROGRESS NOTES
Problem: Ventilator Management  Goal: *Normal spontaneous ventilation  Outcome: Progressing Towards Goal  Ventilator check complete; patient has a #7.5 ET tube secured at the 24 at the lip. Patient is not sedated. Patient is able to follow commands. Breath sounds are coarse and diminished. Trachea is midline, Negative for subcutaneous air, and chest excursion is symmetric. Patient is also Negative for cyanosis. All alarms are set and audible. Resuscitation bag is at the head of the bed.        Ventilator Settings  Mode FIO2 Rate Tidal Volume Pressure PEEP I:E Ratio   Pressure support  30 %   18 350 ml  5 cm H2O  12 cm H20  1:2.70       Peak airway pressure: 24.2 cm H2O   Minute ventilation: 3.6 l/min      ABG:   Recent Labs      09/05/17   0320  09/04/17   2130  09/04/17   1445   PH  7.47*  7.51*  7.38   PCO2  62*  57*  78*   PO2  127*  86  86   HCO3  44*  44*  45*           Juliano Trevino, RT

## 2017-09-05 NOTE — PROGRESS NOTES
9/5/2017 6:31 AM    Admit Date: 8/23/2017    Admit Diagnosis: Atrial flutter with rapid ventricular response (HCC)      Subjective:    Patient with low bp. On leviphed. HR still an issue.  Coreg held due to hypotension    Objective:      Visit Vitals    /52    Pulse 94    Temp 98 °F (36.7 °C)    Resp 20    Ht 5' 6\" (1.676 m)    Wt 78.7 kg (173 lb 8 oz)    SpO2 97%    BMI 28 kg/m2       ROS:  General ROS: negative for - chills  Hematological and Lymphatic ROS: negative for - blood clots or jaundice  Respiratory ROS: no cough, shortness of breath, or wheezing  Cardiovascular ROS: no chest pain or dyspnea on exertion  Gastrointestinal ROS: no abdominal pain, change in bowel habits, or black or bloody stools  Neurological ROS: no TIA or stroke symptoms    Physical Exam:    Physical Examination: General appearance - alert, well appearing, and in no distress  Mental status - alert, oriented to person, place, and time  Eyes - pupils equal and reactive, extraocular eye movements intact  Neck/lymph - supple, no significant adenopathy  Chest/CV - clear to auscultation, no wheezes, rales or rhonchi, symmetric air entry  Heart - normal rate, regular rhythm, normal S1, S2, no murmurs, rubs, clicks or gallops  Abdomen/GI - soft, nontender, nondistended, no masses or organomegaly  Musculoskeletal - no joint tenderness, deformity or swelling  Extremities - peripheral pulses normal, no pedal edema, no clubbing or cyanosis  Skin - normal coloration and turgor, no rashes, no suspicious skin lesions noted    Current Facility-Administered Medications   Medication Dose Route Frequency    midazolam (VERSED) injection 2 mg  2 mg IntraVENous Q2H PRN    norepinephrine (LEVOPHED) 4 mg in dextrose 5% 250 mL infusion  2-16 mcg/min IntraVENous TITRATE    carvedilol (COREG) tablet 12.5 mg  12.5 mg Oral BID WITH MEALS    chlorhexidine (PERIDEX) 0.12 % mouthwash 15 mL  15 mL Oral BID    warfarin (COUMADIN) tablet 2.5 mg  2.5 mg Oral QHS    morphine injection 2 mg  2 mg IntraVENous Q4H PRN    amiodarone (CORDARONE) tablet 400 mg  400 mg Oral Q12H    methylPREDNISolone (PF) (SOLU-MEDROL) injection 60 mg  60 mg IntraVENous DAILY    insulin lispro (HUMALOG) injection   SubCUTAneous Q6H    NUTRITIONAL SUPPORT ELECTROLYTE PRN ORDERS   Does Not Apply PRN    cefTRIAXone (ROCEPHIN) 1 g in 0.9% sodium chloride (MBP/ADV) 50 mL  1 g IntraVENous Q24H    famotidine (PF) (PEPCID) 20 mg in sodium chloride 0.9 % 10 mL injection  20 mg IntraVENous DAILY    mupirocin (BACTROBAN) 2 % ointment   Both Nostrils BID    0.9% sodium chloride infusion 250 mL  250 mL IntraVENous PRN    furosemide (LASIX) injection 40 mg  40 mg IntraVENous DAILY    dilTIAZem (CARDIZEM) 100 mg in 0.9% sodium chloride (MBP/ADV) 100 mL infusion  0-15 mg/hr IntraVENous TITRATE    acetaminophen (TYLENOL) suppository 650 mg  650 mg Rectal Q4H PRN    sodium chloride (NS) flush 20 mL  20 mL InterCATHeter Q8H    heparin (porcine) pf 600 Units  600 Units InterCATHeter Q8H    sodium chloride (NS) flush 20 mL  20 mL InterCATHeter PRN    heparin (porcine) pf 600 Units  600 Units InterCATHeter PRN    albuterol (PROVENTIL VENTOLIN) nebulizer solution 2.5 mg  2.5 mg Nebulization Q4H PRN    sacubitril-valsartan (ENTRESTO) 24-26 mg tablet 1 Tab  1 Tab Oral Q12H    dextrose (D50W) injection syrg 25 g  25 g IntraVENous PRN    ferrous sulfate tablet 325 mg  1 Tab Oral DAILY WITH BREAKFAST    acetaminophen (TYLENOL) tablet 650 mg  650 mg Oral Q6H PRN    gabapentin (NEURONTIN) capsule 600 mg  600 mg Oral TID    albuterol (PROVENTIL HFA, VENTOLIN HFA, PROAIR HFA) inhaler 2 Puff  2 Puff Inhalation Q4H PRN    tiotropium (SPIRIVA) inhalation capsule 18 mcg  1 Cap Inhalation DAILY    sodium chloride (NS) flush 5-10 mL  5-10 mL IntraVENous Q8H    sodium chloride (NS) flush 5-10 mL  5-10 mL IntraVENous PRN    nitroglycerin (NITROSTAT) tablet 0.4 mg  0.4 mg SubLINGual Q5MIN PRN    ondansetron (ZOFRAN) injection 4 mg  4 mg IntraVENous Q4H PRN    budesonide (PULMICORT) 500 mcg/2 ml nebulizer suspension  500 mcg Nebulization BID RT    And    albuterol CONCENTRATE 2.5mg/0.5 mL neb soln  2.5 mg Nebulization Q6H RT       Data Review:   @LABRCNT(Na,K,BUN,CREA,WBC,HGB,HCT,PLT,INR,TRP,TCHOL*,Triglyceride*,LDL*,LDLCPOC HDL*,HDL])@    TELEMETRY: atrial flutter    Assessment/Plan:     Principal Problem:    Atrial flutter with rapid ventricular response (HCC) (8/23/2017) difficult to control. May try higher dose of amiodarone    Active Problems:    Anticoagulant long-term use (8/23/2017)      HTN (hypertension), benign (8/23/2017)now low. On pressors      Peripheral vascular disease ([address] (8/23/2017)      Dyslipidemia (8/23/2017)The current medical regimen is effective;  continue present plan and medications. History of mechanical aortic valve replacement (8/23/2017)The current medical regimen is effective;  continue present plan and medications. Overview: Placement 2000, on chronicCoumadin      Centrilobular emphysema ([address] (8/23/2017)      Ischemic cardiomyopathy (8/23/2017)      Overview: 8/23/17 ECHO:      EF 40 % to 45 %. There were no regional wall motion abnormalities. Moderate hypokinesis of the mid-apical anterior and apical wall(s).       Thrombocytopenia ([address] (8/26/2017)      Acute respiratory failure with hypercapnia (HCC) (8/29/2017)      History of tobacco use (8/29/2017)      Hyponatremia (8/29/2017)      Acute encephalopathy (8/29/2017)      COPD (chronic obstructive pulmonary disease) ([address] (8/30/2017)          Nghia Goldstein MD

## 2017-09-05 NOTE — PROGRESS NOTES
Daily Progress Note -- Hematology/ Medical Oncology        Patient Name: Tona Bill    Date of Visit: 2017  : 1945  Age:72 y.o. Initial consult HPI:   He has a history of mechanical AVR in . He continues on chronic anticoagulation with coumadin, chronic systolic CHF/ICM EF 03%, COPD, PVD, DM II, HTN, iron deficiency, and dyslipidemia who developed worsening dyspnea at the beginning of the week. He presented to the ER and was found to be in atrial flutter with RVR now status post cardioversion and amiodarone. Of note, his platelets were 86,762 on admission and 30,000 today. No other lab comparisons here but can see through care everywhere that his platelets were 83,205 at [facility] about a year ago. He has no recollection of ever being told he has low platelets. He takes iron daily for his history of PRATIK and reports black stools due to this. Hgb on admission was 10.2 and 12.7 today. We have been consulted for his thrombocytopenia    Interval history:  A-fib on amiodarone. Rate controlled. Thrombocytopenia better - status post IVIG x 3, steroids. One unit platelets given 62/66. BMBx unable to be performed due to patient's condition.    Required intubation on , but trying to now wean off vent - seems to be tolerating it well    Medications:   Current Facility-Administered Medications   Medication Dose Route Frequency Provider Last Rate Last Dose    carvedilol (COREG) tablet 6.25 mg  6.25 mg Oral BID WITH MEALS Renan Hernandez MD   Stopped at 17 0800    methylPREDNISolone (PF) (SOLU-MEDROL) injection 60 mg  60 mg IntraVENous Q12H Robert Moran MD        midazolam (VERSED) injection 2 mg  2 mg IntraVENous Q2H PRN Robert Moran MD   2 mg at 17 0501    norepinephrine (LEVOPHED) 4 mg in dextrose 5% 250 mL infusion  2-16 mcg/min IntraVENous TITRATE Robert Moran MD   Stopped at 17 0852    chlorhexidine (PERIDEX) 0.12 % mouthwash 15 mL  15 mL Oral BID Robert Moran MD   15 mL at 09/05/17 0853    warfarin (COUMADIN) tablet 2.5 mg  2.5 mg Oral QHS Deshaun Gunn MD   2.5 mg at 09/04/17 2109    morphine injection 2 mg  2 mg IntraVENous Q4H PRN Akshat Torres MD   2 mg at 09/03/17 1342    amiodarone (CORDARONE) tablet 400 mg  400 mg Oral Q12H Deshaun Gunn MD   400 mg at 09/05/17 6971    insulin lispro (HUMALOG) injection   SubCUTAneous Q6H Phu Galan NP   2 Units at 09/05/17 1137    NUTRITIONAL SUPPORT ELECTROLYTE PRN ORDERS   Does Not Apply PRN Glenice Vadim. Darek Smallwood MD        cefTRIAXone (ROCEPHIN) 1 g in 0.9% sodium chloride (MBP/ADV) 50 mL  1 g IntraVENous Q24H Sabrina Fierro.  Darek Smallwood  mL/hr at 09/05/17 1110 1 g at 09/05/17 1110    famotidine (PF) (PEPCID) 20 mg in sodium chloride 0.9 % 10 mL injection  20 mg IntraVENous DAILY Brooks Rodriguez MD   20 mg at 09/05/17 0853    mupirocin (BACTROBAN) 2 % ointment   Both Nostrils BID Akshat Torres MD        0.9% sodium chloride infusion 250 mL  250 mL IntraVENous PRN Gareth Kim NP        dilTIAZem (CARDIZEM) 100 mg in 0.9% sodium chloride (MBP/ADV) 100 mL infusion  0-15 mg/hr IntraVENous TITRATE Chula Maya MD   Stopped at 08/31/17 1836    acetaminophen (TYLENOL) suppository 650 mg  650 mg Rectal Q4H PRN Howard Alves MD   650 mg at 08/30/17 1555    sodium chloride (NS) flush 20 mL  20 mL InterCATHeter Felicitas Shook MD   20 mL at 09/05/17 0510    heparin (porcine) pf 600 Units  600 Units InterCATHeter Q8H Brooks Rodriguez MD   600 Units at 09/05/17 0509    sodium chloride (NS) flush 20 mL  20 mL InterCATHeter PRN Brooks Rodriguez MD        heparin (porcine) pf 600 Units  600 Units InterCATHeter PRN Brooks Rodriguez MD        albuterol (PROVENTIL VENTOLIN) nebulizer solution 2.5 mg  2.5 mg Nebulization Q4H PRN Mikey Sena MD        sacubitril-valsartan (ENTRESTO) 24-26 mg tablet 1 Tab  1 Tab Oral Q12H Chanel Lagos MD   1 Tab at 09/05/17 0853    dextrose (D50W) injection syrg 25 g  25 g IntraVENous PRN Chanel Lagos MD   25 g at 08/25/17 1618    ferrous sulfate tablet 325 mg  1 Tab Oral DAILY WITH BREAKFAST Calin Zi, NP   325 mg at 09/05/17 0853    acetaminophen (TYLENOL) tablet 650 mg  650 mg Oral Q6H PRN Chanel Lagos MD   650 mg at 09/02/17 2129    gabapentin (NEURONTIN) capsule 600 mg  600 mg Oral TID Akshat Torres MD   600 mg at 09/05/17 0509    albuterol (PROVENTIL HFA, VENTOLIN HFA, PROAIR HFA) inhaler 2 Puff  2 Puff Inhalation Q4H PRN Akshat Torres MD   2 Puff at 08/30/17 1724    tiotropium (SPIRIVA) inhalation capsule 18 mcg  1 Cap Inhalation DAILY Akshat Torres MD   Stopped at 09/04/17 0900    sodium chloride (NS) flush 5-10 mL  5-10 mL IntraVENous Nancie Odonnell MD   10 mL at 09/05/17 0510    sodium chloride (NS) flush 5-10 mL  5-10 mL IntraVENous PRN Akshat Torres MD        nitroglycerin (NITROSTAT) tablet 0.4 mg  0.4 mg SubLINGual Q5MIN PRN Akshat Torres MD        [facility]ron [facility]) injection 4 mg  4 mg IntraVENous Q4H PRN Akshat Torres MD   4 mg at 09/03/17 1740    budesonide (PULMICORT) 500 mcg/2 ml nebulizer suspension  500 mcg Nebulization BID RT Akshat Torres MD   500 mcg at 09/05/17 0726    And    albuterol CONCENTRATE 2.5mg/0.5 mL neb soln  2.5 mg Nebulization Q6H RT Akshat Torres MD   2.5 mg at 09/05/17 8800       Allergies: Allergies   Allergen Reactions    Levaquin [Levofloxacin] Other (comments)     GI upset    Metformin Other (comments)     Gi upset       Review of Systems: The Review of Systems is documented in full in the internal medical record. All systems are negative other than for those noted above. Physical Examination:  General Appearance: Ill appearing patient in no acute distress.    Vital signs:   Visit Vitals    /68    Pulse 67    Temp 97.3 °F (36.3 °C)    Resp 17    Ht 5' 6\" (1.676 m)    Wt 173 lb 8 oz (78.7 kg)    SpO2 95%    BMI 28 kg/m2     HEENT: No oral or pharyngeal masses. There is no ulceration or thrush. Lymph nodes: There is no cervical, supraclavicular, axillary adenopathy. Lungs: Diminished breath sounds bilaterally. On ventilator. Heart: There is no jugular venous distention. Regular, irregular. Generalized edema. Abdomen: Soft, non-tender, bowel sounds present and normal, no appreciated hepatosplenomegaly. No palpable masses. Skin: No rash, petechiae or ecchymoses. No evidence of malignancy. Neuro: Alert today with no obvious focal deficits. .     Labs/Imaging:  Lab Results   Component Value Date/Time    WBC 12.0 09/05/2017 03:52 AM    HGB 10.8 09/05/2017 03:52 AM    HCT 34.0 09/05/2017 03:52 AM    PLATELET 79 46/80/2972 03:52 AM    MCV 91.2 09/05/2017 03:52 AM       Lab Results   Component Value Date/Time    Sodium 137 09/05/2017 03:52 AM    Potassium 4.8 09/05/2017 03:52 AM    Chloride 92 09/05/2017 03:52 AM    CO2 45 09/05/2017 03:52 AM    Anion gap 0 09/05/2017 03:52 AM    Glucose 129 09/05/2017 03:52 AM    BUN 70 09/05/2017 03:52 AM    Creatinine 1.28 09/05/2017 03:52 AM    GFR est AA >60 09/05/2017 03:52 AM    GFR est non-AA 59 09/05/2017 03:52 AM    Calcium 9.0 09/05/2017 03:52 AM    AST (SGOT) 25 08/23/2017 01:05 PM    Alk. phosphatase 86 08/23/2017 01:05 PM    Protein, total 7.4 08/23/2017 01:05 PM    Albumin 3.6 08/23/2017 01:05 PM    Globulin 3.8 08/23/2017 01:05 PM    A-G Ratio 0.9 08/23/2017 01:05 PM    ALT (SGPT) 30 08/23/2017 01:05 PM           ASSESSMENT:  This gentleman has a thrombocytopenia which is isolated (normal RBC and WBC) that has been present for at least one year. The most likely diagnosis is ITP, valvular consumption and/or medications. The level of platelet count in and of itself is not concerning. It is the requirement for warfarin given his mechanical valve which creates difficulties when combined with this thrombocytopenia.  His risk for bleeding is substantially increased especially with platelet counts below 50,000. Dr. Marie Vargas was unable to be performed at bedside BMbx due to the decline in patient's condition on Tuesday. He developed hypercapnic respiratiory failure requiring him to be placed on BiPAP - continues BiPAP. Platelets were down to 18,000 on 08/29 so he was given one unit of platelets. He is s/p IVIG x 3 and he continues IV steroids daily. PLAN:  - Presumed ITP:  9/5 Continue solumedrol IV. Monitor blood sugars closely - may need sliding scale. Platelet level down to 79,000. Transfuse platelets as needed - keep above 50,000. Coumadin necessary due to mechanical heart valve. Continue daily CBC to monitor platelet count. BMBx can be done as outpatient or when out of ICU. Continue solumedrol 60mg daily.            Nunu Mendoza NP  [facility]  [address] [address] [phone]

## 2017-09-05 NOTE — PROGRESS NOTES
Respiratory Mechanics completed and are as follows:  Weaning Parameters  Spontaneous Breathing Trial Complete: Yes  Resp Rate Observed: 20  Ve: 7  VT: 350  RSBI: 58  [place] Agitation Sedation Scale (RASS): Drowsy  Patient extubated to a 2L NC. Patient is able to communicate and is negative for stridor. Breath sounds are coarse. No complications with extubation.      Sharon Trevino, RT

## 2017-09-05 NOTE — PROGRESS NOTES
Hospitalist Progress Note     Admit Date:  2017  1:11 PM   Name:  Rosa Bro   Age:  67 y.o.  :  1945   MRN:  [id number]   PCP:  Nathalia Chau MD  Treatment Team: Attending Provider: Rodolfo Correa MD; Consulting Provider: Rodolfo Correa MD; Utilization Review: Clotsuzi Bryan; Consulting Provider: Mendez Johnson MD; Consulting Provider: Monique Matute MD; Care Manager: Sb Peterson; Consulting Provider: Lionel Blake MD; Consulting Provider: Jay Putnam MD    Subjective:     Suhail Melgoza is a 68 yo male who presented with dyspnea on a background of mechanical AVR in , chronic anticoagulation with coumadin, chronic systolic CHF/ICM EF 11%, COPD, PVD, DM II, HTN, iron deficiency, and dyslipidemia. He is currently being treated for atrial fibrillation with RVR. This morning, he was able to nod yes and no to questions. He denied any chest pain, fever or chills. He was extubated.     Objective:     Patient Vitals for the past 24 hrs:   Temp Pulse Resp BP SpO2   17 1521 - 76 24 - 99 %   17 1506 98.7 °F (37.1 °C) 98 20 134/63 95 %   17 1430 - 93 17 (!) 149/97 92 %   17 1427 - - - - 94 %   17 1400 - 81 18 128/70 96 %   17 1329 - 83 17 120/64 96 %   17 1303 - 68 21 122/70 96 %   17 1244 - (!) 102 20 122/56 96 %   17 1230 - 79 30 139/60 91 %   17 1223 - 91 28 108/62 95 %   17 1144 - 67 17 119/68 95 %   17 1133 - - - - 94 %   17 1129 - 94 (!) 32 112/84 93 %   17 1120 - (!) 104 23 - 92 %   17 1114 97.3 °F (36.3 °C) 83 18 105/66 92 %   17 1014 - 77 18 101/58 92 %   17 0959 - 81 19 100/66 93 %   17 0945 - (!) 105 18 105/67 93 %   17 0929 - (!) 110 25 93/59 93 %   17 0914 - (!) 115 15 99/55 95 %   17 0900 - (!) 115 18 134/74 95 %   17 0845 - (!) 115 (!) 32 103/58 93 %   17 0830 - (!) 113 17 90/54 92 %   17 0814 - (!) 106 16 (!) 86/50 97 %   09/05/17 0759 - (!) 115 24 97/55 96 %   09/05/17 0744 - (!) 114 18 106/53 96 %   09/05/17 0729 97.8 °F (36.6 °C) (!) 115 20 110/54 97 %   09/05/17 0727 - (!) 115 18 - 99 %   09/05/17 0714 - (!) 115 15 108/55 97 %   09/05/17 0659 - (!) 115 23 112/59 98 %   09/05/17 0645 - (!) 115 (!) 32 111/61 98 %   09/05/17 0629 - 94 20 104/52 97 %   09/05/17 0614 - (!) 102 16 (!) 82/50 97 %   09/05/17 0559 - (!) 108 14 (!) 87/57 97 %   09/05/17 0544 - (!) 117 18 95/55 97 %   09/05/17 0529 - (!) 117 18 101/58 97 %   09/05/17 0514 - (!) 117 18 96/52 96 %   09/05/17 0500 - (!) 117 18 104/59 96 %   09/05/17 0445 - (!) 117 18 95/57 96 %   09/05/17 0429 - 100 18 (!) 85/51 96 %   09/05/17 0414 - 95 18 92/50 97 %   09/05/17 0359 - 95 23 106/53 97 %   09/05/17 0344 - (!) 103 22 101/56 97 %   09/05/17 0329 - (!) 116 (!) 33 100/67 99 %   09/05/17 0328 - (!) 116 22 - 98 %   09/05/17 0315 - (!) 115 16 99/72 98 %   09/05/17 0300 - (!) 115 23 123/64 99 %   09/05/17 0258 98 °F (36.7 °C) - - - -   09/05/17 0129 - 92 14 112/60 98 %   09/05/17 0115 - 96 15 120/63 99 %   09/05/17 0059 - 88 18 102/62 98 %   09/05/17 0044 - 94 18 90/51 98 %   09/05/17 0030 - 82 18 - 98 %   09/05/17 0029 - 85 18 106/55 98 %   09/05/17 0019 - 85 18 107/71 98 %   09/05/17 0014 - 82 18 (!) 79/50 98 %   09/04/17 2359 - 89 23 (!) 87/53 97 %   09/04/17 2344 - 97 25 117/55 97 %   09/04/17 2341 98.4 °F (36.9 °C) - - - -   09/04/17 2330 - 85 18 105/52 98 %   09/04/17 2315 - 92 18 96/44 99 %   09/04/17 2259 - 90 16 108/53 98 %   09/04/17 2244 - 89 19 103/57 98 %   09/04/17 2229 - (!) 116 18 91/53 97 %   09/04/17 2220 - (!) 119 - 99/50 -   09/04/17 2144 - (!) 128 (!) 37 123/77 95 %   09/04/17 2116 - (!) 107 22 - 98 %   09/04/17 2109 - 91 - 94/69 -   09/04/17 2031 - 99 17 (!) 82/49 96 %   09/04/17 2019 - 83 18 96/65 96 %   09/04/17 2010 - (!) 103 17 - 97 %   09/04/17 2000 - 89 21 126/71 98 %   09/04/17 1946 - (!) 108 (!) 32 138/78 98 %   09/04/17 1933 98.4 °F (36.9 °C) (!) 105 18 (!) 80/48 98 %   09/04/17 1929 - 88 15 (!) 80/48 97 %   09/04/17 1914 - 82 27 (!) 87/53 97 %   09/04/17 1859 - 87 22 100/56 97 %   09/04/17 1844 - (!) 102 25 92/47 97 %   09/04/17 1829 - 94 17 117/60 97 %   09/04/17 1814 - 94 15 107/63 97 %   09/04/17 1801 - 85 14 107/58 97 %   09/04/17 1800 - 83 15 - 97 %   09/04/17 1745 - 86 14 121/56 97 %   09/04/17 1729 - (!) 102 22 117/66 98 %   09/04/17 1714 - 87 14 110/83 96 %   09/04/17 1659 - 87 14 114/58 96 %   09/04/17 1644 - 83 14 115/59 95 %   09/04/17 1629 - 82 14 108/61 94 %   09/04/17 1614 - 82 14 109/66 95 %   09/04/17 1600 97.8 °F (36.6 °C) 92 15 119/58 100 %     Oxygen Therapy  O2 Sat (%): 99 % (09/05/17 1521)  Pulse via Oximetry: 80 beats per minute (09/05/17 1521)  O2 Device: Nasal cannula (09/05/17 1521)  O2 Flow Rate (L/min): 2 l/min (09/05/17 1521)  O2 Temperature: 87.6 °F (30.9 °C) (09/04/17 1140)  FIO2 (%): 28 % (09/05/17 1427)    Intake/Output Summary (Last 24 hours) at 09/05/17 1545  Last data filed at 09/05/17 1521   Gross per 24 hour   Intake          3121.05 ml   Output             2845 ml   Net           276.05 ml           General appearance: NAD, conversant  Eyes: anicteric sclerae, moist conjunctivae; no lid-lag  ENT: Atraumatic; oropharynx clear with moist mucous membranes and no mucosal ulcerations; normal hard and soft palate  Neck: Trachea midline   FROM, supple, no thyromegaly or lymphadenopathy  Lungs: CTA, with normal respiratory effort and no intercostal retractions  CV: S1,S2 present, no added murmurs  Abdomen: Soft, non-tender; no masses   Extremities: No peripheral edema or extremity lymphadenopathy  Skin: Normal temperature, turgor and texture; no subcutaneous nodules  Psych: Appropriate affect, alert and oriented to person, place and time    Data Review:  I have reviewed all labs, meds, telemetry events, and studies from the last 24 hours.     Recent Results (from the past 24 hour(s))   GLUCOSE, POC    Collection Time: 09/04/17  6:51 PM   Result Value Ref Range    Glucose (POC) 175 (H) 65 - 100 mg/dL   BLOOD GAS, ARTERIAL    Collection Time: 09/04/17  9:30 PM   Result Value Ref Range    pH 7.51 (H) 7.35 - 7.45      PCO2 57 (H) 35 - 45 mmHg    PO2 86 80 - 105 mmHg    BICARBONATE 44 (H) 22 - 26 mmol/L    BASE EXCESS 18.5 (H) 0 - 3 mmol/L    TOTAL HEMOGLOBIN 12.0 11.7 - 15.0 GM/DL    O2 SAT 98 92 - 98.5 %    Arterial O2 Hgb 95.7 94 - 97 %    CARBOXYHEMOGLOBIN 1.6 (H) 0.5 - 1.5 %    METHEMOGLOBIN 0.3 0.0 - 1.5 %    DEOXYHEMOGLOBIN 2 0.0 - 5.0 %    SITE RB     ALLENS TEST NA     MODE PCV 30     FIO2 40.0 %    RATE 18.0      PEEP/CPAP 10.0      Respiratory comment: PEREZ Reyes at 9 4 2017 9 35 39 PM. Read back. GLUCOSE, POC    Collection Time: 09/04/17 11:37 PM   Result Value Ref Range    Glucose (POC) 140 (H) 65 - 100 mg/dL   BLOOD GAS, ARTERIAL    Collection Time: 09/05/17  3:20 AM   Result Value Ref Range    pH 7.47 (H) 7.35 - 7.45      PCO2 62 (H) 35 - 45 mmHg    PO2 127 (H) 80 - 105 mmHg    BICARBONATE 44 (H) 22 - 26 mmol/L    BASE EXCESS 17.6 (H) 0 - 3 mmol/L    TOTAL HEMOGLOBIN 12.2 11.7 - 15.0 GM/DL    O2 SAT 98 92 - 98.5 %    Arterial O2 Hgb 96.9 94 - 97 %    CARBOXYHEMOGLOBIN 1.0 0.5 - 1.5 %    METHEMOGLOBIN 0.5 0.0 - 1.5 %    DEOXYHEMOGLOBIN 2 0.0 - 5.0 %    SITE LR     ALLENS TEST POSITIVE      MODE PCV     FIO2 40.0 %    Tidal volume 320.0      RATE 18.0      PEEP/CPAP 10.0      Respiratory comment: RN at 9 5 2017 3 24 56 AM. Read back.     PROTHROMBIN TIME + INR    Collection Time: 09/05/17  3:52 AM   Result Value Ref Range    Prothrombin time 18.9 (H) 9.6 - 12.0 sec    INR 1.7 (H) 0.9 - 1.2     METABOLIC PANEL, BASIC    Collection Time: 09/05/17  3:52 AM   Result Value Ref Range    Sodium 137 136 - 145 mmol/L    Potassium 4.8 3.5 - 5.1 mmol/L    Chloride 92 (L) 98 - 107 mmol/L    CO2 45 (HH) 21 - 32 mmol/L    Anion gap 0 (L) 7 - 16 mmol/L    Glucose 129 (H) 65 - 100 mg/dL    BUN 70 (H) 8 - 23 MG/DL    Creatinine 1.28 0.8 - 1.5 MG/DL GFR est AA >60 >60 ml/min/1.73m2    GFR est non-AA 59 (L) >60 ml/min/1.73m2    Calcium 9.0 8.3 - 10.4 MG/DL   CBC WITH AUTOMATED DIFF    Collection Time: 09/05/17  3:52 AM   Result Value Ref Range    WBC 12.0 (H) 4.3 - 11.1 K/uL    RBC 3.73 (L) 4.23 - 5.67 M/uL    HGB 10.8 (L) 13.6 - 17.2 g/dL    HCT 34.0 (L) 41.1 - 50.3 %    MCV 91.2 79.6 - 97.8 FL    MCH 28.9 26.1 - 32.9 PG    MCHC 31.7 31.4 - 35.0 g/dL    RDW 16.1 (H) 11.9 - 14.6 %    PLATELET 79 (L) 291 - 450 K/uL    MPV 11.6 10.8 - 14.1 FL    DF AUTOMATED      NEUTROPHILS 85 (H) 43 - 78 %    LYMPHOCYTES 3 (L) 13 - 44 %    MONOCYTES 12 4.0 - 12.0 %    EOSINOPHILS 0 (L) 0.5 - 7.8 %    BASOPHILS 0 0.0 - 2.0 %    IMMATURE GRANULOCYTES 0.4 0.0 - 5.0 %    ABS. NEUTROPHILS 9.2 (H) 1.7 - 8.2 K/UL    ABS. LYMPHOCYTES 0.3 (L) 0.5 - 4.6 K/UL    ABS. MONOCYTES 1.4 (H) 0.1 - 1.3 K/UL    ABS. EOSINOPHILS 0.0 0.0 - 0.8 K/UL    ABS. BASOPHILS 0.0 0.0 - 0.2 K/UL    ABS. IMM.  GRANS. 0.0 0.0 - 0.5 K/UL   GLUCOSE, POC    Collection Time: 09/05/17  5:23 AM   Result Value Ref Range    Glucose (POC) 157 (H) 65 - 100 mg/dL   GLUCOSE, POC    Collection Time: 09/05/17 11:04 AM   Result Value Ref Range    Glucose (POC) 152 (H) 65 - 100 mg/dL        All Micro Results     Procedure Component Value Units Date/Time    CULTURE, BLOOD [[id number]] Collected:  08/25/17 1600    Order Status:  Completed Specimen:  Blood from Blood Updated:  08/30/17 0643     Special Requests: LEFT HAND        Culture result: NO GROWTH 5 DAYS       CULTURE, BLOOD [[id number]] Collected:  08/25/17 2145    Order Status:  Completed Specimen:  Blood from Blood Updated:  08/30/17 0643     Special Requests: RIGHT HAND        Culture result: NO GROWTH 5 DAYS       MRSA SCREEN - PCR (NASAL) [[id number]]  (Abnormal) Collected:  08/29/17 1556    Order Status:  Completed Specimen:  Nasal from Nasal Swab Updated:  08/29/17 1900     Special Requests: NO SPECIAL REQUESTS        Culture result:         MRSA target DNA is detected (presumptive positive for MRSA colonization).  (A)            RESULTS VERIFIED, PHONED TO AND READ BACK BY  DAMARIS GARNICA RN ON 08/29/2017 AT 1851 [facility]      CULTURE, URINE [[id number]] Collected:  08/25/17 2011    Order Status:  Completed Specimen:  Urine from Clean catch Updated:  08/28/17 0653     Special Requests: NO SPECIAL REQUESTS        Culture result:         [id number] COLONIES/mL MIXED SKIN VANNESSA ISOLATED          Current Meds:  Current Facility-Administered Medications   Medication Dose Route Frequency    carvedilol (COREG) tablet 6.25 mg  6.25 mg Oral BID WITH MEALS    methylPREDNISolone (PF) (SOLU-MEDROL) injection 60 mg  60 mg IntraVENous Q12H    warfarin (COUMADIN) tablet 3 mg  3 mg Oral QHS    midazolam (VERSED) injection 2 mg  2 mg IntraVENous Q2H PRN    norepinephrine (LEVOPHED) 4 mg in dextrose 5% 250 mL infusion  2-16 mcg/min IntraVENous TITRATE    chlorhexidine (PERIDEX) 0.12 % mouthwash 15 mL  15 mL Oral BID    morphine injection 2 mg  2 mg IntraVENous Q4H PRN    amiodarone (CORDARONE) tablet 400 mg  400 mg Oral Q12H    insulin lispro (HUMALOG) injection   SubCUTAneous Q6H    NUTRITIONAL SUPPORT ELECTROLYTE PRN ORDERS   Does Not Apply PRN    cefTRIAXone (ROCEPHIN) 1 g in 0.9% sodium chloride (MBP/ADV) 50 mL  1 g IntraVENous Q24H    famotidine (PF) (PEPCID) 20 mg in sodium chloride 0.9 % 10 mL injection  20 mg IntraVENous DAILY    mupirocin (BACTROBAN) 2 % ointment   Both Nostrils BID    0.9% sodium chloride infusion 250 mL  250 mL IntraVENous PRN    dilTIAZem (CARDIZEM) 100 mg in 0.9% sodium chloride (MBP/ADV) 100 mL infusion  0-15 mg/hr IntraVENous TITRATE    acetaminophen (TYLENOL) suppository 650 mg  650 mg Rectal Q4H PRN    sodium chloride (NS) flush 20 mL  20 mL InterCATHeter Q8H    heparin (porcine) pf 600 Units  600 Units InterCATHeter Q8H    sodium chloride (NS) flush 20 mL  20 mL InterCATHeter PRN    heparin (porcine) pf 600 Units  600 Units InterCATHeter PRN    albuterol (PROVENTIL VENTOLIN) nebulizer solution 2.5 mg  2.5 mg Nebulization Q4H PRN    sacubitril-valsartan (ENTRESTO) 24-26 mg tablet 1 Tab  1 Tab Oral Q12H    dextrose (D50W) injection syrg 25 g  25 g IntraVENous PRN    ferrous sulfate tablet 325 mg  1 Tab Oral DAILY WITH BREAKFAST    acetaminophen (TYLENOL) tablet 650 mg  650 mg Oral Q6H PRN    gabapentin (NEURONTIN) capsule 600 mg  600 mg Oral TID    albuterol (PROVENTIL HFA, VENTOLIN HFA, PROAIR HFA) inhaler 2 Puff  2 Puff Inhalation Q4H PRN    tiotropium (SPIRIVA) inhalation capsule 18 mcg  1 Cap Inhalation DAILY    sodium chloride (NS) flush 5-10 mL  5-10 mL IntraVENous Q8H    sodium chloride (NS) flush 5-10 mL  5-10 mL IntraVENous PRN    nitroglycerin (NITROSTAT) tablet 0.4 mg  0.4 mg SubLINGual Q5MIN PRN    ondansetron (ZOFRAN) injection 4 mg  4 mg IntraVENous Q4H PRN    budesonide (PULMICORT) 500 mcg/2 ml nebulizer suspension  500 mcg Nebulization BID RT    And    albuterol CONCENTRATE 2.5mg/0.5 mL neb soln  2.5 mg Nebulization Q6H RT       Other Studies (last 24 hours):  Xr Chest Sngl V    Result Date: 9/5/2017   Portable view of the chest COMPARISON: September 4, 2017. CLINICAL HISTORY: Ventilator. FINDINGS: Endotracheal tube, right-sided PICC, and enteric tube are stable. Cardiac mediastinal contour and the surrounding bones are stable. Lungs are underinflated. Minimal bibasilar densities, likely subsegmental atelectasis. No well-defined consolidation. No pneumothorax or pulmonary edema. Sternotomy wires are stable.      IMPRESSION: No significant change compared to prior exam.      Assessment and Plan:     Hospital Problems as of 9/5/2017  Date Reviewed: 9/1/2017          Codes Class Noted - Resolved POA    COPD (chronic obstructive pulmonary disease) ([facility] [address] (Chronic) ICD-10-CM: J44.9  ICD-9-CM: 496  8/30/2017 - Present Yes        Acute respiratory failure with hypercapnia ([facility] [address] ICD-10-CM: J96.02  ICD-9-CM: 518.81  8/29/2017 - Present No History of tobacco use (Chronic) ICD-10-CM: Y23.898  ICD-9-CM: V15.82  8/29/2017 - Present Yes        Hyponatremia ICD-10-CM: E87.1  ICD-9-CM: 276.1  8/29/2017 - Present Yes        Acute encephalopathy ICD-10-CM: G93.40  ICD-9-CM: 348.30  8/29/2017 - Present Yes        Thrombocytopenia ([facility] [address] ICD-10-CM: D69.6  ICD-9-CM: 287.5  8/26/2017 - Present Yes        Anticoagulant long-term use (Chronic) ICD-10-CM: Z79.01  ICD-9-CM: V58.61  8/23/2017 - Present Yes        HTN (hypertension), benign (Chronic) ICD-10-CM: I10  ICD-9-CM: 401.1  8/23/2017 - Present Yes        Peripheral vascular disease ([facility] [address] (Chronic) ICD-10-CM: I73.9  ICD-9-CM: 443.9  8/23/2017 - Present Yes        Dyslipidemia (Chronic) ICD-10-CM: E78.5  ICD-9-CM: 272.4  8/23/2017 - Present Yes        History of mechanical aortic valve replacement (Chronic) ICD-10-CM: Z95.2  ICD-9-CM: V43.3  8/23/2017 - Present Yes    Overview Signed 8/30/2017 10:30 AM by Brien Stern NP     Placement 2000, on chronicCoumadin             Centrilobular emphysema ([facility] [address] (Chronic) ICD-10-CM: J43.2  ICD-9-CM: 492.8  8/23/2017 - Present Yes        Ischemic cardiomyopathy (Chronic) ICD-10-CM: I25.5  ICD-9-CM: 414.8  8/23/2017 - Present Yes    Overview Signed 8/30/2017 10:29 AM by Brien Stern NP     8/23/17 ECHO:  EF 40 % to 45 %. There were no regional wall motion abnormalities. Moderate hypokinesis of the mid-apical anterior and apical wall(s).              * (Principal)Atrial flutter with rapid ventricular response [facility]) ICD-10-CM: I48.92  ICD-9-CM: 427.32  8/23/2017 - Present Yes              PLAN:    Atrial fibrillation with RVR  S/p Cardioversion and amiodarone  Plan  Continue amiodarone, Coumadin     Acute COPD exacerbation  +wheezing   Decreased O2 requirements  Plan  Continue albuterol, pulmicort, prednisone   Continue ceftriaxone (day 6/7)  Extubated today, continue increased solumedrol  Pulmonology on board    Thrombocytopenia  Daily CBC     Chronic systolic CHF  Continue Entresto, Carvedilol, lasix        ITP  Plan  Continue solumedrol IV      FEN- failed speech therapy again on 8/31.    DC planning/Dispo:  home in 48 hours  DVT ppx:  Coumadin    Signed:  Ne Torres MD

## 2017-09-05 NOTE — PROGRESS NOTES
Bedside shift change report given to Apollo Arroyo, PEREZ and Nola RN (oncoming nurse) by Fabien Fraga RN (offgoing nurse). Report included the following information SBAR, Kardex, ED Summary, Procedure Summary, Intake/Output, MAR, Recent Results and Cardiac Rhythm atrial flutter with bundle brach block. Levophed drip adjusted, dual skin assessment performed, patient turned.

## 2017-09-05 NOTE — PROGRESS NOTES
SPEECH PATHOLOGY NOTE:    Patient with change in status, now intubated. ST to sign off at this time. Please re-consult when patient is medically appropriate.      ABBEY Medel, CCC-SLP, CBIS

## 2017-09-05 NOTE — PROGRESS NOTES
Patient doing well on 2L NC. O2 sats wnl, respirations even and unlabored. Patient is performing his own oral suctioning of secretions.

## 2017-09-05 NOTE — PROGRESS NOTES
Bedside report received from Figueroa gomez, [facility]. Patient responds to voice, follows commands. Denies pain. A fib/a flutter on monitor. Intubated, ett 23 at the lips. BP 80s/40s, levophed restarted. Safety measures in place, will continue to monitor.

## 2017-09-05 NOTE — PROGRESS NOTES
Bedside report received from [facility], [address] [facility] and [place], [facility]. Patient alert and oriented X3, a fib/flutter on monitor, rate controlled. BP stable off Levophed. O2 Sat 98% on 4L NC, tolerating well. Wife at bedside, will continue to monitor.

## 2017-09-05 NOTE — INTERDISCIPLINARY ROUNDS
Interdisciplinary team rounds were held 9/5/2017 with the following team members:Care Management, Nursing, Nurse Practitioner, Nutrition, Palliative Care, Pharmacy, Physical Therapy, Physician, Respiratory Therapy and Clinical Coordinator and the patient. Plan of care discussed. See clinical pathway and/or care plan for interventions and desired outcomes.

## 2017-09-05 NOTE — PROGRESS NOTES
Patient in a flutter with . Versed given to agitation, HR still 117. Monica, NP notified. Orders received for another dose of 5 mg metoprolol.

## 2017-09-06 LAB
ANION GAP SERPL CALC-SCNC: 5 MMOL/L (ref 7–16)
BASOPHILS # BLD: 0 K/UL (ref 0–0.2)
BASOPHILS NFR BLD: 0 % (ref 0–2)
BUN SERPL-MCNC: 49 MG/DL (ref 8–23)
CALCIUM SERPL-MCNC: 9.4 MG/DL (ref 8.3–10.4)
CHLORIDE SERPL-SCNC: 94 MMOL/L (ref 98–107)
CO2 SERPL-SCNC: 38 MMOL/L (ref 21–32)
CREAT SERPL-MCNC: 0.92 MG/DL (ref 0.8–1.5)
DIFFERENTIAL METHOD BLD: ABNORMAL
EOSINOPHIL # BLD: 0 K/UL (ref 0–0.8)
EOSINOPHIL NFR BLD: 0 % (ref 0.5–7.8)
ERYTHROCYTE [DISTWIDTH] IN BLOOD BY AUTOMATED COUNT: 15.9 % (ref 11.9–14.6)
GLUCOSE BLD STRIP.AUTO-MCNC: 158 MG/DL (ref 65–100)
GLUCOSE BLD STRIP.AUTO-MCNC: 177 MG/DL (ref 65–100)
GLUCOSE BLD STRIP.AUTO-MCNC: 198 MG/DL (ref 65–100)
GLUCOSE SERPL-MCNC: 169 MG/DL (ref 65–100)
HCT VFR BLD AUTO: 31.7 % (ref 41.1–50.3)
HGB BLD-MCNC: 10 G/DL (ref 13.6–17.2)
IMM GRANULOCYTES # BLD: 0 K/UL (ref 0–0.5)
IMM GRANULOCYTES NFR BLD: 0.3 % (ref 0–5)
INR PPP: 1.3 (ref 0.9–1.2)
LYMPHOCYTES # BLD: 0.5 K/UL (ref 0.5–4.6)
LYMPHOCYTES NFR BLD: 4 % (ref 13–44)
MCH RBC QN AUTO: 29.5 PG (ref 26.1–32.9)
MCHC RBC AUTO-ENTMCNC: 31.5 G/DL (ref 31.4–35)
MCV RBC AUTO: 93.5 FL (ref 79.6–97.8)
MONOCYTES # BLD: 0.2 K/UL (ref 0.1–1.3)
MONOCYTES NFR BLD: 2 % (ref 4–12)
NEUTS SEG # BLD: 10.4 K/UL (ref 1.7–8.2)
NEUTS SEG NFR BLD: 94 % (ref 43–78)
PLATELET # BLD AUTO: 97 K/UL (ref 150–450)
PMV BLD AUTO: 11.7 FL (ref 10.8–14.1)
POTASSIUM SERPL-SCNC: 4.6 MMOL/L (ref 3.5–5.1)
PROTHROMBIN TIME: 14.7 SEC (ref 9.6–12)
RBC # BLD AUTO: 3.39 M/UL (ref 4.23–5.67)
SODIUM SERPL-SCNC: 137 MMOL/L (ref 136–145)
WBC # BLD AUTO: 11.1 K/UL (ref 4.3–11.1)

## 2017-09-06 PROCEDURE — 74011636637 HC RX REV CODE- 636/637: Performed by: NURSE PRACTITIONER

## 2017-09-06 PROCEDURE — 77010033678 HC OXYGEN DAILY

## 2017-09-06 PROCEDURE — 74011250637 HC RX REV CODE- 250/637: Performed by: INTERNAL MEDICINE

## 2017-09-06 PROCEDURE — 82962 GLUCOSE BLOOD TEST: CPT

## 2017-09-06 PROCEDURE — 99233 SBSQ HOSP IP/OBS HIGH 50: CPT | Performed by: INTERNAL MEDICINE

## 2017-09-06 PROCEDURE — 74011250636 HC RX REV CODE- 250/636: Performed by: INTERNAL MEDICINE

## 2017-09-06 PROCEDURE — 85610 PROTHROMBIN TIME: CPT | Performed by: PHYSICIAN ASSISTANT

## 2017-09-06 PROCEDURE — 97164 PT RE-EVAL EST PLAN CARE: CPT

## 2017-09-06 PROCEDURE — 97110 THERAPEUTIC EXERCISES: CPT

## 2017-09-06 PROCEDURE — 94640 AIRWAY INHALATION TREATMENT: CPT

## 2017-09-06 PROCEDURE — 85025 COMPLETE CBC W/AUTO DIFF WBC: CPT | Performed by: PHYSICIAN ASSISTANT

## 2017-09-06 PROCEDURE — 74011250637 HC RX REV CODE- 250/637: Performed by: NURSE PRACTITIONER

## 2017-09-06 PROCEDURE — 74011000258 HC RX REV CODE- 258: Performed by: INTERNAL MEDICINE

## 2017-09-06 PROCEDURE — 80048 BASIC METABOLIC PNL TOTAL CA: CPT | Performed by: PHYSICIAN ASSISTANT

## 2017-09-06 PROCEDURE — 74011000250 HC RX REV CODE- 250: Performed by: INTERNAL MEDICINE

## 2017-09-06 PROCEDURE — 92526 ORAL FUNCTION THERAPY: CPT

## 2017-09-06 PROCEDURE — 65620000000 HC RM CCU GENERAL

## 2017-09-06 RX ORDER — ACETAZOLAMIDE 250 MG/1
250 TABLET ORAL ONCE
Status: COMPLETED | OUTPATIENT
Start: 2017-09-06 | End: 2017-09-06

## 2017-09-06 RX ORDER — CARVEDILOL 12.5 MG/1
12.5 TABLET ORAL 2 TIMES DAILY WITH MEALS
Status: DISCONTINUED | OUTPATIENT
Start: 2017-09-06 | End: 2017-09-11

## 2017-09-06 RX ORDER — FUROSEMIDE 40 MG/1
40 TABLET ORAL DAILY
Status: DISCONTINUED | OUTPATIENT
Start: 2017-09-06 | End: 2017-09-06

## 2017-09-06 RX ADMIN — GABAPENTIN 600 MG: 300 CAPSULE ORAL at 21:57

## 2017-09-06 RX ADMIN — Medication 20 ML: at 21:57

## 2017-09-06 RX ADMIN — SACUBITRIL AND VALSARTAN 1 TABLET: 24; 26 TABLET, FILM COATED ORAL at 08:40

## 2017-09-06 RX ADMIN — CHLORHEXIDINE GLUCONATE 15 ML: 1.2 RINSE ORAL at 08:42

## 2017-09-06 RX ADMIN — SACUBITRIL AND VALSARTAN 1 TABLET: 24; 26 TABLET, FILM COATED ORAL at 21:58

## 2017-09-06 RX ADMIN — MORPHINE SULFATE 2 MG: 4 INJECTION, SOLUTION INTRAMUSCULAR; INTRAVENOUS at 06:30

## 2017-09-06 RX ADMIN — ALBUTEROL SULFATE 2.5 MG: 2.5 SOLUTION RESPIRATORY (INHALATION) at 14:14

## 2017-09-06 RX ADMIN — Medication 20 ML: at 05:05

## 2017-09-06 RX ADMIN — CHLORHEXIDINE GLUCONATE 15 ML: 1.2 RINSE ORAL at 17:14

## 2017-09-06 RX ADMIN — FAMOTIDINE 20 MG: 10 INJECTION, SOLUTION INTRAVENOUS at 08:41

## 2017-09-06 RX ADMIN — ALBUTEROL SULFATE 2.5 MG: 2.5 SOLUTION RESPIRATORY (INHALATION) at 08:36

## 2017-09-06 RX ADMIN — METHYLPREDNISOLONE SODIUM SUCCINATE 40 MG: 125 INJECTION, POWDER, FOR SOLUTION INTRAMUSCULAR; INTRAVENOUS at 21:57

## 2017-09-06 RX ADMIN — ACETAZOLAMIDE 250 MG: 250 TABLET ORAL at 14:06

## 2017-09-06 RX ADMIN — METHYLPREDNISOLONE SODIUM SUCCINATE 60 MG: 125 INJECTION, POWDER, FOR SOLUTION INTRAMUSCULAR; INTRAVENOUS at 08:41

## 2017-09-06 RX ADMIN — SODIUM CHLORIDE, PRESERVATIVE FREE 600 UNITS: 5 INJECTION INTRAVENOUS at 21:57

## 2017-09-06 RX ADMIN — INSULIN LISPRO 2 UNITS: 100 INJECTION, SOLUTION INTRAVENOUS; SUBCUTANEOUS at 17:14

## 2017-09-06 RX ADMIN — ALBUTEROL SULFATE 2.5 MG: 2.5 SOLUTION RESPIRATORY (INHALATION) at 03:15

## 2017-09-06 RX ADMIN — Medication 10 ML: at 14:01

## 2017-09-06 RX ADMIN — SODIUM CHLORIDE, PRESERVATIVE FREE 600 UNITS: 5 INJECTION INTRAVENOUS at 05:05

## 2017-09-06 RX ADMIN — Medication 10 ML: at 21:58

## 2017-09-06 RX ADMIN — CARVEDILOL 12.5 MG: 12.5 TABLET, FILM COATED ORAL at 08:40

## 2017-09-06 RX ADMIN — AMIODARONE HYDROCHLORIDE 400 MG: 200 TABLET ORAL at 21:58

## 2017-09-06 RX ADMIN — WARFARIN SODIUM 3 MG: 1 TABLET ORAL at 21:58

## 2017-09-06 RX ADMIN — BUDESONIDE 500 MCG: 0.5 INHALANT RESPIRATORY (INHALATION) at 20:30

## 2017-09-06 RX ADMIN — Medication 10 ML: at 05:06

## 2017-09-06 RX ADMIN — MUPIROCIN: 20 OINTMENT TOPICAL at 08:41

## 2017-09-06 RX ADMIN — GABAPENTIN 600 MG: 300 CAPSULE ORAL at 14:00

## 2017-09-06 RX ADMIN — GABAPENTIN 600 MG: 300 CAPSULE ORAL at 05:05

## 2017-09-06 RX ADMIN — FERROUS SULFATE TAB 325 MG (65 MG ELEMENTAL FE) 325 MG: 325 (65 FE) TAB at 08:40

## 2017-09-06 RX ADMIN — BUDESONIDE 500 MCG: 0.5 INHALANT RESPIRATORY (INHALATION) at 08:36

## 2017-09-06 RX ADMIN — ALBUTEROL SULFATE 2.5 MG: 2.5 SOLUTION RESPIRATORY (INHALATION) at 20:30

## 2017-09-06 RX ADMIN — INSULIN LISPRO 2 UNITS: 100 INJECTION, SOLUTION INTRAVENOUS; SUBCUTANEOUS at 05:11

## 2017-09-06 RX ADMIN — CEFTRIAXONE 1 G: 1 INJECTION, POWDER, FOR SOLUTION INTRAMUSCULAR; INTRAVENOUS at 11:23

## 2017-09-06 RX ADMIN — AMIODARONE HYDROCHLORIDE 400 MG: 200 TABLET ORAL at 08:55

## 2017-09-06 RX ADMIN — CARVEDILOL 12.5 MG: 12.5 TABLET, FILM COATED ORAL at 16:12

## 2017-09-06 RX ADMIN — MUPIROCIN: 20 OINTMENT TOPICAL at 17:15

## 2017-09-06 RX ADMIN — INSULIN LISPRO 2 UNITS: 100 INJECTION, SOLUTION INTRAVENOUS; SUBCUTANEOUS at 11:23

## 2017-09-06 RX ADMIN — Medication 20 ML: at 13:59

## 2017-09-06 RX ADMIN — SODIUM CHLORIDE, PRESERVATIVE FREE 600 UNITS: 5 INJECTION INTRAVENOUS at 14:00

## 2017-09-06 NOTE — PROGRESS NOTES
LTG: Patient will tolerate least restrictive diet without overt signs or symptoms of airway compromise. STG: Patient will tolerate mechanical soft and thin liquids without overt signs or symptoms of airway compromise. STG: Patient will participate in modified barium swallow study as clinically indicated. Speech language pathology: bedside swallow note: Re-evaluation    NAME/AGE/GENDER: Jase Houston is a 67 y.o. male  DATE: 9/6/2017  PRIMARY DIAGNOSIS: Atrial flutter with rapid ventricular response ([facility] [address]       ICD-10: Treatment Diagnosis: R13.12 Oropharyngeal Dysphagia. INTERDISCIPLINARY COLLABORATION: Registered Nurse  PRECAUTIONS/ALLERGIES: Levaquin [levofloxacin] and Metformin ASSESSMENT:Patient seen for swallowing re-assessment. He is well known to this service with previous recommendations for modified barium swallow study; however, unable to complete study secondary to respiratory issues. He is now s/p extubation, on nasal cannula. Low volume of speech with patient primarily communicating in a whisper. Poor initiation also noted when responding to clinician questions, requiring moderate prompts to communicate verbally. Single word and short phrase responses only. Wife at bedside  Patient was presented with ice chips, thin liquid via spoon, and honey via spoon and cup sip. Mild throat clear noted on first trial of ice, but improved with additional trials. Delayed swallow initiation with thin and honey, resulting in incoordinated swallow, multiple swallows, and immediate cough/throat clear with thin liquids. Inconsistent throat clear noted on honey via tsp and cup sip. No change in respiratory rate observed. Additional trials deferred due to overt signs and symptoms of airway compromise with honey thick liquids. Recommend continue NPO with alternative means of nutrition/hydration.  Anticipate modified barium swallow study to be completed prior to beginning po diet; however, it is not indicated at this time due to overt signs and symptoms of airway compromise with thin and honey thick liquids at bedside. Results and recommendations communicated to patient, wife, and RN. Patient will benefit from continued skilled speech therapy services to address dysphagia. ?????? ? ? This section established at most recent assessment??????????  PROBLEM LIST (Impairments causing functional limitations):  1. Dysphagia  REHABILITATION POTENTIAL FOR STATED GOALS: Good  PLAN OF CARE:   Patient will benefit from skilled intervention to address the following impairments. RECOMMENDATIONS AND PLANNED INTERVENTIONS (Benefits and precautions of therapy have been discussed with the patient.):  · NPO  MEDICATIONS:  · Non-oral  COMPENSATORY STRATEGIES/MODIFICATIONS INCLUDING:  · Alternate liquids/solids  · Small sips and bites  OTHER RECOMMENDATIONS (including follow up treatment recommendations): · Family training/education  · Patient education  RECOMMENDED DIET MODIFICATIONS DISCUSSED WITH:  · Nursing  · Family  · Patient  FREQUENCY/DURATION: Continue to follow patient 3 times a week for duration of hospital stay to address above goals. RECOMMENDED REHABILITATION/EQUIPMENT: (at time of discharge pending progress):   Continue Skilled Therapy. SUBJECTIVE:   Alert. Communicating at a whisper. Wife at bedside. History of Present Injury/Illness: Mr. Amber Meadows  has a past medical history of Acute diastolic CHF (congestive heart failure) ([facility] [address] (9/15/2016); Arthritis; Chicken pox; Coronary artery disease (4/16/2014); DJD (degenerative joint disease); Emphysema; aortic valve replacement, mechanical; Hypercholesterolemia; Hypertension; Palpitations; Pneumonia; and Systolic CHF, acute ([facility] [address] (11/15/2016). Yamilet Mixon He also  has a past surgical history that includes aortic valve replacement (N/A, 2000) and heart catheterization (07/21/2004).    Present Symptoms: Coughing with thin liquids   Pain Intensity 1: 0  Pain Location 1: Back  Pain Orientation 1: Lower  Pain Intervention(s) 1: Medication (see MAR)  Current Medications:   No current facility-administered medications on file prior to encounter. Current Outpatient Prescriptions on File Prior to Encounter   Medication Sig Dispense Refill    gabapentin (NEURONTIN) 300 mg capsule Take 600 mg by mouth three (3) times daily.  glipiZIDE (GLUCOTROL) 10 mg tablet Take 10 mg by mouth two (2) times a day.  furosemide (LASIX) 40 mg tablet Take 1 Tab by mouth daily. 30 Tab 1    potassium chloride (K-DUR, KLOR-CON) 10 mEq tablet Take 1 Tab by mouth daily. 30 Tab 1    budesonide-formoterol (SYMBICORT) 160-4.5 mcg/actuation HFA inhaler Take 2 Puffs by inhalation.  Omeprazole delayed release (PRILOSEC D/R) 20 mg tablet Take 20 mg by mouth.  flunisolide (NASAREL) 25 mcg (0.025 %) spry 2 Sprays by Nasal route.  albuterol (PROVENTIL HFA) 90 mcg/actuation inhaler Take 2 Puffs by inhalation.  tiotropium (SPIRIVA) 18 mcg inhalation capsule Take 1 Puff by inhalation.  warfarin (COUMADIN) 5 mg tablet Take one 5 mg tablet daily (except 1-1/2 tablet on Wednesday 90 Tab 1    losartan (COZAAR) 100 mg tablet Take 1 Tab by mouth daily. 90 Tab 1    Hydrochlorothiazide 12.5 mg tablet Take 12.5 mg by mouth daily. 90 Tab 1    simvastatin (ZOCOR) 80 mg tablet Take 1 Tab by mouth nightly.  90 Tab 1     Current Dietary Status:  Regular/thin      Social History/Home Situation:    Home Environment: Private residence  # Steps to Enter: 3  Rails to Enter: Yes  Hand Rails : Bilateral  One/Two Story Residence: One story  Living Alone: No  Support Systems: Spouse/Significant Other/Partner, Child(devyn)  Patient Expects to be Discharged to[de-identified] Private residence  Current DME Used/Available at Home: Elstephayn Olivas, rollator  Tub or Shower Type: Tub/Shower combination  OBJECTIVE:   Respiratory Status:  Nasal cannula  2 l/min  CXR Results:Postsurgical change without acute abnormality  MRI/CT Results:MRI pending  Oral Motor Structure/Speech:  Carlos Pih Structure/Motor Speech  Labial: Impaired coordination  Dentition: Edentulous  Oral Hygiene: Dry  Lingual: Incoordinated    Cognitive and Communication Status:  Neurologic State: Alert  Orientation Level: Oriented to person;Oriented to place; Disoriented to time  Cognition: Follows commands  Perception: Appears intact  Perseveration: No perseveration noted  Safety/Judgement: Decreased insight into deficits    BEDSIDE SWALLOW EVALUATION  Oral Assessment:  Oral Assessment  Labial: Impaired coordination  Dentition: Edentulous  Oral Hygiene: Dry  Lingual: Incoordinated  P.O. Trials:  Patient Position: Upright in bed    The patient was given tsp-small bite amounts of the following:   Consistency Presented: Thin liquid;Honey thick liquid  How Presented: SLP-fed/presented;Spoon;Cup/sip    ORAL PHASE:  Bolus Acceptance: No impairment  Bolus Formation/Control: Impaired  Propulsion: Delayed (# of seconds)  Type of Impairment: Delayed; Incomplete  Oral Residue: None    PHARYNGEAL PHASE:  Initiation of Swallow: Delayed (# of seconds)  Laryngeal Elevation: Decreased;Weak  Aspiration Signs/Symptoms: Clear throat;Weak cough  Vocal Quality: Low volume;Hoarse (Speaking in whisper)           Pharyngeal Phase Characteristics: Multiple swallows; Poor endurance    OTHER OBSERVATIONS:  Rate/bite size: Impaired   Endurance:  Impaired   Comments:       Tool Used: Dysphagia Outcome and Severity Scale (KERRIE)    Score Comments   Normal Diet  [] 7 With no strategies or extra time needed   Functional Swallow  [] 6 May have mild oral or pharyngeal delay       Mild Dysphagia    [] 5 Which may require one diet consistency restricted (those who demonstrate penetration which is entirely cleared on MBS would be included)   Mild-Moderate Dysphagia  [] 4 With 1-2 diet consistencies restricted       Moderate Dysphagia  [] 3 With 2 or more diet consistencies restricted       Moderately Severe Dysphagia  [x] 2 With partial PO strategies (trials with ST only)       Severe Dysphagia  [] 1 With inability to tolerate any PO safely          Score:  Initial: 2   Most Recent: X (Date: -- )   Interpretation of Tool: The Dysphagia Outcome and Severity Scale (KERRIE) is a simple, easy-to-use, 7-point scale developed to systematically rate the functional severity of dysphagia based on objective assessment and make recommendations for diet level, independence level, and type of nutrition. Score 7 6 5 4 3 2 1   Modifier CH CI CJ CK CL CM CN   ? Swallowing:     - CURRENT STATUS: CM - 80%-99% impaired, limited or restricted    - GOAL STATUS:  CI - 1%-19% impaired, limited or restricted    - D/C STATUS:  ---------------To be determined---------------  [place] / [place] / Product Type: Medicare /     TREATMENT:    (In addition to Assessment/Re-Assessment sessions the following treatments were rendered)  Assessment/Reassessment only, no treatment provided today  MODALITIES:                                                                    ORAL MOTOR  EXERCISES:                                                                                                                                                                      LARYNGEAL / PHARYNGEAL EXERCISES:                                                                                                                                     __________________________________________________________________________________________________  Safety:   After treatment position/precautions:  · Upright in Bed    Progression/Medical Necessity:   · Patient is expected to demonstrate progress in swallow function, diet tolerance and swallow safety to improve swallow safety and work toward diet advancement. Compliance with Program/Exercises: Will assess as treatment progresses.    Reason for Continuation of Services/Other Comments:  · Patient continues to require skilled intervention due to dysphagia. Recommendations/Intent for next treatment session: \"Treatment next visit will focus on po trials, possible modified barium swallow study\".     Total Treatment Duration:  Time In: 1113  Time Out: 1134    ABBEY Elizalde, CCC-SLP, CBIS

## 2017-09-06 NOTE — PROGRESS NOTES
Daily Progress Note -- Hematology/ Medical Oncology        Patient Name: Flor Olson    Date of Visit: 2017  : 1945  Age:72 y.o. Initial consult HPI:   He has a history of mechanical AVR in . He continues on chronic anticoagulation with coumadin, chronic systolic CHF/ICM EF 11%, COPD, PVD, DM II, HTN, iron deficiency, and dyslipidemia who developed worsening dyspnea at the beginning of the week. He presented to the ER and was found to be in atrial flutter with RVR now status post cardioversion and amiodarone. Of note, his platelets were 00,332 on admission and 30,000 today. No other lab comparisons here but can see through care everywhere that his platelets were 72,276 at [facility] about a year ago. He has no recollection of ever being told he has low platelets. He takes iron daily for his history of PRATIK and reports black stools due to this. Hgb on admission was 10.2 and 12.7 today. We have been consulted for his thrombocytopenia    Interval history:  Platelets up to 64,559 today  - status post IVIG x 3, steroids. BMBx unable to be performed on  due to patient's condition.    No longer intubated - now on BiPAP    Medications:   Current Facility-Administered Medications   Medication Dose Route Frequency Provider Last Rate Last Dose    carvedilol (COREG) tablet 12.5 mg  12.5 mg Oral BID WITH MEALS Franklin Bey MD   12.5 mg at 17 0840    acetaZOLAMIDE (DIAMOX) tablet 250 mg  250 mg Per NG tube Jag Cristobal MD        methylPREDNISolone (PF) (SOLU-MEDROL) injection 40 mg  40 mg IntraVENous Q12H Angie Garg MD        warfarin (COUMADIN) tablet 3 mg  3 mg Oral QHS Franklin Bey MD   3 mg at 17 2105    midazolam (VERSED) injection 2 mg  2 mg IntraVENous Q2H PRN Angie Garg MD   2 mg at 17 0501    chlorhexidine (PERIDEX) 0.12 % mouthwash 15 mL  15 mL Oral BID Angie Garg MD   15 mL at 17 0866    morphine injection 2 mg  2 mg IntraVENous Q4H PRN Crissy Elizondo MD   2 mg at 09/06/17 0630    amiodarone (CORDARONE) tablet 400 mg  400 mg Oral Q12H Brandee Rendon MD   400 mg at 09/06/17 0855    insulin lispro (HUMALOG) injection   SubCUTAneous Q6H Jesse Thornton NP   2 Units at 09/06/17 1123    NUTRITIONAL SUPPORT ELECTROLYTE PRN ORDERS   Does Not Apply PRN Brielle Presley.  [address] Ledesma MD        famotidine (PF) (PEPCID) 20 mg in sodium chloride 0.9 % 10 mL injection  20 mg IntraVENous DAILY Hudson Omalley MD   20 mg at 09/06/17 0841    mupirocin (BACTROBAN) 2 % ointment   Both Nostrils BID Crissy Elizondo MD        0.9% sodium chloride infusion 250 mL  250 mL IntraVENous PRN Nunu Mendoza NP        acetaminophen (TYLENOL) suppository 650 mg  650 mg Rectal Q4H PRN Howard Alves MD   650 mg at 08/30/17 1555    sodium chloride (NS) flush 20 mL  20 mL InterCATHeter Cary Rodriguez MD   20 mL at 09/06/17 0505    heparin (porcine) pf 600 Units  600 Units InterCATHeter Q8H Hudson Omalley MD   600 Units at 09/06/17 0505    sodium chloride (NS) flush 20 mL  20 mL InterCATHeter PRN Hudson Omalley MD        heparin (porcine) pf 600 Units  600 Units InterCATHeter PRN Hudson Omalley MD        albuterol (PROVENTIL VENTOLIN) nebulizer solution 2.5 mg  2.5 mg Nebulization Q4H PRN Howard Alves MD   2.5 mg at 09/05/17 2010    sacubitril-valsartan (ENTRESTO) 24-26 mg tablet 1 Tab  1 Tab Oral Q12H Melinda Parish MD   1 Tab at 09/06/17 0840    dextrose (D50W) injection syrg 25 g  25 g IntraVENous PRN Melinda Parish MD   25 g at 08/25/17 1618    ferrous sulfate tablet 325 mg  1 Tab Oral DAILY WITH BREAKFAST Funez Heaven, NP   325 mg at 09/06/17 0840    acetaminophen (TYLENOL) tablet 650 mg  650 mg Oral Q6H PRN Melinda Parish MD   650 mg at 09/02/17 2129    gabapentin (NEURONTIN) capsule 600 mg  600 mg Oral TID Crissy Elizondo MD   600 mg at 09/06/17 0505    albuterol (PROVENTIL HFA, VENTOLIN HFA, PROAIR HFA) inhaler 2 Puff  2 Puff Inhalation Q4H PRN Jazmin Malloy MD   2 Puff at 08/30/17 1724    tiotropium [facility]) inhalation capsule 18 mcg  1 Cap Inhalation DAILY Jazmin Malloy MD   Stopped at 09/04/17 0900    sodium chloride (NS) flush 5-10 mL  5-10 mL IntraVENous Ava Estevez MD   10 mL at 09/06/17 0506    sodium chloride (NS) flush 5-10 mL  5-10 mL IntraVENous PRN Jazmin Malloy MD        nitroglycerin (NITROSTAT) tablet 0.4 mg  0.4 mg SubLINGual Q5MIN PRN Jazmin Malloy MD        [facility]ansetron [facility]) injection 4 mg  4 mg IntraVENous Q4H PRN Jazmin Malloy MD   4 mg at 09/03/17 1740    budesonide (PULMICORT) 500 mcg/2 ml nebulizer suspension  500 mcg Nebulization BID RT Jazmin Malloy MD   500 mcg at 09/06/17 0836    And    albuterol CONCENTRATE 2.5mg/0.5 mL neb soln  2.5 mg Nebulization Q6H RT Jazmin Malloy MD   2.5 mg at 09/06/17 0836       Allergies: Allergies   Allergen Reactions    Levaquin [Levofloxacin] Other (comments)     GI upset    Metformin Other (comments)     Gi upset       Review of Systems: The Review of Systems is documented in full in the internal medical record. All systems are negative other than for those noted above. Physical Examination:  General Appearance: Ill appearing patient in no acute distress. Vital signs:   Visit Vitals    /64    Pulse 80    Temp 97.8 °F (36.6 °C)    Resp 18    Ht 5' 6\" (1.676 m)    Wt 169 lb 12.1 oz (77 kg)    SpO2 94%    BMI 27.4 kg/m2     HEENT: No oral or pharyngeal masses. There is no ulceration or thrush. Lymph nodes: There is no cervical, supraclavicular, axillary adenopathy. Lungs: Diminished breath sounds bilaterally. On BiPAP. Heart: There is no jugular venous distention. Regular, irregular. Generalized edema. Abdomen: Soft, non-tender, bowel sounds present and normal, no appreciated hepatosplenomegaly. No palpable masses. Skin: No rash, petechiae or ecchymoses. No evidence of malignancy.    Neuro: Alert today with no obvious focal deficits. .     Labs/Imaging:  Lab Results   Component Value Date/Time    WBC 11.1 09/06/2017 04:20 AM    HGB 10.0 09/06/2017 04:20 AM    HCT 31.7 09/06/2017 04:20 AM    PLATELET 97 56/97/6093 04:20 AM    MCV 93.5 09/06/2017 04:20 AM       Lab Results   Component Value Date/Time    Sodium 137 09/06/2017 04:20 AM    Potassium 4.6 09/06/2017 04:20 AM    Chloride 94 09/06/2017 04:20 AM    CO2 38 09/06/2017 04:20 AM    Anion gap 5 09/06/2017 04:20 AM    Glucose 169 09/06/2017 04:20 AM    BUN 49 09/06/2017 04:20 AM    Creatinine 0.92 09/06/2017 04:20 AM    GFR est AA >60 09/06/2017 04:20 AM    GFR est non-AA >60 09/06/2017 04:20 AM    Calcium 9.4 09/06/2017 04:20 AM    AST (SGOT) 25 08/23/2017 01:05 PM    Alk. phosphatase 86 08/23/2017 01:05 PM    Protein, total 7.4 08/23/2017 01:05 PM    Albumin 3.6 08/23/2017 01:05 PM    Globulin 3.8 08/23/2017 01:05 PM    A-G Ratio 0.9 08/23/2017 01:05 PM    ALT (SGPT) 30 08/23/2017 01:05 PM           ASSESSMENT:  This gentleman has a thrombocytopenia which is isolated (normal RBC and WBC) that has been present for at least one year. The most likely diagnosis is ITP, valvular consumption and/or medications. The level of platelet count in and of itself is not concerning. It is the requirement for warfarin given his mechanical valve which creates difficulties when combined with this thrombocytopenia. His risk for bleeding is substantially increased especially with platelet counts below 50,000. Dr. Maria L Hussein was unable to be performed at bedside BMbx due to the decline in patient's condition on 9/2. He developed hypercapnic respiratiory failure requiring him to be placed on BiPAP, intubated for a brief period, now back on BiPAP. Platelets were down to 18,000 on 08/29 so he was given one unit of platelets. He is s/p IVIG x 3 and he continues IV steroids daily. Platelets up to 89,153 today from 79,000 yesterday.     PLAN:  - Presumed ITP:  9/6 Transfuse platelets as needed - keep above 50,000. Coumadin necessary due to mechanical heart valve. Continue daily CBC to monitor platelet count. BMBx can be done as outpatient or when out of ICU. Continue solumedrol 60mg daily.            Samantha Hidalgo NP  [facility]  [address] [address]  P [phone]

## 2017-09-06 NOTE — PROGRESS NOTES
9/6/2017 6:31 AM    Admit Date: 8/23/2017    Admit Diagnosis: Atrial flutter with rapid ventricular response (HCC)      Subjective:    Patient with low bp. On levophed. HR still an issue.  Coreg held due to hypotension    Objective:      Visit Vitals    BP (!) 147/91    Pulse (!) 119    Temp 97.3 °F (36.3 °C)    Resp 20    Ht 5' 6\" (1.676 m)    Wt 77 kg (169 lb 12.1 oz)    SpO2 97%    BMI 27.4 kg/m2       ROS:  General ROS: negative for - chills  Hematological and Lymphatic ROS: negative for - blood clots or jaundice  Respiratory ROS: no cough, shortness of breath, or wheezing  Cardiovascular ROS: no chest pain or dyspnea on exertion  Gastrointestinal ROS: no abdominal pain, change in bowel habits, or black or bloody stools  Neurological ROS: no TIA or stroke symptoms    Physical Exam:    Physical Examination: General appearance - alert, well appearing, and in no distress  Mental status - alert, oriented to person, place, and time  Eyes - pupils equal and reactive, extraocular eye movements intact  Neck/lymph - supple, no significant adenopathy  Chest/CV - clear to auscultation, no wheezes, rales or rhonchi, symmetric air entry  Heart - normal rate, regular rhythm, normal S1, S2, no murmurs, rubs, clicks or gallops  Abdomen/GI - soft, nontender, nondistended, no masses or organomegaly  Musculoskeletal - no joint tenderness, deformity or swelling  Extremities - peripheral pulses normal, no pedal edema, no clubbing or cyanosis  Skin - normal coloration and turgor, no rashes, no suspicious skin lesions noted    Current Facility-Administered Medications   Medication Dose Route Frequency    carvedilol (COREG) tablet 6.25 mg  6.25 mg Oral BID WITH MEALS    methylPREDNISolone (PF) (SOLU-MEDROL) injection 60 mg  60 mg IntraVENous Q12H    warfarin (COUMADIN) tablet 3 mg  3 mg Oral QHS    midazolam (VERSED) injection 2 mg  2 mg IntraVENous Q2H PRN    norepinephrine (LEVOPHED) 4 mg in dextrose 5% 250 mL infusion 2-16 mcg/min IntraVENous TITRATE    chlorhexidine (PERIDEX) 0.12 % mouthwash 15 mL  15 mL Oral BID    morphine injection 2 mg  2 mg IntraVENous Q4H PRN    amiodarone (CORDARONE) tablet 400 mg  400 mg Oral Q12H    insulin lispro (HUMALOG) injection   SubCUTAneous Q6H    NUTRITIONAL SUPPORT ELECTROLYTE PRN ORDERS   Does Not Apply PRN    cefTRIAXone (ROCEPHIN) 1 g in 0.9% sodium chloride (MBP/ADV) 50 mL  1 g IntraVENous Q24H    famotidine (PF) (PEPCID) 20 mg in sodium chloride 0.9 % 10 mL injection  20 mg IntraVENous DAILY    mupirocin (BACTROBAN) 2 % ointment   Both Nostrils BID    0.9% sodium chloride infusion 250 mL  250 mL IntraVENous PRN    dilTIAZem (CARDIZEM) 100 mg in 0.9% sodium chloride (MBP/ADV) 100 mL infusion  0-15 mg/hr IntraVENous TITRATE    acetaminophen (TYLENOL) suppository 650 mg  650 mg Rectal Q4H PRN    sodium chloride (NS) flush 20 mL  20 mL InterCATHeter Q8H    heparin (porcine) pf 600 Units  600 Units InterCATHeter Q8H    sodium chloride (NS) flush 20 mL  20 mL InterCATHeter PRN    heparin (porcine) pf 600 Units  600 Units InterCATHeter PRN    albuterol (PROVENTIL VENTOLIN) nebulizer solution 2.5 mg  2.5 mg Nebulization Q4H PRN    sacubitril-valsartan (ENTRESTO) 24-26 mg tablet 1 Tab  1 Tab Oral Q12H    dextrose (D50W) injection syrg 25 g  25 g IntraVENous PRN    ferrous sulfate tablet 325 mg  1 Tab Oral DAILY WITH BREAKFAST    acetaminophen (TYLENOL) tablet 650 mg  650 mg Oral Q6H PRN    gabapentin (NEURONTIN) capsule 600 mg  600 mg Oral TID    albuterol (PROVENTIL HFA, VENTOLIN HFA, PROAIR HFA) inhaler 2 Puff  2 Puff Inhalation Q4H PRN    tiotropium (SPIRIVA) inhalation capsule 18 mcg  1 Cap Inhalation DAILY    sodium chloride (NS) flush 5-10 mL  5-10 mL IntraVENous Q8H    sodium chloride (NS) flush 5-10 mL  5-10 mL IntraVENous PRN    nitroglycerin (NITROSTAT) tablet 0.4 mg  0.4 mg SubLINGual Q5MIN PRN    ondansetron (ZOFRAN) injection 4 mg  4 mg IntraVENous Q4H PRN  budesonide (PULMICORT) 500 mcg/2 ml nebulizer suspension  500 mcg Nebulization BID RT    And    albuterol CONCENTRATE 2.5mg/0.5 mL neb soln  2.5 mg Nebulization Q6H RT       Data Review:   @LABRCNT(Na,K,BUN,CREA,WBC,HGB,HCT,PLT,INR,TRP,TCHOL*,Triglyceride*,LDL*,LDLCPOC HDL*,HDL])@    TELEMETRY: atrial flutter    Assessment/Plan:     Principal Problem:    Atrial flutter with rapid ventricular response ([address] (8/23/2017) difficult to control. May try higher dose of amiodarone    Active Problems:    Anticoagulant long-term use (8/23/2017)      HTN (hypertension), benign (8/23/2017)now low. On pressors      Peripheral vascular disease ([address] (8/23/2017)      Dyslipidemia (8/23/2017)The current medical regimen is effective;  continue present plan and medications. History of mechanical aortic valve replacement (8/23/2017)The current medical regimen is effective;  continue present plan and medications. Overview: Placement 2000, on chronicCoumadin      Centrilobular emphysema ([address] (8/23/2017)      Ischemic cardiomyopathy (8/23/2017)      Overview: 8/23/17 ECHO:      EF 40 % to 45 %. There were no regional wall motion abnormalities. Moderate hypokinesis of the mid-apical anterior and apical wall(s). Thrombocytopenia ([address] (8/26/2017)      Acute respiratory failure with hypercapnia (HCC) (8/29/2017)      History of tobacco use (8/29/2017)      Hyponatremia (8/29/2017)      Acute encephalopathy (8/29/2017)      COPD (chronic obstructive pulmonary disease) ([address] (8/30/2017)    Coreg increased due to tachycardia.  bp should tolerate      Renan Hernandez MD

## 2017-09-06 NOTE — PROGRESS NOTES
Physical Therapy Note:    Participated in interdisciplinary rounds in ICU and chart reviewed. Patient is experiencing decrease in function from baseline. Patient would benefit from skilled acute therapy to increase independence with self care/ADLs, strength, endurance, and functional mobility. Recommend PT/OT consult when medically stable and MD agrees.     Thank you for your consideration,  DEXTER MillsT

## 2017-09-06 NOTE — INTERDISCIPLINARY ROUNDS
Interdisciplinary team rounds were held 9/6/2017 with the following team members:Care Management, Nursing, Nurse Practitioner, Nutrition, Palliative Care, Pastoral Care, Pharmacy, Physical Therapy, Physician, Respiratory Therapy, Speech Therapy and Clinical Coordinator and the patient. Plan of care discussed. See clinical pathway and/or care plan for interventions and desired outcomes.

## 2017-09-06 NOTE — PROGRESS NOTES
Hospitalist Progress Note     Admit Date:  2017  1:11 PM   Name:  Trinidad Snow   Age:  67 y.o.  :  1945   MRN:  [id number]   PCP:  Elias Santana MD  Treatment Team: Attending Provider: Yamile Carver MD; Consulting Provider: Yamile Carver MD; Utilization Review: Art Baugh; Consulting Provider: Rufina Alvarado MD; Consulting Provider: Ciera Redding MD; Care Manager: Chris Gillis; Consulting Provider: Sherron Cardenas MD; Consulting Provider: Leslie Malin MD    Subjective:     Aidan Davenport is a 68 yo male who presented with dyspnea on a background of mechanical AVR in , chronic anticoagulation with coumadin, chronic systolic CHF/ICM EF 24%, COPD, PVD, DM II, HTN, iron deficiency, and dyslipidemia. He is currently being treated for hypercapnic respiratory failure and atrial flutter. This morning, he denied the following: cough, fever or chills. He reported dyspnea.          Objective:     Patient Vitals for the past 24 hrs:   Temp Pulse Resp BP SpO2   17 1829 - 78 17 118/58 100 %   17 1803 - 78 24 111/58 98 %   17 1729 - 72 16 124/57 95 %   17 1659 - 61 17 117/53 96 %   17 1629 - 84 17 103/68 98 %   17 1620 97.3 °F (36.3 °C) (!) 101 18 108/65 98 %   17 1600 - (!) 113 - 121/74 98 %   17 1535 - (!) 102 17 - 100 %   17 1530 - (!) 104 18 - 100 %   17 1529 - (!) 102 20 133/76 (!) 87 %   17 1459 - 78 17 126/70 98 %   17 1429 - 78 18 128/74 100 %   17 1414 - - - - 96 %   17 1400 - 77 19 131/71 100 %   17 1329 - 78 19 131/75 99 %   17 1259 - 78 19 141/68 99 %   17 1229 - 74 20 131/63 100 %   17 1159 - 78 18 124/66 100 %   17 1130 - 77 19 144/73 100 %   17 1105 97.8 °F (36.6 °C) 80 18 115/64 94 %   17 1029 - 81 18 117/74 97 %   17 0959 - 100 19 104/67 95 %   17 0929 - 75 16 107/71 95 %   17 0859 - (!) 114 21 122/63 96 %   17 0836 - - - - 96 %   09/06/17 0830 - (!) 110 18 131/76 97 %   09/06/17 0759 - (!) 115 (!) 69 144/81 94 %   09/06/17 0729 97.8 °F (36.6 °C) (!) 114 22 (!) 136/91 93 %   09/06/17 0659 - (!) 116 20 148/76 93 %   09/06/17 0634 - (!) 119 (!) 39 (!) 159/92 93 %   09/06/17 0544 - (!) 119 20 (!) 147/91 97 %   09/06/17 0500 - 91 19 149/67 97 %   09/06/17 0429 - (!) 101 19 139/81 97 %   09/06/17 0359 - 80 20 (!) 140/94 97 %   09/06/17 0329 - 81 17 150/75 98 %   09/06/17 0328 97.3 °F (36.3 °C) - - - -   09/06/17 0301 - 81 17 134/61 98 %   09/06/17 0029 - 69 17 121/59 96 %   09/06/17 0001 - 83 18 142/76 98 %   09/05/17 2345 97.6 °F (36.4 °C) - - - -   09/05/17 2329 - 76 16 122/59 100 %   09/05/17 2259 - 72 17 111/57 100 %   09/05/17 2229 - 79 19 123/58 100 %   09/05/17 2159 - 78 18 112/67 98 %   09/05/17 2131 - 92 20 122/61 100 %   09/05/17 2059 - (!) 101 20 142/63 99 %   09/05/17 2033 - 99 22 153/72 (!) 89 %   09/05/17 1959 - (!) 104 23 135/65 (!) 89 %   09/05/17 1930 - 72 20 133/58 99 %   09/05/17 1859 97.9 °F (36.6 °C) 90 21 123/76 93 %     Oxygen Therapy  O2 Sat (%): 100 % (09/06/17 1829)  Pulse via Oximetry: 79 beats per minute (09/06/17 1829)  O2 Device: Nasal cannula (09/06/17 1620)  O2 Flow Rate (L/min): 2 l/min (09/06/17 1620)  O2 Temperature: 87.6 °F (30.9 °C) (09/04/17 1140)  FIO2 (%): 28 % (09/06/17 0315)    Intake/Output Summary (Last 24 hours) at 09/06/17 1835  Last data filed at 09/06/17 1717   Gross per 24 hour   Intake           1408.5 ml   Output             2450 ml   Net          -1041.5 ml           General appearance: NAD, conversant  Eyes: anicteric sclerae, moist conjunctivae; no lid-lag  ENT: Atraumatic; oropharynx clear with moist mucous membranes and no mucosal ulcerations; normal hard and soft palate  Neck: Trachea midline   FROM, supple, no thyromegaly or lymphadenopathy  Lungs: decreased breath sounds  CV: S1,S2 present, no added murmurs  Abdomen: Soft, non-tender; no masses   Extremities: No peripheral edema or extremity lymphadenopathy  Skin: Normal temperature, turgor and texture; no subcutaneous nodules  Psych: Appropriate affect, alert and oriented to person, place and time    Data Review:  I have reviewed all labs, meds, telemetry events, and studies from the last 24 hours. Recent Results (from the past 24 hour(s))   GLUCOSE, POC    Collection Time: 09/05/17  6:37 PM   Result Value Ref Range    Glucose (POC) 189 (H) 65 - 100 mg/dL   GLUCOSE, POC    Collection Time: 09/05/17 11:34 PM   Result Value Ref Range    Glucose (POC) 159 (H) 65 - 100 mg/dL   PROTHROMBIN TIME + INR    Collection Time: 09/06/17  4:20 AM   Result Value Ref Range    Prothrombin time 14.7 (H) 9.6 - 12.0 sec    INR 1.3 (H) 0.9 - 1.2     METABOLIC PANEL, BASIC    Collection Time: 09/06/17  4:20 AM   Result Value Ref Range    Sodium 137 136 - 145 mmol/L    Potassium 4.6 3.5 - 5.1 mmol/L    Chloride 94 (L) 98 - 107 mmol/L    CO2 38 (H) 21 - 32 mmol/L    Anion gap 5 (L) 7 - 16 mmol/L    Glucose 169 (H) 65 - 100 mg/dL    BUN 49 (H) 8 - 23 MG/DL    Creatinine 0.92 0.8 - 1.5 MG/DL    GFR est AA >60 >60 ml/min/1.73m2    GFR est non-AA >60 >60 ml/min/1.73m2    Calcium 9.4 8.3 - 10.4 MG/DL   CBC WITH AUTOMATED DIFF    Collection Time: 09/06/17  4:20 AM   Result Value Ref Range    WBC 11.1 4.3 - 11.1 K/uL    RBC 3.39 (L) 4.23 - 5.67 M/uL    HGB 10.0 (L) 13.6 - 17.2 g/dL    HCT 31.7 (L) 41.1 - 50.3 %    MCV 93.5 79.6 - 97.8 FL    MCH 29.5 26.1 - 32.9 PG    MCHC 31.5 31.4 - 35.0 g/dL    RDW 15.9 (H) 11.9 - 14.6 %    PLATELET 97 (L) 434 - 450 K/uL    MPV 11.7 10.8 - 14.1 FL    DF AUTOMATED      NEUTROPHILS 94 (H) 43 - 78 %    LYMPHOCYTES 4 (L) 13 - 44 %    MONOCYTES 2 (L) 4.0 - 12.0 %    EOSINOPHILS 0 (L) 0.5 - 7.8 %    BASOPHILS 0 0.0 - 2.0 %    IMMATURE GRANULOCYTES 0.3 0.0 - 5.0 %    ABS. NEUTROPHILS 10.4 (H) 1.7 - 8.2 K/UL    ABS. LYMPHOCYTES 0.5 0.5 - 4.6 K/UL    ABS. MONOCYTES 0.2 0.1 - 1.3 K/UL    ABS.  EOSINOPHILS 0.0 0.0 - 0.8 K/UL ABS. BASOPHILS 0.0 0.0 - 0.2 K/UL    ABS. IMM. GRANS. 0.0 0.0 - 0.5 K/UL   GLUCOSE, POC    Collection Time: 09/06/17  5:11 AM   Result Value Ref Range    Glucose (POC) 198 (H) 65 - 100 mg/dL   GLUCOSE, POC    Collection Time: 09/06/17 11:10 AM   Result Value Ref Range    Glucose (POC) 158 (H) 65 - 100 mg/dL   GLUCOSE, POC    Collection Time: 09/06/17  5:08 PM   Result Value Ref Range    Glucose (POC) 177 (H) 65 - 100 mg/dL        All Micro Results     Procedure Component Value Units Date/Time    CULTURE, BLOOD [[id number]] Collected:  08/25/17 1600    Order Status:  Completed Specimen:  Blood from Blood Updated:  08/30/17 0643     Special Requests: LEFT HAND        Culture result: NO GROWTH 5 DAYS       CULTURE, BLOOD [[id number]] Collected:  08/25/17 2145    Order Status:  Completed Specimen:  Blood from Blood Updated:  08/30/17 0643     Special Requests: RIGHT HAND        Culture result: NO GROWTH 5 DAYS       MRSA SCREEN - PCR (NASAL) [[id number]]  (Abnormal) Collected:  08/29/17 1556    Order Status:  Completed Specimen:  Nasal from Nasal Swab Updated:  08/29/17 1900     Special Requests: NO SPECIAL REQUESTS        Culture result:         MRSA target DNA is detected (presumptive positive for MRSA colonization).  (A)            RESULTS VERIFIED, PHONED TO AND READ BACK BY  DAMARIS GARNICA RN ON 08/29/2017 AT 1851 R      CULTURE, URINE [[id number]] Collected:  08/25/17 2011    Order Status:  Completed Specimen:  Urine from Clean catch Updated:  08/28/17 0653     Special Requests: NO SPECIAL REQUESTS        Culture result:         [id number] COLONIES/mL MIXED SKIN VANNESSA ISOLATED          Current Meds:  Current Facility-Administered Medications   Medication Dose Route Frequency    carvedilol (COREG) tablet 12.5 mg  12.5 mg Oral BID WITH MEALS    methylPREDNISolone (PF) (SOLU-MEDROL) injection 40 mg  40 mg IntraVENous Q12H    warfarin (COUMADIN) tablet 3 mg  3 mg Oral QHS    midazolam (VERSED) injection 2 mg  2 mg IntraVENous Q2H PRN    chlorhexidine (PERIDEX) 0.12 % mouthwash 15 mL  15 mL Oral BID    morphine injection 2 mg  2 mg IntraVENous Q4H PRN    amiodarone (CORDARONE) tablet 400 mg  400 mg Oral Q12H    insulin lispro (HUMALOG) injection   SubCUTAneous Q6H    NUTRITIONAL SUPPORT ELECTROLYTE PRN ORDERS   Does Not Apply PRN    famotidine (PF) (PEPCID) 20 mg in sodium chloride 0.9 % 10 mL injection  20 mg IntraVENous DAILY    mupirocin (BACTROBAN) 2 % ointment   Both Nostrils BID    0.9% sodium chloride infusion 250 mL  250 mL IntraVENous PRN    acetaminophen (TYLENOL) suppository 650 mg  650 mg Rectal Q4H PRN    sodium chloride (NS) flush 20 mL  20 mL InterCATHeter Q8H    heparin (porcine) pf 600 Units  600 Units InterCATHeter Q8H    sodium chloride (NS) flush 20 mL  20 mL InterCATHeter PRN    heparin (porcine) pf 600 Units  600 Units InterCATHeter PRN    albuterol (PROVENTIL VENTOLIN) nebulizer solution 2.5 mg  2.5 mg Nebulization Q4H PRN    sacubitril-valsartan (ENTRESTO) 24-26 mg tablet 1 Tab  1 Tab Oral Q12H    dextrose (D50W) injection syrg 25 g  25 g IntraVENous PRN    ferrous sulfate tablet 325 mg  1 Tab Oral DAILY WITH BREAKFAST    acetaminophen (TYLENOL) tablet 650 mg  650 mg Oral Q6H PRN    gabapentin (NEURONTIN) capsule 600 mg  600 mg Oral TID    albuterol (PROVENTIL HFA, VENTOLIN HFA, PROAIR HFA) inhaler 2 Puff  2 Puff Inhalation Q4H PRN    tiotropium (SPIRIVA) inhalation capsule 18 mcg  1 Cap Inhalation DAILY    sodium chloride (NS) flush 5-10 mL  5-10 mL IntraVENous Q8H    sodium chloride (NS) flush 5-10 mL  5-10 mL IntraVENous PRN    nitroglycerin (NITROSTAT) tablet 0.4 mg  0.4 mg SubLINGual Q5MIN PRN    ondansetron (ZOFRAN) injection 4 mg  4 mg IntraVENous Q4H PRN    budesonide (PULMICORT) 500 mcg/2 ml nebulizer suspension  500 mcg Nebulization BID RT    And    albuterol CONCENTRATE 2.5mg/0.5 mL neb soln  2.5 mg Nebulization Q6H RT       Other Studies (last 24 hours):   No results found. Assessment and Plan:     Hospital Problems as of 9/6/2017  Date Reviewed: 9/1/2017          Codes Class Noted - Resolved POA    COPD (chronic obstructive pulmonary disease) (HCC) (Chronic) ICD-10-CM: J44.9  ICD-9-CM: 630  8/30/2017 - Present Yes        Acute respiratory failure with hypercapnia ([facility] [address] ICD-10-CM: J96.02  ICD-9-CM: 518.81  8/29/2017 - Present No        History of tobacco use (Chronic) ICD-10-CM: A67.615  ICD-9-CM: V15.82  8/29/2017 - Present Yes        Hyponatremia ICD-10-CM: E87.1  ICD-9-CM: 276.1  8/29/2017 - Present Yes        Acute encephalopathy ICD-10-CM: G93.40  ICD-9-CM: 348.30  8/29/2017 - Present Yes        Thrombocytopenia ([facility] ICD-10-CM: D69.6  ICD-9-CM: 287.5  8/26/2017 - Present Yes        Anticoagulant long-term use (Chronic) ICD-10-CM: Z79.01  ICD-9-CM: V58.61  8/23/2017 - Present Yes        HTN (hypertension), benign (Chronic) ICD-10-CM: I10  ICD-9-CM: 401.1  8/23/2017 - Present Yes        Peripheral vascular disease ([facility] [address] (Chronic) ICD-10-CM: I73.9  ICD-9-CM: 443.9  8/23/2017 - Present Yes        Dyslipidemia (Chronic) ICD-10-CM: E78.5  ICD-9-CM: 272.4  8/23/2017 - Present Yes        History of mechanical aortic valve replacement (Chronic) ICD-10-CM: Z95.2  ICD-9-CM: V43.3  8/23/2017 - Present Yes    Overview Signed 8/30/2017 10:30 AM by João Gant NP     Placement 2000, on chronicCoumadin             Centrilobular emphysema ([facility] [address] (Chronic) ICD-10-CM: J43.2  ICD-9-CM: 492.8  8/23/2017 - Present Yes        Ischemic cardiomyopathy (Chronic) ICD-10-CM: I25.5  ICD-9-CM: 414.8  8/23/2017 - Present Yes    Overview Signed 8/30/2017 10:29 AM by João Gant NP     8/23/17 ECHO:  EF 40 % to 45 %. There were no regional wall motion abnormalities. Moderate hypokinesis of the mid-apical anterior and apical wall(s).              * (Principal)Atrial flutter with rapid ventricular response ([address] ICD-10-CM: I48.92  ICD-9-CM: 427.32  8/23/2017 - Present Yes              PLAN: Atrial fibrillation with RVR  S/p Cardioversion and amiodarone  Plan  Continue amiodarone, Coumadin     Acute COPD exacerbation  +wheezing   Decreased O2 requirements  Plan  Continue albuterol, pulmicort, prednisone   Continue ceftriaxone (day 7/7)  Continue steroids  Pulmonology on board    Thrombocytopenia  Daily CBC     Chronic systolic CHF  Continue Entresto, Carvedilol     ITP  Plan  Continue solumedrol IV        DC planning/Dispo:  home in 48 hours  DVT ppx:  Coumadin    Signed:  Robert Waller MD

## 2017-09-06 NOTE — PROGRESS NOTES
Bedside shift change report given to Silvia Pro, RN and Nola RN (oncoming nurse) by Dewayne Farris RN (offgoing nurse). Report included the following information SBAR, Kardex, ED Summary, Procedure Summary, Intake/Output, MAR, Recent Results and Cardiac Rhythm atrial flutter/atrial fib.

## 2017-09-06 NOTE — PROGRESS NOTES
Bedside shift change report given to Jairo Bosch RN (oncoming nurse) by Apollo Arroyo RN and Nola RN (offgoing nurse). Report included the following information SBAR, Kardex, ED Summary, Procedure Summary, Intake/Output, MAR, Recent Results and Cardiac Rhythm a-fib/a-flutter.

## 2017-09-06 NOTE — PROGRESS NOTES
Spiritual Care visit. Follow up visit with patient and his wife at bedside.     Visit by Leona Tineo M.Ed., Th.B. ,Staff

## 2017-09-06 NOTE — PROGRESS NOTES
Problem: Mobility Impaired (Adult and Pediatric)  Goal: *Acute Goals and Plan of Care (Insert Text)  STG: Updatd 9/6/17  (1.)Mr. Maria Elena Vogel will move from supine to sit and sit to supine , scoot up and down and roll side to side with CONTACT GUARD ASSIST within 3 day(s). (2.)Mr. Maria Elena Vogel will transfer from bed to chair and chair to bed with CONTACT GUARD ASSIST using the least restrictive device within 3 day(s). (3.)Mr. Maria Elena Vogel will ambulate with CONTACT GUARD ASSIST for 100 feet with the least restrictive device within 3 day(s). LTG:Updated 9/6/17  (1.)Mr. Maria Elena Vogel will move from supine to sit and sit to supine , scoot up and down and roll side to side in bed with SUPERVISION within 7 day(s). (2.)Mr. Maria Elena Vogel will transfer from bed to chair and chair to bed with STAND BY ASSIST using the least restrictive device within 7 day(s). (3.)Mr. Maria Elena Vogel will ambulate with STAND BY ASSIST for 250+ feet with the least restrictive device within 7 day(s). ________________________________________________________________________________________________      PHYSICAL THERAPY: Re-evaluation, Treatment Day: Day of Assessment and PM 9/6/2017  INPATIENT: Hospital Day: 15  [place] / Product Type: Medicare /      NAME/AGE/GENDER: Jesenia Krishna is a 67 y.o. male     PRIMARY DIAGNOSIS: Atrial flutter with rapid ventricular response (HCC) Atrial flutter with rapid ventricular response (HCC) Atrial flutter with rapid ventricular response ([address]        ICD-10: Treatment Diagnosis:       · Generalized Muscle Weakness (M62.81)  · Difficulty in walking, Not elsewhere classified (R26.2)   Precaution/Allergies:  Levaquin [levofloxacin] and Metformin       ASSESSMENT:      Mr. Maria Elena Vogel is supine in bed upon contact and agreeable to PT re-evaluation with wife at bedside. Pt reports 0/10 pain prior to activity. Pt is [place] with transition supine to sit EOB with good static sitting balance.  Pt reports some increased dizziness with first coming to sitting position and drop in O2 sat which improved with prolonged sitting. O2 sat increased with pursed lip breathing technique. Pt performed sit to stand to RW with CGA-[place] this session. Pt able to tolerate standing for a few minutes before needing to return to sitting secondary to weakness. Pt stood again with CGA-[place] and able to take lateral steps at EOB towards HOB. Pt returned to sitting requiring max A to scoot back into bed and returned to supine in bed with [place]-modA. Pt O2 sat fluctuated between 87% to 92% throughout activity. Pt performed exercises while supine in bed requiring VC and TC. Pt only able to tolerate up to 10 repetitions of exercises and unable to finish performing heel slides on R LE secondary to fatigue at end of session. Pt left supine in bed with O2 sat at 98% with wife at bedside with all needs met and within reach. Nursing notified. Goals updated as seen above and will continued to benefit from skilled PT interventions. Will continue efforts. This section established at most recent assessment   PROBLEM LIST (Impairments causing functional limitations):  1. Decreased Strength  2. Decreased ADL/Functional Activities  3. Decreased Transfer Abilities  4. Decreased Ambulation Ability/Technique  5. Decreased Balance  6. Decreased Activity Tolerance  7. Decreased Pacing Skills  8. Increased Fatigue  9. Increased Shortness of Breath  10. Decreased [place] with Home Exercise Program    INTERVENTIONS PLANNED: (Benefits and precautions of physical therapy have been discussed with the patient.)  1. Balance Exercise  2. Bed Mobility  3. Family Education  4. Gait Training  5. Home Exercise Program (HEP)  6. Range of Motion (ROM)  7. Therapeutic Activites  8. Therapeutic Exercise/Strengthening  9. Transfer Training  10.  Group Therapy      TREATMENT PLAN: Frequency/Duration: 3 times a week for duration of hospital stay  Rehabilitation Potential For Stated Goals: FAIR      RECOMMENDED REHABILITATION/EQUIPMENT: (at time of discharge pending progress): Continue Skilled Therapy. HISTORY:   History of Present Injury/Illness (Reason for Referral):  See H&P below  Ambreen Duran is a 67 y.o. WM with h/o mechanical AVR 2000 on chronic AC with coumadin, chronic systolic CHF/ICM EF 40%, COPD, PVD, DM II, HTN and dyslipidemia who developed worsening dyspnea on monday 8/21. He presented to the hospital today reporting worsening SOB and was found to be in atrial flutter with 's/ He was given Cardizem 10 mg IV bolus followed by IV drip 5 mg/hr. He is still in 140's with lower BP and [address] Cardiology was called to evaluate for admission. Pt reports his pulse oximetry monitor showed  on Monday and remained high until today. He felt he had COPD exacerbation and treated it with repeat inhaler and nebulizer treatments. He also reports h/o PRATIK with iron started last year by the [place]. He has been having dark stools but yesterday had rapid onset of uncontrollable copious amount of diarrhea that was black. CBC still pending. He reportedly had a LHC prior to AVR in 2000 that showed no CAD. He had nuclear stress test 11/2016 showing EF 31%, large inferolateral and anteroapical scar. Past Medical History/Comorbidities:   Mr. Emmett Allison  has a past medical history of Acute diastolic CHF (congestive heart failure) ([facility] (9/15/2016); Arthritis; Chicken pox; Coronary artery disease (4/16/2014); DJD (degenerative joint disease); Emphysema; aortic valve replacement, mechanical; Hypercholesterolemia; Hypertension; Palpitations; Pneumonia; and Systolic CHF, acute ([address] (11/15/2016). Mr. Emmett Allison  has a past surgical history that includes aortic valve replacement (N/A, 2000) and heart catheterization (07/21/2004).   Social History/Living Environment:   Home Environment: Private residence  # Steps to Enter: 3  Rails to Enter: Yes  [employer] : Bilateral  One/Two Story Residence: One story  Living Alone: No  Support Systems: Spouse/Significant Other/Partner, Child(devyn)  Patient Expects to be Discharged to[de-identified] Private residence  Current DME Used/Available at Home: Walker, rollator  Tub or Shower Type: Tub/Shower combination  Prior Level of Function/Work/Activity:  Lives with wife, indep with gait and ADLs, 0 falls, driving   Number of Personal Factors/Comorbidities that affect the Plan of Care: 3+: HIGH COMPLEXITY   EXAMINATION:   Most Recent Physical Functioning:   Gross Assessment:  AROM: Generally decreased, functional  Strength: Generally decreased, functional  Coordination: Generally decreased, functional  Sensation: Intact               Posture:     Balance:  Sitting - Static: Good (unsupported)  Sitting - Dynamic: Fair (occasional)  Standing - Static: Fair;Constant support  Standing - Dynamic : Fair Bed Mobility:  Supine to Sit: Minimum assistance  Sit to Supine: Minimum assistance  Scooting: Maximum assistance  Wheelchair Mobility:     Transfers:  Sit to Stand: Contact guard assistance;Minimum assistance  Stand to Sit: Contact guard assistance  Gait:     Base of Support: Narrowed  Speed/Evelin: Slow  Step Length: Left shortened;Right shortened (lateral steps EOB)  Gait Abnormalities: Decreased step clearance;Trunk sway increased  Distance (ft): 5 Feet (ft)  Assistive Device: Walker, rolling  Ambulation - Level of Assistance: Contact guard assistance  Interventions: Safety awareness training; Tactile cues; Verbal cues       Body Structures Involved:  1. Nerves  2. Heart  3. Lungs  4. Bones  5. Joints  6. Muscles Body Functions Affected:  1. Cardio  2. Respiratory  3. Neuromusculoskeletal  4. Movement Related Activities and Participation Affected:  1. General Tasks and Demands  2. Mobility  3. Self Care  4. Domestic Life  5. Interpersonal Interactions and Relationships  6.  Community, Social and [place]   Number of elements that affect the Plan of Care: 4+: HIGH COMPLEXITY   CLINICAL PRESENTATION:   Presentation: Evolving clinical presentation with changing clinical characteristics: MODERATE COMPLEXITY   CLINICAL DECISION MAKIN [facility] Mobility Inpatient Short Form  How much difficulty does the patient currently have. .. Unable A Lot A Little None   1. Turning over in bed (including adjusting bedclothes, sheets and blankets)? [ ] 1   [ ] 2   [X] 3   [ ] 4   2. Sitting down on and standing up from a chair with arms ( e.g., wheelchair, bedside commode, etc.)   [ ] 1   [ ] 2   [X] 3   [ ] 4   3. Moving from lying on back to sitting on the side of the bed? [ ] 1   [ ] 2   [X] 3   [ ] 4   How much help from another person does the patient currently need. .. Total A Lot A Little None   4. Moving to and from a bed to a chair (including a wheelchair)? [ ] 1   [x ] 2   [] 3   [ ] 4   5. Need to walk in hospital room? [ ] 1   [X] 2   [ ] 3   [ ] 4   6. Climbing 3-5 steps with a railing? [ ] 1   [X] 2   [ ] 3   [ ] 4   © , Trustees of [address] [place] [address], under license to [employer]. All rights reserved    Score:  Initial: 15 Most Recent: X (Date: -- )     Interpretation of Tool:  Represents activities that are increasingly more difficult (i.e. Bed mobility, Transfers, Gait).        Score 24 23 22-20 19-15 14-10 9-7 6       Modifier CH CI CJ CK CL CM CN         · Mobility - Walking and Moving Around:               - CURRENT STATUS:    CK - 40%-59% impaired, limited or restricted               - GOAL STATUS:           CJ - 20%-39% impaired, limited or restricted               - D/C STATUS:                       ---------------To be determined---------------  [place] / [place] / Product Type: Medicare /       Medical Necessity:     · Patient is expected to demonstrate progress in strength, balance, coordination and functional technique to decrease assistance required with gait, transfers, and functional mobility. Reason for Services/Other Comments:  · Patient continues to require skilled intervention due to decreased strength, decreased balance, decreased functional tolerance, decreased cardiopulmonary endurance affecting participation in basic ADLs and functional tasks. Use of outcome tool(s) and clinical judgement create a POC that gives a: Questionable prediction of patient's progress: MODERATE COMPLEXITY                 TREATMENT:   (In addition to Assessment/Re-Assessment sessions the following treatments were rendered)   Pre-treatment Symptoms/Complaints:  weakness  Pain: Initial:   Pain Intensity 1: 0  Post Session:  Weakness, fatigue      Therapeutic Activity: (   ):  Therapeutic activities including Bed transfers, Chair transfers and Ambulation on level ground to improve mobility, strength, balance and coordination. Required minimal Safety awareness training; Tactile cues; Verbal cues to promote static and dynamic balance in standing and promote coordination of bilateral, upper extremity(s), lower extremity(s). Therapeutic Exercise: (8 Minutes):  Exercises per grid below to improve mobility and strength. Required mod-max visual, verbal and tactile cues to promote proper body alignment and promote proper body mechanics. Progressed range and repetitions as indicated. Date:  8/25/17 Date:   8/28/17 Date:  9/6/18    Activity/Exercise Parameters Parameters Parameters   LAQ 10 X B  2 x 20 B     Alternating marches 10 X B       Ankle pumps 10 X B   2 X 20 B  10 X B   Quad set 10 X B    10 X B   Glute set 10 X     10 X   Heel slides 10 X B    10 X L   Hip abduction 10 X B 1 x 20 B    Heel taps  2 x 20 B    Toe taps  1 x 20 B    Marching  2 x 20 B                           Braces/Orthotics/Lines/Etc:   · roe catheter, nasogastric tube and O2 NC  Treatment/Session Assessment:    · Response to Treatment:  See above.   · Interdisciplinary Collaboration:  · Physical Therapist and Registered Nurse  · After treatment position/precautions:  · Supine in bed, Bed/Chair-wheels locked, Call light within reach, RN notified and Family at bedside  · Compliance with Program/Exercises: Will assess as treatment progresses. · Recommendations/Intent for next treatment session: \"Next visit will focus on advancements to more challenging activities and reduction in assistance provided\".   Total Treatment Duration:  PT Patient Time In/Time Out  Time In: 1534  Time Out: [address] [place]

## 2017-09-06 NOTE — PROGRESS NOTES
Critical Care Daily Progress Note: 9/6/2017    Tona Bill   Admission Date: 8/23/2017         The patient's chart is reviewed and the patient is discussed with the staff.    67 y.o.  CM with h/o severe COPD (baseline bicarb 37/PCO2 65), ITP with thrombocytopenia, s/p mechanical AVR 2000, atrial flutter who was admitted on 8/23 with rapid a. Flutter.   His course has been complicated by hyponatremia, confusion, hypercarbic respiratory failure. Subjective: Tolerated extubation well and was able to wean to nasal cannula O2. Is off levophed today and has been since yesterday morning.   Rehab services reengaged      Current Facility-Administered Medications   Medication Dose Route Frequency    carvedilol (COREG) tablet 12.5 mg  12.5 mg Oral BID WITH MEALS    methylPREDNISolone (PF) (SOLU-MEDROL) injection 60 mg  60 mg IntraVENous Q12H    warfarin (COUMADIN) tablet 3 mg  3 mg Oral QHS    midazolam (VERSED) injection 2 mg  2 mg IntraVENous Q2H PRN    norepinephrine (LEVOPHED) 4 mg in dextrose 5% 250 mL infusion  2-16 mcg/min IntraVENous TITRATE    chlorhexidine (PERIDEX) 0.12 % mouthwash 15 mL  15 mL Oral BID    morphine injection 2 mg  2 mg IntraVENous Q4H PRN    amiodarone (CORDARONE) tablet 400 mg  400 mg Oral Q12H    insulin lispro (HUMALOG) injection   SubCUTAneous Q6H    NUTRITIONAL SUPPORT ELECTROLYTE PRN ORDERS   Does Not Apply PRN    cefTRIAXone (ROCEPHIN) 1 g in 0.9% sodium chloride (MBP/ADV) 50 mL  1 g IntraVENous Q24H    famotidine (PF) (PEPCID) 20 mg in sodium chloride 0.9 % 10 mL injection  20 mg IntraVENous DAILY    mupirocin (BACTROBAN) 2 % ointment   Both Nostrils BID    0.9% sodium chloride infusion 250 mL  250 mL IntraVENous PRN    dilTIAZem (CARDIZEM) 100 mg in 0.9% sodium chloride (MBP/ADV) 100 mL infusion  0-15 mg/hr IntraVENous TITRATE    acetaminophen (TYLENOL) suppository 650 mg  650 mg Rectal Q4H PRN    sodium chloride (NS) flush 20 mL  20 mL InterCATHeter Q8H    heparin (porcine) pf 600 Units  600 Units InterCATHeter Q8H    sodium chloride (NS) flush 20 mL  20 mL InterCATHeter PRN    heparin (porcine) pf 600 Units  600 Units InterCATHeter PRN    albuterol (PROVENTIL VENTOLIN) nebulizer solution 2.5 mg  2.5 mg Nebulization Q4H PRN    sacubitril-valsartan (ENTRESTO) 24-26 mg tablet 1 Tab  1 Tab Oral Q12H    dextrose (D50W) injection syrg 25 g  25 g IntraVENous PRN    ferrous sulfate tablet 325 mg  1 Tab Oral DAILY WITH BREAKFAST    acetaminophen (TYLENOL) tablet 650 mg  650 mg Oral Q6H PRN    gabapentin (NEURONTIN) capsule 600 mg  600 mg Oral TID    albuterol (PROVENTIL HFA, VENTOLIN HFA, PROAIR HFA) inhaler 2 Puff  2 Puff Inhalation Q4H PRN    tiotropium (SPIRIVA) inhalation capsule 18 mcg  1 Cap Inhalation DAILY    sodium chloride (NS) flush 5-10 mL  5-10 mL IntraVENous Q8H    sodium chloride (NS) flush 5-10 mL  5-10 mL IntraVENous PRN    nitroglycerin (NITROSTAT) tablet 0.4 mg  0.4 mg SubLINGual Q5MIN PRN    ondansetron (ZOFRAN) injection 4 mg  4 mg IntraVENous Q4H PRN    budesonide (PULMICORT) 500 mcg/2 ml nebulizer suspension  500 mcg Nebulization BID RT    And    albuterol CONCENTRATE 2.5mg/0.5 mL neb soln  2.5 mg Nebulization Q6H RT       Review of Systems  Constitutional:  negative for fever, chills, sweats  Cardiovascular:  negative for chest pain, palpitations, syncope, edema  Gastrointestinal:  negative for dysphagia, reflux, vomiting, diarrhea, abdominal pain, or melena  Neurologic:  negative for focal weakness, numbness, headache      Objective:     Vitals:    09/06/17 0659 09/06/17 0729 09/06/17 0759 09/06/17 0830   BP: 148/76 (!) 136/91 144/81 131/76   Pulse: (!) 116 (!) 114 (!) 115 (!) 110   Resp: 20 22 (!) 69 18   Temp:  97.8 °F (36.6 °C)     SpO2: 93% 93% 94% 97%   Weight:       Height:           Intake and Output:   09/04 1901 - 09/06 0700  In: 3069 [I.V.:50]  Out: 3945 [Urine:3945]       Physical Exam:          Constitutional:  the patient is well developed and in no acute distress, on NC  EENMT:  Sclera clear, pupils equal, oral mucosa moist  Respiratory: decreased breath sounds- no wheezing  Cardiovascular:  RRR without M,G,R  Gastrointestinal: soft and non-tender; with positive bowel sounds. Musculoskeletal: warm without cyanosis. There is no lower leg edema. Skin:  no jaundice or rashes, no open wounds   Neurologic: no gross neuro deficits     Psychiatric:  alert and oriented x 3     LINES:  PICC, peripheral, condom cath, NG tube    DRIPS: none    CXR:   9/4/17- R basilar effusion      8/30/17        LAB  Recent Labs      09/06/17   0511  09/05/17   2334  09/05/17   1837  09/05/17   1104  09/05/17   0523   GLUCPOC  198*  159*  189*  152*  157*      Recent Labs      09/06/17   0420  09/05/17   0352  09/04/17   0408   WBC  11.1  12.0*  8.9   HGB  10.0*  10.8*  10.6*   HCT  31.7*  34.0*  33.7*   PLT  97*  79*  135*   INR  1.3*  1.7*  1.6*     Recent Labs      09/06/17   0420  09/05/17   0352  09/04/17   0408   NA  137  137  135*   K  4.6  4.8  5.4*   CL  94*  92*  87*   CO2  38*  45*  >45*   GLU  169*  129*  165*   BUN  49*  70*  74*   CREA  0.92  1.28  1.58*   CA  9.4  9.0  9.4     Recent Labs      09/05/17   0320  09/04/17   2130  09/04/17   1445   PH  7.47*  7.51*  7.38   PCO2  62*  57*  78*   PO2  127*  86  86   HCO3  44*  44*  45*     No results for input(s): LCAD, LAC in the last 72 hours. Assessment:  (Medical Decision Making)          Hospital Problems  Date Reviewed: 9/1/2017          Codes Class Noted POA    COPD (chronic obstructive pulmonary disease) ([facility] (Chronic) ICD-10-CM: J44.9  ICD-9-CM: 569  8/30/2017 Yes    GOLD IV, severe- Continue Albuterol, Pulmicort, Spiriva. Continue Rocephin,   mucinex   Tapering solumedrol for COPD. Is also on steroids for ITP    Acute respiratory failure with hypercapnia (HCC) ICD-10-CM: J96.02  ICD-9-CM: 518.81  8/29/2017 No    Improved.     History of tobacco use (Chronic) ICD-10-CM: B91.493  ICD-9-CM: V15.82  8/29/2017 Yes    Unchanged     Hyponatremia ICD-10-CM: E87.1  ICD-9-CM: 276.1  8/29/2017 Yes    Na 137    Acute encephalopathy ICD-10-CM: G93.40  ICD-9-CM: 348.30  8/29/2017 Yes    Alert today    Thrombocytopenia ([facility] ICD-10-CM: D69.6  ICD-9-CM: 287.5  8/26/2017 Yes    Hematology following, platelets 144>>>18>>775 . Not planning bone marrow biopsy this admission, on solumedrol(dose increased) for ITP continue and adjust dosing per hematology    Anticoagulant long-term use (Chronic) ICD-10-CM: Z79.01  ICD-9-CM: V58.61  8/23/2017 Yes    On coumadin    HTN (hypertension), benign (Chronic) ICD-10-CM: I10  ICD-9-CM: 401.1  8/23/2017 Yes    chronic     Peripheral vascular disease (HCC) (Chronic) ICD-10-CM: I73.9  ICD-9-CM: 443.9  8/23/2017 Yes    Chronic     Dyslipidemia (Chronic) ICD-10-CM: E78.5  ICD-9-CM: 272.4  8/23/2017 Yes    Chronic     History of mechanical aortic valve replacement (Chronic) ICD-10-CM: Z95.2  ICD-9-CM: V43.3  8/23/2017 Yes     Placement 2000, on chronicCoumadin             Centrilobular emphysema ([facility] (Chronic) ICD-10-CM: J43.2  ICD-9-CM: 492.8  8/23/2017 Yes    With last PFTs FVC 1.70 L or 44% predicted, FEV1 0.86 L or 29% predicted, FEV1/FVC 51%. This study was a postbronchodilator study performed at the [place] in [facility] on 8/22/2016    The above results were discussed with both patient and his wife- they were not aware of the severity of his COPD which is ES GOLD stage IV. Discussed with family if he needs mechanical ventilation weaning may not be straightforward. Ischemic cardiomyopathy (Chronic) ICD-10-CM: I25.5  ICD-9-CM: 414.8  8/23/2017 Yes     8/23/17 ECHO:  EF 40 % to 45 %. There were no regional wall motion abnormalities. Moderate hypokinesis of the mid-apical anterior and apical wall(s).          Lasix stopped yesterday    * (Principal)Atrial flutter with rapid ventricular response ([address] ICD-10-CM: I48.92  ICD-9-CM: 427.32  8/23/2017 Yes On oral amiodarone and coumadin, per cardiology       Hypotension: improved off levophed    --Continue nightly BiPAP; may need chronic NIPPV nightly  --Resume home lasix soon. Will give low dose diamox today  --ST/PT/OT  --completes ceftriaxone course today  --tapering steroids to 40mg IV bid.   Will need to coordinate with hematology before titrating off    Fe Walden MD

## 2017-09-07 LAB
ANION GAP SERPL CALC-SCNC: 4 MMOL/L (ref 7–16)
BASOPHILS # BLD: 0 K/UL (ref 0–0.2)
BASOPHILS NFR BLD: 0 % (ref 0–2)
BUN SERPL-MCNC: 36 MG/DL (ref 8–23)
CALCIUM SERPL-MCNC: 8.6 MG/DL (ref 8.3–10.4)
CHLORIDE SERPL-SCNC: 98 MMOL/L (ref 98–107)
CO2 SERPL-SCNC: 34 MMOL/L (ref 21–32)
CREAT SERPL-MCNC: 0.82 MG/DL (ref 0.8–1.5)
DIFFERENTIAL METHOD BLD: ABNORMAL
EOSINOPHIL # BLD: 0 K/UL (ref 0–0.8)
EOSINOPHIL NFR BLD: 0 % (ref 0.5–7.8)
ERYTHROCYTE [DISTWIDTH] IN BLOOD BY AUTOMATED COUNT: 15.8 % (ref 11.9–14.6)
GLUCOSE BLD STRIP.AUTO-MCNC: 139 MG/DL (ref 65–100)
GLUCOSE BLD STRIP.AUTO-MCNC: 153 MG/DL (ref 65–100)
GLUCOSE BLD STRIP.AUTO-MCNC: 154 MG/DL (ref 65–100)
GLUCOSE BLD STRIP.AUTO-MCNC: 168 MG/DL (ref 65–100)
GLUCOSE BLD STRIP.AUTO-MCNC: 169 MG/DL (ref 65–100)
GLUCOSE SERPL-MCNC: 147 MG/DL (ref 65–100)
HCT VFR BLD AUTO: 33.3 % (ref 41.1–50.3)
HGB BLD-MCNC: 10.6 G/DL (ref 13.6–17.2)
IMM GRANULOCYTES # BLD: 0 K/UL (ref 0–0.5)
IMM GRANULOCYTES NFR BLD: 0.2 % (ref 0–5)
INR PPP: 1.3 (ref 0.9–1.2)
LYMPHOCYTES # BLD: 0.5 K/UL (ref 0.5–4.6)
LYMPHOCYTES NFR BLD: 6 % (ref 13–44)
MCH RBC QN AUTO: 29.7 PG (ref 26.1–32.9)
MCHC RBC AUTO-ENTMCNC: 31.7 G/DL (ref 31.4–35)
MCV RBC AUTO: 93.8 FL (ref 79.6–97.8)
MONOCYTES # BLD: 0.3 K/UL (ref 0.1–1.3)
MONOCYTES NFR BLD: 3 % (ref 4–12)
NEUTS SEG # BLD: 8.2 K/UL (ref 1.7–8.2)
NEUTS SEG NFR BLD: 91 % (ref 43–78)
PLATELET # BLD AUTO: 105 K/UL (ref 150–450)
PMV BLD AUTO: 11.7 FL (ref 10.8–14.1)
POTASSIUM SERPL-SCNC: 4.4 MMOL/L (ref 3.5–5.1)
PROTHROMBIN TIME: 14 SEC (ref 9.6–12)
RBC # BLD AUTO: 3.56 M/UL (ref 4.23–5.67)
SODIUM SERPL-SCNC: 136 MMOL/L (ref 136–145)
WBC # BLD AUTO: 9.9 K/UL (ref 4.3–11.1)

## 2017-09-07 PROCEDURE — 85025 COMPLETE CBC W/AUTO DIFF WBC: CPT | Performed by: PHYSICIAN ASSISTANT

## 2017-09-07 PROCEDURE — 36592 COLLECT BLOOD FROM PICC: CPT

## 2017-09-07 PROCEDURE — 77030011256 HC DRSG MEPILEX <16IN NO BORD MOLN -A

## 2017-09-07 PROCEDURE — 74011250636 HC RX REV CODE- 250/636: Performed by: INTERNAL MEDICINE

## 2017-09-07 PROCEDURE — 74011636637 HC RX REV CODE- 636/637: Performed by: NURSE PRACTITIONER

## 2017-09-07 PROCEDURE — 99233 SBSQ HOSP IP/OBS HIGH 50: CPT | Performed by: INTERNAL MEDICINE

## 2017-09-07 PROCEDURE — 74011000250 HC RX REV CODE- 250: Performed by: INTERNAL MEDICINE

## 2017-09-07 PROCEDURE — 74011250637 HC RX REV CODE- 250/637: Performed by: INTERNAL MEDICINE

## 2017-09-07 PROCEDURE — 94660 CPAP INITIATION&MGMT: CPT

## 2017-09-07 PROCEDURE — 94640 AIRWAY INHALATION TREATMENT: CPT

## 2017-09-07 PROCEDURE — 92526 ORAL FUNCTION THERAPY: CPT

## 2017-09-07 PROCEDURE — 97535 SELF CARE MNGMENT TRAINING: CPT

## 2017-09-07 PROCEDURE — 77010033678 HC OXYGEN DAILY

## 2017-09-07 PROCEDURE — 94760 N-INVAS EAR/PLS OXIMETRY 1: CPT

## 2017-09-07 PROCEDURE — 97168 OT RE-EVAL EST PLAN CARE: CPT

## 2017-09-07 PROCEDURE — 80048 BASIC METABOLIC PNL TOTAL CA: CPT | Performed by: PHYSICIAN ASSISTANT

## 2017-09-07 PROCEDURE — 77030018846 HC SOL IRR STRL H20 ICUM -A

## 2017-09-07 PROCEDURE — 74011250637 HC RX REV CODE- 250/637: Performed by: NURSE PRACTITIONER

## 2017-09-07 PROCEDURE — 85610 PROTHROMBIN TIME: CPT | Performed by: PHYSICIAN ASSISTANT

## 2017-09-07 PROCEDURE — 65660000000 HC RM CCU STEPDOWN

## 2017-09-07 PROCEDURE — 82962 GLUCOSE BLOOD TEST: CPT

## 2017-09-07 RX ORDER — PREDNISONE 10 MG/1
10 TABLET ORAL
Status: DISCONTINUED | OUTPATIENT
Start: 2017-09-29 | End: 2017-09-13 | Stop reason: HOSPADM

## 2017-09-07 RX ORDER — PREDNISONE 20 MG/1
20 TABLET ORAL
Status: DISCONTINUED | OUTPATIENT
Start: 2017-09-22 | End: 2017-09-13 | Stop reason: HOSPADM

## 2017-09-07 RX ORDER — WARFARIN SODIUM 5 MG/1
5 TABLET ORAL
Status: DISCONTINUED | OUTPATIENT
Start: 2017-09-07 | End: 2017-09-08

## 2017-09-07 RX ORDER — PREDNISONE 20 MG/1
40 TABLET ORAL
Status: DISCONTINUED | OUTPATIENT
Start: 2017-09-08 | End: 2017-09-13 | Stop reason: HOSPADM

## 2017-09-07 RX ADMIN — CHLORHEXIDINE GLUCONATE 15 ML: 1.2 RINSE ORAL at 08:45

## 2017-09-07 RX ADMIN — BUDESONIDE 500 MCG: 0.5 INHALANT RESPIRATORY (INHALATION) at 19:53

## 2017-09-07 RX ADMIN — CARVEDILOL 12.5 MG: 12.5 TABLET, FILM COATED ORAL at 18:18

## 2017-09-07 RX ADMIN — Medication 20 ML: at 05:52

## 2017-09-07 RX ADMIN — MUPIROCIN: 20 OINTMENT TOPICAL at 08:45

## 2017-09-07 RX ADMIN — INSULIN LISPRO 2 UNITS: 100 INJECTION, SOLUTION INTRAVENOUS; SUBCUTANEOUS at 00:20

## 2017-09-07 RX ADMIN — BUDESONIDE 500 MCG: 0.5 INHALANT RESPIRATORY (INHALATION) at 09:35

## 2017-09-07 RX ADMIN — ALBUTEROL SULFATE 2.5 MG: 2.5 SOLUTION RESPIRATORY (INHALATION) at 13:52

## 2017-09-07 RX ADMIN — FAMOTIDINE 20 MG: 10 INJECTION, SOLUTION INTRAVENOUS at 08:35

## 2017-09-07 RX ADMIN — Medication 10 ML: at 05:53

## 2017-09-07 RX ADMIN — ALBUTEROL SULFATE 2.5 MG: 2.5 SOLUTION RESPIRATORY (INHALATION) at 03:22

## 2017-09-07 RX ADMIN — Medication 20 ML: at 21:37

## 2017-09-07 RX ADMIN — INSULIN LISPRO 2 UNITS: 100 INJECTION, SOLUTION INTRAVENOUS; SUBCUTANEOUS at 05:40

## 2017-09-07 RX ADMIN — CHLORHEXIDINE GLUCONATE 15 ML: 1.2 RINSE ORAL at 18:27

## 2017-09-07 RX ADMIN — SACUBITRIL AND VALSARTAN 1 TABLET: 24; 26 TABLET, FILM COATED ORAL at 08:36

## 2017-09-07 RX ADMIN — SODIUM CHLORIDE, PRESERVATIVE FREE 600 UNITS: 5 INJECTION INTRAVENOUS at 15:01

## 2017-09-07 RX ADMIN — INSULIN LISPRO 2 UNITS: 100 INJECTION, SOLUTION INTRAVENOUS; SUBCUTANEOUS at 12:51

## 2017-09-07 RX ADMIN — ALBUTEROL SULFATE 2.5 MG: 2.5 SOLUTION RESPIRATORY (INHALATION) at 19:53

## 2017-09-07 RX ADMIN — FERROUS SULFATE TAB 325 MG (65 MG ELEMENTAL FE) 325 MG: 325 (65 FE) TAB at 08:34

## 2017-09-07 RX ADMIN — CARVEDILOL 12.5 MG: 12.5 TABLET, FILM COATED ORAL at 08:44

## 2017-09-07 RX ADMIN — GABAPENTIN 600 MG: 300 CAPSULE ORAL at 15:01

## 2017-09-07 RX ADMIN — GABAPENTIN 600 MG: 300 CAPSULE ORAL at 21:32

## 2017-09-07 RX ADMIN — ALBUTEROL SULFATE 2.5 MG: 2.5 SOLUTION RESPIRATORY (INHALATION) at 09:35

## 2017-09-07 RX ADMIN — MUPIROCIN: 20 OINTMENT TOPICAL at 18:20

## 2017-09-07 RX ADMIN — SODIUM CHLORIDE, PRESERVATIVE FREE 600 UNITS: 5 INJECTION INTRAVENOUS at 21:37

## 2017-09-07 RX ADMIN — WARFARIN SODIUM 5 MG: 5 TABLET ORAL at 21:32

## 2017-09-07 RX ADMIN — AMIODARONE HYDROCHLORIDE 400 MG: 200 TABLET ORAL at 21:32

## 2017-09-07 RX ADMIN — SACUBITRIL AND VALSARTAN 1 TABLET: 24; 26 TABLET, FILM COATED ORAL at 21:32

## 2017-09-07 RX ADMIN — METHYLPREDNISOLONE SODIUM SUCCINATE 40 MG: 125 INJECTION, POWDER, FOR SOLUTION INTRAMUSCULAR; INTRAVENOUS at 08:35

## 2017-09-07 RX ADMIN — AMIODARONE HYDROCHLORIDE 400 MG: 200 TABLET ORAL at 08:34

## 2017-09-07 RX ADMIN — Medication 10 ML: at 21:37

## 2017-09-07 RX ADMIN — INSULIN LISPRO 2 UNITS: 100 INJECTION, SOLUTION INTRAVENOUS; SUBCUTANEOUS at 18:15

## 2017-09-07 RX ADMIN — SODIUM CHLORIDE, PRESERVATIVE FREE 600 UNITS: 5 INJECTION INTRAVENOUS at 05:53

## 2017-09-07 RX ADMIN — GABAPENTIN 600 MG: 300 CAPSULE ORAL at 05:52

## 2017-09-07 NOTE — PROGRESS NOTES
TRANSFER - OUT REPORT:    Verbal report given to Sundeep Leonard RN(name) on Dimitris Hamilton  being transferred to 319 - telemetry (unit) for routine progression of care       Report consisted of patients Situation, Background, Assessment and   Recommendations(SBAR). Information from the following report(s) ED Summary, Intake/Output, MAR, Recent Results and Cardiac Rhythm aflutter was reviewed with the receiving nurse. Lines:   PICC Double Lumen 32/74/61 Right;Basilic (Active)   Central Line Being Utilized Yes 9/7/2017  7:29 AM   Criteria for Appropriate Use Limited/no vessel suitable for conventional peripheral access 9/7/2017  7:29 AM   Site Assessment Clean, dry, & intact 9/7/2017  7:29 AM   Phlebitis Assessment 0 9/7/2017  7:29 AM   Infiltration Assessment 0 9/7/2017  7:29 AM   Arm Circumference (cm) 30 cm 8/30/2017  3:12 PM   Date of Last Dressing Change 09/05/17 9/7/2017  7:29 AM   Dressing Status Clean, dry, & intact 9/7/2017  7:29 AM   External Catheter Length (cm) 0 centimeters 9/1/2017  6:59 AM   Dressing Type Disk with Chlorhexadine gluconate (CHG); Stabilization/securement device;Transparent 9/7/2017  7:29 AM   Hub Color/Line Status Purple;Flushed;Patent 9/7/2017  7:29 AM   Positive Blood Return (Site #1) Yes 9/7/2017  7:29 AM   Hub Color/Line Status Red;Flushed;Patent 9/7/2017  7:29 AM   Positive Blood Return (Site #2) Yes 9/7/2017  7:29 AM   Alcohol Cap Used No 9/7/2017  7:29 AM        Opportunity for questions and clarification was provided.       Patient transported with:   Monitor  O2 @ 2 liters

## 2017-09-07 NOTE — PROGRESS NOTES
Order received for home NIPPV.   Will start process with Bibi Marie RN- [facility], BSN  Care Transition/ Bundled Payment Navigator  [phone]

## 2017-09-07 NOTE — PROGRESS NOTES
TRANSFER - OUT REPORT:    Verbal report given to PEREZ Ceja(name) on Tali Almodovar  being transferred to [phone] - telemetry(unit) for routine progression of care       Report consisted of patients Situation, Background, Assessment and   Recommendations(SBAR). Information from the following report(s) SBAR, ED Summary, Intake/Output, Recent Results and Cardiac Rhythm aflutter was reviewed with the receiving nurse. Lines:   PICC Double Lumen 38/10/93 Right;Basilic (Active)   Central Line Being Utilized Yes 9/7/2017  7:29 AM   Criteria for Appropriate Use Limited/no vessel suitable for conventional peripheral access 9/7/2017  7:29 AM   Site Assessment Clean, dry, & intact 9/7/2017  7:29 AM   Phlebitis Assessment 0 9/7/2017  7:29 AM   Infiltration Assessment 0 9/7/2017  7:29 AM   Arm Circumference (cm) 30 cm 8/30/2017  3:12 PM   Date of Last Dressing Change 09/05/17 9/7/2017  7:29 AM   Dressing Status Clean, dry, & intact 9/7/2017  7:29 AM   External Catheter Length (cm) 0 centimeters 9/1/2017  6:59 AM   Dressing Type Disk with Chlorhexadine gluconate (CHG); Stabilization/securement device;Transparent 9/7/2017  7:29 AM   Hub Color/Line Status Purple;Flushed;Patent 9/7/2017  7:29 AM   Positive Blood Return (Site #1) Yes 9/7/2017  7:29 AM   Hub Color/Line Status Red;Flushed;Patent 9/7/2017  7:29 AM   Positive Blood Return (Site #2) Yes 9/7/2017  7:29 AM   Alcohol Cap Used No 9/7/2017  7:29 AM        Opportunity for questions and clarification was provided.       Patient transported with:   Monitor  O2 @ 2 liters

## 2017-09-07 NOTE — PROGRESS NOTES
Skin assessment completed with secondary RN. Sacrum intact. Allevyn placed for prevention only. Encouraged repositioning. Skin breakdown on nose due to use of Bipap at night - small foam dressing applied to be changed every other day and as needed. 12 French NGT inserted in nare, clean dry and intact. Bilateral scattered bruising on arms especially left arm. Healed incisional scar down sternum. Trace edema BUE's and BLE's. Skin inbetween toes and heals intact with no breakdown but is dry and flaky. Abdomen is intact and semi-soft with distension. Pink/red \"bump\" on top on forehead. Face is devan colored, which wife states is normal for patient.

## 2017-09-07 NOTE — PROGRESS NOTES
Problem: Nutrition Deficit  Goal: *Optimize nutritional status  Nutrition F/U:  TF Management for the Hospitalists. Assessment:  Weight 76.4 kg (CCU bed 9/7/17), edema - 1+. The patient remains NPO and TF-dependent to meet his nutritional needs. Good GI tolerance is reported with daily to every other day bowel movements and low gastric residuals reported. Labs are remarkable for potassium 4.4 - gradually declining over the last week from 5.4 with the change in TF formula to Nepro. Current TF at goal rate contributes 26 meq potassium/day. Noted that ST is now re-engaged and evaluating his swallowing. Macronutrient Needs:  Estimated calorie needs - 2740-4702 rina/day (20-25 rina/kg/day)   Estimated protein needs - 72-78 gm pro/day (1.1-1.2 gm pro/kgIBW/day) (GFR 52 ml/min)  Max CHO/day - 247 gm CHO/day (50% rina/day)  Fluid/day - 1.6-2 liters/day (1 ml/rina/day)  Intake/Comparative Standards:  Current intake of TF (Nepro @ 40 ml/hr with a 45 ml/hr water flush) provides 1728 calories/day (100% calorie goal), 78 grams protein/day (100% protein goal), 160 grams CHO/day (does not exceed max CHO limit) and 1776 ml water/day (100% fluid goal). Intervention:   Meals and Snacks: NPO until cleared by ST. Enteral Nutrition: Continue Nepro at 40 ml/hr with a 45 ml/hr water flush via NGT. Mineral Supplement Therapy: Nutrition Support Orders/Electrolyte Management Replacement Protocols are active on the MAR. Coordination of Nutrition Care by a Nutrition Professional: AM CCU rounds, collaboration with Linda Hanks RN. Nutrition Discharge Plan: Unable to determine, hopefully eating instead of enteral feeding. Moon Henriqeuz.  Sarita Sayer  [phone]

## 2017-09-07 NOTE — PROGRESS NOTES
LTG: Patient will tolerate least restrictive diet without overt signs or symptoms of airway compromise. STG: Patient will tolerate mechanical soft and thin liquids without overt signs or symptoms of airway compromise. STG: Patient will participate in modified barium swallow study as clinically indicated. Speech language pathology: bedside swallow note: Daily Note 1    NAME/AGE/GENDER: Sugar Sherwood is a 67 y.o. male  DATE: 9/7/2017  PRIMARY DIAGNOSIS: Atrial flutter with rapid ventricular response ([facility] [address]       ICD-10: Treatment Diagnosis: R13.12 Oropharyngeal Dysphagia. INTERDISCIPLINARY COLLABORATION: Registered Nurse  PRECAUTIONS/ALLERGIES: Levaquin [levofloxacin] and Metformin ASSESSMENT:Patient seen for continued po trials with ST this AM. Patient more alert and conversating with clinician. Dry oral cavity with patient perseveratively suctioning secretions. Low volume and hoarseness initially, but improved following po trials. Patient was presented with thin liquid via tsp, cup, and puree. He endorsed sensation of \"something stuck in my throat\" prior to po trials- likely NG tubed. Improved coordination noted with A/P transit and propulsion. Mildly delayed swallow initiation. Double swallows on all liquid and solid trials. No overt signs or symptoms of airway compromise with thin liquid in isolation or puree. However, delayed throat clear and cough noted on liquid wash following 4 consecutive puree trials. No additional trials completed this date secondary to toileting issues and patient's request to discontinue session. Recommend continue NPO with alternative means of nutrition/hydration. Modified barium swallow study is recommended to objectively assess swallow function with plans to complete on 9/8/17. Results and recommendations communicated to patient, wife, and RN, who expressed understanding and agreement with plan.  Patient will benefit from continued skilled speech therapy services to address dysphagia. ?????? ? ? This section established at most recent assessment??????????  PROBLEM LIST (Impairments causing functional limitations):  1. Dysphagia  REHABILITATION POTENTIAL FOR STATED GOALS: Good  PLAN OF CARE:   Patient will benefit from skilled intervention to address the following impairments. RECOMMENDATIONS AND PLANNED INTERVENTIONS (Benefits and precautions of therapy have been discussed with the patient.):  · NPO  MEDICATIONS:  · Non-oral  COMPENSATORY STRATEGIES/MODIFICATIONS INCLUDING:  · Alternate liquids/solids  · Small sips and bites  OTHER RECOMMENDATIONS (including follow up treatment recommendations): · Family training/education  · Patient education  RECOMMENDED DIET MODIFICATIONS DISCUSSED WITH:  · Nursing  · Family  · Patient  FREQUENCY/DURATION: Continue to follow patient 3 times a week for duration of hospital stay to address above goals. RECOMMENDED REHABILITATION/EQUIPMENT: (at time of discharge pending progress):   Continue Skilled Therapy. SUBJECTIVE:   Improved vocal quality. Wife at bedside. History of Present Injury/Illness: Mr. Emmett Allison  has a past medical history of Acute diastolic CHF (congestive heart failure) ([facility] [address] (9/15/2016); Arthritis; Chicken pox; Coronary artery disease (4/16/2014); DJD (degenerative joint disease); Emphysema; aortic valve replacement, mechanical; Hypercholesterolemia; Hypertension; Palpitations; Pneumonia; and Systolic CHF, acute ([facility] [address] (11/15/2016). Ijeoma Davis He also  has a past surgical history that includes aortic valve replacement (N/A, 2000) and heart catheterization (07/21/2004). Present Symptoms: Coughing with thin liquids   Pain Intensity 1: 0  Pain Location 1: Back  Pain Orientation 1: Lower  Pain Intervention(s) 1: Medication (see MAR)  Current Medications:   No current facility-administered medications on file prior to encounter.       Current Outpatient Prescriptions on File Prior to Encounter   Medication Sig Dispense Refill    gabapentin (NEURONTIN) 300 mg capsule Take 600 mg by mouth three (3) times daily.  glipiZIDE (GLUCOTROL) 10 mg tablet Take 10 mg by mouth two (2) times a day.  furosemide (LASIX) 40 mg tablet Take 1 Tab by mouth daily. 30 Tab 1    potassium chloride (K-DUR, KLOR-CON) 10 mEq tablet Take 1 Tab by mouth daily. 30 Tab 1    budesonide-formoterol (SYMBICORT) 160-4.5 mcg/actuation HFA inhaler Take 2 Puffs by inhalation.  Omeprazole delayed release (PRILOSEC D/R) 20 mg tablet Take 20 mg by mouth.  flunisolide (NASAREL) 25 mcg (0.025 %) spry 2 Sprays by Nasal route.  albuterol (PROVENTIL HFA) 90 mcg/actuation inhaler Take 2 Puffs by inhalation.  tiotropium (SPIRIVA) 18 mcg inhalation capsule Take 1 Puff by inhalation.  warfarin (COUMADIN) 5 mg tablet Take one 5 mg tablet daily (except 1-1/2 tablet on Wednesday 90 Tab 1    losartan (COZAAR) 100 mg tablet Take 1 Tab by mouth daily. 90 Tab 1    Hydrochlorothiazide 12.5 mg tablet Take 12.5 mg by mouth daily. 90 Tab 1    simvastatin (ZOCOR) 80 mg tablet Take 1 Tab by mouth nightly.  90 Tab 1     Current Dietary Status:  Regular/thin      Social History/Home Situation:    Home Environment: Private residence  # Steps to Enter: 3  Rails to Enter: Yes  Hand Rails : Bilateral  One/Two Story Residence: One story  Living Alone: No  Support Systems: Spouse/Significant Other/Partner, Child(devyn)  Patient Expects to be Discharged to[de-identified] Private residence  Current DME Used/Available at Home: Paralee Hirschfeld, rollator  Tub or Shower Type: Tub/Shower combination  OBJECTIVE:   Respiratory Status:  BIPAP  2 l/min  CXR Results:Postsurgical change without acute abnormality  MRI/CT Results:MRI pending  Oral Motor Structure/Speech:  Oral-Motor Structure/Motor Speech  Labial: No impairment  Dentition: Edentulous  Oral Hygiene: dry  Lingual: No impairment    Cognitive and Communication Status:  Neurologic State: Alert  Orientation Level: Oriented to person;Oriented to place;Oriented to time  Cognition: Follows commands  Perception: Appears intact  Perseveration: No perseveration noted  Safety/Judgement: Decreased insight into deficits    BEDSIDE SWALLOW EVALUATION  Oral Assessment:  Oral Assessment  Labial: No impairment  Dentition: Edentulous  Oral Hygiene: dry  Lingual: No impairment  P.O. Trials:  Patient Position: upright in bed    The patient was given tsp-small bite amounts of the following:   Consistency Presented: Thin liquid;Puree  How Presented: SLP-fed/presented;Spoon;Cup/sip    ORAL PHASE:  Bolus Acceptance: No impairment  Bolus Formation/Control: No impairment  Propulsion: Delayed (# of seconds)     Oral Residue: None    PHARYNGEAL PHASE:  Initiation of Swallow: Delayed (# of seconds)  Laryngeal Elevation: Decreased;Weak  Aspiration Signs/Symptoms: Clear throat;Weak cough  Vocal Quality: Low volume           Pharyngeal Phase Characteristics: Multiple swallows; Suspected pharyngeal residue;Poor endurance    OTHER OBSERVATIONS:  Rate/bite size: Impaired   Endurance:  Impaired   Comments:       Tool Used: Dysphagia Outcome and Severity Scale (KERRIE)    Score Comments   Normal Diet  [] 7 With no strategies or extra time needed   Functional Swallow  [] 6 May have mild oral or pharyngeal delay       Mild Dysphagia    [] 5 Which may require one diet consistency restricted (those who demonstrate penetration which is entirely cleared on MBS would be included)   Mild-Moderate Dysphagia  [] 4 With 1-2 diet consistencies restricted       Moderate Dysphagia  [] 3 With 2 or more diet consistencies restricted       Moderately Severe Dysphagia  [x] 2 With partial PO strategies (trials with ST only)       Severe Dysphagia  [] 1 With inability to tolerate any PO safely          Score:  Initial: 2   Most Recent: X (Date: -- )   Interpretation of Tool: The Dysphagia Outcome and Severity Scale (KERRIE) is a simple, easy-to-use, 7-point scale developed to systematically rate the functional severity of dysphagia based on objective assessment and make recommendations for diet level, independence level, and type of nutrition. Score 7 6 5 4 3 2 1   Modifier CH CI CJ CK CL CM CN   ? Swallowing:     - CURRENT STATUS: CM - 80%-99% impaired, limited or restricted    - GOAL STATUS:  CI - 1%-19% impaired, limited or restricted    - D/C STATUS:  ---------------To be determined---------------  [place] / Plan[place] / Product Type: Medicare /     TREATMENT:    (In addition to Assessment/Re-Assessment sessions the following treatments were rendered)  Assessment/Reassessment only, no treatment provided today  MODALITIES:                                                                    ORAL MOTOR  EXERCISES:                                                                                                                                                                      LARYNGEAL / PHARYNGEAL EXERCISES:                                                                                                                                     __________________________________________________________________________________________________  Safety:   After treatment position/precautions:  · Upright in Bed    Progression/Medical Necessity:   · Patient is expected to demonstrate progress in swallow function, diet tolerance and swallow safety to improve swallow safety and work toward diet advancement. Compliance with Program/Exercises: Will assess as treatment progresses. Reason for Continuation of Services/Other Comments:  · Patient continues to require skilled intervention due to dysphagia. Recommendations/Intent for next treatment session: \"Treatment next visit will focus on modified barium swallow study\".     Total Treatment Duration:  Time In: 1030  Time Out: 1053    ABBEY Elizalde, CCC-SLP, CBIS

## 2017-09-07 NOTE — WOUND CARE
Skin of nose with 2.5x1.5cm full thickness ulcer, scant granulation, foam in use would continue, needs to continue to use bipap or cpap face mask that offloads to prevent further breakdown of nose. Patient has had skin cancer surgery on right side of nose in past, and has scars at this areas. Will monitor.

## 2017-09-07 NOTE — PROGRESS NOTES
9/7/2017 6:31 AM    Admit Date: 8/23/2017    Admit Diagnosis: Atrial flutter with rapid ventricular response (HCC)      Subjective:    rate controlled atrial flutter. Needs coumadin increased.  inr low    Objective:      Visit Vitals    /56    Pulse 74    Temp 97.3 °F (36.3 °C)    Resp 16    Ht 5' 6\" (1.676 m)    Wt 76.4 kg (168 lb 6.9 oz)    SpO2 98%    BMI 27.19 kg/m2       ROS:  General ROS: negative for - chills  Hematological and Lymphatic ROS: negative for - blood clots or jaundice  Respiratory ROS: no cough, shortness of breath, or wheezing  Cardiovascular ROS: no chest pain or dyspnea on exertion  Gastrointestinal ROS: no abdominal pain, change in bowel habits, or black or bloody stools  Neurological ROS: no TIA or stroke symptoms    Physical Exam:    Physical Examination: General appearance - alert, well appearing, and in no distress  Mental status - alert, oriented to person, place, and time  Eyes - pupils equal and reactive, extraocular eye movements intact  Neck/lymph - supple, no significant adenopathy  Chest/CV - clear to auscultation, no wheezes, rales or rhonchi, symmetric air entry  Heart - normal rate, regular rhythm, normal S1, S2, no murmurs, rubs, clicks or gallops  Abdomen/GI - soft, nontender, nondistended, no masses or organomegaly  Musculoskeletal - no joint tenderness, deformity or swelling  Extremities - peripheral pulses normal, no pedal edema, no clubbing or cyanosis  Skin - normal coloration and turgor, no rashes, no suspicious skin lesions noted    Current Facility-Administered Medications   Medication Dose Route Frequency    warfarin (COUMADIN) tablet 5 mg  5 mg Oral QHS    carvedilol (COREG) tablet 12.5 mg  12.5 mg Oral BID WITH MEALS    methylPREDNISolone (PF) (SOLU-MEDROL) injection 40 mg  40 mg IntraVENous Q12H    midazolam (VERSED) injection 2 mg  2 mg IntraVENous Q2H PRN    chlorhexidine (PERIDEX) 0.12 % mouthwash 15 mL  15 mL Oral BID    morphine injection 2 mg  2 mg IntraVENous Q4H PRN    amiodarone (CORDARONE) tablet 400 mg  400 mg Oral Q12H    insulin lispro (HUMALOG) injection   SubCUTAneous Q6H    NUTRITIONAL SUPPORT ELECTROLYTE PRN ORDERS   Does Not Apply PRN    famotidine (PF) (PEPCID) 20 mg in sodium chloride 0.9 % 10 mL injection  20 mg IntraVENous DAILY    mupirocin (BACTROBAN) 2 % ointment   Both Nostrils BID    0.9% sodium chloride infusion 250 mL  250 mL IntraVENous PRN    acetaminophen (TYLENOL) suppository 650 mg  650 mg Rectal Q4H PRN    sodium chloride (NS) flush 20 mL  20 mL InterCATHeter Q8H    heparin (porcine) pf 600 Units  600 Units InterCATHeter Q8H    sodium chloride (NS) flush 20 mL  20 mL InterCATHeter PRN    heparin (porcine) pf 600 Units  600 Units InterCATHeter PRN    albuterol (PROVENTIL VENTOLIN) nebulizer solution 2.5 mg  2.5 mg Nebulization Q4H PRN    sacubitril-valsartan (ENTRESTO) 24-26 mg tablet 1 Tab  1 Tab Oral Q12H    dextrose (D50W) injection syrg 25 g  25 g IntraVENous PRN    ferrous sulfate tablet 325 mg  1 Tab Oral DAILY WITH BREAKFAST    acetaminophen (TYLENOL) tablet 650 mg  650 mg Oral Q6H PRN    gabapentin (NEURONTIN) capsule 600 mg  600 mg Oral TID    albuterol (PROVENTIL HFA, VENTOLIN HFA, PROAIR HFA) inhaler 2 Puff  2 Puff Inhalation Q4H PRN    tiotropium (SPIRIVA) inhalation capsule 18 mcg  1 Cap Inhalation DAILY    sodium chloride (NS) flush 5-10 mL  5-10 mL IntraVENous Q8H    sodium chloride (NS) flush 5-10 mL  5-10 mL IntraVENous PRN    nitroglycerin (NITROSTAT) tablet 0.4 mg  0.4 mg SubLINGual Q5MIN PRN    ondansetron (ZOFRAN) injection 4 mg  4 mg IntraVENous Q4H PRN    budesonide (PULMICORT) 500 mcg/2 ml nebulizer suspension  500 mcg Nebulization BID RT    And    albuterol CONCENTRATE 2.5mg/0.5 mL neb soln  2.5 mg Nebulization Q6H RT       Data Review:   @LABRCNT(Na,K,BUN,CREA,WBC,HGB,HCT,PLT,INR,TRP,TCHOL*,Triglyceride*,LDL*,LDLCPOC HDL*,HDL])@    TELEMETRY: atrial flutter    Assessment/Plan:     Principal Problem:    Atrial flutter with rapid ventricular response ([address] (8/23/2017) difficult to control. May try higher dose of amiodarone    Active Problems:    Anticoagulant long-term use (8/23/2017)      HTN (hypertension), benign (8/23/2017)now low. On pressors      Peripheral vascular disease ([address] (8/23/2017)      Dyslipidemia (8/23/2017)The current medical regimen is effective;  continue present plan and medications. History of mechanical aortic valve replacement (8/23/2017)The current medical regimen is effective;  continue present plan and medications. Overview: Placement 2000, on chronicCoumadin      Centrilobular emphysema ([address] (8/23/2017)      Ischemic cardiomyopathy (8/23/2017)      Overview: 8/23/17 ECHO:      EF 40 % to 45 %. There were no regional wall motion abnormalities. Moderate hypokinesis of the mid-apical anterior and apical wall(s). Thrombocytopenia ([address] (8/26/2017)      Acute respiratory failure with hypercapnia ([address] (8/29/2017)      History of tobacco use (8/29/2017)      Hyponatremia (8/29/2017)      Acute encephalopathy (8/29/2017)      COPD (chronic obstructive pulmonary disease) ([address] (8/30/2017)The current medical regimen is effective;  continue present plan and medications.             Amador Buitrago MD

## 2017-09-07 NOTE — PROGRESS NOTES
Bedside and Verbal shift change report given to Candis Valencia RN (oncoming nurse) by Kanwal Light RN and Erendira Lopez RN (offgoing nurse). Report included the following information SBAR, Kardex, MAR and Recent Results.

## 2017-09-07 NOTE — PROGRESS NOTES
Daily Progress Note -- Hematology/ Medical Oncology        Patient Name: Home Jennings    Date of Visit: 2017  : 1945  Age:72 y.o. Initial consult HPI:   He has a history of mechanical AVR in . He continues on chronic anticoagulation with coumadin, chronic systolic CHF/ICM EF 41%, COPD, PVD, DM II, HTN, iron deficiency, and dyslipidemia who developed worsening dyspnea at the beginning of the week. He presented to the ER and was found to be in atrial flutter with RVR now status post cardioversion and amiodarone. Of note, his platelets were 14,272 on admission and 30,000 today. No other lab comparisons here but can see through care everywhere that his platelets were 61,515 at [facility] about a year ago. He has no recollection of ever being told he has low platelets. He takes iron daily for his history of PRATIK and reports black stools due to this. Hgb on admission was 10.2 and 12.7 today.  We have been consulted for his thrombocytopenia    Interval history:  Platelets trending upwards 105,000 today  Will change solu-medrol to prednisone taper    Medications:   Current Facility-Administered Medications   Medication Dose Route Frequency Provider Last Rate Last Dose    warfarin (COUMADIN) tablet 5 mg  5 mg Oral QHS Jacqui Garner MD        [START ON 2017] predniSONE (DELTASONE) tablet 40 mg  40 mg Oral DAILY WITH BREAKFAST Nayely Salter NP        [START ON 9/15/2017] predniSONE (DELTASONE) tablet 30 mg  30 mg Oral DAILY WITH BREAKFAST Nayely Salter NP       [facility] ON 2017] predniSONE (DELTASONE) tablet 20 mg  20 mg Oral DAILY WITH BREAKFAST Nayely Salter NP       [facility] ON 2017] predniSONE (DELTASONE) tablet 10 mg  10 mg Oral DAILY WITH BREAKFAST Nayely Salter NP        carvedilol (COREG) tablet 12.5 mg  12.5 mg Oral BID WITH MEALS Jacqui Garner MD   12.5 mg at 17 0844    midazolam (VERSED) injection 2 mg  2 mg IntraVENous Q2H PRN Heather Rivera MD   2 mg at 17 0501    chlorhexidine (PERIDEX) 0.12 % mouthwash 15 mL  15 mL Oral BID Troy Samayoa MD   15 mL at 09/07/17 0845    morphine injection 2 mg  2 mg IntraVENous Q4H PRN Maximiliano Davila MD   2 mg at 09/06/17 0630    amiodarone (CORDARONE) tablet 400 mg  400 mg Oral Q12H Esha Coker MD   400 mg at 09/07/17 0834    insulin lispro (HUMALOG) injection   SubCUTAneous Q6H Harish Dubois NP   2 Units at 09/07/17 0540    NUTRITIONAL SUPPORT ELECTROLYTE PRN ORDERS   Does Not Apply PRN Audie Isaac.  Hayden Hdez MD        famotidine (PF) (PEPCID) 20 mg in sodium chloride 0.9 % 10 mL injection  20 mg IntraVENous DAILY Shauna Hernandez MD   20 mg at 09/07/17 0835    mupirocin (BACTROBAN) 2 % ointment   Both Nostrils BID Maximiliano Davila MD        0.9% sodium chloride infusion 250 mL  250 mL IntraVENous PRN Samantha Hidalgo NP        acetaminophen (TYLENOL) suppository 650 mg  650 mg Rectal Q4H PRN Howard Alves MD   650 mg at 08/30/17 1555    sodium chloride (NS) flush 20 mL  20 mL InterCATHeter Madhuri Whatley MD   20 mL at 09/07/17 0552    heparin (porcine) pf 600 Units  600 Units InterCATHeter Q8H Shauna Hernandez MD   600 Units at 09/07/17 0553    sodium chloride (NS) flush 20 mL  20 mL InterCATHeter PRN Shauna Hernandez MD        heparin (porcine) pf 600 Units  600 Units InterCATHeter PRN Shauna Hernandez MD        albuterol (PROVENTIL VENTOLIN) nebulizer solution 2.5 mg  2.5 mg Nebulization Q4H PRN Howard Alves MD   2.5 mg at 09/05/17 2010    sacubitril-valsartan (ENTRESTO) 24-26 mg tablet 1 Tab  1 Tab Oral Q12H Horace Carlson MD   1 Tab at 09/07/17 0836    dextrose (D50W) injection syrg 25 g  25 g IntraVENous PRN Horace Carlson MD   25 g at 08/25/17 1618    ferrous sulfate tablet 325 mg  1 Tab Oral DAILY WITH BREAKFAST Louis Moctezuma NP   325 mg at 09/07/17 0834    acetaminophen (TYLENOL) tablet 650 mg  650 mg Oral Q6H PRN Horace Carlson MD   650 mg at 09/02/17 2129    gabapentin (NEURONTIN) capsule 600 mg 600 mg Oral TID Fatmata Alvarez MD   600 mg at 09/07/17 0552    albuterol (PROVENTIL HFA, VENTOLIN HFA, PROAIR HFA) inhaler 2 Puff  2 Puff Inhalation Q4H PRN Fatmata Alvarez MD   2 Puff at 08/30/17 1724    tiotropium (SPIRIVA) inhalation capsule 18 mcg  1 Cap Inhalation DAILY Fatmata Alvarez MD   Stopped at 09/04/17 0900    sodium chloride (NS) flush 5-10 mL  5-10 mL IntraVENous Samantha MD Gwyn   10 mL at 09/07/17 0553    sodium chloride (NS) flush 5-10 mL  5-10 mL IntraVENous PRN Fatmata Alvarez MD        nitroglycerin (NITROSTAT) tablet 0.4 mg  0.4 mg SubLINGual Q5MIN PRN Fatmata Alvarez MD        ondansetron [facility]) injection 4 mg  4 mg IntraVENous Q4H PRN Fatmata Alvarez MD   4 mg at 09/03/17 1740    budesonide (PULMICORT) 500 mcg/2 ml nebulizer suspension  500 mcg Nebulization BID RT Fatmata Alvarez MD   500 mcg at 09/07/17 0935    And    albuterol CONCENTRATE 2.5mg/0.5 mL neb soln  2.5 mg Nebulization Q6H RT Fatmata Alvarez MD   2.5 mg at 09/07/17 0935       Allergies: Allergies   Allergen Reactions    Levaquin [Levofloxacin] Other (comments)     GI upset    Metformin Other (comments)     Gi upset       Review of Systems: The Review of Systems is documented in full in the internal medical record. All systems are negative other than for those noted above. Physical Examination:  General Appearance: Ill appearing patient in no acute distress. Vital signs:   Visit Vitals    /59    Pulse 67    Temp 97.2 °F (36.2 °C)    Resp (!) 40    Ht 5' 6\" (1.676 m)    Wt 168 lb 6.9 oz (76.4 kg)    SpO2 95%    BMI 27.19 kg/m2     HEENT: No oral or pharyngeal masses. There is no ulceration or thrush. Lymph nodes: There is no cervical, supraclavicular, axillary adenopathy. Lungs: Scattered expiratory wheezes bilaterally. On NC. Heart: There is no jugular venous distention. Regular, irregular. Generalized edema.    Abdomen: Soft, non-tender, bowel sounds present and normal, no appreciated hepatosplenomegaly. No palpable masses. Skin: No rash, petechiae or ecchymoses. No evidence of malignancy. Neuro: Alert today with no obvious focal deficits. .     Labs/Imaging:  Lab Results   Component Value Date/Time    WBC 9.9 09/07/2017 04:29 AM    HGB 10.6 09/07/2017 04:29 AM    HCT 33.3 09/07/2017 04:29 AM    PLATELET 496 26/76/5374 04:29 AM    MCV 93.8 09/07/2017 04:29 AM       Lab Results   Component Value Date/Time    Sodium 136 09/07/2017 04:29 AM    Potassium 4.4 09/07/2017 04:29 AM    Chloride 98 09/07/2017 04:29 AM    CO2 34 09/07/2017 04:29 AM    Anion gap 4 09/07/2017 04:29 AM    Glucose 147 09/07/2017 04:29 AM    BUN 36 09/07/2017 04:29 AM    Creatinine 0.82 09/07/2017 04:29 AM    GFR est AA >60 09/07/2017 04:29 AM    GFR est non-AA >60 09/07/2017 04:29 AM    Calcium 8.6 09/07/2017 04:29 AM    AST (SGOT) 25 08/23/2017 01:05 PM    Alk. phosphatase 86 08/23/2017 01:05 PM    Protein, total 7.4 08/23/2017 01:05 PM    Albumin 3.6 08/23/2017 01:05 PM    Globulin 3.8 08/23/2017 01:05 PM    A-G Ratio 0.9 08/23/2017 01:05 PM    ALT (SGPT) 30 08/23/2017 01:05 PM           ASSESSMENT:  This gentleman has a thrombocytopenia which is isolated (normal RBC and WBC) that has been present for at least one year. The most likely diagnosis is ITP, valvular consumption and/or medications. The level of platelet count in and of itself is not concerning. It is the requirement for warfarin given his mechanical valve which creates difficulties when combined with this thrombocytopenia. His risk for bleeding is substantially increased especially with platelet counts below 50,000. Dr. Ivor Duverney was unable to be performed at bedside BMbx due to the decline in patient's condition on 9/2. He developed hypercapnic respiratiory failure requiring him to be placed on BiPAP, intubated for a brief period, then back on BiPAP, now on NC. He is s/p IVIG x 3 and he continues IV steroids daily. Platelets are trending upwards, now at 105,000. PLAN:  - Presumed ITP:  9/7 Transfuse platelets as needed - keep above 50,000. Coumadin necessary due to mechanical heart valve. Continue daily CBC to monitor platelet count. BMBx can be done as outpatient or when out of ICU. Change solumedrol to prednisone taper. Begin with 40mg daily and taper down 10mg weekly. Thank you for allowing us to participate in the care of Mr. Ana Jones. We will sign off; please do not hesitate to call with any questions. Please have him follow-up with Dr. Kraig Peck within 1 week of discharge.            Razia Hamm NP  [facility] Hematology & Oncology  [address] [place] [address]  P [phone]

## 2017-09-07 NOTE — PROGRESS NOTES
Opened chart for possible admit. TRANSFER - IN REPORT:    Verbal report received from Nyasia Simental RN on [facility] being received from CCU for routine progression of care      Report consisted of patients Situation, Background, Assessment and Recommendations(SBAR). Information from the following report(s) SBAR, Kardex, MAR, Recent Results and Cardiac Rhythm Afib, Aflutter, with BBB was reviewed with the receiving nurse. Opportunity for questions and clarification was provided. Assessment completed upon patients arrival to unit and care assumed.

## 2017-09-07 NOTE — PROGRESS NOTES
Critical Care Daily Progress Note: 9/7/2017    Fernando Callahan   Admission Date: 8/23/2017         The patient's chart is reviewed and the patient is discussed with the staff.    67 y.o.  CM with h/o severe COPD (baseline bicarb 37/PCO2 65), ITP with thrombocytopenia, s/p mechanical AVR 2000, atrial flutter who was admitted on 8/23 with rapid a. Flutter.   His course has been complicated by hyponatremia, confusion, hypercarbic respiratory failure. Extubated 9/5/17       Subjective: On 3L NC, cough with white sputum. Family at bedside.      Current Facility-Administered Medications   Medication Dose Route Frequency    warfarin (COUMADIN) tablet 5 mg  5 mg Oral QHS    carvedilol (COREG) tablet 12.5 mg  12.5 mg Oral BID WITH MEALS    methylPREDNISolone (PF) (SOLU-MEDROL) injection 40 mg  40 mg IntraVENous Q12H    midazolam (VERSED) injection 2 mg  2 mg IntraVENous Q2H PRN    chlorhexidine (PERIDEX) 0.12 % mouthwash 15 mL  15 mL Oral BID    morphine injection 2 mg  2 mg IntraVENous Q4H PRN    amiodarone (CORDARONE) tablet 400 mg  400 mg Oral Q12H    insulin lispro (HUMALOG) injection   SubCUTAneous Q6H    NUTRITIONAL SUPPORT ELECTROLYTE PRN ORDERS   Does Not Apply PRN    famotidine (PF) (PEPCID) 20 mg in sodium chloride 0.9 % 10 mL injection  20 mg IntraVENous DAILY    mupirocin (BACTROBAN) 2 % ointment   Both Nostrils BID    0.9% sodium chloride infusion 250 mL  250 mL IntraVENous PRN    acetaminophen (TYLENOL) suppository 650 mg  650 mg Rectal Q4H PRN    sodium chloride (NS) flush 20 mL  20 mL InterCATHeter Q8H    heparin (porcine) pf 600 Units  600 Units InterCATHeter Q8H    sodium chloride (NS) flush 20 mL  20 mL InterCATHeter PRN    heparin (porcine) pf 600 Units  600 Units InterCATHeter PRN    albuterol (PROVENTIL VENTOLIN) nebulizer solution 2.5 mg  2.5 mg Nebulization Q4H PRN    sacubitril-valsartan (ENTRESTO) 24-26 mg tablet 1 Tab  1 Tab Oral Q12H    dextrose (D50W) injection syrg 25 g  25 g IntraVENous PRN    ferrous sulfate tablet 325 mg  1 Tab Oral DAILY WITH BREAKFAST    acetaminophen (TYLENOL) tablet 650 mg  650 mg Oral Q6H PRN    gabapentin (NEURONTIN) capsule 600 mg  600 mg Oral TID    albuterol (PROVENTIL HFA, VENTOLIN HFA, PROAIR HFA) inhaler 2 Puff  2 Puff Inhalation Q4H PRN    tiotropium (SPIRIVA) inhalation capsule 18 mcg  1 Cap Inhalation DAILY    sodium chloride (NS) flush 5-10 mL  5-10 mL IntraVENous Q8H    sodium chloride (NS) flush 5-10 mL  5-10 mL IntraVENous PRN    nitroglycerin (NITROSTAT) tablet 0.4 mg  0.4 mg SubLINGual Q5MIN PRN    ondansetron (ZOFRAN) injection 4 mg  4 mg IntraVENous Q4H PRN    budesonide (PULMICORT) 500 mcg/2 ml nebulizer suspension  500 mcg Nebulization BID RT    And    albuterol CONCENTRATE 2.5mg/0.5 mL neb soln  2.5 mg Nebulization Q6H RT       Review of Systems  Constitutional:  negative for fever, chills, sweats  Cardiovascular:  negative for chest pain, palpitations, syncope, edema  Gastrointestinal:  negative for reflux, vomiting, diarrhea, abdominal pain, or melena  Neurologic:  negative for focal weakness, numbness, headache      Objective:     Vitals:    09/07/17 0830 09/07/17 0859 09/07/17 0929 09/07/17 0935   BP: 144/63 (!) 127/93 115/59    Pulse: 95 91 67    Resp: 16 16 (!) 40    Temp:       SpO2: 98% 99% 95% 95%   Weight:       Height:           Intake and Output:   09/05 1901 - 09/07 0700  In: 2404.5 [I.V.:41.5]  Out: 3700 [Urine:3700]  09/07 0701 - 09/07 1900  In: -   Out: 500 [Urine:500]    Physical Exam:          Constitutional:  the patient is well developed and in no acute distress, on 3L NC with O2 sat 95%  EENMT:  Sclera clear, pupils equal, oral mucosa moist  Respiratory: few scattered expiratory wheezes   Cardiovascular:  RRR without M,G,R  Gastrointestinal: soft and non-tender; with positive bowel sounds. Musculoskeletal: warm without cyanosis. There is no lower leg edema.   Skin:  no jaundice or rashes, no open wounds   Neurologic: no gross neuro deficits     Psychiatric:  alert and oriented x 2    LINES:  PICC, NG tube, condom cath    DRIPS:   none    CXR: 9/5/17      LAB  Recent Labs      09/07/17   0538  09/07/17   0017  09/06/17   1708  09/06/17   1110  09/06/17   0511   GLUCPOC  168*  154*  177*  158*  198*      Recent Labs      09/07/17   0429  09/06/17   0420  09/05/17   0352   WBC  9.9  11.1  12.0*   HGB  10.6*  10.0*  10.8*   HCT  33.3*  31.7*  34.0*   PLT  105*  97*  79*   INR  1.3*  1.3*  1.7*     Recent Labs      09/07/17   0429  09/06/17   0420  09/05/17   0352   NA  136  137  137   K  4.4  4.6  4.8   CL  98  94*  92*   CO2  34*  38*  45*   GLU  147*  169*  129*   BUN  36*  49*  70*   CREA  0.82  0.92  1.28   CA  8.6  9.4  9.0     Recent Labs      09/05/17   0320  09/04/17   2130  09/04/17   1445   PH  7.47*  7.51*  7.38   PCO2  62*  57*  78*   PO2  127*  86  86   HCO3  44*  44*  45*     No results for input(s): LCAD, LAC in the last 72 hours. Assessment:  (Medical Decision Making)     Hospital Problems  Date Reviewed: 9/7/2017          Codes Class Noted POA    COPD (chronic obstructive pulmonary disease) ([facility] [address] (Chronic) ICD-10-CM: J44.9  ICD-9-CM: 937  8/30/2017 Yes    Few scattered wheezes today, continue steroids, BD     Acute respiratory failure with hypercapnia (HCC) ICD-10-CM: J96.02  ICD-9-CM: 518.81  8/29/2017 No    Weaned to 3L NC     History of tobacco use (Chronic) ICD-10-CM: N92.552  ICD-9-CM: V15.82  8/29/2017 Yes    Unchanged     Hyponatremia ICD-10-CM: E87.1  ICD-9-CM: 276.1  8/29/2017 Yes    Na 136, resolved.     Acute encephalopathy ICD-10-CM: G93.40  ICD-9-CM: 348.30  8/29/2017 Yes    better    Thrombocytopenia (HCC) ICD-10-CM: D69.6  ICD-9-CM: 287.5  8/26/2017 Yes    Platelets up to 15    Anticoagulant long-term use (Chronic) ICD-10-CM: Z79.01  ICD-9-CM: V58.61  8/23/2017 Yes    On coumadin     HTN (hypertension), benign (Chronic) ICD-10-CM: I10  ICD-9-CM: 401.1  8/23/2017 Yes Peripheral vascular disease ([facility] [address] (Chronic) ICD-10-CM: I73.9  ICD-9-CM: 443.9  8/23/2017 Yes        Dyslipidemia (Chronic) ICD-10-CM: E78.5  ICD-9-CM: 272.4  8/23/2017 Yes        History of mechanical aortic valve replacement (Chronic) ICD-10-CM: Z95.2  ICD-9-CM: V43.3  8/23/2017 Yes     Placement 2000, on chronicCoumadin             Centrilobular emphysema ([facility] [address] (Chronic) ICD-10-CM: J43.2  ICD-9-CM: 492.8  8/23/2017 Yes    With last PFTs FVC 1.70 L or 44% predicted, FEV1 0.86 L or 29% predicted, FEV1/FVC 51%. This study was a postbronchodilator study performed at the [address] [place] [address] in [facility] [place] on 8/22/2016    Continue BD, steroids     Ischemic cardiomyopathy (Chronic) ICD-10-CM: I25.5  ICD-9-CM: 414.8  8/23/2017 Yes     8/23/17 ECHO:  EF 40 % to 45 %. There were no regional wall motion abnormalities. Moderate hypokinesis of the mid-apical anterior and apical wall(s). * (Principal)Atrial flutter with rapid ventricular response (HCC) ICD-10-CM: I48.92  ICD-9-CM: 427.32  8/23/2017 Yes    Rate controlled, per primary           Plan:  (Medical Decision Making)     --continue Albuterol, Pulmicort, Spiriva  --continue Solu-Medrol 40mg BID   --PT for mobility  --completed antibiotics yesterday  --continue to wean daytime oxygen as tolerated. --Spoke with Santiago Burger today and is working on [facility] for home use when discharged. Due to the severity of the patient's chronic respiratory failure, BiPAP does not supply the adequate pressure or volume to effectively ventilate the patient. Non-invasive ventilation is required for both daytime and nighttime use for life sustaining measures. Failure to adequately ventilate patient with non-invasive ventilation could result in serious harm and/or death. More than 50% of the time documented was spent in face-to-face contact with the patient and in the care of the patient on the floor/unit where the patient is located.     Marla Freire NP      Lungs: Distant  Heart:  RRR with no Murmur/Rubs/Gallops    Additional Comments:  Getting trilogy , continue steroids     I have spoken with and examined the patient. I agree with the above assessment and plan as documented.     Albert Rhoades MD

## 2017-09-07 NOTE — PROGRESS NOTES
Call to [facility], admission IRC to see if they could re-eval and if order needed. Awaiting return call.

## 2017-09-07 NOTE — PROGRESS NOTES
Problem: Falls - Risk of  Goal: *Absence of Falls  Document Lauren Fall Risk and appropriate interventions in the flowsheet.    Outcome: Progressing Towards Goal  Fall Risk Interventions:  Mobility Interventions: Bed/chair exit alarm, Communicate number of staff needed for ambulation/transfer, Strengthening exercises (ROM-active/passive), PT Consult for mobility concerns, PT Consult for assist device competence     Mentation Interventions: Bed/chair exit alarm, Adequate sleep, hydration, pain control, Door open when patient unattended     Medication Interventions: Bed/chair exit alarm, Evaluate medications/consider consulting pharmacy, Patient to call before getting OOB     Elimination Interventions: Bed/chair exit alarm, Call light in reach, Patient to call for help with toileting needs     History of Falls Interventions: Bed/chair exit alarm, Door open when patient unattended

## 2017-09-07 NOTE — PROGRESS NOTES
[facility] CRITICAL CARE OUTREACH NURSE PROGRESS REPORT      SUBJECTIVE: Called to assess patient secondary to recent transfer from ICU. MEWS Score: 1 (09/07/17 1719)  Vitals:    09/07/17 1355 09/07/17 1438 09/07/17 1514 09/07/17 1719   BP:  119/49  125/55   Pulse:  64  66   Resp:  16  16   Temp:  98 °F (36.7 °C)  97 °F (36.1 °C)   SpO2: 96% (!) 77% 96% 96%   Weight:       Height:  5' 6\" (1.676 m)             LAB DATA:    Recent Labs      09/07/17   0429 09/06/17   0420  09/05/17   0352   NA  136  137  137   K  4.4  4.6  4.8   CL  98  94*  92*   CO2  34*  38*  45*   AGAP  4*  5*  0*   GLU  147*  169*  129*   BUN  36*  49*  70*   CREA  0.82  0.92  1.28   GFRAA  >60  >60  >60   GFRNA  >60  >60  59*   CA  8.6  9.4  9.0        Recent Labs      09/07/17   0429 09/06/17   0420  09/05/17   0352   WBC  9.9  11.1  12.0*   HGB  10.6*  10.0*  10.8*   HCT  33.3*  31.7*  34.0*   PLT  105*  97*  79*          OBJECTIVE: On arrival to room, I found patient to be resting in bed. Primary RN at bedside. Pain Assessment  Pain Intensity 1: 0 (09/07/17 1620)  Pain Location 1: Back  Pain Intervention(s) 1: Medication (see MAR)  Patient Stated Pain Goal: 0                                 ASSESSMENT:  Patient is alert, able to communicate needs. /55. HR 71. A-flutter on the monitor. Complaining of dry mouth/throat, unable to cough phlegm up. Mouth care given. Respirations slightly shallow. Lungs coarse. O2 sat wnl per primary RN. NAD. PLAN:  Will continue to monitor per out reach protocol.

## 2017-09-07 NOTE — PROGRESS NOTES
Pt asked to wait for placement to BIPAP for sleep. Currently his 95% saturated on ROOM AIR and 100% saturated on 2 l/m NC without any signs of resp distress. Will continue to monitor.

## 2017-09-07 NOTE — PROGRESS NOTES
Pt already screened per Aster Ruiz. PPD placed and now PT/OT/Speech re-eval since pt transferred to CCU. IRC/9th floor was original POC. Will need to get them to re-eval as well. CM to follow and assist with d/c needs.

## 2017-09-07 NOTE — PROGRESS NOTES
Nick Asencio returned call from [facility]. States they have been following and will pick him back up since transferring to floor. Will see if meets criteria for IRC/9th floor. Aware he is in 319.

## 2017-09-07 NOTE — PROGRESS NOTES
Fall precautions in place. Yellow gripper socks on. Instructed to only get OOB with assistance - patient is on bedrest and is very weak. Encouraged repositioning. Voiced understanding. Call light in reach.

## 2017-09-07 NOTE — PROGRESS NOTES
Hospitalist Progress Note     Admit Date:  2017  1:11 PM   Name:  Daniel Campor   Age:  67 y.o.  :  1945   MRN:  [id number]   PCP:  Lizeth Gutierres MD  Treatment Team: Attending Provider: Jarrett Cedillo MD; Consulting Provider: Jarrett Cedillo MD; Utilization Review: Fidel Looney; Consulting Provider: Sagar Rahman MD; Consulting Provider: Mina Enamorado MD; Care Manager: Timur Chang; Consulting Provider: Nikkie Caraballo MD; Consulting Provider: Ruth Hernandez MD    Subjective:     Denise Kong is a 68 yo male who presented with dyspnea on a background of mechanical AVR in , chronic anticoagulation with coumadin, chronic systolic CHF/ICM EF 64%, COPD, PVD, DM II, HTN, iron deficiency, and dyslipidemia. He is currently being treated for hypercapnic respiratory failure (s/p intubation and extubation) and atrial flutter. This morning, he denied the following: cough, fever or chills. He reported improvement in dyspnea.      Objective:   Patient Vitals for the past 24 hrs:   Temp Pulse Resp BP SpO2   17 0935 - - - - 95 %   17 0929 - 67 (!) 40 115/59 95 %   17 0859 - 91 16 (!) 127/93 99 %   17 0830 - 95 16 144/63 98 %   17 0759 - 66 16 126/70 99 %   17 0729 97.2 °F (36.2 °C) 68 16 125/59 97 %   17 0559 - 74 16 117/56 98 %   17 0529 - 60 15 105/53 98 %   17 0459 - 62 14 104/53 97 %   17 0429 - 60 17 106/55 97 %   17 0359 97.3 °F (36.3 °C) 62 29 112/74 98 %   17 0329 - 63 15 130/65 98 %   17 0325 - - - - 99 %   17 0323 - - - - 99 %   17 0301 - 86 18 139/60 99 %   17 0229 - (!) 59 15 107/55 96 %   17 0200 - (!) 59 15 109/54 96 %   17 0129 - 60 15 97/54 95 %   17 0100 - 60 16 92/55 96 %   17 0029 - 61 15 119/57 98 %   17 0028 - - - - 100 %   17 2359 97.8 °F (36.6 °C) 67 17 113/59 100 %   17 2343 - - - - 100 %   17 2329 - 77 17 98/55 94 % 09/06/17 2259 - 77 21 110/56 94 %   09/06/17 2229 - (!) 105 14 96/53 95 %   09/06/17 2200 - 64 21 122/59 94 %   09/06/17 2130 - 66 19 124/53 100 %   09/06/17 2100 - 69 19 138/62 100 %   09/06/17 2030 - - - - 100 %   09/06/17 2029 - 82 16 131/84 100 %   09/06/17 1859 98 °F (36.7 °C) 68 21 126/59 97 %   09/06/17 1829 - 78 17 118/58 100 %   09/06/17 1803 - 78 24 111/58 98 %   09/06/17 1729 - 72 16 124/57 95 %   09/06/17 1659 - 61 17 117/53 96 %   09/06/17 1629 - 84 17 103/68 98 %   09/06/17 1620 97.3 °F (36.3 °C) (!) 101 18 108/65 98 %   09/06/17 1600 - (!) 113 - 121/74 98 %   09/06/17 1535 - (!) 102 17 - 100 %   09/06/17 1530 - (!) 104 18 - 100 %   09/06/17 1529 - (!) 102 20 133/76 (!) 87 %   09/06/17 1459 - 78 17 126/70 98 %   09/06/17 1429 - 78 18 128/74 100 %   09/06/17 1414 - - - - 96 %   09/06/17 1400 - 77 19 131/71 100 %   09/06/17 1329 - 78 19 131/75 99 %   09/06/17 1259 - 78 19 141/68 99 %   09/06/17 1229 - 74 20 131/63 100 %   09/06/17 1159 - 78 18 124/66 100 %     Oxygen Therapy  O2 Sat (%): 95 % (09/07/17 0935)  Pulse via Oximetry: 63 beats per minute (09/07/17 0929)  O2 Device: BIPAP (09/07/17 0729)  O2 Flow Rate (L/min): 2 l/min (09/07/17 0935)  O2 Temperature: 87.6 °F (30.9 °C) (09/04/17 1140)  FIO2 (%): 28 % (09/07/17 0729)    Intake/Output Summary (Last 24 hours) at 09/07/17 1130  Last data filed at 09/07/17 0926   Gross per 24 hour   Intake           1943.5 ml   Output             2650 ml   Net           -706.5 ml           General appearance: NAD, conversant  Eyes: anicteric sclerae, moist conjunctivae; no lid-lag  ENT: Atraumatic; oropharynx clear with moist mucous membranes and no mucosal ulcerations; normal hard and soft palate  Neck: Trachea midline   FROM, supple, no thyromegaly or lymphadenopathy  Lungs: CTA, with normal respiratory effort and no intercostal retractions  CV: S1,S2 present, no added murmurs  Abdomen: Soft, non-tender; no masses   Extremities: No peripheral edema or extremity lymphadenopathy  Skin: Normal temperature, turgor and texture; no subcutaneous nodules  Psych: Appropriate affect, alert and oriented to person, place and time    Data Review:  I have reviewed all labs, meds, telemetry events, and studies from the last 24 hours. Recent Results (from the past 24 hour(s))   GLUCOSE, POC    Collection Time: 09/06/17  5:08 PM   Result Value Ref Range    Glucose (POC) 177 (H) 65 - 100 mg/dL   GLUCOSE, POC    Collection Time: 09/07/17 12:17 AM   Result Value Ref Range    Glucose (POC) 154 (H) 65 - 100 mg/dL   PROTHROMBIN TIME + INR    Collection Time: 09/07/17  4:29 AM   Result Value Ref Range    Prothrombin time 14.0 (H) 9.6 - 12.0 sec    INR 1.3 (H) 0.9 - 1.2     METABOLIC PANEL, BASIC    Collection Time: 09/07/17  4:29 AM   Result Value Ref Range    Sodium 136 136 - 145 mmol/L    Potassium 4.4 3.5 - 5.1 mmol/L    Chloride 98 98 - 107 mmol/L    CO2 34 (H) 21 - 32 mmol/L    Anion gap 4 (L) 7 - 16 mmol/L    Glucose 147 (H) 65 - 100 mg/dL    BUN 36 (H) 8 - 23 MG/DL    Creatinine 0.82 0.8 - 1.5 MG/DL    GFR est AA >60 >60 ml/min/1.73m2    GFR est non-AA >60 >60 ml/min/1.73m2    Calcium 8.6 8.3 - 10.4 MG/DL   CBC WITH AUTOMATED DIFF    Collection Time: 09/07/17  4:29 AM   Result Value Ref Range    WBC 9.9 4.3 - 11.1 K/uL    RBC 3.56 (L) 4.23 - 5.67 M/uL    HGB 10.6 (L) 13.6 - 17.2 g/dL    HCT 33.3 (L) 41.1 - 50.3 %    MCV 93.8 79.6 - 97.8 FL    MCH 29.7 26.1 - 32.9 PG    MCHC 31.7 31.4 - 35.0 g/dL    RDW 15.8 (H) 11.9 - 14.6 %    PLATELET 854 (L) 738 - 450 K/uL    MPV 11.7 10.8 - 14.1 FL    DF AUTOMATED      NEUTROPHILS 91 (H) 43 - 78 %    LYMPHOCYTES 6 (L) 13 - 44 %    MONOCYTES 3 (L) 4.0 - 12.0 %    EOSINOPHILS 0 (L) 0.5 - 7.8 %    BASOPHILS 0 0.0 - 2.0 %    IMMATURE GRANULOCYTES 0.2 0.0 - 5.0 %    ABS. NEUTROPHILS 8.2 1.7 - 8.2 K/UL    ABS. LYMPHOCYTES 0.5 0.5 - 4.6 K/UL    ABS. MONOCYTES 0.3 0.1 - 1.3 K/UL    ABS. EOSINOPHILS 0.0 0.0 - 0.8 K/UL    ABS.  BASOPHILS 0.0 0.0 - 0.2 K/UL ABS. IMM. GRANS. 0.0 0.0 - 0.5 K/UL   GLUCOSE, POC    Collection Time: 09/07/17  5:38 AM   Result Value Ref Range    Glucose (POC) 168 (H) 65 - 100 mg/dL        All Micro Results     Procedure Component Value Units Date/Time    CULTURE, BLOOD [[id number]] Collected:  08/25/17 1600    Order Status:  Completed Specimen:  Blood from Blood Updated:  08/30/17 0643     Special Requests: LEFT HAND        Culture result: NO GROWTH 5 DAYS       CULTURE, BLOOD [[id number]] Collected:  08/25/17 2145    Order Status:  Completed Specimen:  Blood from Blood Updated:  08/30/17 0643     Special Requests: RIGHT HAND        Culture result: NO GROWTH 5 DAYS       MRSA SCREEN - PCR (NASAL) [[id number]]  (Abnormal) Collected:  08/29/17 1556    Order Status:  Completed Specimen:  Nasal from Nasal Swab Updated:  08/29/17 1900     Special Requests: NO SPECIAL REQUESTS        Culture result:         MRSA target DNA is detected (presumptive positive for MRSA colonization).  (A)            RESULTS VERIFIED, PHONED TO AND READ BACK BY  DAMARIS GARNICA RN ON 08/29/2017 AT 1851 [facility]      CULTURE, URINE [[id number]] Collected:  08/25/17 2011    Order Status:  Completed Specimen:  Urine from Clean catch Updated:  08/28/17 0653     Special Requests: NO SPECIAL REQUESTS        Culture result:         [id number] COLONIES/mL MIXED SKIN VANNESSA ISOLATED          Current Meds:  Current Facility-Administered Medications   Medication Dose Route Frequency    warfarin (COUMADIN) tablet 5 mg  5 mg Oral QHS    [START ON 9/8/2017] predniSONE (DELTASONE) tablet 40 mg  40 mg Oral DAILY WITH BREAKFAST    [START ON 9/15/2017] predniSONE (DELTASONE) tablet 30 mg  30 mg Oral DAILY WITH BREAKFAST    [START ON 9/22/2017] predniSONE (DELTASONE) tablet 20 mg  20 mg Oral DAILY WITH BREAKFAST    [START ON 9/29/2017] predniSONE (DELTASONE) tablet 10 mg  10 mg Oral DAILY WITH BREAKFAST    carvedilol (COREG) tablet 12.5 mg  12.5 mg Oral BID WITH MEALS    midazolam (VERSED) injection 2 mg  2 mg IntraVENous Q2H PRN    chlorhexidine (PERIDEX) 0.12 % mouthwash 15 mL  15 mL Oral BID    morphine injection 2 mg  2 mg IntraVENous Q4H PRN    amiodarone (CORDARONE) tablet 400 mg  400 mg Oral Q12H    insulin lispro (HUMALOG) injection   SubCUTAneous Q6H    NUTRITIONAL SUPPORT ELECTROLYTE PRN ORDERS   Does Not Apply PRN    famotidine (PF) (PEPCID) 20 mg in sodium chloride 0.9 % 10 mL injection  20 mg IntraVENous DAILY    mupirocin (BACTROBAN) 2 % ointment   Both Nostrils BID    0.9% sodium chloride infusion 250 mL  250 mL IntraVENous PRN    acetaminophen (TYLENOL) suppository 650 mg  650 mg Rectal Q4H PRN    sodium chloride (NS) flush 20 mL  20 mL InterCATHeter Q8H    heparin (porcine) pf 600 Units  600 Units InterCATHeter Q8H    sodium chloride (NS) flush 20 mL  20 mL InterCATHeter PRN    heparin (porcine) pf 600 Units  600 Units InterCATHeter PRN    albuterol (PROVENTIL VENTOLIN) nebulizer solution 2.5 mg  2.5 mg Nebulization Q4H PRN    sacubitril-valsartan (ENTRESTO) 24-26 mg tablet 1 Tab  1 Tab Oral Q12H    dextrose (D50W) injection syrg 25 g  25 g IntraVENous PRN    ferrous sulfate tablet 325 mg  1 Tab Oral DAILY WITH BREAKFAST    acetaminophen (TYLENOL) tablet 650 mg  650 mg Oral Q6H PRN    gabapentin (NEURONTIN) capsule 600 mg  600 mg Oral TID    albuterol (PROVENTIL HFA, VENTOLIN HFA, PROAIR HFA) inhaler 2 Puff  2 Puff Inhalation Q4H PRN    tiotropium (SPIRIVA) inhalation capsule 18 mcg  1 Cap Inhalation DAILY    sodium chloride (NS) flush 5-10 mL  5-10 mL IntraVENous Q8H    sodium chloride (NS) flush 5-10 mL  5-10 mL IntraVENous PRN    nitroglycerin (NITROSTAT) tablet 0.4 mg  0.4 mg SubLINGual Q5MIN PRN    ondansetron (ZOFRAN) injection 4 mg  4 mg IntraVENous Q4H PRN    budesonide (PULMICORT) 500 mcg/2 ml nebulizer suspension  500 mcg Nebulization BID RT    And    albuterol CONCENTRATE 2.5mg/0.5 mL neb soln  2.5 mg Nebulization Q6H RT       Other Studies (last 24 hours):  No results found. Assessment and Plan:     Hospital Problems as of 9/7/2017  Date Reviewed: 9/7/2017          Codes Class Noted - Resolved POA    COPD (chronic obstructive pulmonary disease) (HCC) (Chronic) ICD-10-CM: J44.9  ICD-9-CM: 777  8/30/2017 - Present Yes        Acute respiratory failure with hypercapnia (HCC) ICD-10-CM: J96.02  ICD-9-CM: 518.81  8/29/2017 - Present No        History of tobacco use (Chronic) ICD-10-CM: M32.129  ICD-9-CM: V15.82  8/29/2017 - Present Yes        Hyponatremia ICD-10-CM: E87.1  ICD-9-CM: 276.1  8/29/2017 - Present Yes        Acute encephalopathy ICD-10-CM: G93.40  ICD-9-CM: 348.30  8/29/2017 - Present Yes        Thrombocytopenia ([facility] [address] ICD-10-CM: D69.6  ICD-9-CM: 287.5  8/26/2017 - Present Yes        Anticoagulant long-term use (Chronic) ICD-10-CM: Z79.01  ICD-9-CM: V58.61  8/23/2017 - Present Yes        HTN (hypertension), benign (Chronic) ICD-10-CM: I10  ICD-9-CM: 401.1  8/23/2017 - Present Yes        Peripheral vascular disease ([facility] [address] (Chronic) ICD-10-CM: I73.9  ICD-9-CM: 443.9  8/23/2017 - Present Yes        Dyslipidemia (Chronic) ICD-10-CM: E78.5  ICD-9-CM: 272.4  8/23/2017 - Present Yes        History of mechanical aortic valve replacement (Chronic) ICD-10-CM: Z95.2  ICD-9-CM: V43.3  8/23/2017 - Present Yes    Overview Signed 8/30/2017 10:30 AM by Truman Kirby NP     Placement 2000, on chronicCoumadin             Centrilobular emphysema ([facility] [address] (Chronic) ICD-10-CM: J43.2  ICD-9-CM: 492.8  8/23/2017 - Present Yes        Ischemic cardiomyopathy (Chronic) ICD-10-CM: I25.5  ICD-9-CM: 414.8  8/23/2017 - Present Yes    Overview Signed 8/30/2017 10:29 AM by Truman Kirby NP     8/23/17 ECHO:  EF 40 % to 45 %. There were no regional wall motion abnormalities. Moderate hypokinesis of the mid-apical anterior and apical wall(s).              * (Principal)Atrial flutter with rapid ventricular response ([address] ICD-10-CM: I48.92  ICD-9-CM: 427.32  8/23/2017 - Present Yes PLAN:    Atrial fibrillation with RVR  S/p Cardioversion and amiodarone  Plan  Continue amiodarone, Coumadin      Acute COPD exacerbation  Resolving inpatient  S/p intubation and extubation for hypercapnic respiratory failure  Plan  Continue albuterol, pulmicort, prednisone   Continue ceftriaxone (day 7/7)  Continue steroids  Pulmonology on board     Thrombocytopenia  Daily CBC      Chronic systolic CHF  Continue Entresto, Carvedilol      ITP  Plan  Continue  steroids         DC planning/Dispo:  home in 48 hours  DVT ppx:  Coumadin         Signed:  Jeri Barbour MD

## 2017-09-07 NOTE — PROGRESS NOTES
Bedside and Verbal shift change report given to john (oncoming nurse) by [facility] (offgoing nurse). Report included the following information SBAR, Kardex, MAR and Recent Results.

## 2017-09-07 NOTE — PROGRESS NOTES
Spoke with patient's wife. Patient has a history of barretts esophagus, hx of an esophageal stricture that was ballooned open, and an esophageal ulcer.

## 2017-09-07 NOTE — PROGRESS NOTES
Problem: Self Care Deficits Care Plan (Adult)  Goal: Reassessment  And goals modified 9/7/2017: *Acute Goals and Plan of Care (Insert Text)  1. Dimitris Hamilton will follow 1-2 step commands at least 75% of the time in prep for ADL. 2. Dimitris Hamilton will complete self-feeding with standby assistance when appropriate to eat. 3. Dimitris Hamilton will complete grooming/oral care with standby assistance in supported sitting at sink level. 4. Dimitris Hamilton will complete toilet transfers with minimal assistance. 5. Patient will complete upper body bathing with setup assist.  6. Patient will complete lower body bathing with minimal assist.  7. Patient will participate in OT ther activity/exercises for 25 mins to increase activity tolerance for ADLs. Time frame: 7 more visits      OCCUPATIONAL THERAPY: Daily Note, Re-evaluation, Treatment Day: Day of Assessment and PM 9/7/2017  INPATIENT: Hospital Day: 16  [place] / [place] / Product Type: Medicare /      NAME/AGE/GENDER: Dimitris Hamilton is a 67 y.o. male     PRIMARY DIAGNOSIS:  Atrial flutter with rapid ventricular response (HCC) Atrial flutter with rapid ventricular response (HCC) Atrial flutter with rapid ventricular response (HCC)        ICD-10: Treatment Diagnosis:        · Generalized Muscle Weakness (M62.81)  · Other lack of cordination (R27.8)  · Difficulty in walking, Not elsewhere classified (R26.2)   Precautions/Allergies:         Levaquin [levofloxacin] and Metformin       ASSESSMENT:      Mr. Alison Pride presents in CCU supine in bed with wife at bedside, agreeable to OT reevaluation. PEREZ Michel cleared pt for Reeval and reports pt is transferring to 319 as soon as the bed is available. Pt alert, but not oriented to time \"I have been in here about 2 weeks\", which is correct, but got date as still in August.  Wife reports pt has a dry sense of humor and cracks corny jokes usually. Pt quiet and wife did most of the talking. Pt did not even smile during the reeval and was rather flat. Pt up to sitting on edge of bed with minimal assist.  Reported dizzy with sitting which passed in under 2 minutes. Pt has NG tube in place but is scheduled for [facility] 9/8/2017. Pt completed MMT with OT from edge of bed, with B UE WFLs for basic self care tasks but overall generally weak. Able to wash face and hands after setup. Rinsed mouth. Sit to stand with min A and cues to push from the edge of the bed. Once standing, again dizziness occurred but resolved in less than 30 seconds. Pt stood for about 5 minutes while wife changed bed pad and RN cleaned pt after BM found when pt stood. Pt then took steps to head of bed with min A. Sat with CGA and verbal cues to reach back. Back to supine with minimal assist, scooted to head of bed with Max A x2. Repositioned and rolled for further cleaning. Left with all needs in reach and wife at bedside. Asked wife to bring in glasses and dentures as pt always wears glasses and talks better with dentures in place. Pt is motivated he reports to be independent again. Has had prolonged hospital stay and is generally very weak. Pt would benefit from [facility] and was planning to transfer there before he had this last setback. He currently requires significant assistance with all activities of daily living and instrumental activities of daily living. Jesenia Krishna presents with the following problems and would benefit from skilled occupational therapy to maximize independence with self-care, instrumental activities of daily living, and functional transfers/mobility for activities of daily living/instrumental activities of daily living via the above stated goals. Discussed with Maryjo Perez, . This section established at most recent assessment   PROBLEM LIST (Impairments causing functional limitations):  1. Decreased Strength  2. Decreased ADL/Functional Activities  3. Decreased Transfer Abilities  4.  Decreased Ambulation Ability/Technique  5. Decreased Balance  6. Decreased Activity Tolerance  7. Decreased Pacing Skills  8. Increased Fatigue  9. Increased Shortness of Breath  10. Decreased Flexibility/Joint Mobility  11. Decreased Cognition    INTERVENTIONS PLANNED: (Benefits and precautions of occupational therapy have been discussed with the patient.)  1. Activities of daily living training  2. Cognitive training  3. Neuromuscular re-eduation  4. Therapeutic activity  5. Therapeutic exercise  6. Balance training  7. Energy conservation education      TREATMENT PLAN: Frequency/Duration: Follow patient 3 times/week to address above goals. Rehabilitation Potential For Stated Goals: GOOD      RECOMMENDED REHABILITATION/EQUIPMENT: (at time of discharge pending progress): Continue Skilled Therapy and Discussed with Case Management. Would benefit from further skilled therapy                      OCCUPATIONAL PROFILE AND HISTORY:   History of Present Injury/Illness (Reason for Referral):  Per MD H & P: Jackqulyn Paget is a 67 y.o. WM with h/o mechanical AVR 2000 on chronic AC with coumadin, chronic systolic CHF/ICM EF 48%, COPD, PVD, DM II, HTN and dyslipidemia who developed worsening dyspnea on monday 8/21. He presented to the hospital today reporting worsening SOB and was found to be in atrial flutter with 's/ He was given Cardizem 10 mg IV bolus followed by IV drip 5 mg/hr. He is still in 140's with lower BP and [facility] was called to evaluate for admission. Pt reports his pulse oximetry monitor showed  on Monday and remained high until today. He felt he had COPD exacerbation and treated it with repeat inhaler and nebulizer treatments. He also reports h/o PRATIK with iron started last year by the 2000 [facility]. He has been having dark stools but yesterday had rapid onset of uncontrollable copious amount of diarrhea that was black. CBC still pending.    He reportedly had a LHC prior to AVR in 2000 that showed no CAD. He had nuclear stress test 11/2016 showing EF 31%, large inferolateral and anteroapical scar. Past Medical History/Comorbidities:   Mr. Babar Vigil  has a past medical history of Acute diastolic CHF (congestive heart failure) ([facility] [address] (9/15/2016); Arthritis; Chicken pox; Coronary artery disease (4/16/2014); DJD (degenerative joint disease); Emphysema; aortic valve replacement, mechanical; Hypercholesterolemia; Hypertension; Palpitations; Pneumonia; and Systolic CHF, acute ([facility] [address] (11/15/2016). Mr. Babar Vigil  has a past surgical history that includes aortic valve replacement (N/A, 2000) and heart catheterization (07/21/2004). Social History/Living Environment: Lives with wife. Home Environment: Private residence  # Steps to Enter: 3  Rails to Enter: Yes  Hand Rails : Bilateral  One/Two Story Residence: One story  Living Alone: No  Support Systems: Spouse/Significant Other/Partner, Child(devyn)  Patient Expects to be Discharged to[de-identified] Private residence  Current DME Used/Available at Home: [address], rollator  Tub or Shower Type: Tub/Shower combination  Prior Level of Function/Work/Activity:  Typically independent with ADL/instrumental ADL including driving, cutting grass, always outdoors. Number of Personal Factors/Comorbidities that affect the Plan of Care: Brief history (0):  LOW COMPLEXITY   ASSESSMENT OF OCCUPATIONAL PERFORMANCE[de-identified]   Activities of Daily Living:           Basic ADLs (From Assessment) Complex ADLs (From Assessment)   Basic ADL  Feeding: Total assistance (NPO currently)  Oral Facial Hygiene/Grooming: Maximum assistance  Bathing: Maximum assistance  Upper Body Dressing: Maximum assistance  Lower Body Dressing: Total assistance  Toileting: Total assistance (roe in place) Instrumental ADL  Meal Preparation: Total assistance  Homemaking:  Total assistance  Medication Management: Total assistance  Financial Management: Moderate assistance   Grooming/Bathing/Dressing Activities of Daily Living Grooming  Grooming Assistance: Maximum assistance  Washing Face: Maximum assistance  Washing Hands: Maximum assistance  Brushing/Combing Hair: Maximum assistance  Cues: Verbal cues provided;Visual cues provided Cognitive Retraining  Orientation Retraining: Reorienting  Problem Solving: General alternative solution; Identifying the problem; Identifying the task  Executive Functions: Managing time;Regulating behavior  Organizing/Sequencing: Breaking task down;Prioritizing  Attention to Task: Single task  Maintains Attention For (Time): 1 minute  Following Commands: Follows one step commands/directions  Safety/Judgement: Decreased insight into deficits; Decreased awareness of need for safety;Decreased awareness of need for assistance; Fall prevention  Cues: Tactile cues provided;Verbal cues provided;Visual cues provided     Feeding  Feeding Assistance: Total assistance (dependent) (NPO, not safe to eat, [facility] for 9/8/2017)  Food to Mouth: Minimum assistance  Drink to Mouth: Minimum assistance     Toileting  Toileting Assistance: Total assistance(dependent)  Bladder Hygiene: Total assistance (dependent) (roe in place)  Bowel Hygiene: Total assistance (dependent) (wiped after BM in bed)  Clothing Management: Total assistance (dependent)  Cues: Physical assistance for pants down;Physical assistance for pants up; Tactile cues provided;Verbal cues provided;Visual cues provided     Functional Transfers  Toilet Transfer : Minimum assistance  Tub Transfer: Maximum assistance  Shower Transfer: Minimum assistance     Bed/Mat Mobility  Rolling: Minimum assistance  Supine to Sit: Minimum assistance  Sit to Supine: Minimum assistance  Sit to Stand: Contact guard assistance  Bed to Chair: Contact guard assistance; Additional time  Scooting:  Total assistance;Assist x2 (up in bed, CGA for scooting to edge of bed)          Most Recent Physical Functioning:   Gross Assessment:  AROM: Generally decreased, functional  Strength: Generally decreased, functional  Coordination: Generally decreased, functional  Sensation: Intact               Posture:     Balance:  Sitting: Impaired  Sitting - Static: Good (unsupported)  Sitting - Dynamic: Fair (occasional)  Standing: Impaired;Pull to stand; With support  Standing - Static: Fair;Constant support  Standing - Dynamic : Fair Bed Mobility:  Rolling: Minimum assistance  Supine to Sit: Minimum assistance  Sit to Supine: Minimum assistance  Scooting: Total assistance;Assist x2 (up in bed, CGA for scooting to edge of bed)  Wheelchair Mobility:     Transfers:  Sit to Stand: Contact guard assistance  Stand to Sit: Contact guard assistance  Bed to Chair: Contact guard assistance; Additional time                    Patient Vitals for the past 6 hrs:   BP SpO2 O2 Flow Rate (L/min) Pulse   09/07/17 0759 126/70 99 % - 66   09/07/17 0830 144/63 98 % - 95   09/07/17 0859 (!) 127/93 99 % - 91   09/07/17 0929 115/59 95 % - 67   09/07/17 0935 - 95 % 2 l/min -   09/07/17 0959 108/55 95 % - (!) 57   09/07/17 1029 (!) 85/61 98 % - 93   09/07/17 1112 102/56 100 % - (!) 58   09/07/17 1129 101/55 100 % - (!) 57   09/07/17 1200 114/55 100 % - 68   09/07/17 1229 114/57 99 % - (!) 57        Mental Status  Neurologic State: Alert  Orientation Level: Oriented to person, Oriented to place, Disoriented to time, Oriented to situation  Cognition: Follows commands  Perception: Appears intact  Perseveration: No perseveration noted  Safety/Judgement: Decreased insight into deficits, Decreased awareness of need for safety, Decreased awareness of need for assistance, Fall prevention                               Physical Skills Involved:  1. Range of Motion  2. Balance  3. Strength  4. Activity Tolerance  5. Gross Motor Control Cognitive Skills Affected (resulting in the inability to perform in a timely and safe manner):  1. Executive Function  2. Immediate Memory  3. Sustained Attention  4. Divided Attention  5.  Comprehension Psychosocial Skills Affected:  1. Habits/Routines  2. Environmental Adaptation  3. Social Interaction  4. Emotional Regulation  5. Self-Awareness  6. Awareness of Others  7. Social Roles   Number of elements that affect the Plan of Care: 3-5:  MODERATE COMPLEXITY   CLINICAL DECISION MAKIN[address] [place] [address] AM-PAC 6 Clicks   Daily Activity Inpatient Short Form  How much help from another person does the patient currently need. .. Total A Lot A Little None   1. Putting on and taking off regular lower body clothing? [X] 1   [ ] 2   [ ] 3   [ ] 4   2. Bathing (including washing, rinsing, drying)? [] 1   [x ] 2   [ ] 3   [ ] 4   3. Toileting, which includes using toilet, bedpan or urinal?   [X] 1   [ ] 2   [ ] 3   [ ] 4   4. Putting on and taking off regular upper body clothing? [] 1   [x ] 2   [ ] 3   [ ] 4   5. Taking care of personal grooming such as brushing teeth? [] 1   [x] 2   [ ] 3   [ ] 4   6. Eating meals? [ ] 1   [X] 2   [ ] 3   [ ] 4   © , Trustees of [address] [place] [address], under license to [employer]. All rights reserved    Score:  Initial: 7 Most Recent: 10 (Date:2017)     Interpretation of Tool:  Represents activities that are increasingly more difficult (i.e. Bed mobility, Transfers, Gait). Score 24 23 22-20 19-15 14-10 9-7 6       Modifier CH CI CJ CK CL CM CN         · Self Care:               - CURRENT STATUS:    CL - 60%-79% impaired, limited or restricted               - GOAL STATUS:           CI - 1%-19% impaired, limited or restricted               - D/C STATUS:                       ---------------To be determined---------------  [place] / [place] / Product Type: Medicare /       Medical Necessity:     · Patient demonstrates good rehab potential due to higher previous functional level.   Reason for Services/Other Comments:  · Patient continues to require skilled intervention due to decreased independence with ADL, instrumental ADL, and functional mobility for ADL. Use of outcome tool(s) and clinical judgement create a POC that gives a: MODERATE COMPLEXITY             TREATMENT:   (In addition to Assessment/Re-Assessment sessions the following treatments were rendered)      Pre-treatment Symptoms/Complaints:  Pt quiet and flat affect, but agreeable and following commands. Pain: Initial:   Pain Intensity 1: 0 (no complaint of pain during OT Reeval)  Post Session:  same      Self Care: (41 mins): Procedure(s) (per grid) utilized to improve and/or restore self-care/home management as related to bathing, toileting and grooming. Bed mobility, reaching for targets, standing balance, weight shifts, sit to stand and side stepping, rolling for toileting. Required moderate visual, verbal, manual, tactile and   cueing to facilitate activities of daily living skills and compensatory activities. Reevaluation: completed       Braces/Orthotics/Lines/Etc:   · IV, roe catheter, nasogastric tube and NC oxygen, CCU monitoring  Treatment/Session Assessment:    · Response to Treatment:  Tolerated well. Toileting, grooming, bed mobility, and ambulation with reassessment completed. · Interdisciplinary Collaboration:  · Occupational Therapist  · Registered Nurse  · After treatment position/precautions:  · Supine in bed, Bed alarm/tab alert on, Bed/Chair-wheels locked, Bed in low position, Call light within reach, RN notified, Family at bedside and Side rails x 3  · Compliance with Program/Exercises: Will assess as treatment progresses. Compliant today. · Recommendations/Intent for next treatment session: \"Next visit will focus on advancements to more challenging activities\".   Total Treatment Duration: 41 mins  OT Patient Time In/Time Out  Time In: 4411  Time Out: [address] [place] OT  Darien Villalta, MS, OTR/L

## 2017-09-08 ENCOUNTER — APPOINTMENT (OUTPATIENT)
Dept: GENERAL RADIOLOGY | Age: 72
DRG: 308 | End: 2017-09-08
Attending: INTERNAL MEDICINE
Payer: MEDICARE

## 2017-09-08 PROBLEM — Z22.322 MRSA NASAL COLONIZATION: Status: ACTIVE | Noted: 2017-09-08

## 2017-09-08 LAB
ANION GAP SERPL CALC-SCNC: 4 MMOL/L (ref 7–16)
BASOPHILS # BLD: 0 K/UL (ref 0–0.2)
BASOPHILS NFR BLD: 0 % (ref 0–2)
BUN SERPL-MCNC: 38 MG/DL (ref 8–23)
CALCIUM SERPL-MCNC: 8.8 MG/DL (ref 8.3–10.4)
CHLORIDE SERPL-SCNC: 99 MMOL/L (ref 98–107)
CO2 SERPL-SCNC: 34 MMOL/L (ref 21–32)
CREAT SERPL-MCNC: 0.83 MG/DL (ref 0.8–1.5)
DIFFERENTIAL METHOD BLD: ABNORMAL
EOSINOPHIL # BLD: 0 K/UL (ref 0–0.8)
EOSINOPHIL NFR BLD: 0 % (ref 0.5–7.8)
ERYTHROCYTE [DISTWIDTH] IN BLOOD BY AUTOMATED COUNT: 16 % (ref 11.9–14.6)
GLUCOSE BLD STRIP.AUTO-MCNC: 132 MG/DL (ref 65–100)
GLUCOSE BLD STRIP.AUTO-MCNC: 135 MG/DL (ref 65–100)
GLUCOSE BLD STRIP.AUTO-MCNC: 140 MG/DL (ref 65–100)
GLUCOSE BLD STRIP.AUTO-MCNC: 144 MG/DL (ref 65–100)
GLUCOSE SERPL-MCNC: 123 MG/DL (ref 65–100)
HCT VFR BLD AUTO: 39.3 % (ref 41.1–50.3)
HGB BLD-MCNC: 12.3 G/DL (ref 13.6–17.2)
IMM GRANULOCYTES # BLD: 0 K/UL (ref 0–0.5)
IMM GRANULOCYTES NFR BLD: 0.3 % (ref 0–5)
INR PPP: 1.2 (ref 0.9–1.2)
LYMPHOCYTES # BLD: 0.9 K/UL (ref 0.5–4.6)
LYMPHOCYTES NFR BLD: 9 % (ref 13–44)
MCH RBC QN AUTO: 29 PG (ref 26.1–32.9)
MCHC RBC AUTO-ENTMCNC: 31.4 G/DL (ref 31.4–35)
MCV RBC AUTO: 92.5 FL (ref 79.6–97.8)
MONOCYTES # BLD: 0.7 K/UL (ref 0.1–1.3)
MONOCYTES NFR BLD: 7 % (ref 4–12)
NEUTS SEG # BLD: 7.8 K/UL (ref 1.7–8.2)
NEUTS SEG NFR BLD: 84 % (ref 43–78)
PLATELET # BLD AUTO: 97 K/UL (ref 150–450)
PMV BLD AUTO: 12.5 FL (ref 10.8–14.1)
POTASSIUM SERPL-SCNC: 4.3 MMOL/L (ref 3.5–5.1)
PROTHROMBIN TIME: 13.5 SEC (ref 9.6–12)
RBC # BLD AUTO: 4.25 M/UL (ref 4.23–5.67)
SODIUM SERPL-SCNC: 137 MMOL/L (ref 136–145)
WBC # BLD AUTO: 10.3 K/UL (ref 4.3–11.1)

## 2017-09-08 PROCEDURE — 74011000250 HC RX REV CODE- 250: Performed by: INTERNAL MEDICINE

## 2017-09-08 PROCEDURE — 99232 SBSQ HOSP IP/OBS MODERATE 35: CPT | Performed by: INTERNAL MEDICINE

## 2017-09-08 PROCEDURE — 65660000000 HC RM CCU STEPDOWN

## 2017-09-08 PROCEDURE — 74011250637 HC RX REV CODE- 250/637: Performed by: INTERNAL MEDICINE

## 2017-09-08 PROCEDURE — 85025 COMPLETE CBC W/AUTO DIFF WBC: CPT | Performed by: PHYSICIAN ASSISTANT

## 2017-09-08 PROCEDURE — 74011250636 HC RX REV CODE- 250/636: Performed by: INTERNAL MEDICINE

## 2017-09-08 PROCEDURE — 94640 AIRWAY INHALATION TREATMENT: CPT

## 2017-09-08 PROCEDURE — 92611 MOTION FLUOROSCOPY/SWALLOW: CPT

## 2017-09-08 PROCEDURE — 80048 BASIC METABOLIC PNL TOTAL CA: CPT | Performed by: PHYSICIAN ASSISTANT

## 2017-09-08 PROCEDURE — 74011000255 HC RX REV CODE- 255: Performed by: INTERNAL MEDICINE

## 2017-09-08 PROCEDURE — 74011636637 HC RX REV CODE- 636/637: Performed by: NURSE PRACTITIONER

## 2017-09-08 PROCEDURE — 85610 PROTHROMBIN TIME: CPT | Performed by: PHYSICIAN ASSISTANT

## 2017-09-08 PROCEDURE — 77030018798 HC PMP KT ENTRL FED COVD -A

## 2017-09-08 PROCEDURE — 82962 GLUCOSE BLOOD TEST: CPT

## 2017-09-08 PROCEDURE — 77010033678 HC OXYGEN DAILY

## 2017-09-08 PROCEDURE — 74230 X-RAY XM SWLNG FUNCJ C+: CPT

## 2017-09-08 PROCEDURE — 94760 N-INVAS EAR/PLS OXIMETRY 1: CPT

## 2017-09-08 PROCEDURE — 74011250637 HC RX REV CODE- 250/637: Performed by: NURSE PRACTITIONER

## 2017-09-08 RX ORDER — WARFARIN SODIUM 5 MG/1
10 TABLET ORAL
Status: DISCONTINUED | OUTPATIENT
Start: 2017-09-08 | End: 2017-09-13

## 2017-09-08 RX ORDER — ENOXAPARIN SODIUM 100 MG/ML
1 INJECTION SUBCUTANEOUS EVERY 24 HOURS
Status: DISCONTINUED | OUTPATIENT
Start: 2017-09-08 | End: 2017-09-11 | Stop reason: ALTCHOICE

## 2017-09-08 RX ADMIN — AMIODARONE HYDROCHLORIDE 400 MG: 200 TABLET ORAL at 08:44

## 2017-09-08 RX ADMIN — SODIUM CHLORIDE, PRESERVATIVE FREE 600 UNITS: 5 INJECTION INTRAVENOUS at 14:49

## 2017-09-08 RX ADMIN — WARFARIN SODIUM 10 MG: 5 TABLET ORAL at 21:33

## 2017-09-08 RX ADMIN — CARVEDILOL 12.5 MG: 12.5 TABLET, FILM COATED ORAL at 17:31

## 2017-09-08 RX ADMIN — CHLORHEXIDINE GLUCONATE 15 ML: 1.2 RINSE ORAL at 11:51

## 2017-09-08 RX ADMIN — ALBUTEROL SULFATE 2.5 MG: 2.5 SOLUTION RESPIRATORY (INHALATION) at 08:04

## 2017-09-08 RX ADMIN — SODIUM CHLORIDE, PRESERVATIVE FREE 600 UNITS: 5 INJECTION INTRAVENOUS at 21:33

## 2017-09-08 RX ADMIN — CARVEDILOL 12.5 MG: 12.5 TABLET, FILM COATED ORAL at 08:44

## 2017-09-08 RX ADMIN — Medication 20 ML: at 08:59

## 2017-09-08 RX ADMIN — AMIODARONE HYDROCHLORIDE 400 MG: 200 TABLET ORAL at 21:33

## 2017-09-08 RX ADMIN — Medication 20 ML: at 21:34

## 2017-09-08 RX ADMIN — PREDNISONE 40 MG: 20 TABLET ORAL at 08:45

## 2017-09-08 RX ADMIN — ENOXAPARIN SODIUM 70 MG: 100 INJECTION SUBCUTANEOUS at 17:28

## 2017-09-08 RX ADMIN — ALBUTEROL SULFATE 2.5 MG: 2.5 SOLUTION RESPIRATORY (INHALATION) at 14:36

## 2017-09-08 RX ADMIN — SODIUM CHLORIDE, PRESERVATIVE FREE 600 UNITS: 5 INJECTION INTRAVENOUS at 05:58

## 2017-09-08 RX ADMIN — SACUBITRIL AND VALSARTAN 1 TABLET: 24; 26 TABLET, FILM COATED ORAL at 21:33

## 2017-09-08 RX ADMIN — MUPIROCIN: 20 OINTMENT TOPICAL at 08:58

## 2017-09-08 RX ADMIN — BARIUM SULFATE 30 ML: 980 POWDER, FOR SUSPENSION ORAL at 11:02

## 2017-09-08 RX ADMIN — ALBUTEROL SULFATE 2.5 MG: 2.5 SOLUTION RESPIRATORY (INHALATION) at 19:40

## 2017-09-08 RX ADMIN — MUPIROCIN: 20 OINTMENT TOPICAL at 17:37

## 2017-09-08 RX ADMIN — BARIUM SULFATE 15 ML: 400 PASTE ORAL at 11:03

## 2017-09-08 RX ADMIN — Medication 10 ML: at 05:59

## 2017-09-08 RX ADMIN — GABAPENTIN 600 MG: 300 CAPSULE ORAL at 06:01

## 2017-09-08 RX ADMIN — GABAPENTIN 600 MG: 300 CAPSULE ORAL at 14:43

## 2017-09-08 RX ADMIN — SODIUM CHLORIDE, PRESERVATIVE FREE 600 UNITS: 5 INJECTION INTRAVENOUS at 08:59

## 2017-09-08 RX ADMIN — BARIUM SULFATE 15 ML: 400 SUSPENSION ORAL at 11:03

## 2017-09-08 RX ADMIN — Medication 20 ML: at 05:59

## 2017-09-08 RX ADMIN — FAMOTIDINE 20 MG: 10 INJECTION, SOLUTION INTRAVENOUS at 08:50

## 2017-09-08 RX ADMIN — SACUBITRIL AND VALSARTAN 1 TABLET: 24; 26 TABLET, FILM COATED ORAL at 08:44

## 2017-09-08 RX ADMIN — FERROUS SULFATE TAB 325 MG (65 MG ELEMENTAL FE) 325 MG: 325 (65 FE) TAB at 08:44

## 2017-09-08 RX ADMIN — Medication 5 ML: at 21:34

## 2017-09-08 RX ADMIN — BUDESONIDE 500 MCG: 0.5 INHALANT RESPIRATORY (INHALATION) at 08:04

## 2017-09-08 RX ADMIN — BUDESONIDE 500 MCG: 0.5 INHALANT RESPIRATORY (INHALATION) at 19:40

## 2017-09-08 RX ADMIN — GABAPENTIN 600 MG: 300 CAPSULE ORAL at 21:33

## 2017-09-08 NOTE — PROGRESS NOTES
Bedside and Verbal shift change report given to Trev Abernathy RN (oncoming nurse) by Mar Jolley RN (offgoing nurse). Report included the following information SBAR, Kardex, MAR and Recent Results.

## 2017-09-08 NOTE — PROGRESS NOTES
Hospitalist Progress Note     Admit Date:  2017  1:11 PM   Name:  Diane Go   Age:  67 y.o.  :  1945   MRN:  [id number]   PCP:  Julisa Lennon MD  Treatment Team: Attending Provider: Misael Christopher MD; Consulting Provider: Misael Christopher MD; Utilization Review: Ina Witt; Consulting Provider: Art Gómez MD; Care Manager: Hubert Derrick, Shaaron Schaumann; Consulting Provider: Eulogio Mark MD; Consulting Provider: Corene Spurling, MD    Subjective:     Srinivasan Pascual is a 66 yo male who presented with dyspnea on a background of mechanical AVR in , chronic anticoagulation with coumadin, chronic systolic CHF/ICM EF 07%, COPD, PVD, DM II, HTN, iron deficiency, atrial flutter and dyslipidemia. He was admitted for hypercapnic respiratory failure due to COPD exacerbation (s/p intubation and extubation). While his hospital course he developed ITP. Currently on steroids taper. Hematology on board. On my assessment he was found alert, at bedside, in no distress, denies cough, no fever. NGT present. He underwent MBS today with findings of decreased epiglottic inversion, likely suggested for the presence of NGT. Speech therapy following case.      Objective:     Patient Vitals for the past 24 hrs:   Temp Pulse Resp BP SpO2   17 0855 97.5 °F (36.4 °C) 63 20 101/50 100 %   17 0804 - - - - 99 %   17 0617 96.8 °F (36 °C) 62 18 117/62 100 %   17 0108 96.8 °F (36 °C) 62 18 100/62 99 %   17 2245 - - - - 94 %   17 2132 - 72 - 112/58 -   17 2118 97.3 °F (36.3 °C) 72 16 112/58 95 %   17 1953 - - - - 93 %   17 1719 97 °F (36.1 °C) 66 16 125/55 96 %   17 1514 - - - - 96 %   17 1438 98 °F (36.7 °C) 64 16 119/49 (!) 77 %   17 1355 - - - - 96 %   17 1229 97.2 °F (36.2 °C) (!) 57 16 114/57 99 %   17 1200 - 68 15 114/55 100 %   17 1129 - (!) 57 19 101/55 100 %   17 1112 - (!) 58 24 102/56 100 %     Oxygen Therapy  O2 Sat (%): 100 % (09/08/17 0855)  Pulse via Oximetry: 82 beats per minute (09/08/17 0804)  O2 Device: Nasal cannula (09/08/17 0830)  O2 Flow Rate (L/min): 2 l/min (09/08/17 0830)  O2 Temperature: 87.6 °F (30.9 °C) (09/04/17 1140)  FIO2 (%): 28 % (09/07/17 0729)    Intake/Output Summary (Last 24 hours) at 09/08/17 1058  Last data filed at 09/08/17 0605   Gross per 24 hour   Intake             1836 ml   Output              975 ml   Net              861 ml           General appearance: NAD, conversant  Eyes: anicteric sclerae, moist conjunctivae; no lid-lag  ENT: Atraumatic; NGT present   Neck: Trachea midline   FROM, supple, no thyromegaly or lymphadenopathy  Lungs: CTA, with normal respiratory effort and no intercostal retractions  CV: S1,S2 present, no added murmurs  Abdomen: Soft, non-tender; no masses   Extremities: No peripheral edema or extremity lymphadenopathy  Skin: Normal temperature, turgor and texture; no subcutaneous nodules  Psych: Appropriate affect, alert and oriented to person, place and time    Data Review:  I have reviewed all labs, meds, telemetry events, and studies from the last 24 hours.     Recent Results (from the past 24 hour(s))   GLUCOSE, POC    Collection Time: 09/07/17 12:21 PM   Result Value Ref Range    Glucose (POC) 153 (H) 65 - 100 mg/dL   GLUCOSE, POC    Collection Time: 09/07/17  5:26 PM   Result Value Ref Range    Glucose (POC) 169 (H) 65 - 100 mg/dL   GLUCOSE, POC    Collection Time: 09/07/17  9:22 PM   Result Value Ref Range    Glucose (POC) 139 (H) 65 - 100 mg/dL   GLUCOSE, POC    Collection Time: 09/07/17 11:56 PM   Result Value Ref Range    Glucose (POC) 135 (H) 65 - 100 mg/dL   PROTHROMBIN TIME + INR    Collection Time: 09/08/17  4:44 AM   Result Value Ref Range    Prothrombin time 13.5 (H) 9.6 - 12.0 sec    INR 1.2 0.9 - 1.2     METABOLIC PANEL, BASIC    Collection Time: 09/08/17  4:44 AM   Result Value Ref Range    Sodium 137 136 - 145 mmol/L    Potassium 4.3 3.5 - 5.1 mmol/L    Chloride 99 98 - 107 mmol/L    CO2 34 (H) 21 - 32 mmol/L    Anion gap 4 (L) 7 - 16 mmol/L    Glucose 123 (H) 65 - 100 mg/dL    BUN 38 (H) 8 - 23 MG/DL    Creatinine 0.83 0.8 - 1.5 MG/DL    GFR est AA >60 >60 ml/min/1.73m2    GFR est non-AA >60 >60 ml/min/1.73m2    Calcium 8.8 8.3 - 10.4 MG/DL   CBC WITH AUTOMATED DIFF    Collection Time: 09/08/17  4:44 AM   Result Value Ref Range    WBC 10.3 4.3 - 11.1 K/uL    RBC 4.25 4.23 - 5.67 M/uL    HGB 12.3 (L) 13.6 - 17.2 g/dL    HCT 39.3 (L) 41.1 - 50.3 %    MCV 92.5 79.6 - 97.8 FL    MCH 29.0 26.1 - 32.9 PG    MCHC 31.4 31.4 - 35.0 g/dL    RDW 16.0 (H) 11.9 - 14.6 %    PLATELET 97 (L) 052 - 450 K/uL    MPV 12.5 10.8 - 14.1 FL    DF AUTOMATED      NEUTROPHILS 84 (H) 43 - 78 %    LYMPHOCYTES 9 (L) 13 - 44 %    MONOCYTES 7 4.0 - 12.0 %    EOSINOPHILS 0 (L) 0.5 - 7.8 %    BASOPHILS 0 0.0 - 2.0 %    IMMATURE GRANULOCYTES 0.3 0.0 - 5.0 %    ABS. NEUTROPHILS 7.8 1.7 - 8.2 K/UL    ABS. LYMPHOCYTES 0.9 0.5 - 4.6 K/UL    ABS. MONOCYTES 0.7 0.1 - 1.3 K/UL    ABS. EOSINOPHILS 0.0 0.0 - 0.8 K/UL    ABS. BASOPHILS 0.0 0.0 - 0.2 K/UL    ABS. IMM.  GRANS. 0.0 0.0 - 0.5 K/UL   GLUCOSE, POC    Collection Time: 09/08/17  5:57 AM   Result Value Ref Range    Glucose (POC) 132 (H) 65 - 100 mg/dL        All Micro Results     Procedure Component Value Units Date/Time    CULTURE, BLOOD [[id number]] Collected:  08/25/17 1600    Order Status:  Completed Specimen:  Blood from Blood Updated:  08/30/17 0643     Special Requests: LEFT HAND        Culture result: NO GROWTH 5 DAYS       CULTURE, BLOOD [[id number]] Collected:  08/25/17 2145    Order Status:  Completed Specimen:  Blood from Blood Updated:  08/30/17 0643     Special Requests: RIGHT HAND        Culture result: NO GROWTH 5 DAYS       MRSA SCREEN - PCR (NASAL) [[id number]]  (Abnormal) Collected:  08/29/17 1556    Order Status:  Completed Specimen:  Nasal from Nasal Swab Updated:  08/29/17 1900     Special Requests: NO SPECIAL REQUESTS Culture result:         MRSA target DNA is detected (presumptive positive for MRSA colonization).  (A)            RESULTS VERIFIED, PHONED TO AND READ BACK BY  DAMARIS GARNICA RN ON 08/29/2017 AT 1851 [facility]      CULTURE, URINE [[id number]] Collected:  08/25/17 2011    Order Status:  Completed Specimen:  Urine from Clean catch Updated:  08/28/17 0653     Special Requests: NO SPECIAL REQUESTS        Culture result:         [id number] COLONIES/mL MIXED SKIN VANNESSA ISOLATED          Current Meds:  Current Facility-Administered Medications   Medication Dose Route Frequency    barium sulfate (VARIBAR THIN HONEY) 40 % (w/v) 29% (w/w) contrast suspension 15 mL  15 mL Oral RAD ONCE    barium sulfate (VARIBAR PUDDING) 40 % (w/v), 30% (w/w) contrast oral paste 15 mL  15 mL Oral RAD ONCE    barium Sulfate (E-Z-HD) 98 % contrast susp 135 mL  135 mL Oral RAD ONCE    barium sulfate (VARIBAR NECTAR) 40 % (w/v) contrast suspension 15 mL  15 mL Oral RAD ONCE    warfarin (COUMADIN) tablet 5 mg  5 mg Oral QHS    predniSONE (DELTASONE) tablet 40 mg  40 mg Oral DAILY WITH BREAKFAST    [START ON 9/15/2017] predniSONE (DELTASONE) tablet 30 mg  30 mg Oral DAILY WITH BREAKFAST    [START ON 9/22/2017] predniSONE (DELTASONE) tablet 20 mg  20 mg Oral DAILY WITH BREAKFAST    [START ON 9/29/2017] predniSONE (DELTASONE) tablet 10 mg  10 mg Oral DAILY WITH BREAKFAST    carvedilol (COREG) tablet 12.5 mg  12.5 mg Oral BID WITH MEALS    midazolam (VERSED) injection 2 mg  2 mg IntraVENous Q2H PRN    chlorhexidine (PERIDEX) 0.12 % mouthwash 15 mL  15 mL Oral BID    morphine injection 2 mg  2 mg IntraVENous Q4H PRN    amiodarone (CORDARONE) tablet 400 mg  400 mg Oral Q12H    insulin lispro (HUMALOG) injection   SubCUTAneous Q6H    NUTRITIONAL SUPPORT ELECTROLYTE PRN ORDERS   Does Not Apply PRN    famotidine (PF) (PEPCID) 20 mg in sodium chloride 0.9 % 10 mL injection  20 mg IntraVENous DAILY    mupirocin (BACTROBAN) 2 % ointment   Both Nostrils BID    0.9% sodium chloride infusion 250 mL  250 mL IntraVENous PRN    acetaminophen (TYLENOL) suppository 650 mg  650 mg Rectal Q4H PRN    sodium chloride (NS) flush 20 mL  20 mL InterCATHeter Q8H    heparin (porcine) pf 600 Units  600 Units InterCATHeter Q8H    sodium chloride (NS) flush 20 mL  20 mL InterCATHeter PRN    heparin (porcine) pf 600 Units  600 Units InterCATHeter PRN    albuterol (PROVENTIL VENTOLIN) nebulizer solution 2.5 mg  2.5 mg Nebulization Q4H PRN    sacubitril-valsartan (ENTRESTO) 24-26 mg tablet 1 Tab  1 Tab Oral Q12H    dextrose (D50W) injection syrg 25 g  25 g IntraVENous PRN    ferrous sulfate tablet 325 mg  1 Tab Oral DAILY WITH BREAKFAST    acetaminophen (TYLENOL) tablet 650 mg  650 mg Oral Q6H PRN    gabapentin (NEURONTIN) capsule 600 mg  600 mg Oral TID    albuterol (PROVENTIL HFA, VENTOLIN HFA, PROAIR HFA) inhaler 2 Puff  2 Puff Inhalation Q4H PRN    tiotropium (SPIRIVA) inhalation capsule 18 mcg  1 Cap Inhalation DAILY    sodium chloride (NS) flush 5-10 mL  5-10 mL IntraVENous Q8H    sodium chloride (NS) flush 5-10 mL  5-10 mL IntraVENous PRN    nitroglycerin (NITROSTAT) tablet 0.4 mg  0.4 mg SubLINGual Q5MIN PRN    ondansetron (ZOFRAN) injection 4 mg  4 mg IntraVENous Q4H PRN    budesonide (PULMICORT) 500 mcg/2 ml nebulizer suspension  500 mcg Nebulization BID RT    And    albuterol CONCENTRATE 2.5mg/0.5 mL neb soln  2.5 mg Nebulization Q6H RT       Other Studies (last 24 hours):  No results found.     Assessment and Plan:     Hospital Problems as of 9/8/2017  Date Reviewed: 9/8/2017          Codes Class Noted - Resolved POA    MRSA nasal colonization ICD-10-CM: Z22.322  ICD-9-CM: V02.54  9/8/2017 - Present Yes        COPD (chronic obstructive pulmonary disease) ([facility] (Chronic) ICD-10-CM: J44.9  ICD-9-CM: 496  8/30/2017 - Present Yes        Acute respiratory failure with hypercapnia ([facility] ICD-10-CM: J96.02  ICD-9-CM: 518.81  8/29/2017 - Present No History of tobacco use (Chronic) ICD-10-CM: U12.239  ICD-9-CM: V15.82  8/29/2017 - Present Yes        Hyponatremia ICD-10-CM: E87.1  ICD-9-CM: 276.1  8/29/2017 - Present Yes        Acute encephalopathy ICD-10-CM: G93.40  ICD-9-CM: 348.30  8/29/2017 - Present Yes        Thrombocytopenia ([facility] ICD-10-CM: D69.6  ICD-9-CM: 287.5  8/26/2017 - Present Yes        Anticoagulant long-term use (Chronic) ICD-10-CM: Z79.01  ICD-9-CM: V58.61  8/23/2017 - Present Yes        HTN (hypertension), benign (Chronic) ICD-10-CM: I10  ICD-9-CM: 401.1  8/23/2017 - Present Yes        Peripheral vascular disease ([facility] (Chronic) ICD-10-CM: I73.9  ICD-9-CM: 443.9  8/23/2017 - Present Yes        Dyslipidemia (Chronic) ICD-10-CM: E78.5  ICD-9-CM: 272.4  8/23/2017 - Present Yes        History of mechanical aortic valve replacement (Chronic) ICD-10-CM: Z95.2  ICD-9-CM: V43.3  8/23/2017 - Present Yes    Overview Signed 8/30/2017 10:30 AM by Khushboo Hunter NP     Placement 2000, on chronicCoumadin             Centrilobular emphysema ([facility] (Chronic) ICD-10-CM: J43.2  ICD-9-CM: 492.8  8/23/2017 - Present Yes    Overview Signed 9/8/2017  9:24 AM by Levi Montano NP      With last PFTs FVC 1.70 L or 44% predicted, FEV1 0.86 L or 29% predicted, FEV1/FVC 51%. This study was a postbronchodilator study performed at the [facility] on 8/22/2016                  Ischemic cardiomyopathy (Chronic) ICD-10-CM: I25.5  ICD-9-CM: 414.8  8/23/2017 - Present Yes    Overview Signed 8/30/2017 10:29 AM by Khushboo Hunter NP     8/23/17 ECHO:  EF 40 % to 45 %. There were no regional wall motion abnormalities. Moderate hypokinesis of the mid-apical anterior and apical wall(s).              * (Principal)Atrial flutter with rapid ventricular response (HCC) ICD-10-CM: I48.92  ICD-9-CM: 427.32  8/23/2017 - Present Yes              PLAN:      Atrial fibrillation with RVR  Subtherapeutic INR  S/p Cardioversion   Lovenox started today while INR reach therapeutic levels   Continue amiodarone, Coumadin      Acute COPD exacerbation  S/p intubation and extubation for hypercapnic respiratory failure  S/p ceftriaxone therapy ( 7/7 )  Continue albuterol, pulmicort, prednisone   Pulmonology follow up      ITP: stable   Daily CBC  Continue steroids taper  Hematology on board       Chronic systolic CHF: Stable   Continue Entresto, Carvedilol      DC planning/Dispo:  In-patient rehab transfer (9 floor) possible next week   DVT ppx:  Coumadin  Contact isolation: MRSA-nasal isolate    Signed:  Maria De Jesus Putnam MD

## 2017-09-08 NOTE — PROGRESS NOTES
Problem: Falls - Risk of  Goal: *Absence of Falls  Document Lauren Fall Risk and appropriate interventions in the flowsheet. Outcome: Progressing Towards Goal  Fall precautions in place. Yellow gripper socks on. Instructed to only get OOB with assistance. Voiced understanding. Call light in reach.   Fall Risk Interventions:  Mobility Interventions: Bed/chair exit alarm, Communicate number of staff needed for ambulation/transfer, Mechanical lift, OT consult for ADLs, Patient to call before getting OOB, PT Consult for mobility concerns, PT Consult for assist device competence, Strengthening exercises (ROM-active/passive), Utilize walker, cane, or other assitive device     Mentation Interventions: Bed/chair exit alarm, Door open when patient unattended, Evaluate medications/consider consulting pharmacy, Eyeglasses and hearing aids, Familiar objects from home, Family/sitter at bedside, More frequent rounding, Reorient patient, Room close to nurse's station, Toileting rounds, Update white board     Medication Interventions: Bed/chair exit alarm, Evaluate medications/consider consulting pharmacy, Patient to call before getting OOB, Teach patient to arise slowly     Elimination Interventions: Bed/chair exit alarm, Call light in reach, Patient to call for help with toileting needs, Toilet paper/wipes in reach, Toileting schedule/hourly rounds, Urinal in reach     History of Falls Interventions: Door open when patient unattended, Evaluate medications/consider consulting pharmacy, Investigate reason for fall, Room close to nurse's station, Bed/chair exit alarm

## 2017-09-08 NOTE — PROGRESS NOTES
No cp or inc sob  Exam + for labored breathing  Labs reviewed  Imp:  Chronic resp failure in need of home non invasive vent support  mAVR  ? ITP  cAfib  Plan:  Start Lovenox until inr ok.

## 2017-09-08 NOTE — PROGRESS NOTES
LTG: Patient will tolerate least restrictive diet without overt signs or symptoms of airway compromise. STG: Patient will tolerate mechanical soft and thin liquids without overt signs or symptoms of airway compromise. - DISCONTINUE  STG: Patient will tolerate puree diet and nectar thick liquids without overt signs or symptoms of airway compromise. STG: Patient will participate in modified barium swallow study as clinically indicated. - MET 9/8/17      Speech language pathology: modified barium swallow study: Initial Assessment    NAME/AGE/GENDER: Carlos Denny is a 67 y.o. male  DATE: 9/8/2017  PRIMARY DIAGNOSIS: Atrial flutter with rapid ventricular response ([facility]       ICD-10: Treatment Diagnosis: R13.12 Oropharyngeal Dysphagia. INTERDISCIPLINARY COLLABORATION: Registered nurse  PRECAUTIONS/ALLERGIES: Levaquin [levofloxacin] and Metformin ASSESSMENT/PLAN OF CARE:Based on the objective data described below, Mr. Claudell Circle presents with mild-moderate oropharyngeal dysphagia. Patient is edentulous and dentures were not present during study. NG tube in place. Minimal epiglottic inversion, resulting in penetration of thin liquids during the swallow. NG tube believed to impede epiglottic inversion and contributing to penetration. Minimal pharyngeal residue with puree remaining around NG tube after initial swallow which cleared with second swallow. Decreased mastication of soft solid with patient ultimately spitting bolus from oral cavity. No signs of airway compromise observed with puree or nectar thick liquids. Recommend initiate puree and nectar thick liquid diet. Upright seating for po intake. Only eat when fully awake and alert. Concern regarding patient's ability to meet nutrition/hydration needs at this time due to fatigue. Maybe benefit from nutrition consult and/or calorie count. Patient will benefit from skilled intervention to address the below impairments. ?????? ? ? This section established at most recent assessment??????????  RECOMMENDATIONS AND PLANNED INTERVENTIONS (Benefits and precautions of therapy have been discussed with the patient.):  · PO:  Pureed  · Liquids:  nectar  MEDICATIONS:  · Crushed in puree  COMPENSATORY STRATEGIES/MODIFICATIONS INCLUDING:  · Alternate liquids/solids  · Small sips and bites  OTHER RECOMMENDATIONS (including follow up treatment recommendations): · Family training/education  · Laryngeal exercises  · Patient education  FREQUENCY/DURATION: Continue to follow patient 3 times a week for duration of hospital stay to address above goals. RECOMMENDED REHABILITATION/EQUIPMENT: (at time of discharge pending progress): Due to the probability of continued deficits (see above) this patient will likely need continued skilled speech therapy after discharge. SUBJECTIVE:   Patient complains of increased fatigue this morning  History of Present Injury/Illness: Mr. Alan Laguerre  has a past medical history of Acute diastolic CHF (congestive heart failure) ([facility] (9/15/2016); Arthritis; Chicken pox; Coronary artery disease (4/16/2014); DJD (degenerative joint disease); Emphysema; aortic valve replacement, mechanical; Hypercholesterolemia; Hypertension; Palpitations; Pneumonia; and Systolic CHF, acute ([facility] [address] (11/15/2016). Arbutus Ring He also  has a past surgical history that includes aortic valve replacement (N/A, 2000) and heart catheterization (07/21/2004).    Present Symptoms: Coughing with liquids   Pain Intensity 1: 0  Pain Location 1: Back  Pain Orientation 1: Lower  Pain Intervention(s) 1: Medication (see MAR)  Current Dietary Status:  NPO   Radiologist: Arelis  Social History/Home Situation:    Home Environment: Private residence  # Steps to Enter: 3  Rails to Enter: Yes  Hand Rails : Bilateral  One/Two Story Residence: One story  Living Alone: No  Support Systems: Spouse/Significant Other/Partner, Child(devyn)  Patient Expects to be Discharged to[de-identified] Private residence  Current DME Used/Available at Home: Walker, rollator  Tub or Shower Type: Tub/Shower combination  OBJECTIVE:     Cognitive/Communication Status:  Mental Status  Neurologic State: Alert  Orientation Level: Oriented X4  Cognition: Follows commands  Perception: Appears intact  Perseveration: No perseveration noted  Safety/Judgement: Fall prevention    Oral Assessment:  Oral Assessment  Labial: No impairment  Dentition: Edentulous  Oral Hygiene: Dry  Lingual: No impairment    Vocal Quality: Low volume    Patient Viewed: Patient Position: Upright in chair  Film Views: Lateral, Fluoro    Oral Prepatory:  The patient was given the following: Consistency Presented: Mixed consistency, Pudding, Thin liquid, Nectar thick liquid  How Presented: SLP-fed/presented, Cup/sip, Spoon, Straw    Oral Phase:  Bolus Acceptance: No impairment  Bolus Formation/Control: Impaired  Propulsion: Delayed (# of seconds)  Type of Impairment: Delayed, Mastication     Initiation of Swallow: Triggered at vallecula  Oral Phase Severity: Mild-moderate    Pharyngeal Phase:  Timing: Pooling 1-5 sec  Decreased Tongue Base Retraction?: No  Laryngeal Elevation: Incomplete laryngeal closure, Inadequate epiglottic inversion, Minimal movement of larynx/epiglottis, Other (comment) (NG tube believed to contribute to reduced epiglottic inversion. )  Penetration: During swallow, From initial swallow, Trace  Aspiration/Timing: No evidence of aspiration  Aspiration/Penetration Score: 3 (Penetration/Visible residue-Contrast remains above the folds/cords, but is not cleared)  Pharyngeal Symmetry: Not assessed  Pharyngeal Dysfunction: None  Pharyngeal Phase Severity: Mild moderate  Pharyngeal-Esophageal Segment: No impairment    Assessment/Reassessment only, no treatment provided today    Tool Used: Dysphagia Outcome and Severity Scale (KERRIE)    Score Comments   Normal Diet  [] 7 With no strategies or extra time needed       Functional Swallow  [] 6 May have mild oral or pharyngeal delay       Mild Dysphagia    [] 5 Which may require one diet consistency restricted (those who demonstrate penetration which is entirely cleared on MBS would be included)   Mild-Moderate Dysphagia  [x] 4 With 1-2 diet consistencies restricted       Moderate Dysphagia  [] 3 With 2 or more diet consistencies restricted       Moderately Severe Dysphagia  [] 2 With partial PO strategies (trials with ST only)       Severe Dysphagia  [] 1 With inability to tolerate any PO safely          Score:  Initial: 4 Most Recent: X (Date: -- )   Interpretation of Tool: The Dysphagia Outcome and Severity Scale (KERRIE) is a simple, easy-to-use, 7-point scale developed to systematically rate the functional severity of dysphagia based on objective assessment and make recommendations for diet level, independence level, and type of nutrition. Score 7 6 5 4 3 2 1   Modifier CH CI CJ CK CL CM CN   ? Swallowing:     - CURRENT STATUS: CK - 40%-59% impaired, limited or restricted    - GOAL STATUS:  CJ - 20%-39% impaired, limited or restricted    - D/C STATUS:  ---------------To be determined---------------  [place] / [place] / Product Type: Medicare /   __________________________________________________________________________________________________  Safety:   After treatment position/precautions:  · Upright in Bed  Recommendations for treatment: Puree/nectar thick liquids. ST to follow.    Total Treatment Duration:  Time In: 1030   Time Out: 1100    Pricila Esquivel, ABBEY, CCC-SLP, CBIS

## 2017-09-08 NOTE — PROGRESS NOTES
Date of Outreach Update:  Home Jennings was seen and assessed. Primary RN in with pt at this time. Pt with rate controlled afib on monitor. On 2L/NC while awake with BiPap at night, per primary RN he desats significantly with exertion. MEWS Score: 1 (09/08/17 0855)  Vitals:    09/08/17 0855 09/08/17 1121 09/08/17 1354 09/08/17 1437   BP: 101/50  118/63    Pulse: 63  (!) 57    Resp: 20  18    Temp: 97.5 °F (36.4 °C)      SpO2: 100%  100% 95%   Weight:  67.9 kg (149 lb 11.1 oz)     Height:  5' 6\" (1.676 m)           Pain Assessment  Pain Intensity 1: 0 (09/08/17 1158)  Pain Location 1: Back  Pain Intervention(s) 1: Medication (see MAR)  Patient Stated Pain Goal: 0      Previous Outreach assessment has been reviewed. There have been no significant clinical changes since the completion of the last dated Outreach assessment. Will continue to follow up per outreach protocol.     Signed By:   Stacie Burleson RN    September 8, 2017 2:48 PM

## 2017-09-08 NOTE — PROGRESS NOTES
SPEECH PATHOLOGY NOTE:    Modified barium swallow study completed. Patient presents with decreased epiglottic inversion, resulting in penetration of thin liquids during the swallow. NG tube believed to contribute to decrease epiglottic inversion. Patient not wearing dentures during assessment. Decreased mastication of soft solid with patient ultimately spitting bolus from oral cavity. No signs of airway compromise observed with puree or nectar thick liquids. Recommend initiate puree and nectar thick liquid diet. Full report to follow.      ABBEY Aguayo, CCC-SLP, CBIS

## 2017-09-08 NOTE — PROGRESS NOTES
Héctor Casillas  Admission Date: 8/23/2017             Daily Progress Note: 9/8/2017    The patient's chart is reviewed and the patient is discussed with the staff.    67 y.o.  CM with h/o severe COPD (baseline bicarb 37/PCO2 65), ITP with thrombocytopenia, s/p mechanical AVR 2000, atrial flutter who was admitted on 8/23 with rapid a. Flutter.   His course has been complicated by hyponatremia, confusion, hypercarbic respiratory failure requiring intubation. Extubated 9/5/17 and weaned to [place]. Heme/Onc following for thrombocytopenia with plans for outpatient follow up. Subjective:     BP marginal at times. Currently on O2 at 3 lpm via NC with O2 sat 100%. Patient states he has cough productive of moderate amounts of thick yellow mucus. Denies chest pain or palpitations. Some nausea.       Current Facility-Administered Medications   Medication Dose Route Frequency    warfarin (COUMADIN) tablet 5 mg  5 mg Oral QHS    predniSONE (DELTASONE) tablet 40 mg  40 mg Oral DAILY WITH BREAKFAST    [START ON 9/15/2017] predniSONE (DELTASONE) tablet 30 mg  30 mg Oral DAILY WITH BREAKFAST    [START ON 9/22/2017] predniSONE (DELTASONE) tablet 20 mg  20 mg Oral DAILY WITH BREAKFAST    [START ON 9/29/2017] predniSONE (DELTASONE) tablet 10 mg  10 mg Oral DAILY WITH BREAKFAST    carvedilol (COREG) tablet 12.5 mg  12.5 mg Oral BID WITH MEALS    midazolam (VERSED) injection 2 mg  2 mg IntraVENous Q2H PRN    chlorhexidine (PERIDEX) 0.12 % mouthwash 15 mL  15 mL Oral BID    morphine injection 2 mg  2 mg IntraVENous Q4H PRN    amiodarone (CORDARONE) tablet 400 mg  400 mg Oral Q12H    insulin lispro (HUMALOG) injection   SubCUTAneous Q6H    NUTRITIONAL SUPPORT ELECTROLYTE PRN ORDERS   Does Not Apply PRN    famotidine (PF) (PEPCID) 20 mg in sodium chloride 0.9 % 10 mL injection  20 mg IntraVENous DAILY    mupirocin (BACTROBAN) 2 % ointment   Both Nostrils BID    0.9% sodium chloride infusion 250 mL 250 mL IntraVENous PRN    acetaminophen (TYLENOL) suppository 650 mg  650 mg Rectal Q4H PRN    sodium chloride (NS) flush 20 mL  20 mL InterCATHeter Q8H    heparin (porcine) pf 600 Units  600 Units InterCATHeter Q8H    sodium chloride (NS) flush 20 mL  20 mL InterCATHeter PRN    heparin (porcine) pf 600 Units  600 Units InterCATHeter PRN    albuterol (PROVENTIL VENTOLIN) nebulizer solution 2.5 mg  2.5 mg Nebulization Q4H PRN    sacubitril-valsartan (ENTRESTO) 24-26 mg tablet 1 Tab  1 Tab Oral Q12H    dextrose (D50W) injection syrg 25 g  25 g IntraVENous PRN    ferrous sulfate tablet 325 mg  1 Tab Oral DAILY WITH BREAKFAST    acetaminophen (TYLENOL) tablet 650 mg  650 mg Oral Q6H PRN    gabapentin (NEURONTIN) capsule 600 mg  600 mg Oral TID    albuterol (PROVENTIL HFA, VENTOLIN HFA, PROAIR HFA) inhaler 2 Puff  2 Puff Inhalation Q4H PRN    tiotropium (SPIRIVA) inhalation capsule 18 mcg  1 Cap Inhalation DAILY    sodium chloride (NS) flush 5-10 mL  5-10 mL IntraVENous Q8H    sodium chloride (NS) flush 5-10 mL  5-10 mL IntraVENous PRN    nitroglycerin (NITROSTAT) tablet 0.4 mg  0.4 mg SubLINGual Q5MIN PRN    ondansetron (ZOFRAN) injection 4 mg  4 mg IntraVENous Q4H PRN    budesonide (PULMICORT) 500 mcg/2 ml nebulizer suspension  500 mcg Nebulization BID RT    And    albuterol CONCENTRATE 2.5mg/0.5 mL neb soln  2.5 mg Nebulization Q6H RT       Review of Systems  Constitutional: negative for fever, chills, sweats  Cardiovascular: negative for chest pain, palpitations, syncope, edema  Gastrointestinal:  negative for dysphagia, reflux, vomiting, diarrhea, abdominal pain, or melena  Neurologic:  negative for focal weakness, numbness, headache    Objective:     Vitals:    09/08/17 0108 09/08/17 0617 09/08/17 0804 09/08/17 0855   BP: 100/62 117/62  101/50   Pulse: 62 62  63   Resp: 18 18  20   Temp: 96.8 °F (36 °C) 96.8 °F (36 °C)  97.5 °F (36.4 °C)   SpO2: 99% 100% 99% 100%   Weight:       Height: Intake and Output:   09/06 1901 - 09/08 0700  In: 2832   Out: 2725 [Urine:2725]       Physical Exam:   Constitution:  the patient is well developed and in no acute distress  EENMT:  Sclera clear, pupils equal, oral mucosa moist  Respiratory: diminished bilaterally, o2 at 3 lpm  Cardiovascular:  iRRR with II/IV SM,no G,R  Gastrointestinal: soft and non-tender; with positive bowel sounds. NG feeding tube  Musculoskeletal: warm without cyanosis. There is no  lower leg edema. Skin:  no jaundice or rashes, no open wounds   Neurologic: no gross neuro deficits     Psychiatric:  alert and oriented x 3    CXR:   9/5      LAB  Recent Labs      09/08/17   0557  09/07/17   2356  09/07/17   2122  09/07/17   1726  09/07/17   1221   GLUCPOC  132*  135*  139*  169*  153*      Recent Labs      09/08/17   0444  09/07/17   0429  09/06/17   0420   WBC  10.3  9.9  11.1   HGB  12.3*  10.6*  10.0*   HCT  39.3*  33.3*  31.7*   PLT  97*  105*  97*   INR  1.2  1.3*  1.3*     Recent Labs      09/08/17   0444  09/07/17   0429  09/06/17   0420   NA  137  136  137   K  4.3  4.4  4.6   CL  99  98  94*   CO2  34*  34*  38*   GLU  123*  147*  169*   BUN  38*  36*  49*   CREA  0.83  0.82  0.92   CA  8.8  8.6  9.4     No results for input(s): PH, PCO2, PO2, HCO3 in the last 72 hours. No results for input(s): LCAD, LAC in the last 72 hours.       Assessment:  (Medical Decision Making)     Hospital Problems  Date Reviewed: 9/8/2017          Codes Class Noted POA    MRSA nasal colonization ICD-10-CM: Z22.322  ICD-9-CM: V02.54  9/8/2017 Yes    On isolation      COPD (chronic obstructive pulmonary disease) (HCC) (Chronic) ICD-10-CM: J44.9  ICD-9-CM: 662  8/30/2017 Yes    No active wheezing  Nebs / steroids      Acute respiratory failure with hypercapnia (HCC) ICD-10-CM: J96.02  ICD-9-CM: 518.81  8/29/2017 No    Currently on o2 at 3 lpm   Was not previously on home o2      History of tobacco use (Chronic) ICD-10-CM: W34.633  ICD-9-CM: V15.82 8/29/2017 Yes        Hyponatremia ICD-10-CM: E87.1  ICD-9-CM: 276.1  8/29/2017 Yes    Na+ 137      Acute encephalopathy ICD-10-CM: G93.40  ICD-9-CM: 348.30  8/29/2017 Yes    Seems clear on exam      Thrombocytopenia ([facility] ICD-10-CM: D69.6  ICD-9-CM: 287.5  8/26/2017 Yes    Plt 97      Anticoagulant long-term use (Chronic) ICD-10-CM: Z79.01  ICD-9-CM: V58.61  8/23/2017 Yes    Coumadin with INR 1.2      HTN (hypertension), benign (Chronic) ICD-10-CM: I10  ICD-9-CM: 401.1  8/23/2017 Yes        Peripheral vascular disease ([facility] (Chronic) ICD-10-CM: I73.9  ICD-9-CM: 443.9  8/23/2017 Yes        Dyslipidemia (Chronic) ICD-10-CM: E78.5  ICD-9-CM: 272.4  8/23/2017 Yes        History of mechanical aortic valve replacement (Chronic) ICD-10-CM: Z95.2  ICD-9-CM: V43.3  8/23/2017 Yes     Placement 2000, on chronicCoumadin         Centrilobular emphysema ([facility] (Chronic) ICD-10-CM: J43.2  ICD-9-CM: 492.8  8/23/2017 Yes     With last PFTs FVC 1.70 L or 44% predicted, FEV1 0.86 L or 29% predicted, FEV1/FVC 51%. This study was a postbronchodilator study performed at the [facility] on 8/22/2016         Ischemic cardiomyopathy (Chronic) ICD-10-CM: I25.5  ICD-9-CM: 414.8  8/23/2017 Yes     8/23/17 ECHO:  EF 40 % to 45 %. There were no regional wall motion abnormalities. Moderate hypokinesis of the mid-apical anterior and apical wall(s). * (Principal)Atrial flutter with rapid ventricular response [facility]) ICD-10-CM: I48.92  ICD-9-CM: 427.32  8/23/2017 Yes              Plan:  (Medical Decision Making)     --Albuterol / Arley Sides / Spiriva  --prednisone taper  --consider adding Robitussin / Mucinex  --s/p ABX   --coumadin - INR 1.2  --S/P SLP eval - currently NPO with MBS pending  --PT/OT for mobility  --SW following for possible placement on 9th floor when medically stable.    --Edwin Napoles working on Trilogy for home use [employer].  Due to the severity of the patient's chronic respiratory failure, BiPAP does not supply the adequate pressure or volume to effectively ventilate the patient. Non-invasive ventilation is required for both daytime and nighttime use for life sustaining measures. Failure to adequately ventilate patient with non-invasive ventilation could result in serious harm and/or death. More than 50% of the time documented was spent in face-to-face contact with the patient and in the care of the patient on the floor/unit where the patient is located. Torey Long, NISREEN          Lungs:  coarse  Heart:  RRR with no Murmur/Rubs/Gallops    Additional Comments:  He tells me he takes the mask at night but will keep trying to use trilogy at night     I have spoken with and examined the patient. I agree with the above assessment and plan as documented.     Zbigniew Eason MD

## 2017-09-08 NOTE — PROGRESS NOTES
Bedside and Verbal shift change report given to self and Sola Bush RN (oncoming nurse) by Oly Dorado RN (offgoing nurse). Report included the following information SBAR, Kardex, MAR and Recent Results.

## 2017-09-08 NOTE — PROGRESS NOTES
Problem: Nutrition Deficit  Goal: *Optimize nutritional status  Nutrition F/U:  TF Management for the Hospitalists. Assessment:  Weight 76.4 kg (CCU bed 9/7/17), edema - 1+. The patient remains TF-dependent for majority of nutrition needs as he has been NPO. Diet advanced today s/p MBS, however, meal acceptance limited at this time. I would not recommend discontinuing TF until the patient is actively eating meals. Abdomen noted as distended/active. Date of last BM: 9/7. Low gastric residuals reported. Labs are remarkable for potassium 4.3 with low potassium formula. Glucose 123; POC glucose 132, 140 mg/dl today. Elevated glucose being covered with SSI. He is receiving prednisone. Macronutrient Needs:  Estimated calorie needs - 4170-8114 rina/day (20-25 rina/kg/day)   Estimated protein needs - 72-78 gm pro/day (1.1-1.2 gm pro/kgIBW/day) (GFR >60 ml/min)  Max CHO/day - 247 gm CHO/day (50% rina/day)  Fluid/day - 1.6-2 liters/day (1 ml/rina/day)  Intake/Comparative Standards:  I observed 0% of lunch consumed today, bites of applesauce. Current intake of TF (Nepro @ 40 ml/hr with a 45 ml/hr water flush) provides 1728 calories/day (100% calorie goal), 78 grams protein/day (100% protein goal), 160 grams CHO/day (does not exceed max CHO limit) and 1776 ml water/day (100% fluid goal). Intervention:   Meals and Snacks: Pureed/nectar thickened liquids per SLP. Will add ensure enlive and SF mighty shakes. Enteral Nutrition: Continue TF of Nepro at 40 ml/hr with a 45 ml/hr water flush via NGT until the patient is actively eating. I provided the patient's family members with a pureed diet menu and encouraged them to encouraged the patient to try each item on tray and try to consume supplements ordered. Mineral Supplement Therapy: Nutrition Support Orders/Electrolyte Management Replacement Protocols are active on the MAR. Nutrition Discharge Plan: Unable to determine.      Chase Rosenthal Jhonathan [address], [address] [place] [address], [facility] [address], [phone]

## 2017-09-08 NOTE — PROGRESS NOTES
JOSE CARLOS spoke with Dr. Brendan Marcelo from 9th floor. They will re-eval pt on Monday. Trilogy being ordered by Izaiah Welch RN, COPD Navigator. Pt will need a full face mask with the Trilogy secondary to a Stage 3 pressure ulcer on his nose. JOSE CARLOS contacted PEREZ Donahue yesterday to inform.

## 2017-09-08 NOTE — PROGRESS NOTES
Problem: Falls - Risk of  Goal: *Absence of Falls  Document Lauren Fall Risk and appropriate interventions in the flowsheet. Outcome: Progressing Towards Goal  Pt progressing towards goal. No falls since admission. Bed low and locked. Call light within reach. Side rails x 2. Gripper socks applied. Personal belongings within reach. Pt verbalizes understanding to call for assistance. Fall Risk Interventions:  Mobility Interventions: Bed/chair exit alarm, Communicate number of staff needed for ambulation/transfer, OT consult for ADLs, Patient to call before getting OOB, PT Consult for mobility concerns, PT Consult for assist device competence, Utilize walker, cane, or other assitive device     Mentation Interventions: Adequate sleep, hydration, pain control, Bed/chair exit alarm, Door open when patient unattended, Increase mobility, More frequent rounding, Reorient patient, Room close to nurse's station, Toileting rounds, Update white board, Eyeglasses and hearing aids     Medication Interventions: Bed/chair exit alarm, Evaluate medications/consider consulting pharmacy, Patient to call before getting OOB, Teach patient to arise slowly     Elimination Interventions: Bed/chair exit alarm, Call light in reach, Patient to call for help with toileting needs, Toilet paper/wipes in reach, Toileting schedule/hourly rounds     History of Falls Interventions: Bed/chair exit alarm, Door open when patient unattended, Evaluate medications/consider consulting pharmacy, Investigate reason for fall, Room close to nurse's station           Problem: Breathing Pattern - Ineffective  Goal: *Absence of hypoxia  Outcome: Progressing Towards Goal  NC during day, Bipap at night    Problem: Pressure Injury - Risk of  Goal: *Prevention of pressure ulcer  Outcome: Progressing Towards Goal  Allevyn in place. Skin underneath is WDL. No breakdown noted. Patient appropriately turns self in bed.       Nasal bridge pad in place when bipap on.

## 2017-09-08 NOTE — PROGRESS NOTES
Bedside and Verbal shift change report given to Shellie Whittington and Praveen Ross (oncoming nurse) by Naeem Eaton (offgoing nurse). Report included the following information SBAR, Kardex, MAR and Recent Results.

## 2017-09-09 LAB
ALBUMIN SERPL-MCNC: 2.2 G/DL (ref 3.2–4.6)
ALBUMIN/GLOB SERPL: 0.5 {RATIO} (ref 1.2–3.5)
ALP SERPL-CCNC: 59 U/L (ref 50–136)
ALT SERPL-CCNC: 66 U/L (ref 12–65)
ANION GAP SERPL CALC-SCNC: 4 MMOL/L (ref 7–16)
AST SERPL-CCNC: 38 U/L (ref 15–37)
BASOPHILS # BLD: 0 K/UL (ref 0–0.2)
BASOPHILS NFR BLD: 0 % (ref 0–2)
BILIRUB SERPL-MCNC: 0.4 MG/DL (ref 0.2–1.1)
BUN SERPL-MCNC: 42 MG/DL (ref 8–23)
CALCIUM SERPL-MCNC: 8.8 MG/DL (ref 8.3–10.4)
CHLORIDE SERPL-SCNC: 97 MMOL/L (ref 98–107)
CO2 SERPL-SCNC: 35 MMOL/L (ref 21–32)
CREAT SERPL-MCNC: 0.85 MG/DL (ref 0.8–1.5)
DIFFERENTIAL METHOD BLD: ABNORMAL
EOSINOPHIL # BLD: 0.1 K/UL (ref 0–0.8)
EOSINOPHIL NFR BLD: 1 % (ref 0.5–7.8)
ERYTHROCYTE [DISTWIDTH] IN BLOOD BY AUTOMATED COUNT: 16.5 % (ref 11.9–14.6)
GLOBULIN SER CALC-MCNC: 4.4 G/DL (ref 2.3–3.5)
GLUCOSE BLD STRIP.AUTO-MCNC: 114 MG/DL (ref 65–100)
GLUCOSE BLD STRIP.AUTO-MCNC: 115 MG/DL (ref 65–100)
GLUCOSE BLD STRIP.AUTO-MCNC: 129 MG/DL (ref 65–100)
GLUCOSE BLD STRIP.AUTO-MCNC: 209 MG/DL (ref 65–100)
GLUCOSE SERPL-MCNC: 111 MG/DL (ref 65–100)
HCT VFR BLD AUTO: 33.6 % (ref 41.1–50.3)
HGB BLD-MCNC: 11.2 G/DL (ref 13.6–17.2)
IMM GRANULOCYTES # BLD: 0.1 K/UL (ref 0–0.5)
IMM GRANULOCYTES NFR BLD: 0.9 % (ref 0–5)
INR PPP: 1.3 (ref 0.9–1.2)
LYMPHOCYTES # BLD: 0.8 K/UL (ref 0.5–4.6)
LYMPHOCYTES NFR BLD: 9 % (ref 13–44)
MCH RBC QN AUTO: 30 PG (ref 26.1–32.9)
MCHC RBC AUTO-ENTMCNC: 33.4 G/DL (ref 31.4–35)
MCV RBC AUTO: 89.7 FL (ref 79.6–97.8)
MONOCYTES # BLD: 0.9 K/UL (ref 0.1–1.3)
MONOCYTES NFR BLD: 10 % (ref 4–12)
NEUTS SEG # BLD: 7 K/UL (ref 1.7–8.2)
NEUTS SEG NFR BLD: 79 % (ref 43–78)
PLATELET # BLD AUTO: 109 K/UL (ref 150–450)
PMV BLD AUTO: 12.1 FL (ref 10.8–14.1)
POTASSIUM SERPL-SCNC: 4.1 MMOL/L (ref 3.5–5.1)
PROT SERPL-MCNC: 6.6 G/DL (ref 6.3–8.2)
PROTHROMBIN TIME: 14 SEC (ref 9.6–12)
RBC # BLD AUTO: 3.74 M/UL (ref 4.23–5.67)
SODIUM SERPL-SCNC: 136 MMOL/L (ref 136–145)
WBC # BLD AUTO: 9.7 K/UL (ref 4.3–11.1)

## 2017-09-09 PROCEDURE — 94760 N-INVAS EAR/PLS OXIMETRY 1: CPT

## 2017-09-09 PROCEDURE — 77030018798 HC PMP KT ENTRL FED COVD -A

## 2017-09-09 PROCEDURE — 74011250637 HC RX REV CODE- 250/637: Performed by: INTERNAL MEDICINE

## 2017-09-09 PROCEDURE — 77030019605

## 2017-09-09 PROCEDURE — 74011000250 HC RX REV CODE- 250: Performed by: INTERNAL MEDICINE

## 2017-09-09 PROCEDURE — 74011636637 HC RX REV CODE- 636/637: Performed by: NURSE PRACTITIONER

## 2017-09-09 PROCEDURE — 85027 COMPLETE CBC AUTOMATED: CPT | Performed by: INTERNAL MEDICINE

## 2017-09-09 PROCEDURE — 94640 AIRWAY INHALATION TREATMENT: CPT

## 2017-09-09 PROCEDURE — 77010033678 HC OXYGEN DAILY

## 2017-09-09 PROCEDURE — 74011250637 HC RX REV CODE- 250/637: Performed by: NURSE PRACTITIONER

## 2017-09-09 PROCEDURE — 74011250636 HC RX REV CODE- 250/636: Performed by: INTERNAL MEDICINE

## 2017-09-09 PROCEDURE — 99232 SBSQ HOSP IP/OBS MODERATE 35: CPT | Performed by: INTERNAL MEDICINE

## 2017-09-09 PROCEDURE — 85610 PROTHROMBIN TIME: CPT | Performed by: PHYSICIAN ASSISTANT

## 2017-09-09 PROCEDURE — 82962 GLUCOSE BLOOD TEST: CPT

## 2017-09-09 PROCEDURE — 80053 COMPREHEN METABOLIC PANEL: CPT | Performed by: INTERNAL MEDICINE

## 2017-09-09 PROCEDURE — 65660000000 HC RM CCU STEPDOWN

## 2017-09-09 RX ORDER — GUAIFENESIN 600 MG/1
1200 TABLET, EXTENDED RELEASE ORAL 2 TIMES DAILY
Status: DISCONTINUED | OUTPATIENT
Start: 2017-09-09 | End: 2017-09-13 | Stop reason: HOSPADM

## 2017-09-09 RX ORDER — TRAZODONE HYDROCHLORIDE 50 MG/1
50 TABLET ORAL
Status: DISCONTINUED | OUTPATIENT
Start: 2017-09-09 | End: 2017-09-13 | Stop reason: HOSPADM

## 2017-09-09 RX ADMIN — AMIODARONE HYDROCHLORIDE 400 MG: 200 TABLET ORAL at 22:33

## 2017-09-09 RX ADMIN — Medication 10 ML: at 13:41

## 2017-09-09 RX ADMIN — Medication 10 ML: at 06:11

## 2017-09-09 RX ADMIN — TIOTROPIUM BROMIDE 18 MCG: 18 CAPSULE ORAL; RESPIRATORY (INHALATION) at 07:24

## 2017-09-09 RX ADMIN — GUAIFENESIN 1200 MG: 600 TABLET, EXTENDED RELEASE ORAL at 10:42

## 2017-09-09 RX ADMIN — PREDNISONE 40 MG: 20 TABLET ORAL at 08:32

## 2017-09-09 RX ADMIN — CARVEDILOL 12.5 MG: 12.5 TABLET, FILM COATED ORAL at 16:05

## 2017-09-09 RX ADMIN — INSULIN LISPRO 4 UNITS: 100 INJECTION, SOLUTION INTRAVENOUS; SUBCUTANEOUS at 13:29

## 2017-09-09 RX ADMIN — GABAPENTIN 600 MG: 300 CAPSULE ORAL at 13:39

## 2017-09-09 RX ADMIN — BUDESONIDE 500 MCG: 0.5 INHALANT RESPIRATORY (INHALATION) at 07:15

## 2017-09-09 RX ADMIN — MUPIROCIN: 20 OINTMENT TOPICAL at 08:32

## 2017-09-09 RX ADMIN — GUAIFENESIN 1200 MG: 600 TABLET, EXTENDED RELEASE ORAL at 17:29

## 2017-09-09 RX ADMIN — FERROUS SULFATE TAB 325 MG (65 MG ELEMENTAL FE) 325 MG: 325 (65 FE) TAB at 08:31

## 2017-09-09 RX ADMIN — MUPIROCIN: 20 OINTMENT TOPICAL at 17:29

## 2017-09-09 RX ADMIN — SACUBITRIL AND VALSARTAN 1 TABLET: 24; 26 TABLET, FILM COATED ORAL at 22:34

## 2017-09-09 RX ADMIN — SACUBITRIL AND VALSARTAN 1 TABLET: 24; 26 TABLET, FILM COATED ORAL at 08:32

## 2017-09-09 RX ADMIN — ENOXAPARIN SODIUM 70 MG: 100 INJECTION SUBCUTANEOUS at 16:04

## 2017-09-09 RX ADMIN — WARFARIN SODIUM 10 MG: 5 TABLET ORAL at 22:34

## 2017-09-09 RX ADMIN — ALBUTEROL SULFATE 2.5 MG: 2.5 SOLUTION RESPIRATORY (INHALATION) at 07:15

## 2017-09-09 RX ADMIN — SODIUM CHLORIDE, PRESERVATIVE FREE 600 UNITS: 5 INJECTION INTRAVENOUS at 06:11

## 2017-09-09 RX ADMIN — CARVEDILOL 12.5 MG: 12.5 TABLET, FILM COATED ORAL at 08:31

## 2017-09-09 RX ADMIN — BUDESONIDE 500 MCG: 0.5 INHALANT RESPIRATORY (INHALATION) at 20:38

## 2017-09-09 RX ADMIN — GABAPENTIN 600 MG: 300 CAPSULE ORAL at 22:34

## 2017-09-09 RX ADMIN — ALBUTEROL SULFATE 2.5 MG: 2.5 SOLUTION RESPIRATORY (INHALATION) at 20:38

## 2017-09-09 RX ADMIN — Medication 20 ML: at 06:11

## 2017-09-09 RX ADMIN — Medication 20 ML: at 13:41

## 2017-09-09 RX ADMIN — Medication 20 ML: at 22:32

## 2017-09-09 RX ADMIN — AMIODARONE HYDROCHLORIDE 400 MG: 200 TABLET ORAL at 08:32

## 2017-09-09 RX ADMIN — TRAZODONE HYDROCHLORIDE 50 MG: 50 TABLET ORAL at 22:34

## 2017-09-09 RX ADMIN — ALBUTEROL SULFATE 2.5 MG: 2.5 SOLUTION RESPIRATORY (INHALATION) at 13:58

## 2017-09-09 RX ADMIN — SODIUM CHLORIDE, PRESERVATIVE FREE 600 UNITS: 5 INJECTION INTRAVENOUS at 22:32

## 2017-09-09 RX ADMIN — FAMOTIDINE 20 MG: 10 INJECTION, SOLUTION INTRAVENOUS at 08:31

## 2017-09-09 RX ADMIN — SODIUM CHLORIDE, PRESERVATIVE FREE 600 UNITS: 5 INJECTION INTRAVENOUS at 13:40

## 2017-09-09 NOTE — PROGRESS NOTES
Problem: Falls - Risk of  Goal: *Absence of Falls  Document Lauren Fall Risk and appropriate interventions in the flowsheet.    Outcome: Progressing Towards Goal  Fall Risk Interventions:  Mobility Interventions: Patient to call before getting OOB, Bed/chair exit alarm, Communicate number of staff needed for ambulation/transfer     Mentation Interventions: Bed/chair exit alarm, Door open when patient unattended     Medication Interventions: Patient to call before getting OOB, Teach patient to arise slowly, Bed/chair exit alarm     Elimination Interventions: Bed/chair exit alarm, Call light in reach     History of Falls Interventions: Bed/chair exit alarm, Door open when patient unattended

## 2017-09-09 NOTE — PROGRESS NOTES
Verbal bedside report received from Steph Angeles [place]. Assumed care of patient.

## 2017-09-09 NOTE — PROGRESS NOTES
Hospitalist Progress Note     Admit Date:  2017  1:11 PM   Name:  Dimitris Hamilton   Age:  67 y.o.  :  1945   MRN:  [id number]   PCP:  Amaya London MD  Treatment Team: Attending Provider: Alicja Rios MD; Consulting Provider: Alicja Rios MD; Utilization Review: Lorne Huffman; Consulting Provider: Poly Rivera MD; Care Manager: Zainab Deleon; Consulting Provider: Valarie Nye MD; Consulting Provider: Danuta Owens MD    Subjective:     Jefferson Galvez is a 68 yo male admitted for acute respiratory failure secondary to acute COPD exacerbation (s/p intubation and extubation). While his hospital course he developed ITP. Currently on steroids taper. Hematology on board. Cardiology evaluation in light of AVR on coumadin, CHF. Currently on lovenox due to subtherapeutic INR. On my assessment he was found alert, at bedside, in no distress, able to tolerate diet per mouth, no episodes of cough, choking. NGT removed today due to success in tolerating diet. Complained of diarrhea, possible associated to enteral feedings. Patient likely to be transferred to inpatient rehab on Monday.      Objective:     Patient Vitals for the past 24 hrs:   Temp Pulse Resp BP SpO2   17 0831 - (!) 59 - 115/57 -   17 0724 - - - - 100 %   17 0441 97.7 °F (36.5 °C) (!) 57 20 120/56 99 %   17 0051 97.9 °F (36.6 °C) (!) 55 18 95/53 98 %   17 2123 97.6 °F (36.4 °C) 71 16 112/52 100 %   17 1940 - - - - 100 %   17 1731 97.8 °F (36.6 °C) (!) 58 20 125/54 100 %   17 1437 - - - - 95 %   17 1354 - (!) 57 18 118/63 100 %   17 0855 97.5 °F (36.4 °C) 63 20 101/50 100 %     Oxygen Therapy  O2 Sat (%): 100 % (17)  Pulse via Oximetry: 60 beats per minute (17)  O2 Device: Nasal cannula (17 0445)  O2 Flow Rate (L/min): 1 l/min (17)  O2 Temperature: 87.6 °F (30.9 °C) (17 1140)  FIO2 (%): 24 % (17 5136)    Intake/Output Summary (Last 24 hours) at 09/09/17 0851  Last data filed at 09/09/17 0521   Gross per 24 hour   Intake             2165 ml   Output              950 ml   Net             1215 ml           General appearance: NAD, conversant  Eyes: anicteric sclerae, moist conjunctivae; no lid-lag  ENT: Atraumatic; NGT present   Neck: Trachea midline   FROM, supple, no thyromegaly or lymphadenopathy  Lungs: CTA, with normal respiratory effort and no intercostal retractions  CV: S1,S2 present, no added murmurs  Abdomen: Soft, non-tender; no masses   Extremities: No peripheral edema or extremity lymphadenopathy  Skin: Normal temperature, turgor and texture; no subcutaneous nodules  Psych: Appropriate affect, alert and oriented to person, place and time    Data Review:  I have reviewed all labs, meds, telemetry events, and studies from the last 24 hours.     Recent Results (from the past 24 hour(s))   GLUCOSE, POC    Collection Time: 09/08/17 11:49 AM   Result Value Ref Range    Glucose (POC) 140 (H) 65 - 100 mg/dL   GLUCOSE, POC    Collection Time: 09/08/17  5:14 PM   Result Value Ref Range    Glucose (POC) 144 (H) 65 - 100 mg/dL   GLUCOSE, POC    Collection Time: 09/09/17 12:16 AM   Result Value Ref Range    Glucose (POC) 129 (H) 65 - 100 mg/dL   PROTHROMBIN TIME + INR    Collection Time: 09/09/17  5:05 AM   Result Value Ref Range    Prothrombin time 14.0 (H) 9.6 - 12.0 sec    INR 1.3 (H) 0.9 - 1.2     CBC W/O DIFF    Collection Time: 09/09/17  5:05 AM   Result Value Ref Range    WBC 9.7 4.3 - 11.1 K/uL    RBC 3.74 (L) 4.23 - 5.67 M/uL    HGB 11.2 (L) 13.6 - 17.2 g/dL    HCT 33.6 (L) 41.1 - 50.3 %    MCV 89.7 79.6 - 97.8 FL    MCH 30.0 26.1 - 32.9 PG    MCHC 33.4 31.4 - 35.0 g/dL    RDW 16.5 (H) 11.9 - 14.6 %    PLATELET 180 (L) 159 - 450 K/uL    MPV 12.1 10.8 - 58.2 FL   METABOLIC PANEL, COMPREHENSIVE    Collection Time: 09/09/17  5:05 AM   Result Value Ref Range    Sodium 136 136 - 145 mmol/L    Potassium 4.1 3.5 - 5.1 mmol/L    Chloride 97 (L) 98 - 107 mmol/L    CO2 35 (H) 21 - 32 mmol/L    Anion gap 4 (L) 7 - 16 mmol/L    Glucose 111 (H) 65 - 100 mg/dL    BUN 42 (H) 8 - 23 MG/DL    Creatinine 0.85 0.8 - 1.5 MG/DL    GFR est AA >60 >60 ml/min/1.73m2    GFR est non-AA >60 >60 ml/min/1.73m2    Calcium 8.8 8.3 - 10.4 MG/DL    Bilirubin, total 0.4 0.2 - 1.1 MG/DL    ALT (SGPT) 66 (H) 12 - 65 U/L    AST (SGOT) 38 (H) 15 - 37 U/L    Alk. phosphatase 59 50 - 136 U/L    Protein, total 6.6 6.3 - 8.2 g/dL    Albumin 2.2 (L) 3.2 - 4.6 g/dL    Globulin 4.4 (H) 2.3 - 3.5 g/dL    A-G Ratio 0.5 (L) 1.2 - 3.5     DIFFERENTIAL, AUTO    Collection Time: 09/09/17  5:05 AM   Result Value Ref Range    NEUTROPHILS 79 (H) 43 - 78 %    LYMPHOCYTES 9 (L) 13 - 44 %    MONOCYTES 10 4.0 - 12.0 %    EOSINOPHILS 1 0.5 - 7.8 %    BASOPHILS 0 0.0 - 2.0 %    ABS. NEUTROPHILS 7.0 1.7 - 8.2 K/UL    ABS. LYMPHOCYTES 0.8 0.5 - 4.6 K/UL    ABS. MONOCYTES 0.9 0.1 - 1.3 K/UL    ABS. EOSINOPHILS 0.1 0.0 - 0.8 K/UL    ABS. BASOPHILS 0.0 0.0 - 0.2 K/UL    DF AUTOMATED      IMMATURE GRANULOCYTES 0.9 0.0 - 5.0 %    ABS. IMM.  GRANS. 0.1 0.0 - 0.5 K/UL   GLUCOSE, POC    Collection Time: 09/09/17  6:10 AM   Result Value Ref Range    Glucose (POC) 114 (H) 65 - 100 mg/dL        All Micro Results     Procedure Component Value Units Date/Time    CULTURE, BLOOD [[id number]] Collected:  08/25/17 1600    Order Status:  Completed Specimen:  Blood from Blood Updated:  08/30/17 0643     Special Requests: LEFT HAND        Culture result: NO GROWTH 5 DAYS       CULTURE, BLOOD [[id number]] Collected:  08/25/17 2145    Order Status:  Completed Specimen:  Blood from Blood Updated:  08/30/17 0643     Special Requests: RIGHT HAND        Culture result: NO GROWTH 5 DAYS       MRSA SCREEN - PCR (NASAL) [[id number]]  (Abnormal) Collected:  08/29/17 1556    Order Status:  Completed Specimen:  Nasal from Nasal Swab Updated:  08/29/17 1900     Special Requests: NO SPECIAL REQUESTS Culture result:         MRSA target DNA is detected (presumptive positive for MRSA colonization).  (A)            RESULTS VERIFIED, PHONED TO AND READ BACK BY  DAMARIS GARNICA RN ON 08/29/2017 AT 1851 [facility]      CULTURE, URINE [[id number]] Collected:  08/25/17 2011    Order Status:  Completed Specimen:  Urine from Clean catch Updated:  08/28/17 0653     Special Requests: NO SPECIAL REQUESTS        Culture result:         [id number] COLONIES/mL MIXED SKIN VANNESSA ISOLATED          Current Meds:  Current Facility-Administered Medications   Medication Dose Route Frequency    enoxaparin (LOVENOX) injection 70 mg  1 mg/kg SubCUTAneous Q24H    warfarin (COUMADIN) tablet 10 mg  10 mg Oral QHS    predniSONE (DELTASONE) tablet 40 mg  40 mg Oral DAILY WITH BREAKFAST    [START ON 9/15/2017] predniSONE (DELTASONE) tablet 30 mg  30 mg Oral DAILY WITH BREAKFAST    [START ON 9/22/2017] predniSONE (DELTASONE) tablet 20 mg  20 mg Oral DAILY WITH BREAKFAST    [START ON 9/29/2017] predniSONE (DELTASONE) tablet 10 mg  10 mg Oral DAILY WITH BREAKFAST    carvedilol (COREG) tablet 12.5 mg  12.5 mg Oral BID WITH MEALS    midazolam (VERSED) injection 2 mg  2 mg IntraVENous Q2H PRN    morphine injection 2 mg  2 mg IntraVENous Q4H PRN    amiodarone (CORDARONE) tablet 400 mg  400 mg Oral Q12H    insulin lispro (HUMALOG) injection   SubCUTAneous Q6H    NUTRITIONAL SUPPORT ELECTROLYTE PRN ORDERS   Does Not Apply PRN    famotidine (PF) (PEPCID) 20 mg in sodium chloride 0.9 % 10 mL injection  20 mg IntraVENous DAILY    mupirocin (BACTROBAN) 2 % ointment   Both Nostrils BID    0.9% sodium chloride infusion 250 mL  250 mL IntraVENous PRN    acetaminophen (TYLENOL) suppository 650 mg  650 mg Rectal Q4H PRN    sodium chloride (NS) flush 20 mL  20 mL InterCATHeter Q8H    heparin (porcine) pf 600 Units  600 Units InterCATHeter Q8H    sodium chloride (NS) flush 20 mL  20 mL InterCATHeter PRN    heparin (porcine) pf 600 Units  600 Units InterCATHeter PRN    albuterol (PROVENTIL VENTOLIN) nebulizer solution 2.5 mg  2.5 mg Nebulization Q4H PRN    sacubitril-valsartan (ENTRESTO) 24-26 mg tablet 1 Tab  1 Tab Oral Q12H    dextrose (D50W) injection syrg 25 g  25 g IntraVENous PRN    ferrous sulfate tablet 325 mg  1 Tab Oral DAILY WITH BREAKFAST    acetaminophen (TYLENOL) tablet 650 mg  650 mg Oral Q6H PRN    gabapentin (NEURONTIN) capsule 600 mg  600 mg Oral TID    albuterol (PROVENTIL HFA, VENTOLIN HFA, PROAIR HFA) inhaler 2 Puff  2 Puff Inhalation Q4H PRN    tiotropium (SPIRIVA) inhalation capsule 18 mcg  1 Cap Inhalation DAILY    sodium chloride (NS) flush 5-10 mL  5-10 mL IntraVENous Q8H    sodium chloride (NS) flush 5-10 mL  5-10 mL IntraVENous PRN    nitroglycerin (NITROSTAT) tablet 0.4 mg  0.4 mg SubLINGual Q5MIN PRN    ondansetron (ZOFRAN) injection 4 mg  4 mg IntraVENous Q4H PRN    budesonide (PULMICORT) 500 mcg/2 ml nebulizer suspension  500 mcg Nebulization BID RT    And    albuterol CONCENTRATE 2.5mg/0.5 mL neb soln  2.5 mg Nebulization Q6H RT       Other Studies (last 24 hours):  Xr Swallow Fun Video    Result Date: 9/8/2017  MODIFIED BARIUM SWALLOW. HISTORY: Dysphagia. Feeding difficulties. TECHNIQUE: Fluoroscopic guidance was provided for the speech pathologist. Multiple consistencies of barium were given. FINDINGS: No tracheal aspiration. Please see speech pathologist report for further details. Fluoroscopy time: 2.2 minutes. Static images: 0     IMPRESSION: No tracheal aspiration. Please see speech pathologist report for further details.        Assessment and Plan:     Hospital Problems as of 9/9/2017  Date Reviewed: 9/9/2017          Codes Class Noted - Resolved POA    MRSA nasal colonization ICD-10-CM: Z22.322  ICD-9-CM: V02.54  9/8/2017 - Present Yes        COPD (chronic obstructive pulmonary disease) (HCC) (Chronic) ICD-10-CM: J44.9  ICD-9-CM: 496  8/30/2017 - Present Yes        Acute respiratory failure with hypercapnia ([email or web address] 75.) ICD-10-CM: J96.02  ICD-9-CM: 518.81  8/29/2017 - Present No        History of tobacco use (Chronic) ICD-10-CM: I64.581  ICD-9-CM: V15.82  8/29/2017 - Present Yes        Hyponatremia ICD-10-CM: E87.1  ICD-9-CM: 276.1  8/29/2017 - Present Yes        Acute encephalopathy ICD-10-CM: G93.40  ICD-9-CM: 348.30  8/29/2017 - Present Yes        Thrombocytopenia ([facility] ICD-10-CM: D69.6  ICD-9-CM: 287.5  8/26/2017 - Present Yes        Anticoagulant long-term use (Chronic) ICD-10-CM: Z79.01  ICD-9-CM: V58.61  8/23/2017 - Present Yes        HTN (hypertension), benign (Chronic) ICD-10-CM: I10  ICD-9-CM: 401.1  8/23/2017 - Present Yes        Peripheral vascular disease ([facility] [[email or web address] 75.) (Chronic) ICD-10-CM: I73.9  ICD-9-CM: 443.9  8/23/2017 - Present Yes        Dyslipidemia (Chronic) ICD-10-CM: E78.5  ICD-9-CM: 272.4  8/23/2017 - Present Yes        History of mechanical aortic valve replacement (Chronic) ICD-10-CM: Z95.2  ICD-9-CM: V43.3  8/23/2017 - Present Yes    Overview Signed 8/30/2017 10:30 AM by João Gant NP     Placement 2000, on chronicCoumadin             Centrilobular emphysema ([facility] (Chronic) ICD-10-CM: J43.2  ICD-9-CM: 492.8  8/23/2017 - Present Yes    Overview Signed 9/8/2017  9:24 AM by Davie Bello NP      With last PFTs FVC 1.70 L or 44% predicted, FEV1 0.86 L or 29% predicted, FEV1/FVC 51%. This study was a postbronchodilator study performed at the [facility] on 8/22/2016                  Ischemic cardiomyopathy (Chronic) ICD-10-CM: I25.5  ICD-9-CM: 414.8  8/23/2017 - Present Yes    Overview Signed 8/30/2017 10:29 AM by João Gant NP     8/23/17 ECHO:  EF 40 % to 45 %. There were no regional wall motion abnormalities. Moderate hypokinesis of the mid-apical anterior and apical wall(s).              * (Principal)Atrial flutter with rapid ventricular response (HCC) ICD-10-CM: I48.92  ICD-9-CM: 427.32  8/23/2017 - Present Yes              PLAN:      Atrial fibrillation with RVR: controlled   Subtherapeutic INR  S/p Cardioversion   On Lovenox while INR reach therapeutic levels   Continue amiodarone, Coumadin      Acute COPD exacerbation  S/p intubation and extubation for hypercapnic respiratory failure  S/p ceftriaxone therapy ( 7/7 )  Continue albuterol, pulmicort, prednisone   Pulmonology follow up   Encouraged to use NIVPV at night      ITP: stable   Daily CBC  Continue steroids taper  Hematology on board       Chronic systolic CHF: Stable   Continue Entresto, Carvedilol      DC planning/Dispo:  In-patient rehab transfer (9 floor) possible next week   DVT ppx: lovenox   Contact isolation: MRSA-nasal isolate    Signed:  Viraj Zendejas MD

## 2017-09-09 NOTE — PROGRESS NOTES
Verbal bedside report given to Eli Parker oncoming RN. Patient's situation, background, assessment and recommendations provided. Opportunity for questions provided. Oncoming RN assumed care of patient.

## 2017-09-09 NOTE — PROGRESS NOTES
Bedside and Verbal shift change report given to Faby Walters (oncoming nurse) by Alyssa Montero and Praveen Ross (offgoing nurse). Report included the following information SBAR, Kardex, MAR and Recent Results.

## 2017-09-09 NOTE — PROGRESS NOTES
Bedside and Verbal shift change report given to Katrina Fu (oncoming nurse) by Jonh Colon (offgoing nurse). Report included the following information SBAR, Kardex, MAR and Recent Results.

## 2017-09-09 NOTE — PROGRESS NOTES
Pt is refusing bipap at this time. Pt has a really bad sore across the bridge of his nose, which SandraRN, is aware of and he say's the mask marissa him and his sat on [facility] is 100%. Will continue to monitor closely.

## 2017-09-09 NOTE — PROGRESS NOTES
Problem: Afib Pathway: Day 3  Goal: Medications  Outcome: Progressing Towards Goal  Rate controlled with meds        Problem: Falls - Risk of  Goal: *Absence of Falls  Document Lauren Fall Risk and appropriate interventions in the flowsheet. Outcome: Progressing Towards Goal  Pt progressing towards goal. No falls since admission. Bed low and locked. Call light within reach. Side rails x 2. Gripper socks applied. Personal belongings within reach. Pt verbalizes understanding to call for assistance. Fall Risk Interventions:  Mobility Interventions: Bed/chair exit alarm, OT consult for ADLs, PT Consult for assist device competence, Communicate number of staff needed for ambulation/transfer, PT Consult for mobility concerns, Utilize walker, cane, or other assitive device, Patient to call before getting OOB     Mentation Interventions: Adequate sleep, hydration, pain control, Bed/chair exit alarm, Door open when patient unattended, More frequent rounding, Reorient patient, Toileting rounds, Room close to nurse's station, Eyeglasses and hearing aids, Evaluate medications/consider consulting pharmacy     Medication Interventions: Bed/chair exit alarm, Evaluate medications/consider consulting pharmacy, Patient to call before getting OOB, Teach patient to arise slowly     Elimination Interventions: Bed/chair exit alarm, Call light in reach, Patient to call for help with toileting needs, Toilet paper/wipes in reach, Toileting schedule/hourly rounds     History of Falls Interventions: Bed/chair exit alarm, Door open when patient unattended, Evaluate medications/consider consulting pharmacy, Investigate reason for fall, Room close to nurse's station           Problem: Breathing Pattern - Ineffective  Goal: *Absence of hypoxia  Outcome: Progressing Towards Goal  Nc overnight at pt request. No distress noted.  Tolerated well        Problem: Pressure Injury - Risk of  Goal: *Prevention of pressure ulcer  Outcome: Progressing Towards Goal  Allevyn in place. Skin underneath is WDL. No breakdown noted. Patient appropriately turns self in bed.

## 2017-09-09 NOTE — PROGRESS NOTES
Problem: Nutrition Deficit  Goal: *Optimize nutritional status  Nutrition F/U:  TF Management for the Hospitalists-reconsult for calorie count and potential to discontinue TF. Rufina Suarez NP)  Assessment:  Weight 77 kg (5th floor bed 9/9/17), edema - trace-1+. The patient has been started on a diet, however patient is not consuming much at this point. RD to change patient to nocturnal TF cycle to allow patient to eat during the day without the potential of TF inhibiting oral intake from feeling of fullness. Abdomen noted as semi-soft with active bowel sounds. Date of last BM: 9/9. Labs are remarkable for potassium 4.1 with low potassium formula. Glucose 111; POC glucose 114, 209 mg/dl today. Elevated glucose being covered with SSI. He is receiving prednisone. Macronutrient Needs:  Estimated calorie needs - 7326-0038 rina/day (20-25 rina/kg/day)   Estimated protein needs - 72-78 gm pro/day (1.1-1.2 gm pro/kgIBW/day) (GFR >60 ml/min)  Max CHO/day - 247 gm CHO/day (50% rina/day)  Fluid/day - 1.6-2 liters/day (1 ml/rina/day)  Intake/Comparative Standards:    Current intake of TF (Nepro @ 40 ml/hr with a 45 ml/hr water flush) provides 1728 calories/day (100% calorie goal), 78 grams protein/day (100% protein goal), 160 grams CHO/day (does not exceed max CHO limit) and 1776 ml water/day (100% fluid goal). PO intakes: 10% meal intakes provides ~12% of estimated kcal needs and ~10% of estimated protein needs. Intervention:   Meals and Snacks: Pureed/nectar thickened liquids per SLP. Will add ensure enlive and SF mighty shakes. Enteral Nutrition: Change to nocturnal TF of Nepro (6pm-6am) at 75 ml/hr with a 75 ml/hr water flush via NGT until the patient is able to consume 50% of meals  This will provide 1620 kcal (100% of estimated needs), 73 gm of pro (100% of estimated protein needs), 145 gm CHO (does not exceed max CHO/day) and ~1554 ml of free water (96% of estimated fluid needs).   Mineral Supplement Therapy: Nutrition Support Orders/Electrolyte Management Replacement Protocols are active on the MAR. Nutrition Discharge Plan: Unable to determine. Chase Greenwood [address], [address], [place]   [phone]        Addendum:  Received Call from [facility], [facility], [facility] about TF being discontinued by MD and NGT has been removed. Will cancel Calorie count and TF orders at this time. Chase Garcia [address], [address], [place]   [phone]

## 2017-09-09 NOTE — PROGRESS NOTES
Patient did not want to wear bi-pap tonight. He states that it \"makes too much noise\" and he hasn't slept well in past few nights. Patient presently denying any SOB. NC at 2L on at present. SPO2 99%. Breathing even, regular, and non-labored, although LS are diminished, but overall unchanged from previous assessments. RT aware.

## 2017-09-09 NOTE — PROGRESS NOTES
Spoke with Ciarra Landa, nutritionist regarding tube feeding. Patient's NGT d/c this AM per Dr. Matt Pineda.

## 2017-09-09 NOTE — PROGRESS NOTES
No cp or inc sob  Vss, awake, lying very still, feeding tube in nares, valve sound crisp  inr 1.3  Imp:  Chronic resp failure in need of home non invasive vent support  mAVR  ? ITP  cAfib  Ef 45%  Plan:  Cont.  Coreg, Entresto, Lovenox until inr ok

## 2017-09-09 NOTE — PROGRESS NOTES
Jackqulyn Paget  Admission Date: 8/23/2017             Daily Progress Note: 9/9/2017    The patient's chart is reviewed and the patient is discussed with the staff.    67 y.o.  CM with h/o severe COPD, ITP with thrombocytopenia, s/p mechanical AVR 2000, atrial flutter who was admitted on 8/23 with rapid a. Flutter.  EF 45%.  His hospital course has been complicated by hyponatremia, confusion, hypercarbic respiratory failure requiring intubation. Extubated 9/5/17 and weaned to [place]. Heme/Onc following for thrombocytopenia with plans for outpatient follow up. Subjective:     BP marginal at times. Refused to wear BIPAP overnight and very sleepy this am but does rouse some. Continues to refuse BIPAP with nap this am - wife insistent. Currently on O2 at 1 lpm via NC with O2 sat 100%. Wife at bedside - states that he ate some pudding and tea last night. Has not touched breakfast.  Discussed Nutrition consult and possible need for PEG if needs longer term nutritional support.        Current Facility-Administered Medications   Medication Dose Route Frequency    enoxaparin (LOVENOX) injection 70 mg  1 mg/kg SubCUTAneous Q24H    warfarin (COUMADIN) tablet 10 mg  10 mg Oral QHS    predniSONE (DELTASONE) tablet 40 mg  40 mg Oral DAILY WITH BREAKFAST    [START ON 9/15/2017] predniSONE (DELTASONE) tablet 30 mg  30 mg Oral DAILY WITH BREAKFAST    [START ON 9/22/2017] predniSONE (DELTASONE) tablet 20 mg  20 mg Oral DAILY WITH BREAKFAST    [START ON 9/29/2017] predniSONE (DELTASONE) tablet 10 mg  10 mg Oral DAILY WITH BREAKFAST    carvedilol (COREG) tablet 12.5 mg  12.5 mg Oral BID WITH MEALS    midazolam (VERSED) injection 2 mg  2 mg IntraVENous Q2H PRN    morphine injection 2 mg  2 mg IntraVENous Q4H PRN    amiodarone (CORDARONE) tablet 400 mg  400 mg Oral Q12H    insulin lispro (HUMALOG) injection   SubCUTAneous Q6H    NUTRITIONAL SUPPORT ELECTROLYTE PRN ORDERS   Does Not Apply PRN    famotidine (PF) (PEPCID) 20 mg in sodium chloride 0.9 % 10 mL injection  20 mg IntraVENous DAILY    mupirocin (BACTROBAN) 2 % ointment   Both Nostrils BID    0.9% sodium chloride infusion 250 mL  250 mL IntraVENous PRN    acetaminophen (TYLENOL) suppository 650 mg  650 mg Rectal Q4H PRN    sodium chloride (NS) flush 20 mL  20 mL InterCATHeter Q8H    heparin (porcine) pf 600 Units  600 Units InterCATHeter Q8H    sodium chloride (NS) flush 20 mL  20 mL InterCATHeter PRN    heparin (porcine) pf 600 Units  600 Units InterCATHeter PRN    albuterol (PROVENTIL VENTOLIN) nebulizer solution 2.5 mg  2.5 mg Nebulization Q4H PRN    sacubitril-valsartan (ENTRESTO) 24-26 mg tablet 1 Tab  1 Tab Oral Q12H    dextrose (D50W) injection syrg 25 g  25 g IntraVENous PRN    ferrous sulfate tablet 325 mg  1 Tab Oral DAILY WITH BREAKFAST    acetaminophen (TYLENOL) tablet 650 mg  650 mg Oral Q6H PRN    gabapentin (NEURONTIN) capsule 600 mg  600 mg Oral TID    albuterol (PROVENTIL HFA, VENTOLIN HFA, PROAIR HFA) inhaler 2 Puff  2 Puff Inhalation Q4H PRN    tiotropium (SPIRIVA) inhalation capsule 18 mcg  1 Cap Inhalation DAILY    sodium chloride (NS) flush 5-10 mL  5-10 mL IntraVENous Q8H    sodium chloride (NS) flush 5-10 mL  5-10 mL IntraVENous PRN    nitroglycerin (NITROSTAT) tablet 0.4 mg  0.4 mg SubLINGual Q5MIN PRN    ondansetron (ZOFRAN) injection 4 mg  4 mg IntraVENous Q4H PRN    budesonide (PULMICORT) 500 mcg/2 ml nebulizer suspension  500 mcg Nebulization BID RT    And    albuterol CONCENTRATE 2.5mg/0.5 mL neb soln  2.5 mg Nebulization Q6H RT       Review of Systems  Not able to obtain    Objective:     Vitals:    09/09/17 0051 09/09/17 0441 09/09/17 0724 09/09/17 0831   BP: 95/53 120/56  115/57   Pulse: (!) 55 (!) 57  (!) 59   Resp: 18 20     Temp: 97.9 °F (36.6 °C) 97.7 °F (36.5 °C)     SpO2: 98% 99% 100%    Weight:  169 lb 12.1 oz (77 kg)     Height:         Intake and Output:   09/07 1901 - 09/09 0700  In: 3194   Out: 2400 [Urine:2400]       Physical Exam:   Constitution:  the patient is well developed and in no acute distress  EENMT:  Sclera clear, pupils equal, oral mucosa moist  Respiratory: diminished bilaterally, o2 at 1 lpm  Cardiovascular:  iRRR with II/IV SM,no G,R  Gastrointestinal: soft and non-tender; with positive bowel sounds. NG feeding tube  Musculoskeletal: warm without cyanosis. There is no  lower leg edema.   Skin:  no jaundice or rashes, excoriation to bridge of nose  Neurologic: no gross neuro deficits     Psychiatric:  alert and oriented x 3    CXR:   9/5      LAB  Recent Labs      09/09/17   0610  09/09/17   0016  09/08/17   1714  09/08/17   1149  09/08/17   0557   GLUCPOC  114*  129*  144*  140*  132*      Recent Labs      09/09/17   0505  09/08/17   0444  09/07/17   0429   WBC  9.7  10.3  9.9   HGB  11.2*  12.3*  10.6*   HCT  33.6*  39.3*  33.3*   PLT  109*  97*  105*   INR  1.3*  1.2  1.3*     Recent Labs      09/09/17   0505  09/08/17   0444  09/07/17   0429   NA  136  137  136   K  4.1  4.3  4.4   CL  97*  99  98   CO2  35*  34*  34*   GLU  111*  123*  147*   BUN  42*  38*  36*   CREA  0.85  0.83  0.82   CA  8.8  8.8  8.6   ALB  2.2*   --    --    TBILI  0.4   --    --    ALT  66*   --    --    SGOT  38*   --    --        Assessment:  (Medical Decision Making)     Hospital Problems  Date Reviewed: 9/8/2017          Codes Class Noted POA    MRSA nasal colonization ICD-10-CM: Z22.322  ICD-9-CM: V02.54  9/8/2017 Yes    On isolation      COPD [facility]chronic [facility] disease) ([facility] (Chronic) ICD-10-CM: J44.9  ICD-9-CM: 182  8/30/2017 Yes    No active wheezing  Nebs / steroids      Acute respiratory failure with hypercapnia (HCC) ICD-10-CM: J96.02  ICD-9-CM: 518.81  8/29/2017 No    Currently on o2 at 3 lpm   Was not previously on home o2      History of tobacco use (Chronic) ICD-10-CM: X77.638  ICD-9-CM: V15.82  8/29/2017 Yes        Hyponatremia ICD-10-CM: E87.1  ICD-9-CM: 276.1  8/29/2017 Yes    Na+ 136      Acute encephalopathy ICD-10-CM: G93.40  ICD-9-CM: 348.30  8/29/2017 Yes    Seems clear on exam      Thrombocytopenia ([facility] ICD-10-CM: D69.6  ICD-9-CM: 287.5  8/26/2017 Yes    Plt 109      Anticoagulant long-term use (Chronic) ICD-10-CM: Z79.01  ICD-9-CM: V58.61  8/23/2017 Yes    Coumadin with INR 1.2      HTN (hypertension), benign (Chronic) ICD-10-CM: I10  ICD-9-CM: 401.1  8/23/2017 Yes        Peripheral vascular disease ([facility] (Chronic) ICD-10-CM: I73.9  ICD-9-CM: 443.9  8/23/2017 Yes        Dyslipidemia (Chronic) ICD-10-CM: E78.5  ICD-9-CM: 272.4  8/23/2017 Yes        History of mechanical aortic valve replacement (Chronic) ICD-10-CM: Z95.2  ICD-9-CM: V43.3  8/23/2017 Yes     Placement 2000, on chronicCoumadin         Centrilobular emphysema ([facility] (Chronic) ICD-10-CM: J43.2  ICD-9-CM: 492.8  8/23/2017 Yes     With last PFTs FVC 1.70 L or 44% predicted, FEV1 0.86 L or 29% predicted, FEV1/FVC 51%. This study was a postbronchodilator study performed at the [place] in [facility] [place] on 8/22/2016         Ischemic cardiomyopathy (Chronic) ICD-10-CM: I25.5  ICD-9-CM: 414.8  8/23/2017 Yes     8/23/17 ECHO:  EF 40 % to 45 %. There were no regional wall motion abnormalities. Moderate hypokinesis of the mid-apical anterior and apical wall(s). * (Principal)Atrial flutter with rapid ventricular response [facility]) ICD-10-CM: I48.92  ICD-9-CM: 427.32  8/23/2017 Yes           Plan:  (Medical Decision Making)     --Albuterol / Sharman Prose / Spiriva  --prednisone taper  --add Mucinex  --wean daytime O2 as tolerated  --s/p ABX   --coumadin - INR 1.3 / lovenox until therapeutic  --S/P SLP eval - s/p MBS - recommendation for puree with nectar thick liquids. Concern for adequate intake secondary to fatiugy   Albumin 2.2   Nutritional evaluation / calorie count   --discussed ?  Eventual need for PEG in the future pending intake assessment for adequate po intake  --PT/OT for mobility  --SW following for possible placement on 9th floor when medically stable. --refused to wear BIPAP overnight - skin breakdown and patient c/o noise and inability to sleep. Sleeping this am - recommended that mask be applied during nap to help with daytime wakefulness. Patient refuses but wife insists. RT to try.  --would benefit from Palliative Care for ongoing discussions regarding goals of care. --Marta Richards working on [facility] [place] [facility] for home use [employer].  Due to the severity of the patient's chronic respiratory failure, BiPAP does not supply the adequate pressure or volume to effectively ventilate the patient. Non-invasive ventilation is required for both daytime and nighttime use for life sustaining measures. Failure to adequately ventilate patient with non-invasive ventilation could result in serious harm and/or death. More than 50% of the time documented was spent in face-to-face contact with the patient and in the care of the patient on the floor/unit where the patient is located. Torey Long NP      The patient has been seen and examined by me personally, the chart,labs, and radiographic studies have been reviewed. Chest: CTA at time of exam  Extremities: 1+ edema    I agree with the above assessment and plan.     Crow Luna MD.

## 2017-09-10 LAB
GLUCOSE BLD STRIP.AUTO-MCNC: 109 MG/DL (ref 65–100)
GLUCOSE BLD STRIP.AUTO-MCNC: 142 MG/DL (ref 65–100)
GLUCOSE BLD STRIP.AUTO-MCNC: 181 MG/DL (ref 65–100)
GLUCOSE BLD STRIP.AUTO-MCNC: 80 MG/DL (ref 65–100)

## 2017-09-10 PROCEDURE — 65660000000 HC RM CCU STEPDOWN

## 2017-09-10 PROCEDURE — 94660 CPAP INITIATION&MGMT: CPT

## 2017-09-10 PROCEDURE — 74011250637 HC RX REV CODE- 250/637: Performed by: INTERNAL MEDICINE

## 2017-09-10 PROCEDURE — 74011636637 HC RX REV CODE- 636/637: Performed by: NURSE PRACTITIONER

## 2017-09-10 PROCEDURE — 82962 GLUCOSE BLOOD TEST: CPT

## 2017-09-10 PROCEDURE — 74011000250 HC RX REV CODE- 250: Performed by: INTERNAL MEDICINE

## 2017-09-10 PROCEDURE — 94640 AIRWAY INHALATION TREATMENT: CPT

## 2017-09-10 PROCEDURE — 74011250636 HC RX REV CODE- 250/636: Performed by: INTERNAL MEDICINE

## 2017-09-10 PROCEDURE — 94760 N-INVAS EAR/PLS OXIMETRY 1: CPT

## 2017-09-10 PROCEDURE — 77010033678 HC OXYGEN DAILY

## 2017-09-10 PROCEDURE — 74011250637 HC RX REV CODE- 250/637: Performed by: NURSE PRACTITIONER

## 2017-09-10 RX ADMIN — GABAPENTIN 600 MG: 300 CAPSULE ORAL at 13:57

## 2017-09-10 RX ADMIN — CARVEDILOL 12.5 MG: 12.5 TABLET, FILM COATED ORAL at 17:27

## 2017-09-10 RX ADMIN — WARFARIN SODIUM 10 MG: 5 TABLET ORAL at 22:22

## 2017-09-10 RX ADMIN — SODIUM CHLORIDE, PRESERVATIVE FREE 600 UNITS: 5 INJECTION INTRAVENOUS at 22:23

## 2017-09-10 RX ADMIN — MUPIROCIN: 20 OINTMENT TOPICAL at 08:42

## 2017-09-10 RX ADMIN — SODIUM CHLORIDE, PRESERVATIVE FREE 600 UNITS: 5 INJECTION INTRAVENOUS at 06:35

## 2017-09-10 RX ADMIN — ENOXAPARIN SODIUM 70 MG: 100 INJECTION SUBCUTANEOUS at 17:33

## 2017-09-10 RX ADMIN — TIOTROPIUM BROMIDE 18 MCG: 18 CAPSULE ORAL; RESPIRATORY (INHALATION) at 07:02

## 2017-09-10 RX ADMIN — BUDESONIDE 500 MCG: 0.5 INHALANT RESPIRATORY (INHALATION) at 19:48

## 2017-09-10 RX ADMIN — ALBUTEROL SULFATE 2.5 MG: 2.5 SOLUTION RESPIRATORY (INHALATION) at 14:25

## 2017-09-10 RX ADMIN — SODIUM CHLORIDE, PRESERVATIVE FREE 600 UNITS: 5 INJECTION INTRAVENOUS at 13:57

## 2017-09-10 RX ADMIN — GABAPENTIN 600 MG: 300 CAPSULE ORAL at 06:33

## 2017-09-10 RX ADMIN — FERROUS SULFATE TAB 325 MG (65 MG ELEMENTAL FE) 325 MG: 325 (65 FE) TAB at 08:42

## 2017-09-10 RX ADMIN — FAMOTIDINE 20 MG: 10 INJECTION, SOLUTION INTRAVENOUS at 08:38

## 2017-09-10 RX ADMIN — ALBUTEROL SULFATE 2.5 MG: 2.5 SOLUTION RESPIRATORY (INHALATION) at 19:48

## 2017-09-10 RX ADMIN — SACUBITRIL AND VALSARTAN 1 TABLET: 24; 26 TABLET, FILM COATED ORAL at 22:22

## 2017-09-10 RX ADMIN — INSULIN LISPRO 2 UNITS: 100 INJECTION, SOLUTION INTRAVENOUS; SUBCUTANEOUS at 12:27

## 2017-09-10 RX ADMIN — Medication 20 ML: at 13:57

## 2017-09-10 RX ADMIN — GABAPENTIN 600 MG: 300 CAPSULE ORAL at 22:22

## 2017-09-10 RX ADMIN — PREDNISONE 40 MG: 20 TABLET ORAL at 08:42

## 2017-09-10 RX ADMIN — TRAZODONE HYDROCHLORIDE 50 MG: 50 TABLET ORAL at 22:22

## 2017-09-10 RX ADMIN — ALBUTEROL SULFATE 2.5 MG: 2.5 SOLUTION RESPIRATORY (INHALATION) at 02:05

## 2017-09-10 RX ADMIN — Medication 20 ML: at 22:23

## 2017-09-10 RX ADMIN — GUAIFENESIN 1200 MG: 600 TABLET, EXTENDED RELEASE ORAL at 08:42

## 2017-09-10 RX ADMIN — GUAIFENESIN 1200 MG: 600 TABLET, EXTENDED RELEASE ORAL at 17:27

## 2017-09-10 RX ADMIN — Medication 10 ML: at 13:59

## 2017-09-10 RX ADMIN — ALBUTEROL SULFATE 2.5 MG: 2.5 SOLUTION RESPIRATORY (INHALATION) at 07:02

## 2017-09-10 RX ADMIN — BUDESONIDE 500 MCG: 0.5 INHALANT RESPIRATORY (INHALATION) at 07:02

## 2017-09-10 NOTE — PROGRESS NOTES
Verbal bedside report given to [address] , oncoming RN. Patient's situation, background, assessment and recommendations provided. Opportunity for questions provided. Oncoming RN assumed care of patient.

## 2017-09-10 NOTE — PROGRESS NOTES
Hospitalist Progress Note     Admit Date:  2017  1:11 PM   Name:  Daniel Campor   Age:  67 y.o.  :  1945   MRN:  [id number]   PCP:  Lizeth Gutierres MD  Treatment Team: Attending Provider: Jarrett Cedillo MD; Consulting Provider: Jarrett Cedillo MD; Utilization Review: Fidel Looney; Consulting Provider: Mina Enamorado MD; Care Manager: Timur Chang; Consulting Provider: Nikkie Caraballo MD    Subjective: This is a 66 yo male admitted for acute respiratory failure secondary to acute COPD exacerbation (s/p intubation and extubation). PMH of atrial fibrillation, mAVR. He was found at bedside. This morning BP was found to be 77/39  HR 59. BP medications were held for now. Latest BP reading 92/43  HR 64. Cardiology follow up appreciated and amiodarone was discontinue since his A fib is under control. Also due to his lung condition this medications is being discontinue. On my assessment today he was not symptomatic despite his BP reading. He was able to tolerate most of his diet. Labs stable. Possible rehab transfer tomorrow if blood pressure is under control.      Objective:     Patient Vitals for the past 24 hrs:   Temp Pulse Resp BP SpO2   09/10/17 0702 - - - - 98 %   09/10/17 0439 98.2 °F (36.8 °C) (!) 59 18 103/54 98 %   09/10/17 0205 - - - - 100 %   09/10/17 0112 98.5 °F (36.9 °C) (!) 58 20 113/54 94 %   17 2356 - - - - 100 %   17 98.6 °F (37 °C) (!) 56 18 113/69 100 %   17 2038 - - - - 100 %   17 1640 97.5 °F (36.4 °C) (!) 59 16 95/54 100 %   17 1605 - (!) 58 - 101/52 -   17 1359 - - - - 100 %   17 1209 97.6 °F (36.4 °C) (!) 55 20 111/55 100 %   17 1045 - (!) 57 - 94/49 -   17 0948 97.7 °F (36.5 °C) 60 16 (!) 84/42 99 %   17 0831 - (!) 59 - 115/57 -     Oxygen Therapy  O2 Sat (%): 98 % (09/10/17 0702)  Pulse via Oximetry: 55 beats per minute (09/10/17 0702)  O2 Device: Nasal cannula (09/10/17 0702)  O2 Flow Rate (L/min): 2 l/min (09/10/17 0702)  O2 Temperature: 87.6 °F (30.9 °C) (09/04/17 1140)  FIO2 (%): 28 % (09/10/17 0702)    Intake/Output Summary (Last 24 hours) at 09/10/17 0749  Last data filed at 09/10/17 0442   Gross per 24 hour   Intake             1310 ml   Output              550 ml   Net              760 ml           General appearance: NAD, conversant  Eyes: anicteric sclerae, moist conjunctivae; no lid-lag  ENT: normal oral mucosas   Neck: Trachea midline   FROM, supple, no thyromegaly or lymphadenopathy  Lungs: CTA, with normal respiratory effort and no intercostal retractions  CV: S1,S2 present, no added murmurs  Abdomen: Soft, non-tender; no masses   Extremities: No peripheral edema or extremity lymphadenopathy  Skin: Normal temperature, turgor and texture; no subcutaneous nodules  Psych: Appropriate affect, alert    Data Review:  I have reviewed all labs, meds, telemetry events, and studies from the last 24 hours.     Recent Results (from the past 24 hour(s))   GLUCOSE, POC    Collection Time: 09/09/17 11:59 AM   Result Value Ref Range    Glucose (POC) 209 (H) 65 - 100 mg/dL   GLUCOSE, POC    Collection Time: 09/09/17  4:37 PM   Result Value Ref Range    Glucose (POC) 115 (H) 65 - 100 mg/dL   GLUCOSE, POC    Collection Time: 09/10/17 12:04 AM   Result Value Ref Range    Glucose (POC) 109 (H) 65 - 100 mg/dL   GLUCOSE, POC    Collection Time: 09/10/17  6:13 AM   Result Value Ref Range    Glucose (POC) 80 65 - 100 mg/dL        All Micro Results     Procedure Component Value Units Date/Time    CULTURE, BLOOD [[id number]] Collected:  08/25/17 1600    Order Status:  Completed Specimen:  Blood from Blood Updated:  08/30/17 0643     Special Requests: LEFT HAND        Culture result: NO GROWTH 5 DAYS       CULTURE, BLOOD [[id number]] Collected:  08/25/17 2145    Order Status:  Completed Specimen:  Blood from Blood Updated:  08/30/17 0643     Special Requests: RIGHT HAND        Culture result: NO GROWTH 5 DAYS MRSA SCREEN - PCR (NASAL) [[id number]]  (Abnormal) Collected:  08/29/17 1556    Order Status:  Completed Specimen:  Nasal from Nasal Swab Updated:  08/29/17 1900     Special Requests: NO SPECIAL REQUESTS        Culture result:         MRSA target DNA is detected (presumptive positive for MRSA colonization).  (A)            RESULTS VERIFIED, PHONED TO AND READ BACK BY  DAMARIS GARNICA RN ON 08/29/2017 AT [address] [facility]      CULTURE, URINE [[id number]] Collected:  08/25/17 2011    Order Status:  Completed Specimen:  Urine from Clean catch Updated:  08/28/17 0653     Special Requests: NO SPECIAL REQUESTS        Culture result:         [id number] COLONIES/mL MIXED SKIN VANNESSA ISOLATED          Current Meds:  Current Facility-Administered Medications   Medication Dose Route Frequency    guaiFENesin ER (MUCINEX) tablet 1,200 mg  1,200 mg Oral BID    traZODone (DESYREL) tablet 50 mg  50 mg Oral QHS PRN    enoxaparin (LOVENOX) injection 70 mg  1 mg/kg SubCUTAneous Q24H    warfarin (COUMADIN) tablet 10 mg  10 mg Oral QHS    predniSONE (DELTASONE) tablet 40 mg  40 mg Oral DAILY WITH BREAKFAST    [START ON 9/15/2017] predniSONE (DELTASONE) tablet 30 mg  30 mg Oral DAILY WITH BREAKFAST    [START ON 9/22/2017] predniSONE (DELTASONE) tablet 20 mg  20 mg Oral DAILY WITH BREAKFAST    [START ON 9/29/2017] predniSONE (DELTASONE) tablet 10 mg  10 mg Oral DAILY WITH BREAKFAST    carvedilol (COREG) tablet 12.5 mg  12.5 mg Oral BID WITH MEALS    midazolam (VERSED) injection 2 mg  2 mg IntraVENous Q2H PRN    morphine injection 2 mg  2 mg IntraVENous Q4H PRN    amiodarone (CORDARONE) tablet 400 mg  400 mg Oral Q12H    insulin lispro (HUMALOG) injection   SubCUTAneous Q6H    NUTRITIONAL SUPPORT ELECTROLYTE PRN ORDERS   Does Not Apply PRN    famotidine (PF) (PEPCID) 20 mg in sodium chloride 0.9 % 10 mL injection  20 mg IntraVENous DAILY    mupirocin (BACTROBAN) 2 % ointment   Both Nostrils BID    0.9% sodium chloride infusion 250 mL  250 mL IntraVENous PRN    acetaminophen (TYLENOL) suppository 650 mg  650 mg Rectal Q4H PRN    sodium chloride (NS) flush 20 mL  20 mL InterCATHeter Q8H    heparin (porcine) pf 600 Units  600 Units InterCATHeter Q8H    sodium chloride (NS) flush 20 mL  20 mL InterCATHeter PRN    heparin (porcine) pf 600 Units  600 Units InterCATHeter PRN    albuterol (PROVENTIL VENTOLIN) nebulizer solution 2.5 mg  2.5 mg Nebulization Q4H PRN    sacubitril-valsartan (ENTRESTO) 24-26 mg tablet 1 Tab  1 Tab Oral Q12H    dextrose (D50W) injection syrg 25 g  25 g IntraVENous PRN    ferrous sulfate tablet 325 mg  1 Tab Oral DAILY WITH BREAKFAST    acetaminophen (TYLENOL) tablet 650 mg  650 mg Oral Q6H PRN    gabapentin (NEURONTIN) capsule 600 mg  600 mg Oral TID    albuterol (PROVENTIL HFA, VENTOLIN HFA, PROAIR HFA) inhaler 2 Puff  2 Puff Inhalation Q4H PRN    tiotropium (SPIRIVA) inhalation capsule 18 mcg  1 Cap Inhalation DAILY    sodium chloride (NS) flush 5-10 mL  5-10 mL IntraVENous Q8H    sodium chloride (NS) flush 5-10 mL  5-10 mL IntraVENous PRN    nitroglycerin (NITROSTAT) tablet 0.4 mg  0.4 mg SubLINGual Q5MIN PRN    ondansetron (ZOFRAN) injection 4 mg  4 mg IntraVENous Q4H PRN    budesonide (PULMICORT) 500 mcg/2 ml nebulizer suspension  500 mcg Nebulization BID RT    And    albuterol CONCENTRATE 2.5mg/0.5 mL neb soln  2.5 mg Nebulization Q6H RT       Other Studies (last 24 hours):  No results found.     Assessment and Plan:     Hospital Problems as of 9/10/2017  Date Reviewed: 9/9/2017          Codes Class Noted - Resolved POA    MRSA nasal colonization ICD-10-CM: Z22.322  ICD-9-CM: V02.54  9/8/2017 - Present Yes        COPD (chronic obstructive pulmonary disease) (HCC) (Chronic) ICD-10-CM: J44.9  ICD-9-CM: 496  8/30/2017 - Present Yes        Acute respiratory failure with hypercapnia ([facility] ICD-10-CM: J96.02  ICD-9-CM: 518.81  8/29/2017 - Present No        History of tobacco use (Chronic) ICD-10-CM: D53.634  ICD-9-CM: V15.82  8/29/2017 - Present Yes        Hyponatremia ICD-10-CM: E87.1  ICD-9-CM: 276.1  8/29/2017 - Present Yes        Acute encephalopathy ICD-10-CM: G93.40  ICD-9-CM: 348.30  8/29/2017 - Present Yes        Thrombocytopenia ([facility] ICD-10-CM: D69.6  ICD-9-CM: 287.5  8/26/2017 - Present Yes        Anticoagulant long-term use (Chronic) ICD-10-CM: Z79.01  ICD-9-CM: V58.61  8/23/2017 - Present Yes        HTN (hypertension), benign (Chronic) ICD-10-CM: I10  ICD-9-CM: 401.1  8/23/2017 - Present Yes        Peripheral vascular disease ([facility] (Chronic) ICD-10-CM: I73.9  ICD-9-CM: 443.9  8/23/2017 - Present Yes        Dyslipidemia (Chronic) ICD-10-CM: E78.5  ICD-9-CM: 272.4  8/23/2017 - Present Yes        History of mechanical aortic valve replacement (Chronic) ICD-10-CM: Z95.2  ICD-9-CM: V43.3  8/23/2017 - Present Yes    Overview Signed 8/30/2017 10:30 AM by Jaylen Ortega NP     Placement 2000, on chronicCoumadin             Centrilobular emphysema ([facility] (Chronic) ICD-10-CM: J43.2  ICD-9-CM: 492.8  8/23/2017 - Present Yes    Overview Signed 9/8/2017  9:24 AM by Payam Mcnally NP      With last PFTs FVC 1.70 L or 44% predicted, FEV1 0.86 L or 29% predicted, FEV1/FVC 51%. This study was a postbronchodilator study performed at the [place] in [facility] on 8/22/2016                  Ischemic cardiomyopathy (Chronic) ICD-10-CM: I25.5  ICD-9-CM: 414.8  8/23/2017 - Present Yes    Overview Signed 8/30/2017 10:29 AM by Jaylen Ortega NP     8/23/17 ECHO:  EF 40 % to 45 %. There were no regional wall motion abnormalities. Moderate hypokinesis of the mid-apical anterior and apical wall(s).              * (Principal)Atrial flutter with rapid ventricular response (HCC) ICD-10-CM: I48.92  ICD-9-CM: 427.32  8/23/2017 - Present Yes              PLAN:      #Hypotension:  Likely related to his blood pressure medications  He was asymptomatic  Cardiology on board and amiodarone was discontinue  For now Hold BP medications due to hypotension    Atrial fibrillation with RVR: controlled   Subtherapeutic INR  S/p Cardioversion   On Lovenox while INR reach therapeutic levels   Amiodarone discontinue today.   Continue Coumadin      Acute COPD exacerbation  S/p intubation and extubation for hypercapnic respiratory failure  S/p ceftriaxone therapy ( 7/7 )  Continue albuterol, pulmicort, prednisone   Pulmonology follow up   Encouraged to use NIVPV at night      ITP: stable   Daily CBC, awaiting results today   Continue steroids taper  Hematology on board       Chronic systolic CHF: Stable   Continue Entresto, Carvedilol      DC planning/Dispo:  In-patient rehab transfer (9 floor) tomorrow if BP is stable   DVT ppx: lovenox   Contact isolation: MRSA-nasal isolate    Signed:  Octavio Gaviria MD

## 2017-09-10 NOTE — PROGRESS NOTES
Spoke with Dr. Greer Álvarez regarding patient's BP 77/39 and 85/48. Received orders to hold entresto and coreg.

## 2017-09-10 NOTE — PROGRESS NOTES
Bedside and Verbal shift change report given to [facility], RN (oncoming nurse) by self (offgoing nurse). Report included the following information SBAR, Kardex, MAR and Recent Results.

## 2017-09-10 NOTE — PROGRESS NOTES
No c/o  Vss. Exam w/o change, somnolent  Imp:  Chronic resp failure in need of home non invasive vent support  mAVR  ? ITP  cAfib  Ef 45%  Plan:  Stop amiodarone since his a fib is permanent and not a good med w/ his lung condition.   Stop Lovenox when INR ok

## 2017-09-10 NOTE — PROGRESS NOTES
Bedside and Verbal shift change report given to self (oncoming nurse) by Sarah Borjas RN (offgoing nurse). Report included the following information SBAR, Kardex, MAR and Recent Results.

## 2017-09-10 NOTE — PROGRESS NOTES
Bedside and Verbal shift change report given to self (oncoming nurse) by Natacha Larsen RN (offgoing nurse). Report included the following information SBAR, Kardex, MAR and Recent Results.

## 2017-09-11 LAB
ALBUMIN SERPL-MCNC: 2.2 G/DL (ref 3.2–4.6)
ALBUMIN/GLOB SERPL: 0.6 {RATIO} (ref 1.2–3.5)
ALP SERPL-CCNC: 49 U/L (ref 50–136)
ALT SERPL-CCNC: 84 U/L (ref 12–65)
ANION GAP SERPL CALC-SCNC: 3 MMOL/L (ref 7–16)
AST SERPL-CCNC: 52 U/L (ref 15–37)
BASOPHILS # BLD: 0 K/UL (ref 0–0.2)
BASOPHILS NFR BLD: 0 % (ref 0–2)
BILIRUB SERPL-MCNC: 0.3 MG/DL (ref 0.2–1.1)
BUN SERPL-MCNC: 34 MG/DL (ref 8–23)
CALCIUM SERPL-MCNC: 8.3 MG/DL (ref 8.3–10.4)
CHLORIDE SERPL-SCNC: 102 MMOL/L (ref 98–107)
CO2 SERPL-SCNC: 37 MMOL/L (ref 21–32)
CREAT SERPL-MCNC: 0.74 MG/DL (ref 0.8–1.5)
DIFFERENTIAL METHOD BLD: ABNORMAL
EOSINOPHIL # BLD: 0.1 K/UL (ref 0–0.8)
EOSINOPHIL NFR BLD: 1 % (ref 0.5–7.8)
ERYTHROCYTE [DISTWIDTH] IN BLOOD BY AUTOMATED COUNT: 16.8 % (ref 11.9–14.6)
GLOBULIN SER CALC-MCNC: 3.9 G/DL (ref 2.3–3.5)
GLUCOSE BLD STRIP.AUTO-MCNC: 106 MG/DL (ref 65–100)
GLUCOSE BLD STRIP.AUTO-MCNC: 109 MG/DL (ref 65–100)
GLUCOSE BLD STRIP.AUTO-MCNC: 114 MG/DL (ref 65–100)
GLUCOSE BLD STRIP.AUTO-MCNC: 149 MG/DL (ref 65–100)
GLUCOSE BLD STRIP.AUTO-MCNC: 216 MG/DL (ref 65–100)
GLUCOSE SERPL-MCNC: 74 MG/DL (ref 65–100)
HCT VFR BLD AUTO: 29.3 % (ref 41.1–50.3)
HGB BLD-MCNC: 9.4 G/DL (ref 13.6–17.2)
IMM GRANULOCYTES # BLD: 0 K/UL (ref 0–0.5)
IMM GRANULOCYTES NFR BLD: 0.2 % (ref 0–5)
INR PPP: 2.1 (ref 0.9–1.2)
LYMPHOCYTES # BLD: 0.7 K/UL (ref 0.5–4.6)
LYMPHOCYTES NFR BLD: 11 % (ref 13–44)
MCH RBC QN AUTO: 29.6 PG (ref 26.1–32.9)
MCHC RBC AUTO-ENTMCNC: 32 G/DL (ref 31.4–35)
MCV RBC AUTO: 92.7 FL (ref 79.6–97.8)
MONOCYTES # BLD: 0.6 K/UL (ref 0.1–1.3)
MONOCYTES NFR BLD: 10 % (ref 4–12)
NEUTS SEG # BLD: 5.1 K/UL (ref 1.7–8.2)
NEUTS SEG NFR BLD: 78 % (ref 43–78)
PLATELET # BLD AUTO: 78 K/UL (ref 150–450)
PMV BLD AUTO: 13.4 FL (ref 10.8–14.1)
POTASSIUM SERPL-SCNC: 4 MMOL/L (ref 3.5–5.1)
PROT SERPL-MCNC: 6.1 G/DL (ref 6.3–8.2)
PROTHROMBIN TIME: 23.5 SEC (ref 9.6–12)
RBC # BLD AUTO: 3.16 M/UL (ref 4.23–5.67)
SODIUM SERPL-SCNC: 142 MMOL/L (ref 136–145)
WBC # BLD AUTO: 7.2 K/UL (ref 4.3–11.1)

## 2017-09-11 PROCEDURE — 74011250636 HC RX REV CODE- 250/636: Performed by: NURSE PRACTITIONER

## 2017-09-11 PROCEDURE — 74011250637 HC RX REV CODE- 250/637: Performed by: NURSE PRACTITIONER

## 2017-09-11 PROCEDURE — 74011250636 HC RX REV CODE- 250/636: Performed by: INTERNAL MEDICINE

## 2017-09-11 PROCEDURE — 74011636637 HC RX REV CODE- 636/637: Performed by: NURSE PRACTITIONER

## 2017-09-11 PROCEDURE — 82962 GLUCOSE BLOOD TEST: CPT

## 2017-09-11 PROCEDURE — 97110 THERAPEUTIC EXERCISES: CPT

## 2017-09-11 PROCEDURE — 80053 COMPREHEN METABOLIC PANEL: CPT | Performed by: INTERNAL MEDICINE

## 2017-09-11 PROCEDURE — 94640 AIRWAY INHALATION TREATMENT: CPT

## 2017-09-11 PROCEDURE — 97535 SELF CARE MNGMENT TRAINING: CPT

## 2017-09-11 PROCEDURE — 93005 ELECTROCARDIOGRAM TRACING: CPT | Performed by: NURSE PRACTITIONER

## 2017-09-11 PROCEDURE — 74011250637 HC RX REV CODE- 250/637: Performed by: INTERNAL MEDICINE

## 2017-09-11 PROCEDURE — 77010033678 HC OXYGEN DAILY

## 2017-09-11 PROCEDURE — 97530 THERAPEUTIC ACTIVITIES: CPT

## 2017-09-11 PROCEDURE — 85025 COMPLETE CBC W/AUTO DIFF WBC: CPT | Performed by: INTERNAL MEDICINE

## 2017-09-11 PROCEDURE — 65660000000 HC RM CCU STEPDOWN

## 2017-09-11 PROCEDURE — 94760 N-INVAS EAR/PLS OXIMETRY 1: CPT

## 2017-09-11 PROCEDURE — 74011000250 HC RX REV CODE- 250: Performed by: INTERNAL MEDICINE

## 2017-09-11 PROCEDURE — 85610 PROTHROMBIN TIME: CPT | Performed by: PHYSICIAN ASSISTANT

## 2017-09-11 PROCEDURE — 94660 CPAP INITIATION&MGMT: CPT

## 2017-09-11 PROCEDURE — 77030011256 HC DRSG MEPILEX <16IN NO BORD MOLN -A

## 2017-09-11 RX ORDER — INSULIN LISPRO 100 [IU]/ML
INJECTION, SOLUTION INTRAVENOUS; SUBCUTANEOUS
Status: DISCONTINUED | OUTPATIENT
Start: 2017-09-11 | End: 2017-09-13 | Stop reason: HOSPADM

## 2017-09-11 RX ORDER — ACETAMINOPHEN 325 MG/1
650 TABLET ORAL
Status: DISCONTINUED | OUTPATIENT
Start: 2017-09-11 | End: 2017-09-13 | Stop reason: HOSPADM

## 2017-09-11 RX ORDER — CARVEDILOL 6.25 MG/1
6.25 TABLET ORAL 2 TIMES DAILY WITH MEALS
Status: DISCONTINUED | OUTPATIENT
Start: 2017-09-11 | End: 2017-09-12

## 2017-09-11 RX ORDER — DIGOXIN 0.25 MG/ML
250 INJECTION INTRAMUSCULAR; INTRAVENOUS
Status: COMPLETED | OUTPATIENT
Start: 2017-09-11 | End: 2017-09-11

## 2017-09-11 RX ADMIN — Medication 10 ML: at 12:20

## 2017-09-11 RX ADMIN — GUAIFENESIN 1200 MG: 600 TABLET, EXTENDED RELEASE ORAL at 08:32

## 2017-09-11 RX ADMIN — ACETAMINOPHEN 650 MG: 325 TABLET ORAL at 22:47

## 2017-09-11 RX ADMIN — TIOTROPIUM BROMIDE 18 MCG: 18 CAPSULE ORAL; RESPIRATORY (INHALATION) at 07:36

## 2017-09-11 RX ADMIN — ALBUTEROL SULFATE 2.5 MG: 2.5 SOLUTION RESPIRATORY (INHALATION) at 02:56

## 2017-09-11 RX ADMIN — BUDESONIDE 500 MCG: 0.5 INHALANT RESPIRATORY (INHALATION) at 07:36

## 2017-09-11 RX ADMIN — CARVEDILOL 6.25 MG: 6.25 TABLET, FILM COATED ORAL at 17:37

## 2017-09-11 RX ADMIN — Medication 20 ML: at 22:27

## 2017-09-11 RX ADMIN — Medication 20 ML: at 12:20

## 2017-09-11 RX ADMIN — SODIUM CHLORIDE, PRESERVATIVE FREE 600 UNITS: 5 INJECTION INTRAVENOUS at 22:48

## 2017-09-11 RX ADMIN — SODIUM CHLORIDE, PRESERVATIVE FREE 600 UNITS: 5 INJECTION INTRAVENOUS at 05:45

## 2017-09-11 RX ADMIN — BUDESONIDE 500 MCG: 0.5 INHALANT RESPIRATORY (INHALATION) at 19:17

## 2017-09-11 RX ADMIN — ALBUTEROL SULFATE 2.5 MG: 2.5 SOLUTION RESPIRATORY (INHALATION) at 07:36

## 2017-09-11 RX ADMIN — INSULIN LISPRO 4 UNITS: 100 INJECTION, SOLUTION INTRAVENOUS; SUBCUTANEOUS at 17:40

## 2017-09-11 RX ADMIN — GABAPENTIN 600 MG: 300 CAPSULE ORAL at 05:45

## 2017-09-11 RX ADMIN — ALBUTEROL SULFATE 2.5 MG: 2.5 SOLUTION RESPIRATORY (INHALATION) at 19:17

## 2017-09-11 RX ADMIN — GUAIFENESIN 1200 MG: 600 TABLET, EXTENDED RELEASE ORAL at 17:37

## 2017-09-11 RX ADMIN — GABAPENTIN 600 MG: 300 CAPSULE ORAL at 14:37

## 2017-09-11 RX ADMIN — WARFARIN SODIUM 10 MG: 5 TABLET ORAL at 22:47

## 2017-09-11 RX ADMIN — ALBUTEROL SULFATE 2.5 MG: 2.5 SOLUTION RESPIRATORY (INHALATION) at 13:19

## 2017-09-11 RX ADMIN — Medication 10 ML: at 22:27

## 2017-09-11 RX ADMIN — DIGOXIN 250 MCG: 0.25 INJECTION INTRAMUSCULAR; INTRAVENOUS at 22:31

## 2017-09-11 RX ADMIN — FAMOTIDINE 20 MG: 10 INJECTION, SOLUTION INTRAVENOUS at 08:33

## 2017-09-11 RX ADMIN — PREDNISONE 40 MG: 20 TABLET ORAL at 08:31

## 2017-09-11 RX ADMIN — Medication 20 ML: at 05:45

## 2017-09-11 RX ADMIN — ACETAMINOPHEN 650 MG: 325 TABLET ORAL at 18:20

## 2017-09-11 RX ADMIN — FERROUS SULFATE TAB 325 MG (65 MG ELEMENTAL FE) 325 MG: 325 (65 FE) TAB at 08:31

## 2017-09-11 RX ADMIN — GABAPENTIN 600 MG: 300 CAPSULE ORAL at 22:47

## 2017-09-11 RX ADMIN — SODIUM CHLORIDE, PRESERVATIVE FREE 600 UNITS: 5 INJECTION INTRAVENOUS at 14:38

## 2017-09-11 NOTE — PROGRESS NOTES
9/11/2017 6:38 AM    Admit Date: 8/23/2017    Admit Diagnosis: Atrial flutter with rapid ventricular response (HCC)      Subjective:    Patient looking a little better.  Needs rehab    Objective:      Visit Vitals    /54 (BP 1 Location: Left arm, BP Patient Position: At rest)    Pulse 61    Temp 96.8 °F (36 °C)    Resp 20    Ht 5' 6\" (1.676 m)    Wt 77.3 kg (170 lb 6.7 oz)    SpO2 99%    BMI 27.51 kg/m2       ROS:  General ROS: negative for - chills  Hematological and Lymphatic ROS: negative for - blood clots or jaundice  Respiratory ROS: no cough, shortness of breath, or wheezing  Cardiovascular ROS: no chest pain or dyspnea on exertion  Gastrointestinal ROS: no abdominal pain, change in bowel habits, or black or bloody stools  Neurological ROS: no TIA or stroke symptoms    Physical Exam:    Physical Examination: General appearance - alert, well appearing, and in no distress  Mental status - alert, oriented to person, place, and time  Eyes - pupils equal and reactive, extraocular eye movements intact  Neck/lymph - supple, no significant adenopathy  Chest/CV - clear to auscultation, no wheezes, rales or rhonchi, symmetric air entry  Heart - normal rate, regular rhythm, normal S1, S2, no murmurs, rubs, clicks or gallops  Abdomen/GI - soft, nontender, nondistended, no masses or organomegaly  Musculoskeletal - no joint tenderness, deformity or swelling  Extremities - peripheral pulses normal, no pedal edema, no clubbing or cyanosis  Skin - normal coloration and turgor, no rashes, no suspicious skin lesions noted    Current Facility-Administered Medications   Medication Dose Route Frequency    guaiFENesin ER (MUCINEX) tablet 1,200 mg  1,200 mg Oral BID    traZODone (DESYREL) tablet 50 mg  50 mg Oral QHS PRN    enoxaparin (LOVENOX) injection 70 mg  1 mg/kg SubCUTAneous Q24H    warfarin (COUMADIN) tablet 10 mg  10 mg Oral QHS    predniSONE (DELTASONE) tablet 40 mg  40 mg Oral DAILY WITH BREAKFAST    [START ON 9/15/2017] predniSONE (DELTASONE) tablet 30 mg  30 mg Oral DAILY WITH BREAKFAST    [START ON 9/22/2017] predniSONE (DELTASONE) tablet 20 mg  20 mg Oral DAILY WITH BREAKFAST    [START ON 9/29/2017] predniSONE (DELTASONE) tablet 10 mg  10 mg Oral DAILY WITH BREAKFAST    carvedilol (COREG) tablet 12.5 mg  12.5 mg Oral BID WITH MEALS    midazolam (VERSED) injection 2 mg  2 mg IntraVENous Q2H PRN    morphine injection 2 mg  2 mg IntraVENous Q4H PRN    insulin lispro (HUMALOG) injection   SubCUTAneous Q6H    NUTRITIONAL SUPPORT ELECTROLYTE PRN ORDERS   Does Not Apply PRN    famotidine (PF) (PEPCID) 20 mg in sodium chloride 0.9 % 10 mL injection  20 mg IntraVENous DAILY    0.9% sodium chloride infusion 250 mL  250 mL IntraVENous PRN    acetaminophen (TYLENOL) suppository 650 mg  650 mg Rectal Q4H PRN    sodium chloride (NS) flush 20 mL  20 mL InterCATHeter Q8H    heparin (porcine) pf 600 Units  600 Units InterCATHeter Q8H    sodium chloride (NS) flush 20 mL  20 mL InterCATHeter PRN    heparin (porcine) pf 600 Units  600 Units InterCATHeter PRN    albuterol (PROVENTIL VENTOLIN) nebulizer solution 2.5 mg  2.5 mg Nebulization Q4H PRN    sacubitril-valsartan (ENTRESTO) 24-26 mg tablet 1 Tab  1 Tab Oral Q12H    dextrose (D50W) injection syrg 25 g  25 g IntraVENous PRN    ferrous sulfate tablet 325 mg  1 Tab Oral DAILY WITH BREAKFAST    acetaminophen (TYLENOL) tablet 650 mg  650 mg Oral Q6H PRN    gabapentin (NEURONTIN) capsule 600 mg  600 mg Oral TID    albuterol (PROVENTIL HFA, VENTOLIN HFA, PROAIR HFA) inhaler 2 Puff  2 Puff Inhalation Q4H PRN    tiotropium (SPIRIVA) inhalation capsule 18 mcg  1 Cap Inhalation DAILY    sodium chloride (NS) flush 5-10 mL  5-10 mL IntraVENous Q8H    sodium chloride (NS) flush 5-10 mL  5-10 mL IntraVENous PRN    nitroglycerin (NITROSTAT) tablet 0.4 mg  0.4 mg SubLINGual Q5MIN PRN    ondansetron (ZOFRAN) injection 4 mg  4 mg IntraVENous Q4H PRN    budesonide (PULMICORT) 500 mcg/2 ml nebulizer suspension  500 mcg Nebulization BID RT    And    albuterol CONCENTRATE 2.5mg/0.5 mL neb soln  2.5 mg Nebulization Q6H RT       Data Review:   @LABRCNT(Na,K,BUN,CREA,WBC,HGB,HCT,PLT,INR,TRP,TCHOL*,Triglyceride*,LDL*,LDLCPOC HDL*,HDL])@    TELEMETRY: aflutter    Assessment/Plan:     Principal Problem:    Atrial flutter with rapid ventricular response (HCC) (8/23/2017) rate controlled. The current medical regimen is effective;  continue present plan and medications. Active Problems:    Anticoagulant long-term use (8/23/2017)      HTN (hypertension), benign (8/23/2017)The current medical regimen is effective;  continue present plan and medications. Peripheral vascular disease ([facility] (8/23/2017)      Dyslipidemia (8/23/2017)      History of mechanical aortic valve replacement (8/23/2017) The current medical regimen is effective;  continue present plan and medications. Overview: Placement 2000, on chronicCoumadin      Centrilobular emphysema ([address] (8/23/2017)      Overview: With last PFTs FVC 1.70 L or 44% predicted, FEV1 0.86 L or 29% predicted,       FEV1/FVC 51%. This study was a postbronchodilator study performed at the       [facility] on 8/22/2016                   Ischemic cardiomyopathy (8/23/2017)      Overview: 8/23/17 ECHO:      EF 40 % to 45 %. There were no regional wall motion abnormalities. Moderate hypokinesis of the mid-apical anterior and apical wall(s). Thrombocytopenia ([address] (8/26/2017)      Acute respiratory failure with hypercapnia ([address] (8/29/2017)      History of tobacco use (8/29/2017)      Hyponatremia (8/29/2017)      Acute encephalopathy (8/29/2017)      COPD (chronic obstructive pulmonary disease) ([address] (8/30/2017)The current medical regimen is effective;  continue present plan and medications.         MRSA nasal colonization (9/8/2017)    Needs rehab      Gayle Rodriguez MD

## 2017-09-11 NOTE — PROGRESS NOTES
Pt placed on BIPAP. SAT's 100%. BIPAP is plugged in red out let. No complications noted at this time.

## 2017-09-11 NOTE — PROGRESS NOTES
Problem: Falls - Risk of  Goal: *Absence of Falls  Document Lauren Fall Risk and appropriate interventions in the flowsheet.    Outcome: Progressing Towards Goal  Fall Risk Interventions:  Mobility Interventions: Patient to call before getting OOB, Bed/chair exit alarm     Mentation Interventions: Bed/chair exit alarm, Door open when patient unattended     Medication Interventions: Teach patient to arise slowly, Patient to call before getting OOB, Bed/chair exit alarm     Elimination Interventions: Bed/chair exit alarm, Call light in reach, Toileting schedule/hourly rounds     History of Falls Interventions: Bed/chair exit alarm, Door open when patient unattended

## 2017-09-11 NOTE — PROGRESS NOTES
Problem: Falls - Risk of  Goal: *Absence of Falls  Document Lauren Fall Risk and appropriate interventions in the flowsheet. Outcome: Progressing Towards Goal  Fall Risk Interventions:  Pt progressing towards goal. No falls since admission. Bed low and locked. Call light within reach. Side rails x 2. Gripper socks applied. Personal belongings within reach. Pt verbalizes understanding to call for assistance.       Mobility Interventions: Bed/chair exit alarm     Mentation Interventions: Bed/chair exit alarm     Medication Interventions: Patient to call before getting OOB     Elimination Interventions: Bed/chair exit alarm     History of Falls Interventions: Bed/chair exit alarm

## 2017-09-11 NOTE — PROGRESS NOTES
Hospitalist Progress Note     Admit Date:  2017  1:11 PM   Name:  Sugar Sherwood   Age:  67 y.o.  :  1945   MRN:  [id number]   PCP:  Fredrich Sandhoff, MD  Treatment Team: Attending Provider: Jennyfer Murray MD; Consulting Provider: Jennyfer Murray MD; Utilization Review: Jodi Emmanuel; Consulting Provider: Rey Xavier MD; Care Manager: Sepideh Baltazar; Consulting Provider: Romaine Oviedo MD    Subjective: This is a 68 yo male admitted for acute respiratory failure secondary to acute COPD exacerbation on 17. He has PMH of atrial fibrillation, mAVR. Upon admission he had a fib with RVR and patient was cardioverted. During his clinical course patient developed ITP and started on steroids ( currently on prednisone taper ). Eventually he was able to be extubated but had to have NJT for dysphagia. PT/OT/ speech and swallow evaluation noted. Underwent MBS with no overt weakness. NJT removed during the weekend and is able to tolerate pure diet. Developed hypotension while on HTN meds. For now held, BP improved. Cardiology on board. Patient is a candidate to be transferred to 9th floor to continue inpatient rehabilitation. On my assessment to day he was found alert, oriented x 3, in no distress. Still awaiting transfer to rehab once  notify me.          Objective:     Patient Vitals for the past 24 hrs:   Temp Pulse Resp BP SpO2   17 1319 - - - - 99 %   17 1316 98.2 °F (36.8 °C) 79 18 106/66 97 %   17 1102 - - - - 99 %   17 0908 97.2 °F (36.2 °C) 66 18 (!) 85/38 96 %   17 0834 - 61 - (!) 84/45 -   17 0736 - - - - 96 %   17 0531 96.8 °F (36 °C) 61 20 107/54 99 %   17 0256 - - - - 99 %   17 0127 98 °F (36.7 °C) 66 18 126/55 100 %   09/10/17 2212 - - - - 100 %   09/10/17 2136 98.2 °F (36.8 °C) 63 16 138/50 99 %   09/10/17 1948 - - - - 97 %   09/10/17 1748 98 °F (36.7 °C) 92 16 96/49 96 %   09/10/17 1727 - 80 - 96/49 -     Oxygen Therapy  O2 Sat (%): 99 % (09/11/17 1319)  Pulse via Oximetry: 68 beats per minute (09/11/17 1319)  O2 Device: Nasal cannula (09/11/17 1319)  O2 Flow Rate (L/min): 2 l/min (09/11/17 1319)  O2 Temperature: 87.6 °F (30.9 °C) (09/04/17 1140)  FIO2 (%): 28 % (09/11/17 0256)    Intake/Output Summary (Last 24 hours) at 09/11/17 1458  Last data filed at 09/11/17 1447   Gross per 24 hour   Intake              840 ml   Output              400 ml   Net              440 ml         Physical exam:    General appearance: NAD, conversant  Eyes: anicteric sclerae, moist conjunctivae; no lid-lag  ENT: normal oral mucosas   Neck: Trachea midline, no hematomas   FROM, supple, no thyromegaly or lymphadenopathy  Lungs: CTA, with normal respiratory effort and no intercostal retractions, bilateral rhonchi. No rales   CV: S1,S2 present, no added murmurs  Abdomen: Soft, non-tender; no masses   Extremities: No peripheral edema or extremity lymphadenopathy  Skin: Normal temperature, turgor and texture; no subcutaneous nodules  Psych: Appropriate affect, alert    Data Review:  I have reviewed all labs, meds, telemetry events, and studies from the last 24 hours.     Recent Results (from the past 24 hour(s))   GLUCOSE, POC    Collection Time: 09/10/17  4:48 PM   Result Value Ref Range    Glucose (POC) 142 (H) 65 - 100 mg/dL   GLUCOSE, POC    Collection Time: 09/10/17 11:56 PM   Result Value Ref Range    Glucose (POC) 114 (H) 65 - 100 mg/dL   PROTHROMBIN TIME + INR    Collection Time: 09/11/17  5:38 AM   Result Value Ref Range    Prothrombin time 23.5 (H) 9.6 - 12.0 sec    INR 2.1 (H) 0.9 - 1.2     METABOLIC PANEL, COMPREHENSIVE    Collection Time: 09/11/17  5:38 AM   Result Value Ref Range    Sodium 142 136 - 145 mmol/L    Potassium 4.0 3.5 - 5.1 mmol/L    Chloride 102 98 - 107 mmol/L    CO2 37 (H) 21 - 32 mmol/L    Anion gap 3 (L) 7 - 16 mmol/L    Glucose 74 65 - 100 mg/dL    BUN 34 (H) 8 - 23 MG/DL    Creatinine 0.74 (L) 0.8 - 1.5 MG/DL    GFR est AA >60 >60 ml/min/1.73m2    GFR est non-AA >60 >60 ml/min/1.73m2    Calcium 8.3 8.3 - 10.4 MG/DL    Bilirubin, total 0.3 0.2 - 1.1 MG/DL    ALT (SGPT) 84 (H) 12 - 65 U/L    AST (SGOT) 52 (H) 15 - 37 U/L    Alk. phosphatase 49 (L) 50 - 136 U/L    Protein, total 6.1 (L) 6.3 - 8.2 g/dL    Albumin 2.2 (L) 3.2 - 4.6 g/dL    Globulin 3.9 (H) 2.3 - 3.5 g/dL    A-G Ratio 0.6 (L) 1.2 - 3.5     GLUCOSE, POC    Collection Time: 09/11/17  6:50 AM   Result Value Ref Range    Glucose (POC) 109 (H) 65 - 100 mg/dL   CBC WITH AUTOMATED DIFF    Collection Time: 09/11/17  8:42 AM   Result Value Ref Range    WBC 7.2 4.3 - 11.1 K/uL    RBC 3.16 (L) 4.23 - 5.67 M/uL    HGB 9.4 (L) 13.6 - 17.2 g/dL    HCT 29.3 (L) 41.1 - 50.3 %    MCV 92.7 79.6 - 97.8 FL    MCH 29.6 26.1 - 32.9 PG    MCHC 32.0 31.4 - 35.0 g/dL    RDW 16.8 (H) 11.9 - 14.6 %    PLATELET 78 (L) 062 - 450 K/uL    MPV 13.4 10.8 - 14.1 FL    DF AUTOMATED      NEUTROPHILS 78 43 - 78 %    LYMPHOCYTES 11 (L) 13 - 44 %    MONOCYTES 10 4.0 - 12.0 %    EOSINOPHILS 1 0.5 - 7.8 %    BASOPHILS 0 0.0 - 2.0 %    IMMATURE GRANULOCYTES 0.2 0.0 - 5.0 %    ABS. NEUTROPHILS 5.1 1.7 - 8.2 K/UL    ABS. LYMPHOCYTES 0.7 0.5 - 4.6 K/UL    ABS. MONOCYTES 0.6 0.1 - 1.3 K/UL    ABS. EOSINOPHILS 0.1 0.0 - 0.8 K/UL    ABS. BASOPHILS 0.0 0.0 - 0.2 K/UL    ABS. IMM.  GRANS. 0.0 0.0 - 0.5 K/UL   GLUCOSE, POC    Collection Time: 09/11/17 11:55 AM   Result Value Ref Range    Glucose (POC) 149 (H) 65 - 100 mg/dL        All Micro Results     Procedure Component Value Units Date/Time    CULTURE, BLOOD [[id number]] Collected:  08/25/17 1600    Order Status:  Completed Specimen:  Blood from Blood Updated:  08/30/17 0643     Special Requests: LEFT HAND        Culture result: NO GROWTH 5 DAYS       CULTURE, BLOOD [[id number]] Collected:  08/25/17 2145    Order Status:  Completed Specimen:  Blood from Blood Updated:  08/30/17 0643     Special Requests: RIGHT HAND        Culture result: NO GROWTH 5 DAYS MRSA SCREEN - PCR (NASAL) [[id number]]  (Abnormal) Collected:  08/29/17 1556    Order Status:  Completed Specimen:  Nasal from Nasal Swab Updated:  08/29/17 1900     Special Requests: NO SPECIAL REQUESTS        Culture result:         MRSA target DNA is detected (presumptive positive for MRSA colonization).  (A)            RESULTS VERIFIED, PHONED TO AND READ BACK BY  DAMARIS GARNICA RN ON 08/29/2017 AT 1851 [facility]      CULTURE, URINE [[id number]] Collected:  08/25/17 2011    Order Status:  Completed Specimen:  Urine from Clean catch Updated:  08/28/17 0653     Special Requests: NO SPECIAL REQUESTS        Culture result:         [id number] COLONIES/mL MIXED SKIN VANNESSA ISOLATED          Current Meds:  Current Facility-Administered Medications   Medication Dose Route Frequency    carvedilol (COREG) tablet 6.25 mg  6.25 mg Oral BID WITH MEALS    guaiFENesin ER (MUCINEX) tablet 1,200 mg  1,200 mg Oral BID    traZODone (DESYREL) tablet 50 mg  50 mg Oral QHS PRN    warfarin (COUMADIN) tablet 10 mg  10 mg Oral QHS    predniSONE (DELTASONE) tablet 40 mg  40 mg Oral DAILY WITH BREAKFAST    [START ON 9/15/2017] predniSONE (DELTASONE) tablet 30 mg  30 mg Oral DAILY WITH BREAKFAST    [START ON 9/22/2017] predniSONE (DELTASONE) tablet 20 mg  20 mg Oral DAILY WITH BREAKFAST    [START ON 9/29/2017] predniSONE (DELTASONE) tablet 10 mg  10 mg Oral DAILY WITH BREAKFAST    midazolam (VERSED) injection 2 mg  2 mg IntraVENous Q2H PRN    morphine injection 2 mg  2 mg IntraVENous Q4H PRN    insulin lispro (HUMALOG) injection   SubCUTAneous Q6H    NUTRITIONAL SUPPORT ELECTROLYTE PRN ORDERS   Does Not Apply PRN    famotidine (PF) (PEPCID) 20 mg in sodium chloride 0.9 % 10 mL injection  20 mg IntraVENous DAILY    0.9% sodium chloride infusion 250 mL  250 mL IntraVENous PRN    acetaminophen (TYLENOL) suppository 650 mg  650 mg Rectal Q4H PRN    sodium chloride (NS) flush 20 mL  20 mL InterCATHeter Q8H    heparin (porcine) pf 600 Units  600 Units InterCATHeter Q8H    sodium chloride (NS) flush 20 mL  20 mL InterCATHeter PRN    heparin (porcine) pf 600 Units  600 Units InterCATHeter PRN    albuterol (PROVENTIL VENTOLIN) nebulizer solution 2.5 mg  2.5 mg Nebulization Q4H PRN    sacubitril-valsartan (ENTRESTO) 24-26 mg tablet 1 Tab  1 Tab Oral Q12H    dextrose (D50W) injection syrg 25 g  25 g IntraVENous PRN    ferrous sulfate tablet 325 mg  1 Tab Oral DAILY WITH BREAKFAST    acetaminophen (TYLENOL) tablet 650 mg  650 mg Oral Q6H PRN    gabapentin (NEURONTIN) capsule 600 mg  600 mg Oral TID    albuterol (PROVENTIL HFA, VENTOLIN HFA, PROAIR HFA) inhaler 2 Puff  2 Puff Inhalation Q4H PRN    tiotropium (SPIRIVA) inhalation capsule 18 mcg  1 Cap Inhalation DAILY    sodium chloride (NS) flush 5-10 mL  5-10 mL IntraVENous Q8H    sodium chloride (NS) flush 5-10 mL  5-10 mL IntraVENous PRN    nitroglycerin (NITROSTAT) tablet 0.4 mg  0.4 mg SubLINGual Q5MIN PRN    ondansetron (ZOFRAN) injection 4 mg  4 mg IntraVENous Q4H PRN    budesonide (PULMICORT) 500 mcg/2 ml nebulizer suspension  500 mcg Nebulization BID RT    And    albuterol CONCENTRATE 2.5mg/0.5 mL neb soln  2.5 mg Nebulization Q6H RT       Other Studies (last 24 hours):  No results found.     Assessment and Plan:     Hospital Problems as of 9/11/2017  Date Reviewed: 9/9/2017          Codes Class Noted - Resolved POA    MRSA nasal colonization ICD-10-CM: Z22.322  ICD-9-CM: V02.54  9/8/2017 - Present Yes        COPD (chronic obstructive pulmonary disease) (HCC) (Chronic) ICD-10-CM: J44.9  ICD-9-CM: 496  8/30/2017 - Present Yes        Acute respiratory failure with hypercapnia ([facility] [address] ICD-10-CM: J96.02  ICD-9-CM: 518.81  8/29/2017 - Present No        History of tobacco use (Chronic) ICD-10-CM: D43.176  ICD-9-CM: V15.82  8/29/2017 - Present Yes        Hyponatremia ICD-10-CM: E87.1  ICD-9-CM: 276.1  8/29/2017 - Present Yes        Acute encephalopathy ICD-10-CM: G93.40  ICD-9-CM: 348.30 8/29/2017 - Present Yes        Thrombocytopenia ([facility] ICD-10-CM: D69.6  ICD-9-CM: 287.5  8/26/2017 - Present Yes        Anticoagulant long-term use (Chronic) ICD-10-CM: Z79.01  ICD-9-CM: V58.61  8/23/2017 - Present Yes        HTN (hypertension), benign (Chronic) ICD-10-CM: I10  ICD-9-CM: 401.1  8/23/2017 - Present Yes        Peripheral vascular disease ([facility] (Chronic) ICD-10-CM: I73.9  ICD-9-CM: 443.9  8/23/2017 - Present Yes        Dyslipidemia (Chronic) ICD-10-CM: E78.5  ICD-9-CM: 272.4  8/23/2017 - Present Yes        History of mechanical aortic valve replacement (Chronic) ICD-10-CM: Z95.2  ICD-9-CM: V43.3  8/23/2017 - Present Yes    Overview Signed 8/30/2017 10:30 AM by Alisha Plummer NP     Placement 2000, on chronicCoumadin             Centrilobular emphysema ([facility] (Chronic) ICD-10-CM: J43.2  ICD-9-CM: 492.8  8/23/2017 - Present Yes    Overview Signed 9/8/2017  9:24 AM by Marie Leary NP      With last PFTs FVC 1.70 L or 44% predicted, FEV1 0.86 L or 29% predicted, FEV1/FVC 51%. [facility]                  Ischemic cardiomyopathy (Chronic) ICD-10-CM: I25.5  ICD-9-CM: 414.8  8/23/2017 - Present Yes    Overview Signed 8/30/2017 10:29 AM by Alisha Plummer NP     8/23/17 ECHO:  EF 40 % to 45 %. There were no regional wall motion abnormalities. Moderate hypokinesis of the mid-apical anterior and apical wall(s).              * (Principal)Atrial flutter with rapid ventricular response (HCC) ICD-10-CM: I48.92  ICD-9-CM: 427.32  8/23/2017 - Present Yes              PLAN:      #Hypotension: resolved   Likely related to his blood pressure medications  He was asymptomatic  Cardiology on board and amiodarone was discontinue  For now Hold BP medications due to hypotension    Atrial fibrillation with RVR: controlled   Subtherapeutic INR  S/p Cardioversion   On Lovenox while INR reach therapeutic levels   Amiodarone discontinue today.  Continue Coumadin      Acute COPD exacerbation  S/p intubation and extubation for hypercapnic respiratory failure  S/p ceftriaxone therapy ( 7/7 )  Continue albuterol, pulmicort, prednisone   Pulmonology follow up   Encouraged to use NIVPV at night      ITP: stable   Daily CBC, awaiting results today   Continue steroids taper  Hematology on board       Chronic systolic CHF: Stable   Continue Entresto, Carvedilol      DC planning/Dispo:  In-patient rehab transfer (9 floor) once  notification  DVT ppx: lovenox   Contact isolation: MRSA-nasal isolate    Signed:  Leora Rivera MD

## 2017-09-11 NOTE — PROGRESS NOTES
Patient needs OT notes before he can go to 9th floor. Patient has bed on 9th in am once OT sees. Discharge plan 9th floor.

## 2017-09-11 NOTE — PROGRESS NOTES
Speech therapy note  Attempted to see pt this pm, however, pt getting cleaned up.      Dennise Snellen MA/DIANA/SLP

## 2017-09-11 NOTE — PROGRESS NOTES
Bedside and Verbal shift change report given to Megan Barrios RN (oncoming nurse) by Meghan Knight RN and Shai Cavazos RN (offgoing nurse). Report included the following information SBAR, Kardex, MAR and Recent Results.

## 2017-09-11 NOTE — PROGRESS NOTES
Verbal bedside report given to [facility], oncoming RN. Patient's situation, background, assessment and recommendations provided. Opportunity for questions provided. Oncoming RN assumed care of patient.

## 2017-09-11 NOTE — PROGRESS NOTES
Problem: Self Care Deficits Care Plan (Adult)  Goal: Reassessment  And goals modified 9/7/2017: *Acute Goals and Plan of Care (Insert Text)  1. Jackqulyn Paget will follow 1-2 step commands at least 75% of the time in prep for ADL. 2. Jackqulyn Paget will complete self-feeding with standby assistance when appropriate to eat. 3. Jackqulyn Paget will complete grooming/oral care with standby assistance in supported sitting at sink level. 4. Jackqulyn Paget will complete toilet transfers with minimal assistance. 5. Patient will complete upper body bathing with setup assist.  6. Patient will complete lower body bathing with minimal assist.  7. Patient will participate in OT ther activity/exercises for 25 mins to increase activity tolerance for ADLs. Time frame: 7 more visits      OCCUPATIONAL THERAPY: Daily Note, Treatment Day: 1st and PM 9/11/2017  INPATIENT: Hospital Day: 20  [place] / Product Type: Medicare /      NAME/AGE/GENDER: Jackqulyn Paget is a 67 y.o. male     PRIMARY DIAGNOSIS:  Atrial flutter with rapid ventricular response (HCC) Atrial flutter with rapid ventricular response (HCC) Atrial flutter with rapid ventricular response (HCC)        ICD-10: Treatment Diagnosis:        · Generalized Muscle Weakness (M62.81)  · Other lack of cordination (R27.8)  · Difficulty in walking, Not elsewhere classified (R26.2)   Precautions/Allergies:         Levaquin [levofloxacin] and Metformin       ASSESSMENT:   Mr. Glenda Álvarez was admitted for the above diagnosis. Pt presents sitting up in chair upon arrival. Pt was assisted with standing with CNA for bowel hygiene. He is dependent at this time due to weakness and poor standing balance. Pt was introduced to and practiced using adaptive equipment (reacher, dressing stick, sock aid, and long shoe horn). Pt was able to don/doff socks using equipment and min assist. Pt assisted with shaving as well today.  Pt needed to use bedside commode (transferred with min assist). Pt left with all needs in reach. Will continue to benefit from skilled OT during stay. This section established at most recent assessment   PROBLEM LIST (Impairments causing functional limitations):  1. Decreased Strength  2. Decreased ADL/Functional Activities  3. Decreased Transfer Abilities  4. Decreased Ambulation Ability/Technique  5. Decreased Balance  6. Decreased Activity Tolerance  7. Decreased Pacing Skills  8. Increased Fatigue  9. Increased Shortness of Breath  10. Decreased Flexibility/Joint Mobility  11. Decreased Cognition    INTERVENTIONS PLANNED: (Benefits and precautions of occupational therapy have been discussed with the patient.)  1. Activities of daily living training  2. Cognitive training  3. Neuromuscular re-eduation  4. Therapeutic activity  5. Therapeutic exercise  6. Balance training  7. Energy conservation education      TREATMENT PLAN: Frequency/Duration: Follow patient 3 times/week to address above goals. Rehabilitation Potential For Stated Goals: GOOD      RECOMMENDED REHABILITATION/EQUIPMENT: (at time of discharge pending progress): Continue Skilled Therapy and Discussed with Case Management. Would benefit from further skilled therapy               OCCUPATIONAL PROFILE AND HISTORY:   History of Present Injury/Illness (Reason for Referral):  Per MD H & P: Heather Gillespie is a 67 y.o. WM with h/o mechanical AVR 2000 on chronic AC with coumadin, chronic systolic CHF/ICM EF 27%, COPD, PVD, DM II, HTN and dyslipidemia who developed worsening dyspnea on monday 8/21. He presented to the hospital today reporting worsening SOB and was found to be in atrial flutter with 's/ He was given Cardizem 10 mg IV bolus followed by IV drip 5 mg/hr. He is still in 140's with lower BP and [facility] was called to evaluate for admission. Pt reports his pulse oximetry monitor showed  on Monday and remained high until today.  He felt he had COPD exacerbation and treated it with repeat inhaler and nebulizer treatments. He also reports h/o PRATIK with iron started last year by the [place]. He has been having dark stools but yesterday had rapid onset of uncontrollable copious amount of diarrhea that was black. CBC still pending. He reportedly had a LHC prior to AVR in 2000 that showed no CAD. He had nuclear stress test 11/2016 showing EF 31%, large inferolateral and anteroapical scar. Past Medical History/Comorbidities:   Mr. Donal Pratt  has a past medical history of Acute diastolic CHF (congestive heart failure) ([facility] [address] (9/15/2016); Arthritis; Chicken pox; Coronary artery disease (4/16/2014); DJD (degenerative joint disease); Emphysema; aortic valve replacement, mechanical; Hypercholesterolemia; Hypertension; Palpitations; Pneumonia; and Systolic CHF, acute ([facility] [address] (11/15/2016). Mr. Donal Pratt  has a past surgical history that includes aortic valve replacement (N/A, 2000) and heart catheterization (07/21/2004). Social History/Living Environment: Lives with wife. Home Environment: Private residence  # Steps to Enter: 3  Rails to Enter: Yes  Hand Rails : Bilateral  One/Two Story Residence: One story  Living Alone: No  Support Systems: Spouse/Significant Other/Partner, Child(devyn)  Patient Expects to be Discharged to[de-identified] Private residence  Current DME Used/Available at Home: [address], rollator  Tub or Shower Type: Tub/Shower combination  Prior Level of Function/Work/Activity:  Typically independent with ADL/instrumental ADL including driving, cutting grass, always outdoors. Number of Personal Factors/Comorbidities that affect the Plan of Care: Brief history (0):  LOW COMPLEXITY   ASSESSMENT OF OCCUPATIONAL PERFORMANCE[de-identified]   Activities of Daily Living:           Basic ADLs (From Assessment) Complex ADLs (From Assessment)   Basic ADL  Feeding:  Total assistance (NPO currently)  Oral Facial Hygiene/Grooming: Maximum assistance  Bathing: Maximum assistance  Upper Body Dressing: Maximum assistance  Lower Body Dressing: Total assistance  Toileting: Total assistance (roe in place) Instrumental ADL  Meal Preparation: Total assistance  Homemaking: Total assistance  Medication Management: Total assistance  Financial Management: Moderate assistance   Grooming/Bathing/Dressing Activities of Daily Living   Grooming  Washing Face: Supervision/set-up  Shaving: Maximum assistance (due to using safety razor) Cognitive Retraining  Safety/Judgement: Awareness of environment           Toileting  Bowel Hygiene:  Total assistance (dependent)     Functional Transfers  Toilet Transfer : Minimum assistance   Lower Body Dressing Assistance  Socks: Minimum assistance Bed/Mat Mobility  Supine to Sit: Stand-by asssistance  Sit to Stand: Contact guard assistance;Minimum assistance  Bed to Chair: Contact guard assistance;Minimum assistance  Scooting: Stand-by asssistance          Most Recent Physical Functioning:   Gross Assessment:                  Posture:     Balance:  Sitting: Impaired  Sitting - Static: Good (unsupported)  Sitting - Dynamic: Fair (occasional)  Standing: Impaired  Standing - Static: Fair  Standing - Dynamic : Poor Bed Mobility:  Supine to Sit: Stand-by asssistance  Scooting: Stand-by asssistance  Wheelchair Mobility:     Transfers:  Sit to Stand: Contact guard assistance;Minimum assistance  Stand to Sit: Contact guard assistance  Bed to Chair: Contact guard assistance;Minimum assistance  Interventions: Safety awareness training;Verbal cues;Manual cues                    Patient Vitals for the past 6 hrs:   BP BP Patient Position SpO2 O2 Flow Rate (L/min) Pulse   09/11/17 1102 - - 99 % - -   09/11/17 1200 - - - 2 l/min -   09/11/17 1316 106/66 Sitting 97 % - 79   09/11/17 1319 - - 99 % 2 l/min -        Mental Status  Neurologic State: Alert  Orientation Level: Oriented X4  Cognition: Follows commands  Perception: Appears intact  Perseveration: No perseveration noted  Safety/Judgement: Awareness of environment                               Physical Skills Involved:  1. Range of Motion  2. Balance  3. Strength  4. Activity Tolerance  5. Gross Motor Control Cognitive Skills Affected (resulting in the inability to perform in a timely and safe manner):  1. Executive Function  2. Immediate Memory  3. Sustained Attention  4. Divided Attention  5. Comprehension Psychosocial Skills Affected:  1. Habits/Routines  2. Environmental Adaptation  3. Social Interaction  4. Emotional Regulation  5. Self-Awareness  6. Awareness of Others  7. Social Roles   Number of elements that affect the Plan of Care: 3-5:  MODERATE COMPLEXITY   CLINICAL DECISION MAKING:   [facility] AM-PAC 6 Clicks   Daily Activity Inpatient Short Form  How much help from another person does the patient currently need. .. Total A Lot A Little None   1. Putting on and taking off regular lower body clothing? [X] 1   [ ] 2   [ ] 3   [ ] 4   2. Bathing (including washing, rinsing, drying)? [] 1   [x ] 2   [ ] 3   [ ] 4   3. Toileting, which includes using toilet, bedpan or urinal?   [X] 1   [ ] 2   [ ] 3   [ ] 4   4. Putting on and taking off regular upper body clothing? [] 1   [x ] 2   [ ] 3   [ ] 4   5. Taking care of personal grooming such as brushing teeth? [] 1   [x] 2   [ ] 3   [ ] 4   6. Eating meals? [ ] 1   [X] 2   [ ] 3   [ ] 4   © 2007, Trustees of [facility], under license to [employer]. All rights reserved    Score:  Initial: 7 Most Recent: 10 (Date:9/7/2017)     Interpretation of Tool:  Represents activities that are increasingly more difficult (i.e. Bed mobility, Transfers, Gait).        Score 24 23 22-20 19-15 14-10 9-7 6       Modifier CH CI CJ CK CL CM CN         · Self Care:               - CURRENT STATUS:    CL - 60%-79% impaired, limited or restricted               - GOAL STATUS:           CI - 1%-19% impaired, limited or restricted               - D/C STATUS: ---------------To be determined---------------  [place] / Product Type: Medicare /       Medical Necessity:     · Patient demonstrates good rehab potential due to higher previous functional level. Reason for Services/Other Comments:  · Patient continues to require skilled intervention due to decreased independence with ADL, instrumental ADL, and functional mobility for ADL. Use of outcome tool(s) and clinical judgement create a POC that gives a: MODERATE COMPLEXITY             TREATMENT:   (In addition to Assessment/Re-Assessment sessions the following treatments were rendered)      Pre-treatment Symptoms/Complaints:  Pt quiet and flat affect, but agreeable and following commands. Pain: Initial:   Pain Intensity 1: 0  Post Session:  same      Self Care: (30 minutes): Procedure(s) (per grid) utilized to improve and/or restore self-care/home management as related to dressing, toileting and grooming. Required minimal visual, verbal, manual and tactile cueing to facilitate activities of daily living skills and compensatory activities. Therapeutic Activity: (10 minutes): Therapeutic activities including Chair transfers and Toilet transfers to improve mobility, strength and balance. Required minimal assistance to promote static and dynamic balance in standing. Braces/Orthotics/Lines/Etc:   · IV, roe catheter, nasogastric tube and NC oxygen, CCU monitoring  Treatment/Session Assessment:    · Response to Treatment:  Tolerated well. · Interdisciplinary Collaboration:  · Certified Occupational Therapy Assistant and Registered Nurse  · After treatment position/precautions:  · Up in chair, Bed alarm/tab alert on, Bed/Chair-wheels locked and Call light within reach  · Compliance with Program/Exercises: Will assess as treatment progresses. Compliant today. · Recommendations/Intent for next treatment session:   \"Next visit will focus on advancements to more challenging activities\".   Total Treatment Duration: 41 mins  OT Patient Time In/Time Out  Time In: 1415  Time Out: [address]

## 2017-09-11 NOTE — PROGRESS NOTES
maniplex is placed over pt nose to prevent pressure ulcer while on BiPAP. Pt has an mild red area over nose before being placed BIPAP.

## 2017-09-11 NOTE — PROGRESS NOTES
Problem: Mobility Impaired (Adult and Pediatric)  Goal: *Acute Goals and Plan of Care (Insert Text)  STG: Updatd 9/6/17  (1.)Mr. Sidney Lange will move from supine to sit and sit to supine , scoot up and down and roll side to side with CONTACT GUARD ASSIST within 3 day(s). (2.)Mr. Sidney Lange will transfer from bed to chair and chair to bed with CONTACT GUARD ASSIST using the least restrictive device within 3 day(s). (3.)Mr. Sidney Lange will ambulate with CONTACT GUARD ASSIST for 100 feet with the least restrictive device within 3 day(s). LTG:Updated 9/6/17  (1.)Mr. Sidney Lange will move from supine to sit and sit to supine , scoot up and down and roll side to side in bed with SUPERVISION within 7 day(s). (2.)Mr. Sidney Lange will transfer from bed to chair and chair to bed with STAND BY ASSIST using the least restrictive device within 7 day(s). (3.)Mr. Sidney Lange will ambulate with STAND BY ASSIST for 250+ feet with the least restrictive device within 7 day(s). (4.)Mr. Sidney Lange will participate in therapeutic activity/exerices x 23 minutes for increased strength within 7 days. ________________________________________________________________________________________________      PHYSICAL THERAPY: Daily Note, Treatment Day: 1st and AM 9/11/2017  INPATIENT: Hospital Day: 20  [place] / Product Type: Medicare /      NAME/AGE/GENDER: Tona Bill is a 67 y.o. male     PRIMARY DIAGNOSIS: Atrial flutter with rapid ventricular response (HCC) Atrial flutter with rapid ventricular response (HCC) Atrial flutter with rapid ventricular response (HCC)        ICD-10: Treatment Diagnosis:       · Generalized Muscle Weakness (M62.81)  · Difficulty in walking, Not elsewhere classified (R26.2)   Precaution/Allergies:  Levaquin [levofloxacin] and Metformin       ASSESSMENT:      Mr. Sidney Lange was supine in bed upon arrival but agreeable to PT.   He was able to perfrom bed mobility with standby assist and stood with [place]-CGA using RW. His standing balance continues to be fair to poor but he was able to transfer to bedside chair with CGA-[place] and use of RW. Pt also participated in standing balance activities with CGA/RW. He tolerated exercise in bedside chair but required frequent rest breaks. Pt's SpO2 recorded at 99% after activity. He has progressed in functional mobility and activity tolerance. Will continue POC. This section established at most recent assessment   PROBLEM LIST (Impairments causing functional limitations):  1. Decreased Strength  2. Decreased ADL/Functional Activities  3. Decreased Transfer Abilities  4. Decreased Ambulation Ability/Technique  5. Decreased Balance  6. Decreased Activity Tolerance  7. Decreased Pacing Skills  8. Increased Fatigue  9. Increased Shortness of Breath  10. Decreased [place] with Home Exercise Program    INTERVENTIONS PLANNED: (Benefits and precautions of physical therapy have been discussed with the patient.)  1. Balance Exercise  2. Bed Mobility  3. Family Education  4. Gait Training  5. Home Exercise Program (HEP)  6. Range of Motion (ROM)  7. Therapeutic Activites  8. Therapeutic Exercise/Strengthening  9. Transfer Training  10. Group Therapy      TREATMENT PLAN: Frequency/Duration: 3 times a week for duration of hospital stay  Rehabilitation Potential For Stated Goals: FAIR      RECOMMENDED REHABILITATION/EQUIPMENT: (at time of discharge pending progress): Continue Skilled Therapy. HISTORY:   History of Present Injury/Illness (Reason for Referral):  See H&P below  Rosa George is a 67 y.o. WM with h/o mechanical AVR 2000 on chronic AC with coumadin, chronic systolic CHF/ICM EF 89%, COPD, PVD, DM II, HTN and dyslipidemia who developed worsening dyspnea on monday 8/21.   He presented to the hospital today reporting worsening SOB and was found to be in atrial flutter with 's/ He was given Cardizem 10 mg IV bolus followed by IV drip 5 mg/hr. He is still in 140's with lower BP and [facility] was called to evaluate for admission. Pt reports his pulse oximetry monitor showed  on Monday and remained high until today. He felt he had COPD exacerbation and treated it with repeat inhaler and nebulizer treatments. He also reports h/o PRATIK with iron started last year by the [place]. He has been having dark stools but yesterday had rapid onset of uncontrollable copious amount of diarrhea that was black. CBC still pending. He reportedly had a LHC prior to AVR in 2000 that showed no CAD. He had nuclear stress test 11/2016 showing EF 31%, large inferolateral and anteroapical scar. Past Medical History/Comorbidities:   Mr. Babar Vigil  has a past medical history of Acute diastolic CHF (congestive heart failure) ([facility] [address] (9/15/2016); Arthritis; Chicken pox; Coronary artery disease (4/16/2014); DJD (degenerative joint disease); Emphysema; aortic valve replacement, mechanical; Hypercholesterolemia; Hypertension; Palpitations; Pneumonia; and Systolic CHF, acute ([facility] [address] (11/15/2016). Mr. Babar Vigil  has a past surgical history that includes aortic valve replacement (N/A, 2000) and heart catheterization (07/21/2004).   Social History/Living Environment:   Home Environment: Private residence  # Steps to Enter: 3  Rails to Enter: Yes  Hand Rails : Bilateral  One/Two Story Residence: One story  Living Alone: No  Support Systems: Spouse/Significant Other/Partner, Child(devyn)  Patient Expects to be Discharged to[de-identified] Private residence  Current DME Used/Available at Home: Walker, rollator  Tub or Shower Type: Tub/Shower combination  Prior Level of Function/Work/Activity:  Lives with wife, indep with gait and ADLs, 0 falls, driving   Number of Personal Factors/Comorbidities that affect the Plan of Care: 3+: HIGH COMPLEXITY   EXAMINATION:   Most Recent Physical Functioning:   Gross Assessment:                  Posture:     Balance:  Sitting: Impaired  Sitting - Static: Good (unsupported)  Sitting - Dynamic: Prop sitting  Standing: Impaired  Standing - Static: Fair  Standing - Dynamic : Poor Bed Mobility:  Supine to Sit: Stand-by asssistance  Scooting: Stand-by asssistance  Wheelchair Mobility:     Transfers:  Sit to Stand: Minimum assistance;Contact guard assistance  Stand to Sit: Contact guard assistance  Bed to Chair: Contact guard assistance;Minimum assistance  Interventions: Safety awareness training;Verbal cues;Manual cues  Gait:             Body Structures Involved:  1. Nerves  2. Heart  3. Lungs  4. Bones  5. Joints  6. Muscles Body Functions Affected:  1. Cardio  2. Respiratory  3. Neuromusculoskeletal  4. Movement Related Activities and Participation Affected:  1. General Tasks and Demands  2. Mobility  3. Self Care  4. Domestic Life  5. Interpersonal Interactions and Relationships  6. Community, Social and [place]   Number of elements that affect the Plan of Care: 4+: HIGH COMPLEXITY   CLINICAL PRESENTATION:   Presentation: Evolving clinical presentation with changing clinical characteristics: MODERATE COMPLEXITY   CLINICAL DECISION MAKIN [facility] Inpatient Short Form  How much difficulty does the patient currently have. .. Unable A Lot A Little None   1. Turning over in bed (including adjusting bedclothes, sheets and blankets)? [ ] 1   [ ] 2   [X] 3   [ ] 4   2. Sitting down on and standing up from a chair with arms ( e.g., wheelchair, bedside commode, etc.)   [ ] 1   [ ] 2   [X] 3   [ ] 4   3. Moving from lying on back to sitting on the side of the bed? [ ] 1   [ ] 2   [X] 3   [ ] 4   How much help from another person does the patient currently need. .. Total A Lot A Little None   4. Moving to and from a bed to a chair (including a wheelchair)? [ ] 1   [x ] 2   [] 3   [ ] 4   5. Need to walk in hospital room? [ ] 1   [X] 2   [ ] 3   [ ] 4   6. Climbing 3-5 steps with a railing? [ ] 1   [X] 2   [ ] 3   [ ] 4   © 2007, Trustees of [address] [place] [address] [address] [id number], under license to [employer]. All rights reserved    Score:  Initial: 15 Most Recent: X (Date: -- )     Interpretation of Tool:  Represents activities that are increasingly more difficult (i.e. Bed mobility, Transfers, Gait). Score 24 23 22-20 19-15 14-10 9-7 6       Modifier CH CI CJ CK CL CM CN         · Mobility - Walking and Moving Around:               - CURRENT STATUS:    CK - 40%-59% impaired, limited or restricted               - GOAL STATUS:           CJ - 20%-39% impaired, limited or restricted               - D/C STATUS:                       ---------------To be determined---------------  [place] / Product Type: Medicare /       Medical Necessity:     · Patient is expected to demonstrate progress in strength, balance, coordination and functional technique to decrease assistance required with gait, transfers, and functional mobility. Reason for Services/Other Comments:  · Patient continues to require skilled intervention due to decreased strength, decreased balance, decreased functional tolerance, decreased cardiopulmonary endurance affecting participation in basic ADLs and functional tasks. Use of outcome tool(s) and clinical judgement create a POC that gives a: Questionable prediction of patient's progress: MODERATE COMPLEXITY                 TREATMENT:   (In addition to Assessment/Re-Assessment sessions the following treatments were rendered)   Pre-treatment Symptoms/Complaints:  None  Pain: Initial:   Pain Intensity 1: 0  Post Session:  0      Therapeutic Activity: (   12 minutes): Therapeutic activities including Bed transfers, Chair transfers and Ambulation on level ground, and standing activites to improve mobility, strength, balance and coordination.   Required minimal verbal, manual, and safety awareness cuing   to promote static and dynamic balance in standing and promote coordination of bilateral, upper extremity(s), lower extremity(s). Therapeutic Exercise: (11 Minutes):  Exercises per grid below to improve mobility, strength and activity tolerance. Required minimal visual, verbal and tactile cues to promote proper body alignment and promote proper body mechanics. Progressed range and repetitions as indicated. Date:  8/25/17 Date:   8/28/17 Date:  9/6/18  Date: 9/11   Activity/Exercise Parameters Parameters Parameters    LAQ 10 X B  2 x 20 B   2 x 15 B   Alternating marches 10 X B        Ankle pumps 10 X B   2 X 20 B 10 X B     Quad set 10 X B   10 X B     Glute set 10 X     10 X    Heel slides 10 X B   10 X L     Hip abduction 10 X B 1 x 20 B  1 x 15 B   Heel taps  2 x 20 B  1 x 20 B   Toe taps  1 x 20 B  1 x 20 B   Marching  2 x 20 B  1 x 15 B                                  Braces/Orthotics/Lines/Etc:   · O2 NC  Treatment/Session Assessment:    · Response to Treatment:  See above. · Interdisciplinary Collaboration:  · Physical Therapist, Registered Nurse and Certified Nursing Assistant/Patient Care Technician  · After treatment position/precautions:  · Up in chair, Bed/Chair-wheels locked, Call light within reach and CNA notified  · Compliance with Program/Exercises: Will assess as treatment progresses. · Recommendations/Intent for next treatment session: \"Next visit will focus on advancements to more challenging activities and reduction in assistance provided\".   Total Treatment Duration:  PT Patient Time In/Time Out  Time In: 1102  Time Out: [address] Gianni Musa PT, DPT

## 2017-09-12 ENCOUNTER — APPOINTMENT (OUTPATIENT)
Dept: GENERAL RADIOLOGY | Age: 72
DRG: 308 | End: 2017-09-12
Attending: HOSPITALIST
Payer: MEDICARE

## 2017-09-12 LAB
ABO + RH BLD: NORMAL
ALBUMIN SERPL-MCNC: 2.3 G/DL (ref 3.2–4.6)
ALBUMIN/GLOB SERPL: 0.5 {RATIO} (ref 1.2–3.5)
ALP SERPL-CCNC: 122 U/L (ref 50–136)
ALT SERPL-CCNC: 578 U/L (ref 12–65)
ANION GAP SERPL CALC-SCNC: 5 MMOL/L (ref 7–16)
AST SERPL-CCNC: 405 U/L (ref 15–37)
ATRIAL RATE: 132 BPM
BASOPHILS # BLD: 0 K/UL (ref 0–0.2)
BASOPHILS # BLD: 0 K/UL (ref 0–0.2)
BASOPHILS NFR BLD: 0 % (ref 0–2)
BASOPHILS NFR BLD: 0 % (ref 0–2)
BILIRUB SERPL-MCNC: 2.1 MG/DL (ref 0.2–1.1)
BLOOD GROUP ANTIBODIES SERPL: NORMAL
BUN SERPL-MCNC: 42 MG/DL (ref 8–23)
CALCIUM SERPL-MCNC: 8.4 MG/DL (ref 8.3–10.4)
CALCULATED R AXIS, ECG10: 126 DEGREES
CALCULATED T AXIS, ECG11: 54 DEGREES
CHLORIDE SERPL-SCNC: 103 MMOL/L (ref 98–107)
CO2 SERPL-SCNC: 35 MMOL/L (ref 21–32)
CREAT SERPL-MCNC: 1.08 MG/DL (ref 0.8–1.5)
DIAGNOSIS, 93000: NORMAL
DIFFERENTIAL METHOD BLD: ABNORMAL
DIFFERENTIAL METHOD BLD: ABNORMAL
EOSINOPHIL # BLD: 0 K/UL (ref 0–0.8)
EOSINOPHIL # BLD: 0 K/UL (ref 0–0.8)
EOSINOPHIL NFR BLD: 0 % (ref 0.5–7.8)
EOSINOPHIL NFR BLD: 0 % (ref 0.5–7.8)
ERYTHROCYTE [DISTWIDTH] IN BLOOD BY AUTOMATED COUNT: 16.8 % (ref 11.9–14.6)
ERYTHROCYTE [DISTWIDTH] IN BLOOD BY AUTOMATED COUNT: 16.9 % (ref 11.9–14.6)
ERYTHROCYTE [DISTWIDTH] IN BLOOD BY AUTOMATED COUNT: 16.9 % (ref 11.9–14.6)
GLOBULIN SER CALC-MCNC: 4.2 G/DL (ref 2.3–3.5)
GLUCOSE BLD STRIP.AUTO-MCNC: 134 MG/DL (ref 65–100)
GLUCOSE BLD STRIP.AUTO-MCNC: 140 MG/DL (ref 65–100)
GLUCOSE BLD STRIP.AUTO-MCNC: 175 MG/DL (ref 65–100)
GLUCOSE BLD STRIP.AUTO-MCNC: 188 MG/DL (ref 65–100)
GLUCOSE BLD STRIP.AUTO-MCNC: 196 MG/DL (ref 65–100)
GLUCOSE SERPL-MCNC: 126 MG/DL (ref 65–100)
HCT VFR BLD AUTO: 27.3 % (ref 41.1–50.3)
HCT VFR BLD AUTO: 29.8 % (ref 41.1–50.3)
HCT VFR BLD AUTO: 32.5 % (ref 41.1–50.3)
HGB BLD-MCNC: 10.4 G/DL (ref 13.6–17.2)
HGB BLD-MCNC: 8.9 G/DL (ref 13.6–17.2)
HGB BLD-MCNC: 9.7 G/DL (ref 13.6–17.2)
IMM GRANULOCYTES # BLD: 0.1 K/UL (ref 0–0.5)
IMM GRANULOCYTES # BLD: 0.1 K/UL (ref 0–0.5)
IMM GRANULOCYTES NFR BLD: 0.2 % (ref 0–5)
IMM GRANULOCYTES NFR BLD: 0.3 % (ref 0–5)
INR PPP: 2.9 (ref 0.9–1.2)
LYMPHOCYTES # BLD: 0.5 K/UL (ref 0.5–4.6)
LYMPHOCYTES # BLD: 0.7 K/UL (ref 0.5–4.6)
LYMPHOCYTES NFR BLD: 2 % (ref 13–44)
LYMPHOCYTES NFR BLD: 3 % (ref 13–44)
MCH RBC QN AUTO: 29.8 PG (ref 26.1–32.9)
MCH RBC QN AUTO: 30 PG (ref 26.1–32.9)
MCH RBC QN AUTO: 30.1 PG (ref 26.1–32.9)
MCHC RBC AUTO-ENTMCNC: 32 G/DL (ref 31.4–35)
MCHC RBC AUTO-ENTMCNC: 32.6 G/DL (ref 31.4–35)
MCHC RBC AUTO-ENTMCNC: 32.6 G/DL (ref 31.4–35)
MCV RBC AUTO: 91.3 FL (ref 79.6–97.8)
MCV RBC AUTO: 92.3 FL (ref 79.6–97.8)
MCV RBC AUTO: 93.9 FL (ref 79.6–97.8)
MONOCYTES # BLD: 0.7 K/UL (ref 0.1–1.3)
MONOCYTES # BLD: 1 K/UL (ref 0.1–1.3)
MONOCYTES NFR BLD: 3 % (ref 4–12)
MONOCYTES NFR BLD: 4 % (ref 4–12)
NEUTS SEG # BLD: 21.3 K/UL (ref 1.7–8.2)
NEUTS SEG # BLD: 22.4 K/UL (ref 1.7–8.2)
NEUTS SEG NFR BLD: 94 % (ref 43–78)
NEUTS SEG NFR BLD: 94 % (ref 43–78)
P-R INTERVAL, ECG05: 112 MS
PLATELET # BLD AUTO: 12 K/UL (ref 150–450)
PLATELET # BLD AUTO: 30 K/UL (ref 150–450)
PLATELET # BLD AUTO: 75 K/UL (ref 150–450)
PMV BLD AUTO: 13.1 FL (ref 10.8–14.1)
PMV BLD AUTO: ABNORMAL FL (ref 10.8–14.1)
PMV BLD AUTO: ABNORMAL FL (ref 10.8–14.1)
POTASSIUM SERPL-SCNC: 4.3 MMOL/L (ref 3.5–5.1)
PROT SERPL-MCNC: 6.5 G/DL (ref 6.3–8.2)
PROTHROMBIN TIME: 32.1 SEC (ref 9.6–12)
Q-T INTERVAL, ECG07: 384 MS
QRS DURATION, ECG06: 166 MS
QTC CALCULATION (BEZET), ECG08: 568 MS
RBC # BLD AUTO: 2.99 M/UL (ref 4.23–5.67)
RBC # BLD AUTO: 3.23 M/UL (ref 4.23–5.67)
RBC # BLD AUTO: 3.46 M/UL (ref 4.23–5.67)
SODIUM SERPL-SCNC: 143 MMOL/L (ref 136–145)
SPECIMEN EXP DATE BLD: NORMAL
VENTRICULAR RATE, ECG03: 132 BPM
WBC # BLD AUTO: 16.9 K/UL (ref 4.3–11.1)
WBC # BLD AUTO: 21.4 K/UL (ref 4.3–11.1)
WBC # BLD AUTO: 23.9 K/UL (ref 4.3–11.1)

## 2017-09-12 PROCEDURE — 87086 URINE CULTURE/COLONY COUNT: CPT | Performed by: HOSPITALIST

## 2017-09-12 PROCEDURE — 87186 SC STD MICRODIL/AGAR DIL: CPT | Performed by: INTERNAL MEDICINE

## 2017-09-12 PROCEDURE — 85027 COMPLETE CBC AUTOMATED: CPT | Performed by: HOSPITALIST

## 2017-09-12 PROCEDURE — 87205 SMEAR GRAM STAIN: CPT | Performed by: INTERNAL MEDICINE

## 2017-09-12 PROCEDURE — 85610 PROTHROMBIN TIME: CPT | Performed by: PHYSICIAN ASSISTANT

## 2017-09-12 PROCEDURE — 36592 COLLECT BLOOD FROM PICC: CPT

## 2017-09-12 PROCEDURE — 87040 BLOOD CULTURE FOR BACTERIA: CPT | Performed by: NURSE PRACTITIONER

## 2017-09-12 PROCEDURE — 74011636637 HC RX REV CODE- 636/637: Performed by: NURSE PRACTITIONER

## 2017-09-12 PROCEDURE — 71010 XR CHEST SNGL V: CPT

## 2017-09-12 PROCEDURE — 74011250637 HC RX REV CODE- 250/637: Performed by: INTERNAL MEDICINE

## 2017-09-12 PROCEDURE — 74011000250 HC RX REV CODE- 250: Performed by: INTERNAL MEDICINE

## 2017-09-12 PROCEDURE — 74011250637 HC RX REV CODE- 250/637: Performed by: NURSE PRACTITIONER

## 2017-09-12 PROCEDURE — 82962 GLUCOSE BLOOD TEST: CPT

## 2017-09-12 PROCEDURE — 77030013131 HC IV BLD ST ICUM -A

## 2017-09-12 PROCEDURE — 74011000258 HC RX REV CODE- 258: Performed by: HOSPITALIST

## 2017-09-12 PROCEDURE — 92526 ORAL FUNCTION THERAPY: CPT

## 2017-09-12 PROCEDURE — 74011250636 HC RX REV CODE- 250/636: Performed by: INTERNAL MEDICINE

## 2017-09-12 PROCEDURE — 86022 PLATELET ANTIBODIES: CPT | Performed by: HOSPITALIST

## 2017-09-12 PROCEDURE — 87077 CULTURE AEROBIC IDENTIFY: CPT | Performed by: INTERNAL MEDICINE

## 2017-09-12 PROCEDURE — 85025 COMPLETE CBC W/AUTO DIFF WBC: CPT | Performed by: PHYSICIAN ASSISTANT

## 2017-09-12 PROCEDURE — 87040 BLOOD CULTURE FOR BACTERIA: CPT | Performed by: INTERNAL MEDICINE

## 2017-09-12 PROCEDURE — 74011250636 HC RX REV CODE- 250/636: Performed by: HOSPITALIST

## 2017-09-12 PROCEDURE — 74011000250 HC RX REV CODE- 250: Performed by: HOSPITALIST

## 2017-09-12 PROCEDURE — 36415 COLL VENOUS BLD VENIPUNCTURE: CPT | Performed by: INTERNAL MEDICINE

## 2017-09-12 PROCEDURE — 94640 AIRWAY INHALATION TREATMENT: CPT

## 2017-09-12 PROCEDURE — 86900 BLOOD TYPING SEROLOGIC ABO: CPT | Performed by: INTERNAL MEDICINE

## 2017-09-12 PROCEDURE — 65660000000 HC RM CCU STEPDOWN

## 2017-09-12 PROCEDURE — 36430 TRANSFUSION BLD/BLD COMPNT: CPT

## 2017-09-12 PROCEDURE — 74011636637 HC RX REV CODE- 636/637: Performed by: HOSPITALIST

## 2017-09-12 PROCEDURE — 80053 COMPREHEN METABOLIC PANEL: CPT | Performed by: INTERNAL MEDICINE

## 2017-09-12 PROCEDURE — P9037 PLATE PHERES LEUKOREDU IRRAD: HCPCS | Performed by: INTERNAL MEDICINE

## 2017-09-12 PROCEDURE — 94760 N-INVAS EAR/PLS OXIMETRY 1: CPT

## 2017-09-12 PROCEDURE — 86644 CMV ANTIBODY: CPT | Performed by: INTERNAL MEDICINE

## 2017-09-12 RX ORDER — POVIDONE-IODINE 10 %
SOLUTION, NON-ORAL TOPICAL DAILY
Status: DISCONTINUED | OUTPATIENT
Start: 2017-09-12 | End: 2017-09-13 | Stop reason: HOSPADM

## 2017-09-12 RX ORDER — PREDNISONE 20 MG/1
20 TABLET ORAL
Qty: 5 TAB | Refills: 0 | Status: SHIPPED | OUTPATIENT
Start: 2017-09-18 | End: 2017-09-13

## 2017-09-12 RX ORDER — CARVEDILOL 6.25 MG/1
6.25 TABLET ORAL 2 TIMES DAILY WITH MEALS
Qty: 60 TAB | Refills: 2 | Status: SHIPPED | OUTPATIENT
Start: 2017-09-12 | End: 2017-09-13

## 2017-09-12 RX ORDER — DIGOXIN 250 MCG
0.25 TABLET ORAL DAILY
Qty: 30 TAB | Refills: 2 | Status: SHIPPED | OUTPATIENT
Start: 2017-09-12 | End: 2017-10-03

## 2017-09-12 RX ORDER — TRAZODONE HYDROCHLORIDE 50 MG/1
50 TABLET ORAL
Qty: 40 TAB | Refills: 0 | Status: SHIPPED | OUTPATIENT
Start: 2017-09-12 | End: 2017-10-03

## 2017-09-12 RX ORDER — DIGOXIN 250 MCG
0.25 TABLET ORAL DAILY
Status: DISCONTINUED | OUTPATIENT
Start: 2017-09-12 | End: 2017-09-13 | Stop reason: HOSPADM

## 2017-09-12 RX ORDER — PREDNISONE 5 MG/1
5 TABLET ORAL DAILY
Qty: 5 TAB | Refills: 0 | Status: SHIPPED | OUTPATIENT
Start: 2017-09-28 | End: 2017-09-13

## 2017-09-12 RX ORDER — PREDNISONE 20 MG/1
40 TABLET ORAL
Qty: 10 TAB | Refills: 0 | Status: SHIPPED | OUTPATIENT
Start: 2017-09-12 | End: 2017-09-13

## 2017-09-12 RX ORDER — PREDNISONE 10 MG/1
10 TABLET ORAL
Qty: 5 TAB | Refills: 0 | Status: SHIPPED | OUTPATIENT
Start: 2017-09-23 | End: 2017-09-13

## 2017-09-12 RX ORDER — GABAPENTIN 300 MG/1
600 CAPSULE ORAL 3 TIMES DAILY
Qty: 180 CAP | Refills: 2 | Status: SHIPPED | OUTPATIENT
Start: 2017-09-12 | End: 2017-10-03

## 2017-09-12 RX ORDER — WARFARIN SODIUM 5 MG/1
10 TABLET ORAL
Status: CANCELLED | OUTPATIENT
Start: 2017-09-12

## 2017-09-12 RX ORDER — CARVEDILOL 6.25 MG/1
6.25 TABLET ORAL 2 TIMES DAILY WITH MEALS
Status: CANCELLED | OUTPATIENT
Start: 2017-09-12

## 2017-09-12 RX ORDER — CARVEDILOL 3.12 MG/1
3.12 TABLET ORAL 2 TIMES DAILY WITH MEALS
Status: DISCONTINUED | OUTPATIENT
Start: 2017-09-12 | End: 2017-09-13 | Stop reason: HOSPADM

## 2017-09-12 RX ORDER — VANCOMYCIN HYDROCHLORIDE
1250 EVERY 12 HOURS
Status: DISCONTINUED | OUTPATIENT
Start: 2017-09-12 | End: 2017-09-13 | Stop reason: HOSPADM

## 2017-09-12 RX ORDER — SODIUM CHLORIDE 9 MG/ML
250 INJECTION, SOLUTION INTRAVENOUS AS NEEDED
Status: DISCONTINUED | OUTPATIENT
Start: 2017-09-12 | End: 2017-09-13 | Stop reason: HOSPADM

## 2017-09-12 RX ADMIN — AZTREONAM 2 G: 2 INJECTION, POWDER, LYOPHILIZED, FOR SOLUTION INTRAMUSCULAR; INTRAVENOUS at 18:17

## 2017-09-12 RX ADMIN — TIOTROPIUM BROMIDE 18 MCG: 18 CAPSULE ORAL; RESPIRATORY (INHALATION) at 08:26

## 2017-09-12 RX ADMIN — GABAPENTIN 600 MG: 300 CAPSULE ORAL at 15:35

## 2017-09-12 RX ADMIN — CARVEDILOL 3.12 MG: 3.12 TABLET, FILM COATED ORAL at 17:00

## 2017-09-12 RX ADMIN — PREDNISONE 40 MG: 20 TABLET ORAL at 09:47

## 2017-09-12 RX ADMIN — WARFARIN SODIUM 10 MG: 5 TABLET ORAL at 23:49

## 2017-09-12 RX ADMIN — ALBUTEROL SULFATE 2.5 MG: 2.5 SOLUTION RESPIRATORY (INHALATION) at 13:52

## 2017-09-12 RX ADMIN — GABAPENTIN 600 MG: 300 CAPSULE ORAL at 23:49

## 2017-09-12 RX ADMIN — Medication 20 ML: at 15:36

## 2017-09-12 RX ADMIN — POVIDONE-IODINE: 10 SOLUTION TOPICAL at 10:54

## 2017-09-12 RX ADMIN — SODIUM CHLORIDE, PRESERVATIVE FREE 600 UNITS: 5 INJECTION INTRAVENOUS at 06:24

## 2017-09-12 RX ADMIN — VANCOMYCIN HYDROCHLORIDE 1250 MG: 10 INJECTION, POWDER, LYOPHILIZED, FOR SOLUTION INTRAVENOUS at 17:14

## 2017-09-12 RX ADMIN — Medication 20 ML: at 06:24

## 2017-09-12 RX ADMIN — ALBUTEROL SULFATE 2.5 MG: 2.5 SOLUTION RESPIRATORY (INHALATION) at 21:27

## 2017-09-12 RX ADMIN — BUDESONIDE 500 MCG: 0.5 INHALANT RESPIRATORY (INHALATION) at 21:27

## 2017-09-12 RX ADMIN — Medication 20 ML: at 23:49

## 2017-09-12 RX ADMIN — Medication 10 ML: at 15:35

## 2017-09-12 RX ADMIN — INSULIN LISPRO 2 UNITS: 100 INJECTION, SOLUTION INTRAVENOUS; SUBCUTANEOUS at 11:47

## 2017-09-12 RX ADMIN — FAMOTIDINE 20 MG: 10 INJECTION, SOLUTION INTRAVENOUS at 09:48

## 2017-09-12 RX ADMIN — BUDESONIDE 500 MCG: 0.5 INHALANT RESPIRATORY (INHALATION) at 08:26

## 2017-09-12 RX ADMIN — GABAPENTIN 600 MG: 300 CAPSULE ORAL at 06:19

## 2017-09-12 RX ADMIN — SODIUM CHLORIDE, PRESERVATIVE FREE 600 UNITS: 5 INJECTION INTRAVENOUS at 23:50

## 2017-09-12 RX ADMIN — FERROUS SULFATE TAB 325 MG (65 MG ELEMENTAL FE) 325 MG: 325 (65 FE) TAB at 09:48

## 2017-09-12 RX ADMIN — GUAIFENESIN 1200 MG: 600 TABLET, EXTENDED RELEASE ORAL at 18:04

## 2017-09-12 RX ADMIN — DIGOXIN 0.25 MG: 250 TABLET ORAL at 09:48

## 2017-09-12 RX ADMIN — Medication 10 ML: at 23:50

## 2017-09-12 RX ADMIN — ALBUTEROL SULFATE 2.5 MG: 2.5 SOLUTION RESPIRATORY (INHALATION) at 08:26

## 2017-09-12 RX ADMIN — INSULIN LISPRO 2 UNITS: 100 INJECTION, SOLUTION INTRAVENOUS; SUBCUTANEOUS at 16:33

## 2017-09-12 RX ADMIN — GUAIFENESIN 1200 MG: 600 TABLET, EXTENDED RELEASE ORAL at 09:48

## 2017-09-12 NOTE — PROGRESS NOTES
Problem: Falls - Risk of  Goal: *Absence of Falls  Document Lauren Fall Risk and appropriate interventions in the flowsheet.    Outcome: Progressing Towards Goal  Fall Risk Interventions:  Mobility Interventions: Assess mobility with egress test, PT Consult for mobility concerns, PT Consult for assist device competence, OT consult for ADLs     Mentation Interventions: Adequate sleep, hydration, pain control, Bed/chair exit alarm, Door open when patient unattended, More frequent rounding, Increase mobility, Reorient patient, Room close to nurse's station, Toileting rounds     Medication Interventions: Bed/chair exit alarm, Patient to call before getting OOB, Teach patient to arise slowly     Elimination Interventions: Call light in reach, Bed/chair exit alarm, Patient to call for help with toileting needs, Toilet paper/wipes in reach, Toileting schedule/hourly rounds, Urinal in reach     History of Falls Interventions: Bed/chair exit alarm, Evaluate medications/consider consulting pharmacy, Investigate reason for fall, Room close to nurse's station

## 2017-09-12 NOTE — PROGRESS NOTES
Pharmacokinetic Consult to Pharmacist    Rebecca Jair is a 67 y.o. male being treated for sepsis with aztreonam and vancomycin. Height: 5' 6\" (167.6 cm)  Weight: 77.7 kg (171 lb 4.8 oz)  Lab Results   Component Value Date/Time    BUN 42 09/12/2017 06:31 AM    Creatinine 1.08 09/12/2017 06:31 AM    WBC 21.4 09/12/2017 10:59 AM    Procalcitonin 0.1 09/04/2017 04:08 AM      Estimated Creatinine Clearance: 59.8 mL/min (based on Cr of 1.08). CULTURES:  8/25 :  BCx / UCx - NG, final  8/29:  MRSA swab - positive, final  9/12: BCx - in process   UCx - in process    Day 1 of vancomycin. Goal trough is 15-20. Vancomycin dose initiated at 1.25 g q12h. Trough prior to 4th dose   Will continue to follow patient.       Thank you,  Will Qiu, PharmD, [facility]  Clinical Pharmacist  [phone]

## 2017-09-12 NOTE — PROGRESS NOTES
Bedside and Verbal shift change report given to Regan Hennessy RN (oncoming nurse) by Elsa Whitmore RN (offgoing nurse). Report included the following information SBAR, Kardex, MAR and Recent Results. Discussed WBC this morning, HR, and BP.

## 2017-09-12 NOTE — PROGRESS NOTES
Updated Brittany Dahiram NP of patients HR and BP 72/41, . Orders received with readback from  to continue administration with IV digoxin 250 mcg.

## 2017-09-12 NOTE — PROGRESS NOTES
MEWS score noted to be 3. Charge nurse, Aron Cummings RN informed and assessed patient. High MEWS score noted due to HR and BP. Vitals signs to be completed every 2 hours. Will continue to monitor. Radha Louis NP aware. Orders received from NP. See progress notes.

## 2017-09-12 NOTE — WOUND CARE
Wound on nose follow up. 2x1.2cm ulcer, red and granulating. Will add betadine paint in daily with foam under mask at night. Will monitor.

## 2017-09-12 NOTE — PROGRESS NOTES
BP 88/42 manual and -130's atrial fibrillation. Discussed with May Vincent NP. Orders received with readback to hold 9/11 PM Entresto dose and for stat EKG.

## 2017-09-12 NOTE — PROGRESS NOTES
LTG: Patient will tolerate least restrictive diet without overt signs or symptoms of airway compromise. STG: Patient will tolerate mechanical soft and thin liquids without overt signs or symptoms of airway compromise. STG: Patient will participate in modified barium swallow study as clinically indicated. Speech language pathology: bedside swallow note: Daily Note 1    NAME/AGE/GENDER: Tali Almodovar is a 67 y.o. male  DATE: 9/12/2017  PRIMARY DIAGNOSIS: Atrial flutter with rapid ventricular response ([facility] [address]       ICD-10: Treatment Diagnosis: R13.12 Oropharyngeal Dysphagia. INTERDISCIPLINARY COLLABORATION: Registered Nurse  PRECAUTIONS/ALLERGIES: Levaquin [levofloxacin] and Metformin ASSESSMENT:Patient seen for diet tolerance and assessment for diet upgrade. Modified barium swallow study completed on  9/8/17 with recommendations for puree and nectar thick liquids. NG tube was believed to contribute to swallowing difficulties during study. NG tube has since been removed. Patient reports good compliance with nectar thick liquids and says he is \"eating good\" with puree. He was initially resistant to wearing dentures in session due to poor fit, but was agreeable with minimal encouragement. He reports wife previously ground all meats at home. He is much more alert and communicative today, even making jokes with clinician. He was presented with thin liquid via cup and straw, nectar via cup and straw,  and mixed consistencies. Patient with much improved timeliness of swallow initiation. Inconsistent double swallows with straw and consecutive cup sips of thin liquid. However, no change in vocal quality, cough, or throat clear noted. No overt signs or symptoms of airway compromise with nectar via cup or straw. Increased mastication time with mixed consistency, but adequate oral clearing. Solids deferred due to baseline diet (ground meats)  Recommend upgrade to mechanical soft diet and nectar thick liquids.  OK for free water, no straws. Upright seating for all meals. Results and recommendations reviewed with patient and wife, who express understanding. ST to continue following regarding dysphagia. ?????? ? ? This section established at most recent assessment??????????  PROBLEM LIST (Impairments causing functional limitations):  1. Dysphagia  REHABILITATION POTENTIAL FOR STATED GOALS: Good  PLAN OF CARE:   Patient will benefit from skilled intervention to address the following impairments. RECOMMENDATIONS AND PLANNED INTERVENTIONS (Benefits and precautions of therapy have been discussed with the patient.):  · PO:  Mechanical soft with ground meat/chopped vegatables  · Liquids:  nectar   · Free water  MEDICATIONS:  · whole in puree  COMPENSATORY STRATEGIES/MODIFICATIONS INCLUDING:  · Alternate liquids/solids  · Small sips and bites  OTHER RECOMMENDATIONS (including follow up treatment recommendations): · Family training/education  · Patient education  RECOMMENDED DIET MODIFICATIONS DISCUSSED WITH:  · Nursing  · Family  · Patient  FREQUENCY/DURATION: Continue to follow patient 3 times a week for duration of hospital stay to address above goals. RECOMMENDED REHABILITATION/EQUIPMENT: (at time of discharge pending progress):   Continue Skilled Therapy. SUBJECTIVE:   \"everyone keeps saying I look better. \". Wife at bedside  History of Present Injury/Illness: Mr. Talat Sandoval  has a past medical history of Acute diastolic CHF (congestive heart failure) ([facility] [address] (9/15/2016); Arthritis; Chicken pox; Coronary artery disease (4/16/2014); DJD (degenerative joint disease); Emphysema; aortic valve replacement, mechanical; Hypercholesterolemia; Hypertension; Palpitations; Pneumonia; and Systolic CHF, acute ([facility] [address] (11/15/2016). Juanita Parson He also  has a past surgical history that includes aortic valve replacement (N/A, 2000) and heart catheterization (07/21/2004).    Present Symptoms: Coughing with thin liquids   Pain Intensity 1: 0  Pain Location 1: Back  Pain Orientation 1: Mid  Pain Intervention(s) 1: Medication (see MAR)  Current Medications:   No current facility-administered medications on file prior to encounter. Current Outpatient Prescriptions on File Prior to Encounter   Medication Sig Dispense Refill    gabapentin (NEURONTIN) 300 mg capsule Take 600 mg by mouth three (3) times daily.  glipiZIDE (GLUCOTROL) 10 mg tablet Take 10 mg by mouth two (2) times a day.  furosemide (LASIX) 40 mg tablet Take 1 Tab by mouth daily. 30 Tab 1    potassium chloride (K-DUR, KLOR-CON) 10 mEq tablet Take 1 Tab by mouth daily. 30 Tab 1    budesonide-formoterol (SYMBICORT) 160-4.5 mcg/actuation HFA inhaler Take 2 Puffs by inhalation.  Omeprazole delayed release (PRILOSEC D/R) 20 mg tablet Take 20 mg by mouth.  flunisolide (NASAREL) 25 mcg (0.025 %) spry 2 Sprays by Nasal route.  albuterol (PROVENTIL HFA) 90 mcg/actuation inhaler Take 2 Puffs by inhalation.  tiotropium (SPIRIVA) 18 mcg inhalation capsule Take 1 Puff by inhalation.  warfarin (COUMADIN) 5 mg tablet Take one 5 mg tablet daily (except 1-1/2 tablet on Wednesday 90 Tab 1    losartan (COZAAR) 100 mg tablet Take 1 Tab by mouth daily. 90 Tab 1    Hydrochlorothiazide 12.5 mg tablet Take 12.5 mg by mouth daily. 90 Tab 1    simvastatin (ZOCOR) 80 mg tablet Take 1 Tab by mouth nightly.  90 Tab 1     Current Dietary Status:  Puree/nectar   Radiologist: Arelis  Social History/Home Situation:    Home Environment: Private residence  # Steps to Enter: 3  Rails to Enter: Yes  Hand Rails : Bilateral  One/Two Story Residence: One story  Living Alone: No  Support Systems: Spouse/Significant Other/Partner, Child(devyn)  Patient Expects to be Discharged to[de-identified] Private residence  Current DME Used/Available at Home: Elveria Brendon, rollator  Tub or Shower Type: Tub/Shower combination  OBJECTIVE:   Respiratory Status:  Room air  0 l/min  CXR Results:Postsurgical change without acute abnormality  MRI/CT Results:MRI pending  Oral Motor Structure/Speech:  Oral-Motor Structure/Motor Speech  Labial: No impairment  Dentition: Upper & lower dentures  Oral Hygiene: Adequate  Lingual: No impairment  Velum: No impairment    Cognitive and Communication Status:  Neurologic State: Alert  Orientation Level: Oriented X4  Cognition: Follows commands  Perception: Appears intact  Perseveration: No perseveration noted  Safety/Judgement: Fall prevention    BEDSIDE SWALLOW EVALUATION  Oral Assessment:  Oral Assessment  Labial: No impairment  Dentition: Upper & lower dentures  Oral Hygiene: Adequate  Lingual: No impairment  Velum: No impairment  P.O. Trials:  Patient Position: upright in bed    The patient was given tsp-small bite amounts of the following:   Consistency Presented: Thin liquid; Nectar thick liquid;Mixed consistency  How Presented: Self-fed/presented;Cup/sip;Spoon;Straw    ORAL PHASE:  Bolus Acceptance: No impairment  Bolus Formation/Control: Impaired  Propulsion: No impairment  Type of Impairment: Mastication  Oral Residue: None    PHARYNGEAL PHASE:  Initiation of Swallow: No impairment  Laryngeal Elevation: Functional  Aspiration Signs/Symptoms: None  Vocal Quality: No impairment           Pharyngeal Phase Characteristics: Easily fatigued ; Double swallow    OTHER OBSERVATIONS:  Rate/bite size: Impaired   Endurance:  Impaired   Comments:       Tool Used: Dysphagia Outcome and Severity Scale (KERRIE)    Score Comments   Normal Diet  [] 7 With no strategies or extra time needed   Functional Swallow  [] 6 May have mild oral or pharyngeal delay       Mild Dysphagia    [] 5 Which may require one diet consistency restricted (those who demonstrate penetration which is entirely cleared on MBS would be included)   Mild-Moderate Dysphagia  [] 4 With 1-2 diet consistencies restricted       Moderate Dysphagia  [] 3 With 2 or more diet consistencies restricted       Moderately Severe Dysphagia  [x] 2 With partial PO strategies (trials with ST only)       Severe Dysphagia  [] 1 With inability to tolerate any PO safely          Score:  Initial: 2   Most Recent: X (Date: -- )   Interpretation of Tool: The Dysphagia Outcome and Severity Scale (KERRIE) is a simple, easy-to-use, 7-point scale developed to systematically rate the functional severity of dysphagia based on objective assessment and make recommendations for diet level, independence level, and type of nutrition. Score 7 6 5 4 3 2 1   Modifier CH CI CJ CK CL CM CN   ? Swallowing:     - CURRENT STATUS: CK - 40%-59% impaired, limited or restricted    - GOAL STATUS:  CI - 1%-19% impaired, limited or restricted    - D/C STATUS:  ---------------To be determined---------------  [place] / [place] / Product Type: Medicare /     TREATMENT:    (In addition to Assessment/Re-Assessment sessions the following treatments were rendered)  Assessment/Reassessment only, no treatment provided today  MODALITIES:                                                                    ORAL MOTOR  EXERCISES:                                                                                                                                                                      LARYNGEAL / PHARYNGEAL EXERCISES:                                                                                                                                     __________________________________________________________________________________________________  Safety:   After treatment position/precautions:  · Upright in Bed    Progression/Medical Necessity:   · Patient is expected to demonstrate progress in swallow function, diet tolerance and swallow safety to improve swallow safety and work toward diet advancement. Compliance with Program/Exercises: Will assess as treatment progresses.    Reason for Continuation of Services/Other Comments:  · Patient continues to require skilled intervention due to dysphagia. Recommendations/Intent for next treatment session: \"Treatment next visit will focus on modified barium swallow study\".     Total Treatment Duration:  Time In: 1430  Time Out: 1505    ABBEY Elizalde, CCC-SLP, CBIS

## 2017-09-12 NOTE — PROGRESS NOTES
Home NIPPV/ Trilogy has been approved through [address] Spoke with Candice Fajardo RT, and he has a gel mask available for the patient if needed.      Gualberto Jeff RN- LUKE LouN  Care Transition/ Bundled Payment Navigator  [phone]

## 2017-09-12 NOTE — PROGRESS NOTES
Hospitalist Progress Note     Admit Date:  2017  1:11 PM   Name:  Jase Houston   Age:  67 y.o.  :  1945   MRN:  [id number]   PCP:  Tashia Cuevas MD  Treatment Team: Attending Provider: Toño Gaitan MD; Consulting Provider: Toño Gaitan MD; Utilization Review: Florence Partida; Consulting Provider: Michael Ferreira MD; Care Manager: Warren Cha; Consulting Provider: Jamil Pearl MD    Subjective: This is a 68 yo male admitted for acute respiratory failure secondary to acute COPD exacerbation on 17. He has PMH of atrial fibrillation, mAVR. Upon admission he had a fib with RVR and patient was cardioverted. During his clinical course patient developed ITP and started on steroids ( currently on prednisone taper ). Eventually he was able to be extubated but had to have NJT for dysphagia. PT/OT/ speech and swallow evaluation noted. Underwent MBS with no overt weakness. NJT removed during the weekend and is able to tolerate pure diet. Cardiology is primary team.    : seen at bedside  Denies any active complaints  No chest pain, SOB, cough, nausea or vomiting, fever  Pt has been persistently tachycardic.          Objective:     Patient Vitals for the past 24 hrs:   Temp Pulse Resp BP SpO2   17 1315 97.4 °F (36.3 °C) (!) 126 16 109/66 97 %   17 1222 - - - 91/68 -   17 0930 97.4 °F (36.3 °C) (!) 128 16 (!) 86/58 96 %   17 0827 - - - - 94 %   17 0450 - 82 - - -   17 0441 98.1 °F (36.7 °C) 99 17 110/57 99 %   17 0020 99.7 °F (37.6 °C) (!) 122 18 (!) 82/42 97 %   17 2351 - (!) 111 - - -   17 2346 - - - - 95 %   17 2231 - (!) 127 - (!) 72/41 -   17 2059 98.1 °F (36.7 °C) (!) 110 19 (!) 88/42 97 %   17 - (!) 129 - (!) 80/41 -   17 - - - - 95 %   17 1737 97.5 °F (36.4 °C) (!) 125 19 121/65 98 %     Oxygen Therapy  O2 Sat (%): 97 % (17 1315)  Pulse via Oximetry: 88 beats per minute (09/12/17 0827)  O2 Device: Room air (09/12/17 1208)  O2 Flow Rate (L/min): 0 l/min (09/12/17 0827)  O2 Temperature: 87.6 °F (30.9 °C) (09/04/17 1140)  FIO2 (%): 21 % (09/12/17 0827)    Intake/Output Summary (Last 24 hours) at 09/12/17 1338  Last data filed at 09/12/17 1315   Gross per 24 hour   Intake              730 ml   Output                0 ml   Net              730 ml         Physical exam:    General appearance: NAD, conversant  HEENT: head AT/NC, nasal bridge ulceration, PERRLA+, no pallor or ict, neck supple, JVD negative  Lungs: CTA, with normal respiratory effort and no intercostal retractions, bilateral rhonchi. No rales   CV: S1,S2 present, no added murmurs, tachycardic  Abdomen: Soft, non-tender; no masses   Extremities: trace peripheral edema bilaterally   Skin: Normal temperature, turgor and texture; no subcutaneous nodules  Psych: Appropriate affect, alert  CNS: gcs 15, no motor or sensory deficits, CN 2-12 intact    Data Review:  I have reviewed all labs, meds, telemetry events, and studies from the last 24 hours.     Recent Results (from the past 24 hour(s))   GLUCOSE, POC    Collection Time: 09/11/17  4:25 PM   Result Value Ref Range    Glucose (POC) 216 (H) 65 - 100 mg/dL   EKG, 12 LEAD, INITIAL    Collection Time: 09/11/17  9:32 PM   Result Value Ref Range    Ventricular Rate 132 BPM    Atrial Rate 132 BPM    P-R Interval 112 ms    QRS Duration 166 ms    Q-T Interval 384 ms    QTC Calculation (Bezet) 568 ms    Calculated R Axis 126 degrees    Calculated T Axis 54 degrees    Diagnosis       atrial tachycardia RVR   Right axis deviation  Non-specific intra-ventricular conduction block  Abnormal ECG  When compared with ECG of 23-AUG-2017 13:29,  RBBB RVH rate has increased since August 23  Confirmed by GLO KWOK (), Jamal Torres ([id number]) on 9/12/2017 7:50:21 AM     GLUCOSE, POC    Collection Time: 09/11/17  9:46 PM   Result Value Ref Range    Glucose (POC) 106 (H) 65 - 100 mg/dL   GLUCOSE, POC Collection Time: 09/12/17  6:21 AM   Result Value Ref Range    Glucose (POC) 140 (H) 65 - 100 mg/dL   PROTHROMBIN TIME + INR    Collection Time: 09/12/17  6:31 AM   Result Value Ref Range    Prothrombin time 32.1 (H) 9.6 - 12.0 sec    INR 2.9 (H) 0.9 - 1.2     CBC WITH AUTOMATED DIFF    Collection Time: 09/12/17  6:31 AM   Result Value Ref Range    WBC 23.9 (H) 4.3 - 11.1 K/uL    RBC 3.23 (L) 4.23 - 5.67 M/uL    HGB 9.7 (L) 13.6 - 17.2 g/dL    HCT 29.8 (L) 41.1 - 50.3 %    MCV 92.3 79.6 - 97.8 FL    MCH 30.0 26.1 - 32.9 PG    MCHC 32.6 31.4 - 35.0 g/dL    RDW 16.8 (H) 11.9 - 14.6 %    PLATELET 75 (L) 682 - 450 K/uL    MPV 13.1 10.8 - 14.1 FL    DF AUTOMATED      NEUTROPHILS 94 (H) 43 - 78 %    LYMPHOCYTES 2 (L) 13 - 44 %    MONOCYTES 4 4.0 - 12.0 %    EOSINOPHILS 0 (L) 0.5 - 7.8 %    BASOPHILS 0 0.0 - 2.0 %    IMMATURE GRANULOCYTES 0.3 0.0 - 5.0 %    ABS. NEUTROPHILS 22.4 (H) 1.7 - 8.2 K/UL    ABS. LYMPHOCYTES 0.5 0.5 - 4.6 K/UL    ABS. MONOCYTES 1.0 0.1 - 1.3 K/UL    ABS. EOSINOPHILS 0.0 0.0 - 0.8 K/UL    ABS. BASOPHILS 0.0 0.0 - 0.2 K/UL    ABS. IMM. GRANS. 0.1 0.0 - 0.5 K/UL   METABOLIC PANEL, COMPREHENSIVE    Collection Time: 09/12/17  6:31 AM   Result Value Ref Range    Sodium 143 136 - 145 mmol/L    Potassium 4.3 3.5 - 5.1 mmol/L    Chloride 103 98 - 107 mmol/L    CO2 35 (H) 21 - 32 mmol/L    Anion gap 5 (L) 7 - 16 mmol/L    Glucose 126 (H) 65 - 100 mg/dL    BUN 42 (H) 8 - 23 MG/DL    Creatinine 1.08 0.8 - 1.5 MG/DL    GFR est AA >60 >60 ml/min/1.73m2    GFR est non-AA >60 >60 ml/min/1.73m2    Calcium 8.4 8.3 - 10.4 MG/DL    Bilirubin, total 2.1 (H) 0.2 - 1.1 MG/DL    ALT (SGPT) 578 (H) 12 - 65 U/L    AST (SGOT) 405 (H) 15 - 37 U/L    Alk.  phosphatase 122 50 - 136 U/L    Protein, total 6.5 6.3 - 8.2 g/dL    Albumin 2.3 (L) 3.2 - 4.6 g/dL    Globulin 4.2 (H) 2.3 - 3.5 g/dL    A-G Ratio 0.5 (L) 1.2 - 3.5     CBC WITH AUTOMATED DIFF    Collection Time: 09/12/17 10:59 AM   Result Value Ref Range    WBC 21.4 (H) 4.3 - 11.1 K/uL    RBC 3.46 (L) 4.23 - 5.67 M/uL    HGB 10.4 (L) 13.6 - 17.2 g/dL    HCT 32.5 (L) 41.1 - 50.3 %    MCV 93.9 79.6 - 97.8 FL    MCH 30.1 26.1 - 32.9 PG    MCHC 32.0 31.4 - 35.0 g/dL    RDW 16.9 (H) 11.9 - 14.6 %    PLATELET 30 (LL) 496 - 450 K/uL    MPV Cannot be calculated 10.8 - 14.1 FL    DF AUTOMATED      NEUTROPHILS 94 (H) 43 - 78 %    LYMPHOCYTES 3 (L) 13 - 44 %    MONOCYTES 3 (L) 4.0 - 12.0 %    EOSINOPHILS 0 (L) 0.5 - 7.8 %    BASOPHILS 0 0.0 - 2.0 %    IMMATURE GRANULOCYTES 0.2 0.0 - 5.0 %    ABS. NEUTROPHILS 21.3 (H) 1.7 - 8.2 K/UL    ABS. LYMPHOCYTES 0.7 0.5 - 4.6 K/UL    ABS. MONOCYTES 0.7 0.1 - 1.3 K/UL    ABS. EOSINOPHILS 0.0 0.0 - 0.8 K/UL    ABS. BASOPHILS 0.0 0.0 - 0.2 K/UL    ABS. IMM. GRANS. 0.1 0.0 - 0.5 K/UL   GLUCOSE, POC    Collection Time: 09/12/17 11:07 AM   Result Value Ref Range    Glucose (POC) 188 (H) 65 - 100 mg/dL        All Micro Results     Procedure Component Value Units Date/Time    CULTURE, BLOOD [[id number]] Collected:  09/12/17 1011    Order Status:  Completed Specimen:  Blood from Blood Updated:  09/12/17 1132    CULTURE, BLOOD [[id number]] Collected:  09/12/17 1008    Order Status:  Completed Specimen:  Blood from Blood Updated:  09/12/17 1046    CULTURE, BLOOD [[id number]] Collected:  08/25/17 1600    Order Status:  Completed Specimen:  Blood from Blood Updated:  08/30/17 0643     Special Requests: LEFT HAND        Culture result: NO GROWTH 5 DAYS       CULTURE, BLOOD [[id number]] Collected:  08/25/17 2145    Order Status:  Completed Specimen:  Blood from Blood Updated:  08/30/17 0643     Special Requests: RIGHT HAND        Culture result: NO GROWTH 5 DAYS       MRSA SCREEN - PCR (NASAL) [[id number]]  (Abnormal) Collected:  08/29/17 1556    Order Status:  Completed Specimen:  Nasal from Nasal Swab Updated:  08/29/17 1900     Special Requests: NO SPECIAL REQUESTS        Culture result:         MRSA target DNA is detected (presumptive positive for MRSA colonization). (A)            RESULTS VERIFIED, PHONED TO AND READ BACK BY  DAMARIS GARNICA RN ON 08/29/2017 AT 1851 [facility]      CULTURE, URINE [[id number]] Collected:  08/25/17 2011    Order Status:  Completed Specimen:  Urine from Clean catch Updated:  08/28/17 0653     Special Requests: NO SPECIAL REQUESTS        Culture result:         [id number] COLONIES/mL MIXED SKIN VANNESSA ISOLATED          Current Meds:  Current Facility-Administered Medications   Medication Dose Route Frequency    digoxin (LANOXIN) tablet 0.25 mg  0.25 mg Oral DAILY    povidone-iodine (BETADINE) 10 % topical solution   Topical DAILY    carvedilol (COREG) tablet 3.125 mg  3.125 mg Oral BID WITH MEALS    insulin lispro (HUMALOG) injection   SubCUTAneous AC&HS    acetaminophen (TYLENOL) tablet 650 mg  650 mg Oral Q4H PRN    guaiFENesin ER (MUCINEX) tablet 1,200 mg  1,200 mg Oral BID    traZODone (DESYREL) tablet 50 mg  50 mg Oral QHS PRN    warfarin (COUMADIN) tablet 10 mg  10 mg Oral QHS    predniSONE (DELTASONE) tablet 40 mg  40 mg Oral DAILY WITH BREAKFAST    [START ON 9/15/2017] predniSONE (DELTASONE) tablet 30 mg  30 mg Oral DAILY WITH BREAKFAST    [START ON 9/22/2017] predniSONE (DELTASONE) tablet 20 mg  20 mg Oral DAILY WITH BREAKFAST    [START ON 9/29/2017] predniSONE (DELTASONE) tablet 10 mg  10 mg Oral DAILY WITH BREAKFAST    midazolam (VERSED) injection 2 mg  2 mg IntraVENous Q2H PRN    morphine injection 2 mg  2 mg IntraVENous Q4H PRN    NUTRITIONAL SUPPORT ELECTROLYTE PRN ORDERS   Does Not Apply PRN    famotidine (PF) (PEPCID) 20 mg in sodium chloride 0.9 % 10 mL injection  20 mg IntraVENous DAILY    0.9% sodium chloride infusion 250 mL  250 mL IntraVENous PRN    acetaminophen (TYLENOL) suppository 650 mg  650 mg Rectal Q4H PRN    sodium chloride (NS) flush 20 mL  20 mL InterCATHeter Q8H    heparin (porcine) pf 600 Units  600 Units InterCATHeter Q8H    sodium chloride (NS) flush 20 mL  20 mL InterCATHeter PRN    heparin (porcine) pf 600 Units  600 Units InterCATHeter PRN    albuterol (PROVENTIL VENTOLIN) nebulizer solution 2.5 mg  2.5 mg Nebulization Q4H PRN    dextrose (D50W) injection syrg 25 g  25 g IntraVENous PRN    ferrous sulfate tablet 325 mg  1 Tab Oral DAILY WITH BREAKFAST    gabapentin (NEURONTIN) capsule 600 mg  600 mg Oral TID    albuterol (PROVENTIL HFA, VENTOLIN HFA, PROAIR HFA) inhaler 2 Puff  2 Puff Inhalation Q4H PRN    tiotropium (SPIRIVA) inhalation capsule 18 mcg  1 Cap Inhalation DAILY    sodium chloride (NS) flush 5-10 mL  5-10 mL IntraVENous Q8H    sodium chloride (NS) flush 5-10 mL  5-10 mL IntraVENous PRN    nitroglycerin (NITROSTAT) tablet 0.4 mg  0.4 mg SubLINGual Q5MIN PRN    ondansetron (ZOFRAN) injection 4 mg  4 mg IntraVENous Q4H PRN    budesonide (PULMICORT) 500 mcg/2 ml nebulizer suspension  500 mcg Nebulization BID RT    And    albuterol CONCENTRATE 2.5mg/0.5 mL neb soln  2.5 mg Nebulization Q6H RT       Other Studies (last 24 hours):  No results found.     Assessment and Plan:     Hospital Problems as of 9/12/2017  Date Reviewed: 9/9/2017          Codes Class Noted - Resolved POA    MRSA nasal colonization ICD-10-CM: Z22.322  ICD-9-CM: V02.54  9/8/2017 - Present Yes        COPD (chronic obstructive pulmonary disease) (HCC) (Chronic) ICD-10-CM: J44.9  ICD-9-CM: 496  8/30/2017 - Present Yes        Acute respiratory failure with hypercapnia ([facility] [address] ICD-10-CM: J96.02  ICD-9-CM: 518.81  8/29/2017 - Present No        History of tobacco use (Chronic) ICD-10-CM: E19.494  ICD-9-CM: V15.82  8/29/2017 - Present Yes        Hyponatremia ICD-10-CM: E87.1  ICD-9-CM: 276.1  8/29/2017 - Present Yes        Acute encephalopathy ICD-10-CM: G93.40  ICD-9-CM: 348.30  8/29/2017 - Present Yes        Thrombocytopenia ([address] ICD-10-CM: D69.6  ICD-9-CM: 287.5  8/26/2017 - Present Yes        Anticoagulant long-term use (Chronic) ICD-10-CM: Z79.01  ICD-9-CM: V58.61  8/23/2017 - Present Yes        HTN (hypertension), benign (Chronic) ICD-10-CM: I10  ICD-9-CM: 401.1  8/23/2017 - Present Yes        Peripheral vascular disease ([facility] [address] (Chronic) ICD-10-CM: I73.9  ICD-9-CM: 443.9  8/23/2017 - Present Yes        Dyslipidemia (Chronic) ICD-10-CM: E78.5  ICD-9-CM: 272.4  8/23/2017 - Present Yes        History of mechanical aortic valve replacement (Chronic) ICD-10-CM: Z95.2  ICD-9-CM: V43.3  8/23/2017 - Present Yes    Overview Signed 8/30/2017 10:30 AM by Caty Sutherland NP     Placement 2000, on chronicCoumadin             Centrilobular emphysema ([facility] [address] (Chronic) ICD-10-CM: J43.2  ICD-9-CM: 492.8  8/23/2017 - Present Yes    Overview Signed 9/8/2017  9:24 AM by Mario Lesch, NP      With last PFTs FVC 1.70 L or 44% predicted, FEV1 0.86 L or 29% predicted, FEV1/FVC 51%. This study was a postbronchodilator study performed at the [place] [facility] on 8/22/2016                  Ischemic cardiomyopathy (Chronic) ICD-10-CM: I25.5  ICD-9-CM: 414.8  8/23/2017 - Present Yes    Overview Signed 8/30/2017 10:29 AM by Caty Sutherland NP     8/23/17 ECHO:  EF 40 % to 45 %. There were no regional wall motion abnormalities. Moderate hypokinesis of the mid-apical anterior and apical wall(s). * (Principal)Atrial flutter with rapid ventricular response (HCC) ICD-10-CM: I48.92  ICD-9-CM: 427.32  8/23/2017 - Present Yes              PLAN:      # Sepsis:  Unclear etiology  Leukocytosis, tachycardia with recurrent hypotension. F/u blood and urine culture. Will get CXR. Will start empiric antibiotics    #Hypotension: intermittent   Due to blood pressure meds  Infection needs to be ruled out in view of elevated WBC. Leukocytosis could also be due to steroids. For now Hold BP medications due to hypotension    # Leukocytosis:   F/u with urine and blood culture  Could be due to oral steroids.     Atrial fibrillation with RVR: not rate controlled   INR therapeutic  S/p Cardioversion   Amiodarone discontinued  Continue Coumadin      Acute COPD exacerbation  S/p intubation and extubation for hypercapnic respiratory failure  S/p ceftriaxone therapy ( 7/7 )  Continue albuterol, pulmicort, prednisone   Pulmonology follow up   Encouraged to use NIVPV at night      ITP:   Further drop in platelet from 47T to 30K today  No signs of active bleeding (petechiae, ecchymoses etc)  Hb is stable. Continue steroids taper  Hematology on board   Recheck CBC @ 1800 hrs, if platelet count drops further, can transfuse 1 bag of platelet.   Work up for HIT      Chronic systolic CHF: Stable   holding Entresto, Carvedilol in view of recurrent hypotension      DC planning/Dispo:  will be discharged to 9th floor rehab when medically stable  DVT ppx: SCD's  Contact isolation: MRSA-nasal isolate  High risk with opioids on board\    Signed:  Ramirez James MD

## 2017-09-12 NOTE — PROGRESS NOTES
Spoke with Austin Zimmerman NP regarding CBC results that were redrawn. Received orders to re-consult hematology.

## 2017-09-12 NOTE — PROGRESS NOTES
Bedside and Verbal shift change report given to Megan Barrios RN (oncoming nurse) by Quinton Seip RN (offgoing nurse). Report included the following information SBAR, Kardex, MAR and Recent Results. Discussed platelet critical value 12 - orders received per day shift RN. Awaiting blood bank draw.

## 2017-09-12 NOTE — PROGRESS NOTES
Bedside and Verbal shift change report given to Trang Pickett RN (oncoming nurse) by self (offgoing nurse). Report included the following information SBAR, Kardex, Intake/Output, MAR and Recent Results.

## 2017-09-12 NOTE — PROGRESS NOTES
Patient has been accepted to 9th floor but not medically ready for discharge at this time. Cardiology is primary and will do discharge. Will continue to follow for transfer to 9th floor.

## 2017-09-12 NOTE — PROGRESS NOTES
Niles Hester NP notified of WBC increase from 7.2 to 23.9. Order to obtain blood cultures was given. Will continue to monitor.

## 2017-09-12 NOTE — PROGRESS NOTES
Patient with recurrent hypotension, now with elevated WBC, hold BB, Entresto this am. Check blood cultures.

## 2017-09-12 NOTE — PROGRESS NOTES
MEWS score noted to be 4. Charge nurse, India Llanos RN informed and assessed patient.  informed and will assess patient. High MEWS score noted due to HR and BP. Vitals signs to be completed every 2 hours. Will continue to monitor. Alena Daily NP aware.

## 2017-09-12 NOTE — PROGRESS NOTES
Samantha Figures notified of pt blood pressure being 88/58. Orders were given to hold coreg and entresto. Will continue to monitor.

## 2017-09-12 NOTE — PROGRESS NOTES
Spoke with Dr. Ann Marie Muir and received an order to infuse 1 bag of platelets. Will let PM RN aware.

## 2017-09-12 NOTE — PROGRESS NOTES
Pt discharge order placed by Dr. Wendi Castrejon MD. Pt is not being discharged today - WBC elevated this AM. [facility] cardiology aware. Spoke with Dr. Wendi Castrejon MD who stated he would discontinue order. Will continue to monitor.

## 2017-09-12 NOTE — PROGRESS NOTES
9/12/2017 6:38 AM    Admit Date: 8/23/2017    Admit Diagnosis: Atrial flutter with rapid ventricular response (HCC)      Subjective:    Patient looking a little better. Needs rehab. Recurrent AF.  Add dig    Objective:      Visit Vitals    /57 (BP 1 Location: Left arm, BP Patient Position: At rest)    Pulse 82    Temp 98.1 °F (36.7 °C)    Resp 17    Ht 5' 6\" (1.676 m)    Wt 77.7 kg (171 lb 4.8 oz)    SpO2 99%    BMI 27.65 kg/m2       ROS:  General ROS: negative for - chills  Hematological and Lymphatic ROS: negative for - blood clots or jaundice  Respiratory ROS: no cough, shortness of breath, or wheezing  Cardiovascular ROS: no chest pain or dyspnea on exertion  Gastrointestinal ROS: no abdominal pain, change in bowel habits, or black or bloody stools  Neurological ROS: no TIA or stroke symptoms    Physical Exam:    Physical Examination: General appearance - alert, well appearing, and in no distress  Mental status - alert, oriented to person, place, and time  Eyes - pupils equal and reactive, extraocular eye movements intact  Neck/lymph - supple, no significant adenopathy  Chest/CV - clear to auscultation, no wheezes, rales or rhonchi, symmetric air entry  Heart - normal rate, regular rhythm, normal S1, S2, no murmurs, rubs, clicks or gallops  Abdomen/GI - soft, nontender, nondistended, no masses or organomegaly  Musculoskeletal - no joint tenderness, deformity or swelling  Extremities - peripheral pulses normal, no pedal edema, no clubbing or cyanosis  Skin - normal coloration and turgor, no rashes, no suspicious skin lesions noted    Current Facility-Administered Medications   Medication Dose Route Frequency    digoxin (LANOXIN) tablet 0.25 mg  0.25 mg Oral DAILY    carvedilol (COREG) tablet 6.25 mg  6.25 mg Oral BID WITH MEALS    insulin lispro (HUMALOG) injection   SubCUTAneous AC&HS    acetaminophen (TYLENOL) tablet 650 mg  650 mg Oral Q4H PRN    guaiFENesin ER (MUCINEX) tablet 1,200 mg 1,200 mg Oral BID    traZODone (DESYREL) tablet 50 mg  50 mg Oral QHS PRN    warfarin (COUMADIN) tablet 10 mg  10 mg Oral QHS    predniSONE (DELTASONE) tablet 40 mg  40 mg Oral DAILY WITH BREAKFAST    [START ON 9/15/2017] predniSONE (DELTASONE) tablet 30 mg  30 mg Oral DAILY WITH BREAKFAST    [START ON 9/22/2017] predniSONE (DELTASONE) tablet 20 mg  20 mg Oral DAILY WITH BREAKFAST    [START ON 9/29/2017] predniSONE (DELTASONE) tablet 10 mg  10 mg Oral DAILY WITH BREAKFAST    midazolam (VERSED) injection 2 mg  2 mg IntraVENous Q2H PRN    morphine injection 2 mg  2 mg IntraVENous Q4H PRN    NUTRITIONAL SUPPORT ELECTROLYTE PRN ORDERS   Does Not Apply PRN    famotidine (PF) (PEPCID) 20 mg in sodium chloride 0.9 % 10 mL injection  20 mg IntraVENous DAILY    0.9% sodium chloride infusion 250 mL  250 mL IntraVENous PRN    acetaminophen (TYLENOL) suppository 650 mg  650 mg Rectal Q4H PRN    sodium chloride (NS) flush 20 mL  20 mL InterCATHeter Q8H    heparin (porcine) pf 600 Units  600 Units InterCATHeter Q8H    sodium chloride (NS) flush 20 mL  20 mL InterCATHeter PRN    heparin (porcine) pf 600 Units  600 Units InterCATHeter PRN    albuterol (PROVENTIL VENTOLIN) nebulizer solution 2.5 mg  2.5 mg Nebulization Q4H PRN    sacubitril-valsartan (ENTRESTO) 24-26 mg tablet 1 Tab  1 Tab Oral Q12H    dextrose (D50W) injection syrg 25 g  25 g IntraVENous PRN    ferrous sulfate tablet 325 mg  1 Tab Oral DAILY WITH BREAKFAST    gabapentin (NEURONTIN) capsule 600 mg  600 mg Oral TID    albuterol (PROVENTIL HFA, VENTOLIN HFA, PROAIR HFA) inhaler 2 Puff  2 Puff Inhalation Q4H PRN    tiotropium (SPIRIVA) inhalation capsule 18 mcg  1 Cap Inhalation DAILY    sodium chloride (NS) flush 5-10 mL  5-10 mL IntraVENous Q8H    sodium chloride (NS) flush 5-10 mL  5-10 mL IntraVENous PRN    nitroglycerin (NITROSTAT) tablet 0.4 mg  0.4 mg SubLINGual Q5MIN PRN    ondansetron (ZOFRAN) injection 4 mg  4 mg IntraVENous Q4H PRN  budesonide (PULMICORT) 500 mcg/2 ml nebulizer suspension  500 mcg Nebulization BID RT    And    albuterol CONCENTRATE 2.5mg/0.5 mL neb soln  2.5 mg Nebulization Q6H RT       Data Review:   @LABRCNT(Na,K,BUN,CREA,WBC,HGB,HCT,PLT,INR,TRP,TCHOL*,Triglyceride*,LDL*,LDLCPOC HDL*,HDL])@    TELEMETRY: aflutter    Assessment/Plan:     Principal Problem:    Atrial flutter with rapid ventricular response (HCC) (8/23/2017) rate controlled. The current medical regimen is effective;  continue present plan and medications. Active Problems:    Anticoagulant long-term use (8/23/2017)      HTN (hypertension), benign (8/23/2017)The current medical regimen is effective;  continue present plan and medications. Peripheral vascular disease ([address] (8/23/2017)      Dyslipidemia (8/23/2017)      History of mechanical aortic valve replacement (8/23/2017) The current medical regimen is effective;  continue present plan and medications. Overview: Placement 2000, on chronicCoumadin      Centrilobular emphysema ([address] (8/23/2017)      Overview: With last PFTs FVC 1.70 L or 44% predicted, FEV1 0.86 L or 29% predicted,       FEV1/FVC 51%. This study was a postbronchodilator study performed at the       [facility] on 8/22/2016                   Ischemic cardiomyopathy (8/23/2017)      Overview: 8/23/17 ECHO:      EF 40 % to 45 %. There were no regional wall motion abnormalities. Moderate hypokinesis of the mid-apical anterior and apical wall(s). Thrombocytopenia ([address] (8/26/2017)      Acute respiratory failure with hypercapnia ([address] (8/29/2017)      History of tobacco use (8/29/2017)      Hyponatremia (8/29/2017)      Acute encephalopathy (8/29/2017)      COPD (chronic obstructive pulmonary disease) ([address] (8/30/2017)The current medical regimen is effective;  continue present plan and medications.         MRSA nasal colonization (9/8/2017)    Needs rehab      Derik Garces MD

## 2017-09-12 NOTE — PROGRESS NOTES
Bedside and Verbal shift change report given to self and Van Rubinstein, RN (oncoming nurse) by Sparkle Mooney RN (offgoing nurse). Report included the following information SBAR, Kardex, Intake/Output and MAR.

## 2017-09-13 ENCOUNTER — HOSPITAL ENCOUNTER (INPATIENT)
Age: 72
LOS: 20 days | Discharge: HOME HEALTH | DRG: 309 | End: 2017-10-03
Attending: PHYSICAL MEDICINE & REHABILITATION | Admitting: PHYSICAL MEDICINE & REHABILITATION
Payer: MEDICARE

## 2017-09-13 VITALS
OXYGEN SATURATION: 97 % | SYSTOLIC BLOOD PRESSURE: 138 MMHG | HEIGHT: 66 IN | BODY MASS INDEX: 27.85 KG/M2 | DIASTOLIC BLOOD PRESSURE: 79 MMHG | TEMPERATURE: 96.6 F | WEIGHT: 173.28 LBS | RESPIRATION RATE: 17 BRPM | HEART RATE: 100 BPM

## 2017-09-13 DIAGNOSIS — E87.1 HYPONATREMIA: ICD-10-CM

## 2017-09-13 DIAGNOSIS — Z95.2 H/O HEART VALVE REPLACEMENT WITH MECHANICAL VALVE: Primary | ICD-10-CM

## 2017-09-13 DIAGNOSIS — J96.22 ACUTE ON CHRONIC RESPIRATORY FAILURE WITH HYPERCAPNIA (HCC): ICD-10-CM

## 2017-09-13 DIAGNOSIS — I51.9 LV DYSFUNCTION: Chronic | ICD-10-CM

## 2017-09-13 DIAGNOSIS — J96.02 ACUTE RESPIRATORY FAILURE WITH HYPERCAPNIA (HCC): ICD-10-CM

## 2017-09-13 DIAGNOSIS — I25.5 ISCHEMIC CARDIOMYOPATHY: Chronic | ICD-10-CM

## 2017-09-13 DIAGNOSIS — D69.6 THROMBOCYTOPENIA (HCC): ICD-10-CM

## 2017-09-13 DIAGNOSIS — D69.3 ACUTE ITP (HCC): ICD-10-CM

## 2017-09-13 DIAGNOSIS — G93.40 ACUTE ENCEPHALOPATHY: ICD-10-CM

## 2017-09-13 DIAGNOSIS — K21.9 GASTROESOPHAGEAL REFLUX DISEASE WITHOUT ESOPHAGITIS: ICD-10-CM

## 2017-09-13 DIAGNOSIS — I73.9 PERIPHERAL VASCULAR DISEASE (HCC): Chronic | ICD-10-CM

## 2017-09-13 DIAGNOSIS — I10 HTN (HYPERTENSION), BENIGN: Chronic | ICD-10-CM

## 2017-09-13 DIAGNOSIS — Z79.01 ANTICOAGULANT LONG-TERM USE: Chronic | ICD-10-CM

## 2017-09-13 DIAGNOSIS — I48.92 ATRIAL FLUTTER WITH RAPID VENTRICULAR RESPONSE (HCC): ICD-10-CM

## 2017-09-13 DIAGNOSIS — J43.2 CENTRILOBULAR EMPHYSEMA (HCC): ICD-10-CM

## 2017-09-13 DIAGNOSIS — Z22.322 MRSA NASAL COLONIZATION: ICD-10-CM

## 2017-09-13 PROBLEM — J96.90 RESPIRATORY FAILURE (HCC): Status: ACTIVE | Noted: 2017-09-13

## 2017-09-13 LAB
ANION GAP SERPL CALC-SCNC: 6 MMOL/L (ref 7–16)
BASOPHILS # BLD: 0 K/UL (ref 0–0.2)
BASOPHILS NFR BLD: 0 % (ref 0–2)
BLD PROD TYP BPU: NORMAL
BPU ID: NORMAL
BUN SERPL-MCNC: 31 MG/DL (ref 8–23)
CALCIUM SERPL-MCNC: 7.4 MG/DL (ref 8.3–10.4)
CHLORIDE SERPL-SCNC: 106 MMOL/L (ref 98–107)
CO2 SERPL-SCNC: 30 MMOL/L (ref 21–32)
CREAT SERPL-MCNC: 0.67 MG/DL (ref 0.8–1.5)
DIFFERENTIAL METHOD BLD: ABNORMAL
EOSINOPHIL # BLD: 0 K/UL (ref 0–0.8)
EOSINOPHIL NFR BLD: 0 % (ref 0.5–7.8)
ERYTHROCYTE [DISTWIDTH] IN BLOOD BY AUTOMATED COUNT: 16.9 % (ref 11.9–14.6)
GLUCOSE BLD STRIP.AUTO-MCNC: 130 MG/DL (ref 65–100)
GLUCOSE BLD STRIP.AUTO-MCNC: 161 MG/DL (ref 65–100)
GLUCOSE BLD STRIP.AUTO-MCNC: 262 MG/DL (ref 65–100)
GLUCOSE BLD STRIP.AUTO-MCNC: 99 MG/DL (ref 65–100)
GLUCOSE SERPL-MCNC: 93 MG/DL (ref 65–100)
HCT VFR BLD AUTO: 26.1 % (ref 41.1–50.3)
HEPARIN INDUCED PLT,XHIPA: NEGATIVE
HGB BLD-MCNC: 8.4 G/DL (ref 13.6–17.2)
HIT INTERPRETATION,XINTPR: NEGATIVE
HIT PROFILE,XHITT: NORMAL
IMM GRANULOCYTES # BLD: 0 K/UL (ref 0–0.5)
IMM GRANULOCYTES NFR BLD: 0.2 % (ref 0–5)
INR PPP: 3.5 (ref 0.9–1.2)
LYMPHOCYTES # BLD: 0.3 K/UL (ref 0.5–4.6)
LYMPHOCYTES NFR BLD: 3 % (ref 13–44)
MCH RBC QN AUTO: 29.6 PG (ref 26.1–32.9)
MCHC RBC AUTO-ENTMCNC: 32.1 G/DL (ref 31.4–35)
MCV RBC AUTO: 92.2 FL (ref 79.6–97.8)
MONOCYTES # BLD: 0.4 K/UL (ref 0.1–1.3)
MONOCYTES NFR BLD: 3 % (ref 4–12)
NEUTS SEG # BLD: 9.9 K/UL (ref 1.7–8.2)
NEUTS SEG NFR BLD: 94 % (ref 43–78)
OPTICAL DENSITY READ,XHITAO: 0.14 ABS
PLATELET # BLD AUTO: 84 K/UL (ref 150–450)
PMV BLD AUTO: 12 FL (ref 10.8–14.1)
POTASSIUM SERPL-SCNC: 3.6 MMOL/L (ref 3.5–5.1)
PROTHROMBIN TIME: 38.3 SEC (ref 9.6–12)
RBC # BLD AUTO: 2.57 M/UL (ref 4.23–5.67)
SODIUM SERPL-SCNC: 142 MMOL/L (ref 136–145)
STATUS OF UNIT,%ST: NORMAL
UNIT DIVISION, %UDIV: 0
WBC # BLD AUTO: 10.5 K/UL (ref 4.3–11.1)

## 2017-09-13 PROCEDURE — 85025 COMPLETE CBC W/AUTO DIFF WBC: CPT | Performed by: PHYSICIAN ASSISTANT

## 2017-09-13 PROCEDURE — 74011000250 HC RX REV CODE- 250: Performed by: INTERNAL MEDICINE

## 2017-09-13 PROCEDURE — 74011250637 HC RX REV CODE- 250/637: Performed by: INTERNAL MEDICINE

## 2017-09-13 PROCEDURE — 74011250637 HC RX REV CODE- 250/637: Performed by: NURSE PRACTITIONER

## 2017-09-13 PROCEDURE — 97110 THERAPEUTIC EXERCISES: CPT

## 2017-09-13 PROCEDURE — 97530 THERAPEUTIC ACTIVITIES: CPT

## 2017-09-13 PROCEDURE — 74011636637 HC RX REV CODE- 636/637: Performed by: NURSE PRACTITIONER

## 2017-09-13 PROCEDURE — 74011250636 HC RX REV CODE- 250/636: Performed by: INTERNAL MEDICINE

## 2017-09-13 PROCEDURE — 74011000258 HC RX REV CODE- 258: Performed by: HOSPITALIST

## 2017-09-13 PROCEDURE — 94640 AIRWAY INHALATION TREATMENT: CPT

## 2017-09-13 PROCEDURE — 65310000000 HC RM PRIVATE REHAB

## 2017-09-13 PROCEDURE — 94760 N-INVAS EAR/PLS OXIMETRY 1: CPT

## 2017-09-13 PROCEDURE — 74011250636 HC RX REV CODE- 250/636: Performed by: HOSPITALIST

## 2017-09-13 PROCEDURE — 74011250636 HC RX REV CODE- 250/636: Performed by: PHYSICAL MEDICINE & REHABILITATION

## 2017-09-13 PROCEDURE — 80048 BASIC METABOLIC PNL TOTAL CA: CPT | Performed by: PHYSICIAN ASSISTANT

## 2017-09-13 PROCEDURE — 85610 PROTHROMBIN TIME: CPT | Performed by: PHYSICIAN ASSISTANT

## 2017-09-13 PROCEDURE — 36592 COLLECT BLOOD FROM PICC: CPT

## 2017-09-13 PROCEDURE — 74011636637 HC RX REV CODE- 636/637: Performed by: PHYSICAL MEDICINE & REHABILITATION

## 2017-09-13 PROCEDURE — 94660 CPAP INITIATION&MGMT: CPT

## 2017-09-13 PROCEDURE — 74011000250 HC RX REV CODE- 250: Performed by: HOSPITALIST

## 2017-09-13 PROCEDURE — 82962 GLUCOSE BLOOD TEST: CPT

## 2017-09-13 PROCEDURE — 99223 1ST HOSP IP/OBS HIGH 75: CPT | Performed by: PHYSICAL MEDICINE & REHABILITATION

## 2017-09-13 PROCEDURE — 99233 SBSQ HOSP IP/OBS HIGH 50: CPT | Performed by: INTERNAL MEDICINE

## 2017-09-13 PROCEDURE — 77030011256 HC DRSG MEPILEX <16IN NO BORD MOLN -A

## 2017-09-13 PROCEDURE — 74011250637 HC RX REV CODE- 250/637: Performed by: PHYSICAL MEDICINE & REHABILITATION

## 2017-09-13 RX ORDER — PREDNISONE 20 MG/1
20 TABLET ORAL
Status: DISCONTINUED | OUTPATIENT
Start: 2017-09-22 | End: 2017-09-22 | Stop reason: HOSPADM

## 2017-09-13 RX ORDER — PREDNISONE 20 MG/1
40 TABLET ORAL
Status: CANCELLED | OUTPATIENT
Start: 2017-09-14 | End: 2017-09-16

## 2017-09-13 RX ORDER — ALBUTEROL SULFATE 2.5 MG/.5ML
2.5 SOLUTION RESPIRATORY (INHALATION)
Status: CANCELLED | OUTPATIENT
Start: 2017-09-13

## 2017-09-13 RX ORDER — ACETAMINOPHEN 325 MG/1
650 TABLET ORAL
Status: CANCELLED | OUTPATIENT
Start: 2017-09-13

## 2017-09-13 RX ORDER — LANOLIN ALCOHOL/MO/W.PET/CERES
325 CREAM (GRAM) TOPICAL
Qty: 30 TAB | Refills: 0 | Status: SHIPPED
Start: 2017-09-13 | End: 2017-10-03

## 2017-09-13 RX ORDER — TRAZODONE HYDROCHLORIDE 50 MG/1
50 TABLET ORAL
Status: DISCONTINUED | OUTPATIENT
Start: 2017-09-13 | End: 2017-09-22 | Stop reason: HOSPADM

## 2017-09-13 RX ORDER — ONDANSETRON 2 MG/ML
4 INJECTION INTRAMUSCULAR; INTRAVENOUS
Status: CANCELLED | OUTPATIENT
Start: 2017-09-13

## 2017-09-13 RX ORDER — HEPARIN 100 UNIT/ML
600 SYRINGE INTRAVENOUS EVERY 8 HOURS
Status: CANCELLED | OUTPATIENT
Start: 2017-09-13

## 2017-09-13 RX ORDER — ALBUTEROL SULFATE 2.5 MG/.5ML
2.5 SOLUTION RESPIRATORY (INHALATION)
Status: DISCONTINUED | OUTPATIENT
Start: 2017-09-13 | End: 2017-09-18

## 2017-09-13 RX ORDER — ACETAMINOPHEN 325 MG/1
650 TABLET ORAL
Status: DISCONTINUED | OUTPATIENT
Start: 2017-09-13 | End: 2017-09-22 | Stop reason: HOSPADM

## 2017-09-13 RX ORDER — CARVEDILOL 3.12 MG/1
3.12 TABLET ORAL 2 TIMES DAILY WITH MEALS
Status: DISCONTINUED | OUTPATIENT
Start: 2017-09-13 | End: 2017-09-16

## 2017-09-13 RX ORDER — WARFARIN SODIUM 5 MG/1
5 TABLET ORAL
Status: CANCELLED | OUTPATIENT
Start: 2017-09-13

## 2017-09-13 RX ORDER — CARVEDILOL 3.12 MG/1
3.12 TABLET ORAL 2 TIMES DAILY WITH MEALS
Status: DISCONTINUED | OUTPATIENT
Start: 2017-09-14 | End: 2017-09-13 | Stop reason: SDUPTHER

## 2017-09-13 RX ORDER — NITROGLYCERIN 0.4 MG/1
0.4 TABLET SUBLINGUAL
Status: DISCONTINUED | OUTPATIENT
Start: 2017-09-13 | End: 2017-09-22 | Stop reason: HOSPADM

## 2017-09-13 RX ORDER — DEXTROSE 50 % IN WATER (D50W) INTRAVENOUS SYRINGE
25 AS NEEDED
Status: CANCELLED | OUTPATIENT
Start: 2017-09-13

## 2017-09-13 RX ORDER — CARVEDILOL 3.12 MG/1
3.12 TABLET ORAL 2 TIMES DAILY WITH MEALS
Qty: 60 TAB | Refills: 1 | Status: SHIPPED
Start: 2017-09-13 | End: 2017-10-03

## 2017-09-13 RX ORDER — VANCOMYCIN HYDROCHLORIDE
1250 EVERY 12 HOURS
Status: DISCONTINUED | OUTPATIENT
Start: 2017-09-14 | End: 2017-09-14

## 2017-09-13 RX ORDER — SODIUM CHLORIDE 0.9 % (FLUSH) 0.9 %
5-10 SYRINGE (ML) INJECTION AS NEEDED
Status: DISCONTINUED | OUTPATIENT
Start: 2017-09-13 | End: 2017-09-22 | Stop reason: HOSPADM

## 2017-09-13 RX ORDER — ALBUTEROL SULFATE 0.83 MG/ML
2.5 SOLUTION RESPIRATORY (INHALATION)
Status: CANCELLED | OUTPATIENT
Start: 2017-09-13

## 2017-09-13 RX ORDER — PREDNISONE 20 MG/1
20 TABLET ORAL
Qty: 6 TAB | Refills: 0 | Status: SHIPPED
Start: 2017-09-22 | End: 2017-10-03

## 2017-09-13 RX ORDER — VANCOMYCIN HYDROCHLORIDE
1250 EVERY 12 HOURS
Status: CANCELLED | OUTPATIENT
Start: 2017-09-13

## 2017-09-13 RX ORDER — POVIDONE-IODINE 10 %
1 SOLUTION, NON-ORAL TOPICAL DAILY
Qty: 1 BOTTLE | Refills: 0 | Status: SHIPPED
Start: 2017-09-13 | End: 2017-10-03

## 2017-09-13 RX ORDER — SODIUM CHLORIDE 0.9 % (FLUSH) 0.9 %
20 SYRINGE (ML) INJECTION EVERY 8 HOURS
Qty: 1 SYRINGE | Refills: 0 | Status: SHIPPED
Start: 2017-09-13 | End: 2017-10-03

## 2017-09-13 RX ORDER — WARFARIN SODIUM 5 MG/1
5 TABLET ORAL
Status: DISCONTINUED | OUTPATIENT
Start: 2017-09-13 | End: 2017-09-13 | Stop reason: HOSPADM

## 2017-09-13 RX ORDER — PREDNISONE 10 MG/1
10 TABLET ORAL
Status: CANCELLED | OUTPATIENT
Start: 2017-09-29 | End: 2017-10-06

## 2017-09-13 RX ORDER — CARVEDILOL 3.12 MG/1
3.12 TABLET ORAL 2 TIMES DAILY WITH MEALS
Status: CANCELLED | OUTPATIENT
Start: 2017-09-13

## 2017-09-13 RX ORDER — PREDNISONE 10 MG/1
10 TABLET ORAL
Qty: 6 TAB | Refills: 0 | Status: ON HOLD
Start: 2017-09-29 | End: 2017-10-03

## 2017-09-13 RX ORDER — GABAPENTIN 300 MG/1
600 CAPSULE ORAL 3 TIMES DAILY
Status: CANCELLED | OUTPATIENT
Start: 2017-09-13

## 2017-09-13 RX ORDER — BUDESONIDE 0.5 MG/2ML
500 INHALANT ORAL
Status: DISCONTINUED | OUTPATIENT
Start: 2017-09-13 | End: 2017-09-18

## 2017-09-13 RX ORDER — LANOLIN ALCOHOL/MO/W.PET/CERES
1 CREAM (GRAM) TOPICAL
Status: CANCELLED | OUTPATIENT
Start: 2017-09-14

## 2017-09-13 RX ORDER — WARFARIN SODIUM 5 MG/1
5 TABLET ORAL SEE ADMIN INSTRUCTIONS
Status: DISCONTINUED | OUTPATIENT
Start: 2017-09-13 | End: 2017-09-14

## 2017-09-13 RX ORDER — GABAPENTIN 300 MG/1
600 CAPSULE ORAL 3 TIMES DAILY
Status: DISCONTINUED | OUTPATIENT
Start: 2017-09-13 | End: 2017-09-22 | Stop reason: HOSPADM

## 2017-09-13 RX ORDER — INSULIN LISPRO 100 [IU]/ML
INJECTION, SOLUTION INTRAVENOUS; SUBCUTANEOUS
Qty: 1 VIAL | Refills: 0 | Status: SHIPPED
Start: 2017-09-13 | End: 2017-10-03

## 2017-09-13 RX ORDER — VANCOMYCIN HYDROCHLORIDE
1250 EVERY 12 HOURS
Qty: 1 ML | Refills: 0 | Status: SHIPPED
Start: 2017-09-13 | End: 2017-10-03

## 2017-09-13 RX ORDER — ALBUTEROL SULFATE 90 UG/1
2 AEROSOL, METERED RESPIRATORY (INHALATION)
Status: CANCELLED | OUTPATIENT
Start: 2017-09-13

## 2017-09-13 RX ORDER — PREDNISONE 20 MG/1
40 TABLET ORAL
Qty: 2 TAB | Refills: 0 | Status: SHIPPED
Start: 2017-09-13 | End: 2017-10-03

## 2017-09-13 RX ORDER — PREDNISONE 10 MG/1
30 TABLET ORAL
Qty: 18 TAB | Refills: 0 | Status: SHIPPED
Start: 2017-09-15 | End: 2017-10-03

## 2017-09-13 RX ORDER — HEPARIN 100 UNIT/ML
600 SYRINGE INTRAVENOUS AS NEEDED
Status: DISCONTINUED | OUTPATIENT
Start: 2017-09-13 | End: 2017-09-22 | Stop reason: HOSPADM

## 2017-09-13 RX ORDER — LANOLIN ALCOHOL/MO/W.PET/CERES
1 CREAM (GRAM) TOPICAL
Status: DISCONTINUED | OUTPATIENT
Start: 2017-09-14 | End: 2017-09-22 | Stop reason: HOSPADM

## 2017-09-13 RX ORDER — SODIUM CHLORIDE 0.9 % (FLUSH) 0.9 %
20 SYRINGE (ML) INJECTION EVERY 8 HOURS
Status: DISCONTINUED | OUTPATIENT
Start: 2017-09-13 | End: 2017-09-22 | Stop reason: HOSPADM

## 2017-09-13 RX ORDER — DIGOXIN 250 MCG
0.25 TABLET ORAL DAILY
Status: DISCONTINUED | OUTPATIENT
Start: 2017-09-14 | End: 2017-09-22 | Stop reason: HOSPADM

## 2017-09-13 RX ORDER — SODIUM CHLORIDE 0.9 % (FLUSH) 0.9 %
20 SYRINGE (ML) INJECTION AS NEEDED
Qty: 1 SYRINGE | Refills: 0 | Status: SHIPPED
Start: 2017-09-13 | End: 2017-10-03

## 2017-09-13 RX ORDER — INSULIN LISPRO 100 [IU]/ML
INJECTION, SOLUTION INTRAVENOUS; SUBCUTANEOUS
Status: CANCELLED | OUTPATIENT
Start: 2017-09-13

## 2017-09-13 RX ORDER — ALBUTEROL SULFATE 90 UG/1
2 AEROSOL, METERED RESPIRATORY (INHALATION)
Status: DISCONTINUED | OUTPATIENT
Start: 2017-09-13 | End: 2017-09-22 | Stop reason: HOSPADM

## 2017-09-13 RX ORDER — PREDNISONE 20 MG/1
40 TABLET ORAL
Status: DISCONTINUED | OUTPATIENT
Start: 2017-09-14 | End: 2017-09-13

## 2017-09-13 RX ORDER — ALBUTEROL SULFATE 0.83 MG/ML
2.5 SOLUTION RESPIRATORY (INHALATION)
Status: DISCONTINUED | OUTPATIENT
Start: 2017-09-13 | End: 2017-09-22 | Stop reason: HOSPADM

## 2017-09-13 RX ORDER — PREDNISONE 20 MG/1
40 TABLET ORAL
Status: COMPLETED | OUTPATIENT
Start: 2017-09-14 | End: 2017-09-14

## 2017-09-13 RX ORDER — ONDANSETRON 2 MG/ML
4 INJECTION INTRAMUSCULAR; INTRAVENOUS
Status: DISCONTINUED | OUTPATIENT
Start: 2017-09-13 | End: 2017-09-22 | Stop reason: HOSPADM

## 2017-09-13 RX ORDER — SODIUM CHLORIDE 0.9 % (FLUSH) 0.9 %
20 SYRINGE (ML) INJECTION EVERY 8 HOURS
Status: CANCELLED | OUTPATIENT
Start: 2017-09-13

## 2017-09-13 RX ORDER — HEPARIN 100 UNIT/ML
600 SYRINGE INTRAVENOUS EVERY 8 HOURS
Status: DISCONTINUED | OUTPATIENT
Start: 2017-09-13 | End: 2017-09-22 | Stop reason: HOSPADM

## 2017-09-13 RX ORDER — POVIDONE-IODINE 10 %
SOLUTION, NON-ORAL TOPICAL DAILY
Status: CANCELLED | OUTPATIENT
Start: 2017-09-14

## 2017-09-13 RX ORDER — HEPARIN 100 UNIT/ML
600 SYRINGE INTRAVENOUS AS NEEDED
Status: CANCELLED | OUTPATIENT
Start: 2017-09-13

## 2017-09-13 RX ORDER — GUAIFENESIN 600 MG/1
1200 TABLET, EXTENDED RELEASE ORAL 2 TIMES DAILY
Status: CANCELLED | OUTPATIENT
Start: 2017-09-13

## 2017-09-13 RX ORDER — PREDNISONE 10 MG/1
10 TABLET ORAL
Status: DISCONTINUED | OUTPATIENT
Start: 2017-09-29 | End: 2017-09-22 | Stop reason: HOSPADM

## 2017-09-13 RX ORDER — DEXTROSE 50 % IN WATER (D50W) INTRAVENOUS SYRINGE
25 AS NEEDED
Status: DISCONTINUED | OUTPATIENT
Start: 2017-09-13 | End: 2017-09-22 | Stop reason: HOSPADM

## 2017-09-13 RX ORDER — POVIDONE-IODINE 10 %
SOLUTION, NON-ORAL TOPICAL DAILY
Status: DISCONTINUED | OUTPATIENT
Start: 2017-09-14 | End: 2017-09-22 | Stop reason: HOSPADM

## 2017-09-13 RX ORDER — NITROGLYCERIN 0.4 MG/1
0.4 TABLET SUBLINGUAL
Status: CANCELLED | OUTPATIENT
Start: 2017-09-13

## 2017-09-13 RX ORDER — INSULIN LISPRO 100 [IU]/ML
INJECTION, SOLUTION INTRAVENOUS; SUBCUTANEOUS
Status: DISCONTINUED | OUTPATIENT
Start: 2017-09-13 | End: 2017-09-22 | Stop reason: HOSPADM

## 2017-09-13 RX ORDER — ACETAMINOPHEN 650 MG/1
650 SUPPOSITORY RECTAL
Status: CANCELLED | OUTPATIENT
Start: 2017-09-13

## 2017-09-13 RX ORDER — DIGOXIN 250 MCG
0.25 TABLET ORAL DAILY
Status: CANCELLED | OUTPATIENT
Start: 2017-09-14

## 2017-09-13 RX ORDER — GUAIFENESIN 600 MG/1
1200 TABLET, EXTENDED RELEASE ORAL 2 TIMES DAILY
Status: DISCONTINUED | OUTPATIENT
Start: 2017-09-13 | End: 2017-09-22 | Stop reason: HOSPADM

## 2017-09-13 RX ORDER — ACETAMINOPHEN 650 MG/1
650 SUPPOSITORY RECTAL
Status: DISCONTINUED | OUTPATIENT
Start: 2017-09-13 | End: 2017-09-22 | Stop reason: HOSPADM

## 2017-09-13 RX ORDER — PREDNISONE 20 MG/1
20 TABLET ORAL
Status: CANCELLED | OUTPATIENT
Start: 2017-09-22 | End: 2017-09-29

## 2017-09-13 RX ORDER — SODIUM CHLORIDE 9 MG/ML
250 INJECTION, SOLUTION INTRAVENOUS AS NEEDED
Status: CANCELLED | OUTPATIENT
Start: 2017-09-13

## 2017-09-13 RX ORDER — TRAZODONE HYDROCHLORIDE 50 MG/1
50 TABLET ORAL
Status: CANCELLED | OUTPATIENT
Start: 2017-09-13

## 2017-09-13 RX ORDER — BUDESONIDE 0.5 MG/2ML
500 INHALANT ORAL
Status: CANCELLED | OUTPATIENT
Start: 2017-09-13

## 2017-09-13 RX ORDER — SODIUM CHLORIDE 0.9 % (FLUSH) 0.9 %
5-10 SYRINGE (ML) INJECTION AS NEEDED
Status: CANCELLED | OUTPATIENT
Start: 2017-09-13

## 2017-09-13 RX ORDER — SODIUM CHLORIDE 9 MG/ML
250 INJECTION, SOLUTION INTRAVENOUS AS NEEDED
Status: DISCONTINUED | OUTPATIENT
Start: 2017-09-13 | End: 2017-09-20 | Stop reason: SDUPTHER

## 2017-09-13 RX ADMIN — INSULIN LISPRO 6 UNITS: 100 INJECTION, SOLUTION INTRAVENOUS; SUBCUTANEOUS at 22:26

## 2017-09-13 RX ADMIN — GUAIFENESIN 1200 MG: 600 TABLET, EXTENDED RELEASE ORAL at 22:20

## 2017-09-13 RX ADMIN — Medication 20 ML: at 22:20

## 2017-09-13 RX ADMIN — FAMOTIDINE 20 MG: 10 INJECTION, SOLUTION INTRAVENOUS at 08:28

## 2017-09-13 RX ADMIN — PREDNISONE 40 MG: 20 TABLET ORAL at 08:28

## 2017-09-13 RX ADMIN — Medication 10 ML: at 05:30

## 2017-09-13 RX ADMIN — Medication 20 ML: at 14:00

## 2017-09-13 RX ADMIN — TIOTROPIUM BROMIDE 18 MCG: 18 CAPSULE ORAL; RESPIRATORY (INHALATION) at 07:40

## 2017-09-13 RX ADMIN — GABAPENTIN 600 MG: 300 CAPSULE ORAL at 13:26

## 2017-09-13 RX ADMIN — ALBUTEROL SULFATE 2.5 MG: 2.5 SOLUTION RESPIRATORY (INHALATION) at 14:57

## 2017-09-13 RX ADMIN — Medication 20 ML: at 05:30

## 2017-09-13 RX ADMIN — CARVEDILOL 3.12 MG: 3.12 TABLET, FILM COATED ORAL at 18:49

## 2017-09-13 RX ADMIN — AZTREONAM 2 G: 2 INJECTION, POWDER, LYOPHILIZED, FOR SOLUTION INTRAMUSCULAR; INTRAVENOUS at 15:07

## 2017-09-13 RX ADMIN — SODIUM CHLORIDE, PRESERVATIVE FREE 600 UNITS: 5 INJECTION INTRAVENOUS at 05:31

## 2017-09-13 RX ADMIN — Medication 600 UNITS: at 22:20

## 2017-09-13 RX ADMIN — DIGOXIN 0.25 MG: 250 TABLET ORAL at 08:28

## 2017-09-13 RX ADMIN — FERROUS SULFATE TAB 325 MG (65 MG ELEMENTAL FE) 325 MG: 325 (65 FE) TAB at 08:28

## 2017-09-13 RX ADMIN — GABAPENTIN 600 MG: 300 CAPSULE ORAL at 05:39

## 2017-09-13 RX ADMIN — IMMUNE GLOBULIN (HUMAN): 10 INJECTION INTRAVENOUS; SUBCUTANEOUS at 12:42

## 2017-09-13 RX ADMIN — TRAZODONE HYDROCHLORIDE 50 MG: 50 TABLET ORAL at 22:20

## 2017-09-13 RX ADMIN — AZTREONAM 2 G: 2 INJECTION, POWDER, LYOPHILIZED, FOR SOLUTION INTRAMUSCULAR; INTRAVENOUS at 00:13

## 2017-09-13 RX ADMIN — VANCOMYCIN HYDROCHLORIDE 1250 MG: 10 INJECTION, POWDER, LYOPHILIZED, FOR SOLUTION INTRAVENOUS at 15:51

## 2017-09-13 RX ADMIN — VANCOMYCIN HYDROCHLORIDE 1250 MG: 10 INJECTION, POWDER, LYOPHILIZED, FOR SOLUTION INTRAVENOUS at 03:42

## 2017-09-13 RX ADMIN — ALBUTEROL SULFATE 2.5 MG: 2.5 SOLUTION RESPIRATORY (INHALATION) at 07:40

## 2017-09-13 RX ADMIN — CARVEDILOL 3.12 MG: 3.12 TABLET, FILM COATED ORAL at 08:28

## 2017-09-13 RX ADMIN — GABAPENTIN 600 MG: 300 CAPSULE ORAL at 22:20

## 2017-09-13 RX ADMIN — GUAIFENESIN 1200 MG: 600 TABLET, EXTENDED RELEASE ORAL at 08:28

## 2017-09-13 RX ADMIN — Medication 10 ML: at 13:29

## 2017-09-13 RX ADMIN — POVIDONE-IODINE: 10 SOLUTION TOPICAL at 08:29

## 2017-09-13 RX ADMIN — BUDESONIDE 500 MCG: 0.5 INHALANT RESPIRATORY (INHALATION) at 07:40

## 2017-09-13 RX ADMIN — ALBUTEROL SULFATE 2.5 MG: 2.5 SOLUTION RESPIRATORY (INHALATION) at 02:34

## 2017-09-13 RX ADMIN — AZTREONAM 2 G: 2 INJECTION, POWDER, LYOPHILIZED, FOR SOLUTION INTRAMUSCULAR; INTRAVENOUS at 06:36

## 2017-09-13 NOTE — PROGRESS NOTES
Warfarin dosing per pharmacist    Tona Bill is a 67 y.o. male. Height: 5' 6\" (167.6 cm)    Weight: 78.6 kg (173 lb 4.5 oz)    Indication: A. Fib    Goal INR:  2-3    Home dose:  5 mg daily, 7.5 mg on Wednesday    Risk factors or significant drug interactions:  Antibiotics, prednisone    Other anticoagulants:  none    Daily Monitoring  Date  INR     Warfarin dose HGB              Notes  9/8  1.2  10mg  11.2    9/9  1.3  10mg   9.4  9/10  ---  10mg   9.7  9/11  2.1  10mg  10.8  9/12  2.9  10mg   8.9  9/13  3.5  HOLD   8.4  77 Amanda Srinivasan consulted to manage warfarin on 9/13/17 by Dr. April Alvarez    Patient with higher than usual home dose for several days. INR is now above goal.  Will hold tonight's dose and resume warfarin at a lower dose on 9/14. Trend INR daily. Continue to monitor.     Thank you,  Wisam Starkey, PharmD, BCPS  Clinical Pharmacist  [phone]

## 2017-09-13 NOTE — PROGRESS NOTES
Bedside and Verbal shift change report given to self (oncoming nurse) by Princess Biggs RN (offgoing nurse). Report included the following information SBAR, Kardex, Intake/Output, MAR and Recent Results.

## 2017-09-13 NOTE — PROGRESS NOTES
TRANSFER - OUT REPORT:    Verbal report given to Carmen Shankar RN on Mai Chambers  being transferred to 9th floor rehab for routine progression of care       Report consisted of patients Situation, Background, Assessment and Recommendations(SBAR). Information from the following report(s) SBAR, Kardex, Procedure Summary, Intake/Output, MAR and Recent Results was reviewed with the receiving nurse. Opportunity for questions and clarification was provided. Patient taken up to 9th floor rehab. Bi-PAP taken up to room 907 with patient. Patient family gathered patient belongings. Care assumed by 9th floor RN.

## 2017-09-13 NOTE — PROGRESS NOTES
Hospitalist Progress Note    Subjective:   Daily Progress Note: 9/13/2017 2:37 PM    \"I feel pretty good. \" denies SOB/Cp/N/V/bleeding.     Current Facility-Administered Medications   Medication Dose Route Frequency    [START ON 9/14/2017] VANCOMYCIN Trough Reminder   Other ONCE    warfarin (COUMADIN) - Pharmacy Dosing - HOLD  5 mg Oral QHS    immune globulin 10% (GAMUNEX-C) 65 Gm   IntraVENous ONCE TITR    digoxin (LANOXIN) tablet 0.25 mg  0.25 mg Oral DAILY    povidone-iodine (BETADINE) 10 % topical solution   Topical DAILY    carvedilol (COREG) tablet 3.125 mg  3.125 mg Oral BID WITH MEALS    aztreonam (AZACTAM) 2 g in 0.9% sodium chloride (MBP/ADV) 100 mL MBP  2 g IntraVENous Q8H    vancomycin (VANCOCIN) 1250 mg in  ml infusion  1,250 mg IntraVENous Q12H    0.9% sodium chloride infusion 250 mL  250 mL IntraVENous PRN    insulin lispro (HUMALOG) injection   SubCUTAneous AC&HS    acetaminophen (TYLENOL) tablet 650 mg  650 mg Oral Q4H PRN    guaiFENesin ER (MUCINEX) tablet 1,200 mg  1,200 mg Oral BID    traZODone (DESYREL) tablet 50 mg  50 mg Oral QHS PRN    predniSONE (DELTASONE) tablet 40 mg  40 mg Oral DAILY WITH BREAKFAST    [START ON 9/15/2017] predniSONE (DELTASONE) tablet 30 mg  30 mg Oral DAILY WITH BREAKFAST    [START ON 9/22/2017] predniSONE (DELTASONE) tablet 20 mg  20 mg Oral DAILY WITH BREAKFAST    [START ON 9/29/2017] predniSONE (DELTASONE) tablet 10 mg  10 mg Oral DAILY WITH BREAKFAST    midazolam (VERSED) injection 2 mg  2 mg IntraVENous Q2H PRN    morphine injection 2 mg  2 mg IntraVENous Q4H PRN    NUTRITIONAL SUPPORT ELECTROLYTE PRN ORDERS   Does Not Apply PRN    famotidine (PF) (PEPCID) 20 mg in sodium chloride 0.9 % 10 mL injection  20 mg IntraVENous DAILY    0.9% sodium chloride infusion 250 mL  250 mL IntraVENous PRN    acetaminophen (TYLENOL) suppository 650 mg  650 mg Rectal Q4H PRN    sodium chloride (NS) flush 20 mL  20 mL InterCATHeter Q8H    heparin (porcine) pf 600 Units  600 Units InterCATHeter Q8H    sodium chloride (NS) flush 20 mL  20 mL InterCATHeter PRN    heparin (porcine) pf 600 Units  600 Units InterCATHeter PRN    albuterol (PROVENTIL VENTOLIN) nebulizer solution 2.5 mg  2.5 mg Nebulization Q4H PRN    dextrose (D50W) injection syrg 25 g  25 g IntraVENous PRN    ferrous sulfate tablet 325 mg  1 Tab Oral DAILY WITH BREAKFAST    gabapentin (NEURONTIN) capsule 600 mg  600 mg Oral TID    albuterol (PROVENTIL HFA, VENTOLIN HFA, PROAIR HFA) inhaler 2 Puff  2 Puff Inhalation Q4H PRN    tiotropium (SPIRIVA) inhalation capsule 18 mcg  1 Cap Inhalation DAILY    sodium chloride (NS) flush 5-10 mL  5-10 mL IntraVENous Q8H    sodium chloride (NS) flush 5-10 mL  5-10 mL IntraVENous PRN    nitroglycerin (NITROSTAT) tablet 0.4 mg  0.4 mg SubLINGual Q5MIN PRN    ondansetron (ZOFRAN) injection 4 mg  4 mg IntraVENous Q4H PRN    budesonide (PULMICORT) 500 mcg/2 ml nebulizer suspension  500 mcg Nebulization BID RT    And    albuterol CONCENTRATE 2.5mg/0.5 mL neb soln  2.5 mg Nebulization Q6H RT        Review of Systems  A comprehensive review of systems was negative except for that written in the HPI. Objective:     Visit Vitals    /75    Pulse 77    Temp 98.3 °F (36.8 °C)    Resp 18    Ht 5' 6\" (1.676 m)    Wt 78.6 kg (173 lb 4.5 oz)    SpO2 98%    BMI 27.97 kg/m2    O2 Flow Rate (L/min): 0 l/min O2 Device: Room air    Temp (24hrs), Av.8 °F (36.6 °C), Min:97.3 °F (36.3 °C), Max:98.9 °F (37.2 °C)         1901 -  0700  In: 863 [P.O.:710]  Out: 1000 [Urine:1000]    Visit Vitals    /75    Pulse 77    Temp 98.3 °F (36.8 °C)    Resp 18    Ht 5' 6\" (1.676 m)    Wt 78.6 kg (173 lb 4.5 oz)    SpO2 98%    BMI 27.97 kg/m2     General appearance: alert, cooperative, no distress, appears stated age  Head: Normocephalic, without obvious abnormality, atraumatic  Eyes: negative  Nose: Nares normal. Septum midline.  Mucosa normal. No drainage or sinus tenderness. Neck: supple, symmetrical, trachea midline, no adenopathy, thyroid: not enlarged, symmetric, no tenderness/mass/nodules, no carotid bruit and no JVD  Lungs: clear to auscultation bilaterally  Heart: irreg irreg, S1, S2 normal, no murmur, click, rub or gallop  Abdomen: soft, non-tender. Bowel sounds normal. No masses,  no organomegaly  Extremities: extremities normal, atraumatic, no cyanosis or edema  Pulses: 2+ and symmetric    Additional comments:None    Data Review    Recent Results (from the past 24 hour(s))   HIT PROFILE    Collection Time: 09/12/17  3:43 PM   Result Value Ref Range    HIT PROFILE PERFORMED BY [address] INDUCED PLT AB. NEGATIVE       OPTICAL DENSITY READ 0.135 <0.400 ABS    HIT INTERPRETATION NEGATIVE      GLUCOSE, POC    Collection Time: 09/12/17  4:00 PM   Result Value Ref Range    Glucose (POC) 196 (H) 65 - 100 mg/dL   CULTURE, URINE    Collection Time: 09/12/17  5:05 PM   Result Value Ref Range    Special Requests: NO SPECIAL REQUESTS      Culture result:        NO GROWTH AFTER SHORT PERIOD OF INCUBATION. FURTHER RESULTS TO FOLLOW AFTER OVERNIGHT INCUBATION.    CBC W/O DIFF    Collection Time: 09/12/17  6:19 PM   Result Value Ref Range    WBC 16.9 (H) 4.3 - 11.1 K/uL    RBC 2.99 (L) 4.23 - 5.67 M/uL    HGB 8.9 (L) 13.6 - 17.2 g/dL    HCT 27.3 (L) 41.1 - 50.3 %    MCV 91.3 79.6 - 97.8 FL    MCH 29.8 26.1 - 32.9 PG    MCHC 32.6 31.4 - 35.0 g/dL    RDW 16.9 (H) 11.9 - 14.6 %    PLATELET 12 (LL) 348 - 450 K/uL    MPV Cannot be calculated 10.8 - 14.1 FL   PLATELETS, ALLOCATE    Collection Time: 09/12/17  7:30 PM   Result Value Ref Range    Unit number [id number]     Blood component type PLTPH LRIR,2     Unit division 00     Status of unit TRANSFUSED    GLUCOSE, POC    Collection Time: 09/12/17  9:30 PM   Result Value Ref Range    Glucose (POC) 175 (H) 65 - 100 mg/dL   TYPE & SCREEN    Collection Time: 09/12/17  9:40 PM   Result Value Ref Range    Crossmatch Expiration 09/15/2017     ABO/Rh(D) A NEGATIVE     Antibody screen NEG    GLUCOSE, POC    Collection Time: 09/12/17 11:36 PM   Result Value Ref Range    Glucose (POC) 134 (H) 65 - 100 mg/dL   PROTHROMBIN TIME + INR    Collection Time: 09/13/17  3:51 AM   Result Value Ref Range    Prothrombin time 38.3 (H) 9.6 - 12.0 sec    INR 3.5 (H) 0.9 - 1.2     METABOLIC PANEL, BASIC    Collection Time: 09/13/17  3:51 AM   Result Value Ref Range    Sodium 142 136 - 145 mmol/L    Potassium 3.6 3.5 - 5.1 mmol/L    Chloride 106 98 - 107 mmol/L    CO2 30 21 - 32 mmol/L    Anion gap 6 (L) 7 - 16 mmol/L    Glucose 93 65 - 100 mg/dL    BUN 31 (H) 8 - 23 MG/DL    Creatinine 0.67 (L) 0.8 - 1.5 MG/DL    GFR est AA >60 >60 ml/min/1.73m2    GFR est non-AA >60 >60 ml/min/1.73m2    Calcium 7.4 (L) 8.3 - 10.4 MG/DL   CBC WITH AUTOMATED DIFF    Collection Time: 09/13/17  3:51 AM   Result Value Ref Range    WBC 10.5 4.3 - 11.1 K/uL    RBC 2.57 (L) 4.23 - 5.67 M/uL    HGB 8.4 (L) 13.6 - 17.2 g/dL    HCT 26.1 (L) 41.1 - 50.3 %    MCV 92.2 79.6 - 97.8 FL    MCH 29.6 26.1 - 32.9 PG    MCHC 32.1 31.4 - 35.0 g/dL    RDW 16.9 (H) 11.9 - 14.6 %    PLATELET 84 (L) 325 - 450 K/uL    MPV 12.0 10.8 - 14.1 FL    DF AUTOMATED      NEUTROPHILS 94 (H) 43 - 78 %    LYMPHOCYTES 3 (L) 13 - 44 %    MONOCYTES 3 (L) 4.0 - 12.0 %    EOSINOPHILS 0 (L) 0.5 - 7.8 %    BASOPHILS 0 0.0 - 2.0 %    IMMATURE GRANULOCYTES 0.2 0.0 - 5.0 %    ABS. NEUTROPHILS 9.9 (H) 1.7 - 8.2 K/UL    ABS. LYMPHOCYTES 0.3 (L) 0.5 - 4.6 K/UL    ABS. MONOCYTES 0.4 0.1 - 1.3 K/UL    ABS. EOSINOPHILS 0.0 0.0 - 0.8 K/UL    ABS. BASOPHILS 0.0 0.0 - 0.2 K/UL    ABS. IMM.  GRANS. 0.0 0.0 - 0.5 K/UL   GLUCOSE, POC    Collection Time: 09/13/17  6:34 AM   Result Value Ref Range    Glucose (POC) 99 65 - 100 mg/dL   GLUCOSE, POC    Collection Time: 09/13/17 11:08 AM   Result Value Ref Range    Glucose (POC) 130 (H) 65 - 100 mg/dL         Assessment/Plan:     Principal Problem:    Atrial flutter with rapid ventricular response ([address] (8/23/2017)-per primary team, cards. Consider increase coreg. Active Problems:    Anticoagulant long-term use (8/23/2017)      HTN (hypertension), benign (8/23/2017)-stable      Peripheral vascular disease ([address] (8/23/2017)      Dyslipidemia (8/23/2017)-stable      History of mechanical aortic valve replacement (8/23/2017)      Overview: Placement 2000, on chronicCoumadin-INR goal 2.5-3.5      Centrilobular emphysema ([address] (8/23/2017)      Overview: With last PFTs FVC 1.70 L or 44% predicted, FEV1 0.86 L or 29% predicted,       FEV1/FVC 51%. This study was a postbronchodilator study performed at the       [facility] on 8/22/2016 -stable            Ischemic cardiomyopathy (8/23/2017)-stable      Overview: 8/23/17 ECHO:      EF 40 % to 45 %. There were no regional wall motion abnormalities. Moderate hypokinesis of the mid-apical anterior and apical wall(s). Thrombocytopenia ([address] (8/26/2017)-ITP per Heme/Onc service. Prednisone taper. Acute respiratory failure with hypercapnia (HCC) (8/29/2017)      History of tobacco use (8/29/2017)      Hyponatremia (8/29/2017)      Acute encephalopathy (8/29/2017)-resolved      COPD (chronic obstructive pulmonary disease) ([address] (8/30/2017)      MRSA nasal colonization (9/8/2017)    For transfer to 9th floor rehab. Care Plan discussed with: Patient/Family and Nurse    Total time spent with patient: 25 minutes.     Signed By: Anjelica Maharaj MD     September 13, 2017

## 2017-09-13 NOTE — PROGRESS NOTES
Bedside and Verbal shift change report given to Garrett Mann RN (oncoming nurse) by Kostas Vo RN (offgoing nurse). Report included the following information SBAR, Kardex, MAR and Recent Results.

## 2017-09-13 NOTE — IP AVS SNAPSHOT
Summary of Care Report The Summary of Care report has been created to help improve care coordination. Users with access to [employer] or [address] (Web-based application) may access additional patient information including the Discharge Summary. If you are not currently a [address] user and need more information, please call the number listed below in the [place] [address] section and ask to be connected with Medical Records. Facility Information Name Address Phone [address] [address] [address] [phone] Patient Information Patient Name Sex  Sanaz  ([id number]) Male 1945 Discharge Information Admitting Provider Service Area Unit Jenifer Larson MD / [address] [facility] Unit / [phone] Discharge Provider Discharge Date/Time Discharge Disposition Destination Jenifer Larson MD / [phone] 10/3/2017 Morning (Pending) AHR (none) Patient Language Language ENGLISH [13] Hospital Problems as of 10/3/2017  Reviewed: 2017  8:39 AM by Kyle Mcbride NP Class Noted - Resolved Last Modified POA Active Problems Ischemic cardiomyopathy (Chronic)  2017 - Present 2017 by Leia Solis MD Yes Entered by Jojo Cronin MD  
  Overview Signed 2017 10:29 AM by Jessica Carcamo NP  
   17 ECHO: 
EF 40 % to 45 %. There were no regional wall motion abnormalities. Moderate hypokinesis of the mid-apical anterior and apical wall(s). Atrial flutter with rapid ventricular response ([address]  2017 - Present 2017 by Leia Solis MD Yes Entered by David Dickerson PA-C Thrombocytopenia ([address]  2017 - Present 2017 by Leia Solis MD Yes   Entered by Artemio Slater MD  
 COPD (chronic obstructive pulmonary disease) ([facility] (Chronic)  8/30/2017 - Present 9/22/2017 by Ricardo Gosselin, MD Yes Entered by Armond Marcial NP Respiratory failure ([facility]  9/13/2017 - Present 9/13/2017 by Inder Swanson MD Unknown Entered by Inder Swanson MD  
  
Non-Hospital Problems as of 10/3/2017  Reviewed: 9/9/2017  8:39 AM by Beryl Chu NP Class Noted - Resolved Last Modified Active Problems Anticoagulant long-term use (Chronic)  8/23/2017 - Present 8/30/2017 by Armond Marcial, NP Entered by Lucinda Quiñones HTN (hypertension), benign (Chronic)  8/23/2017 - Present 8/30/2017 by Armond Marcial, NP Entered by Lucinda Quiñones Peripheral vascular disease ([facility] (Chronic)  8/23/2017 - Present 8/30/2017 by Armond Marcial, NP Entered by Lucinda Quiñones Atherosclerosis of native arteries of the extremities with intermittent claudication (Chronic)  4/15/2014 - Present 8/29/2017 by Ray Freedman, NP Entered by Lucinda Quiñonse Dyslipidemia (Chronic)  8/23/2017 - Present 8/30/2017 by Armond Marcial, NP Entered by Edmund French History of mechanical aortic valve replacement (Chronic)  8/23/2017 - Present 8/30/2017 by Armond Marcial, NP Entered by Kerry George MD  
  Overview Signed 8/30/2017 10:30 AM by Armond Marcial NP Placement 2000, on chronicCoumadin Centrilobular emphysema ([facility] (Chronic)  8/23/2017 - Present 9/8/2017 by Beryl Chu, NP Entered by Kerry George MD  
  Overview Signed 9/8/2017  9:24 AM by Beryl Chu NP With last PFTs FVC 1.70 L or 44% predicted, FEV1 0.86 L or 29% predicted, FEV1/FVC 51%. This study was a postbronchodilator study performed at the [place] [facility] on 8/22/2016 LV dysfunction (Chronic)  10/19/2016 - Present 8/29/2017 by Ray Freedman NP   Entered by Kerry George MD  
 Acute respiratory failure with hypercapnia ([facility]  8/29/2017 - Present 8/29/2017 by David Dinero MD  
  Entered by Denise Hutchison NP History of tobacco use (Chronic)  8/29/2017 - Present 8/29/2017 by Denise Hutchison NP Entered by Denise Hutchison NP Hyponatremia  8/29/2017 - Present 8/29/2017 by Shu Vo NP Entered by Shu Vo NP Acute encephalopathy  8/29/2017 - Present 8/29/2017 by Shu Vo NP Entered by Shu Vo NP  
  MRSA nasal colonization  9/8/2017 - Present 9/8/2017 by Kay Ramírez NP Entered by Kay Ramírez NP You are allergic to the following Allergen Reactions Levaquin (Levofloxacin) Other (comments) GI upset Metformin Other (comments) Gi upset Current Discharge Medication List  
  
START taking these medications Dose & Instructions Dispensing Information Comments  
 amiodarone 200 mg tablet Commonly known as:  CORDARONE Dose:  200 mg Take 1 Tab by mouth daily. Quantity:  30 Tab Refills:  2  
   
 enalapril 5 mg tablet Commonly known as:  Orval Pheasant Dose:  5 mg Take 1 Tab by mouth daily. Quantity:  30 Tab Refills:  2  
   
 metoprolol succinate 100 mg tablet Commonly known as:  TOPROL-XL Dose:  100 mg Take 1 Tab by mouth two (2) times a day. Indications: VENTRICULAR RATE CONTROL IN ATRIAL FIBRILLATION Quantity:  60 Tab Refills:  2  
   
 nitroglycerin 0.4 mg SL tablet Commonly known as:  NITROSTAT Dose:  0.4 mg  
1 Tab by SubLINGual route every five (5) minutes as needed for Chest Pain. Indications: ANGINA Quantity:  15 Tab Refills:  0 CONTINUE these medications which have CHANGED Dose & Instructions Dispensing Information Comments  
 furosemide 20 mg tablet Commonly known as:  LASIX What changed:   
- medication strength 
- how much to take Dose:  20 mg Take 1 Tab by mouth daily. Indications: Edema Quantity:  30 Tab Refills:  1  
   
 * guaiFENesin 1,200 mg Ta12 ER tablet Commonly known as:  Stackdriver What changed:  Another medication with the same name was added. Make sure you understand how and when to take each. Dose:  1200 mg Take 1 Tab by mouth two (2) times a day for 14 days. Quantity:  28 Tab Refills:  1  
   
 * guaiFENesin 1,200 mg Ta12 ER tablet Commonly known as:  Stackdriver What changed: You were already taking a medication with the same name, and this prescription was added. Make sure you understand how and when to take each. Dose:  1200 mg Take 1 Tab by mouth every twelve (12) hours. Quantity:  60 Tab Refills:  1  
   
 potassium chloride 20 mEq tablet Commonly known as:  K-DUR, KLOR-CON What changed:   
- medication strength 
- how much to take Dose:  20 mEq Take 1 Tab by mouth daily. Indications: hypokalemia prevention Quantity:  30 Tab Refills:  1  
   
 * predniSONE 10 mg tablet Commonly known as:  Framingham Sonu What changed:   
- how much to take - when to take this - Another medication with the same name was removed. Continue taking this medication, and follow the directions you see here. Dose:  5 mg Take 0.5 Tabs by mouth daily for 6 days. Quantity:  30 Tab Refills:  0  
   
 * predniSONE 5 mg tablet Commonly known as:  Framingham Sonu Start taking on:  10/4/2017 What changed:   
- medication strength 
- how much to take - Another medication with the same name was removed. Continue taking this medication, and follow the directions you see here. Dose:  5 mg Take 1 Tab by mouth daily (with breakfast). Quantity:  30 Tab Refills:  1  
   
 * PROVENTIL HFA 90 mcg/actuation inhaler Generic drug:  albuterol What changed:  Another medication with the same name was added. Make sure you understand how and when to take each. Dose:  2 Puff Take 2 Puffs by inhalation. Refills:  0  
   
 * albuterol 90 mcg/actuation inhaler Commonly known as:  PROVENTIL HFA What changed: You were already taking a medication with the same name, and this prescription was added. Make sure you understand how and when to take each. Dose:  2 Puff Take 2 Puffs by inhalation every four (4) hours as needed for Wheezing or Shortness of Breath. Indications: Chronic Obstructive Pulmonary Disease Quantity:  1 Inhaler Refills:  3  
   
 * tiotropium 18 mcg inhalation capsule Commonly known as:  Arvine Jenny What changed:  Another medication with the same name was added. Make sure you understand how and when to take each. Dose:  1 Puff Take 1 Puff by inhalation. Refills:  0  
   
 * tiotropium 18 mcg inhalation capsule Commonly known as:  Murlene Jenny What changed: You were already taking a medication with the same name, and this prescription was added. Make sure you understand how and when to take each. Dose:  1 Cap Take 1 Cap by inhalation daily. Indications: BRONCHOSPASM PREVENTION WITH COPD Quantity:  30 Cap Refills:  2  
   
 * Notice: This list has 8 medication(s) that are the same as other medications prescribed for you. Read the directions carefully, and ask your doctor or other care provider to review them with you. CONTINUE these medications which have NOT CHANGED Dose & Instructions Dispensing Information Comments  
 budesonide-formoterol 160-4.5 mcg/actuation Hfaa Commonly known as:  SYMBICORT Dose:  2 Puff Take 2 Puffs by inhalation. Refills:  0  
   
 flunisolide 25 mcg (0.025 %) Spry Commonly known as:  NASAREL Dose:  2 Spray 2 Sprays by Nasal route. Refills:  0  
   
 gabapentin 300 mg capsule Commonly known as:  NEURONTIN Dose:  600 mg Take 2 Caps by mouth three (3) times daily. Indications: NEUROPATHIC PAIN Quantity:  90 Cap Refills:  2 Omeprazole delayed release 20 mg tablet Commonly known as:  PRILOSEC D/R Dose:  20 mg Take 20 mg by mouth. Refills:  0 STOP taking these medications Comments  
 aztreonam 2 gram 2 g IVPB  
   
   
 carvedilol 3.125 mg tablet Commonly known as:  COREG  
   
   
 digoxin 0.25 mg tablet Commonly known as:  LANOXIN  
   
   
 ferrous sulfate 325 mg (65 mg iron) tablet  
   
   
 insulin lispro 100 unit/mL injection Commonly known as:  HUMALOG  
   
   
 povidone-iodine 10 % external solution Commonly known as:  BETADINE  
   
   
 simvastatin 80 mg tablet Commonly known as:  ZOCOR  
   
   
 sodium chloride 0.9 % Commonly known as:  NS  
   
   
 traZODone 50 mg tablet Commonly known as:  DESYREL  
   
   
 vancomycin in 0.9% Sodium Cl 1.25 gram/250 mL Soln Current Immunizations Name Date Influenza Vaccine 8/1/2016, 11/2/2013, 11/2/2013, 11/18/2011 Influenza Vaccine (Quad) PF  Deferred () Pneumococcal Conjugate (PCV-13) 1/1/2015 Pneumococcal Vaccine (Unspecified Type) 1/1/2014 TB Skin Test (PPD) Intradermal 8/26/2017 Zoster Vaccine, Live 4/1/2016 Follow-up Information Follow up With Details Comments Contact Info Carissa Marcelo MD Schedule an appointment as soon as possible for a visit in 1 week Follow Up [address] [place] 
[phone] Dr Anurag Stock On 10/11/2017 @11 AM [address] 
[phone] [facility] health will call prior to first visit. Will probably be on Thursday. [address] 
[phone] Nobie Riedel, MD Go on 10/10/2017 follow up at 10:30 am [address] [facility] [address] 
[phone] Gustabo Cosby MD Schedule an appointment as soon as possible for a visit in 1 week Please call for appt [address] [facility] [address] [phone] Discharge Instructions None Chart Review Routing History Recipient Method Report Sent By Ministerio Patton [facility] Fax: [phone] Phone: [phone] Fax Notes Report Rahul Pineda [[id number]] 11/14/2016  9:46 AM 11/11/2016 [address] Fax: [phone] Phone: [phone] Fax Notes Report Rahul Pineda [[id number]] 11/14/2016  9:46 AM 11/11/2016 [facility] Fax: [phone] Fax [address] [place] [address] [[id number]] 11/16/2016  9:56 AM 11/11/2016 [facility] Fax: [phone] Fax Notes Report Jennifer Peters [[id number]] 11/16/2016  9:57 AM 11/11/2016

## 2017-09-13 NOTE — PROGRESS NOTES
Care Management Interventions  PCP Verified by CM: Yes  Transition of Care Consult (CM Consult): Discharge Planning  Physical Therapy Consult: Yes  Occupational Therapy Consult: Yes  Speech Therapy Consult: Yes  Current Support Network: Lives with Spouse  Confirm Follow Up Transport: Family    Patient is medically stable for transfer to 9th floor. Informed Parminder Cabrera on 9th about discharge and report to be called to [phone]. Notified Sola Bush charge nurse of bed is ready and given number for report.  Patient discharged to 9th floor

## 2017-09-13 NOTE — PROGRESS NOTES
Dual nurse skin assessment completed with Aaron Brady RN. Small scab on right elbow and 1cm x 1cm open wound on bridge of nose noted. Right upper arm PICC noted. Last dressing change today. Dry skin on bilateral feet. No other skin abnormalities noted.

## 2017-09-13 NOTE — PROGRESS NOTES
Problem: Mobility Impaired (Adult and Pediatric)  Goal: *Acute Goals and Plan of Care (Insert Text)  STG: Updatd 9/6/17  (1.)Mr. Sidney Lange will move from supine to sit and sit to supine , scoot up and down and roll side to side with CONTACT GUARD ASSIST within 3 day(s). MET 9/13/17  (2.)Mr. Sidney Lange will transfer from bed to chair and chair to bed with CONTACT GUARD ASSIST using the least restrictive device within 3 day(s). MET 9/13/17  (3.)Mr. Sidney Lange will ambulate with CONTACT GUARD ASSIST for 100 feet with the least restrictive device within 3 day(s). LTG:Updated 9/6/17  (1.)Mr. Sidney Lange will move from supine to sit and sit to supine , scoot up and down and roll side to side in bed with SUPERVISION within 7 day(s). MET 9/13/17  (2.)Mr. Sidney Lange will transfer from bed to chair and chair to bed with STAND BY ASSIST using the least restrictive device within 7 day(s). (3.)Mr. Sidney Lange will ambulate with STAND BY ASSIST for 250+ feet with the least restrictive device within 7 day(s). (4.)Mr. Sidney Lange will participate in therapeutic activity/exerices x 23 minutes for increased strength within 7 days.     ________________________________________________________________________________________________      PHYSICAL THERAPY: Daily Note, Treatment Day: 2nd and AM 9/13/2017  INPATIENT: Hospital Day: 22  [place] / [place] / Product Type: Medicare /      NAME/AGE/GENDER: Tona Bill is a 67 y.o. male     PRIMARY DIAGNOSIS: Atrial flutter with rapid ventricular response (HCC) Atrial flutter with rapid ventricular response (HCC) Atrial flutter with rapid ventricular response (HCC)        ICD-10: Treatment Diagnosis:       · Generalized Muscle Weakness (M62.81)  · Difficulty in walking, Not elsewhere classified (R26.2)   Precaution/Allergies:  Levaquin [levofloxacin] and Metformin       ASSESSMENT:      Mr. Sidney Lange was in bed upon arrival and reporting having not slept well but agreeable to PT. He was able to come to sitting on edge of bed with supervision but requires additional time. Pt also required numerous rest breaks throughout treatment due to fatigue. He transferred to bedside chair with RW and continues to be unsteady. Pt participated in therapeutic exercise today with increased number of activities and reps. SpO2 after ambulation noted to be 97% on room air. Pt progressed slightly in therapeutic activity and will continue POC. This section established at most recent assessment   PROBLEM LIST (Impairments causing functional limitations):  1. Decreased Strength  2. Decreased ADL/Functional Activities  3. Decreased Transfer Abilities  4. Decreased Ambulation Ability/Technique  5. Decreased Balance  6. Decreased Activity Tolerance  7. Decreased Pacing Skills  8. Increased Fatigue  9. Increased Shortness of Breath  10. Decreased [place] with Home Exercise Program    INTERVENTIONS PLANNED: (Benefits and precautions of physical therapy have been discussed with the patient.)  1. Balance Exercise  2. Bed Mobility  3. Family Education  4. Gait Training  5. Home Exercise Program (HEP)  6. Range of Motion (ROM)  7. Therapeutic Activites  8. Therapeutic Exercise/Strengthening  9. Transfer Training  10. Group Therapy      TREATMENT PLAN: Frequency/Duration: 3 times a week for duration of hospital stay  Rehabilitation Potential For Stated Goals: FAIR      RECOMMENDED REHABILITATION/EQUIPMENT: (at time of discharge pending progress): Continue Skilled Therapy. HISTORY:   History of Present Injury/Illness (Reason for Referral):  See H&P below  Greyson Ruiz is a 67 y.o. WM with h/o mechanical AVR 2000 on chronic AC with coumadin, chronic systolic CHF/ICM EF 02%, COPD, PVD, DM II, HTN and dyslipidemia who developed worsening dyspnea on monday 8/21.   He presented to the hospital today reporting worsening SOB and was found to be in atrial flutter with 's/ He was given Cardizem 10 mg IV bolus followed by IV drip 5 mg/hr. He is still in 140's with lower BP and [facility] was called to evaluate for admission. Pt reports his pulse oximetry monitor showed  on Monday and remained high until today. He felt he had COPD exacerbation and treated it with repeat inhaler and nebulizer treatments. He also reports h/o PRATIK with iron started last year by the [place]. He has been having dark stools but yesterday had rapid onset of uncontrollable copious amount of diarrhea that was black. CBC still pending. He reportedly had a LHC prior to AVR in 2000 that showed no CAD. He had nuclear stress test 11/2016 showing EF 31%, large inferolateral and anteroapical scar. Past Medical History/Comorbidities:   Mr. Chay Weinstein  has a past medical history of Acute diastolic CHF (congestive heart failure) ([facility] [address] (9/15/2016); Arthritis; Chicken pox; Coronary artery disease (4/16/2014); DJD (degenerative joint disease); Emphysema; aortic valve replacement, mechanical; Hypercholesterolemia; Hypertension; Palpitations; Pneumonia; and Systolic CHF, acute ([facility] [address] (11/15/2016). Mr. Chay Weinstein  has a past surgical history that includes aortic valve replacement (N/A, 2000) and heart catheterization (07/21/2004).   Social History/Living Environment:   Home Environment: Private residence  # Steps to Enter: 3  Rails to Enter: Yes  Hand Rails : Bilateral  One/Two Story Residence: One story  Living Alone: No  Support Systems: Spouse/Significant Other/Partner, Child(devyn)  Patient Expects to be Discharged to[de-identified] Private residence  Current DME Used/Available at Home: Walker, rollator  Tub or Shower Type: Tub/Shower combination  Prior Level of Function/Work/Activity:  Lives with wife, indep with gait and ADLs, 0 falls, driving   Number of Personal Factors/Comorbidities that affect the Plan of Care: 3+: HIGH COMPLEXITY   EXAMINATION:   Most Recent Physical Functioning:   Gross Assessment:                  Posture: Balance:  Sitting: Intact  Standing: Impaired  Standing - Static: Fair  Standing - Dynamic : Fair Bed Mobility:  Supine to Sit: Supervision  Scooting: Supervision  Wheelchair Mobility:     Transfers:  Sit to Stand: Contact guard assistance  Stand to Sit: Contact guard assistance  Bed to Chair: Contact guard assistance  Gait:     Base of Support: Narrowed  Speed/Evelin: Slow  Step Length: Left shortened;Right shortened  Gait Abnormalities: Decreased step clearance;Trunk sway increased  Distance (ft): 5 Feet (ft)  Assistive Device: Walker, rolling  Ambulation - Level of Assistance: Contact guard assistance  Interventions: Safety awareness training;Verbal cues;Manual cues       Body Structures Involved:  1. Nerves  2. Heart  3. Lungs  4. Bones  5. Joints  6. Muscles Body Functions Affected:  1. Cardio  2. Respiratory  3. Neuromusculoskeletal  4. Movement Related Activities and Participation Affected:  1. General Tasks and Demands  2. Mobility  3. Self Care  4. Domestic Life  5. Interpersonal Interactions and Relationships  6. Community, Social and [place]   Number of elements that affect the Plan of Care: 4+: HIGH COMPLEXITY   CLINICAL PRESENTATION:   Presentation: Evolving clinical presentation with changing clinical characteristics: MODERATE COMPLEXITY   CLINICAL DECISION MAKIN [facility] Inpatient Short Form  How much difficulty does the patient currently have. .. Unable A Lot A Little None   1. Turning over in bed (including adjusting bedclothes, sheets and blankets)? [ ] 1   [ ] 2   [X] 3   [ ] 4   2. Sitting down on and standing up from a chair with arms ( e.g., wheelchair, bedside commode, etc.)   [ ] 1   [ ] 2   [X] 3   [ ] 4   3. Moving from lying on back to sitting on the side of the bed? [ ] 1   [ ] 2   [X] 3   [ ] 4   How much help from another person does the patient currently need. .. Total A Lot A Little None   4.   Moving to and from a bed to a chair (including a wheelchair)? [ ] 1   [x ] 2   [] 3   [ ] 4   5. Need to walk in hospital room? [ ] 1   [X] 2   [ ] 3   [ ] 4   6. Climbing 3-5 steps with a railing? [ ] 1   [X] 2   [ ] 3   [ ] 4   © 2007, Trustees of [address] [place] [address] [address] [id number], under license to [employer]. All rights reserved    Score:  Initial: 15 Most Recent: X (Date: -- )     Interpretation of Tool:  Represents activities that are increasingly more difficult (i.e. Bed mobility, Transfers, Gait). Score 24 23 22-20 19-15 14-10 9-7 6       Modifier CH CI CJ CK CL CM CN         · Mobility - Walking and Moving Around:               - CURRENT STATUS:    CK - 40%-59% impaired, limited or restricted               - GOAL STATUS:           CJ - 20%-39% impaired, limited or restricted               - D/C STATUS:                       ---------------To be determined---------------  [place] / Product Type: Medicare /       Medical Necessity:     · Patient is expected to demonstrate progress in strength, balance, coordination and functional technique to decrease assistance required with gait, transfers, and functional mobility. Reason for Services/Other Comments:  · Patient continues to require skilled intervention due to decreased strength, decreased balance, decreased functional tolerance, decreased cardiopulmonary endurance affecting participation in basic ADLs and functional tasks. Use of outcome tool(s) and clinical judgement create a POC that gives a: Questionable prediction of patient's progress: MODERATE COMPLEXITY                 TREATMENT:   (In addition to Assessment/Re-Assessment sessions the following treatments were rendered)   Pre-treatment Symptoms/Complaints:  Fatigue   Pain: Initial:   Pain Intensity 1: 0  Post Session:  0      Therapeutic Activity: (   8 minutes):   Therapeutic activities including Bed transfers, Chair transfers and Ambulation on level ground to improve mobility, strength, balance, coordination and activity tolerance. Required minimal Safety awareness training;Verbal cues;Manual cues to promote static and dynamic balance in standing, promote coordination of bilateral, upper extremity(s), lower extremity(s) and activity pacing. Therapeutic Exercise: (15 Minutes):  Exercises per grid below to improve mobility, strength and activity tolerance. Required minimal visual, verbal and tactile cues to promote proper body alignment, promote proper body mechanics and promote proper body breathing techniques. Progressed range and repetitions as indicated. Date:  8/25/17 Date:   8/28/17 Date:  9/6/18  Date: 9/11 Date: 9/13    Activity/Exercise Parameters Parameters Parameters      LAQ 10 X B  2 x 20 B   2 x 15 B 1 x 25 B    Alternating marches 10 X B          Ankle pumps 10 X B   2 X 20 B 10 X B       Quad set 10 X B   10 X B       Glute set 10 X     10 X      Heel slides 10 X B   10 X L  1 x 25 B     Hip abduction 10 X B 1 x 20 B  1 x 15 B     Heel taps  2 x 20 B  1 x 20 B 2 x 25 B    Toe taps  1 x 20 B  1 x 20 B 2 x 25 B    Marching  2 x 20 B  1 x 15 B 1 x 25 B                                       Braces/Orthotics/Lines/Etc:   · Room air  Treatment/Session Assessment:    · Response to Treatment:  See above. · Interdisciplinary Collaboration:  · Physical Therapist and Registered Nurse  · After treatment position/precautions:  · Up in chair, Bed/Chair-wheels locked, Call light within reach and Family at bedside  · Compliance with Program/Exercises: Will assess as treatment progresses. · Recommendations/Intent for next treatment session: \"Next visit will focus on advancements to more challenging activities and reduction in assistance provided\".   Total Treatment Duration:  PT Patient Time In/Time Out  Time In: 1025  Time Out: John Beltre, PT, DPT

## 2017-09-13 NOTE — DISCHARGE INSTRUCTIONS
DISCHARGE SUMMARY from Nurse    The following personal items are in your possession at time of discharge:    Dental Appliances: Uppers, Lowers, With patient  Visual Aid: With patient, Glasses           Clothing: Pants, Shirt, Undergarments, With patient                PATIENT INSTRUCTIONS:    After general anesthesia or intravenous sedation, for 24 hours or while taking prescription Narcotics:  · Limit your activities  · Do not drive and operate hazardous machinery  · Do not make important personal or business decisions  · Do  not drink alcoholic beverages  · If you have not urinated within 8 hours after discharge, please contact your surgeon on call. Report the following to your surgeon:  · Excessive pain, swelling, redness or odor of or around the surgical area  · Temperature over 100.5  · Nausea and vomiting lasting longer than 4 hours or if unable to take medications  · Any signs of decreased circulation or nerve impairment to extremity: change in color, persistent  numbness, tingling, coldness or increase pain  · Any questions        What to do at Home:  Recommended activity: Activity as tolerated,       *  Please give a list of your current medications to your Primary Care Provider. *  Please update this list whenever your medications are discontinued, doses are      changed, or new medications (including over-the-counter products) are added. *  Please carry medication information at all times in case of emergency situations. These are general instructions for a healthy lifestyle:    No smoking/ No tobacco products/ Avoid exposure to second hand smoke    Surgeon General's Warning:  Quitting smoking now greatly reduces serious risk to your health.     Obesity, smoking, and sedentary lifestyle greatly increases your risk for illness    A healthy diet, regular physical exercise & weight monitoring are important for maintaining a healthy lifestyle    You may be retaining fluid if you have a history of heart failure or if you experience any of the following symptoms:  Weight gain of 3 pounds or more overnight or 5 pounds in a week, increased swelling in our hands or feet or shortness of breath while lying flat in bed. Please call your doctor as soon as you notice any of these symptoms; do not wait until your next office visit. Recognize signs and symptoms of STROKE:    F-face looks uneven    A-arms unable to move or move unevenly    S-speech slurred or non-existent    T-time-call 911 as soon as signs and symptoms begin-DO NOT go       Back to bed or wait to see if you get better-TIME IS BRAIN. Warning Signs of HEART ATTACK     Call 911 if you have these symptoms:   Chest discomfort. Most heart attacks involve discomfort in the center of the chest that lasts more than a few minutes, or that goes away and comes back. It can feel like uncomfortable pressure, squeezing, fullness, or pain.  Discomfort in other areas of the upper body. Symptoms can include pain or discomfort in one or both arms, the back, neck, jaw, or stomach.  Shortness of breath with or without chest discomfort.  Other signs may include breaking out in a cold sweat, nausea, or lightheadedness. Don't wait more than five minutes to call 911 - MINUTES MATTER! Fast action can save your life. Calling 911 is almost always the fastest way to get lifesaving treatment. Emergency Medical Services staff can begin treatment when they arrive -- up to an hour sooner than if someone gets to the hospital by car. Atrial Fibrillation: Care Instructions  Your Care Instructions    Atrial fibrillation is an irregular and often fast heartbeat. Treating this condition is important for several reasons. It can cause blood clots, which can travel from your heart to your brain and cause a stroke. If you have a fast heartbeat, you may feel lightheaded, dizzy, and weak.  An irregular heartbeat can also increase your risk for heart failure. Atrial fibrillation is often the result of another heart condition, such as high blood pressure or coronary artery disease. Making changes to improve your heart condition will help you stay healthy and active. Follow-up care is a key part of your treatment and safety. Be sure to make and go to all appointments, and call your doctor if you are having problems. It's also a good idea to know your test results and keep a list of the medicines you take. How can you care for yourself at home? Medicines  · Take your medicines exactly as prescribed. Call your doctor if you think you are having a problem with your medicine. You will get more details on the specific medicines your doctor prescribes. · If your doctor has given you a blood thinner to prevent a stroke, be sure you get instructions about how to take your medicine safely. Blood thinners can cause serious bleeding problems. · Do not take any vitamins, over-the-counter drugs, or herbal products without talking to your doctor first.  Lifestyle changes  · Do not smoke. Smoking can increase your chance of a stroke and heart attack. If you need help quitting, talk to your doctor about stop-smoking programs and medicines. These can increase your chances of quitting for good. · Eat a heart-healthy diet. · Stay at a healthy weight. Lose weight if you need to. · Limit alcohol to 2 drinks a day for men and 1 drink a day for women. Too much alcohol can cause health problems. · Avoid colds and flu. Get a pneumococcal vaccine shot. If you have had one before, ask your doctor whether you need another dose. Get a flu shot every year. If you must be around people with colds or flu, wash your hands often. Activity  · If your doctor recommends it, get more exercise. Walking is a good choice. Bit by bit, increase the amount you walk every day. Try for at least 30 minutes on most days of the week. You also may want to swim, bike, or do other activities.  Your doctor may suggest that you join a cardiac rehabilitation program so that you can have help increasing your physical activity safely. · Start light exercise if your doctor says it is okay. Even a small amount will help you get stronger, have more energy, and manage stress. Walking is an easy way to get exercise. Start out by walking a little more than you did in the hospital. Gradually increase the amount you walk. · When you exercise, watch for signs that your heart is working too hard. You are pushing too hard if you cannot talk while you are exercising. If you become short of breath or dizzy or have chest pain, sit down and rest immediately. · Check your pulse regularly. Place two fingers on the artery at the palm side of your wrist, in line with your thumb. If your heartbeat seems uneven or fast, talk to your doctor. When should you call for help? Call 911 anytime you think you may need emergency care. For example, call if:  · You have symptoms of a heart attack. These may include:  ¨ Chest pain or pressure, or a strange feeling in the chest.  ¨ Sweating. ¨ Shortness of breath. ¨ Nausea or vomiting. ¨ Pain, pressure, or a strange feeling in the back, neck, jaw, or upper belly or in one or both shoulders or arms. ¨ Lightheadedness or sudden weakness. ¨ A fast or irregular heartbeat. After you call 911, the  may tell you to chew 1 adult-strength or 2 to 4 low-dose aspirin. Wait for an ambulance. Do not try to drive yourself. · You have symptoms of a stroke. These may include:  ¨ Sudden numbness, tingling, weakness, or loss of movement in your face, arm, or leg, especially on only one side of your body. ¨ Sudden vision changes. ¨ Sudden trouble speaking. ¨ Sudden confusion or trouble understanding simple statements. ¨ Sudden problems with walking or balance. ¨ A sudden, severe headache that is different from past headaches. · You passed out (lost consciousness).   Call your doctor now or seek immediate medical care if:  · You have new or increased shortness of breath. · You feel dizzy or lightheaded, or you feel like you may faint. · Your heart rate becomes irregular. · You can feel your heart flutter in your chest or skip heartbeats. Tell your doctor if these symptoms are new or worse. Watch closely for changes in your health, and be sure to contact your doctor if you have any problems. Where can you learn more? Go to http://kristina-karis.info/. Enter U020 in the search box to learn more about \"Atrial Fibrillation: Care Instructions. \"  Current as of: September 21, 2016  Content Version: 11.3  © 7527-6472 [employer]. Care instructions adapted under license by [employer] (which disclaims liability or warranty for this information). If you have questions about a medical condition or this instruction, always ask your healthcare professional. [place] [address] any warranty or liability for your use of this information. Heart Failure: Care Instructions  Your Care Instructions    Heart failure occurs when your heart does not pump as much blood as the body needs. Failure does not mean that the heart has stopped pumping but rather that it is not pumping as well as it should. Over time, this causes fluid buildup in your lungs and other parts of your body. Fluid buildup can cause shortness of breath, fatigue, swollen ankles, and other problems. By taking medicines regularly, reducing sodium (salt) in your diet, checking your weight every day, and making lifestyle changes, you can feel better and live longer. Follow-up care is a key part of your treatment and safety. Be sure to make and go to all appointments, and call your doctor if you are having problems. It's also a good idea to know your test results and keep a list of the medicines you take. How can you care for yourself at home? Medicines  · Be safe with medicines.  Take your medicines exactly as prescribed. Call your doctor if you think you are having a problem with your medicine. · Do not take any vitamins, over-the-counter medicine, or herbal products without talking to your doctor first. Ila Mora not take ibuprofen (Advil or Motrin) and naproxen (Aleve) without talking to your doctor first. They could make your heart failure worse. · You may be taking some of the following medicine. ¨ Beta-blockers can slow heart rate, decrease blood pressure, and improve your condition. Taking a beta-blocker may lower your chance of needing to be hospitalized. ¨ Angiotensin-converting enzyme inhibitors (ACEIs) reduce the heart's workload, lower blood pressure, and reduce swelling. Taking an ACEI may lower your chance of needing to be hospitalized again. ¨ Angiotensin II receptor blockers (ARBs) work like ACEIs. Your doctor may prescribe them instead of ACEIs. ¨ Diuretics, also called water pills, reduce swelling. ¨ Potassium supplements replace this important mineral, which is sometimes lost with diuretics. ¨ Aspirin and other blood thinners prevent blood clots, which can cause a stroke or heart attack. You will get more details on the specific medicines your doctor prescribes. Diet  · Your doctor may suggest that you limit sodium to 2,000 milligrams (mg) a day or less. That is less than 1 teaspoon of salt a day, including all the salt you eat in cooking or in packaged foods. People get most of their sodium from processed foods. Fast food and restaurant meals also tend to be very high in sodium. · Ask your doctor how much liquid you can drink each day. You may have to limit liquids. Weight  · Weigh yourself without clothing at the same time each day. Record your weight. Call your doctor if you have a sudden weight gain, such as more than 2 to 3 pounds in a day or 5 pounds in a week.  (Your doctor may suggest a different range of weight gain.) A sudden weight gain may mean that your heart failure is getting worse. Activity level  · Start light exercise (if your doctor says it is okay). Even if you can only do a small amount, exercise will help you get stronger, have more energy, and manage your weight and your stress. Walking is an easy way to get exercise. Start out by walking a little more than you did before. Bit by bit, increase the amount you walk. · When you exercise, watch for signs that your heart is working too hard. You are pushing yourself too hard if you cannot talk while you are exercising. If you become short of breath or dizzy or have chest pain, stop, sit down, and rest.  · If you feel \"wiped out\" the day after you exercise, walk slower or for a shorter distance until you can work up to a better pace. · Get enough rest at night. Sleeping with 1 or 2 pillows under your upper body and head may help you breathe easier. Lifestyle changes  · Do not smoke. Smoking can make a heart condition worse. If you need help quitting, talk to your doctor about stop-smoking programs and medicines. These can increase your chances of quitting for good. Quitting smoking may be the most important step you can take to protect your heart. · Limit alcohol to 2 drinks a day for men and 1 drink a day for women. Too much alcohol can cause health problems. · Avoid getting sick from colds and the flu. Get a pneumococcal vaccine shot. If you have had one before, ask your doctor whether you need another dose. Get a flu shot each year. If you must be around people with colds or the flu, wash your hands often. When should you call for help? Call 911 if you have symptoms of sudden heart failure such as:  · You have severe trouble breathing. · You cough up pink, foamy mucus. · You have a new irregular or rapid heartbeat. Call your doctor now or seek immediate medical care if:  · You have new or increased shortness of breath. · You are dizzy or lightheaded, or you feel like you may faint.   · You have sudden weight gain, such as more than 2 to 3 pounds in a day or 5 pounds in a week. (Your doctor may suggest a different range of weight gain.)  · You have increased swelling in your legs, ankles, or feet. · You are suddenly so tired or weak that you cannot do your usual activities. Watch closely for changes in your health, and be sure to contact your doctor if:  · You develop new symptoms. Avoiding Triggers With Heart Failure: Care Instructions  Your Care Instructions  Triggers are anything that make your heart failure flare up. A flare-up is also called \"sudden heart failure\" or \"acute heart failure. \" When you have a flare-up, fluid builds up in your lungs, and you have problems breathing. You might need to go to the hospital. By watching for changes in your condition and avoiding triggers, you can prevent heart failure flare-ups. Follow-up care is a key part of your treatment and safety. Be sure to make and go to all appointments, and call your doctor if you are having problems. It's also a good idea to know your test results and keep a list of the medicines you take. How can you care for yourself at home? Watch for changes in your weight and condition  · Weigh yourself without clothing at the same time each day. Record your weight. Call your doctor if you have sudden weight gain, such as more than 2 to 3 pounds in a day or 5 pounds in a week. (Your doctor may suggest a different range of weight gain.) A sudden weight gain may mean that your heart failure is getting worse. · Keep a daily record of your symptoms. Write down any changes in how you feel, such as new shortness of breath, cough, or problems eating. Also record if your ankles are more swollen than usual and if you feel more tired than usual. Note anything that you ate or did that could have triggered these changes. Limit sodium  Sodium causes your body to hold on to extra water. This may cause your heart failure symptoms to get worse.  People get most of their sodium from processed foods. Fast food and restaurant meals also tend to be very high in sodium. · Your doctor may suggest that you limit sodium to 2,000 milligrams (mg) a day or less. That is less than 1 teaspoon of salt a day, including all the salt you eat in cooking or in packaged foods. · Read food labels on cans and food packages. They tell you how much sodium you get in one serving. Check the serving size. If you eat more than one serving, you are getting more sodium. · Be aware that sodium can come in forms other than salt, including monosodium glutamate (MSG), sodium citrate, and sodium bicarbonate (baking soda). MSG is often added to Asian food. You can sometimes ask for food without MSG or salt. · Slowly reducing salt will help you adjust to the taste. Take the salt shaker off the table. · Flavor your food with garlic, lemon juice, onion, vinegar, herbs, and spices instead of salt. Do not use soy sauce, steak sauce, onion salt, garlic salt, mustard, or ketchup on your food, unless it is labeled \"low-sodium\" or \"low-salt. \"  · Make your own salad dressings, sauces, and ketchup without adding salt. · Use fresh or frozen ingredients, instead of canned ones, whenever you can. Choose low-sodium canned goods. · Eat less processed food and food from restaurants, including fast food. Exercise as directed  Moderate, regular exercise is very good for your heart. It improves your blood flow and helps control your weight. But too much exercise can stress your heart and cause a heart failure flare-up. · Check with your doctor before you start an exercise program.  · Walking is an easy way to get exercise. Start out slowly. Gradually increase the length and pace of your walk. Swimming, riding a bike, and using a treadmill are also good forms of exercise. · When you exercise, watch for signs that your heart is working too hard.  You are pushing yourself too hard if you cannot talk while you are exercising. If you become short of breath or dizzy or have chest pain, stop, sit down, and rest.  · Do not exercise when you do not feel well. Take medicines correctly  · Take your medicines exactly as prescribed. Call your doctor if you think you are having a problem with your medicine. · Make a list of all the medicines you take. Include those prescribed to you by other doctors and any over-the-counter medicines, vitamins, or supplements you take. Take this list with you when you go to any doctor. · Take your medicines at the same time every day. It may help you to post a list of all the medicines you take every day and what time of day you take them. · Make taking your medicine as simple as you can. Plan times to take your medicines when you are doing other things, such as eating a meal or getting ready for bed. This will make it easier to remember to take your medicines. · Get organized. Use helpful tools, such as daily or weekly pill containers. When should you call for help? Call 911 if you have symptoms of sudden heart failure such as:  · You have severe trouble breathing. · You cough up pink, foamy mucus. · You have a new irregular or rapid heartbeat. Call your doctor now or seek immediate medical care if:  · You have new or increased shortness of breath. · You are dizzy or lightheaded, or you feel like you may faint. · You have sudden weight gain, such as more than 2 to 3 pounds in a day or 5 pounds in a week. (Your doctor may suggest a different range of weight gain.)  · You have increased swelling in your legs, ankles, or feet. · You are suddenly so tired or weak that you cannot do your usual activities. Watch closely for changes in your health, and be sure to contact your doctor if you develop new symptoms. Where can you learn more? Go to http://kristina-karis.info/.   Enter C179 in the search box to learn more about \"Avoiding Triggers With Heart Failure: Care Instructions. \"  Current as of: February 23, 2017  Content Version: 11.3  © 7585-8064 [employer], [facility]. Care instructions adapted under license by [employer] (which disclaims liability or warranty for this information). If you have questions about a medical condition or this instruction, always ask your healthcare professional. [place] [address] any warranty or liability for your use of this information.

## 2017-09-13 NOTE — PROGRESS NOTES
TRANSFER - IN REPORT:    Verbal report received from PEREZ Curtis(name) on Juan Thompson  being received from 3rd Telemetry(unit) for routine progression of care      Report consisted of patients Situation, Background, Assessment and   Recommendations(SBAR). Information from the following report(s) SBAR, Kardex, Procedure Summary and Recent Results was reviewed with the receiving nurse. Opportunity for questions and clarification was provided. Assessment completed upon patients arrival to unit and care assumed.

## 2017-09-13 NOTE — PROGRESS NOTES
Called Dr. Gemma Ng to report patients positive blood cultures: gram negative rods in an anaerobic bottle. Orders received with readback to continue with Azactam and Vancomycin.

## 2017-09-13 NOTE — PROGRESS NOTES
Spoke with Dr. Libby Laughlin regarding pt with processing HIT panel and having heparin flushes due. Received an order to discontinue heparin flushes and to call pharmacy to verify alter medication. Spoke with Nola in pharmacy and she stated no other medication would be used In place of heparin. Will continue to monitor.

## 2017-09-13 NOTE — DISCHARGE SUMMARY
[address] Cardiology Discharge Summary     Patient ID:  Dimitris Hamilton  [id number]  36 y.o.  1945    Admit date: 8/23/2017    Discharge date:  09/13/2017    Admitting Physician: Alicja Rios MD     Discharge Physician: Felipa Maynard NP/Dr. Dain Lynn    Admission Diagnoses: Atrial flutter with rapid ventricular response [facility])    Discharge Diagnoses:    Diagnosis    MRSA nasal colonization    COPD (chronic obstructive pulmonary disease) ([facility]    Acute respiratory failure with hypercapnia ([facility]    History of tobacco use    Hyponatremia    Acute encephalopathy    Thrombocytopenia (HCC)    Anticoagulant long-term use    HTN (hypertension), benign    Peripheral vascular disease ([facility]    Dyslipidemia    History of mechanical aortic valve replacement    Centrilobular emphysema ([facility]    Ischemic cardiomyopathy    Atrial flutter with rapid ventricular response [facility])       Cardiology Procedures this admission:  MERLYN/Cardioversion  Consults: Pulmonary/Intensive care, Hematology/Oncology, Rehabilitation Medicine, Hospitalist and Pharmacy    Hospital Course: Patient presented to the emergency department of [facility] with complaints of worsening shortness of breath and was found to have atrial flutter with RVR of 140s. The patient was noted to be in atrial fibrillation with RVR on arrival.  The patient was admitted to telemetry and Cardizem drip was continued. MERLYN/Cardioversion was planned. The patient underwent MERLYN that was negative for thrombus and then underwent successful cardioversion from atrial fibrillation to sinus rhythm with frequent PACs, but he returned to atrial fib/flutter the following day. AC with  warfarin was continued and IV amiodarone added as antiarrhythmic. He failed speech evaluation and NGT was placed for tube feedings. Patient developed shortness of breath and lasix was started.   Echo showed EF of 40-45% and heart failure meds, coreg and entresto,  were initiated. Hospitalist were consulted to assist with medical management. On day 2 of stay he had declining mental status and suffered a fall. CT and MRI of head were negative for bleeding. However, he continued to be confused. Platelets were noted to be low and heme/onc was consulted. He was treated with IVIG, platelet transfusion x2 and steroids, he will need BMBx as OP. As confusion persisted an ABG was obtained showing hypercapnia with subsequent transfer to ICU for BIPAP, eventually requiring intubation. Patient was briefly hypotensive needing levophed and coreg and entresto were stopped. A 7 day course of Rocephin was completed. He was eventually successfully extubated to NC, but will need Trilogy at home for use at night. After extubation, he was cleared for eating and NGT was removed. Amiodarone was discontinued as rate control medication, given chronic lung issues and coreg and digoxin were resumed. Atrial fibrillation was rate controlled and coumadin was continued for [facility], which is being followed by pharmacy for dosing. On 9-12-17 WBCs were elevated and work up was initiated. BC grew E coli for which treatment was started. On repeat labs WBCs have decreased. He will need Vancomycin trough on 9-14-17 @ 0200. .  The patient was seen and examined by Dr. Libby Laughlin and was determined stable and ready for discharge home. The patient will follow up with [facility] in 2 weeks after discharge from rehab. DISPOSITION: The patient is being discharged to 9th floor IP rehab in stable condition on a low saturated fat, low cholesterol and low salt diet. The patient is instructed to advance activities as tolerated to the limit of fatigue or shortness of breath. The patient is instructed to call the office or return to the ER for immediate evaluation for any severe shortness of breath, chest pain, prolonged palpitations, near syncope or syncope.     Discharge Exam:   Visit Vitals    /74 (BP 1 Location: Left arm, BP Patient Position: At rest)    Pulse 98    Temp 98 °F (36.7 °C)    Resp 20    Ht 5' 6\" (1.676 m)    Wt 78.6 kg (173 lb 4.5 oz)    SpO2 97%    BMI 27.97 kg/m2       Recent Results (from the past 24 hour(s))   GLUCOSE, POC    Collection Time: 09/12/17  4:00 PM   Result Value Ref Range    Glucose (POC) 196 (H) 65 - 100 mg/dL   CULTURE, URINE    Collection Time: 09/12/17  5:05 PM   Result Value Ref Range    Special Requests: NO SPECIAL REQUESTS      Culture result:        NO GROWTH AFTER SHORT PERIOD OF INCUBATION. FURTHER RESULTS TO FOLLOW AFTER OVERNIGHT INCUBATION.    CBC W/O DIFF    Collection Time: 09/12/17  6:19 PM   Result Value Ref Range    WBC 16.9 (H) 4.3 - 11.1 K/uL    RBC 2.99 (L) 4.23 - 5.67 M/uL    HGB 8.9 (L) 13.6 - 17.2 g/dL    HCT 27.3 (L) 41.1 - 50.3 %    MCV 91.3 79.6 - 97.8 FL    MCH 29.8 26.1 - 32.9 PG    MCHC 32.6 31.4 - 35.0 g/dL    RDW 16.9 (H) 11.9 - 14.6 %    PLATELET 12 (LL) 160 - 450 K/uL    MPV Cannot be calculated 10.8 - 14.1 FL   PLATELETS, ALLOCATE    Collection Time: 09/12/17  7:30 PM   Result Value Ref Range    Unit number [id number]     Blood component type PLTPH LRIR,2     Unit division 00     Status of unit TRANSFUSED    GLUCOSE, POC    Collection Time: 09/12/17  9:30 PM   Result Value Ref Range    Glucose (POC) 175 (H) 65 - 100 mg/dL   TYPE & SCREEN    Collection Time: 09/12/17  9:40 PM   Result Value Ref Range    Crossmatch Expiration 09/15/2017     ABO/Rh(D) A NEGATIVE     Antibody screen NEG    GLUCOSE, POC    Collection Time: 09/12/17 11:36 PM   Result Value Ref Range    Glucose (POC) 134 (H) 65 - 100 mg/dL   PROTHROMBIN TIME + INR    Collection Time: 09/13/17  3:51 AM   Result Value Ref Range    Prothrombin time 38.3 (H) 9.6 - 12.0 sec    INR 3.5 (H) 0.9 - 1.2     METABOLIC PANEL, BASIC    Collection Time: 09/13/17  3:51 AM   Result Value Ref Range    Sodium 142 136 - 145 mmol/L    Potassium 3.6 3.5 - 5.1 mmol/L    Chloride 106 98 - 107 mmol/L CO2 30 21 - 32 mmol/L    Anion gap 6 (L) 7 - 16 mmol/L    Glucose 93 65 - 100 mg/dL    BUN 31 (H) 8 - 23 MG/DL    Creatinine 0.67 (L) 0.8 - 1.5 MG/DL    GFR est AA >60 >60 ml/min/1.73m2    GFR est non-AA >60 >60 ml/min/1.73m2    Calcium 7.4 (L) 8.3 - 10.4 MG/DL   CBC WITH AUTOMATED DIFF    Collection Time: 09/13/17  3:51 AM   Result Value Ref Range    WBC 10.5 4.3 - 11.1 K/uL    RBC 2.57 (L) 4.23 - 5.67 M/uL    HGB 8.4 (L) 13.6 - 17.2 g/dL    HCT 26.1 (L) 41.1 - 50.3 %    MCV 92.2 79.6 - 97.8 FL    MCH 29.6 26.1 - 32.9 PG    MCHC 32.1 31.4 - 35.0 g/dL    RDW 16.9 (H) 11.9 - 14.6 %    PLATELET 84 (L) 404 - 450 K/uL    MPV 12.0 10.8 - 14.1 FL    DF AUTOMATED      NEUTROPHILS 94 (H) 43 - 78 %    LYMPHOCYTES 3 (L) 13 - 44 %    MONOCYTES 3 (L) 4.0 - 12.0 %    EOSINOPHILS 0 (L) 0.5 - 7.8 %    BASOPHILS 0 0.0 - 2.0 %    IMMATURE GRANULOCYTES 0.2 0.0 - 5.0 %    ABS. NEUTROPHILS 9.9 (H) 1.7 - 8.2 K/UL    ABS. LYMPHOCYTES 0.3 (L) 0.5 - 4.6 K/UL    ABS. MONOCYTES 0.4 0.1 - 1.3 K/UL    ABS. EOSINOPHILS 0.0 0.0 - 0.8 K/UL    ABS. BASOPHILS 0.0 0.0 - 0.2 K/UL    ABS. IMM. GRANS. 0.0 0.0 - 0.5 K/UL   GLUCOSE, POC    Collection Time: 09/13/17  6:34 AM   Result Value Ref Range    Glucose (POC) 99 65 - 100 mg/dL   GLUCOSE, POC    Collection Time: 09/13/17 11:08 AM   Result Value Ref Range    Glucose (POC) 130 (H) 65 - 100 mg/dL         Patient Instructions:     Current Discharge Medication List      START taking these medications    Details   carvedilol (COREG) 3.125 mg tablet Take 1 Tab by mouth two (2) times daily (with meals). Qty: 60 Tab, Refills: 1      aztreonam 2 gram 2 g IVPB 2 g by IntraVENous route every eight (8) hours. Indications: +[facility] in [facility]  Qty: 1 Dose, Refills: 0      ferrous sulfate 325 mg (65 mg iron) tablet Take 1 Tab by mouth daily (with breakfast).   Qty: 30 Tab, Refills: 0      insulin lispro (HUMALOG) 100 unit/mL injection See above  Qty: 1 Vial, Refills: 0      !! predniSONE (DELTASONE) 20 mg tablet Take 2 Tabs by mouth daily (with breakfast) for 1 day. Qty: 2 Tab, Refills: 0      !! predniSONE (DELTASONE) 10 mg tablet Take 3 Tabs by mouth daily (with breakfast) for 6 days. Qty: 18 Tab, Refills: 0      !! predniSONE (DELTASONE) 20 mg tablet Take 1 Tab by mouth daily (with breakfast) for 6 days. Qty: 6 Tab, Refills: 0      !! predniSONE (DELTASONE) 10 mg tablet Take 1 Tab by mouth daily (with breakfast) for 6 days. Qty: 6 Tab, Refills: 0      povidone-iodine (BETADINE) 10 % external solution Apply 1 mL to affected area daily for 7 days. Qty: 1 Bottle, Refills: 0      !! sodium chloride (NS) 0.9 % 20 mL by InterCATHeter route every eight (8) hours. Qty: 1 Syringe, Refills: 0      !! sodium chloride (NS) 0.9 % 20 mL by InterCATHeter route as needed. Qty: 1 Syringe, Refills: 0      VANCOMYCIN/0.9 % SOD CHLORIDE (VANCOMYCIN IN 0.9% SODIUM CL) 1.25 gram/250 mL soln 250 mL by IntraVENous route every twelve (12) hours every twelve (12) hours. Indications: +GNR in [facility]  Qty: 1 mL, Refills: 0      traZODone (DESYREL) 50 mg tablet Take 1 Tab by mouth nightly as needed for Sleep. Qty: 40 Tab, Refills: 0      digoxin (LANOXIN) 0.25 mg tablet Take 1 Tab by mouth daily for 30 days. Qty: 30 Tab, Refills: 2      guaiFENesin ER (MUCINEX) 1,200 mg Ta12 ER tablet Take 1 Tab by mouth two (2) times a day for 14 days. Qty: 28 Tab, Refills: 1       !! - Potential duplicate medications found. Please discuss with provider. CONTINUE these medications which have CHANGED    Details   gabapentin (NEURONTIN) 300 mg capsule Take 2 Caps by mouth three (3) times daily for 30 days. Qty: 180 Cap, Refills: 2         CONTINUE these medications which have NOT CHANGED    Details   furosemide (LASIX) 40 mg tablet Take 1 Tab by mouth daily. Qty: 30 Tab, Refills: 1      potassium chloride (K-DUR, KLOR-CON) 10 mEq tablet Take 1 Tab by mouth daily.   Qty: 30 Tab, Refills: 1      budesonide-formoterol (SYMBICORT) 160-4.5 mcg/actuation HFA inhaler Take 2 Puffs by inhalation. Omeprazole delayed release (PRILOSEC D/R) 20 mg tablet Take 20 mg by mouth. flunisolide (NASAREL) 25 mcg (0.025 %) spry 2 Sprays by Nasal route. albuterol (PROVENTIL HFA) 90 mcg/actuation inhaler Take 2 Puffs by inhalation. tiotropium (SPIRIVA) 18 mcg inhalation capsule Take 1 Puff by inhalation. simvastatin (ZOCOR) 80 mg tablet Take 1 Tab by mouth nightly. Qty: 90 Tab, Refills: 1    Associated Diagnoses: HLD (hyperlipidemia)         STOP taking these medications       glipiZIDE (GLUCOTROL) 10 mg tablet Comments:   Reason for Stopping:         warfarin (COUMADIN) 5 mg tablet Pharmacy following for dosing. Pharmacy needs to be consulted on admission for management.          losartan (COZAAR) 100 mg tablet Comments:   Reason for Stopping:         Hydrochlorothiazide 12.5 mg tablet Comments:   Reason for Stopping:               Signed:  KODI Heller  9/13/2017  12:58 PM

## 2017-09-13 NOTE — IP AVS SNAPSHOT
[address] 
[phone] Patient: Monica Figueroa MRN: [id number] LHV:[id number] You are allergic to the following Allergen Reactions Levaquin (Levofloxacin) Other (comments) GI upset Metformin Other (comments) Gi upset Immunizations Administered for This Admission Name Date Influenza Vaccine (Quad) PF  Deferred () Recent Documentation Smoking Status Former Smoker Emergency Contacts Name Discharge Info Relation Home Work Mobile Sumanth Aguilar [3] [phone] About your hospitalization You were admitted on:  September 13, 2017 You last received care in the:  [facility] 9 INPATIENT REHAB UNIT You were discharged on:  October 3, 2017 Unit phone number:  [phone] Why you were hospitalized Your primary diagnosis was:  Not on File Your diagnoses also included:  Respiratory Failure (Hcc), Ischemic Cardiomyopathy, Atrial Flutter With Rapid Ventricular Response (Hcc), Thrombocytopenia (Hcc), Copd (Chronic Obstructive Pulmonary Disease) (Hcc) Providers Seen During Your Hospitalizations Provider Role Specialty Primary office phone Ana Joseph MD Attending Provider Physical Medicine and Rehab [phone] Your Primary Care Physician (PCP) Primary Care Physician Office Phone Office Fax Arely Naseem [phone] Follow-up Information Follow up With Details Comments Contact Info Shira Friend MD Schedule an appointment as soon as possible for a visit in 1 week Follow Up [address] Jose Acosta 
[phone] Dr Weber Score On 10/11/2017 @11 AM [address] 
[phone] [facility] will call prior to first visit.  Will probably be on Thursday. [address] [facility] Oumar Murcia, [address] 
[phone] Rose Welsh MD Go on 10/10/2017 follow up at 10:30 am [address] [facility] [address] 
[phone] Daisy Burger MD Schedule an appointment as soon as possible for a visit in 1 week Please call for appt [address] [facility] [address] 
[phone] Your Appointments Tuesday October 10, 2017 10:45 AM EDT  
LAB with [address]  
1808 [facility] OUTREACH INSURANCE [facility]) Dulce Maria Dove [address] [place] [address]  
[phone] Tuesday October 10, 2017 11:00 AM EDT New Patient with Marshal Watkins MD  
[facility] Hematology and Oncology [facility] POLLO/ Robby Varela [address] [facility] [address]  
[phone] Current Discharge Medication List  
  
START taking these medications Dose & Instructions Dispensing Information Comments Morning Noon Evening Bedtime  
 amiodarone 200 mg tablet Commonly known as:  CORDARONE Your last dose was: Your next dose is:    
   
   
 Dose:  200 mg Take 1 Tab by mouth daily. Quantity:  30 Tab Refills:  2  
     
   
   
   
  
 enalapril 5 mg tablet Commonly known as:  Lula Rubens Your last dose was: Your next dose is:    
   
   
 Dose:  5 mg Take 1 Tab by mouth daily. Quantity:  30 Tab Refills:  2  
     
   
   
   
  
 metoprolol succinate 100 mg tablet Commonly known as:  TOPROL-XL Your last dose was: Your next dose is:    
   
   
 Dose:  100 mg Take 1 Tab by mouth two (2) times a day. Indications: VENTRICULAR RATE CONTROL IN ATRIAL FIBRILLATION Quantity:  60 Tab Refills:  2  
     
   
   
   
  
 nitroglycerin 0.4 mg SL tablet Commonly known as:  NITROSTAT Your last dose was:     
   
Your next dose is:    
   
   
 Dose:  0.4 mg  
 1 Tab by SubLINGual route every five (5) minutes as needed for Chest Pain. Indications: ANGINA Quantity:  15 Tab Refills:  0 CONTINUE these medications which have CHANGED Dose & Instructions Dispensing Information Comments Morning Noon Evening Bedtime  
 furosemide 20 mg tablet Commonly known as:  LASIX What changed:   
- medication strength 
- how much to take Your last dose was: Your next dose is:    
   
   
 Dose:  20 mg Take 1 Tab by mouth daily. Indications: Edema Quantity:  30 Tab Refills:  1  
     
   
   
   
  
 * guaiFENesin 1,200 mg Ta12 ER tablet Commonly known as:  [employer] Obie What changed:  Another medication with the same name was added. Make sure you understand how and when to take each. Your last dose was: Your next dose is:    
   
   
 Dose:  1200 mg Take 1 Tab by mouth two (2) times a day for 14 days. Quantity:  28 Tab Refills:  1  
     
   
   
   
  
 * guaiFENesin 1,200 mg Ta12 ER tablet Commonly known as:  [employer] What changed: You were already taking a medication with the same name, and this prescription was added. Make sure you understand how and when to take each. Your last dose was: Your next dose is:    
   
   
 Dose:  1200 mg Take 1 Tab by mouth every twelve (12) hours. Quantity:  60 Tab Refills:  1  
     
   
   
   
  
 potassium chloride 20 mEq tablet Commonly known as:  K-DUR, KLOR-ALEKSANDR What changed:   
- medication strength 
- how much to take Your last dose was: Your next dose is:    
   
   
 Dose:  20 mEq Take 1 Tab by mouth daily. Indications: hypokalemia prevention Quantity:  30 Tab Refills:  1  
     
   
   
   
  
 * predniSONE 10 mg tablet Commonly known as:  Sruthi Rogers What changed:   
- how much to take - when to take this - Another medication with the same name was removed.  Continue taking this medication, and follow the directions you see here. Your last dose was: Your next dose is:    
   
   
 Dose:  5 mg Take 0.5 Tabs by mouth daily for 6 days. Quantity:  30 Tab Refills:  0  
     
   
   
   
  
 * predniSONE 5 mg tablet Commonly known as:  Garry Kwong Start taking on:  10/4/2017 What changed:   
- medication strength 
- how much to take - Another medication with the same name was removed. Continue taking this medication, and follow the directions you see here. Your last dose was: Your next dose is:    
   
   
 Dose:  5 mg Take 1 Tab by mouth daily (with breakfast). Quantity:  30 Tab Refills:  1  
     
   
   
   
  
 * PROVENTIL HFA 90 mcg/actuation inhaler Generic drug:  albuterol What changed:  Another medication with the same name was added. Make sure you understand how and when to take each. Your last dose was: Your next dose is:    
   
   
 Dose:  2 Puff Take 2 Puffs by inhalation. Refills:  0  
     
   
   
   
  
 * albuterol 90 mcg/actuation inhaler Commonly known as:  PROVENTIL HFA What changed: You were already taking a medication with the same name, and this prescription was added. Make sure you understand how and when to take each. Your last dose was: Your next dose is:    
   
   
 Dose:  2 Puff Take 2 Puffs by inhalation every four (4) hours as needed for Wheezing or Shortness of Breath. Indications: Chronic Obstructive Pulmonary Disease Quantity:  1 Inhaler Refills:  3  
     
   
   
   
  
 * tiotropium 18 mcg inhalation capsule Commonly known as:  Candis Vidal What changed:  Another medication with the same name was added. Make sure you understand how and when to take each. Your last dose was: Your next dose is:    
   
   
 Dose:  1 Puff Take 1 Puff by inhalation. Refills:  0  
     
   
   
   
  
 * tiotropium 18 mcg inhalation capsule Commonly known as:  Denis Charles What changed: You were already taking a medication with the same name, and this prescription was added. Make sure you understand how and when to take each. Your last dose was: Your next dose is:    
   
   
 Dose:  1 Cap Take 1 Cap by inhalation daily. Indications: BRONCHOSPASM PREVENTION WITH COPD Quantity:  30 Cap Refills:  2  
     
   
   
   
  
 * Notice: This list has 8 medication(s) that are the same as other medications prescribed for you. Read the directions carefully, and ask your doctor or other care provider to review them with you. CONTINUE these medications which have NOT CHANGED Dose & Instructions Dispensing Information Comments Morning Noon Evening Bedtime  
 budesonide-formoterol 160-4.5 mcg/actuation Hfaa Commonly known as:  SYMBICORT Your last dose was: Your next dose is:    
   
   
 Dose:  2 Puff Take 2 Puffs by inhalation. Refills:  0  
     
   
   
   
  
 flunisolide 25 mcg (0.025 %) Spry Commonly known as:  NASAREL Your last dose was: Your next dose is:    
   
   
 Dose:  2 Spray 2 Sprays by Nasal route. Refills:  0  
     
   
   
   
  
 gabapentin 300 mg capsule Commonly known as:  NEURONTIN Your last dose was: Your next dose is:    
   
   
 Dose:  600 mg Take 2 Caps by mouth three (3) times daily. Indications: NEUROPATHIC PAIN Quantity:  90 Cap Refills:  2 Omeprazole delayed release 20 mg tablet Commonly known as:  PRILOSEC D/R Your last dose was: Your next dose is:    
   
   
 Dose:  20 mg Take 20 mg by mouth. Refills:  0 STOP taking these medications   
 aztreonam 2 gram 2 g IVPB  
   
  
 carvedilol 3.125 mg tablet Commonly known as:  COREG  
   
  
 digoxin 0.25 mg tablet Commonly known as:  LANOXIN  
   
  
 ferrous sulfate 325 mg (65 mg iron) tablet insulin lispro 100 unit/mL injection Commonly known as:  HUMALOG  
   
  
 povidone-iodine 10 % external solution Commonly known as:  BETADINE  
   
  
 simvastatin 80 mg tablet Commonly known as:  ZOCOR  
   
  
 sodium chloride 0.9 % Commonly known as:  NS  
   
  
 traZODone 50 mg tablet Commonly known as:  DESYREL  
   
  
 vancomycin in 0.9% Sodium Cl 1.25 gram/250 mL Soln ASK your doctor about these medications Dose & Instructions Dispensing Information Comments Morning Noon Evening Bedtime  
 glipiZIDE 10 mg tablet Commonly known as:  Pastora Duran Your last dose was: Your next dose is:    
   
   
 Dose:  10 mg Take 10 mg by mouth two (2) times a day. Refills:  0 Where to Get Your Medications These medications were sent to [address] [place] [address] [address] [address] Phone:  [phone]  
  predniSONE 5 mg tablet  
 tiotropium 18 mcg inhalation capsule Information on where to get these meds will be given to you by the nurse or doctor. ! Ask your nurse or doctor about these medications  
  albuterol 90 mcg/actuation inhaler  
 amiodarone 200 mg tablet  
 enalapril 5 mg tablet  
 furosemide 20 mg tablet  
 gabapentin 300 mg capsule  
 guaiFENesin 1,200 mg Ta12 ER tablet  
 guaiFENesin 1,200 mg Ta12 ER tablet  
 metoprolol succinate 100 mg tablet  
 nitroglycerin 0.4 mg SL tablet  
 potassium chloride 20 mEq tablet  
 predniSONE 10 mg tablet Discharge Instructions Dr. Rasheed Altamirano ordered to resume glipizide 10mg BID at home via telephone order. Discharge Instructions Attachments/References COPD AND ASTHMA: AIR POLLUTION: GENERAL INFO (ENGLISH) Discharge Orders None ACO Transitions of Care Introducing [employer] [address] Jayla Sparks offers a voluntary care coordination program to provide high quality service and care to [facility] fee-for-service beneficiaries. Davis Crouch was designed to help you enhance your health and well-being through the following services: ? Transitions of Care  support for individuals who are transitioning from one care setting to another (example: Hospital to home). ? Chronic and Complex Care Coordination  support for individuals and caregivers of those with serious or chronic illnesses or with more than one chronic (ongoing) condition and those who take a number of different medications. If you meet specific medical criteria, a [address] [place] [address] Rd may call you directly to coordinate your care with your primary care physician and your other care providers. For questions about the [facility] programs, please, contact your physicians office. For general questions or additional information about Accountable Care Organizations: 
Please visit www.medicare.gov/acos. html or call 1-800-MEDICARE (3-704.600.2334) TTY users should call 6-290.121.5419. [employer] Announcement We are excited to announce that we are making your provider's discharge notes available to you in [employer]. You will see these notes when they are completed and signed by the physician that discharged you from your recent hospital stay. If you have any questions or concerns about any information you see in [employer], please call the Health Information Department where you were seen or reach out to your Primary Care Provider for more information about your plan of care. Introducing [facility] [place] [facility]! Yissel Joyner introduces [employer] patient portal. Now you can access parts of your medical record, email your doctor's office, and request medication refills online. 1. In your internet browser, go to [email or web address] 2. Click on the First Time User? Click Here link in the Sign In box. You will see the New Member Sign Up page. 3. Enter your [employer] Access Code exactly as it appears below. You will not need to use this code after youve completed the sign-up process. If you do not sign up before the expiration date, you must request a new code. · [employer] Access Code: [email or web address] Expires: 11/16/2017 10:44 AM 
 
4. Enter the last four digits of your Social Security Number (xxxx) and Date of Birth (mm/dd/yyyy) as indicated and click Submit. You will be taken to the next sign-up page. 5. Create a [employer] ID. This will be your [employer] login ID and cannot be changed, so think of one that is secure and easy to remember. 6. Create a [employer] password. You can change your password at any time. 7. Enter your Password Reset Question and Answer. This can be used at a later time if you forget your password. 8. Enter your e-mail address. You will receive e-mail notification when new information is available in [address] 9. Click Sign Up. You can now view and download portions of your medical record. 10. Click the Download Summary menu link to download a portable copy of your medical information. If you have questions, please visit the Frequently Asked Questions section of the [employer] website. Remember, [employer] is NOT to be used for urgent needs. For medical emergencies, dial 911. Now available from your iPhone and Android! General Information Please provide this summary of care documentation to your next provider. Patient Signature:  ____________________________________________________________ Date:  ____________________________________________________________  
  
Shalonda Short Provider Signature:  ____________________________________________________________ Date:  ____________________________________________________________ More Information Learning About COPD, Asthma, and Air Pollution How does air pollution affect COPD and asthma? When you have COPD or asthma, air pollution may make your symptoms worse. If it does, it means that air pollution is a trigger for you. It is important to know what your triggers are and how to deal with them. If air pollution is a trigger for you, you need to learn about air quality and pay attention to weather forecasts that include how bad the air is expected to be. How can you manage a flare-up caused by air pollution? · Do not panic. Quick treatment at home may help you prevent serious breathing problems. · Take your medicines exactly as your doctor tells you. ¨ Use your quick-relief inhaler as directed by your doctor. If your symptoms do not get better after you use your medicine, have someone take you to the emergency room. Call an ambulance if necessary. ¨ With inhaled medicines, a spacer or a nebulizer may help you get more medicine to your lungs. Ask your doctor or pharmacist how to use them properly. Practice using the spacer in front of a mirror before you have a flare-up. This may help you get the medicine into your lungs quickly. ¨ If your doctor has given you steroid pills, take them as directed. ¨ Talk to your doctor if you have any problems with your medicine. What can you do to prevent flare-ups? · Try not to be outside when air pollution levels are high. Stay at home with your windows closed. · Do not smoke. This is the most important step you can take to prevent more damage to your lungs and prevent problems. If you already smoke, it is never too late to stop. If you need help quitting, talk to your doctor about stop-smoking programs and medicines. These can increase your chances of quitting for good. · Avoid secondhand smoke; cold, dry air; and high altitudes. · Take your daily medicines as prescribed. · Avoid colds and flu. ¨ Get a pneumococcal vaccine. ¨ Get a flu vaccine each year, as soon as it is available. Ask those you live or work with to do the same, so they will not get the flu and infect you. ¨ Try to stay away from people with colds or the flu. ¨ Wash your hands often. Follow-up care is a key part of your treatment and safety. Be sure to make and go to all appointments, and call your doctor if you are having problems. It's also a good idea to know your test results and keep a list of the medicines you take. Where can you learn more? Go to http://kristina-karis.info/. Enter  in the search box to learn more about \"Learning About COPD, Asthma, and Air Pollution. \" Current as of: March 25, 2017 Content Version: 11.3 © 4065-4545 [employer]. Care instructions adapted under license by [employer] (which disclaims liability or warranty for this information). If you have questions about a medical condition or this instruction, always ask your healthcare professional. [place] [address] any warranty or liability for your use of this information.

## 2017-09-13 NOTE — PROGRESS NOTES
Pharmacy called this nurse regarding pt's INR level of 3.5 and asking about holding tonight's dose of coumadin. State they were consulted to dose coumadin on 3rd floor. Called Dr July Enriquez and received telephone order with read back and confirmation to hold tonight's dose of coumadin and to consult pharmacy for coumadin dosing.

## 2017-09-13 NOTE — PROGRESS NOTES
SPEECH PATHOLOGY NOTE:    Attempted to see patient for speech therapy this AM. Patient declining to participate due to fatigue stating \"I have been up all night and just don't feel up to it\". ST to re-attempt at later time as schedule allows and patient is available.      Connie Burdick, MSP, CCC-SLP, CBIS

## 2017-09-14 LAB
BASOPHILS # BLD: 0 K/UL (ref 0–0.2)
BASOPHILS NFR BLD: 0 % (ref 0–2)
DIFFERENTIAL METHOD BLD: ABNORMAL
EOSINOPHIL # BLD: 0 K/UL (ref 0–0.8)
EOSINOPHIL NFR BLD: 1 % (ref 0.5–7.8)
ERYTHROCYTE [DISTWIDTH] IN BLOOD BY AUTOMATED COUNT: 17.2 % (ref 11.9–14.6)
GLUCOSE BLD STRIP.AUTO-MCNC: 132 MG/DL (ref 65–100)
GLUCOSE BLD STRIP.AUTO-MCNC: 150 MG/DL (ref 65–100)
GLUCOSE BLD STRIP.AUTO-MCNC: 188 MG/DL (ref 65–100)
GLUCOSE BLD STRIP.AUTO-MCNC: 66 MG/DL (ref 65–100)
HCT VFR BLD AUTO: 26.8 % (ref 41.1–50.3)
HGB BLD-MCNC: 8.5 G/DL (ref 13.6–17.2)
IMM GRANULOCYTES # BLD: 0 K/UL (ref 0–0.5)
IMM GRANULOCYTES NFR BLD: 0.2 % (ref 0–5)
INR PPP: 3.6 (ref 0.9–1.2)
LYMPHOCYTES # BLD: 0.4 K/UL (ref 0.5–4.6)
LYMPHOCYTES NFR BLD: 8 % (ref 13–44)
MCH RBC QN AUTO: 29.4 PG (ref 26.1–32.9)
MCHC RBC AUTO-ENTMCNC: 31.6 G/DL (ref 31.4–35)
MCV RBC AUTO: 93.1 FL (ref 79.6–97.8)
MONOCYTES # BLD: 0.4 K/UL (ref 0.1–1.3)
MONOCYTES NFR BLD: 8 % (ref 4–12)
NEUTS SEG # BLD: 3.6 K/UL (ref 1.7–8.2)
NEUTS SEG NFR BLD: 83 % (ref 43–78)
PLATELET # BLD AUTO: 62 K/UL (ref 150–450)
PMV BLD AUTO: 12.4 FL (ref 10.8–14.1)
PROTHROMBIN TIME: 39.4 SEC (ref 9.6–12)
RBC # BLD AUTO: 2.62 M/UL (ref 4.23–5.67)
VANCOMYCIN TROUGH SERPL-MCNC: 17.1 UG/ML (ref 5–20)
WBC # BLD AUTO: 4.4 K/UL (ref 4.3–11.1)

## 2017-09-14 PROCEDURE — 97165 OT EVAL LOW COMPLEX 30 MIN: CPT

## 2017-09-14 PROCEDURE — 99233 SBSQ HOSP IP/OBS HIGH 50: CPT | Performed by: INTERNAL MEDICINE

## 2017-09-14 PROCEDURE — 97162 PT EVAL MOD COMPLEX 30 MIN: CPT

## 2017-09-14 PROCEDURE — 77010033678 HC OXYGEN DAILY

## 2017-09-14 PROCEDURE — 97535 SELF CARE MNGMENT TRAINING: CPT

## 2017-09-14 PROCEDURE — 85610 PROTHROMBIN TIME: CPT | Performed by: PHYSICAL MEDICINE & REHABILITATION

## 2017-09-14 PROCEDURE — 65310000000 HC RM PRIVATE REHAB

## 2017-09-14 PROCEDURE — 94640 AIRWAY INHALATION TREATMENT: CPT

## 2017-09-14 PROCEDURE — 92610 EVALUATE SWALLOWING FUNCTION: CPT

## 2017-09-14 PROCEDURE — 74011000250 HC RX REV CODE- 250: Performed by: PHYSICAL MEDICINE & REHABILITATION

## 2017-09-14 PROCEDURE — 99232 SBSQ HOSP IP/OBS MODERATE 35: CPT | Performed by: PHYSICAL MEDICINE & REHABILITATION

## 2017-09-14 PROCEDURE — 97530 THERAPEUTIC ACTIVITIES: CPT

## 2017-09-14 PROCEDURE — 94760 N-INVAS EAR/PLS OXIMETRY 1: CPT

## 2017-09-14 PROCEDURE — 74011250636 HC RX REV CODE- 250/636: Performed by: NURSE PRACTITIONER

## 2017-09-14 PROCEDURE — 80202 ASSAY OF VANCOMYCIN: CPT | Performed by: PHYSICAL MEDICINE & REHABILITATION

## 2017-09-14 PROCEDURE — 97116 GAIT TRAINING THERAPY: CPT

## 2017-09-14 PROCEDURE — 94660 CPAP INITIATION&MGMT: CPT

## 2017-09-14 PROCEDURE — 97110 THERAPEUTIC EXERCISES: CPT

## 2017-09-14 PROCEDURE — 74011000258 HC RX REV CODE- 258: Performed by: PHYSICAL MEDICINE & REHABILITATION

## 2017-09-14 PROCEDURE — 74011250636 HC RX REV CODE- 250/636: Performed by: PHYSICAL MEDICINE & REHABILITATION

## 2017-09-14 PROCEDURE — 74011250637 HC RX REV CODE- 250/637: Performed by: PHYSICAL MEDICINE & REHABILITATION

## 2017-09-14 PROCEDURE — 36592 COLLECT BLOOD FROM PICC: CPT

## 2017-09-14 PROCEDURE — 74011636637 HC RX REV CODE- 636/637: Performed by: PHYSICAL MEDICINE & REHABILITATION

## 2017-09-14 PROCEDURE — 82962 GLUCOSE BLOOD TEST: CPT

## 2017-09-14 PROCEDURE — 85025 COMPLETE CBC W/AUTO DIFF WBC: CPT | Performed by: PHYSICAL MEDICINE & REHABILITATION

## 2017-09-14 RX ORDER — WARFARIN SODIUM 5 MG/1
5 TABLET ORAL EVERY EVENING
Status: DISCONTINUED | OUTPATIENT
Start: 2017-09-14 | End: 2017-09-16

## 2017-09-14 RX ADMIN — FAMOTIDINE 20 MG: 10 INJECTION, SOLUTION INTRAVENOUS at 08:44

## 2017-09-14 RX ADMIN — Medication 600 UNITS: at 05:58

## 2017-09-14 RX ADMIN — INSULIN LISPRO 2 UNITS: 100 INJECTION, SOLUTION INTRAVENOUS; SUBCUTANEOUS at 12:42

## 2017-09-14 RX ADMIN — Medication 20 ML: at 05:57

## 2017-09-14 RX ADMIN — BUDESONIDE 500 MCG: 0.5 INHALANT RESPIRATORY (INHALATION) at 16:10

## 2017-09-14 RX ADMIN — INSULIN LISPRO 2 UNITS: 100 INJECTION, SOLUTION INTRAVENOUS; SUBCUTANEOUS at 22:33

## 2017-09-14 RX ADMIN — TIOTROPIUM BROMIDE 18 MCG: 18 CAPSULE ORAL; RESPIRATORY (INHALATION) at 06:05

## 2017-09-14 RX ADMIN — CARVEDILOL 3.12 MG: 3.12 TABLET, FILM COATED ORAL at 16:47

## 2017-09-14 RX ADMIN — AZTREONAM 2 G: 2 INJECTION, POWDER, LYOPHILIZED, FOR SOLUTION INTRAMUSCULAR; INTRAVENOUS at 22:18

## 2017-09-14 RX ADMIN — GABAPENTIN 600 MG: 300 CAPSULE ORAL at 22:16

## 2017-09-14 RX ADMIN — Medication 20 ML: at 16:49

## 2017-09-14 RX ADMIN — BUDESONIDE 500 MCG: 0.5 INHALANT RESPIRATORY (INHALATION) at 06:05

## 2017-09-14 RX ADMIN — ALBUTEROL SULFATE 2.5 MG: 2.5 SOLUTION RESPIRATORY (INHALATION) at 16:10

## 2017-09-14 RX ADMIN — PREDNISONE 40 MG: 20 TABLET ORAL at 08:40

## 2017-09-14 RX ADMIN — IMMUNE GLOBULIN (HUMAN) 65 G: 10 INJECTION INTRAVENOUS; SUBCUTANEOUS at 17:55

## 2017-09-14 RX ADMIN — Medication 600 UNITS: at 16:53

## 2017-09-14 RX ADMIN — ALBUTEROL SULFATE 2.5 MG: 2.5 SOLUTION RESPIRATORY (INHALATION) at 06:05

## 2017-09-14 RX ADMIN — Medication 20 ML: at 22:19

## 2017-09-14 RX ADMIN — VANCOMYCIN HYDROCHLORIDE 1250 MG: 10 INJECTION, POWDER, LYOPHILIZED, FOR SOLUTION INTRAVENOUS at 03:28

## 2017-09-14 RX ADMIN — GABAPENTIN 600 MG: 300 CAPSULE ORAL at 05:58

## 2017-09-14 RX ADMIN — GABAPENTIN 600 MG: 300 CAPSULE ORAL at 16:48

## 2017-09-14 RX ADMIN — CARVEDILOL 3.12 MG: 3.12 TABLET, FILM COATED ORAL at 08:41

## 2017-09-14 RX ADMIN — GUAIFENESIN 1200 MG: 600 TABLET, EXTENDED RELEASE ORAL at 17:46

## 2017-09-14 RX ADMIN — DIGOXIN 0.25 MG: 250 TABLET ORAL at 08:41

## 2017-09-14 RX ADMIN — ALBUTEROL SULFATE 2.5 MG: 2.5 SOLUTION RESPIRATORY (INHALATION) at 00:55

## 2017-09-14 RX ADMIN — POVIDONE-IODINE: 10 SOLUTION TOPICAL at 08:52

## 2017-09-14 RX ADMIN — FERROUS SULFATE TAB 325 MG (65 MG ELEMENTAL FE) 325 MG: 325 (65 FE) TAB at 08:41

## 2017-09-14 RX ADMIN — IMMUNE GLOBULIN (HUMAN) 65 G: 10 INJECTION INTRAVENOUS; SUBCUTANEOUS at 18:40

## 2017-09-14 RX ADMIN — TRAZODONE HYDROCHLORIDE 50 MG: 50 TABLET ORAL at 22:16

## 2017-09-14 RX ADMIN — GUAIFENESIN 1200 MG: 600 TABLET, EXTENDED RELEASE ORAL at 08:39

## 2017-09-14 RX ADMIN — Medication 20 ML: at 03:29

## 2017-09-14 RX ADMIN — Medication 600 UNITS: at 22:35

## 2017-09-14 NOTE — PROGRESS NOTES
Care Management Interventions  PCP Verified by CM: Yes  Transition of Care Consult (CM Consult): Discharge Planning  Physical Therapy Consult: Yes  Occupational Therapy Consult: Yes  Speech Therapy Consult: Yes  Plan discussed with Pt/Family/Caregiver: Yes  Freedom of Choice Offered: Yes  Discharge Location  Discharge Placement: Home with family assistance (continued therapy)    Pt admitted to rehab from [facility] - cardiac debility. Pt lives at home with spouse, Rosendo Vnu - [phone]. Pt was independent with self care, ambulation and driving prior to admission. Pt lives in a one story home with 3 PATSY with rails. Pt has a walker. Has not utilized [facility] or outpatient therapy. Pt has Medicare and supplemental insurance. Get medications through the [facility] . Pt has been rated   100% service connected disability. Anticipated length of stay 7 - 10 days. Plan is to return home with spouse who can provide 24/7. Pt has children but they do not live in the area. Wife states pt will need grab bars at home and a shower chair; explained that these items are not covered by his insurance. SW to contact [facility] to see if they can obtain for pt. Discussed role of SW, team conference, IRC process etc. Will follow for dc planning and case management needs.

## 2017-09-14 NOTE — PROGRESS NOTES
MD Mateusz,   Medical Director  [address] [facility], [address] [facility]  Tel: [phone]       [facility] PROGRESS NOTE    Prashanth Gutiérrez  Admit Date: 9/13/2017  Admit Diagnosis: Cardiac Debility; Respiratory failure ([address]    Subjective     Patient seen and examined. Vss. HR good, appropriate increase at activity, returns to baseline at rest.  No acute complaints. PT, OT well tolerated. Steady gains made. No new barrier to progress noted.      Objective:     Current Facility-Administered Medications   Medication Dose Route Frequency    immune globulin 10% (GAMUNEX-C) 65 g  65 g IntraVENous ONCE TITR    [START ON 9/15/2017] immune globulin 10% infusion 63.8 g  1,000 mg/kg (Ideal) IntraVENous ONCE TITR    warfarin (COUMADIN) tablet 5 mg  5 mg Oral QHS    0.9% sodium chloride infusion 250 mL  250 mL IntraVENous PRN    nitroglycerin (NITROSTAT) tablet 0.4 mg  0.4 mg SubLINGual Q5MIN PRN    ondansetron (ZOFRAN) injection 4 mg  4 mg IntraVENous Q4H PRN    sodium chloride (NS) flush 5-10 mL  5-10 mL IntraVENous PRN    NUTRITIONAL SUPPORT ELECTROLYTE PRN ORDERS   Does Not Apply PRN    acetaminophen (TYLENOL) suppository 650 mg  650 mg Rectal Q4H PRN    acetaminophen (TYLENOL) tablet 650 mg  650 mg Oral Q4H PRN    albuterol (PROVENTIL HFA, VENTOLIN HFA, PROAIR HFA) inhaler 2 Puff  2 Puff Inhalation Q4H PRN    albuterol (PROVENTIL VENTOLIN) nebulizer solution 2.5 mg  2.5 mg Nebulization Q4H PRN    aztreonam (AZACTAM) 2 g in 0.9% sodium chloride (MBP/ADV) 100 mL MBP  2 g IntraVENous Q8H    budesonide (PULMICORT) 500 mcg/2 ml nebulizer suspension  500 mcg Nebulization BID RT    And    albuterol CONCENTRATE 2.5mg/0.5 mL neb soln  2.5 mg Nebulization Q6H RT    dextrose (D50W) injection syrg 25 g  25 g IntraVENous PRN    digoxin (LANOXIN) tablet 0.25 mg  0.25 mg Oral DAILY    famotidine (PF) (PEPCID) 20 mg in sodium chloride 0.9 % 10 mL injection  20 mg IntraVENous DAILY  ferrous sulfate tablet 325 mg  1 Tab Oral DAILY WITH BREAKFAST    gabapentin (NEURONTIN) capsule 600 mg  600 mg Oral TID    guaiFENesin ER (MUCINEX) tablet 1,200 mg  1,200 mg Oral BID    heparin (porcine) pf 600 Units  600 Units InterCATHeter Q8H    heparin (porcine) pf 600 Units  600 Units InterCATHeter PRN    insulin lispro (HUMALOG) injection   SubCUTAneous AC&HS    povidone-iodine (BETADINE) 10 % topical solution   Topical DAILY    [START ON 9/29/2017] predniSONE (DELTASONE) tablet 10 mg  10 mg Oral DAILY WITH BREAKFAST    [START ON 9/22/2017] predniSONE (DELTASONE) tablet 20 mg  20 mg Oral DAILY WITH BREAKFAST    [START ON 9/15/2017] predniSONE (DELTASONE) tablet 30 mg  30 mg Oral DAILY WITH BREAKFAST    sodium chloride (NS) flush 20 mL  20 mL InterCATHeter Q8H    tiotropium (SPIRIVA) inhalation capsule 18 mcg  1 Cap Inhalation DAILY    traZODone (DESYREL) tablet 50 mg  50 mg Oral QHS PRN    influenza vaccine 2015-16 (36mos+)(PF) (FLUZONE/FLUARIX QUAD) injection 0.5 mL  0.5 mL IntraMUSCular PRIOR TO DISCHARGE    carvedilol (COREG) tablet 3.125 mg  3.125 mg Oral BID WITH MEALS     Review of Systems:    Denies chest pain, shortness of breath, cough, headache, visual problems, abdominal pain, dysurea, calf pain. Pertinent positives are as noted in the medical records and unremarkable otherwise. Visit Vitals    /67    Pulse (!) 132    Temp 97.7 °F (36.5 °C)    Resp 16    SpO2 98%      Physical Exam:   General: Alert and age appropriately oriented x 2. No acute cardio respiratory distress. HEENT: Normocephalic,no scleral icterus  Oral mucosa moist without cyanosis, No bruit, No JVD. Lungs: Clear to auscultation  bilaterally. Respiration even and unlabored   Heart: Regular rate and rhythm, S1, S2   No  murmurs, clicks, rub or gallops   Abdomen: Soft, non-tender, nondistended. Bowel sounds present. No organomegaly.    Genitourinary: defered   Neuromuscular:      PERRL, EOMI  Speech slow, dysarthric. Focus, attention poor. Follows simple commands consistently, with delay, corrections. Able to identify, recall repeat.      LUE     Shoulder abduction  5- /5              Elbow flexion:  5- /5               Wrist extension:   5-/5              WWNGHQ flexion;  5- /5                        ADMQ:   4+/5  RUE    Shoulder abduction:  5-/5                Elbow flexion:   5-/5                         Wrist extension:  5-/5                        Finger flexion:   /5-5                        ADMQ:  5- /5  LLE     Hip flexion:  3 /5              Knee extension:   4/5                         Ankle dorsiflexion: 4+ /5                        Ankle plantarflexion:  5- /5                                                                RLE     Hip flexion:  3 /5                        Knee extension:  4 /5                         Ankle dorsiflexion: 4+ /5                        Ankle plantarflexion:   5-/5  Sensory - intact  No cerebellar signs. Plantars - down going  No atrophy, no fasciculations, no tremors. Skin/extremity: No rashes, no erythema. Calf non tender BLE.                                                                                Functional Assessment:          Balance  Sitting - Static: Good (unsupported) (09/14/17 1200)  Sitting - Dynamic: Good (unsupported) (09/14/17 1200)  Standing - Static: Fair (09/14/17 1200)  Standing - Dynamic : Impaired (09/14/17 1200)                     Scottie Rodriguez Fall Risk Assessment:  Scottie Rodriguez Fall Risk  Mobility: Ambulates or transfers with assist devices or assistance/unsteady gait (09/13/17 2055)  Mobility Interventions: Utilize walker, cane, or other assitive device (09/13/17 2055)  Mentation: Alert, oriented x 3 (09/13/17 2055)  Mentation Interventions: Bed/chair exit alarm; Door open when patient unattended (09/13/17 2055)  Medication: Patient receiving anticonvulsants, sedatives(tranquilizers), psychotropics or hypnotics, hypoglycemics, narcotics, sleep aids, antihypertensives, laxatives, or diuretics (09/13/17 2055)  Medication Interventions: Bed/chair exit alarm; Patient to call before getting OOB (09/13/17 2055)  Elimination: Needs assistance with toileting (09/13/17 2055)  Elimination Interventions: Call light in reach (09/13/17 2055)  Prior Fall History: During admission (Date - Comment) (09/13/17 2055)  History of Falls Interventions: Bed/chair exit alarm; Door open when patient unattended (09/13/17 2055)  Total Score: 5 (09/13/17 2055)  High Fall Risk: Yes (09/13/17 2055)     Speech Assessment:  Aspiration Signs/Symptoms: None (09/14/17 1036)      Ambulation:  Gait  Distance (ft): 5 Feet (ft) (09/14/17 0900)  Assistive Device:  (No Device- per pre-morbid level of function) (09/14/17 0900)     Labs/Studies:  Recent Results (from the past 72 hour(s))   GLUCOSE, POC    Collection Time: 09/11/17  4:25 PM   Result Value Ref Range    Glucose (POC) 216 (H) 65 - 100 mg/dL   EKG, 12 LEAD, INITIAL    Collection Time: 09/11/17  9:32 PM   Result Value Ref Range    Ventricular Rate 132 BPM    Atrial Rate 132 BPM    P-R Interval 112 ms    QRS Duration 166 ms    Q-T Interval 384 ms    QTC Calculation (Bezet) 568 ms    Calculated R Axis 126 degrees    Calculated T Axis 54 degrees    Diagnosis       atrial tachycardia RVR   Right axis deviation  Non-specific intra-ventricular conduction block  Abnormal ECG  When compared with ECG of 23-AUG-2017 13:29,  RBBB RVH rate has increased since August 23  Confirmed by GLO KWOK (), [facility] ([id number]) on 9/12/2017 7:50:21 AM     GLUCOSE, POC    Collection Time: 09/11/17  9:46 PM   Result Value Ref Range    Glucose (POC) 106 (H) 65 - 100 mg/dL   GLUCOSE, POC    Collection Time: 09/12/17  6:21 AM   Result Value Ref Range    Glucose (POC) 140 (H) 65 - 100 mg/dL   PROTHROMBIN TIME + INR    Collection Time: 09/12/17  6:31 AM   Result Value Ref Range    Prothrombin time 32.1 (H) 9.6 - 12.0 sec    INR 2.9 (H) 0.9 - 1.2     CBC WITH AUTOMATED DIFF    Collection Time: 09/12/17  6:31 AM   Result Value Ref Range    WBC 23.9 (H) 4.3 - 11.1 K/uL    RBC 3.23 (L) 4.23 - 5.67 M/uL    HGB 9.7 (L) 13.6 - 17.2 g/dL    HCT 29.8 (L) 41.1 - 50.3 %    MCV 92.3 79.6 - 97.8 FL    MCH 30.0 26.1 - 32.9 PG    MCHC 32.6 31.4 - 35.0 g/dL    RDW 16.8 (H) 11.9 - 14.6 %    PLATELET 75 (L) 843 - 450 K/uL    MPV 13.1 10.8 - 14.1 FL    DF AUTOMATED      NEUTROPHILS 94 (H) 43 - 78 %    LYMPHOCYTES 2 (L) 13 - 44 %    MONOCYTES 4 4.0 - 12.0 %    EOSINOPHILS 0 (L) 0.5 - 7.8 %    BASOPHILS 0 0.0 - 2.0 %    IMMATURE GRANULOCYTES 0.3 0.0 - 5.0 %    ABS. NEUTROPHILS 22.4 (H) 1.7 - 8.2 K/UL    ABS. LYMPHOCYTES 0.5 0.5 - 4.6 K/UL    ABS. MONOCYTES 1.0 0.1 - 1.3 K/UL    ABS. EOSINOPHILS 0.0 0.0 - 0.8 K/UL    ABS. BASOPHILS 0.0 0.0 - 0.2 K/UL    ABS. IMM. GRANS. 0.1 0.0 - 0.5 K/UL   METABOLIC PANEL, COMPREHENSIVE    Collection Time: 09/12/17  6:31 AM   Result Value Ref Range    Sodium 143 136 - 145 mmol/L    Potassium 4.3 3.5 - 5.1 mmol/L    Chloride 103 98 - 107 mmol/L    CO2 35 (H) 21 - 32 mmol/L    Anion gap 5 (L) 7 - 16 mmol/L    Glucose 126 (H) 65 - 100 mg/dL    BUN 42 (H) 8 - 23 MG/DL    Creatinine 1.08 0.8 - 1.5 MG/DL    GFR est AA >60 >60 ml/min/1.73m2    GFR est non-AA >60 >60 ml/min/1.73m2    Calcium 8.4 8.3 - 10.4 MG/DL    Bilirubin, total 2.1 (H) 0.2 - 1.1 MG/DL    ALT (SGPT) 578 (H) 12 - 65 U/L    AST (SGOT) 405 (H) 15 - 37 U/L    Alk.  phosphatase 122 50 - 136 U/L    Protein, total 6.5 6.3 - 8.2 g/dL    Albumin 2.3 (L) 3.2 - 4.6 g/dL    Globulin 4.2 (H) 2.3 - 3.5 g/dL    A-G Ratio 0.5 (L) 1.2 - 3.5     CULTURE, BLOOD    Collection Time: 09/12/17 10:08 AM   Result Value Ref Range    Special Requests: RED PICC      GRAM STAIN GRAM NEGATIVE RODS      GRAM STAIN AEROBIC AND ANAEROBIC BOTTLES      GRAM STAIN        RESULTS VERIFIED, PHONED TO AND READ BACK BY  Mina Salazar RN @[id number] 9/12/17     Culture result: ESCHERICHIA COLI (A)      Culture result: REPEATING SUSCEPTIBILITY    CULTURE, BLOOD    Collection Time: 09/12/17 10:11 AM   Result Value Ref Range    Special Requests: LEFT HAND      Culture result: NO GROWTH 2 DAYS     CBC WITH AUTOMATED DIFF    Collection Time: 09/12/17 10:59 AM   Result Value Ref Range    WBC 21.4 (H) 4.3 - 11.1 K/uL    RBC 3.46 (L) 4.23 - 5.67 M/uL    HGB 10.4 (L) 13.6 - 17.2 g/dL    HCT 32.5 (L) 41.1 - 50.3 %    MCV 93.9 79.6 - 97.8 FL    MCH 30.1 26.1 - 32.9 PG    MCHC 32.0 31.4 - 35.0 g/dL    RDW 16.9 (H) 11.9 - 14.6 %    PLATELET 30 (LL) 237 - 450 K/uL    MPV Cannot be calculated 10.8 - 14.1 FL    DF AUTOMATED      NEUTROPHILS 94 (H) 43 - 78 %    LYMPHOCYTES 3 (L) 13 - 44 %    MONOCYTES 3 (L) 4.0 - 12.0 %    EOSINOPHILS 0 (L) 0.5 - 7.8 %    BASOPHILS 0 0.0 - 2.0 %    IMMATURE GRANULOCYTES 0.2 0.0 - 5.0 %    ABS. NEUTROPHILS 21.3 (H) 1.7 - 8.2 K/UL    ABS. LYMPHOCYTES 0.7 0.5 - 4.6 K/UL    ABS. MONOCYTES 0.7 0.1 - 1.3 K/UL    ABS. EOSINOPHILS 0.0 0.0 - 0.8 K/UL    ABS. BASOPHILS 0.0 0.0 - 0.2 K/UL    ABS. IMM. GRANS. 0.1 0.0 - 0.5 K/UL   GLUCOSE, POC    Collection Time: 09/12/17 11:07 AM   Result Value Ref Range    Glucose (POC) 188 (H) 65 - 100 mg/dL   HIT PROFILE    Collection Time: 09/12/17  3:43 PM   Result Value Ref Range    HIT PROFILE PERFORMED BY [facility]      HEPARIN INDUCED PLT AB.  NEGATIVE       OPTICAL DENSITY READ 0.135 <0.400 ABS    HIT INTERPRETATION NEGATIVE      GLUCOSE, POC    Collection Time: 09/12/17  4:00 PM   Result Value Ref Range    Glucose (POC) 196 (H) 65 - 100 mg/dL   CULTURE, URINE    Collection Time: 09/12/17  5:05 PM   Result Value Ref Range    Special Requests: NO SPECIAL REQUESTS      Culture result: NO GROWTH 1 DAY     CBC W/O DIFF    Collection Time: 09/12/17  6:19 PM   Result Value Ref Range    WBC 16.9 (H) 4.3 - 11.1 K/uL    RBC 2.99 (L) 4.23 - 5.67 M/uL    HGB 8.9 (L) 13.6 - 17.2 g/dL    HCT 27.3 (L) 41.1 - 50.3 %    MCV 91.3 79.6 - 97.8 FL    MCH 29.8 26.1 - 32.9 PG    MCHC 32.6 31.4 - 35.0 g/dL    RDW 16.9 (H) 11.9 - 14.6 %    PLATELET 12 (LL) 633 - 450 K/uL    MPV Cannot be calculated 10.8 - 14.1 FL   PLATELETS, ALLOCATE    Collection Time: 09/12/17  7:30 PM   Result Value Ref Range    Unit number [id number]     Blood component type PLTPH LRIR,2     Unit division 00     Status of unit TRANSFUSED    GLUCOSE, POC    Collection Time: 09/12/17  9:30 PM   Result Value Ref Range    Glucose (POC) 175 (H) 65 - 100 mg/dL   TYPE & SCREEN    Collection Time: 09/12/17  9:40 PM   Result Value Ref Range    Crossmatch Expiration 09/15/2017     ABO/Rh(D) A NEGATIVE     Antibody screen NEG    GLUCOSE, POC    Collection Time: 09/12/17 11:36 PM   Result Value Ref Range    Glucose (POC) 134 (H) 65 - 100 mg/dL   PROTHROMBIN TIME + INR    Collection Time: 09/13/17  3:51 AM   Result Value Ref Range    Prothrombin time 38.3 (H) 9.6 - 12.0 sec    INR 3.5 (H) 0.9 - 1.2     METABOLIC PANEL, BASIC    Collection Time: 09/13/17  3:51 AM   Result Value Ref Range    Sodium 142 136 - 145 mmol/L    Potassium 3.6 3.5 - 5.1 mmol/L    Chloride 106 98 - 107 mmol/L    CO2 30 21 - 32 mmol/L    Anion gap 6 (L) 7 - 16 mmol/L    Glucose 93 65 - 100 mg/dL    BUN 31 (H) 8 - 23 MG/DL    Creatinine 0.67 (L) 0.8 - 1.5 MG/DL    GFR est AA >60 >60 ml/min/1.73m2    GFR est non-AA >60 >60 ml/min/1.73m2    Calcium 7.4 (L) 8.3 - 10.4 MG/DL   CBC WITH AUTOMATED DIFF    Collection Time: 09/13/17  3:51 AM   Result Value Ref Range    WBC 10.5 4.3 - 11.1 K/uL    RBC 2.57 (L) 4.23 - 5.67 M/uL    HGB 8.4 (L) 13.6 - 17.2 g/dL    HCT 26.1 (L) 41.1 - 50.3 %    MCV 92.2 79.6 - 97.8 FL    MCH 29.6 26.1 - 32.9 PG    MCHC 32.1 31.4 - 35.0 g/dL    RDW 16.9 (H) 11.9 - 14.6 %    PLATELET 84 (L) 470 - 450 K/uL    MPV 12.0 10.8 - 14.1 FL    DF AUTOMATED      NEUTROPHILS 94 (H) 43 - 78 %    LYMPHOCYTES 3 (L) 13 - 44 %    MONOCYTES 3 (L) 4.0 - 12.0 %    EOSINOPHILS 0 (L) 0.5 - 7.8 %    BASOPHILS 0 0.0 - 2.0 %    IMMATURE GRANULOCYTES 0.2 0.0 - 5.0 %    ABS. NEUTROPHILS 9.9 (H) 1.7 - 8.2 K/UL    ABS. LYMPHOCYTES 0.3 (L) 0.5 - 4.6 K/UL    ABS. MONOCYTES 0.4 0.1 - 1.3 K/UL    ABS. EOSINOPHILS 0.0 0.0 - 0.8 K/UL    ABS. BASOPHILS 0.0 0.0 - 0.2 K/UL    ABS. IMM. GRANS. 0.0 0.0 - 0.5 K/UL   GLUCOSE, POC    Collection Time: 09/13/17  6:34 AM   Result Value Ref Range    Glucose (POC) 99 65 - 100 mg/dL   GLUCOSE, POC    Collection Time: 09/13/17 11:08 AM   Result Value Ref Range    Glucose (POC) 130 (H) 65 - 100 mg/dL   GLUCOSE, POC    Collection Time: 09/13/17  5:01 PM   Result Value Ref Range    Glucose (POC) 161 (H) 65 - 100 mg/dL   GLUCOSE, POC    Collection Time: 09/13/17  8:18 PM   Result Value Ref Range    Glucose (POC) 262 (H) 65 - 100 mg/dL   VANCOMYCIN, TROUGH    Collection Time: 09/14/17  2:05 AM   Result Value Ref Range    Vancomycin,trough 17.1 5 - 20 ug/mL   CBC WITH AUTOMATED DIFF    Collection Time: 09/14/17  5:33 AM   Result Value Ref Range    WBC 4.4 4.3 - 11.1 K/uL    RBC 2.62 (L) 4.23 - 5.67 M/uL    HGB 8.5 (L) 13.6 - 17.2 g/dL    HCT 26.8 (L) 41.1 - 50.3 %    MCV 93.1 79.6 - 97.8 FL    MCH 29.4 26.1 - 32.9 PG    MCHC 31.6 31.4 - 35.0 g/dL    RDW 17.2 (H) 11.9 - 14.6 %    PLATELET 62 (L) 351 - 450 K/uL    MPV 12.4 10.8 - 14.1 FL    DF AUTOMATED      NEUTROPHILS 83 (H) 43 - 78 %    LYMPHOCYTES 8 (L) 13 - 44 %    MONOCYTES 8 4.0 - 12.0 %    EOSINOPHILS 1 0.5 - 7.8 %    BASOPHILS 0 0.0 - 2.0 %    IMMATURE GRANULOCYTES 0.2 0.0 - 5.0 %    ABS. NEUTROPHILS 3.6 1.7 - 8.2 K/UL    ABS. LYMPHOCYTES 0.4 (L) 0.5 - 4.6 K/UL    ABS. MONOCYTES 0.4 0.1 - 1.3 K/UL    ABS. EOSINOPHILS 0.0 0.0 - 0.8 K/UL    ABS. BASOPHILS 0.0 0.0 - 0.2 K/UL    ABS. IMM.  GRANS. 0.0 0.0 - 0.5 K/UL   PROTHROMBIN TIME + INR    Collection Time: 09/14/17  5:33 AM   Result Value Ref Range    Prothrombin time 39.4 (H) 9.6 - 12.0 sec    INR 3.6 (HH) 0.9 - 1.2     GLUCOSE, POC    Collection Time: 09/14/17  6:11 AM   Result Value Ref Range    Glucose (POC) 66 65 - 100 mg/dL   GLUCOSE, POC    Collection Time: 09/14/17 11:26 AM   Result Value Ref Range    Glucose (POC) 150 (H) 65 - 100 mg/dL       Assessment:     Problem List as of 9/14/2017  Date Reviewed: 9/9/2017          Codes Class Noted - Resolved    Respiratory failure ([facility] ICD-10-CM: J96.90  ICD-9-CM: 518.81  9/13/2017 - Present        MRSA nasal colonization ICD-10-CM: Z22.322  ICD-9-CM: V02.54  9/8/2017 - Present        COPD (chronic obstructive pulmonary disease) (HCC) (Chronic) ICD-10-CM: J44.9  ICD-9-CM: 496  8/30/2017 - Present        Acute respiratory failure with hypercapnia (HCC) ICD-10-CM: J96.02  ICD-9-CM: 518.81  8/29/2017 - Present        History of tobacco use (Chronic) ICD-10-CM: X39.859  ICD-9-CM: V15.82  8/29/2017 - Present        Hyponatremia ICD-10-CM: E87.1  ICD-9-CM: 276.1  8/29/2017 - Present        Acute encephalopathy ICD-10-CM: G93.40  ICD-9-CM: 348.30  8/29/2017 - Present        Thrombocytopenia ([facility] ICD-10-CM: D69.6  ICD-9-CM: 287.5  8/26/2017 - Present        Anticoagulant long-term use (Chronic) ICD-10-CM: Z79.01  ICD-9-CM: V58.61  8/23/2017 - Present        HTN (hypertension), benign (Chronic) ICD-10-CM: I10  ICD-9-CM: 401.1  8/23/2017 - Present        Peripheral vascular disease ([facility] (Chronic) ICD-10-CM: I73.9  ICD-9-CM: 443.9  8/23/2017 - Present        Dyslipidemia (Chronic) ICD-10-CM: E78.5  ICD-9-CM: 272.4  8/23/2017 - Present        History of mechanical aortic valve replacement (Chronic) ICD-10-CM: Z95.2  ICD-9-CM: V43.3  8/23/2017 - Present    Overview Signed 8/30/2017 10:30 AM by Stefano Rain NP     Placement 2000, on chronicCoumadin             Centrilobular emphysema ([facility] (Chronic) ICD-10-CM: J43.2  ICD-9-CM: 492.8  8/23/2017 - Present    Overview Signed 9/8/2017  9:24 AM by Kecia Pacheco NP      With last PFTs FVC 1.70 L or 44% predicted, FEV1 0.86 L or 29% predicted, FEV1/FVC 51%.  This study was a postbronchodilator study performed at the [facility] on 8/22/2016 Ischemic cardiomyopathy (Chronic) ICD-10-CM: I25.5  ICD-9-CM: 414.8  8/23/2017 - Present    Overview Signed 8/30/2017 10:29 AM by Catrachita Daniel NP     8/23/17 ECHO:  EF 40 % to 45 %. There were no regional wall motion abnormalities. Moderate hypokinesis of the mid-apical anterior and apical wall(s). Atrial flutter with rapid ventricular response (HCC) ICD-10-CM: I48.92  ICD-9-CM: 427.32  8/23/2017 - Present        LV dysfunction (Chronic) ICD-10-CM: I51.9  ICD-9-CM: 429.9  10/19/2016 - Present        Atherosclerosis of native arteries of the extremities with intermittent claudication (Chronic) ICD-10-CM: F37.707  ICD-9-CM: 440.21  4/15/2014 - Present              Plan:      Rehabilitation Plan  The patient has shown the ability to tolerate and benefit from 3 hours of therapy daily and is being admitted to a comprehensive acute inpatient rehabilitation program consisting of at least 3 hours of combined physical and occupational therapies. Begin intensive Physical Therapy for a minimum of 1.5 hours a day, at least 5 out of 7 days per week to address bed mobility, transfers, ambulation, strengthening, balance, and endurance. Patient limited by poor endurance , weakness of his trunk and limbs.    Begin intensive Occupational Therapy for a minimum of 1.5 hours a day, at least 5 out of 7 days per week to address ADL ( bathing, LE dressing, toileting) and adaptive equipment as needed.        Continue ST for: dysarthria, impaired communication skills, hypophonia, dysphagia.      Continue 24-hour skilled rehabilitation nursing for bowel and bladder management, skin care for decubitus ulcer prevention , pain management and ongoing medication administration      Continue daily physician medical management:     Atrial flutter with rapid ventricular response ([address] (8/23/2017) sp cardioversion - monitor HR./ BP.   - Digoxin, coreg.  - continue anticoagulation  - cardiac precautions.         History of mechanical aortic valve replacement 2000, on coumadin/ Anticoagulant long-term use. Goal 2.5-3.5.  - resume warfarin, monitor INR.   - INR 2.9 (9/12) -> 3.5(9/13) -> 3.6(9/14) . resume warfarin at 5mg tonight.      Bacteremia - started on empiric antibiotic treatment. Aztreonam, vanco.   - BCx grew e.coli, will discontinue vano. Will follow Cx/ sensitivity.         HTN (hypertension) - monitor.  - continue Coreg/ dogoxin     Ischemic cardiomyopathy       Thrombocytopenia/ ITP  - continued on steroids. - hematology following.   - HIT negative. - ivig per hematology direction.      COPD   - continue respiratory treatments. Presently q6h, wean as appropriate. - resume spiriva, pulmicort  - SOB with activity. CxR in am.         iron deficiency anemia -hgb 8.5. Monitor. Continue fe supplements.      Hyponatremia  - monitor.      Pneumonia prophylaxis- Insentive spirometer every hour while awake     DVT risk / DVT Prophylaxis- Will require daily physician exam to assess for signs and symptoms as patient is at increased risk for of thromboembolism. Mobilization as tolerated. Intermittent pneumatic compression devices when in bed Thigh-high or knee-high thromboembolic deterrent hose when out of bed.    - covered with warfarin.      Pain Control: stable, mild-to-moderate joint symptoms intermittently, reasonably well controlled by PRN meds. Will require regular pain assessment and comprenhensive pain management. - resume gabapentin.      Wound Care: Monitor wound status daily per staff and physician. At risk for failure. Will require 24/7 rehab nursing. Keep wound clean and dry  - excoriation at bridge of nose. Continue antibiotic ointment.      Diabetes mellitus - Uncontrolled. poor glycemic control.  Will require daily, close FSG monitoring and medication adjustment to optimize glycemic control in setting of acute illness and hospitalization.   - SSI coverage with lispro.      Urinary retention/ neurogenic bladder - schedule voids q6-8 hrs. Check post-void residual every shift; In and Out catheterize if post-void residual is more than 400 cc.     bowel program - colace as needed. Time spent was 25 minutes with over 1/2 in direct patient care/examination, consultation and coordination of care.      Signed By: Tete French MD     September 14, 2017

## 2017-09-14 NOTE — PROGRESS NOTES
Patient resting up in bed. Alert and oriented. Lung sounds diminished in bases. S1S2, bowel sounds active. PICC line patent and clean. Nectarr thickened liquids at bedside. Continues on remote telemetry. Dressing to bridge of nose. Denies any pain or discomfort. Repositioned up in bed for breakfast tray. No other verbalized needs at present time. See doc flow sheet for further assessments.

## 2017-09-14 NOTE — PROGRESS NOTES
Problem: Falls - Risk of  Goal: *Absence of Falls  Document Lauren Fall Risk and appropriate interventions in the flowsheet.    Outcome: Progressing Towards Goal  Fall Risk Interventions:  Mobility Interventions: Utilize walker, cane, or other assitive device     Mentation Interventions: Bed/chair exit alarm, Door open when patient unattended     Medication Interventions: Bed/chair exit alarm, Patient to call before getting OOB     Elimination Interventions: Call light in reach     History of Falls Interventions: Bed/chair exit alarm, Door open when patient unattended

## 2017-09-14 NOTE — PROGRESS NOTES
Daily Progress Note -- Hematology/ Medical Oncology        Patient Name: Quintin Dhillon    Date of Visit: 2017  : 1945  Age:72 y.o. Interval history:  Platelets decreased to 12,000 two days ago. Transfused one unit and then 84,000 yesterday. Now 62,000. HIT negative. Continues receiving antibiotics for gram negative bacteremia. Up in wheelchair working with PT. Denies any bleeding.      Medications:   Current Facility-Administered Medications   Medication Dose Route Frequency Provider Last Rate Last Dose    immune globulin 10% (GAMUNEX-C) 65 g  65 g IntraVENous ONCE TITR Bailee Mcdowell NP        0.9% sodium chloride infusion 250 mL  250 mL IntraVENous PRN Angelic Brown MD        nitroglycerin (NITROSTAT) tablet 0.4 mg  0.4 mg SubLINGual Q5MIN PRN Angelic Brown MD        ondansetron [facility]) injection 4 mg  4 mg IntraVENous Q4H PRN Angelic Brown MD        sodium chloride (NS) flush 5-10 mL  5-10 mL IntraVENous PRN Angelic Brown MD        NUTRITIONAL SUPPORT ELECTROLYTE PRN ORDERS   Does Not Apply PRN Angelic Brown MD        acetaminophen (TYLENOL) suppository 650 mg  650 mg Rectal Q4H PRN Angelic Brown MD        acetaminophen (TYLENOL) tablet 650 mg  650 mg Oral Q4H PRN Angelic Brown MD        albuterol (PROVENTIL HFA, VENTOLIN HFA, PROAIR HFA) inhaler 2 Puff  2 Puff Inhalation Q4H PRN Angelic Brown MD        albuterol (PROVENTIL VENTOLIN) nebulizer solution 2.5 mg  2.5 mg Nebulization Q4H PRN Angelic Brown MD        aztreonam (AZACTAM) 2 g in 0.9% sodium chloride (MBP/ADV) 100 mL MBP  2 g IntraVENous Q8H Angelic Brown MD        budesonide (PULMICORT) 500 mcg/2 ml nebulizer suspension  500 mcg Nebulization BID RT Angelic Brown MD   500 mcg at 17 3735    And    albuterol CONCENTRATE 2.5mg/0.5 mL neb soln  2.5 mg Nebulization Q6H RT Angelic Brown MD   Stopped at 17 1200    dextrose (D50W) injection syrg 25 g  25 g IntraVENous PRN Angelic Brown MD       [facility] digoxin (LANOXIN) tablet 0.25 mg  0.25 mg Oral DAILY Simran Novak MD   0.25 mg at 09/14/17 0841    famotidine (PF) (PEPCID) 20 mg in sodium chloride 0.9 % 10 mL injection  20 mg IntraVENous DAILY Simran Novak MD   20 mg at 09/14/17 0844    ferrous sulfate tablet 325 mg  1 Tab Oral DAILY WITH BREAKFAST Simran Novak MD   325 mg at 09/14/17 0841    gabapentin (NEURONTIN) capsule 600 mg  600 mg Oral TID Simran Novak MD   600 mg at 09/14/17 0558    guaiFENesin ER (MUCINEX) tablet 1,200 mg  1,200 mg Oral BID Simran Novak MD   1,200 mg at 09/14/17 0839    heparin (porcine) pf 600 Units  600 Units InterCATHeter Q8H Simran Novak MD   600 Units at 09/14/17 0558    heparin (porcine) pf 600 Units  600 Units InterCATHeter PRN Simran Novak MD        insulin lispro (HUMALOG) injection   SubCUTAneous AC&HS Simran Novak MD   2 Units at 09/14/17 1242    povidone-iodine (BETADINE) 10 % topical solution   Topical DAILY Simran Novak MD        [START ON 9/29/2017] predniSONE (DELTASONE) tablet 10 mg  10 mg Oral DAILY WITH May MD Elida        [START ON 9/22/2017] predniSONE (DELTASONE) tablet 20 mg  20 mg Oral DAILY WITH BREAKFAST Simran Novak MD        [START ON 9/15/2017] predniSONE (DELTASONE) tablet 30 mg  30 mg Oral DAILY WITH BREAKFAST Simran Novak MD        sodium chloride (NS) flush 20 mL  20 mL InterCATHeter Q8H Simran Novak MD   20 mL at 09/14/17 0557    tiotropium (SPIRIVA) inhalation capsule 18 mcg  1 Cap Inhalation DAILY Simran Novak MD   18 mcg at 09/14/17 0605    traZODone (DESYREL) tablet 50 mg  50 mg Oral QHS PRN Simran Novak MD   50 mg at 09/13/17 2220    vancomycin (VANCOCIN) 1250 mg in  ml infusion  1,250 mg IntraVENous Q12H Simran Novak .7 mL/hr at 09/14/17 0328 1,250 mg at 09/14/17 0328    warfarin (COUMADIN) tablet 5 mg - pharmacy dosing ON HOLD  5 mg Oral See Admin Instructions Simran Novak MD        influenza vaccine 2015-16 (36mos+)(PF) (FLUZONE/FLUARIX QUAD) injection 0.5 mL  0.5 mL IntraMUSCular PRIOR TO DISCHARGE Terell vEans MD        carvedilol (COREG) tablet 3.125 mg  3.125 mg Oral BID WITH MEALS Terell Evans MD   3.125 mg at 09/14/17 0841       Allergies: Allergies   Allergen Reactions    Levaquin [Levofloxacin] Other (comments)     GI upset    Metformin Other (comments)     Gi upset       Review of Systems: The Review of Systems is documented in full in the internal medical record. All systems are negative other than for those noted above. Physical Examination:  General Appearance: Chronically-ll appearing patient in no acute distress. Vital signs:   Visit Vitals    /67    Pulse (!) 132    Temp 97.7 °F (36.5 °C)    Resp 16    SpO2 98%     HEENT: No oral or pharyngeal masses. There is no ulceration or thrush. Lungs: Bilateral breath sounds clear. No use of accessory muscles. O2 intact. Heart: There is no jugular venous distention. Regular, irregular. Generalized edema. Abdomen: Soft, non-tender, bowel sounds present and normal, no appreciated hepatosplenomegaly. No palpable masses. Skin: No rash, petechiae or ecchymoses. No evidence of malignancy. Neuro: Alert today with no obvious focal deficits. .     Labs/Imaging:  Lab Results   Component Value Date/Time    WBC 4.4 09/14/2017 05:33 AM    HGB 8.5 09/14/2017 05:33 AM    HCT 26.8 09/14/2017 05:33 AM    PLATELET 62 90/55/7185 05:33 AM    MCV 93.1 09/14/2017 05:33 AM       Lab Results   Component Value Date/Time    Sodium 142 09/13/2017 03:51 AM    Potassium 3.6 09/13/2017 03:51 AM    Chloride 106 09/13/2017 03:51 AM    CO2 30 09/13/2017 03:51 AM    Anion gap 6 09/13/2017 03:51 AM    Glucose 93 09/13/2017 03:51 AM    BUN 31 09/13/2017 03:51 AM    Creatinine 0.67 09/13/2017 03:51 AM    GFR est AA >60 09/13/2017 03:51 AM    GFR est non-AA >60 09/13/2017 03:51 AM    Calcium 7.4 09/13/2017 03:51 AM    AST (SGOT) 405 09/12/2017 06:31 AM    Alk.  phosphatase 122 09/12/2017 06:31 AM    Protein, total 6.5 09/12/2017 06:31 AM    Albumin 2.3 09/12/2017 06:31 AM    Globulin 4.2 09/12/2017 06:31 AM    A-G Ratio 0.5 09/12/2017 06:31 AM    ALT (SGPT) 578 09/12/2017 06:31 AM     Initial consult HPI:   He has a history of mechanical AVR in 2000. He continues on chronic anticoagulation with coumadin, chronic systolic CHF/ICM EF 02%, COPD, PVD, DM II, HTN, iron deficiency, and dyslipidemia who developed worsening dyspnea at the beginning of the week. He presented to the ER and was found to be in atrial flutter with RVR now status post cardioversion and amiodarone. Of note, his platelets were 88,777 on admission and 30,000 today. No other lab comparisons here but can see through care everywhere that his platelets were 10,721 at [address] about a year ago. He has no recollection of ever being told he has low platelets. He takes iron daily for his history of PRATIK and reports black stools due to this. Hgb on admission was 10.2 and 12.7 today. We have been consulted for his thrombocytopenia    PLAN:  - Presumed ITP:  09/07 Transfuse platelets as needed - keep above 50,000. Coumadin necessary due to mechanical heart valve. Continue daily CBC to monitor platelet count. BMBx can be done as outpatient or when out of ICU. Change solumedrol to prednisone taper. Begin with 40mg daily and taper down 10mg weekly. 09/13 Platelets 61,451 yesterday. Transfused 1 unit. Today platelets 68,688. HIT panel pending. IVIG today and tomorrow. Continue with steroids. We will follow up on labs tomorrow. 09/14 Platelets 28,614 today. HIT negative. IVIG today and tomorrow. Continue Prednisone 40 mg daily and taper by 10 mg weekly. Will follow labs tomorrow and if acceptable, will sign off and see him in outpatient clinic. Follow-up Appointments   Procedures    FOLLOW UP VISIT Appointment in: Two Weeks Please schedule new pt apt follow up with Dr. Carlos Gallego (MD Only) 2 weeks from discharge. Labs prior including CBC, CMP. Please schedule new pt apt follow up with Dr. Patricia Ross (MD Only) 2 weeks from discharge. Labs prior including CBC, CMP. Standing Status:   Standing     Number of Occurrences:   1     Order Specific Question:   Appointment in     Answer: [address] [facility]  [address] [place] [address]  P [phone]        Attending Addendum:  I personally evaluated the patient with Manuel Bazzi N.P.,  and agree with the assessment, findings and plan as documented. Appears stable, he should receive IVIG.               Leia Manzano MD  [address] [place] [address] Avenue  Office : [phone]  Fax : [phone]

## 2017-09-14 NOTE — PROGRESS NOTES
PHYSICAL THERAPY DAILY NOTE  Time In: 1115  Time Out: 5664  Patient Seen For: AM;Patient education; Therapeutic exercise;Transfer training    Subjective: \"I'm still feeling pretty tired. \" Patient agreeable to therapy. Objective: Vital Signs:   Patient Vitals for the past 8 hrs:   Temp Pulse Resp BP SpO2   09/14/17 1105 - (!) 132 - 122/67 98 %   09/14/17 0736 97.7 °F (36.5 °C) 77 16 109/62 91 %   09/14/17 0605 - - - - 98 %         Pain level: No pain reported. Pain location: n/a  Pain interventions: n/a    Patient education: Educated patient on deep breathing techniques. Interdisciplinary Communication: Communicated with OT regarding patient's fatigue. Contact (MRSA)  GROSS ASSESSMENT Daily Assessment            BED/MAT MOBILITY Daily Assessment    Rolling Right : 0 (Not tested)  Rolling Left : 0 (Not tested)  Supine to Sit : 0 (Not tested)  Sit to Supine : 0 (Not tested)       TRANSFERS Daily Assessment   Required for safety.   Transfer Type: SPT with walker  Transfer Assistance : 4 (Minimal assistance)  Sit to Stand Assistance: Contact guard assistance  Car Transfers: Not tested       GAIT Daily Assessment   Held secondary to increased patient fatigue         STEPS or STAIRS Daily Assessment            BALANCE Daily Assessment    Sitting - Static: Good (unsupported)  Sitting - Dynamic: Good (unsupported)  Standing - Static: Fair  Standing - Dynamic : Impaired       WHEELCHAIR MOBILITY Daily Assessment    Functional Level: 0 (Secondary to patient fatigue.)  Curbs/Ramps Assist Required (FIM Score): 0 (Not tested)  Wheelchair Setup Assist Required : 0 (Not tested)       LOWER EXTREMITY EXERCISES Daily Assessment    Extremity: Both  Exercise Type #1: Seated lower extremity strengthening  Sets Performed: 1  Reps Performed: 10  Level of Assist: Supervision     Patient performed the following B LE exercises:  SEATED EXERCISES Sets Reps Comments   Ankle Pumps 1 10    Hip Flexion 1 10    Long Arc Quads 1 10 Hip Adduction/Ball Squeeze 1 10    Hip Abduction with Green Theraband 1 10    Hamstring Curls with Green Theraband 1 10    Hip Extension with Green Theraband 1 10      Patient returned to room sitting in w/c with all needs in reach. Assessment: Patient continues to be limited this AM secondary to fatigue and reports of not being able to take a full breath. PT instructed patient on deep breathing techniques and provided rest breaks between therapeutic exercise. Patient was also educated on activity pacing with PT. Patient will benefit from further therapy to improve endurance and increase independence with transfers and household ambulation. Plan of Care: Continue with POC and progress as tolerated.        Bhavesh Aquino  9/14/2017

## 2017-09-14 NOTE — PROGRESS NOTES
[facility] OCCUPATIONAL THERAPY INITIAL EVALUATION    Time In: 0745  Time Out: 9696    Precautions: Falls and nectar thick liquids, MRSA contact precautions    Vitals:   Patient Vitals for the past 8 hrs:   Temp Pulse Resp BP SpO2   09/14/17 1105 - (!) 132 - 122/67 98 %   09/14/17 0736 97.7 °F (36.5 °C) 77 16 109/62 91 %         Pain: None indicated    History of Presenting Illness (per previous reports): Patient is a 67 y.o.  male presents with atrial flutter with RVR and hypotension. He was seen by cardiology and had a MERLYN and cardioverted. He has had intermittent confusion since a fall with head CT and MRI showing no acute abnormality. He has been on lasix and his sodium is now 128. He has since converted to atrial fibrillation. His heart rate is controlled. He was scheduled today for MBS but was more confused prompting an ABG. ABG showed hypercapnic respiratory failure for which he is being transferred to the ICU. He also has thrombocytopenia and been followed by Hem/Onc while here. He is anticoagulated on coumadin.     He has reported COPD managed at [place] and long term history of tobacco abuse. Past Medical History/ Co-morbidities:   Past Medical History:   Diagnosis Date    Acute diastolic CHF (congestive heart failure) ([address] 9/15/2016    Arthritis     Chicken pox     Coronary artery disease 4/16/2014    DJD (degenerative joint disease)     Emphysema     Hx of aortic valve replacement, mechanical       doing well,. Had to hold 3 days for removal of skin ca. Will have to have add. resection.  Hypercholesterolemia     Hypertension     Palpitations      Holter showed  PVC's, PAC's, one short run SVT.  Pneumonia     Systolic CHF, acute ([address] 11/15/2016       Patient's Goal: \"Get back to where I was before. \"    Previous Level of Function: Independent with ADL/IADL and driving PTA, managing medications independently    [facility] Lives Alone No    Support Systems Spouse/Significant Other/Partner    Shower Situation Tub/Shower combination    Current DME Walker, rollator    Lift Chair      Stairs to [employer] 3       Rails Bilateral       W/C Ramp      Interior Steps        Upper Extremity Function   LOIS TORIBIOBARRETT   FMC  Impaired  Impaired   GMC  Intact  Intact   Light Touch No apparent deficit No apparent deficit   Sharp/Dull Discrimination       Hot/Cold No apparent deficit No apparent deficit   Proprioception       Stereognosis       9 Hole Peg Test       General Comments        ANNEBARRETT CATARINOBARRETT   General Evalutaion       AROM       Shoulder Flexion 4 4   Shoulder Extension        Shoulder Abduction 4 4   Shoulder Adduction       Elbow Flexion 4 4   Elbow Extension  4 4   Wrist Flexion/Extension 4      4 4   General Comments     0/5 No palpable muscle contraction  1/5 Palpable muscle contraction, no joint movement  2-/5 Less than full range of motion in gravity eliminated position  2/5 Able to complete full range of motion in gravity eliminated position  2+/5 Able to initiate movement against gravity  3-/5 More than half but not full range of motion against gravity  3/5 Able to complete full range of motion against gravity  3+/5 Completes full range of motion against gravity with minimal resistance  4-/5 Completes full range of motion against gravity with minimal-moderate resistance  4/5 Completes full range of motion against gravity with moderate resistance  4+/5 Completes full range of motion against gravity with moderate-maximum resistance  5/5 Completes full range of motion against gravity with maximum resistance    Cognition   Performance Comments   Orientation Level Oriented X4    Comprehension Level 6 Mode: Auditory  Hearing Aid:None  Glasses:Reading glasses   Expression (Native Language) 6 Mode: Verbal  Increased time, distress with speech d/t intubation   Social Interaction/Pragmatics 5 Monitoring or encouragement for participation/interaction Problem Solving 5 Solves complex problems with cues, or basic problems 90% or more of the time. Memory 5 Recognizes, recalls, or executes 3 steps of 3 step request 90% of the time (cueing, reminders less than 10%, loses track of time) or cueing and coaxing under stressful/unfamiliar conditions. Comments       Activities of Daily Living    Score Comments   Self-Feeding 5 Setup at tray table, nectar thick liquids   Grooming 3 Tasks completed by patient: Washed face, Brushed hair  Assist to comb hair d/t fatigue   Bathing 3 Body Parts Bathe: Abdomen, Arm, left, Arm, right, Chest, Hilaria area  Assist to complete d/t fatigue, stands with use of grab bars with CGA to have bottom bathed   Tub/Shower Transfer [place] 4 Type of Shower: Shower  Adaptive  Equipment:Tub transfer bench, Grab bars and Wheelchair   Upper Body  Dressing/Undressing 3 Items Applied:Pullover (4 steps)  Assist to progress shirt over RUE/down trunk   Lower Body Dressing/Undressing 1 Items Applied:Sock, left (1 step), Sock, right (1 step), Underpants (3 steps), Elastic waist pants (3 steps)  Total assist d/t fatigue end of session   Toileting 1 Assist with all aspects today   Toilet Transfer 4 SPT     Vision/Perception: No deficits noted. Instrumental Activities of Daily Living   Performance Comments   Meal Preperation Total assistance    Homemaking Total assistance    Medication Management Total assistance    Financial Management Total assistance        Session: Patient seen for ADL evaluation this morning completing bathing in shower. Patient's primary barrier to functioning is decreased activity tolerance. Note slowed processing/responses this morning. See above for FIM details. Interdisciplinary Communication: Collaborated with PT and nursing for current level of function, plan of care, and measures to assure safety during their stay. Updated board with current level of function to increase carryover between disciplines. Patient/Family Education: Patient was/were educated On the role of OT, On POC and On IRC expectations. Problem List: [address], Activity Tolerance, Strength, Standing Balance and Cognition    Functional Limitations: ADL, IADL, Functional Transfers and Functional Mobility    Goals: Please see Care Plan    OT order received and chart reviewed. OT orders have been acknowledged. Patient will benefit from skilled OT services to address ADL, functional transfers, UE strength, UE coordination, balance, cognition and activity tolerance to maximize functional performance with daily self-care tasks and functional mobility. Treatment is likely to include ADL, Balance, Strength, Activity tolerance, Cognitive, DME, AE, Family  and Safety awareness  training to increase independence with self-care. Patient will be seen for 1.5-2 hours of skilled OT services 5-6 days a week.      Best Shelby, OT

## 2017-09-14 NOTE — PROGRESS NOTES
OT Daily Note    Time In 1301   Time Out 1345     Subjective: \"I'm out of gas. \"  Pain: None indicated  4/10 on Perceived Exertion Index    Precautions: Contact (MRSA)    Evaluation   Patient seen for formal OT evaluation this PM.  See evaluation note for details. Visual fields and tracking appear intact. Patient wears reading glasses. BUE sensation appears intact for light touch, however note decreased accuracy for naming sections of arm (i.e. \"elbow\" for forearm/alternate sections on multiple occasions). MMT completed, grossly 4/5 strength bilaterally. 9 Hole Peg Test  RUE 54 seconds (dominant)  LUE 52 seconds  FMC impaired bilaterally, patient notes arthritis in fingers. Activity Tolerance   Patient completed BUE endurance task seated at tabletop, note does not tolerate weight added. Patient attached and removed clothespins of varied resistance to vertical and horizontal poles using alternating hands x 2 trials. Patient educated regarding Perceived Exertion Index and rates fatigue as 4/10. Assessment: Patient tolerated well. Decreased strength and DELTA [facility] in [address]. Education: Purpose of therapy  Interdisciplinary Communication: Collaborated with Nayan Choi and agreed patient is progressing well and on-track to meet goals as stated in POC. Plan: Continue to address ADL/IADL, functional mobility, activity tolerance, balance, strengthening, coordination, cognition.     Alexis Malik, OTR/L

## 2017-09-14 NOTE — PROGRESS NOTES
Warfarin dosing per pharmacist    Ryan Eduardo is a 67 y.o. male. Indication: A. Fib    Goal INR:  2-3    Home dose:  5 mg daily, 7.5 mg on Wednesday    Risk factors or significant drug interactions:  Antibiotics, prednisone    Other anticoagulants:  none    Daily Monitoring  Date  INR     Warfarin dose HGB              Notes  9/8  1.2  10mg  11.2    9/9  1.3  10mg  9.4  9/10  ---  10mg  9.7  9/11  2.1  10mg  10.8  9/12  2.9  10mg  8.9  9/13  3.5  Hold  8.4  Pharmacy Consulted  9/14  3.6  Hold  8.5      Pharmacy consulted to manage warfarin on 9/13/17 by Dr. Alli Galindo    Patient with higher than usual home dose for several days. INR is now above goal.      INR up to 3.6. Plan to resume warfarin at a lower dose once INR begins to trend down toward therapeutic range. Daily INR. Pharmacy will continue to follow. Please call with any questions.     Thank you,  Arnol Reid, PharmD  Clinical Pharmacist  [phone]

## 2017-09-14 NOTE — PROGRESS NOTES
Pharmacokinetic Consult to Pharmacist    Anna Covert is a 67 y.o. male being treated for sepsis with aztreonam and vancomycin. Lab Results   Component Value Date/Time    BUN 31 09/13/2017 03:51 AM    Creatinine 0.67 09/13/2017 03:51 AM    WBC 4.4 09/14/2017 05:33 AM    Procalcitonin 0.1 09/04/2017 04:08 AM      Estimated Creatinine Clearance: 96.8 mL/min (based on Cr of 0.67). CULTURES:  8/25 :  BCx / UCx - NG, final  8/29:  MRSA swab - positive, final  9/12: BCx 1/2 E. Coli, prelim   UCx - NGTD, prelim    Lab Results   Component Value Date/Time    Vancomycin,trough 17.1 09/14/2017 02:05 AM       Day 3 of vancomycin. Goal trough is 15-20. Trough is therapeutic. Continue vancomycin 1250 mg IV q12h. Further levels will be ordered as clinically indicated. Consider stopping vancomycin as blood cultures have grown E. Coli. Pharmacy will continue to follow. Please call with any questions.     Thank you,  Jose Nguyen, PharmD  Clinical Pharmacist  [phone]

## 2017-09-14 NOTE — PROGRESS NOTES
Problem: Falls - Risk of  Goal: *Absence of Falls  Document Lauren Fall Risk and appropriate interventions in the flowsheet.    Outcome: Progressing Towards Goal  Fall Risk Interventions:  Mobility Interventions: Utilize walker, cane, or other assitive device     Mentation Interventions: Door open when patient unattended, Evaluate medications/consider consulting pharmacy, Bed/chair exit alarm     Medication Interventions: Bed/chair exit alarm, Evaluate medications/consider consulting pharmacy, Patient to call before getting OOB     Elimination Interventions: Call light in reach     History of Falls Interventions: Bed/chair exit alarm, Door open when patient unattended

## 2017-09-14 NOTE — PROGRESS NOTES
09/14/17 1036   Mental Status   Neurologic State Alert   Orientation Level Oriented X4   Cognition Appropriate decision making   Perception Appears intact   Perseveration No perseveration noted   Safety/Judgement Fall prevention   Oral Assessment   Labial No impairment   Dentition Upper & lower dentures   Oral Hygiene adequate   Lingual No impairment   PO Trials   Assessment Method(s) Observation   Patient Position upright in bed   Vocal Quality No impairment   Consistency Presented Thin liquid   How Presented Straw;Cup/sip; Self-fed/presented   Bolus Acceptance No impairment   Bolus Formation/Control Impaired   Type of Impairment Mastication  (reports mastication)   Propulsion No impairment   Oral Residue None   Initiation of Swallow No impairment   Aspiration Signs/Symptoms None   Pharyngeal Phase Characteristics No impairment, issues, or problems    Findings and Recommendations   Oral Phase Severity Mild-moderate   Pharyngeal Phase Severity  Mild   Treatment Diagnosis   Treatment Diagnosis R13.12 oropharynheal phase dysphagia   Pt reports he does not want any food. Pt drank water from a cup and straw with no overt signs/sx of aspiration. Modified barium swallow study on 9/8 recommend mechanical soft textures with nectar thick liquids. Pt reports wife was chopping up meats at home. Recommend continued mechanical soft textures with nectar thick liquids. Free water between meals. Recommend a re Modified barium swallow study to rule out aspiration and assess for diet upgrade. Noted hoarse voice quality. Pt would benefit from continued ST services to address dysphagia, voice and assess cognition.    Time in 1000  Time out [address] MA/CCC/SLP

## 2017-09-14 NOTE — PROGRESS NOTES
PHYSICAL THERAPY EXAMINATION    Time in: [address]  Time out: [address]    Patient Name: Holly Esparza  Patient Age: 67 y.o. Past Medical History:   Past Medical History:   Diagnosis Date    Acute diastolic CHF (congestive heart failure) ([facility] [address] 9/15/2016    Arthritis     Chicken pox     Coronary artery disease 4/16/2014    DJD (degenerative joint disease)     Emphysema     Hx of aortic valve replacement, mechanical       doing well,. Had to hold 3 days for removal of skin ca. Will have to have add. resection.  Hypercholesterolemia     Hypertension     Palpitations      Holter showed  PVC's, PAC's, one short run SVT.  Pneumonia     Systolic CHF, acute ([facility] [address] 11/15/2016       Medical Diagnosis:  Cardiac Debility  Respiratory failure (HCC) <principal problem not specified>    Precautions at Admission: Contact (MRSA)    Therapy Diagnosis:   Difficulty with bed mobility  [x]     Difficulty with functional transfers  [x]     Difficulty with ambulation  [x]     Difficulty with stair negotiations  [x]       Problem List:    Decreased strength B LE  [x]     Decreased strength trunk/core  [x]     Decreased AROM   []     Decreased PROM  []    Decreased endurance  [x]     Decreased balance sitting  []     Decreased balance standing  [x]     Pain   [x]     Slow ambulation velocity  [x]    Decreased coordination  [x]    Decreased safety awareness  [x]      Functional Limitations:   Decreased independence with bed mobility  [x]     Decreased independence with functional transfers  [x]     Decreased independence with ambulation  [x]     Decreased independence with stair negotiation  [x]       Previous Functional Level: Patient was driving independently, ambulating without AD, performing tasks around house (mowing lawn).     Home Environment: Home Environment: Private residence  # Steps to Enter: 3  Rails to Enter: Yes  Hand Rails : Bilateral  One/Two Story Residence: One story  Living Alone: No  Support Systems: Spouse/Significant Other/Partner, [employer] / [employer] community  Patient Expects to be Discharged to[de-identified] Private residence  Current DME Used/Available at Home: [employer], rollator  Tub or Shower Type: Tub/Shower combination         Outcome Measures: Vital Signs:   Patient Vitals for the past 8 hrs:   Temp Pulse Resp BP SpO2   09/14/17 1105 - (!) 132 - 122/67 98 %   09/14/17 0736 97.7 °F (36.5 °C) 77 16 109/62 91 %   09/14/17 0605 - - - - 98 %   09/14/17 0419 97.7 °F (36.5 °C) 63 16 130/68 100 %         Pain level: No pain reported. Pain location: n/a  Pain interventions: n/a    Patient education: Oriented patient to [facility] and daily schedule. Interdisciplinary Communication: Communicated with Leo Martinez OT regarding patient's POC.        MMT Initial Asssessment   Right Lower Extremity Left Lower Extremity   Hip Flexion 4 4   Knee Extension 4 4   Knee Flexion 4- 4-   Ankle Dorsiflexion 4 4   0/5 No palpable muscle contraction  1/5 Palpable muscle contraction, no joint movement  2-/5 Less than full range of motion in gravity eliminated position  2/5 Able to complete full range of motion in gravity eliminated position  2+/5 Able to initiate movement against gravity  3-/5 More than half but not full range of motion against gravity  3/5 Able to complete full range of motion against gravity  3+/5 Completes full range of motion against gravity with minimal resistance  4-/5 Completes full range of motion against gravity with minimal-moderate resistance  4/5 Completes full range of motion against gravity with moderate resistance  4+/5 Completes full range of motion against gravity with moderate-maximum resistance  5/5 Completes full range of motion against gravity with maximum resistance     AROM: WFL    FIM SCORES Initial Assessment   Bed/Chair/Wheelchair Transfers 4   Wheelchair Mobility 0 (Secondary to patient fatigue.)   Walking [place] 1   Steps/Stairs 0 (Secondary to patient fatigue)   PRIMARY MODE OF LOCOMOTION: Ambulation  Please see [facility] Interdisciplinary Eval: Coordination/Balance Section for details regarding FIM score description. BED/CHAIR/WHEELCHAIR TRANSFERS Initial Assessment   Rolling Right 0 (Not tested)   Rolling Left 0 (Not tested)   Supine to Sit 4 (Contact guard assistance)   Sit to Stand Contact guard assistance   Sit to Supine 4 (Minimal assistance) (CGA)   Transfer Assist Score 4   Transfer Type SPT with walker   Comments Patient unsteady with standing, takes short, cautious steps   Car Transfer Not tested   Car Type         WHEELCHAIR MOBILITY/MANAGEMENT Initial Assessment   Able to Propel     Functional Level 0 (Secondary to patient fatigue.)   Curbs/ramps assistance required 0 (Not tested)   Wheelchair set up assistance required 0 (Not tested)   Wheelchair management         WALKING INDEPENDENCE Initial Assessment   Assistive device  (No Device- per pre-morbid level of function)   Ambulation assistance - level surface 4 (Minimal assistance)   Distance 5 Feet (ft)   Functional Level 1   Comments Decreased dynamic standing balance, patient ambulated with decreased step length B LE, decreased arm swing B UE, decreased trunk sway, mild forward flexed posture   Ambulation assistance - unlevel surface 0 (Not tested)       STEPS/STAIRS Initial Assessment   Steps/Stairs ambulated  0   Rail Use  N/A   Functional Level 0 (Secondary to patient fatigue)   Comments     Curbs/Ramps           QUALITY INDICATOR ASSIST COMMENTS   Walk 10 feet NT Secondary to patient fatigue and decreased dynamic standing balance. Walk 50 feet with 2 turns NT Secondary to patient fatigue and decreased dynamic standing balance. Walk 150 feet NT Secondary to patient fatigue and decreased dynamic standing balance. Walk 10 feet on uneven  NT Secondary to patient fatigue and decreased dynamic standing balance. 1 step/curb NT Secondary to patient fatigue and decreased dynamic standing balance.    4 steps NT Secondary to patient fatigue and decreased dynamic standing balance. 12 steps NT Secondary to patient fatigue and decreased dynamic standing balance.  object NT Secondary to patient fatigue and decreased dynamic standing balance. Wheel 48' w/2 turns NT Secondary to patient fatigue. Wheel 150' NT Secondary to patient fatigue. Patient returned to room lying supine in bed with head of bed elevated and all needs in reach. PHYSICAL THERAPY PLAN OF CARE    Therapy Diagnosis:   Please see table above    Order received from MD for physical therapy services and chart reviewed. Pt to be seen at least 5 times per week for at least 1.5 hours of physical therapy per day for 2 weeks. Thank you for the referral.    LTGs:  LTG 1. Patient transfer supine<>sit with MOD I in 2 weeks  LTG 2. Patient transfer sit<>stand and perform stand pivot transfer with RW and modified independence in 2 weeks  LTG 3. Patient ambulate 200 feet with LRAD and MOD I in 2 weeks  LTG 4. Patient ambulate up/down 4 steps with handrails and MOD I in 2 weeks  LTG 5. Patient will perform HEP with MOD I in 2 weeks. Pt would benefit from skilled physical therapy in order to improve independent functional mobility within the home. Interventions may include range of motion (AROM, PROM B LE/trunk), motor function (B LE/trunk strengthening/coordination), activity tolerance (vitals, oxygen saturation levels), bed mobility training, balance activities, gait training (progressive ambulation program), and functional transfer training. Please see IRC; Interdisciplinary Eval, Care Plan, and Patient Education for further information regarding physical therapy examination and plan of care.      Rafia Franco  9/14/2017

## 2017-09-14 NOTE — H&P
[address] [place] [address], [address] [facility]  Tel: [phone]  Fax: [phone]      HISTORY AND PHYSICAL  IRC       Admit Date: 9/13/2017  Primary Care Physician: Yamini Martell MD  Specialty Group:pulmonary, PMR, cardiology, hematology    Chief Complaint : Gait dysfunction secondary to below. Admit Diagnosis: Atrial flutter with rapid ventricular response (HCC)  Atrial flutter with rapid ventricular response (HCC) (8/23/2017) sp cardioversion  Anticoagulant long-term use  HTN (hypertension)  Peripheral vascular disease   Dyslipidemia   History of mechanical aortic valve replacement 2000, on coumadin  Ischemic cardiomyopathy    Thrombocytopenia/ ITP  Hypoglycemia  COPD  DM II   iron deficiency anemia  hyponatremia  Acute Rehab Dx:  cognitive deficit  Weakness  spasticity  Debility    deconditioning  Mobility and ambulation deficits  Self Care/ADL deficits    Medical Dx:  Past Medical History:   Diagnosis Date    Acute diastolic CHF (congestive heart failure) ([facility] [address] 9/15/2016    Arthritis     Chicken pox     Coronary artery disease 4/16/2014    DJD (degenerative joint disease)     Emphysema     Hx of aortic valve replacement, mechanical       doing well,. Had to hold 3 days for removal of skin ca. Will have to have add. resection.  Hypercholesterolemia     Hypertension     Palpitations      Holter showed  PVC's, PAC's, one short run SVT.  Pneumonia     Systolic CHF, acute ([address] 11/15/2016       Date of Evaluation:  September 14, 2017    HPI: Delfina Mooney is a 67 y.o. male patient at [address] [place] [address] who was admitted on 8/23/2017  by Livan Orantes MD with below mentioned medical history ,is being seen for Physical Medicine and Rehabilitation consult.       Delfina Mooney presented with worsening dyspnea. He was found to be in atrial flutter with 's, was started on treatment and was admitted.  Patient continued to have a.fib with RVR, therefore underwent a MERLYN-guided   Cardioversion with success. EE demonstrated no evidence of left atrial, left atrial appendage, right atrium, right atrial appendage thrombus. Patient's admission course was complicated by thrombocytopenia. Hematology diagnosed possible ITP, and started on trial IVIG. On third day of admission, he was noted to be more confused, weak and unable to carry out his normal activity. He then developed hypercapnic respiratory failure requiring admission to the ICU. There he required off bipap support, o2 supplement. His afib recurred, requiring continued medical treatment for control at ICU. Patient meanwhile continued to receive hematology treatment for declining platelets, ivig and iv/ po steroids. Patient also had G- bacteremia, elevated WBCs prior to admisison to IRU. Empiric iv antibiotics have been started. We are consulted to assist with rehab needs and placement. Patient has been seen and evaluated by acute PT. he requires CGA, minimum assist for transfers and gait. started to participate in therapies with acute PT, OT and ST. He shows significant right sided deficits, limiting his mobility, ambulation and ADLs. He is managing his gait with minimal assist.    Norbert Saucedo is seen and examined today. Medical Records reviewed. Patient was active and independent with all activities. He had no gait  Problems prior to this admission. Physical therapy was initiated and patient was starting to mobilize. However, she shows significant functional deficits due to prolonged immobility and hospitalization. Our service was consulted for rehab needs and we recommended inpatient hospital rehabilitation is reasonable and necessary due to ongoing acute medical issues which have not changed since initial pre-admission evaluation. and rehab needs still likely best managed in IRU setting. The patient was evaluated by [facility] admissions coordinators.  I reviewed the preadmission screening and have approved for admission to the [facility]. The patient was cleared for transfer to rehab today. Patient continues to have ongoing  medical issues outlined above requiring physician medical supervision and functional deficits which would benefit from continued intensive therapies. Current Level of Function: (evaluated by acute therapy staff, with bed mobility, transfers, balance personally observed post-admission in the IRF setting minutes prior to submission of document) bathing - mod A, lower body dressing - max A, toileting - max A, bed mobility - min A, transfers- CGA, poor balance , ambulation- short distances, 5' CGA with RW     Prior Level of Function/Work/Activity:  Lives with wife, indep with gait and ADLs, 0 falls, driving? Past Medical History:   Diagnosis Date    Acute diastolic CHF (congestive heart failure) ([facility] [address] 9/15/2016    Arthritis     Chicken pox     Coronary artery disease 4/16/2014    DJD (degenerative joint disease)     Emphysema     Hx of aortic valve replacement, mechanical       doing well,. Had to hold 3 days for removal of skin ca. Will have to have add. resection.  Hypercholesterolemia     Hypertension     Palpitations      Holter showed  PVC's, PAC's, one short run SVT.  Pneumonia     Systolic CHF, acute ([address] 11/15/2016      Past Surgical History:   Procedure Laterality Date    HX AORTIC VALVE REPLACEMENT N/A 2000    Aortic Valve Replacement w/ Mechanical Valve    HX HEART CATHETERIZATION  07/21/2004    [facility]     Allergies   Allergen Reactions    Levaquin [Levofloxacin] Other (comments)     GI upset    Metformin Other (comments)     Gi upset      No family history on file.    Social History   Substance Use Topics    Smoking status: Former Smoker     Packs/day: 1.50     Years: 40.00    Smokeless tobacco: Never Used    Alcohol use No      Current Facility-Administered Medications   Medication Dose Route Frequency    immune globulin 10% (GAMUNEX-C) 65 g  65 g IntraVENous ONCE TITR    [START ON 9/15/2017] immune globulin 10% infusion 63.8 g  1,000 mg/kg (Ideal) IntraVENous ONCE TITR    0.9% sodium chloride infusion 250 mL  250 mL IntraVENous PRN    nitroglycerin (NITROSTAT) tablet 0.4 mg  0.4 mg SubLINGual Q5MIN PRN    ondansetron (ZOFRAN) injection 4 mg  4 mg IntraVENous Q4H PRN    sodium chloride (NS) flush 5-10 mL  5-10 mL IntraVENous PRN    NUTRITIONAL SUPPORT ELECTROLYTE PRN ORDERS   Does Not Apply PRN    acetaminophen (TYLENOL) suppository 650 mg  650 mg Rectal Q4H PRN    acetaminophen (TYLENOL) tablet 650 mg  650 mg Oral Q4H PRN    albuterol (PROVENTIL HFA, VENTOLIN HFA, PROAIR HFA) inhaler 2 Puff  2 Puff Inhalation Q4H PRN    albuterol (PROVENTIL VENTOLIN) nebulizer solution 2.5 mg  2.5 mg Nebulization Q4H PRN    aztreonam (AZACTAM) 2 g in 0.9% sodium chloride (MBP/ADV) 100 mL MBP  2 g IntraVENous Q8H    budesonide (PULMICORT) 500 mcg/2 ml nebulizer suspension  500 mcg Nebulization BID RT    And    albuterol CONCENTRATE 2.5mg/0.5 mL neb soln  2.5 mg Nebulization Q6H RT    dextrose (D50W) injection syrg 25 g  25 g IntraVENous PRN    digoxin (LANOXIN) tablet 0.25 mg  0.25 mg Oral DAILY    famotidine (PF) (PEPCID) 20 mg in sodium chloride 0.9 % 10 mL injection  20 mg IntraVENous DAILY    ferrous sulfate tablet 325 mg  1 Tab Oral DAILY WITH BREAKFAST    gabapentin (NEURONTIN) capsule 600 mg  600 mg Oral TID    guaiFENesin ER (MUCINEX) tablet 1,200 mg  1,200 mg Oral BID    heparin (porcine) pf 600 Units  600 Units InterCATHeter Q8H    heparin (porcine) pf 600 Units  600 Units InterCATHeter PRN    insulin lispro (HUMALOG) injection   SubCUTAneous AC&HS    povidone-iodine (BETADINE) 10 % topical solution   Topical DAILY    [START ON 9/29/2017] predniSONE (DELTASONE) tablet 10 mg  10 mg Oral DAILY WITH BREAKFAST    [START ON 9/22/2017] predniSONE (DELTASONE) tablet 20 mg  20 mg Oral DAILY WITH BREAKFAST    [START ON 9/15/2017] predniSONE (DELTASONE) tablet 30 mg  30 mg Oral DAILY WITH BREAKFAST    sodium chloride (NS) flush 20 mL  20 mL InterCATHeter Q8H    tiotropium (SPIRIVA) inhalation capsule 18 mcg  1 Cap Inhalation DAILY    traZODone (DESYREL) tablet 50 mg  50 mg Oral QHS PRN    vancomycin (VANCOCIN) 1250 mg in  ml infusion  1,250 mg IntraVENous Q12H    warfarin (COUMADIN) tablet 5 mg - pharmacy dosing ON HOLD  5 mg Oral See Admin Instructions    influenza vaccine 2015-16 (36mos+)(PF) (FLUZONE/FLUARIX QUAD) injection 0.5 mL  0.5 mL IntraMUSCular PRIOR TO DISCHARGE    carvedilol (COREG) tablet 3.125 mg  3.125 mg Oral BID WITH MEALS       Review of Systems:  Denies: fevers, chills, sweats, fatigue, malaise, anorexia, weight loss   Denies: blurry vision, loss of vision, eye pain, photophobia   Denies: hearing loss, ringing in the ears, earache, epistaxis   Denies: chest pain, palpitations, syncope, orthopnea, paroxysmal nocturnal dyspnea, claudication   Denies: dysphagia, odynophagia, nausea, vomiting, diarrhea, constipation, abdominal pain, jaundice, melena   Denies: frequency, dysuria, nocturia, urinary incontinence, stones, hematuria   Denies: polydipsia/polyuria, skin changes, temperature intolerance, unexpected weight gain   Denies: back pain, joint pain, joint swelling, muscle pain, muscle weakness   Denies: bleeding problems, blood transfusions, bruising, pallor, swollen lymph nodes   Denies: headache, dysarthria, blurred vision, diplopia,seizure, focal deficits. Objective:     Visit Vitals    /67    Pulse (!) 132    Temp 97.7 °F (36.5 °C)    Resp 16    SpO2 98%      Intake and Output:     Physical Exam:   General: Alert and age appropriately oriented x 2. No acute cardio respiratory distress. HEENT: Normocephalic,no scleral icterus  Oral mucosa moist without cyanosis, No bruit, No JVD. Lungs: Clear to auscultation  bilaterally.   Respiration even and unlabored   Heart: Regular rate and rhythm, S1, S2   No murmurs, clicks, rub or gallops   Abdomen: Soft, non-tender, nondistended. Bowel sounds present. No organomegaly. Genitourinary: defered   Neuromuscular:     PERRL, EOMI  Speech slow, dysarthric. Focus, attention poor. Follows simple commands consistently, with delay, corrections. Able to identify, recall repeat.     LUE     Shoulder abduction  5- /5              Elbow flexion:  5- /5               Wrist extension:   5-/5              Finger flexion;  5- /5                        ADMQ:   4+/5  RUE    Shoulder abduction:  5-/5                Elbow flexion:   5-/5                         Wrist extension:  5-/5                        Finger flexion:   /5-5                        ADMQ:  5- /5  LLE     Hip flexion:  3 /5              Knee extension:   4/5                         Ankle dorsiflexion: 4+ /5                        Ankle plantarflexion:  5- /5                                                                RLE     Hip flexion:  3 /5                        Knee extension:  4 /5                         Ankle dorsiflexion: 4+ /5                        Ankle plantarflexion:   5-/5  Sensory - intact  No cerebellar signs. Plantars - down going  No atrophy, no fasciculations, no tremors. Skin/extremity: No rashes, no erythema.  Calf non tender BLE.         Lab Review:    Recent Results (from the past 72 hour(s))   GLUCOSE, POC    Collection Time: 09/11/17  4:25 PM   Result Value Ref Range    Glucose (POC) 216 (H) 65 - 100 mg/dL   EKG, 12 LEAD, INITIAL    Collection Time: 09/11/17  9:32 PM   Result Value Ref Range    Ventricular Rate 132 BPM    Atrial Rate 132 BPM    P-R Interval 112 ms    QRS Duration 166 ms    Q-T Interval 384 ms    QTC Calculation (Bezet) 568 ms    Calculated R Axis 126 degrees    Calculated T Axis 54 degrees    Diagnosis       atrial tachycardia RVR   Right axis deviation  Non-specific intra-ventricular conduction block  Abnormal ECG  When compared with ECG of 23-AUG-2017 13:29,  RBBB RV rate has increased since August 23  Confirmed by GLO KWOK (), [place] ([id number]) on 9/12/2017 7:50:21 AM     GLUCOSE, POC    Collection Time: 09/11/17  9:46 PM   Result Value Ref Range    Glucose (POC) 106 (H) 65 - 100 mg/dL   GLUCOSE, POC    Collection Time: 09/12/17  6:21 AM   Result Value Ref Range    Glucose (POC) 140 (H) 65 - 100 mg/dL   PROTHROMBIN TIME + INR    Collection Time: 09/12/17  6:31 AM   Result Value Ref Range    Prothrombin time 32.1 (H) 9.6 - 12.0 sec    INR 2.9 (H) 0.9 - 1.2     CBC WITH AUTOMATED DIFF    Collection Time: 09/12/17  6:31 AM   Result Value Ref Range    WBC 23.9 (H) 4.3 - 11.1 K/uL    RBC 3.23 (L) 4.23 - 5.67 M/uL    HGB 9.7 (L) 13.6 - 17.2 g/dL    HCT 29.8 (L) 41.1 - 50.3 %    MCV 92.3 79.6 - 97.8 FL    MCH 30.0 26.1 - 32.9 PG    MCHC 32.6 31.4 - 35.0 g/dL    RDW 16.8 (H) 11.9 - 14.6 %    PLATELET 75 (L) 487 - 450 K/uL    MPV 13.1 10.8 - 14.1 FL    DF AUTOMATED      NEUTROPHILS 94 (H) 43 - 78 %    LYMPHOCYTES 2 (L) 13 - 44 %    MONOCYTES 4 4.0 - 12.0 %    EOSINOPHILS 0 (L) 0.5 - 7.8 %    BASOPHILS 0 0.0 - 2.0 %    IMMATURE GRANULOCYTES 0.3 0.0 - 5.0 %    ABS. NEUTROPHILS 22.4 (H) 1.7 - 8.2 K/UL    ABS. LYMPHOCYTES 0.5 0.5 - 4.6 K/UL    ABS. MONOCYTES 1.0 0.1 - 1.3 K/UL    ABS. EOSINOPHILS 0.0 0.0 - 0.8 K/UL    ABS. BASOPHILS 0.0 0.0 - 0.2 K/UL    ABS. IMM. GRANS. 0.1 0.0 - 0.5 K/UL   METABOLIC PANEL, COMPREHENSIVE    Collection Time: 09/12/17  6:31 AM   Result Value Ref Range    Sodium 143 136 - 145 mmol/L    Potassium 4.3 3.5 - 5.1 mmol/L    Chloride 103 98 - 107 mmol/L    CO2 35 (H) 21 - 32 mmol/L    Anion gap 5 (L) 7 - 16 mmol/L    Glucose 126 (H) 65 - 100 mg/dL    BUN 42 (H) 8 - 23 MG/DL    Creatinine 1.08 0.8 - 1.5 MG/DL    GFR est AA >60 >60 ml/min/1.73m2    GFR est non-AA >60 >60 ml/min/1.73m2    Calcium 8.4 8.3 - 10.4 MG/DL    Bilirubin, total 2.1 (H) 0.2 - 1.1 MG/DL    ALT (SGPT) 578 (H) 12 - 65 U/L    AST (SGOT) 405 (H) 15 - 37 U/L    Alk.  phosphatase 122 50 - 136 U/L Protein, total 6.5 6.3 - 8.2 g/dL    Albumin 2.3 (L) 3.2 - 4.6 g/dL    Globulin 4.2 (H) 2.3 - 3.5 g/dL    A-G Ratio 0.5 (L) 1.2 - 3.5     CULTURE, BLOOD    Collection Time: 09/12/17 10:08 AM   Result Value Ref Range    Special Requests: RED PICC      GRAM STAIN GRAM NEGATIVE RODS      GRAM STAIN AEROBIC AND ANAEROBIC BOTTLES      GRAM STAIN        RESULTS VERIFIED, PHONED TO AND READ BACK BY  Fred Balbuena RN @6042 9/12/17 ES    Culture result: ESCHERICHIA COLI (A)      Culture result: REPEATING SUSCEPTIBILITY    CULTURE, BLOOD    Collection Time: 09/12/17 10:11 AM   Result Value Ref Range    Special Requests: LEFT HAND      Culture result: NO GROWTH 2 DAYS     CBC WITH AUTOMATED DIFF    Collection Time: 09/12/17 10:59 AM   Result Value Ref Range    WBC 21.4 (H) 4.3 - 11.1 K/uL    RBC 3.46 (L) 4.23 - 5.67 M/uL    HGB 10.4 (L) 13.6 - 17.2 g/dL    HCT 32.5 (L) 41.1 - 50.3 %    MCV 93.9 79.6 - 97.8 FL    MCH 30.1 26.1 - 32.9 PG    MCHC 32.0 31.4 - 35.0 g/dL    RDW 16.9 (H) 11.9 - 14.6 %    PLATELET 30 (LL) 386 - 450 K/uL    MPV Cannot be calculated 10.8 - 14.1 FL    DF AUTOMATED      NEUTROPHILS 94 (H) 43 - 78 %    LYMPHOCYTES 3 (L) 13 - 44 %    MONOCYTES 3 (L) 4.0 - 12.0 %    EOSINOPHILS 0 (L) 0.5 - 7.8 %    BASOPHILS 0 0.0 - 2.0 %    IMMATURE GRANULOCYTES 0.2 0.0 - 5.0 %    ABS. NEUTROPHILS 21.3 (H) 1.7 - 8.2 K/UL    ABS. LYMPHOCYTES 0.7 0.5 - 4.6 K/UL    ABS. MONOCYTES 0.7 0.1 - 1.3 K/UL    ABS. EOSINOPHILS 0.0 0.0 - 0.8 K/UL    ABS. BASOPHILS 0.0 0.0 - 0.2 K/UL    ABS. IMM. GRANS. 0.1 0.0 - 0.5 K/UL   GLUCOSE, POC    Collection Time: 09/12/17 11:07 AM   Result Value Ref Range    Glucose (POC) 188 (H) 65 - 100 mg/dL   HIT PROFILE    Collection Time: 09/12/17  3:43 PM   Result Value Ref Range    HIT PROFILE PERFORMED BY [facility]      HEPARIN INDUCED PLT AB.  NEGATIVE       OPTICAL DENSITY READ 0.135 <0.400 ABS    HIT INTERPRETATION NEGATIVE      GLUCOSE, POC    Collection Time: 09/12/17 4:00 PM   Result Value Ref Range    Glucose (POC) 196 (H) 65 - 100 mg/dL   CULTURE, URINE    Collection Time: 09/12/17  5:05 PM   Result Value Ref Range    Special Requests: NO SPECIAL REQUESTS      Culture result: NO GROWTH 1 DAY     CBC W/O DIFF    Collection Time: 09/12/17  6:19 PM   Result Value Ref Range    WBC 16.9 (H) 4.3 - 11.1 K/uL    RBC 2.99 (L) 4.23 - 5.67 M/uL    HGB 8.9 (L) 13.6 - 17.2 g/dL    HCT 27.3 (L) 41.1 - 50.3 %    MCV 91.3 79.6 - 97.8 FL    MCH 29.8 26.1 - 32.9 PG    MCHC 32.6 31.4 - 35.0 g/dL    RDW 16.9 (H) 11.9 - 14.6 %    PLATELET 12 (LL) 144 - 450 K/uL    MPV Cannot be calculated 10.8 - 14.1 FL   PLATELETS, ALLOCATE    Collection Time: 09/12/17  7:30 PM   Result Value Ref Range    Unit number [id number]     Blood component type PLTPH LRIR,2     Unit division 00     Status of unit TRANSFUSED    GLUCOSE, POC    Collection Time: 09/12/17  9:30 PM   Result Value Ref Range    Glucose (POC) 175 (H) 65 - 100 mg/dL   TYPE & SCREEN    Collection Time: 09/12/17  9:40 PM   Result Value Ref Range    Crossmatch Expiration 09/15/2017     ABO/Rh(D) A NEGATIVE     Antibody screen NEG    GLUCOSE, POC    Collection Time: 09/12/17 11:36 PM   Result Value Ref Range    Glucose (POC) 134 (H) 65 - 100 mg/dL   PROTHROMBIN TIME + INR    Collection Time: 09/13/17  3:51 AM   Result Value Ref Range    Prothrombin time 38.3 (H) 9.6 - 12.0 sec    INR 3.5 (H) 0.9 - 1.2     METABOLIC PANEL, BASIC    Collection Time: 09/13/17  3:51 AM   Result Value Ref Range    Sodium 142 136 - 145 mmol/L    Potassium 3.6 3.5 - 5.1 mmol/L    Chloride 106 98 - 107 mmol/L    CO2 30 21 - 32 mmol/L    Anion gap 6 (L) 7 - 16 mmol/L    Glucose 93 65 - 100 mg/dL    BUN 31 (H) 8 - 23 MG/DL    Creatinine 0.67 (L) 0.8 - 1.5 MG/DL    GFR est AA >60 >60 ml/min/1.73m2    GFR est non-AA >60 >60 ml/min/1.73m2    Calcium 7.4 (L) 8.3 - 10.4 MG/DL   CBC WITH AUTOMATED DIFF    Collection Time: 09/13/17  3:51 AM   Result Value Ref Range    WBC 10.5 4.3 - 11.1 K/uL    RBC 2.57 (L) 4.23 - 5.67 M/uL    HGB 8.4 (L) 13.6 - 17.2 g/dL    HCT 26.1 (L) 41.1 - 50.3 %    MCV 92.2 79.6 - 97.8 FL    MCH 29.6 26.1 - 32.9 PG    MCHC 32.1 31.4 - 35.0 g/dL    RDW 16.9 (H) 11.9 - 14.6 %    PLATELET 84 (L) 155 - 450 K/uL    MPV 12.0 10.8 - 14.1 FL    DF AUTOMATED      NEUTROPHILS 94 (H) 43 - 78 %    LYMPHOCYTES 3 (L) 13 - 44 %    MONOCYTES 3 (L) 4.0 - 12.0 %    EOSINOPHILS 0 (L) 0.5 - 7.8 %    BASOPHILS 0 0.0 - 2.0 %    IMMATURE GRANULOCYTES 0.2 0.0 - 5.0 %    ABS. NEUTROPHILS 9.9 (H) 1.7 - 8.2 K/UL    ABS. LYMPHOCYTES 0.3 (L) 0.5 - 4.6 K/UL    ABS. MONOCYTES 0.4 0.1 - 1.3 K/UL    ABS. EOSINOPHILS 0.0 0.0 - 0.8 K/UL    ABS. BASOPHILS 0.0 0.0 - 0.2 K/UL    ABS. IMM. GRANS. 0.0 0.0 - 0.5 K/UL   GLUCOSE, POC    Collection Time: 09/13/17  6:34 AM   Result Value Ref Range    Glucose (POC) 99 65 - 100 mg/dL   GLUCOSE, POC    Collection Time: 09/13/17 11:08 AM   Result Value Ref Range    Glucose (POC) 130 (H) 65 - 100 mg/dL   GLUCOSE, POC    Collection Time: 09/13/17  5:01 PM   Result Value Ref Range    Glucose (POC) 161 (H) 65 - 100 mg/dL   GLUCOSE, POC    Collection Time: 09/13/17  8:18 PM   Result Value Ref Range    Glucose (POC) 262 (H) 65 - 100 mg/dL   VANCOMYCIN, TROUGH    Collection Time: 09/14/17  2:05 AM   Result Value Ref Range    Vancomycin,trough 17.1 5 - 20 ug/mL   CBC WITH AUTOMATED DIFF    Collection Time: 09/14/17  5:33 AM   Result Value Ref Range    WBC 4.4 4.3 - 11.1 K/uL    RBC 2.62 (L) 4.23 - 5.67 M/uL    HGB 8.5 (L) 13.6 - 17.2 g/dL    HCT 26.8 (L) 41.1 - 50.3 %    MCV 93.1 79.6 - 97.8 FL    MCH 29.4 26.1 - 32.9 PG    MCHC 31.6 31.4 - 35.0 g/dL    RDW 17.2 (H) 11.9 - 14.6 %    PLATELET 62 (L) 226 - 450 K/uL    MPV 12.4 10.8 - 14.1 FL    DF AUTOMATED      NEUTROPHILS 83 (H) 43 - 78 %    LYMPHOCYTES 8 (L) 13 - 44 %    MONOCYTES 8 4.0 - 12.0 %    EOSINOPHILS 1 0.5 - 7.8 %    BASOPHILS 0 0.0 - 2.0 %    IMMATURE GRANULOCYTES 0.2 0.0 - 5.0 %    ABS.  NEUTROPHILS 3.6 1.7 - 8.2 K/UL    ABS. LYMPHOCYTES 0.4 (L) 0.5 - 4.6 K/UL    ABS. MONOCYTES 0.4 0.1 - 1.3 K/UL    ABS. EOSINOPHILS 0.0 0.0 - 0.8 K/UL    ABS. BASOPHILS 0.0 0.0 - 0.2 K/UL    ABS. IMM. GRANS. 0.0 0.0 - 0.5 K/UL   PROTHROMBIN TIME + INR    Collection Time: 09/14/17  5:33 AM   Result Value Ref Range    Prothrombin time 39.4 (H) 9.6 - 12.0 sec    INR 3.6 (HH) 0.9 - 1.2     GLUCOSE, POC    Collection Time: 09/14/17  6:11 AM   Result Value Ref Range    Glucose (POC) 66 65 - 100 mg/dL   GLUCOSE, POC    Collection Time: 09/14/17 11:26 AM   Result Value Ref Range    Glucose (POC) 150 (H) 65 - 100 mg/dL       Functional Assessment:          Balance  Sitting - Static: Good (unsupported) (09/14/17 1200)  Sitting - Dynamic: Good (unsupported) (09/14/17 1200)  Standing - Static: Fair (09/14/17 1200)  Standing - Dynamic : Impaired (09/14/17 1200)                     Speech Assessment:  Aspiration Signs/Symptoms: None (09/14/17 1036)      Ambulation:  Gait  Distance (ft): 5 Feet (ft) (09/14/17 0900)  Assistive Device:  (No Device- per pre-morbid level of function) (09/14/17 0900)       Condition on Admission: Good      Assessment:    The Post Assessment Physician Evaluation (JOE) found the current functional status to be comparable with the Pre-admission Screening. The Patient is a good candidate for acute inpatient rehabilitation. Nothing since the Pre-admission screen has changed that determination. Rehabilitation Plan  The patient has shown the ability to tolerate and benefit from 3 hours of therapy daily and is being admitted to a comprehensive acute inpatient rehabilitation program consisting of at least 3 hours of combined physical and occupational therapies. Begin intensive Physical Therapy for a minimum of 1.5 hours a day, at least 5 out of 7 days per week to address bed mobility, transfers, ambulation, strengthening, balance, and endurance. Patient limited by poor endurance , weakness of his trunk and limbs.    Begin intensive Occupational Therapy for a minimum of 1.5 hours a day, at least 5 out of 7 days per week to address ADL ( bathing, LE dressing, toileting) and adaptive equipment as needed. Continue ST for: dysarthria, impaired communication skills, hypophonia, dysphagia. Continue 24-hour skilled rehabilitation nursing for bowel and bladder management, skin care for decubitus ulcer prevention , pain management and ongoing medication administration     Continue daily physician medical management:    Atrial flutter with rapid ventricular response ([facility] [address] (8/23/2017) sp cardioversion - monitor HR./ BP.   - Digoxin, coreg.  - continue anticoagulation  - cardiac precautions. History of mechanical aortic valve replacement 2000, on coumadin/ Anticoagulant long-term use. Goal 2.5-3.5.  - resume warfarin, monitor INR. Bacteremia - started on empiric antibiotic treatment. Aztreonam, vanco.   - BCx grew e.coli, will discontinue vano. Will follow Cx/ sensitivity. HTN (hypertension) - monitor.  - continue Coreg/ dogoxin    Ischemic cardiomyopathy      Thrombocytopenia/ ITP  - continued on steroids. - hematology following.   - HIT negative. - ivig per hematology direction. COPD   - continue respiratory treatments. Presently q6h, wean as appropriate. - resume spiriva, pulmicort      iron deficiency anemia    Hyponatremia  - monitor. Pneumonia prophylaxis- Insentive spirometer every hour while awake    DVT risk / DVT Prophylaxis- Will require daily physician exam to assess for signs and symptoms as patient is at increased risk for of thromboembolism. Mobilization as tolerated. Intermittent pneumatic compression devices when in bed Thigh-high or knee-high thromboembolic deterrent hose when out of bed.    - covered with warfarin. Pain Control: stable, mild-to-moderate joint symptoms intermittently, reasonably well controlled by PRN meds.  Will require regular pain assessment and comprenhensive pain management. - resume gabapentin. Wound Care: Monitor wound status daily per staff and physician. At risk for failure. Will require 24/7 rehab nursing. Keep wound clean and dry  - excoriation at bridge of nose. Continue antibiotic ointment. Diabetes mellitus - Uncontrolled. poor glycemic control. Will require daily, close FSG monitoring and medication adjustment to optimize glycemic control in setting of acute illness and hospitalization.   - SSI coverage with lispro. Urinary retention/ neurogenic bladder - schedule voids q6-8 hrs. Check post-void residual every shift; In and Out catheterize if post-void residual is more than 400 cc.    bowel program - colace as needed. The patient's prognosis for significant practical improvement within a reasonable period of time appears good and the estimated length of stay is 14 days and he is expected to return home with spouse and continued rehabilitation with home health therapy. Given the patient's complex neurologic/medical condition and the risk of further medical complications including: DVT, PE, skin breakdown, pneumonia due to decreased mobility, recurrent infection , CVA, MI, cardiac arrhythmias due to HTN, increased risk of thromboembolism secondary to decreased blood volume and increased energy expenditure in a patient with known acute blood loss could potentially impact the IRF program with decreased cardiovascular efficiency, postural hypotension and cardiac arrhythmia. For these ongoing medical issues, rehabilitation services could not be safely provided at a lower level of care such as a skilled nursing facility or nursing home.         Signed By: Jenifer Larson MD     September 14, 2017

## 2017-09-15 ENCOUNTER — APPOINTMENT (OUTPATIENT)
Dept: GENERAL RADIOLOGY | Age: 72
DRG: 309 | End: 2017-09-15
Attending: PHYSICAL MEDICINE & REHABILITATION
Payer: MEDICARE

## 2017-09-15 LAB
BACTERIA SPEC CULT: ABNORMAL
BACTERIA SPEC CULT: ABNORMAL
BACTERIA SPEC CULT: NORMAL
BASOPHILS # BLD: 0 K/UL (ref 0–0.2)
BASOPHILS NFR BLD: 0 % (ref 0–2)
DIFFERENTIAL METHOD BLD: ABNORMAL
EOSINOPHIL # BLD: 0 K/UL (ref 0–0.8)
EOSINOPHIL NFR BLD: 0 % (ref 0.5–7.8)
ERYTHROCYTE [DISTWIDTH] IN BLOOD BY AUTOMATED COUNT: 16.8 % (ref 11.9–14.6)
GLUCOSE BLD STRIP.AUTO-MCNC: 118 MG/DL (ref 65–100)
GLUCOSE BLD STRIP.AUTO-MCNC: 153 MG/DL (ref 65–100)
GLUCOSE BLD STRIP.AUTO-MCNC: 187 MG/DL (ref 65–100)
GLUCOSE BLD STRIP.AUTO-MCNC: 72 MG/DL (ref 65–100)
GRAM STN SPEC: ABNORMAL
HCT VFR BLD AUTO: 27 % (ref 41.1–50.3)
HGB BLD-MCNC: 8.7 G/DL (ref 13.6–17.2)
IMM GRANULOCYTES # BLD: 0 K/UL (ref 0–0.5)
INR PPP: 2.5 (ref 0.9–1.2)
LYMPHOCYTES # BLD: 0.6 K/UL (ref 0.5–4.6)
LYMPHOCYTES NFR BLD: 12 % (ref 13–44)
MCH RBC QN AUTO: 30 PG (ref 26.1–32.9)
MCHC RBC AUTO-ENTMCNC: 32.2 G/DL (ref 31.4–35)
MCV RBC AUTO: 93.1 FL (ref 79.6–97.8)
MONOCYTES # BLD: 0.4 K/UL (ref 0.1–1.3)
MONOCYTES NFR BLD: 8 % (ref 4–12)
NEUTS SEG # BLD: 3.7 K/UL (ref 1.7–8.2)
NEUTS SEG NFR BLD: 80 % (ref 43–78)
PLATELET # BLD AUTO: 25 K/UL (ref 150–450)
PLATELET COMMENTS,PCOM: ABNORMAL
PMV BLD AUTO: ABNORMAL FL (ref 10.8–14.1)
PROTHROMBIN TIME: 27.6 SEC (ref 9.6–12)
RBC # BLD AUTO: 2.9 M/UL (ref 4.23–5.67)
RBC MORPH BLD: ABNORMAL
SERVICE CMNT-IMP: ABNORMAL
SERVICE CMNT-IMP: NORMAL
WBC # BLD AUTO: 4.6 K/UL (ref 4.3–11.1)
WBC MORPH BLD: ABNORMAL

## 2017-09-15 PROCEDURE — 74011000250 HC RX REV CODE- 250: Performed by: PHYSICAL MEDICINE & REHABILITATION

## 2017-09-15 PROCEDURE — 97110 THERAPEUTIC EXERCISES: CPT

## 2017-09-15 PROCEDURE — 99233 SBSQ HOSP IP/OBS HIGH 50: CPT | Performed by: INTERNAL MEDICINE

## 2017-09-15 PROCEDURE — 97535 SELF CARE MNGMENT TRAINING: CPT

## 2017-09-15 PROCEDURE — 74011250637 HC RX REV CODE- 250/637: Performed by: PHYSICAL MEDICINE & REHABILITATION

## 2017-09-15 PROCEDURE — 74011000258 HC RX REV CODE- 258: Performed by: PHYSICAL MEDICINE & REHABILITATION

## 2017-09-15 PROCEDURE — 94660 CPAP INITIATION&MGMT: CPT

## 2017-09-15 PROCEDURE — 99232 SBSQ HOSP IP/OBS MODERATE 35: CPT | Performed by: PHYSICAL MEDICINE & REHABILITATION

## 2017-09-15 PROCEDURE — 71010 XR CHEST SNGL V: CPT

## 2017-09-15 PROCEDURE — 94760 N-INVAS EAR/PLS OXIMETRY 1: CPT

## 2017-09-15 PROCEDURE — 92611 MOTION FLUOROSCOPY/SWALLOW: CPT

## 2017-09-15 PROCEDURE — 97530 THERAPEUTIC ACTIVITIES: CPT

## 2017-09-15 PROCEDURE — 94640 AIRWAY INHALATION TREATMENT: CPT

## 2017-09-15 PROCEDURE — 30253R1: ICD-10-PCS | Performed by: PHYSICAL MEDICINE & REHABILITATION

## 2017-09-15 PROCEDURE — 85610 PROTHROMBIN TIME: CPT | Performed by: PHYSICAL MEDICINE & REHABILITATION

## 2017-09-15 PROCEDURE — 74011250636 HC RX REV CODE- 250/636: Performed by: PHYSICAL MEDICINE & REHABILITATION

## 2017-09-15 PROCEDURE — 85025 COMPLETE CBC W/AUTO DIFF WBC: CPT | Performed by: PHYSICAL MEDICINE & REHABILITATION

## 2017-09-15 PROCEDURE — 74011250636 HC RX REV CODE- 250/636: Performed by: INTERNAL MEDICINE

## 2017-09-15 PROCEDURE — 74011000255 HC RX REV CODE- 255: Performed by: PHYSICAL MEDICINE & REHABILITATION

## 2017-09-15 PROCEDURE — 74011636637 HC RX REV CODE- 636/637: Performed by: PHYSICAL MEDICINE & REHABILITATION

## 2017-09-15 PROCEDURE — 65310000000 HC RM PRIVATE REHAB

## 2017-09-15 PROCEDURE — 74230 X-RAY XM SWLNG FUNCJ C+: CPT

## 2017-09-15 PROCEDURE — 36430 TRANSFUSION BLD/BLD COMPNT: CPT

## 2017-09-15 PROCEDURE — P9037 PLATE PHERES LEUKOREDU IRRAD: HCPCS | Performed by: PHYSICAL MEDICINE & REHABILITATION

## 2017-09-15 PROCEDURE — 86644 CMV ANTIBODY: CPT | Performed by: PHYSICAL MEDICINE & REHABILITATION

## 2017-09-15 PROCEDURE — 77030027138 HC INCENT SPIROMETER -A

## 2017-09-15 PROCEDURE — 82962 GLUCOSE BLOOD TEST: CPT

## 2017-09-15 RX ORDER — SODIUM CHLORIDE 9 MG/ML
250 INJECTION, SOLUTION INTRAVENOUS AS NEEDED
Status: DISCONTINUED | OUTPATIENT
Start: 2017-09-15 | End: 2017-09-22 | Stop reason: HOSPADM

## 2017-09-15 RX ADMIN — BUDESONIDE 500 MCG: 0.5 INHALANT RESPIRATORY (INHALATION) at 18:00

## 2017-09-15 RX ADMIN — Medication 20 ML: at 06:03

## 2017-09-15 RX ADMIN — ALBUTEROL SULFATE 2.5 MG: 2.5 SOLUTION RESPIRATORY (INHALATION) at 06:13

## 2017-09-15 RX ADMIN — GUAIFENESIN 1200 MG: 600 TABLET, EXTENDED RELEASE ORAL at 18:16

## 2017-09-15 RX ADMIN — CARVEDILOL 3.12 MG: 3.12 TABLET, FILM COATED ORAL at 08:52

## 2017-09-15 RX ADMIN — FAMOTIDINE 20 MG: 10 INJECTION, SOLUTION INTRAVENOUS at 08:52

## 2017-09-15 RX ADMIN — BARIUM SULFATE 45 ML: 980 POWDER, FOR SUSPENSION ORAL at 08:28

## 2017-09-15 RX ADMIN — ALBUTEROL SULFATE 2.5 MG: 2.5 SOLUTION RESPIRATORY (INHALATION) at 23:55

## 2017-09-15 RX ADMIN — POVIDONE-IODINE: 10 SOLUTION TOPICAL at 09:00

## 2017-09-15 RX ADMIN — Medication 600 UNITS: at 22:26

## 2017-09-15 RX ADMIN — AZTREONAM 2 G: 2 INJECTION, POWDER, LYOPHILIZED, FOR SOLUTION INTRAMUSCULAR; INTRAVENOUS at 06:06

## 2017-09-15 RX ADMIN — GUAIFENESIN 1200 MG: 600 TABLET, EXTENDED RELEASE ORAL at 08:51

## 2017-09-15 RX ADMIN — IMMUNE GLOBULIN (HUMAN): 10 INJECTION INTRAVENOUS; SUBCUTANEOUS at 19:06

## 2017-09-15 RX ADMIN — BARIUM SULFATE 15 ML: 400 SUSPENSION ORAL at 08:29

## 2017-09-15 RX ADMIN — BUDESONIDE 500 MCG: 0.5 INHALANT RESPIRATORY (INHALATION) at 06:13

## 2017-09-15 RX ADMIN — Medication 600 UNITS: at 06:03

## 2017-09-15 RX ADMIN — CARVEDILOL 3.12 MG: 3.12 TABLET, FILM COATED ORAL at 18:16

## 2017-09-15 RX ADMIN — Medication 600 UNITS: at 14:00

## 2017-09-15 RX ADMIN — GABAPENTIN 600 MG: 300 CAPSULE ORAL at 06:03

## 2017-09-15 RX ADMIN — ALBUTEROL SULFATE 2.5 MG: 2.5 SOLUTION RESPIRATORY (INHALATION) at 00:37

## 2017-09-15 RX ADMIN — Medication 20 ML: at 22:27

## 2017-09-15 RX ADMIN — DIGOXIN 0.25 MG: 250 TABLET ORAL at 08:51

## 2017-09-15 RX ADMIN — ALBUTEROL SULFATE 2.5 MG: 2.5 SOLUTION RESPIRATORY (INHALATION) at 18:00

## 2017-09-15 RX ADMIN — TIOTROPIUM BROMIDE 18 MCG: 18 CAPSULE ORAL; RESPIRATORY (INHALATION) at 06:13

## 2017-09-15 RX ADMIN — GABAPENTIN 600 MG: 300 CAPSULE ORAL at 15:30

## 2017-09-15 RX ADMIN — BARIUM SULFATE 15 ML: 400 PASTE ORAL at 08:30

## 2017-09-15 RX ADMIN — Medication 20 ML: at 14:00

## 2017-09-15 RX ADMIN — WARFARIN SODIUM 5 MG: 5 TABLET ORAL at 18:16

## 2017-09-15 RX ADMIN — FERROUS SULFATE TAB 325 MG (65 MG ELEMENTAL FE) 325 MG: 325 (65 FE) TAB at 08:51

## 2017-09-15 RX ADMIN — PREDNISONE 30 MG: 20 TABLET ORAL at 08:52

## 2017-09-15 RX ADMIN — INSULIN LISPRO 2 UNITS: 100 INJECTION, SOLUTION INTRAVENOUS; SUBCUTANEOUS at 22:33

## 2017-09-15 NOTE — PROGRESS NOTES
OT Daily Note    Time In 1031   Time Out 1116     Subjective: \"I'm exhausted. \" [patient donned socks this session seated in wheelchair]  Pain: None indicated    Precautions: Contact (MRSA)    Self-Care   Patient re-educated regarding rehab course and purpose of OT, prompted to attempt doffing/donning socks seated in wheelchair according to premorbid LB dressing method (bending forward at hips while seated using BUEs) as patient requires medicated lotion applied to feet. Patient bent forward while seated and successfully doffed socks with increased time d/t rest breaks required, see Subjective above. Patient required assist to apply lotion to feet d/t fatigue/decreased flexibility. Patient donned socks utilizing same method with rest break in between socks. Activity Tolerance/Cognition   Patient completed pipe tree PVC construction task using BUEs seated at tabletop with no weight added. Patient initiated construction according to visual pattern without letter cues provided (pieces labeled in pattern), however required maximal cues for problem solving and graded down to visual with letter cues provided. Continued to require moderate verbal cues for problem solving and was unable to complete one construction before time expires this session. Assessment: Patient continues to be limited by significantly decreased activity tolerance, requiring increased time and frequent rest breaks. Decreased problem solving for simple pipe tree construction noted. Education: Purpose of OT  Interdisciplinary Communication: Collaborated with Anabell Cantu and agreed patient is progressing well and on-track to meet goals as stated in POC. Plan: Continue to address ADL/IADL, functional mobility, activity tolerance, balance, strengthening, coordination, cognition.     Tameka Lizama, OTR/L

## 2017-09-15 NOTE — PROGRESS NOTES
PHYSICAL THERAPY DAILY NOTE  Time In: 1346  Time Out: 7090  Patient Seen For: PM;Therapeutic exercise;Transfer training;Patient education    Subjective: \"When will I stop feeling so tired? \" Patient agreeable to therapy. Objective: Vital Signs: No vitals taken this PM.        Pain level: No pain reported. Pain location: n/a  Pain interventions: n/a    Patient education: Educated patient on activity pacing and building endurance with therapy sessions. Interdisciplinary Communication: Communicated with Flavio Phan, OT regarding patient's fatigue. Contact (MRSA)  GROSS ASSESSMENT Daily Assessment            BED/MAT MOBILITY Daily Assessment   Required for safety. Rolling Right : 0 (Not tested)  Rolling Left : 0 (Not tested)  Supine to Sit : 0 (Not tested)  Sit to Supine : 4 (Minimal assistance)       TRANSFERS Daily Assessment   Required for safety. Transfer Type: SPT with walker  Transfer Assistance : 4 (Contact guard assistance)  Sit to Stand Assistance: Contact guard assistance  Car Transfers: Not tested       GAIT Daily Assessment   Patient did not perform gait training activity this PM. Patient stood with RW for ~45 seconds prior to requesting to sit back in w/c due to fatigue.         STEPS or STAIRS Daily Assessment            BALANCE Daily Assessment    Sitting - Static: Good (unsupported)  Sitting - Dynamic: Good (unsupported)  Standing - Static: Fair;Constant support  Standing - Dynamic : Impaired       WHEELCHAIR MOBILITY Daily Assessment            LOWER EXTREMITY EXERCISES Daily Assessment    Extremity: Both  Exercise Type #1: Seated lower extremity strengthening  Sets Performed: 1  Reps Performed: 15  Level of Assist: Supervision     Patient performed the following B LE exercises:  SEATED EXERCISES Sets Reps Comments   Ankle Pumps 1 15    Hip Flexion 1 15    Long Arc Quads 1 15    Hip Adduction/Ball Squeeze 1 15    Hip Abduction with Green Theraband 1 15    Hamstring Curls with Green Theraband 1 15    Hip Extension with Green Theraband 1 15      Patient returned to room lying supine in bed with all needs in reach and spouse in room. Assessment: Patient requires assistance with standing upright secondary to decreased balance and decreased strength/endurance of B LE. Patient will benefit from further therapy to improve endurance and further education on activity pacing. Plan of Care: Continue with POC and progress as tolerated.        Trevon Colorado  9/15/2017

## 2017-09-15 NOTE — PROGRESS NOTES
PHYSICAL THERAPY DAILY NOTE  Time In: 1115  Time Out: 1201  Patient Seen For: AM;Patient education;Transfer training; Therapeutic exercise    Subjective: \"I'm exhausted this morning. \" Patient agreeable to therapy. Objective: Vital Signs:   Patient Vitals for the past 8 hrs:   Temp Pulse Resp BP SpO2   09/15/17 0804 97.4 °F (36.3 °C) (!) 132 22 131/79 98 %   09/15/17 0613 - - - - 95 %         Pain level: No pain reported. Pain location: n/a  Pain interventions: n/a    Patient education: Educated patient on use of incentive spirometer for improved deeper breathing. Interdisciplinary Communication: Communicated with Nnamdi Vaca regarding patient's fatigue and introduction on use of incentive spirometer. Contact (MRSA)  GROSS ASSESSMENT Daily Assessment            BED/MAT MOBILITY Daily Assessment   Required for safety. Rolling Right : 0 (Not tested)  Rolling Left : 0 (Not tested)  Supine to Sit : 0 (Not tested)  Sit to Supine : 4 (Minimal assistance)       TRANSFERS Daily Assessment   Required for safety.  Transfer Type: SPT with walker  Transfer Assistance : 4 (Contact guard assistance)  Sit to Stand Assistance: Stand-by assistance  Car Transfers: Not tested       GAIT Daily Assessment    Not tested       STEPS or STAIRS Daily Assessment            BALANCE Daily Assessment    Sitting - Static: Good (unsupported)  Sitting - Dynamic: Good (unsupported)  Standing - Static: Fair  Standing - Dynamic : Impaired       WHEELCHAIR MOBILITY Daily Assessment            LOWER EXTREMITY EXERCISES Daily Assessment    Extremity: Both  Exercise Type #1: Seated lower extremity strengthening  Sets Performed: 2  Reps Performed: 10  Level of Assist: Supervision     Patient performed the following B LE exercises:  SEATED EXERCISES Sets Reps Comments   Ankle Pumps 2 20    Hip Flexion 2 10    Long Arc Quads 2 10    Hip Adduction/Ball Squeeze 2 10    Hip Abduction with Green Theraband 2 10    Hamstring Curls with Ferrel Alpers Theraband 2 10    Hip Extension with Green Theraband 2 10      Patient returned to room lying supine in bed with all needs in reach. Assessment: Patient continues to be limited with therapy secondary to fatigue. Patient was educated on use of incentive spirometer for encouragement of deeper breathing. Patient required multiple rest breaks throughout therapy session. Patient will benefit from further therapy to increase independence with transfers and household ambulation. Plan of Care: Continue with POC and progress as tolerated.        Kermit oWodard  9/15/2017

## 2017-09-15 NOTE — PROGRESS NOTES
PHYSICAL THERAPY DAILY NOTE  Time In: 2292  Time Out: 1430  Patient Seen For: PM;Gait training;Patient education; Therapeutic exercise;Transfer training    Subjective: \"I'm feeling a little better this afternoon. \" Patient agreeable to therapy. Objective: Vital Signs:   Patient Vitals for the past 8 hrs:   Temp Pulse Resp BP SpO2   09/15/17 0804 97.4 °F (36.3 °C) (!) 132 22 131/79 98 %   09/15/17 0613 - - - - 95 %         Pain level: No pain reported. Pain location: n/a  Pain interventions: n/a    Patient education: Educated patient on safety with ambulation and use of RW for gait training secondary to decreased endurance. Interdisciplinary Communication: Communicated with Cristiana Alejandro OT regarding patient's POC. Contact (MRSA)  GROSS ASSESSMENT Daily Assessment            BED/MAT MOBILITY Daily Assessment   Required for safety. Rolling Right : 0 (Not tested)  Rolling Left : 0 (Not tested)  Supine to Sit : 0 (Not tested)  Sit to Supine : 5 (Supervision)       TRANSFERS Daily Assessment   Required for safety. Transfer Type: SPT with walker  Transfer Assistance : 4 (Contact guard assistance)  Sit to Stand Assistance: Contact guard assistance  Car Transfers: Not tested       GAIT Daily Assessment   Patient ambulated with slow reciprocal steps with decreased foot clearance B LE, and shuffling gait pattern. Mild forward head posture.  Amount of Assistance: 4 (Minimal assistance)  Distance (ft): 35 Feet (ft)  Assistive Device: Walker, rolling;Gait belt       STEPS or STAIRS Daily Assessment            BALANCE Daily Assessment    Sitting - Static: Good (unsupported)  Sitting - Dynamic: Good (unsupported)  Standing - Static: Fair  Standing - Dynamic : Impaired       WHEELCHAIR MOBILITY Daily Assessment            LOWER EXTREMITY EXERCISES Daily Assessment    Extremity: Both  Exercise Type #1: Seated lower extremity strengthening  Sets Performed: 1  Reps Performed: 10  Level of Assist: Supervision     Patient performed the following B LE exercises:  SEATED EXERCISES Sets Reps Comments   Ankle Pumps 1 10    Hip Flexion 1 10    Long Arc Quads 1 10    Hip Adduction/Ball Squeeze 1 10    Hip Abduction with Green Theraband 1 10    Hamstring Curls with Green Theraband 1 10      Patient returned to room lying supine in bed with head of bed elevated and all needs in reach. Assessment: Patient demonstrated improved endurance this PM with gait training. Patient continues to be limited by fatigue with activity, requiring frequent rest breaks throughout therapy session. Patient was educated on activity pacing and gradually building up tolerance to activity. Patient will benefit from further therapy to increase independence with transfers and household ambulation. Plan of Care: Continue with POC and progress as tolerated.        Socorro Davila  9/15/2017

## 2017-09-15 NOTE — PROGRESS NOTES
Daily Progress Note -- Hematology/ Medical Oncology        Patient Name: Mia Chambers    Date of Visit: 9/15/2017  : 1945  Age:72 y.o. Interval history:  Platelets 31,123 today. HIT negative. Continues receiving antibiotics for gram negative bacteremia. Up in wheelchair working with PT. Denies any bleeding.      Medications:   Current Facility-Administered Medications   Medication Dose Route Frequency Provider Last Rate Last Dose    barium sulfate (VARIBAR THIN HONEY) 40 % (w/v) 29% (w/w) contrast suspension 15 mL  15 mL Oral RAD Chong Jeans, MD        0.9% sodium chloride infusion 250 mL  250 mL IntraVENous PRN Ny Aguirre MD        immune globulin 10% (GAMUNEX-C) 65 g   IntraVENous ONCE TITR Abe Escudero MD        warfarin (COUMADIN) tablet 5 mg  5 mg Oral QPM Ny Aguirre MD   Stopped at 17 2100    0.9% sodium chloride infusion 250 mL  250 mL IntraVENous PRN Ny Aguirre MD        nitroglycerin (NITROSTAT) tablet 0.4 mg  0.4 mg SubLINGual Q5MIN PRN Ny Aguirre MD        ondansetron [facility]) injection 4 mg  4 mg IntraVENous Q4H PRN Ny Aguirre MD        sodium chloride (NS) flush 5-10 mL  5-10 mL IntraVENous PRN Ny Aguirre MD        NUTRITIONAL SUPPORT ELECTROLYTE PRN ORDERS   Does Not Apply PRN Ny Aguirre MD        acetaminophen (TYLENOL) suppository 650 mg  650 mg Rectal Q4H PRN Ny Aguirre MD        acetaminophen (TYLENOL) tablet 650 mg  650 mg Oral Q4H PRN Ny Aguirre MD        albuterol (PROVENTIL HFA, VENTOLIN HFA, PROAIR HFA) inhaler 2 Puff  2 Puff Inhalation Q4H PRN Ny Aguirre MD        albuterol (PROVENTIL VENTOLIN) nebulizer solution 2.5 mg  2.5 mg Nebulization Q4H PRN Ny Aguirre MD        budesonide (PULMICORT) 500 mcg/2 ml nebulizer suspension  500 mcg Nebulization BID RT Ny Aguirre MD   500 mcg at 09/15/17 1299    And    albuterol CONCENTRATE 2.5mg/0.5 mL neb soln  2.5 mg Nebulization Q6H RT Ny Aguirre MD Stopped at 09/15/17 1200    dextrose (D50W) injection syrg 25 g  25 g IntraVENous PRN Terell Evans MD        digoxin (LANOXIN) tablet 0.25 mg  0.25 mg Oral DAILY Terell Evans MD   0.25 mg at 09/15/17 0851    famotidine (PF) (PEPCID) 20 mg in sodium chloride 0.9 % 10 mL injection  20 mg IntraVENous DAILY Terell Evans MD   20 mg at 09/15/17 0852    ferrous sulfate tablet 325 mg  1 Tab Oral DAILY WITH BREAKFAST Terell Evans MD   325 mg at 09/15/17 0851    gabapentin (NEURONTIN) capsule 600 mg  600 mg Oral TID Terell Evans MD   600 mg at 09/15/17 0603    guaiFENesin ER (MUCINEX) tablet 1,200 mg  1,200 mg Oral BID Terell Evans MD   1,200 mg at 09/15/17 0851    heparin (porcine) pf 600 Units  600 Units InterCATHeter Q8H Terell Evans MD   600 Units at 09/15/17 0603    heparin (porcine) pf 600 Units  600 Units InterCATHeter PRN Terell Evans MD        insulin lispro (HUMALOG) injection   SubCUTAneous AC&HS Terell Evans MD   Stopped at 09/15/17 0656    povidone-iodine (BETADINE) 10 % topical solution   Topical DAILY Terell Evans MD        [START ON 9/29/2017] predniSONE (DELTASONE) tablet 10 mg  10 mg Oral DAILY WITH Lana Elizondo MD        [START ON 9/22/2017] predniSONE (DELTASONE) tablet 20 mg  20 mg Oral DAILY WITH BREAKFAST Terell Evans MD        predniSONE (DELTASONE) tablet 30 mg  30 mg Oral DAILY WITH BREAKFAST Terell Evans MD   30 mg at 09/15/17 0852    sodium chloride (NS) flush 20 mL  20 mL InterCATHeter Diane Pearson MD   20 mL at 09/15/17 0603    tiotropium (SPIRIVA) inhalation capsule 18 mcg  1 Cap Inhalation DAILY Terell Evans MD   18 mcg at 09/15/17 0056    traZODone (DESYREL) tablet 50 mg  50 mg Oral QHS PRN Terell Evans MD   50 mg at 09/14/17 2216    influenza vaccine 2015-16 (36mos+)(PF) (FLUZONE/FLUARIX QUAD) injection 0.5 mL  0.5 mL IntraMUSCular PRIOR TO DISCHARGE Terell Evans MD        carvedilol (COREG) tablet 3.125 mg  3.125 mg Oral BID WITH MEALS Byung H Loni Barnes MD   3.125 mg at 09/15/17 7123       Allergies: Allergies   Allergen Reactions    Levaquin [Levofloxacin] Other (comments)     GI upset    Metformin Other (comments)     Gi upset       Review of Systems: The Review of Systems is documented in full in the internal medical record. All systems are negative other than for those noted above. Physical Examination:  General Appearance: Chronically-ll appearing patient in no acute distress. Vital signs:   Visit Vitals    /79    Pulse (!) 132    Temp 97.4 °F (36.3 °C)    Resp 22    SpO2 98%     HEENT: No oral or pharyngeal masses. There is no ulceration or thrush. Lungs: Bilateral breath sounds clear. No use of accessory muscles. O2 intact. Heart: There is no jugular venous distention. Regular, irregular. Generalized edema. Abdomen: Soft, non-tender, bowel sounds present and normal, no appreciated hepatosplenomegaly. No palpable masses. Skin: Multiple areas of ecchymoses on upper extremities bilaterally. No evidence of malignancy. Neuro: Alert today with no obvious focal deficits. .     Labs/Imaging:  Lab Results   Component Value Date/Time    WBC 4.6 09/15/2017 05:39 AM    HGB 8.7 09/15/2017 05:39 AM    HCT 27.0 09/15/2017 05:39 AM    PLATELET 25 98/69/8904 05:39 AM    MCV 93.1 09/15/2017 05:39 AM       Lab Results   Component Value Date/Time    Sodium 142 09/13/2017 03:51 AM    Potassium 3.6 09/13/2017 03:51 AM    Chloride 106 09/13/2017 03:51 AM    CO2 30 09/13/2017 03:51 AM    Anion gap 6 09/13/2017 03:51 AM    Glucose 93 09/13/2017 03:51 AM    BUN 31 09/13/2017 03:51 AM    Creatinine 0.67 09/13/2017 03:51 AM    GFR est AA >60 09/13/2017 03:51 AM    GFR est non-AA >60 09/13/2017 03:51 AM    Calcium 7.4 09/13/2017 03:51 AM    AST (SGOT) 405 09/12/2017 06:31 AM    Alk.  phosphatase 122 09/12/2017 06:31 AM    Protein, total 6.5 09/12/2017 06:31 AM    Albumin 2.3 09/12/2017 06:31 AM    Globulin 4.2 09/12/2017 06:31 AM    A-G Ratio 0.5 09/12/2017 06:31 AM    ALT (SGPT) 578 09/12/2017 06:31 AM     Initial consult HPI:   He has a history of mechanical AVR in 2000. He continues on chronic anticoagulation with coumadin, chronic systolic CHF/ICM EF 78%, COPD, PVD, DM II, HTN, iron deficiency, and dyslipidemia who developed worsening dyspnea at the beginning of the week. He presented to the ER and was found to be in atrial flutter with RVR now status post cardioversion and amiodarone. Of note, his platelets were 13,679 on admission and 30,000 today. No other lab comparisons here but can see through care everywhere that his platelets were 43,266 at [facility] about a year ago. He has no recollection of ever being told he has low platelets. He takes iron daily for his history of PRATIK and reports black stools due to this. Hgb on admission was 10.2 and 12.7 today. We have been consulted for his thrombocytopenia    PLAN:  - Presumed ITP:  09/07 Transfuse platelets as needed - keep above 50,000. Coumadin necessary due to mechanical heart valve. Continue daily CBC to monitor platelet count. BMBx can be done as outpatient or when out of ICU. Change solumedrol to prednisone taper. Begin with 40mg daily and taper down 10mg weekly. 09/13 Platelets 73,385 yesterday. Transfused 1 unit. Today platelets 98,149. HIT panel pending. IVIG today and tomorrow. Continue with steroids. We will follow up on labs tomorrow. 09/14 Platelets 83,194 today. HIT negative. IVIG today and tomorrow. Continue Prednisone 40 mg daily and taper by 10 mg weekly. Will follow labs tomorrow and if acceptable, will sign off and see him in outpatient clinic.   09/15 Platelets down to 06,783 today. 2nd dose of IVIG today. Platelets ordered per primary team. Continue Prednisone 40 mg daily and taper by 10 mg weekly. Will continue to watch platelets daily and evaluate patient intermittently.      Follow-up Appointments   Procedures    FOLLOW UP VISIT Appointment in: Two Weeks Please schedule new pt apt follow up with Dr. Patricia Ross (MD Only) 2 weeks from discharge. Labs prior including CBC, CMP. Please schedule new pt apt follow up with Dr. Patricia Ross (MD Only) 2 weeks from discharge. Labs prior including CBC, CMP. Standing Status:   Standing     Number of Occurrences:   1     Order Specific Question:   Appointment in     Answer: [address] [facility]  [address] [place] [address] [phone]    Attending Addendum:  I personally evaluated the patient with Manuel Bazzi N.P.,  and agree with the assessment, findings and plan as documented. Appears stable, heart regular without murmur, lungs clear, abdomen benign. He will receive his second dose of IVIG today.               Leia Manzano MD  [address]  Office : [phone]  Fax : [phone]

## 2017-09-15 NOTE — PROGRESS NOTES
Warfarin dosing per pharmacist    Suzie Lerma is a 67 y.o. male. Indication: A. Fib    Goal INR:  2-3    Home dose:  5 mg daily, 7.5 mg on Wednesday    Risk factors or significant drug interactions:  Antibiotics, prednisone    Other anticoagulants:  none    Daily Monitoring  Date  INR     Warfarin dose HGB              Notes  9/8  1.2  10mg  11.2    9/9  1.3  10mg  9.4  9/10  ---  10mg  9.7  9/11  2.1  10mg  10.8  9/12  2.9  10mg  8.9  9/13  3.5  Hold  8.4  Pharmacy Consulted  9/14  3.6  Held  8.5  9/15  2.5  5 mg  8.7      Pharmacy consulted to manage warfarin on 9/13/17 by Dr. Norma Reddy    Patient with higher than usual home dose for several days. INR is now above goal.      INR 2.5. Warfarin was reordered by MD last night but held by nursing. Resume warfarin 5 mg qhs tonight. Daily INR. Pharmacy will continue to follow. Please call with any questions.     Thank you,  Sudhaakr Zayas, PharmD  Clinical Pharmacist  [phone]

## 2017-09-15 NOTE — PROGRESS NOTES
Problem: Falls - Risk of  Goal: *Absence of Falls  Document Lauren Fall Risk and appropriate interventions in the flowsheet.    Outcome: Progressing Towards Goal  Fall Risk Interventions:  Mobility Interventions: Utilize walker, cane, or other assitive device     Mentation Interventions: Door open when patient unattended     Medication Interventions: Bed/chair exit alarm, Patient to call before getting OOB     Elimination Interventions: Call light in reach, Patient to call for help with toileting needs     History of Falls Interventions: Door open when patient unattended

## 2017-09-15 NOTE — PROGRESS NOTES
MD Mateusz,   Medical Director  [address] [facility], [address]  Tel: [phone]       [facility] PROGRESS NOTE    Ld Scott  Admit Date: 9/13/2017  Admit Diagnosis: Cardiac Debility; Respiratory failure ([address]    Subjective     Patient seen and examined. Vss.afberile. Feels well but tired this morning. PT, OT well tolerated. Steady gains made. No new barrier to progress noted. Platelets declined again, will need transfusion. No signs of bleeding. Will need to continue warfarin despite low platelets due to risk of thrombosis, and consequences with mechanical aortic valve.     Objective:     Current Facility-Administered Medications   Medication Dose Route Frequency    barium sulfate (VARIBAR THIN HONEY) 40 % (w/v) 29% (w/w) contrast suspension 15 mL  15 mL Oral RAD ONCE    0.9% sodium chloride infusion 250 mL  250 mL IntraVENous PRN    immune globulin 10% (GAMUNEX-C) 65 g   IntraVENous ONCE TITR    warfarin (COUMADIN) tablet 5 mg  5 mg Oral QPM    0.9% sodium chloride infusion 250 mL  250 mL IntraVENous PRN    nitroglycerin (NITROSTAT) tablet 0.4 mg  0.4 mg SubLINGual Q5MIN PRN    ondansetron (ZOFRAN) injection 4 mg  4 mg IntraVENous Q4H PRN    sodium chloride (NS) flush 5-10 mL  5-10 mL IntraVENous PRN    NUTRITIONAL SUPPORT ELECTROLYTE PRN ORDERS   Does Not Apply PRN    acetaminophen (TYLENOL) suppository 650 mg  650 mg Rectal Q4H PRN    acetaminophen (TYLENOL) tablet 650 mg  650 mg Oral Q4H PRN    albuterol (PROVENTIL HFA, VENTOLIN HFA, PROAIR HFA) inhaler 2 Puff  2 Puff Inhalation Q4H PRN    albuterol (PROVENTIL VENTOLIN) nebulizer solution 2.5 mg  2.5 mg Nebulization Q4H PRN    budesonide (PULMICORT) 500 mcg/2 ml nebulizer suspension  500 mcg Nebulization BID RT    And    albuterol CONCENTRATE 2.5mg/0.5 mL neb soln  2.5 mg Nebulization Q6H RT    dextrose (D50W) injection syrg 25 g  25 g IntraVENous PRN    digoxin (LANOXIN) tablet 0.25 mg 0.25 mg Oral DAILY    famotidine (PF) (PEPCID) 20 mg in sodium chloride 0.9 % 10 mL injection  20 mg IntraVENous DAILY    ferrous sulfate tablet 325 mg  1 Tab Oral DAILY WITH BREAKFAST    gabapentin (NEURONTIN) capsule 600 mg  600 mg Oral TID    guaiFENesin ER (MUCINEX) tablet 1,200 mg  1,200 mg Oral BID    heparin (porcine) pf 600 Units  600 Units InterCATHeter Q8H    heparin (porcine) pf 600 Units  600 Units InterCATHeter PRN    insulin lispro (HUMALOG) injection   SubCUTAneous AC&HS    povidone-iodine (BETADINE) 10 % topical solution   Topical DAILY    [START ON 9/29/2017] predniSONE (DELTASONE) tablet 10 mg  10 mg Oral DAILY WITH BREAKFAST    [START ON 9/22/2017] predniSONE (DELTASONE) tablet 20 mg  20 mg Oral DAILY WITH BREAKFAST    predniSONE (DELTASONE) tablet 30 mg  30 mg Oral DAILY WITH BREAKFAST    sodium chloride (NS) flush 20 mL  20 mL InterCATHeter Q8H    tiotropium (SPIRIVA) inhalation capsule 18 mcg  1 Cap Inhalation DAILY    traZODone (DESYREL) tablet 50 mg  50 mg Oral QHS PRN    influenza vaccine 2015-16 (36mos+)(PF) (FLUZONE/FLUARIX QUAD) injection 0.5 mL  0.5 mL IntraMUSCular PRIOR TO DISCHARGE    carvedilol (COREG) tablet 3.125 mg  3.125 mg Oral BID WITH MEALS     Review of Systems:    Denies chest pain, shortness of breath, cough, headache, visual problems, abdominal pain, dysurea, calf pain. Pertinent positives are as noted in the medical records and unremarkable otherwise. Visit Vitals    /79    Pulse (!) 132    Temp 97.4 °F (36.3 °C)    Resp 22    SpO2 98%      Physical Exam:   General: Alert and age appropriately oriented x 2. No acute cardio respiratory distress. HEENT: Normocephalic,no scleral icterus  Oral mucosa moist without cyanosis, No bruit, No JVD. Lungs: Clear to auscultation  bilaterally. Respiration even and unlabored   Heart: Regular rate and rhythm, S1, S2   No  murmurs, clicks, rub or gallops   Abdomen: Soft, non-tender, nondistended.  Bowel sounds present. No organomegaly. Genitourinary: defered   Neuromuscular:      PERRL, EOMI  Speech slow, dysarthric. Focus, attention poor. Follows simple commands consistently, with delay, corrections. Able to identify, recall repeat.      LUE     Shoulder abduction  5- /5              Elbow flexion:  5- /5               Wrist extension:   5-/5              DOIZBS flexion;  5- /5                        ADMQ:   4+/5  RUE    Shoulder abduction:  5-/5                Elbow flexion:   5-/5                         Wrist extension:  5-/5                        Finger flexion:   /5-5                        ADMQ:  5- /5  LLE     Hip flexion:  3 /5              Knee extension:   4/5                         Ankle dorsiflexion: 4+ /5                        Ankle plantarflexion:  5- /5                                                                RLE     Hip flexion:  3 /5                        Knee extension:  4 /5                         Ankle dorsiflexion: 4+ /5                        Ankle plantarflexion:   5-/5  Sensory - intact  No cerebellar signs. Plantars - down going  No atrophy, no fasciculations, no tremors. Skin/extremity: No rashes, no erythema. Calf non tender BLE.                                                                                   Functional Assessment:          Balance  Sitting - Static: Good (unsupported) (09/15/17 1200)  Sitting - Dynamic: Good (unsupported) (09/15/17 1200)  Standing - Static: Fair (09/15/17 1200)  Standing - Dynamic : Impaired (09/15/17 1200)                     Toni Lopes Fall Risk Assessment:  Toni Lopes Fall Risk  Mobility: Ambulates or transfers with assist devices or assistance/unsteady gait (09/15/17 1042)  Mobility Interventions: Utilize walker, cane, or other assitive device (09/15/17 1042)  Mentation: Alert, oriented x 3 (09/15/17 1042)  Mentation Interventions: Door open when patient unattended (09/15/17 1042)  Medication: Patient receiving anticonvulsants, sedatives(tranquilizers), psychotropics or hypnotics, hypoglycemics, narcotics, sleep aids, antihypertensives, laxatives, or diuretics (09/15/17 1042)  Medication Interventions: Bed/chair exit alarm (09/15/17 1042)  Elimination: Needs assistance with toileting (09/15/17 1042)  Elimination Interventions: Call light in reach (09/15/17 1042)  Prior Fall History: During admission (Date - Comment) (09/15/17 1042)  History of Falls Interventions: Door open when patient unattended (09/15/17 1042)  Total Score: 5 (09/15/17 1042)  Standard Fall Precautions: Yes (09/14/17 0705)  High Fall Risk: Yes (09/15/17 1042)     Speech Assessment:  Aspiration Signs/Symptoms: None (09/14/17 1036)      Ambulation:  Gait  Distance (ft): 35 Feet (ft) (09/15/17 0800)  Assistive Device: Elwanda Josefina, rolling;Gait belt (09/15/17 0800)     Labs/Studies:  Recent Results (from the past 72 hour(s))   HIT PROFILE    Collection Time: 09/12/17  3:43 PM   Result Value Ref Range    HIT PROFILE PERFORMED BY [address] INDUCED PLT AB.  NEGATIVE       OPTICAL DENSITY READ 0.135 <0.400 ABS    HIT INTERPRETATION NEGATIVE      GLUCOSE, POC    Collection Time: 09/12/17  4:00 PM   Result Value Ref Range    Glucose (POC) 196 (H) 65 - 100 mg/dL   CULTURE, URINE    Collection Time: 09/12/17  5:05 PM   Result Value Ref Range    Special Requests: NO SPECIAL REQUESTS      Culture result: NO GROWTH 2 DAYS     CBC W/O DIFF    Collection Time: 09/12/17  6:19 PM   Result Value Ref Range    WBC 16.9 (H) 4.3 - 11.1 K/uL    RBC 2.99 (L) 4.23 - 5.67 M/uL    HGB 8.9 (L) 13.6 - 17.2 g/dL    HCT 27.3 (L) 41.1 - 50.3 %    MCV 91.3 79.6 - 97.8 FL    MCH 29.8 26.1 - 32.9 PG    MCHC 32.6 31.4 - 35.0 g/dL    RDW 16.9 (H) 11.9 - 14.6 %    PLATELET 12 (LL) 418 - 450 K/uL    MPV Cannot be calculated 10.8 - 14.1 FL   PLATELETS, ALLOCATE    Collection Time: 09/12/17  7:30 PM   Result Value Ref Range    Unit number [id number]     Blood component type PLTPH LRIR,2     Unit division 00     Status of unit TRANSFUSED    GLUCOSE, POC    Collection Time: 09/12/17  9:30 PM   Result Value Ref Range    Glucose (POC) 175 (H) 65 - 100 mg/dL   TYPE & SCREEN    Collection Time: 09/12/17  9:40 PM   Result Value Ref Range    Crossmatch Expiration 09/15/2017     ABO/Rh(D) A NEGATIVE     Antibody screen NEG    GLUCOSE, POC    Collection Time: 09/12/17 11:36 PM   Result Value Ref Range    Glucose (POC) 134 (H) 65 - 100 mg/dL   PROTHROMBIN TIME + INR    Collection Time: 09/13/17  3:51 AM   Result Value Ref Range    Prothrombin time 38.3 (H) 9.6 - 12.0 sec    INR 3.5 (H) 0.9 - 1.2     METABOLIC PANEL, BASIC    Collection Time: 09/13/17  3:51 AM   Result Value Ref Range    Sodium 142 136 - 145 mmol/L    Potassium 3.6 3.5 - 5.1 mmol/L    Chloride 106 98 - 107 mmol/L    CO2 30 21 - 32 mmol/L    Anion gap 6 (L) 7 - 16 mmol/L    Glucose 93 65 - 100 mg/dL    BUN 31 (H) 8 - 23 MG/DL    Creatinine 0.67 (L) 0.8 - 1.5 MG/DL    GFR est AA >60 >60 ml/min/1.73m2    GFR est non-AA >60 >60 ml/min/1.73m2    Calcium 7.4 (L) 8.3 - 10.4 MG/DL   CBC WITH AUTOMATED DIFF    Collection Time: 09/13/17  3:51 AM   Result Value Ref Range    WBC 10.5 4.3 - 11.1 K/uL    RBC 2.57 (L) 4.23 - 5.67 M/uL    HGB 8.4 (L) 13.6 - 17.2 g/dL    HCT 26.1 (L) 41.1 - 50.3 %    MCV 92.2 79.6 - 97.8 FL    MCH 29.6 26.1 - 32.9 PG    MCHC 32.1 31.4 - 35.0 g/dL    RDW 16.9 (H) 11.9 - 14.6 %    PLATELET 84 (L) 986 - 450 K/uL    MPV 12.0 10.8 - 14.1 FL    DF AUTOMATED      NEUTROPHILS 94 (H) 43 - 78 %    LYMPHOCYTES 3 (L) 13 - 44 %    MONOCYTES 3 (L) 4.0 - 12.0 %    EOSINOPHILS 0 (L) 0.5 - 7.8 %    BASOPHILS 0 0.0 - 2.0 %    IMMATURE GRANULOCYTES 0.2 0.0 - 5.0 %    ABS. NEUTROPHILS 9.9 (H) 1.7 - 8.2 K/UL    ABS. LYMPHOCYTES 0.3 (L) 0.5 - 4.6 K/UL    ABS. MONOCYTES 0.4 0.1 - 1.3 K/UL    ABS. EOSINOPHILS 0.0 0.0 - 0.8 K/UL    ABS. BASOPHILS 0.0 0.0 - 0.2 K/UL    ABS. IMM.  GRANS. 0.0 0.0 - 0.5 K/UL   GLUCOSE, POC    Collection Time: 09/13/17  6:34 AM   Result Value Ref Range    Glucose (POC) 99 65 - 100 mg/dL   GLUCOSE, POC    Collection Time: 09/13/17 11:08 AM   Result Value Ref Range    Glucose (POC) 130 (H) 65 - 100 mg/dL   GLUCOSE, POC    Collection Time: 09/13/17  5:01 PM   Result Value Ref Range    Glucose (POC) 161 (H) 65 - 100 mg/dL   GLUCOSE, POC    Collection Time: 09/13/17  8:18 PM   Result Value Ref Range    Glucose (POC) 262 (H) 65 - 100 mg/dL   VANCOMYCIN, TROUGH    Collection Time: 09/14/17  2:05 AM   Result Value Ref Range    Vancomycin,trough 17.1 5 - 20 ug/mL   CBC WITH AUTOMATED DIFF    Collection Time: 09/14/17  5:33 AM   Result Value Ref Range    WBC 4.4 4.3 - 11.1 K/uL    RBC 2.62 (L) 4.23 - 5.67 M/uL    HGB 8.5 (L) 13.6 - 17.2 g/dL    HCT 26.8 (L) 41.1 - 50.3 %    MCV 93.1 79.6 - 97.8 FL    MCH 29.4 26.1 - 32.9 PG    MCHC 31.6 31.4 - 35.0 g/dL    RDW 17.2 (H) 11.9 - 14.6 %    PLATELET 62 (L) 904 - 450 K/uL    MPV 12.4 10.8 - 14.1 FL    DF AUTOMATED      NEUTROPHILS 83 (H) 43 - 78 %    LYMPHOCYTES 8 (L) 13 - 44 %    MONOCYTES 8 4.0 - 12.0 %    EOSINOPHILS 1 0.5 - 7.8 %    BASOPHILS 0 0.0 - 2.0 %    IMMATURE GRANULOCYTES 0.2 0.0 - 5.0 %    ABS. NEUTROPHILS 3.6 1.7 - 8.2 K/UL    ABS. LYMPHOCYTES 0.4 (L) 0.5 - 4.6 K/UL    ABS. MONOCYTES 0.4 0.1 - 1.3 K/UL    ABS. EOSINOPHILS 0.0 0.0 - 0.8 K/UL    ABS. BASOPHILS 0.0 0.0 - 0.2 K/UL    ABS. IMM.  GRANS. 0.0 0.0 - 0.5 K/UL   PROTHROMBIN TIME + INR    Collection Time: 09/14/17  5:33 AM   Result Value Ref Range    Prothrombin time 39.4 (H) 9.6 - 12.0 sec    INR 3.6 (HH) 0.9 - 1.2     GLUCOSE, POC    Collection Time: 09/14/17  6:11 AM   Result Value Ref Range    Glucose (POC) 66 65 - 100 mg/dL   GLUCOSE, POC    Collection Time: 09/14/17 11:26 AM   Result Value Ref Range    Glucose (POC) 150 (H) 65 - 100 mg/dL   GLUCOSE, POC    Collection Time: 09/14/17  4:14 PM   Result Value Ref Range    Glucose (POC) 132 (H) 65 - 100 mg/dL   GLUCOSE, POC    Collection Time: 09/14/17  9:08 PM Result Value Ref Range    Glucose (POC) 188 (H) 65 - 100 mg/dL   CBC WITH AUTOMATED DIFF    Collection Time: 09/15/17  5:39 AM   Result Value Ref Range    WBC 4.6 4.3 - 11.1 K/uL    RBC 2.90 (L) 4.23 - 5.67 M/uL    HGB 8.7 (L) 13.6 - 17.2 g/dL    HCT 27.0 (L) 41.1 - 50.3 %    MCV 93.1 79.6 - 97.8 FL    MCH 30.0 26.1 - 32.9 PG    MCHC 32.2 31.4 - 35.0 g/dL    RDW 16.8 (H) 11.9 - 14.6 %    PLATELET 25 (LL) 936 - 450 K/uL    MPV Cannot be calculated 10.8 - 14.1 FL    NEUTROPHILS 80 (H) 43 - 78 %    LYMPHOCYTES 12 (L) 13 - 44 %    MONOCYTES 8 4.0 - 12.0 %    EOSINOPHILS 0 (L) 0.5 - 7.8 %    BASOPHILS 0 0.0 - 2.0 %    ABS. NEUTROPHILS 3.7 1.7 - 8.2 K/UL    ABS. LYMPHOCYTES 0.6 0.5 - 4.6 K/UL    ABS. MONOCYTES 0.4 0.1 - 1.3 K/UL    ABS. EOSINOPHILS 0.0 0.0 - 0.8 K/UL    ABS. BASOPHILS 0.0 0.0 - 0.2 K/UL    ABS. IMM.  GRANS. 0.0 0.0 - 0.5 K/UL    RBC COMMENTS SLIGHT  ANISOCYTOSIS        WBC COMMENTS Result Confirmed By Smear      PLATELET COMMENTS MARKED      DF AUTOMATED     PROTHROMBIN TIME + INR    Collection Time: 09/15/17  5:39 AM   Result Value Ref Range    Prothrombin time 27.6 (H) 9.6 - 12.0 sec    INR 2.5 (H) 0.9 - 1.2     GLUCOSE, POC    Collection Time: 09/15/17  6:17 AM   Result Value Ref Range    Glucose (POC) 72 65 - 100 mg/dL   PLATELETS, ALLOCATE    Collection Time: 09/15/17  9:45 AM   Result Value Ref Range    Unit number [id number]     Blood component type PLTPH LRIR,1     Unit division 00     Status of unit ALLOCATED    GLUCOSE, POC    Collection Time: 09/15/17 11:18 AM   Result Value Ref Range    Glucose (POC) 118 (H) 65 - 100 mg/dL       Assessment:     Problem List as of 9/15/2017  Date Reviewed: 9/9/2017          Codes Class Noted - Resolved    Respiratory failure ([facility] [address] ICD-10-CM: J96.90  ICD-9-CM: 518.81  9/13/2017 - Present        MRSA nasal colonization ICD-10-CM: P71.997  ICD-9-CM: V02.54  9/8/2017 - Present        COPD (chronic obstructive pulmonary disease) (HCC) (Chronic) ICD-10-CM: J44.9  ICD-9-CM: 496  8/30/2017 - Present        Acute respiratory failure with hypercapnia (HCC) ICD-10-CM: J96.02  ICD-9-CM: 518.81  8/29/2017 - Present        History of tobacco use (Chronic) ICD-10-CM: I15.333  ICD-9-CM: V15.82  8/29/2017 - Present        Hyponatremia ICD-10-CM: E87.1  ICD-9-CM: 276.1  8/29/2017 - Present        Acute encephalopathy ICD-10-CM: G93.40  ICD-9-CM: 348.30  8/29/2017 - Present        Thrombocytopenia (HCC) ICD-10-CM: D69.6  ICD-9-CM: 287.5  8/26/2017 - Present        Anticoagulant long-term use (Chronic) ICD-10-CM: Z79.01  ICD-9-CM: V58.61  8/23/2017 - Present        HTN (hypertension), benign (Chronic) ICD-10-CM: I10  ICD-9-CM: 401.1  8/23/2017 - Present        Peripheral vascular disease ([facility] [address] (Chronic) ICD-10-CM: I73.9  ICD-9-CM: 443.9  8/23/2017 - Present        Dyslipidemia (Chronic) ICD-10-CM: E78.5  ICD-9-CM: 272.4  8/23/2017 - Present        History of mechanical aortic valve replacement (Chronic) ICD-10-CM: Z95.2  ICD-9-CM: V43.3  8/23/2017 - Present    Overview Signed 8/30/2017 10:30 AM by Martha Garg NP     Placement 2000, on chronicCoumadin             Centrilobular emphysema ([facility] [address] (Chronic) ICD-10-CM: J43.2  ICD-9-CM: 492.8  8/23/2017 - Present    Overview Signed 9/8/2017  9:24 AM by Armond Harvey NP      With last PFTs FVC 1.70 L or 44% predicted, FEV1 0.86 L or 29% predicted, FEV1/FVC 51%. This study was a postbronchodilator study performed at the [facility] on 8/22/2016                  Ischemic cardiomyopathy (Chronic) ICD-10-CM: I25.5  ICD-9-CM: 414.8  8/23/2017 - Present    Overview Signed 8/30/2017 10:29 AM by Martha Garg NP     8/23/17 ECHO:  EF 40 % to 45 %. There were no regional wall motion abnormalities. Moderate hypokinesis of the mid-apical anterior and apical wall(s).              Atrial flutter with rapid ventricular response (HCC) ICD-10-CM: F77.60  ICD-9-CM: 427.32  8/23/2017 - Present        LV dysfunction (Chronic) ICD-10-CM: I51.9  ICD-9-CM: 429.9  10/19/2016 - Present        Atherosclerosis of native arteries of the extremities with intermittent claudication (Chronic) ICD-10-CM: G58.879  ICD-9-CM: 440.21  4/15/2014 - Present              Plan:      Rehabilitation Plan  The patient has shown the ability to tolerate and benefit from 3 hours of therapy daily and is being admitted to a comprehensive acute inpatient rehabilitation program consisting of at least 3 hours of combined physical and occupational therapies. Begin intensive Physical Therapy for a minimum of 1.5 hours a day, at least 5 out of 7 days per week to address bed mobility, transfers, ambulation, strengthening, balance, and endurance. Patient limited by poor endurance , weakness of his trunk and limbs. Begin intensive Occupational Therapy for a minimum of 1.5 hours a day, at least 5 out of 7 days per week to address ADL ( bathing, LE dressing, toileting) and adaptive equipment as needed.        Continue ST for: dysarthria, impaired communication skills, hypophonia, dysphagia.      Continue 24-hour skilled rehabilitation nursing for bowel and bladder management, skin care for decubitus ulcer prevention , pain management and ongoing medication administration      Continue daily physician medical management:     Atrial flutter with rapid ventricular response ([facility] [address] (8/23/2017) sp cardioversion - monitor HR./ BP.   - Digoxin, coreg.  - continue anticoagulation  - cardiac precautions.         History of mechanical aortic valve replacement 2000, on coumadin/ Anticoagulant long-term use. Goal 2.5-3.5.  - resume warfarin, monitor INR.   - INR 2.9 (9/12) -> 3.5(9/13) -> 3.6(9/14)-> 2.5 . resume warfarin at 5mg tonight. will average out home dose ~> 6mg hs.      Bacteremia - started on empiric antibiotic treatment. Aztreonam, vanco.   - BCx grew e.coli. discontinued vano.    - final ID extended-spectrum beta-lactamases (ESBLs) , appears treated, though efficacy of vanco, aztreonam not optimal.  . Pt asymptomatic, WBC wnl. Will discontinue - aztreonam. .  - will monitor closely. - will have on contact precaution, infection control notified. HTN (hypertension) - monitor.  - continue Coreg/ dogoxin     Ischemic cardiomyopathy       Thrombocytopenia/ ITP  - continued on steroids. - hematology following.   - HIT negative. - ivig per hematology direction. Will receive 1 more dose. - will receive platelet 1unit today. plt 25k  -      COPD   - continue respiratory treatments. Presently q6h, wean as appropriate. - resume spiriva, pulmicort  - SOB with activity. - normal CxR (9/15)        iron deficiency anemia -hgb 8.5-> 8.7(9/15) . Monitor. Continue fe supplements.      Hyponatremia  - monitor.      Pneumonia prophylaxis- Insentive spirometer every hour while awake     DVT risk / DVT Prophylaxis- Will require daily physician exam to assess for signs and symptoms as patient is at increased risk for of thromboembolism. Mobilization as tolerated. Intermittent pneumatic compression devices when in bed Thigh-high or knee-high thromboembolic deterrent hose when out of bed.    - covered with warfarin.      Pain Control: stable, mild-to-moderate joint symptoms intermittently, reasonably well controlled by PRN meds. Will require regular pain assessment and comprenhensive pain management. - resume gabapentin.      Wound Care: Monitor wound status daily per staff and physician. At risk for failure. Will require 24/7 rehab nursing. Keep wound clean and dry  - excoriation at bridge of nose. Continue antibiotic ointment.      Diabetes mellitus - Uncontrolled. poor glycemic control. Will require daily, close FSG monitoring and medication adjustment to optimize glycemic control in setting of acute illness and hospitalization.   - SSI coverage with lispro.      Urinary retention/ neurogenic bladder - schedule voids q6-8 hrs. Check post-void residual every shift;  In and Out catheterize if post-void residual is more than 400 cc.     bowel program - colace as needed. Time spent was 25 minutes with over 1/2 in direct patient care/examination, consultation and coordination of care.      Signed By: Sylwia Garcia MD     September 15, 2017

## 2017-09-15 NOTE — PROGRESS NOTES
09/15/17 0845   Mental Status   Neurologic State Alert   Orientation Level Oriented X4   Cognition Follows commands   Perception Appears intact   Perseveration No perseveration noted   Safety/Judgement Fall prevention   Oral Assessment   Labial No impairment   Dentition Upper & lower dentures   Oral Hygiene fair   Lingual No impairment   Swallowing Study Trial   Type of Study Modified barium swallow   Film Views Lateral;Fluoro   Radiologist stoppenhagen    Patient Position up rin chair   Consistency Presented Puree; Solid; Thin liquid;Mixed consistency; Nectar thick liquid   How Presented Self-fed/presented;SLP-fed/presented;Cup/sip;Spoon;Straw   Bolus Acceptance No impairment   Bolus Formation/Control Impaired   Type of Impairment Mastication   Propulsion No impairment   Oral Residue None   Initiation of Swallow Triggered at vallecula   Timing Pooling 1-5 sec   Penetration During swallow;From residual   Aspiration/Timing No evidence of aspiration   Pharyngeal Clearance Less than 10%   Swallowing Physiology   Decreased Tongue Base Retraction? No   Laryngeal Elevation Minimal movement of larynx/epiglottis   Aspiration/Penetration Score 3 (Penetration/Visible residue-Contrast remains above the folds/cords, but is not cleared)   Pharyngeal Symmetry Not assessed   Pharyngeal-Esophageal Segment No impairment   Pharyngeal Dysfunction Decreased strength   Findings   Oral Phase Severity Mild   Pharyngeal Phase Severity Mild moderate   Treatment Diagnosis   Treatment Diagnosis R13.12 oropharyngeal phase dysphagia   Pt with a delayed swallow down to the valleculae with all present trials. Pt with silent, penetration during the swallow for initial swallow and after the swallow from residue with thin liquids and mixed consistencies. Pt tolerated nectar thick liquids, pudding and solid. Increased mastication with chewable textures. Mild valleculae residue with all trials.  Recommend mechanical soft textures with no mixed with nectar thick liquids. Medications as tolerated.    Time in 800  Time out 1256 [facility] MA/CCC/SLP

## 2017-09-15 NOTE — PROGRESS NOTES
09/15/17 1009   Time Spent With Patient   Time In 0703   Time Out 0750   Patient Seen For: AM;ADLs   Feeding/Eating   Feeding/Eating Assistance S   Comments Setup for container management   Grooming   Grooming Assistance  S   Comments Setup seated at sink to don deodorant and wash face   Upper Body Bathing   Bathing Assistance, Upper Mod I   Position Performed Seated in chair   Adaptive Equipment Other (comment)  (Wheelchair)   Comments Increased time and rest breaks required d/t fatigue   Lower Body Bathing   Bathing Assistance, Lower  Mod A   Position Performed Seated in chair;Standing   Adaptive Equipment Other (comment)  (Seated in wheelchair, stabilizing on sink counter for balance in stance)   Comments CGA/close supervision for standing balance at sink to bathe bottom/perineal area, assist to bathe lower LEs d/t fatigue   Upper Body [address]   Comments Assist to help t-shirt overhead, note decreased attention to cues from therapist for UB dressing technique for energy conservation   Lower Body Dressing    Dressing Assistance  Mod A   Leg Crossed Method Used No   Position Performed Seated in chair;Standing   Adaptive Equipment Used Reacher   Don/Doff Anti-Embolic Stockings NA   Comments Assist to start underwear/pants over feet and don socks d/t fatigue end of session, patient educated and uses reacher to doff socks and underwear for energy conservation however declines use for donning clothing       S: \"I don't like that thing [reacher]. \" Agreeable to therapy. Focus of session was on ADL retraining/functional mobility. Patient was able to transfer bed to wheelchair SPT with CGA. Pain not indicated. Collaborated with PT, Sam Islas and confirmed patient is on track to reach goals as documented in the care plan.    Patient tolerated session well, but motivation/participation, activity tolerance, balance, functional mobility, strength, coordination are still below baseline and require skilled facilitation to successfully and safely complete ADL's and transfers. Patient ended session in wheelchair with breakfast, call remote and phone within reach.      Octaviano Cruz OTR/L

## 2017-09-15 NOTE — PROGRESS NOTES
Please note that [employer] arranged through [address], [place] can deliver equipment 1-2 days before discharge for training if needed.      Ashley Vazquez, RN- [facility], BSN  Care Transition/ Bundled Payment Navigator  [phone]

## 2017-09-15 NOTE — PROGRESS NOTES
OT Daily Note    Time In 1301   Time Out 1345     Subjective: \"That was a relief. \" [patient completed BM this session]  Pain: None indicated    Precautions: Contact (MRSA)    Self-Care   Patient transferred bed to wheelchair SPT with CGA. Patient completed grooming at sink independently to wash eyes/face d/t reported discomfort and build-up in eyes. Patient transferred wheelchair <> toilet SPT with CGA and use of grab bars, completed toileting with CGA for standing balance only to manage hygiene and pants on/off hips. Activity Tolerance   Patient completed 1 set x 10 reps of BUE therapeutic exercises seated in wheelchair utilizing 2# weighted dowel. Patient completed overhead press, chest press, and forward row with rest breaks between exercises before time expires this session. Assessment: Patient tolerated well. Education: Purpose of therapy  Interdisciplinary Communication: Collaborated with Eduardo Randall and agreed patient is progressing well and on-track to meet goals as stated in POC. Plan: Continue to address ADL/IADL, functional mobility, activity tolerance, balance, strengthening, coordination, cognition.     Volodymyr Raya OTR/L

## 2017-09-16 ENCOUNTER — APPOINTMENT (OUTPATIENT)
Dept: GENERAL RADIOLOGY | Age: 72
DRG: 309 | End: 2017-09-16
Attending: PHYSICAL MEDICINE & REHABILITATION
Payer: MEDICARE

## 2017-09-16 LAB
ATRIAL RATE: 113 BPM
BASOPHILS # BLD: 0 K/UL (ref 0–0.2)
BASOPHILS NFR BLD: 0 % (ref 0–2)
BLD PROD TYP BPU: NORMAL
BPU ID: NORMAL
CALCULATED R AXIS, ECG10: 120 DEGREES
CALCULATED T AXIS, ECG11: -42 DEGREES
DIAGNOSIS, 93000: NORMAL
DIFFERENTIAL METHOD BLD: ABNORMAL
EOSINOPHIL # BLD: 0 K/UL (ref 0–0.8)
EOSINOPHIL NFR BLD: 0 % (ref 0.5–7.8)
ERYTHROCYTE [DISTWIDTH] IN BLOOD BY AUTOMATED COUNT: 16.5 % (ref 11.9–14.6)
GLUCOSE BLD STRIP.AUTO-MCNC: 101 MG/DL (ref 65–100)
GLUCOSE BLD STRIP.AUTO-MCNC: 111 MG/DL (ref 65–100)
GLUCOSE BLD STRIP.AUTO-MCNC: 200 MG/DL (ref 65–100)
GLUCOSE BLD STRIP.AUTO-MCNC: 65 MG/DL (ref 65–100)
GLUCOSE BLD STRIP.AUTO-MCNC: 87 MG/DL (ref 65–100)
HCT VFR BLD AUTO: 33 % (ref 41.1–50.3)
HGB BLD-MCNC: 10.8 G/DL (ref 13.6–17.2)
IMM GRANULOCYTES # BLD: 0 K/UL (ref 0–0.5)
IMM GRANULOCYTES NFR BLD: 0.3 % (ref 0–5)
INR PPP: 1.9 (ref 0.9–1.2)
LYMPHOCYTES # BLD: 0.5 K/UL (ref 0.5–4.6)
LYMPHOCYTES NFR BLD: 14 % (ref 13–44)
MCH RBC QN AUTO: 30.4 PG (ref 26.1–32.9)
MCHC RBC AUTO-ENTMCNC: 32.7 G/DL (ref 31.4–35)
MCV RBC AUTO: 93 FL (ref 79.6–97.8)
MONOCYTES # BLD: 0.4 K/UL (ref 0.1–1.3)
MONOCYTES NFR BLD: 9 % (ref 4–12)
NEUTS SEG # BLD: 3.1 K/UL (ref 1.7–8.2)
NEUTS SEG NFR BLD: 77 % (ref 43–78)
PLATELET # BLD AUTO: 75 K/UL (ref 150–450)
PMV BLD AUTO: 11 FL (ref 10.8–14.1)
PROTHROMBIN TIME: 21 SEC (ref 9.6–12)
Q-T INTERVAL, ECG07: 358 MS
QRS DURATION, ECG06: 164 MS
QTC CALCULATION (BEZET), ECG08: 514 MS
RBC # BLD AUTO: 3.55 M/UL (ref 4.23–5.67)
STATUS OF UNIT,%ST: NORMAL
UNIT DIVISION, %UDIV: 0
VENTRICULAR RATE, ECG03: 124 BPM
WBC # BLD AUTO: 4 K/UL (ref 4.3–11.1)

## 2017-09-16 PROCEDURE — 97535 SELF CARE MNGMENT TRAINING: CPT

## 2017-09-16 PROCEDURE — 97150 GROUP THERAPEUTIC PROCEDURES: CPT

## 2017-09-16 PROCEDURE — 74011250636 HC RX REV CODE- 250/636: Performed by: PHYSICAL MEDICINE & REHABILITATION

## 2017-09-16 PROCEDURE — 85025 COMPLETE CBC W/AUTO DIFF WBC: CPT | Performed by: PHYSICAL MEDICINE & REHABILITATION

## 2017-09-16 PROCEDURE — 85610 PROTHROMBIN TIME: CPT | Performed by: PHYSICAL MEDICINE & REHABILITATION

## 2017-09-16 PROCEDURE — 51798 US URINE CAPACITY MEASURE: CPT

## 2017-09-16 PROCEDURE — 74011250637 HC RX REV CODE- 250/637: Performed by: PHYSICAL MEDICINE & REHABILITATION

## 2017-09-16 PROCEDURE — 94660 CPAP INITIATION&MGMT: CPT

## 2017-09-16 PROCEDURE — 97116 GAIT TRAINING THERAPY: CPT

## 2017-09-16 PROCEDURE — 71010 XR CHEST SNGL V: CPT

## 2017-09-16 PROCEDURE — 97530 THERAPEUTIC ACTIVITIES: CPT

## 2017-09-16 PROCEDURE — 94640 AIRWAY INHALATION TREATMENT: CPT

## 2017-09-16 PROCEDURE — 77030011256 HC DRSG MEPILEX <16IN NO BORD MOLN -A

## 2017-09-16 PROCEDURE — 97110 THERAPEUTIC EXERCISES: CPT

## 2017-09-16 PROCEDURE — 74011636637 HC RX REV CODE- 636/637: Performed by: PHYSICAL MEDICINE & REHABILITATION

## 2017-09-16 PROCEDURE — 74011000250 HC RX REV CODE- 250: Performed by: PHYSICAL MEDICINE & REHABILITATION

## 2017-09-16 PROCEDURE — 99232 SBSQ HOSP IP/OBS MODERATE 35: CPT | Performed by: PHYSICAL MEDICINE & REHABILITATION

## 2017-09-16 PROCEDURE — 74011250637 HC RX REV CODE- 250/637: Performed by: INTERNAL MEDICINE

## 2017-09-16 PROCEDURE — 93005 ELECTROCARDIOGRAM TRACING: CPT | Performed by: PHYSICAL MEDICINE & REHABILITATION

## 2017-09-16 PROCEDURE — 65310000000 HC RM PRIVATE REHAB

## 2017-09-16 PROCEDURE — 82962 GLUCOSE BLOOD TEST: CPT

## 2017-09-16 PROCEDURE — 94760 N-INVAS EAR/PLS OXIMETRY 1: CPT

## 2017-09-16 RX ORDER — METOPROLOL SUCCINATE 50 MG/1
50 TABLET, EXTENDED RELEASE ORAL DAILY
Status: DISCONTINUED | OUTPATIENT
Start: 2017-09-16 | End: 2017-09-18

## 2017-09-16 RX ADMIN — Medication 20 ML: at 06:16

## 2017-09-16 RX ADMIN — TIOTROPIUM BROMIDE 18 MCG: 18 CAPSULE ORAL; RESPIRATORY (INHALATION) at 05:40

## 2017-09-16 RX ADMIN — PREDNISONE 30 MG: 20 TABLET ORAL at 08:17

## 2017-09-16 RX ADMIN — CARVEDILOL 3.12 MG: 3.12 TABLET, FILM COATED ORAL at 17:00

## 2017-09-16 RX ADMIN — POVIDONE-IODINE: 10 SOLUTION TOPICAL at 08:18

## 2017-09-16 RX ADMIN — CARVEDILOL 3.12 MG: 3.12 TABLET, FILM COATED ORAL at 08:17

## 2017-09-16 RX ADMIN — METOPROLOL SUCCINATE 50 MG: 50 TABLET, EXTENDED RELEASE ORAL at 21:08

## 2017-09-16 RX ADMIN — DIGOXIN 0.25 MG: 250 TABLET ORAL at 08:17

## 2017-09-16 RX ADMIN — Medication 600 UNITS: at 21:09

## 2017-09-16 RX ADMIN — Medication 20 ML: at 21:09

## 2017-09-16 RX ADMIN — FAMOTIDINE 20 MG: 10 INJECTION, SOLUTION INTRAVENOUS at 08:17

## 2017-09-16 RX ADMIN — INSULIN LISPRO 4 UNITS: 100 INJECTION, SOLUTION INTRAVENOUS; SUBCUTANEOUS at 16:55

## 2017-09-16 RX ADMIN — FERROUS SULFATE TAB 325 MG (65 MG ELEMENTAL FE) 325 MG: 325 (65 FE) TAB at 08:17

## 2017-09-16 RX ADMIN — GUAIFENESIN 1200 MG: 600 TABLET, EXTENDED RELEASE ORAL at 08:17

## 2017-09-16 RX ADMIN — ALBUTEROL SULFATE 2.5 MG: 2.5 SOLUTION RESPIRATORY (INHALATION) at 05:40

## 2017-09-16 RX ADMIN — ALBUTEROL SULFATE 2.5 MG: 2.5 SOLUTION RESPIRATORY (INHALATION) at 11:05

## 2017-09-16 RX ADMIN — GABAPENTIN 600 MG: 300 CAPSULE ORAL at 21:10

## 2017-09-16 RX ADMIN — GABAPENTIN 600 MG: 300 CAPSULE ORAL at 06:16

## 2017-09-16 RX ADMIN — Medication 20 ML: at 14:40

## 2017-09-16 RX ADMIN — Medication 600 UNITS: at 14:40

## 2017-09-16 RX ADMIN — BUDESONIDE 500 MCG: 0.5 INHALANT RESPIRATORY (INHALATION) at 17:45

## 2017-09-16 RX ADMIN — Medication 600 UNITS: at 06:17

## 2017-09-16 RX ADMIN — WARFARIN SODIUM 6 MG: 5 TABLET ORAL at 17:00

## 2017-09-16 RX ADMIN — BUDESONIDE 500 MCG: 0.5 INHALANT RESPIRATORY (INHALATION) at 05:40

## 2017-09-16 RX ADMIN — GABAPENTIN 600 MG: 300 CAPSULE ORAL at 14:40

## 2017-09-16 RX ADMIN — GUAIFENESIN 1200 MG: 600 TABLET, EXTENDED RELEASE ORAL at 17:00

## 2017-09-16 NOTE — PROGRESS NOTES
Warfarin dosing per pharmacist    Caren Angel is a 67 y.o. male. Indication: A. Fib    Goal INR:  2-3    Home dose:  5 mg daily, 7.5 mg on Wednesday    Risk factors or significant drug interactions:  Antibiotics, prednisone    Other anticoagulants:  none    Daily Monitoring  Date  INR     Warfarin dose HGB              Notes  9/8  1.2  10mg  11.2    9/9  1.3  10mg  9.4  9/10  ---  10mg  9.7  9/11  2.1  10mg  10.8  9/12  2.9  10mg  8.9  9/13  3.5  Hold  8.4  Pharmacy Consulted  9/14  3.6  Held  8.5  9/15  2.5  5 mg  8.7  9/16  1.9  6 mg  10.8      INR to 1.9 after restarted at 5 mg last night. Changed to 6 mg per Dr. Stefan Arriola. Will continue to follow. Thank you,  Nik Carroll, Pharm. D.   Clinical Pharmacist  [phone]

## 2017-09-16 NOTE — PROGRESS NOTES
Bs=64 given juice per protocol, will tai bs, pt a&o x4, denies any c/o, labs drawn, picc line flushed without difficulty

## 2017-09-16 NOTE — PROGRESS NOTES
Problem: Falls - Risk of  Goal: *Absence of Falls  Document Lauren Fall Risk and appropriate interventions in the flowsheet.    Outcome: Progressing Towards Goal  Fall Risk Interventions:  Mobility Interventions: Patient to call before getting OOB     Mentation Interventions: Increase mobility     Medication Interventions: Patient to call before getting OOB     Elimination Interventions: Call light in reach, Patient to call for help with toileting needs, Toileting schedule/hourly rounds     History of Falls Interventions: Door open when patient unattended

## 2017-09-16 NOTE — PROGRESS NOTES
PHYSICAL THERAPY DAILY NOTE  Time In: 5937  Time Out: 6008  Patient Seen For: AM;Gait training;Patient education; Therapeutic exercise;Transfer training; Other (see progress notes)    Subjective: patient reporting he is \"exhausted\" from AM ADLs. Reports he is too tired to walk. Reports he is dizzy and feels weak. Requesting frequent and multiple rest breaks during treatment         Objective:Vital Signs:  Patient Vitals for the past 12 hrs:   Temp Pulse Resp BP SpO2   09/16/17 1244 - 88 - - -   09/16/17 1105 - - - - 94 %   09/16/17 1000 - (!) 131 - - -   09/16/17 0705 97.6 °F (36.4 °C) (!) 128 20 120/80 98 %   09/16/17 0540 - - - - 92 %   09/16/17 0419 - (!) 126 - - -     Initial sitting resting /84 , 02 sat 95%,  and 02 sat 95% after ambulating 30ft on room air    Pain level:No c/o pain  Pain location:NA  Pain interventions:NA    Patient education:Bed mobility training,transfer training, gait training, fall precautions, activity pacing, energy conservation,Patient verbalizing understanding and demonstrating partial understanding of patient education. Recommend follow up education. Interdisciplinary Communication:spoke with MD and RN regarding elevated HR    Contact (MRSA)  GROSS ASSESSMENT Daily Assessment     NA       BED/MAT MOBILITY Daily Assessment   Increased time and effort to complete Rolling Right : 0 (Not tested)  Rolling Left : 0 (Not tested)  Supine to Sit : 4 (Minimal assistance)  Sit to Supine : 4 (Minimal assistance)       TRANSFERS Daily Assessment   Verbal cues for safe body mechanics Transfer Type: SPT with walker  Transfer Assistance : 4 (Minimal assistance)  Sit to Stand Assistance: Minimal assistance  Car Transfers: Not tested       GAIT Daily Assessment   Following patient with wheelchair Amount of Assistance: 4 (Minimal assistance)  Distance (ft): 30 Feet (ft)  Assistive Device: Walker, rolling;Gait belt   Slow shuffling gait with flexed posture.  Verbal cues for posture correction and to improve step clearance and step length    STEPS or STAIRS Daily Assessment    Steps/Stairs Ambulated (#): 0  Level of Assist : 0 (Not tested)       BALANCE Daily Assessment    Sitting - Static: Good (unsupported)  Sitting - Dynamic: Good (unsupported)  Standing - Static: Fair;Constant support (with RW)  Standing - Dynamic : Impaired       WHEELCHAIR MOBILITY Daily Assessment    Curbs/Ramps Assist Required (FIM Score): 0 (Not tested)  Wheelchair Setup Assist Required : 0 (Not tested)       LOWER EXTREMITY EXERCISES Daily Assessment    Extremity: Both  Exercise Type #1: Supine lower extremity strengthening  Sets Performed: 2  Reps Performed: 10  Level of Assist: Minimal assistance          Assessment: Slow progress towards goals due to medical status. HR elevated this AM and unable to progress gait distance. Patient fatigues easily and requires multiple and frequent rest breaks during exercise and functional mobility       Rehab tech assisted patient back to room at end of treatment    Plan of Care: Continue with POC and progress as tolerated.      Doretha Cavazos, PT  9/16/2017

## 2017-09-16 NOTE — PROGRESS NOTES
MD Mateusz,   Medical Director  [address] [facility], [address] [facility]  Tel: [phone]       [facility] PROGRESS NOTE    Beverley Santiago  Admit Date: 9/13/2017  Admit Diagnosis: Cardiac Debility; Respiratory failure ([address]    Subjective     Patient seen and examined. Vss.afberile. Patient c/o mild SOB, denies chest pain, cough. HR in 130s this morning. EKG shows a.fib. Appears LE mildy more edematous.      Objective:     Current Facility-Administered Medications   Medication Dose Route Frequency    warfarin (COUMADIN) tablet 6 mg  6 mg Oral QPM    0.9% sodium chloride infusion 250 mL  250 mL IntraVENous PRN    0.9% sodium chloride infusion 250 mL  250 mL IntraVENous PRN    nitroglycerin (NITROSTAT) tablet 0.4 mg  0.4 mg SubLINGual Q5MIN PRN    ondansetron (ZOFRAN) injection 4 mg  4 mg IntraVENous Q4H PRN    sodium chloride (NS) flush 5-10 mL  5-10 mL IntraVENous PRN    NUTRITIONAL SUPPORT ELECTROLYTE PRN ORDERS   Does Not Apply PRN    acetaminophen (TYLENOL) suppository 650 mg  650 mg Rectal Q4H PRN    acetaminophen (TYLENOL) tablet 650 mg  650 mg Oral Q4H PRN    albuterol (PROVENTIL HFA, VENTOLIN HFA, PROAIR HFA) inhaler 2 Puff  2 Puff Inhalation Q4H PRN    albuterol (PROVENTIL VENTOLIN) nebulizer solution 2.5 mg  2.5 mg Nebulization Q4H PRN    budesonide (PULMICORT) 500 mcg/2 ml nebulizer suspension  500 mcg Nebulization BID RT    And    albuterol CONCENTRATE 2.5mg/0.5 mL neb soln  2.5 mg Nebulization Q6H RT    dextrose (D50W) injection syrg 25 g  25 g IntraVENous PRN    digoxin (LANOXIN) tablet 0.25 mg  0.25 mg Oral DAILY    famotidine (PF) (PEPCID) 20 mg in sodium chloride 0.9 % 10 mL injection  20 mg IntraVENous DAILY    ferrous sulfate tablet 325 mg  1 Tab Oral DAILY WITH BREAKFAST    gabapentin (NEURONTIN) capsule 600 mg  600 mg Oral TID    guaiFENesin ER (MUCINEX) tablet 1,200 mg  1,200 mg Oral BID    heparin (porcine) pf 600 Units  600 Units InterCATHeter Q8H    heparin (porcine) pf 600 Units  600 Units InterCATHeter PRN    insulin lispro (HUMALOG) injection   SubCUTAneous AC&HS    povidone-iodine (BETADINE) 10 % topical solution   Topical DAILY    [START ON 9/29/2017] predniSONE (DELTASONE) tablet 10 mg  10 mg Oral DAILY WITH BREAKFAST    [START ON 9/22/2017] predniSONE (DELTASONE) tablet 20 mg  20 mg Oral DAILY WITH BREAKFAST    predniSONE (DELTASONE) tablet 30 mg  30 mg Oral DAILY WITH BREAKFAST    sodium chloride (NS) flush 20 mL  20 mL InterCATHeter Q8H    tiotropium (SPIRIVA) inhalation capsule 18 mcg  1 Cap Inhalation DAILY    traZODone (DESYREL) tablet 50 mg  50 mg Oral QHS PRN    influenza vaccine 2015-16 (36mos+)(PF) (FLUZONE/FLUARIX QUAD) injection 0.5 mL  0.5 mL IntraMUSCular PRIOR TO DISCHARGE    carvedilol (COREG) tablet 3.125 mg  3.125 mg Oral BID WITH MEALS     Review of Systems:    Denies chest pain, shortness of breath, cough, headache, visual problems, abdominal pain, dysurea, calf pain. Pertinent positives are as noted in the medical records and unremarkable otherwise. Visit Vitals    /80 (BP 1 Location: Left arm, BP Patient Position: At rest)    Pulse (!) 128    Temp 97.6 °F (36.4 °C)    Resp 20    SpO2 98%      Physical Exam:   General: Alert and age appropriately oriented x 2. No acute cardio respiratory distress. HEENT: Normocephalic,no scleral icterus  Oral mucosa moist without cyanosis, No bruit, + JVD. Lungs: + bibasilar crackles. No wheezing. Respiration even and unlabored   Heart: irregular rate and rhythm, S1, S2   No  murmurs, clicks, rub or gallops   Abdomen: Soft, non-tender, nondistended. Bowel sounds present. No organomegaly. Genitourinary: defered   Neuromuscular:      PERRL, EOMI  Speech slow, dysarthric. Focus, attention poor. Follows simple commands consistently, with delay, corrections.  Able to identify, recall repeat.      LUE     Shoulder abduction  5- /5              Elbow flexion:  5- /5               Wrist extension:   5-/5              Finger flexion;  5- /5                        ADMQ:   4+/5  RUE    Shoulder abduction:  5-/5                Elbow flexion:   5-/5                         Wrist extension:  5-/5                        Finger flexion:   /5-5                        ADMQ:  5- /5  LLE     Hip flexion:  3 /5              Knee extension:   4/5                         Ankle dorsiflexion: 4+ /5                        Ankle plantarflexion:  5- /5                                                                RLE     Hip flexion:  3 /5                        Knee extension:  4 /5                         Ankle dorsiflexion: 4+ /5                        Ankle plantarflexion:   5-/5  Sensory - intact  No cerebellar signs. Plantars - down going  No atrophy, no fasciculations, no tremors. Skin/extremity: No rashes, no erythema. Calf non tender BLE. 2+ edema BLE.                                                                                    Functional Assessment:          Balance  Sitting - Static: Good (unsupported) (09/15/17 1600)  Sitting - Dynamic: Good (unsupported) (09/15/17 1600)  Standing - Static: Fair;Constant support (09/15/17 1600)  Standing - Dynamic : Impaired (09/15/17 1600)                     Tia Manus Fall Risk Assessment:  Tia Manus Fall Risk  Mobility: Ambulates or transfers with assist devices or assistance/unsteady gait (09/16/17 0757)  Mobility Interventions: Patient to call before getting OOB (09/16/17 0757)  Mentation: Alert, oriented x 3 (09/16/17 0757)  Mentation Interventions: Increase mobility (09/16/17 0757)  Medication: Patient receiving anticonvulsants, sedatives(tranquilizers), psychotropics or hypnotics, hypoglycemics, narcotics, sleep aids, antihypertensives, laxatives, or diuretics (09/16/17 0757)  Medication Interventions: Patient to call before getting OOB (09/16/17 0757)  Elimination: Needs assistance with toileting (09/16/17 0757)  Elimination Interventions: Call light in reach; Patient to call for help with toileting needs; Toileting schedule/hourly rounds (09/16/17 0757)  Prior Fall History: During admission (Date - Comment) (09/15/17 2200)  History of Falls Interventions: Door open when patient unattended (09/16/17 0757)  Total Score: 5 (09/15/17 2200)  Standard Fall Precautions: Yes (09/15/17 1725)  High Fall Risk: Yes (09/16/17 0757)     Speech Assessment:  Aspiration Signs/Symptoms: None (09/14/17 1036)      Ambulation:  Gait  Distance (ft): 35 Feet (ft) (09/15/17 0800)  Assistive Device: Namon Panning, rolling;Gait belt (09/15/17 0800)     Labs/Studies:  Recent Results (from the past 72 hour(s))   GLUCOSE, POC    Collection Time: 09/13/17 11:08 AM   Result Value Ref Range    Glucose (POC) 130 (H) 65 - 100 mg/dL   GLUCOSE, POC    Collection Time: 09/13/17  5:01 PM   Result Value Ref Range    Glucose (POC) 161 (H) 65 - 100 mg/dL   GLUCOSE, POC    Collection Time: 09/13/17  8:18 PM   Result Value Ref Range    Glucose (POC) 262 (H) 65 - 100 mg/dL   VANCOMYCIN, TROUGH    Collection Time: 09/14/17  2:05 AM   Result Value Ref Range    Vancomycin,trough 17.1 5 - 20 ug/mL   CBC WITH AUTOMATED DIFF    Collection Time: 09/14/17  5:33 AM   Result Value Ref Range    WBC 4.4 4.3 - 11.1 K/uL    RBC 2.62 (L) 4.23 - 5.67 M/uL    HGB 8.5 (L) 13.6 - 17.2 g/dL    HCT 26.8 (L) 41.1 - 50.3 %    MCV 93.1 79.6 - 97.8 FL    MCH 29.4 26.1 - 32.9 PG    MCHC 31.6 31.4 - 35.0 g/dL    RDW 17.2 (H) 11.9 - 14.6 %    PLATELET 62 (L) 743 - 450 K/uL    MPV 12.4 10.8 - 14.1 FL    DF AUTOMATED      NEUTROPHILS 83 (H) 43 - 78 %    LYMPHOCYTES 8 (L) 13 - 44 %    MONOCYTES 8 4.0 - 12.0 %    EOSINOPHILS 1 0.5 - 7.8 %    BASOPHILS 0 0.0 - 2.0 %    IMMATURE GRANULOCYTES 0.2 0.0 - 5.0 %    ABS. NEUTROPHILS 3.6 1.7 - 8.2 K/UL    ABS. LYMPHOCYTES 0.4 (L) 0.5 - 4.6 K/UL    ABS. MONOCYTES 0.4 0.1 - 1.3 K/UL    ABS. EOSINOPHILS 0.0 0.0 - 0.8 K/UL    ABS. BASOPHILS 0.0 0.0 - 0.2 K/UL    ABS. IMM. GRANS. 0.0 0.0 - 0.5 K/UL   PROTHROMBIN TIME + INR    Collection Time: 09/14/17  5:33 AM   Result Value Ref Range    Prothrombin time 39.4 (H) 9.6 - 12.0 sec    INR 3.6 (HH) 0.9 - 1.2     GLUCOSE, POC    Collection Time: 09/14/17  6:11 AM   Result Value Ref Range    Glucose (POC) 66 65 - 100 mg/dL   GLUCOSE, POC    Collection Time: 09/14/17 11:26 AM   Result Value Ref Range    Glucose (POC) 150 (H) 65 - 100 mg/dL   GLUCOSE, POC    Collection Time: 09/14/17  4:14 PM   Result Value Ref Range    Glucose (POC) 132 (H) 65 - 100 mg/dL   GLUCOSE, POC    Collection Time: 09/14/17  9:08 PM   Result Value Ref Range    Glucose (POC) 188 (H) 65 - 100 mg/dL   CBC WITH AUTOMATED DIFF    Collection Time: 09/15/17  5:39 AM   Result Value Ref Range    WBC 4.6 4.3 - 11.1 K/uL    RBC 2.90 (L) 4.23 - 5.67 M/uL    HGB 8.7 (L) 13.6 - 17.2 g/dL    HCT 27.0 (L) 41.1 - 50.3 %    MCV 93.1 79.6 - 97.8 FL    MCH 30.0 26.1 - 32.9 PG    MCHC 32.2 31.4 - 35.0 g/dL    RDW 16.8 (H) 11.9 - 14.6 %    PLATELET 25 (LL) 592 - 450 K/uL    MPV Cannot be calculated 10.8 - 14.1 FL    NEUTROPHILS 80 (H) 43 - 78 %    LYMPHOCYTES 12 (L) 13 - 44 %    MONOCYTES 8 4.0 - 12.0 %    EOSINOPHILS 0 (L) 0.5 - 7.8 %    BASOPHILS 0 0.0 - 2.0 %    ABS. NEUTROPHILS 3.7 1.7 - 8.2 K/UL    ABS. LYMPHOCYTES 0.6 0.5 - 4.6 K/UL    ABS. MONOCYTES 0.4 0.1 - 1.3 K/UL    ABS. EOSINOPHILS 0.0 0.0 - 0.8 K/UL    ABS. BASOPHILS 0.0 0.0 - 0.2 K/UL    ABS. IMM.  GRANS. 0.0 0.0 - 0.5 K/UL    RBC COMMENTS SLIGHT  ANISOCYTOSIS        WBC COMMENTS Result Confirmed By Smear      PLATELET COMMENTS MARKED      DF AUTOMATED     PROTHROMBIN TIME + INR    Collection Time: 09/15/17  5:39 AM   Result Value Ref Range    Prothrombin time 27.6 (H) 9.6 - 12.0 sec    INR 2.5 (H) 0.9 - 1.2     GLUCOSE, POC    Collection Time: 09/15/17  6:17 AM   Result Value Ref Range    Glucose (POC) 72 65 - 100 mg/dL   PLATELETS, ALLOCATE    Collection Time: 09/15/17  9:45 AM   Result Value Ref Range    Unit number [id number] Blood component type PLTPH LRIR,1     Unit division 00     Status of unit TRANSFUSED    GLUCOSE, POC    Collection Time: 09/15/17 11:18 AM   Result Value Ref Range    Glucose (POC) 118 (H) 65 - 100 mg/dL   GLUCOSE, POC    Collection Time: 09/15/17  4:24 PM   Result Value Ref Range    Glucose (POC) 153 (H) 65 - 100 mg/dL   GLUCOSE, POC    Collection Time: 09/15/17  8:06 PM   Result Value Ref Range    Glucose (POC) 187 (H) 65 - 100 mg/dL   GLUCOSE, POC    Collection Time: 09/16/17  6:09 AM   Result Value Ref Range    Glucose (POC) 65 65 - 100 mg/dL   CBC WITH AUTOMATED DIFF    Collection Time: 09/16/17  6:13 AM   Result Value Ref Range    WBC 4.0 (L) 4.3 - 11.1 K/uL    RBC 3.55 (L) 4.23 - 5.67 M/uL    HGB 10.8 (L) 13.6 - 17.2 g/dL    HCT 33.0 (L) 41.1 - 50.3 %    MCV 93.0 79.6 - 97.8 FL    MCH 30.4 26.1 - 32.9 PG    MCHC 32.7 31.4 - 35.0 g/dL    RDW 16.5 (H) 11.9 - 14.6 %    PLATELET 75 (L) 583 - 450 K/uL    MPV 11.0 10.8 - 14.1 FL    DF AUTOMATED      NEUTROPHILS 77 43 - 78 %    LYMPHOCYTES 14 13 - 44 %    MONOCYTES 9 4.0 - 12.0 %    EOSINOPHILS 0 (L) 0.5 - 7.8 %    BASOPHILS 0 0.0 - 2.0 %    IMMATURE GRANULOCYTES 0.3 0.0 - 5.0 %    ABS. NEUTROPHILS 3.1 1.7 - 8.2 K/UL    ABS. LYMPHOCYTES 0.5 0.5 - 4.6 K/UL    ABS. MONOCYTES 0.4 0.1 - 1.3 K/UL    ABS. EOSINOPHILS 0.0 0.0 - 0.8 K/UL    ABS. BASOPHILS 0.0 0.0 - 0.2 K/UL    ABS. IMM.  GRANS. 0.0 0.0 - 0.5 K/UL   PROTHROMBIN TIME + INR    Collection Time: 09/16/17  6:13 AM   Result Value Ref Range    Prothrombin time 21.0 (H) 9.6 - 12.0 sec    INR 1.9 (H) 0.9 - 1.2     GLUCOSE, POC    Collection Time: 09/16/17  6:53 AM   Result Value Ref Range    Glucose (POC) 87 65 - 100 mg/dL       Assessment:     Problem List as of 9/16/2017  Date Reviewed: 9/9/2017          Codes Class Noted - Resolved    Respiratory failure ([facility] [address] ICD-10-CM: J96.90  ICD-9-CM: 518.81  9/13/2017 - Present        MRSA nasal colonization ICD-10-CM: G35.518  ICD-9-CM: V02.54  9/8/2017 - Present COPD (chronic obstructive pulmonary disease) (HCC) (Chronic) ICD-10-CM: J44.9  ICD-9-CM: 496  8/30/2017 - Present        Acute respiratory failure with hypercapnia (HCC) ICD-10-CM: J96.02  ICD-9-CM: 518.81  8/29/2017 - Present        History of tobacco use (Chronic) ICD-10-CM: Y11.629  ICD-9-CM: V15.82  8/29/2017 - Present        Hyponatremia ICD-10-CM: E87.1  ICD-9-CM: 276.1  8/29/2017 - Present        Acute encephalopathy ICD-10-CM: G93.40  ICD-9-CM: 348.30  8/29/2017 - Present        Thrombocytopenia ([facility] [address] ICD-10-CM: D69.6  ICD-9-CM: 287.5  8/26/2017 - Present        Anticoagulant long-term use (Chronic) ICD-10-CM: Z79.01  ICD-9-CM: V58.61  8/23/2017 - Present        HTN (hypertension), benign (Chronic) ICD-10-CM: I10  ICD-9-CM: 401.1  8/23/2017 - Present        Peripheral vascular disease ([facility] [address] (Chronic) ICD-10-CM: I73.9  ICD-9-CM: 443.9  8/23/2017 - Present        Dyslipidemia (Chronic) ICD-10-CM: E78.5  ICD-9-CM: 272.4  8/23/2017 - Present        History of mechanical aortic valve replacement (Chronic) ICD-10-CM: Z95.2  ICD-9-CM: V43.3  8/23/2017 - Present    Overview Signed 8/30/2017 10:30 AM by Martha Garg NP     Placement 2000, on chronicCoumadin             Centrilobular emphysema ([facility] [address] (Chronic) ICD-10-CM: J43.2  ICD-9-CM: 492.8  8/23/2017 - Present    Overview Signed 9/8/2017  9:24 AM by Armond Harvey NP      With last PFTs FVC 1.70 L or 44% predicted, FEV1 0.86 L or 29% predicted, FEV1/FVC 51%. This study was a postbronchodilator study performed at the [facility] on 8/22/2016                  Ischemic cardiomyopathy (Chronic) ICD-10-CM: I25.5  ICD-9-CM: 414.8  8/23/2017 - Present    Overview Signed 8/30/2017 10:29 AM by Martha Garg NP     8/23/17 ECHO:  EF 40 % to 45 %. There were no regional wall motion abnormalities. Moderate hypokinesis of the mid-apical anterior and apical wall(s).              Atrial flutter with rapid ventricular response ([address] ICD-10-CM: I48.92  ICD-9-CM: 427.32  8/23/2017 - Present        LV dysfunction (Chronic) ICD-10-CM: I51.9  ICD-9-CM: 429.9  10/19/2016 - Present        Atherosclerosis of native arteries of the extremities with intermittent claudication (Chronic) ICD-10-CM: N19.104  ICD-9-CM: 440.21  4/15/2014 - Present              Plan:      Rehabilitation Plan  The patient has shown the ability to tolerate and benefit from 3 hours of therapy daily and is being admitted to a comprehensive acute inpatient rehabilitation program consisting of at least 3 hours of combined physical and occupational therapies. Begin intensive Physical Therapy for a minimum of 1.5 hours a day, at least 5 out of 7 days per week to address bed mobility, transfers, ambulation, strengthening, balance, and endurance. Patient limited by poor endurance , weakness of his trunk and limbs. Begin intensive Occupational Therapy for a minimum of 1.5 hours a day, at least 5 out of 7 days per week to address ADL ( bathing, LE dressing, toileting) and adaptive equipment as needed.        Continue ST for: dysarthria, impaired communication skills, hypophonia, dysphagia.      Continue 24-hour skilled rehabilitation nursing for bowel and bladder management, skin care for decubitus ulcer prevention , pain management and ongoing medication administration      Continue daily physician medical management:     Atrial flutter with rapid ventricular response ([address] (8/23/2017) sp cardioversion - monitor HR./ BP. / hx of CHF / Ischemic cardiomyopathy     - Digoxin, coreg.  - continue anticoagulation  - cardiac precautions.   - (9/16) -recurrent a.fib with RVR. HR 130s. - CxR pnding. Cardiology asked to follow. - may need to restart diuretics, as was at home. Check cxR.      History of mechanical aortic valve replacement 2000, on coumadin/ Anticoagulant long-term use.  Goal 2.5-3.5.  - resume warfarin, monitor INR.   - INR 2.9 (9/12) -> 3.5(9/13) -> 3.6(9/14)-> 2.5 . resume warfarin at 5mg tonight. will average out home dose ~> 6mg hs.      Bacteremia - started on empiric antibiotic treatment. Aztreonam, vanco.   - BCx grew e.coli. discontinued vano. - final ID extended-spectrum beta-lactamases (ESBLs) , appears treated, though efficacy of vanco, aztreonam not optimal.  . Pt asymptomatic, WBC wnl. Will discontinue - aztreonam. .  - will monitor closely. - will have on contact precaution, infection control notified. HTN (hypertension) - monitor.  - continue Coreg/ digoxin        Thrombocytopenia/ ITP  - continued on steroids. - hematology following.   - HIT negative. - ivig per hematology direction. Will receive 1 more dose. - will received platelet 1unit (6/08). plt 25k -> 75k (9/16)       COPD   - continue respiratory treatments. Presently q6h, wean as appropriate. - resume spiriva, pulmicort  - SOB with activity. - normal CxR (9/15)        iron deficiency anemia -hgb 8.5-> 8.7(9/15) . Monitor. Continue fe supplements.      Hyponatremia  - monitor.      Pneumonia prophylaxis- Insentive spirometer every hour while awake     DVT risk / DVT Prophylaxis- Will require daily physician exam to assess for signs and symptoms as patient is at increased risk for of thromboembolism. Mobilization as tolerated. Intermittent pneumatic compression devices when in bed Thigh-high or knee-high thromboembolic deterrent hose when out of bed.    - covered with warfarin.      Pain Control: stable, mild-to-moderate joint symptoms intermittently, reasonably well controlled by PRN meds. Will require regular pain assessment and comprenhensive pain management. - resume gabapentin.      Wound Care: Monitor wound status daily per staff and physician. At risk for failure. Will require 24/7 rehab nursing. Keep wound clean and dry  - excoriation at bridge of nose. Continue antibiotic ointment.      Diabetes mellitus - Uncontrolled. poor glycemic control.  Will require daily, close FSG monitoring and medication adjustment to optimize glycemic control in setting of acute illness and hospitalization.   - SSI coverage with lispro.      Urinary retention/ neurogenic bladder - schedule voids q6-8 hrs. Check post-void residual every shift; In and Out catheterize if post-void residual is more than 400 cc.     bowel program - colace as needed. Time spent was 25 minutes with over 1/2 in direct patient care/examination, consultation and coordination of care.      Signed By: Greyson Hanna MD     September 16, 2017

## 2017-09-16 NOTE — PROGRESS NOTES
Pt tolerated gamunex -c infusion rate protocol without difficulty, completed at 2100 , hs [id number], given 2 units of sliding scale insulin, pt wore c-pap from 9:30 pm till 4:15 am, noted abraded area under dressing on nose,  Pt denies any c/o, voids continent and incontinent, bladder scan= 11pvr, pt remains on nectar thick liquids

## 2017-09-16 NOTE — PROGRESS NOTES
Assessment completed, respiration even and unlabored, instructed to call for needs or assist , educated on risk and needs not to get up without assist, call light in reach, Gamunex - C rate change to 144 from 72, pt tolerating well, no c/o,

## 2017-09-17 LAB
ATRIAL RATE: 97 BPM
BACTERIA SPEC CULT: NORMAL
BASOPHILS # BLD: 0 K/UL (ref 0–0.2)
BASOPHILS NFR BLD: 0 % (ref 0–2)
BNP SERPL-MCNC: 1446 PG/ML
CALCULATED R AXIS, ECG10: 130 DEGREES
CALCULATED T AXIS, ECG11: -53 DEGREES
DIAGNOSIS, 93000: NORMAL
DIFFERENTIAL METHOD BLD: ABNORMAL
EOSINOPHIL # BLD: 0 K/UL (ref 0–0.8)
EOSINOPHIL NFR BLD: 0 % (ref 0.5–7.8)
ERYTHROCYTE [DISTWIDTH] IN BLOOD BY AUTOMATED COUNT: 16.3 % (ref 11.9–14.6)
GLUCOSE BLD STRIP.AUTO-MCNC: 123 MG/DL (ref 65–100)
GLUCOSE BLD STRIP.AUTO-MCNC: 160 MG/DL (ref 65–100)
GLUCOSE BLD STRIP.AUTO-MCNC: 286 MG/DL (ref 65–100)
GLUCOSE BLD STRIP.AUTO-MCNC: 82 MG/DL (ref 65–100)
HCT VFR BLD AUTO: 28.9 % (ref 41.1–50.3)
HGB BLD-MCNC: 9.3 G/DL (ref 13.6–17.2)
IMM GRANULOCYTES # BLD: 0 K/UL (ref 0–0.5)
IMM GRANULOCYTES NFR BLD: 0.4 % (ref 0–5)
INR PPP: 2.2 (ref 0.9–1.2)
LYMPHOCYTES # BLD: 1.4 K/UL (ref 0.5–4.6)
LYMPHOCYTES NFR BLD: 21 % (ref 13–44)
MCH RBC QN AUTO: 30.3 PG (ref 26.1–32.9)
MCHC RBC AUTO-ENTMCNC: 32.2 G/DL (ref 31.4–35)
MCV RBC AUTO: 94.1 FL (ref 79.6–97.8)
MONOCYTES # BLD: 0.4 K/UL (ref 0.1–1.3)
MONOCYTES NFR BLD: 5 % (ref 4–12)
NEUTS SEG # BLD: 4.9 K/UL (ref 1.7–8.2)
NEUTS SEG NFR BLD: 74 % (ref 43–78)
PLATELET # BLD AUTO: 65 K/UL (ref 150–450)
PMV BLD AUTO: ABNORMAL FL (ref 10.8–14.1)
PROTHROMBIN TIME: 23.9 SEC (ref 9.6–12)
Q-T INTERVAL, ECG07: 380 MS
QRS DURATION, ECG06: 174 MS
QTC CALCULATION (BEZET), ECG08: 528 MS
RBC # BLD AUTO: 3.07 M/UL (ref 4.23–5.67)
SERVICE CMNT-IMP: NORMAL
VENTRICULAR RATE, ECG03: 116 BPM
WBC # BLD AUTO: 4.2 K/UL (ref 4.3–11.1)

## 2017-09-17 PROCEDURE — 74011000250 HC RX REV CODE- 250: Performed by: PHYSICAL MEDICINE & REHABILITATION

## 2017-09-17 PROCEDURE — 83880 ASSAY OF NATRIURETIC PEPTIDE: CPT | Performed by: PHYSICAL MEDICINE & REHABILITATION

## 2017-09-17 PROCEDURE — 94760 N-INVAS EAR/PLS OXIMETRY 1: CPT

## 2017-09-17 PROCEDURE — 65310000000 HC RM PRIVATE REHAB

## 2017-09-17 PROCEDURE — 85025 COMPLETE CBC W/AUTO DIFF WBC: CPT | Performed by: PHYSICAL MEDICINE & REHABILITATION

## 2017-09-17 PROCEDURE — 74011250637 HC RX REV CODE- 250/637: Performed by: PHYSICAL MEDICINE & REHABILITATION

## 2017-09-17 PROCEDURE — 74011636637 HC RX REV CODE- 636/637: Performed by: PHYSICAL MEDICINE & REHABILITATION

## 2017-09-17 PROCEDURE — 93005 ELECTROCARDIOGRAM TRACING: CPT | Performed by: PHYSICAL MEDICINE & REHABILITATION

## 2017-09-17 PROCEDURE — 74011250637 HC RX REV CODE- 250/637: Performed by: INTERNAL MEDICINE

## 2017-09-17 PROCEDURE — 87040 BLOOD CULTURE FOR BACTERIA: CPT | Performed by: PHYSICAL MEDICINE & REHABILITATION

## 2017-09-17 PROCEDURE — 94660 CPAP INITIATION&MGMT: CPT

## 2017-09-17 PROCEDURE — 36415 COLL VENOUS BLD VENIPUNCTURE: CPT | Performed by: PHYSICAL MEDICINE & REHABILITATION

## 2017-09-17 PROCEDURE — 82962 GLUCOSE BLOOD TEST: CPT

## 2017-09-17 PROCEDURE — 94640 AIRWAY INHALATION TREATMENT: CPT

## 2017-09-17 PROCEDURE — 85610 PROTHROMBIN TIME: CPT | Performed by: PHYSICAL MEDICINE & REHABILITATION

## 2017-09-17 PROCEDURE — 74011250636 HC RX REV CODE- 250/636: Performed by: PHYSICAL MEDICINE & REHABILITATION

## 2017-09-17 RX ORDER — DILTIAZEM HYDROCHLORIDE 180 MG/1
180 CAPSULE, COATED, EXTENDED RELEASE ORAL DAILY
Status: DISCONTINUED | OUTPATIENT
Start: 2017-09-17 | End: 2017-09-22 | Stop reason: HOSPADM

## 2017-09-17 RX ORDER — FUROSEMIDE 20 MG/1
20 TABLET ORAL
Status: DISCONTINUED | OUTPATIENT
Start: 2017-09-17 | End: 2017-09-17

## 2017-09-17 RX ORDER — POTASSIUM CHLORIDE 20 MEQ/1
20 TABLET, EXTENDED RELEASE ORAL DAILY
Status: DISCONTINUED | OUTPATIENT
Start: 2017-09-18 | End: 2017-09-22 | Stop reason: HOSPADM

## 2017-09-17 RX ORDER — FUROSEMIDE 20 MG/1
20 TABLET ORAL DAILY
Status: DISCONTINUED | OUTPATIENT
Start: 2017-09-18 | End: 2017-09-22 | Stop reason: HOSPADM

## 2017-09-17 RX ORDER — METOPROLOL TARTRATE 5 MG/5ML
5 INJECTION INTRAVENOUS
Status: DISCONTINUED | OUTPATIENT
Start: 2017-09-17 | End: 2017-09-19

## 2017-09-17 RX ORDER — POTASSIUM CHLORIDE 20 MEQ/1
20 TABLET, EXTENDED RELEASE ORAL 2 TIMES DAILY
Status: DISCONTINUED | OUTPATIENT
Start: 2017-09-17 | End: 2017-09-17

## 2017-09-17 RX ORDER — METOPROLOL TARTRATE 5 MG/5ML
5 INJECTION INTRAVENOUS EVERY 6 HOURS
Status: DISCONTINUED | OUTPATIENT
Start: 2017-09-17 | End: 2017-09-17

## 2017-09-17 RX ADMIN — ALBUTEROL SULFATE 2.5 MG: 2.5 SOLUTION RESPIRATORY (INHALATION) at 05:35

## 2017-09-17 RX ADMIN — POVIDONE-IODINE: 10 SOLUTION TOPICAL at 09:00

## 2017-09-17 RX ADMIN — ALBUTEROL SULFATE 2.5 MG: 2.5 SOLUTION RESPIRATORY (INHALATION) at 22:36

## 2017-09-17 RX ADMIN — FERROUS SULFATE TAB 325 MG (65 MG ELEMENTAL FE) 325 MG: 325 (65 FE) TAB at 08:37

## 2017-09-17 RX ADMIN — FAMOTIDINE 20 MG: 10 INJECTION, SOLUTION INTRAVENOUS at 08:36

## 2017-09-17 RX ADMIN — Medication 20 ML: at 14:00

## 2017-09-17 RX ADMIN — Medication 600 UNITS: at 18:02

## 2017-09-17 RX ADMIN — TIOTROPIUM BROMIDE 18 MCG: 18 CAPSULE ORAL; RESPIRATORY (INHALATION) at 05:35

## 2017-09-17 RX ADMIN — Medication 20 ML: at 20:53

## 2017-09-17 RX ADMIN — GABAPENTIN 600 MG: 300 CAPSULE ORAL at 05:29

## 2017-09-17 RX ADMIN — GABAPENTIN 600 MG: 300 CAPSULE ORAL at 20:50

## 2017-09-17 RX ADMIN — Medication 600 UNITS: at 20:52

## 2017-09-17 RX ADMIN — INSULIN LISPRO 6 UNITS: 100 INJECTION, SOLUTION INTRAVENOUS; SUBCUTANEOUS at 12:47

## 2017-09-17 RX ADMIN — DILTIAZEM HYDROCHLORIDE 180 MG: 180 CAPSULE, EXTENDED RELEASE ORAL at 12:48

## 2017-09-17 RX ADMIN — GABAPENTIN 600 MG: 300 CAPSULE ORAL at 18:01

## 2017-09-17 RX ADMIN — GUAIFENESIN 1200 MG: 600 TABLET, EXTENDED RELEASE ORAL at 08:37

## 2017-09-17 RX ADMIN — INSULIN LISPRO 2 UNITS: 100 INJECTION, SOLUTION INTRAVENOUS; SUBCUTANEOUS at 21:01

## 2017-09-17 RX ADMIN — GUAIFENESIN 1200 MG: 600 TABLET, EXTENDED RELEASE ORAL at 18:01

## 2017-09-17 RX ADMIN — WARFARIN SODIUM 6 MG: 5 TABLET ORAL at 18:02

## 2017-09-17 RX ADMIN — Medication 20 ML: at 05:30

## 2017-09-17 RX ADMIN — DIGOXIN 0.25 MG: 250 TABLET ORAL at 08:37

## 2017-09-17 RX ADMIN — BUDESONIDE 500 MCG: 0.5 INHALANT RESPIRATORY (INHALATION) at 05:35

## 2017-09-17 RX ADMIN — ALBUTEROL SULFATE 2.5 MG: 2.5 SOLUTION RESPIRATORY (INHALATION) at 16:34

## 2017-09-17 RX ADMIN — Medication 600 UNITS: at 05:30

## 2017-09-17 RX ADMIN — PREDNISONE 30 MG: 20 TABLET ORAL at 08:37

## 2017-09-17 NOTE — PROGRESS NOTES
[facility] Cardiology/Electrophyiology Consult                Date of  Admission: 9/13/2017  5:34 PM         CC/Reason for consult:KRISTEL Santiago is a 67 y.o. male admitted for Cardiac Debility; Respiratory failure ([facility] [address] Patient presented to the emergency department of [facility] with complaints of worsening shortness of breath and was found to have atrial fib with RVR of 140s. The patient was admitted to telemetry and Cardizem drip was continued. MERLYN/Cardioversion was planned. The patient underwent MERLYN that was negative for thrombus and then underwent successful cardioversion from atrial fibrillation to sinus rhythm with frequent PACs, but he returned to atrial fib/flutter the following day. AC with  warfarin was continued and IV amiodarone added as antiarrhythmic. He failed speech evaluation and NGT was placed for tube feedings. Patient developed shortness of breath and lasix was started. Echo showed EF of 40-45% and heart failure meds, coreg and entresto,  were initiated. Amiodarone was discontinued as rate control medication, given chronic lung issues and coreg and digoxin were resumed. Atrial fibrillation was rate controlled and coumadin was continued for [facility], which is being followed by pharmacy for dosing.     Patient Active Problem List   Diagnosis Code    Anticoagulant long-term use Z79.01    HTN (hypertension), benign I10    Peripheral vascular disease ([facility] [address] I73.9    Atherosclerosis of native arteries of the extremities with intermittent claudication I70.219    Dyslipidemia E78.5    History of mechanical aortic valve replacement Z95.2    Centrilobular emphysema (HCC) J43.2    LV dysfunction I51.9    Ischemic cardiomyopathy I25.5    Atrial flutter with rapid ventricular response (HCC) I48.92    Thrombocytopenia (HCC) D69.6    Acute respiratory failure with hypercapnia (HCC) J96.02    History of tobacco use Z87.891    Hyponatremia E87.1    Acute encephalopathy G93.40    COPD (chronic obstructive pulmonary disease) ([facility]) J44.9    MRSA nasal colonization Z22.322    Respiratory failure ([facility]) J96.90       Past Medical History:   Diagnosis Date    Acute diastolic CHF (congestive heart failure) ([facility] [address] 9/15/2016    Arthritis     Chicken pox     Coronary artery disease 4/16/2014    DJD (degenerative joint disease)     Emphysema     Hx of aortic valve replacement, mechanical       doing well,. Had to hold 3 days for removal of skin ca. Will have to have add. resection.  Hypercholesterolemia     Hypertension     Palpitations      Holter showed  PVC's, PAC's, one short run SVT.  Pneumonia     Systolic CHF, acute ([facility] [address] 11/15/2016      Past Surgical History:   Procedure Laterality Date    HX AORTIC VALVE REPLACEMENT N/A 2000    Aortic Valve Replacement w/ Mechanical Valve    HX HEART CATHETERIZATION  07/21/2004    [facility]     Allergies   Allergen Reactions    Levaquin [Levofloxacin] Other (comments)     GI upset    Metformin Other (comments)     Gi upset      No family history on file.      Current Facility-Administered Medications   Medication Dose Route Frequency    warfarin (COUMADIN) tablet 6 mg  6 mg Oral QPM    0.9% sodium chloride infusion 250 mL  250 mL IntraVENous PRN    0.9% sodium chloride infusion 250 mL  250 mL IntraVENous PRN    nitroglycerin (NITROSTAT) tablet 0.4 mg  0.4 mg SubLINGual Q5MIN PRN    ondansetron (ZOFRAN) injection 4 mg  4 mg IntraVENous Q4H PRN    sodium chloride (NS) flush 5-10 mL  5-10 mL IntraVENous PRN    NUTRITIONAL SUPPORT ELECTROLYTE PRN ORDERS   Does Not Apply PRN    acetaminophen (TYLENOL) suppository 650 mg  650 mg Rectal Q4H PRN    acetaminophen (TYLENOL) tablet 650 mg  650 mg Oral Q4H PRN    albuterol (PROVENTIL HFA, VENTOLIN HFA, PROAIR HFA) inhaler 2 Puff  2 Puff Inhalation Q4H PRN    albuterol (PROVENTIL VENTOLIN) nebulizer solution 2.5 mg  2.5 mg Nebulization Q4H PRN    budesonide (PULMICORT) 500 mcg/2 ml nebulizer suspension 500 mcg Nebulization BID RT    And    albuterol CONCENTRATE 2.5mg/0.5 mL neb soln  2.5 mg Nebulization Q6H RT    dextrose (D50W) injection syrg 25 g  25 g IntraVENous PRN    digoxin (LANOXIN) tablet 0.25 mg  0.25 mg Oral DAILY    famotidine (PF) (PEPCID) 20 mg in sodium chloride 0.9 % 10 mL injection  20 mg IntraVENous DAILY    ferrous sulfate tablet 325 mg  1 Tab Oral DAILY WITH BREAKFAST    gabapentin (NEURONTIN) capsule 600 mg  600 mg Oral TID    guaiFENesin ER (MUCINEX) tablet 1,200 mg  1,200 mg Oral BID    heparin (porcine) pf 600 Units  600 Units InterCATHeter Q8H    heparin (porcine) pf 600 Units  600 Units InterCATHeter PRN    insulin lispro (HUMALOG) injection   SubCUTAneous AC&HS    povidone-iodine (BETADINE) 10 % topical solution   Topical DAILY    [START ON 9/29/2017] predniSONE (DELTASONE) tablet 10 mg  10 mg Oral DAILY WITH BREAKFAST    [START ON 9/22/2017] predniSONE (DELTASONE) tablet 20 mg  20 mg Oral DAILY WITH BREAKFAST    predniSONE (DELTASONE) tablet 30 mg  30 mg Oral DAILY WITH BREAKFAST    sodium chloride (NS) flush 20 mL  20 mL InterCATHeter Q8H    tiotropium (SPIRIVA) inhalation capsule 18 mcg  1 Cap Inhalation DAILY    traZODone (DESYREL) tablet 50 mg  50 mg Oral QHS PRN    influenza vaccine 2015-16 (36mos+)(PF) (FLUZONE/FLUARIX QUAD) injection 0.5 mL  0.5 mL IntraMUSCular PRIOR TO DISCHARGE    carvedilol (COREG) tablet 3.125 mg  3.125 mg Oral BID WITH MEALS       Review of Symptoms:  General: no recent weight loss/gain, weakness, fatigue, fever or chills  Skin: no rashes, lumps, or other skin changes  HEENT: no headache, dizziness, lightheadedness, vision changes, hearing changes, tinnitus, vertigo, sinus pressure/pain, bleeding gums, sore throat, or hoarseness  Neck: no swollen glands, goiter, pain or stiffness  Respiratory: no cough, sputum, hemoptysis, dyspnea, wheezing  Cardiovascular: no chest pain or discomfort, palpitations, dyspnea, orthopnea, paroxysmal nocturnal dyspnea, peripheral edema  Gastrointestinal: no trouble swallowing, heartburn, change of appetite, nausea, change in bowel habits, pain with defecation, rectal bleeding or black/tarry stools, hemorrhoids, constipation, diarrhea, abdominal pain, jaundice, liver or gallbladder problems  Urinary: no frequency, urgency , hematuria, burning/pain with urination, recent flank pain, polyuria, nocturia, or difficulty urinating  Genital: no vaginal or pelvic infections  Peripheral Vascular: no claudication, leg cramps, prior DVTs, swelling of calves, legs, or feet, color change, or swelling with redness or tenderness  Musculoskeletal: no muscle or joint pain/stiffness, joint swelling, erythema of joints, or back pain  Psychiatric: no depression, mental disorders, or excessive stress  Neurological: no sensory or motor loss, seizures, syncope, tremors, numbness, tingling, no changes in mood, attention, or speech, no changes in orientation, memory, insight, or judgment. no headache, dizziness, vertigo. Hematologic: no anemia, easy bruising or bleeding  Endocrine: no thyroid problems, heat or cold intolerance, excessive sweating, polyuria, polydipsia, or history of diabetes. Physical Exam  Vitals:    09/16/17 1244 09/16/17 1445 09/16/17 1623 09/16/17 1747   BP:  155/89     Pulse: 88 (!) 126 (!) 128    Resp:  22     Temp:  97.6 °F (36.4 °C)     SpO2:  96%  96%       Physical Exam:  General appearance - Alert, well appearing, and in no distress   Mental status - Affect appropriate to mood. Eyes - Sclerae anicteric,  ENMT - Hearing grossly normal bilaterally, Dental hygiene good. Neck - Carotids upstroke normal bilaterally, no bruits, no JVD. Resp - Clear to auscultation, no wheezes, rales or rhonchi, symmetric air entry. Heart - Normal rate, irregular rhythm, normal S1, S2, no murmurs, rubs, clicks or gallops. GI - Soft, nontender, nondistended, no masses or organomegaly.   Neurological - Grossly intact - normal speech, no focal findings  Musculoskeletal - No joint tenderness, deformity or swelling, no muscular tenderness noted. Extremities - Peripheral pulses normal, no pedal edema, no clubbing or cyanosis. Skin - Normal coloration and turgor. Psych -  oriented to person, place, and time. Cardiographics    Telemetry:   ECG: A. Fib  Echocardiogram:     Labs:   Recent Labs      09/16/17   0613  09/15/17   0539   WBC  4.0*  4.6   HGB  10.8*  8.7*   HCT  33.0*  27.0*   PLT  75*  25*   INR  1.9*  2.5*        Assessment:      Active Problems:    Respiratory failure (HCC) (9/13/2017)           Plan:   1. A. Fib - Patient with known perst A. Fib. Will need better rate control. Change Coreg to Metoprolol XL 50mg. Iv metoprolol 5mg PRN for break through episodes      Thank you very much for this referral. We appreciate the opportunity to participate in this patient's care. We will follow along with above stated plan. Roberto Roland. Samantha Fournier M.D., F.A.C.C, F.H.R.S.   Cardiology/Electrophysiology

## 2017-09-17 NOTE — PROGRESS NOTES
Warfarin dosing per pharmacist    Deonte August is a 67 y.o. male. Indication: A. Fib    Goal INR:  2-3    Home dose:  5 mg daily, 7.5 mg on Wednesday    Risk factors or significant drug interactions:  Antibiotics, prednisone    Other anticoagulants:  none    Daily Monitoring  Date  INR     Warfarin dose HGB              Notes  9/8  1.2  10mg  11.2    9/9  1.3  10mg  9.4  9/10  ---  10mg  9.7  9/11  2.1  10mg  10.8  9/12  2.9  10mg  8.9  9/13  3.5  Hold  8.4  Pharmacy Consulted  9/14  3.6  Held  8.5  9/15  2.5  5 mg  8.7  9/16  1.9  6 mg  10.8  9/17  2.2  6 mg  9.3      INR 2.2. No changes      Thank you,  Sonia Manley, Pharm. D.   Clinical Pharmacist  [phone]

## 2017-09-17 NOTE — PROGRESS NOTES
[facility] CARDIOLOGY PROGRESS NOTE           9/17/2017 11:23 AM    Admit Date: 9/13/2017    Admit Diagnosis: Cardiac Debility; Respiratory failure (HCC)      Subjective:   Ongoing periods of tachycardia this am      Objective:     Vitals:    09/16/17 2354 09/17/17 0535 09/17/17 0540 09/17/17 0753   BP: 140/72  (!) 147/93 139/88   Pulse: 72  (!) 133 (!) 134   Resp: 18 16 18   Temp:    98.7 °F (37.1 °C)   SpO2: 100% 98% 98% 98%       Physical Exam:  General-Well Developed, Well Nourished, No Acute Distress, Alert & Oriented x 3, appropriate mood. Neck- supple, no JVD  CV- irregular/tachy rate and rhythm no MRG  Lung- clear bilaterally  Abd- soft, nontender, nondistended  Ext- no edema bilaterally.   Skin- warm and dry    Current Facility-Administered Medications   Medication Dose Route Frequency    metoprolol (LOPRESSOR) injection 5 mg  5 mg IntraVENous Q6H PRN    [START ON 9/18/2017] furosemide (LASIX) tablet 20 mg  20 mg Oral DAILY    [START ON 9/18/2017] potassium chloride (K-DUR, KLOR-CON) SR tablet 20 mEq  20 mEq Oral DAILY    warfarin (COUMADIN) tablet 6 mg  6 mg Oral QPM    metoprolol succinate (TOPROL-XL) XL tablet 50 mg  50 mg Oral DAILY    0.9% sodium chloride infusion 250 mL  250 mL IntraVENous PRN    0.9% sodium chloride infusion 250 mL  250 mL IntraVENous PRN    nitroglycerin (NITROSTAT) tablet 0.4 mg  0.4 mg SubLINGual Q5MIN PRN    ondansetron (ZOFRAN) injection 4 mg  4 mg IntraVENous Q4H PRN    sodium chloride (NS) flush 5-10 mL  5-10 mL IntraVENous PRN    NUTRITIONAL SUPPORT ELECTROLYTE PRN ORDERS   Does Not Apply PRN    acetaminophen (TYLENOL) suppository 650 mg  650 mg Rectal Q4H PRN    acetaminophen (TYLENOL) tablet 650 mg  650 mg Oral Q4H PRN    albuterol (PROVENTIL HFA, VENTOLIN HFA, PROAIR HFA) inhaler 2 Puff  2 Puff Inhalation Q4H PRN    albuterol (PROVENTIL VENTOLIN) nebulizer solution 2.5 mg  2.5 mg Nebulization Q4H PRN    budesonide (PULMICORT) 500 mcg/2 ml nebulizer suspension  500 mcg Nebulization BID RT    And    albuterol CONCENTRATE 2.5mg/0.5 mL neb soln  2.5 mg Nebulization Q6H RT    dextrose (D50W) injection syrg 25 g  25 g IntraVENous PRN    digoxin (LANOXIN) tablet 0.25 mg  0.25 mg Oral DAILY    famotidine (PF) (PEPCID) 20 mg in sodium chloride 0.9 % 10 mL injection  20 mg IntraVENous DAILY    ferrous sulfate tablet 325 mg  1 Tab Oral DAILY WITH BREAKFAST    gabapentin (NEURONTIN) capsule 600 mg  600 mg Oral TID    guaiFENesin ER (MUCINEX) tablet 1,200 mg  1,200 mg Oral BID    heparin (porcine) pf 600 Units  600 Units InterCATHeter Q8H    heparin (porcine) pf 600 Units  600 Units InterCATHeter PRN    insulin lispro (HUMALOG) injection   SubCUTAneous AC&HS    povidone-iodine (BETADINE) 10 % topical solution   Topical DAILY    [START ON 9/29/2017] predniSONE (DELTASONE) tablet 10 mg  10 mg Oral DAILY WITH BREAKFAST    [START ON 9/22/2017] predniSONE (DELTASONE) tablet 20 mg  20 mg Oral DAILY WITH BREAKFAST    predniSONE (DELTASONE) tablet 30 mg  30 mg Oral DAILY WITH BREAKFAST    sodium chloride (NS) flush 20 mL  20 mL InterCATHeter Q8H    tiotropium (SPIRIVA) inhalation capsule 18 mcg  1 Cap Inhalation DAILY    traZODone (DESYREL) tablet 50 mg  50 mg Oral QHS PRN    influenza vaccine 2015-16 (36mos+)(PF) (FLUZONE/FLUARIX QUAD) injection 0.5 mL  0.5 mL IntraMUSCular PRIOR TO DISCHARGE     Data Review:   Recent Results (from the past 24 hour(s))   GLUCOSE, POC    Collection Time: 09/16/17  4:29 PM   Result Value Ref Range    Glucose (POC) 200 (H) 65 - 100 mg/dL   GLUCOSE, POC    Collection Time: 09/16/17  9:04 PM   Result Value Ref Range    Glucose (POC) 111 (H) 65 - 100 mg/dL   CBC WITH AUTOMATED DIFF    Collection Time: 09/17/17  5:28 AM   Result Value Ref Range    WBC 4.2 (L) 4.3 - 11.1 K/uL    RBC 3.07 (L) 4.23 - 5.67 M/uL    HGB 9.3 (L) 13.6 - 17.2 g/dL    HCT 28.9 (L) 41.1 - 50.3 %    MCV 94.1 79.6 - 97.8 FL    MCH 30.3 26.1 - 32.9 PG MCHC 32.2 31.4 - 35.0 g/dL    RDW 16.3 (H) 11.9 - 14.6 %    PLATELET 65 (L) 433 - 450 K/uL    MPV Cannot be calculated 10.8 - 14.1 FL    DF AUTOMATED      NEUTROPHILS 74 43 - 78 %    LYMPHOCYTES 21 13 - 44 %    MONOCYTES 5 4.0 - 12.0 %    EOSINOPHILS 0 (L) 0.5 - 7.8 %    BASOPHILS 0 0.0 - 2.0 %    IMMATURE GRANULOCYTES 0.4 0.0 - 5.0 %    ABS. NEUTROPHILS 4.9 1.7 - 8.2 K/UL    ABS. LYMPHOCYTES 1.4 0.5 - 4.6 K/UL    ABS. MONOCYTES 0.4 0.1 - 1.3 K/UL    ABS. EOSINOPHILS 0.0 0.0 - 0.8 K/UL    ABS. BASOPHILS 0.0 0.0 - 0.2 K/UL    ABS. IMM. GRANS. 0.0 0.0 - 0.5 K/UL   PROTHROMBIN TIME + INR    Collection Time: 09/17/17  5:28 AM   Result Value Ref Range    Prothrombin time 23.9 (H) 9.6 - 12.0 sec    INR 2.2 (H) 0.9 - 1.2     BNP    Collection Time: 09/17/17  5:28 AM   Result Value Ref Range    BNP 1446 pg/mL   GLUCOSE, POC    Collection Time: 09/17/17  7:43 AM   Result Value Ref Range    Glucose (POC) 82 65 - 100 mg/dL   GLUCOSE, POC    Collection Time: 09/17/17 11:07 AM   Result Value Ref Range    Glucose (POC) 286 (H) 65 - 100 mg/dL     Assessment:     Active Problems:    Respiratory failure (HCC) (9/13/2017)      Plan:   1. A. Fib - Patient with known perst A. Fib. Will need better rate control. Changed Coreg to Metoprolol XL 50mg. Iv metoprolol 5mg PRN for break through episodes. Add Dilt 180mg qam. On Coumadin. Glen Dewitt. Stefan Bartholomew M.D., F.A.C.C, F.H.R.S.   Cardiology/Electrophysiology

## 2017-09-17 NOTE — PROGRESS NOTES
Assessment completed, respiration even and unlabored, instructed to call for needs or assist , educated on risk and needs not to get up without assist, call light in reach, Dr Fredy Alexandre notified about pulse , no new orders recieved

## 2017-09-18 LAB
GLUCOSE BLD STRIP.AUTO-MCNC: 112 MG/DL (ref 65–100)
GLUCOSE BLD STRIP.AUTO-MCNC: 168 MG/DL (ref 65–100)
GLUCOSE BLD STRIP.AUTO-MCNC: 177 MG/DL (ref 65–100)
GLUCOSE BLD STRIP.AUTO-MCNC: 202 MG/DL (ref 65–100)
INR PPP: 2.4 (ref 0.9–1.2)
PROTHROMBIN TIME: 26.1 SEC (ref 9.6–12)

## 2017-09-18 PROCEDURE — 94640 AIRWAY INHALATION TREATMENT: CPT

## 2017-09-18 PROCEDURE — 74011250637 HC RX REV CODE- 250/637: Performed by: INTERNAL MEDICINE

## 2017-09-18 PROCEDURE — 94760 N-INVAS EAR/PLS OXIMETRY 1: CPT

## 2017-09-18 PROCEDURE — 96125 COGNITIVE TEST BY HC PRO: CPT

## 2017-09-18 PROCEDURE — 82962 GLUCOSE BLOOD TEST: CPT

## 2017-09-18 PROCEDURE — 97530 THERAPEUTIC ACTIVITIES: CPT

## 2017-09-18 PROCEDURE — 74011250637 HC RX REV CODE- 250/637: Performed by: PHYSICAL MEDICINE & REHABILITATION

## 2017-09-18 PROCEDURE — 85610 PROTHROMBIN TIME: CPT | Performed by: PHYSICAL MEDICINE & REHABILITATION

## 2017-09-18 PROCEDURE — 65310000000 HC RM PRIVATE REHAB

## 2017-09-18 PROCEDURE — 74011000250 HC RX REV CODE- 250: Performed by: PHYSICAL MEDICINE & REHABILITATION

## 2017-09-18 PROCEDURE — 77030019605

## 2017-09-18 PROCEDURE — 74011250636 HC RX REV CODE- 250/636: Performed by: PHYSICAL MEDICINE & REHABILITATION

## 2017-09-18 PROCEDURE — 77030011256 HC DRSG MEPILEX <16IN NO BORD MOLN -A

## 2017-09-18 PROCEDURE — 97110 THERAPEUTIC EXERCISES: CPT

## 2017-09-18 PROCEDURE — 74011636637 HC RX REV CODE- 636/637: Performed by: PHYSICAL MEDICINE & REHABILITATION

## 2017-09-18 PROCEDURE — 97535 SELF CARE MNGMENT TRAINING: CPT

## 2017-09-18 PROCEDURE — 99232 SBSQ HOSP IP/OBS MODERATE 35: CPT | Performed by: PHYSICAL MEDICINE & REHABILITATION

## 2017-09-18 RX ORDER — METOPROLOL SUCCINATE 50 MG/1
50 TABLET, EXTENDED RELEASE ORAL 2 TIMES DAILY
Status: DISCONTINUED | OUTPATIENT
Start: 2017-09-18 | End: 2017-09-19

## 2017-09-18 RX ORDER — BUDESONIDE 0.5 MG/2ML
500 INHALANT ORAL
Status: DISCONTINUED | OUTPATIENT
Start: 2017-09-18 | End: 2017-09-22 | Stop reason: HOSPADM

## 2017-09-18 RX ORDER — ALBUTEROL SULFATE 2.5 MG/.5ML
2.5 SOLUTION RESPIRATORY (INHALATION) 2 TIMES DAILY
Status: DISCONTINUED | OUTPATIENT
Start: 2017-09-18 | End: 2017-09-22 | Stop reason: HOSPADM

## 2017-09-18 RX ADMIN — Medication 600 UNITS: at 14:10

## 2017-09-18 RX ADMIN — ALBUTEROL SULFATE 2.5 MG: 2.5 SOLUTION RESPIRATORY (INHALATION) at 10:46

## 2017-09-18 RX ADMIN — INSULIN LISPRO 2 UNITS: 100 INJECTION, SOLUTION INTRAVENOUS; SUBCUTANEOUS at 12:02

## 2017-09-18 RX ADMIN — FUROSEMIDE 20 MG: 20 TABLET ORAL at 08:35

## 2017-09-18 RX ADMIN — TRAZODONE HYDROCHLORIDE 50 MG: 50 TABLET ORAL at 21:51

## 2017-09-18 RX ADMIN — GABAPENTIN 600 MG: 300 CAPSULE ORAL at 14:10

## 2017-09-18 RX ADMIN — Medication 20 ML: at 06:00

## 2017-09-18 RX ADMIN — METOPROLOL TARTRATE 5 MG: 5 INJECTION INTRAVENOUS at 10:24

## 2017-09-18 RX ADMIN — POTASSIUM CHLORIDE 20 MEQ: 20 TABLET, EXTENDED RELEASE ORAL at 08:35

## 2017-09-18 RX ADMIN — GUAIFENESIN 1200 MG: 600 TABLET, EXTENDED RELEASE ORAL at 08:35

## 2017-09-18 RX ADMIN — Medication 600 UNITS: at 06:00

## 2017-09-18 RX ADMIN — PREDNISONE 30 MG: 20 TABLET ORAL at 08:36

## 2017-09-18 RX ADMIN — FERROUS SULFATE TAB 325 MG (65 MG ELEMENTAL FE) 325 MG: 325 (65 FE) TAB at 08:35

## 2017-09-18 RX ADMIN — WARFARIN SODIUM 6 MG: 5 TABLET ORAL at 17:18

## 2017-09-18 RX ADMIN — BUDESONIDE 500 MCG: 0.5 INHALANT RESPIRATORY (INHALATION) at 17:41

## 2017-09-18 RX ADMIN — BUDESONIDE 500 MCG: 0.5 INHALANT RESPIRATORY (INHALATION) at 05:15

## 2017-09-18 RX ADMIN — METOPROLOL SUCCINATE 50 MG: 50 TABLET, EXTENDED RELEASE ORAL at 08:35

## 2017-09-18 RX ADMIN — INSULIN LISPRO 2 UNITS: 100 INJECTION, SOLUTION INTRAVENOUS; SUBCUTANEOUS at 21:54

## 2017-09-18 RX ADMIN — GABAPENTIN 600 MG: 300 CAPSULE ORAL at 21:51

## 2017-09-18 RX ADMIN — DIGOXIN 0.25 MG: 250 TABLET ORAL at 08:35

## 2017-09-18 RX ADMIN — ACETAMINOPHEN 650 MG: 325 TABLET ORAL at 13:07

## 2017-09-18 RX ADMIN — DILTIAZEM HYDROCHLORIDE 180 MG: 180 CAPSULE, EXTENDED RELEASE ORAL at 08:35

## 2017-09-18 RX ADMIN — GUAIFENESIN 1200 MG: 600 TABLET, EXTENDED RELEASE ORAL at 17:18

## 2017-09-18 RX ADMIN — GABAPENTIN 600 MG: 300 CAPSULE ORAL at 06:10

## 2017-09-18 RX ADMIN — FAMOTIDINE 20 MG: 10 INJECTION, SOLUTION INTRAVENOUS at 08:34

## 2017-09-18 RX ADMIN — Medication 20 ML: at 22:03

## 2017-09-18 RX ADMIN — Medication 600 UNITS: at 22:03

## 2017-09-18 RX ADMIN — Medication 20 ML: at 14:10

## 2017-09-18 RX ADMIN — POVIDONE-IODINE: 10 SOLUTION TOPICAL at 08:38

## 2017-09-18 RX ADMIN — TIOTROPIUM BROMIDE 18 MCG: 18 CAPSULE ORAL; RESPIRATORY (INHALATION) at 05:15

## 2017-09-18 RX ADMIN — METOPROLOL SUCCINATE 50 MG: 50 TABLET, EXTENDED RELEASE ORAL at 17:18

## 2017-09-18 RX ADMIN — ALBUTEROL SULFATE 2.5 MG: 2.5 SOLUTION RESPIRATORY (INHALATION) at 05:15

## 2017-09-18 RX ADMIN — INSULIN LISPRO 4 UNITS: 100 INJECTION, SOLUTION INTRAVENOUS; SUBCUTANEOUS at 16:48

## 2017-09-18 NOTE — PROGRESS NOTES
MD Mateusz,   Medical Director  [address] [facility], [address] [facility]  Tel: [phone]       [facility] PROGRESS NOTE    Bing Cortes  Admit Date: 9/13/2017  Admit Diagnosis: Cardiac Debility; Respiratory failure ([address]    Subjective     Patient seen and examined. afberile. Persistent atrial flutter with RVR. Cardiology following, management plans.      Objective:     Current Facility-Administered Medications   Medication Dose Route Frequency    metoprolol succinate (TOPROL-XL) XL tablet 50 mg  50 mg Oral BID    albuterol CONCENTRATE 2.5mg/0.5 mL neb soln  2.5 mg Nebulization BID    And    budesonide (PULMICORT) 500 mcg/2 ml nebulizer suspension  500 mcg Nebulization BID RT    metoprolol (LOPRESSOR) injection 5 mg  5 mg IntraVENous Q6H PRN    furosemide (LASIX) tablet 20 mg  20 mg Oral DAILY    potassium chloride (K-DUR, KLOR-CON) SR tablet 20 mEq  20 mEq Oral DAILY    dilTIAZem CD (CARDIZEM CD) capsule 180 mg  180 mg Oral DAILY    warfarin (COUMADIN) tablet 6 mg  6 mg Oral QPM    0.9% sodium chloride infusion 250 mL  250 mL IntraVENous PRN    0.9% sodium chloride infusion 250 mL  250 mL IntraVENous PRN    nitroglycerin (NITROSTAT) tablet 0.4 mg  0.4 mg SubLINGual Q5MIN PRN    ondansetron (ZOFRAN) injection 4 mg  4 mg IntraVENous Q4H PRN    sodium chloride (NS) flush 5-10 mL  5-10 mL IntraVENous PRN    NUTRITIONAL SUPPORT ELECTROLYTE PRN ORDERS   Does Not Apply PRN    acetaminophen (TYLENOL) suppository 650 mg  650 mg Rectal Q4H PRN    acetaminophen (TYLENOL) tablet 650 mg  650 mg Oral Q4H PRN    albuterol (PROVENTIL HFA, VENTOLIN HFA, PROAIR HFA) inhaler 2 Puff  2 Puff Inhalation Q4H PRN    albuterol (PROVENTIL VENTOLIN) nebulizer solution 2.5 mg  2.5 mg Nebulization Q4H PRN    dextrose (D50W) injection syrg 25 g  25 g IntraVENous PRN    digoxin (LANOXIN) tablet 0.25 mg  0.25 mg Oral DAILY    famotidine (PF) (PEPCID) 20 mg in sodium chloride 0.9 % 10 mL injection  20 mg IntraVENous DAILY    ferrous sulfate tablet 325 mg  1 Tab Oral DAILY WITH BREAKFAST    gabapentin (NEURONTIN) capsule 600 mg  600 mg Oral TID    guaiFENesin ER (MUCINEX) tablet 1,200 mg  1,200 mg Oral BID    heparin (porcine) pf 600 Units  600 Units InterCATHeter Q8H    heparin (porcine) pf 600 Units  600 Units InterCATHeter PRN    insulin lispro (HUMALOG) injection   SubCUTAneous AC&HS    povidone-iodine (BETADINE) 10 % topical solution   Topical DAILY    [START ON 9/29/2017] predniSONE (DELTASONE) tablet 10 mg  10 mg Oral DAILY WITH BREAKFAST    [START ON 9/22/2017] predniSONE (DELTASONE) tablet 20 mg  20 mg Oral DAILY WITH BREAKFAST    predniSONE (DELTASONE) tablet 30 mg  30 mg Oral DAILY WITH BREAKFAST    sodium chloride (NS) flush 20 mL  20 mL InterCATHeter Q8H    tiotropium (SPIRIVA) inhalation capsule 18 mcg  1 Cap Inhalation DAILY    traZODone (DESYREL) tablet 50 mg  50 mg Oral QHS PRN    influenza vaccine 2015-16 (36mos+)(PF) (FLUZONE/FLUARIX QUAD) injection 0.5 mL  0.5 mL IntraMUSCular PRIOR TO DISCHARGE     Review of Systems:    Denies chest pain, shortness of breath, cough, headache, visual problems, abdominal pain, dysurea, calf pain. Pertinent positives are as noted in the medical records and unremarkable otherwise. Visit Vitals    /81    Pulse 77    Temp 98.3 °F (36.8 °C)    Resp 18    SpO2 97%      Physical Exam:   General: Alert and age appropriately oriented x 2. No acute cardio respiratory distress. HEENT: Normocephalic,no scleral icterus  Oral mucosa moist without cyanosis, No bruit, + JVD. Lungs: + bibasilar crackles. No wheezing. Respiration even and unlabored   Heart: irregular rate and rhythm, S1, S2   No  murmurs, clicks, rub or gallops   Abdomen: Soft, non-tender, nondistended. Bowel sounds present. No organomegaly. Genitourinary: defered   Neuromuscular:      PERRL, EOMI  Speech slow, dysarthric. Focus, attention poor.  Follows simple commands consistently, with delay, corrections. Able to identify, recall repeat.      LUE     Shoulder abduction  5- /5              Elbow flexion:  5- /5               Wrist extension:   5-/5              DCJDAR flexion;  5- /5                        ADMQ:   4+/5  RUE    Shoulder abduction:  5-/5                Elbow flexion:   5-/5                         Wrist extension:  5-/5                        Finger flexion:   /5-5                        ADMQ:  5- /5  LLE     Hip flexion:  3 /5              Knee extension:   4/5                         Ankle dorsiflexion: 4+ /5                        Ankle plantarflexion:  5- /5                                                                RLE     Hip flexion:  3 /5                        Knee extension:  4 /5                         Ankle dorsiflexion: 4+ /5                        Ankle plantarflexion:   5-/5  Sensory - intact  No cerebellar signs. Plantars - down going  No atrophy, no fasciculations, no tremors. Skin/extremity: No rashes, no erythema. Calf non tender BLE. 2+ edema BLE.                                                                                Functional Assessment:          Balance  Sitting - Static: Good (unsupported) (09/16/17 1300)  Sitting - Dynamic: Good (unsupported) (09/16/17 1300)  Standing - Static: Fair;Constant support (with RW) (09/16/17 1300)  Standing - Dynamic : Impaired (09/16/17 1300)           Toileting  Adaptive Equipment: Grab bars; Other (comment) (Wheelchair) (09/18/17 1012)         Staci Harris Fall Risk Assessment:  Staci Harris Fall Risk  Mobility: Ambulates or transfers with assist devices or assistance/unsteady gait (09/18/17 0705)  Mobility Interventions: Patient to call before getting OOB; Strengthening exercises (ROM-active/passive); Utilize walker, cane, or other assitive device (09/18/17 0705)  Mentation: Alert, oriented x 3 (09/18/17 3809)  Mentation Interventions: Evaluate medications/consider consulting pharmacy; Eyeglasses and hearing aids; Increase mobility; Toileting rounds (09/18/17 3245)  Medication: Patient receiving anticonvulsants, sedatives(tranquilizers), psychotropics or hypnotics, hypoglycemics, narcotics, sleep aids, antihypertensives, laxatives, or diuretics (09/18/17 0705)  Medication Interventions: Evaluate medications/consider consulting pharmacy; Patient to call before getting OOB; Teach patient to arise slowly (09/18/17 4363)  Elimination: Needs assistance with toileting (09/18/17 0705)  Elimination Interventions: Call light in reach; Patient to call for help with toileting needs;Urinal in reach (09/18/17 0705)  Prior Fall History: During admission (Date - Comment) (09/18/17 0705)  History of Falls Interventions: Consult care management for discharge planning (09/18/17 0705)  Total Score: 5 (09/18/17 0705)  Standard Fall Precautions: Yes (09/17/17 1109)  High Fall Risk: Yes (09/18/17 0705)     Speech Assessment:  Aspiration Signs/Symptoms: None (09/14/17 1036)      Ambulation:  Gait  Distance (ft): 30 Feet (ft) (09/16/17 1300)  Assistive Device: [place] Earing, rolling;Gait belt (09/16/17 1300)     Labs/Studies:  Recent Results (from the past 72 hour(s))   GLUCOSE, POC    Collection Time: 09/15/17  4:24 PM   Result Value Ref Range    Glucose (POC) 153 (H) 65 - 100 mg/dL   GLUCOSE, POC    Collection Time: 09/15/17  8:06 PM   Result Value Ref Range    Glucose (POC) 187 (H) 65 - 100 mg/dL   GLUCOSE, POC    Collection Time: 09/16/17  6:09 AM   Result Value Ref Range    Glucose (POC) 65 65 - 100 mg/dL   CBC WITH AUTOMATED DIFF    Collection Time: 09/16/17  6:13 AM   Result Value Ref Range    WBC 4.0 (L) 4.3 - 11.1 K/uL    RBC 3.55 (L) 4.23 - 5.67 M/uL    HGB 10.8 (L) 13.6 - 17.2 g/dL    HCT 33.0 (L) 41.1 - 50.3 %    MCV 93.0 79.6 - 97.8 FL    MCH 30.4 26.1 - 32.9 PG    MCHC 32.7 31.4 - 35.0 g/dL    RDW 16.5 (H) 11.9 - 14.6 %    PLATELET 75 (L) 774 - 450 K/uL    MPV 11.0 10.8 - 14.1 FL    DF AUTOMATED NEUTROPHILS 77 43 - 78 %    LYMPHOCYTES 14 13 - 44 %    MONOCYTES 9 4.0 - 12.0 %    EOSINOPHILS 0 (L) 0.5 - 7.8 %    BASOPHILS 0 0.0 - 2.0 %    IMMATURE GRANULOCYTES 0.3 0.0 - 5.0 %    ABS. NEUTROPHILS 3.1 1.7 - 8.2 K/UL    ABS. LYMPHOCYTES 0.5 0.5 - 4.6 K/UL    ABS. MONOCYTES 0.4 0.1 - 1.3 K/UL    ABS. EOSINOPHILS 0.0 0.0 - 0.8 K/UL    ABS. BASOPHILS 0.0 0.0 - 0.2 K/UL    ABS. IMM. GRANS. 0.0 0.0 - 0.5 K/UL   PROTHROMBIN TIME + INR    Collection Time: 09/16/17  6:13 AM   Result Value Ref Range    Prothrombin time 21.0 (H) 9.6 - 12.0 sec    INR 1.9 (H) 0.9 - 1.2     GLUCOSE, POC    Collection Time: 09/16/17  6:53 AM   Result Value Ref Range    Glucose (POC) 87 65 - 100 mg/dL   EKG, 12 LEAD, INITIAL    Collection Time: 09/16/17 10:25 AM   Result Value Ref Range    Ventricular Rate 124 BPM    Atrial Rate 113 BPM    QRS Duration 164 ms    Q-T Interval 358 ms    QTC Calculation (Bezet) 514 ms    Calculated R Axis 120 degrees    Calculated T Axis -42 degrees    Diagnosis       Atrial fibrillation with rapid ventricular response  Right bundle branch block  Left posterior fascicular block  !!! Bifascicular block !!!   Abnormal ECG  When compared with ECG of 11-SEP-2017 21:32,  Previous ECG has undetermined rhythm, needs review  Nonspecific T wave abnormality now evident in Inferior leads  Confirmed by [facility] KALE KWOK (), HAN KRUSE ([id number]) on 9/16/2017 10:27:57 PM     GLUCOSE, POC    Collection Time: 09/16/17 11:15 AM   Result Value Ref Range    Glucose (POC) 101 (H) 65 - 100 mg/dL   GLUCOSE, POC    Collection Time: 09/16/17  4:29 PM   Result Value Ref Range    Glucose (POC) 200 (H) 65 - 100 mg/dL   GLUCOSE, POC    Collection Time: 09/16/17  9:04 PM   Result Value Ref Range    Glucose (POC) 111 (H) 65 - 100 mg/dL   CBC WITH AUTOMATED DIFF    Collection Time: 09/17/17  5:28 AM   Result Value Ref Range    WBC 4.2 (L) 4.3 - 11.1 K/uL    RBC 3.07 (L) 4.23 - 5.67 M/uL    HGB 9.3 (L) 13.6 - 17.2 g/dL    HCT 28.9 (L) 41.1 - 50.3 % MCV 94.1 79.6 - 97.8 FL    MCH 30.3 26.1 - 32.9 PG    MCHC 32.2 31.4 - 35.0 g/dL    RDW 16.3 (H) 11.9 - 14.6 %    PLATELET 65 (L) 210 - 450 K/uL    MPV Cannot be calculated 10.8 - 14.1 FL    DF AUTOMATED      NEUTROPHILS 74 43 - 78 %    LYMPHOCYTES 21 13 - 44 %    MONOCYTES 5 4.0 - 12.0 %    EOSINOPHILS 0 (L) 0.5 - 7.8 %    BASOPHILS 0 0.0 - 2.0 %    IMMATURE GRANULOCYTES 0.4 0.0 - 5.0 %    ABS. NEUTROPHILS 4.9 1.7 - 8.2 K/UL    ABS. LYMPHOCYTES 1.4 0.5 - 4.6 K/UL    ABS. MONOCYTES 0.4 0.1 - 1.3 K/UL    ABS. EOSINOPHILS 0.0 0.0 - 0.8 K/UL    ABS. BASOPHILS 0.0 0.0 - 0.2 K/UL    ABS. IMM. GRANS. 0.0 0.0 - 0.5 K/UL   PROTHROMBIN TIME + INR    Collection Time: 09/17/17  5:28 AM   Result Value Ref Range    Prothrombin time 23.9 (H) 9.6 - 12.0 sec    INR 2.2 (H) 0.9 - 1.2     BNP    Collection Time: 09/17/17  5:28 AM   Result Value Ref Range    BNP 1446 pg/mL   GLUCOSE, POC    Collection Time: 09/17/17  7:43 AM   Result Value Ref Range    Glucose (POC) 82 65 - 100 mg/dL   GLUCOSE, POC    Collection Time: 09/17/17 11:07 AM   Result Value Ref Range    Glucose (POC) 286 (H) 65 - 100 mg/dL   EKG, 12 LEAD, SUBSEQUENT    Collection Time: 09/17/17 11:25 AM   Result Value Ref Range    Ventricular Rate 116 BPM    Atrial Rate 97 BPM    QRS Duration 174 ms    Q-T Interval 380 ms    QTC Calculation (Bezet) 528 ms    Calculated R Axis 130 degrees    Calculated T Axis -53 degrees    Diagnosis       Atrial fibrillation with rapid ventricular response  Right bundle branch block  Left posterior fascicular block  !!!  Bifascicular block !!!  T wave abnormality, consider lateral ischemia or digitalis effect  Abnormal ECG  When compared with ECG of 16-SEP-2017 10:25,  Inverted T waves have replaced nonspecific T wave abnormality in Lateral   leads  Confirmed by Dania Le ([id number]) on 9/17/2017 5:51:29 PM     CULTURE, BLOOD    Collection Time: 09/17/17 12:42 PM   Result Value Ref Range    Special Requests: LEFT  Antecubital Culture result: NO GROWTH AFTER 17 HOURS     GLUCOSE, POC    Collection Time: 09/17/17  4:26 PM   Result Value Ref Range    Glucose (POC) 123 (H) 65 - 100 mg/dL   GLUCOSE, POC    Collection Time: 09/17/17  8:45 PM   Result Value Ref Range    Glucose (POC) 160 (H) 65 - 100 mg/dL   PROTHROMBIN TIME + INR    Collection Time: 09/18/17  4:00 AM   Result Value Ref Range    Prothrombin time 26.1 (H) 9.6 - 12.0 sec    INR 2.4 (H) 0.9 - 1.2     GLUCOSE, POC    Collection Time: 09/18/17  7:07 AM   Result Value Ref Range    Glucose (POC) 112 (H) 65 - 100 mg/dL   GLUCOSE, POC    Collection Time: 09/18/17 11:44 AM   Result Value Ref Range    Glucose (POC) 177 (H) 65 - 100 mg/dL       Assessment:     Problem List as of 9/18/2017  Date Reviewed: 9/9/2017          Codes Class Noted - Resolved    Respiratory failure ([facility] ICD-10-CM: J96.90  ICD-9-CM: 518.81  9/13/2017 - Present        MRSA nasal colonization ICD-10-CM: Z22.322  ICD-9-CM: V02.54  9/8/2017 - Present        COPD (chronic obstructive pulmonary disease) (HCC) (Chronic) ICD-10-CM: J44.9  ICD-9-CM: 496  8/30/2017 - Present        Acute respiratory failure with hypercapnia ([facility] ICD-10-CM: J96.02  ICD-9-CM: 518.81  8/29/2017 - Present        History of tobacco use (Chronic) ICD-10-CM: C40.165  ICD-9-CM: V15.82  8/29/2017 - Present        Hyponatremia ICD-10-CM: E87.1  ICD-9-CM: 276.1  8/29/2017 - Present        Acute encephalopathy ICD-10-CM: G93.40  ICD-9-CM: 348.30  8/29/2017 - Present        Thrombocytopenia ([facility] ICD-10-CM: D69.6  ICD-9-CM: 287.5  8/26/2017 - Present        Anticoagulant long-term use (Chronic) ICD-10-CM: Z79.01  ICD-9-CM: V58.61  8/23/2017 - Present        HTN (hypertension), benign (Chronic) ICD-10-CM: I10  ICD-9-CM: 401.1  8/23/2017 - Present        Peripheral vascular disease ([address] (Chronic) ICD-10-CM: I73.9  ICD-9-CM: 443.9  8/23/2017 - Present        Dyslipidemia (Chronic) ICD-10-CM: E78.5  ICD-9-CM: 272.4  8/23/2017 - Present        History of mechanical aortic valve replacement (Chronic) ICD-10-CM: Z95.2  ICD-9-CM: V43.3  8/23/2017 - Present    Overview Signed 8/30/2017 10:30 AM by Julia Cabrera NP     Placement 2000, on chronicCoumadin             Centrilobular emphysema ([address] (Chronic) ICD-10-CM: J43.2  ICD-9-CM: 492.8  8/23/2017 - Present    Overview Signed 9/8/2017  9:24 AM by Kay Ramírez NP      With last PFTs FVC 1.70 L or 44% predicted, FEV1 0.86 L or 29% predicted, FEV1/FVC 51%. This study was a postbronchodilator study performed at the [place] [facility] on 8/22/2016                  Ischemic cardiomyopathy (Chronic) ICD-10-CM: I25.5  ICD-9-CM: 414.8  8/23/2017 - Present    Overview Signed 8/30/2017 10:29 AM by Julia Cabrera NP     8/23/17 ECHO:  EF 40 % to 45 %. There were no regional wall motion abnormalities. Moderate hypokinesis of the mid-apical anterior and apical wall(s). Atrial flutter with rapid ventricular response (HCC) ICD-10-CM: I48.92  ICD-9-CM: 427.32  8/23/2017 - Present        LV dysfunction (Chronic) ICD-10-CM: I51.9  ICD-9-CM: 429.9  10/19/2016 - Present        Atherosclerosis of native arteries of the extremities with intermittent claudication (Chronic) ICD-10-CM: V98.585  ICD-9-CM: 440.21  4/15/2014 - Present              Plan:      Rehabilitation Plan  The patient has shown the ability to tolerate and benefit from 3 hours of therapy daily and is being admitted to a comprehensive acute inpatient rehabilitation program consisting of at least 3 hours of combined physical and occupational therapies. Begin intensive Physical Therapy for a minimum of 1.5 hours a day, at least 5 out of 7 days per week to address bed mobility, transfers, ambulation, strengthening, balance, and endurance. Patient limited by poor endurance , weakness of his trunk and limbs.    Begin intensive Occupational Therapy for a minimum of 1.5 hours a day, at least 5 out of 7 days per week to address ADL ( bathing, LE dressing, toileting) and adaptive equipment as needed.        Continue ST for: dysarthria, impaired communication skills, hypophonia, dysphagia.      Continue 24-hour skilled rehabilitation nursing for bowel and bladder management, skin care for decubitus ulcer prevention , pain management and ongoing medication administration      Continue daily physician medical management:     Atrial flutter with rapid ventricular response ([facility] (8/23/2017) sp cardioversion - monitor HR./ BP. / hx of CHF / Ischemic cardiomyopathy     - Digoxin, coreg.  - continue anticoagulation  - cardiac precautions.   - (9/16) -recurrent a.fib with RVR. HR 130s. - CxR pnding. Cardiology asked to follow. - may need to restart diuretics, as was at home. Check cxR.   - 9/18 - monitor HR. Increase toprol per cariology. following their plans.      History of mechanical aortic valve replacement 2000, on coumadin/ Anticoagulant long-term use. Goal 2.5-3.5.  - resume warfarin, monitor INR.   - INR 2.9 (9/12) -> 3.5(9/13) -> 3.6(9/14)-> 2.5 . resume warfarin at 5mg tonight. will average out home dose ~> 6mg hs.      Bacteremia - started on empiric antibiotic treatment. Aztreonam, vanco.   - BCx grew e.coli. discontinued vano. - final ID extended-spectrum beta-lactamases (ESBLs) , appears treated, though efficacy of vanco, aztreonam not optimal.  . Pt asymptomatic, WBC wnl. Will discontinue - aztreonam. .  - will monitor closely. - will have on contact precaution, infection control notified. HTN (hypertension) - monitor.  - continue Coreg/ digoxin     Thrombocytopenia/ ITP  - continued on steroids. - hematology following.   - HIT negative. - ivig per hematology direction. Will receive 1 more dose. - will received platelet 1unit (7/99). plt 25k -> 75k (9/16)       COPD   - continue respiratory treatments. Presently q6h, wean as appropriate. - resume spiriva, pulmicort  - SOB with activity.    - normal CxR (9/15)        iron deficiency anemia -hgb 8.5-> 8.7(9/15) . Monitor. Continue fe supplements.      Hyponatremia  - monitor.      Pneumonia prophylaxis- Insentive spirometer every hour while awake     DVT risk / DVT Prophylaxis- Will require daily physician exam to assess for signs and symptoms as patient is at increased risk for of thromboembolism. Mobilization as tolerated. Intermittent pneumatic compression devices when in bed Thigh-high or knee-high thromboembolic deterrent hose when out of bed.    - covered with warfarin.      Pain Control: stable, mild-to-moderate joint symptoms intermittently, reasonably well controlled by PRN meds. Will require regular pain assessment and comprenhensive pain management. - resume gabapentin.      Wound Care: Monitor wound status daily per staff and physician. At risk for failure. Will require 24/7 rehab nursing. Keep wound clean and dry  - excoriation at bridge of nose. Continue antibiotic ointment.      Diabetes mellitus - daily, close FSG monitoring and medication adjustment to optimize glycemic control in setting of acute illness and hospitalization.   - SSI coverage with lispro.      Urinary retention/ neurogenic bladder - schedule voids q6-8 hrs. Check post-void residual every shift; In and Out catheterize if post-void residual is more than 400 cc.  -  continent.      bowel program - colace as needed. Time spent was 25 minutes with over 1/2 in direct patient care/examination, consultation and coordination of care.      Signed By: Madelyn Rivera MD     September 18, 2017

## 2017-09-18 NOTE — PROGRESS NOTES
Pt resting quietly in bed. Denies needs or concerns at this time. Alert and oriented x4. Lungs sounds diminished bilaterally with respirations even and unlabored. Bowel sounds active in all quadrants. Palpable pulses bilaterally in upper and lower extremities. Right upper arm PICC in place. Last dressing change 9/13/2017. OT in room to perform ADLs.

## 2017-09-18 NOTE — PROGRESS NOTES
09/18/17 1102   Time Spent With Patient   Time In 1036   Time Out 1100   Mental Status   Neurologic State Alert   Orientation Level Oriented to time;Oriented to person;Oriented to place;Oriented to situation   Cognition Appropriate decision making   Perception Appears intact   Perseveration No perseveration noted   Safety/Judgement Fall prevention   Psychosocial   Patient Behaviors Calm   Reading Comprehension   Visual Impairment No impairment   Pre-Morbid Reading Status Literate   Paragraph  No impairment   Overall Impairment Severity None   Written Expression   Pre-Morbid Dominant Hand Unknown/unable to assess      09/18/17 1104   Verbal Expression   Primary Mode of Expression Verbal   Initiation No impairment   Automatic Speech Task No impairment   Repetition No impairment   Naming Impaired   Conversation Dysarthric   Effective Techniques Word retrieval strategies   Overall Impairment Mild   FIM Score (Verbal Expression) 4   Oral-Motor Structure/Motor Speech   Face No impairment   Labial No impairment   Dentition Upper & lower dentures   Oral Hygiene dry   Lingual Decreased rate   Apraxic Characteristics None   Dysarthric Characteristics Decreased rate;Decreased breath support   Overall Impairment Severity Mild   Neuro-Linguistics/Cognitive Function   Reasoning  No impairment   Organizational No impairment   Problem Solving No impairment   FIM Score (Problem Solving) 6   Mathematics No impairment   Memory  Short-term   FIM Score (Memory) 4   Attention  No impairment   Overall Impairment Severity Mild   Pragmatics   Pragmatics Impairment Monotone; Abnormal affect   Pragmatics Impairment Severity Mild   FIM Score (Pragmatics/Social Interaction) 5   Pt completed basic cognitive-linguistic evaluation with performance as follows: simple yes/no questions 5/5, complex yes/no questions 5.5, 1-2 step commands 10/10, 3 step commands 5/5, named 5 items per minute in food & animal categories, problem solving questions 5/5 reasoning questions 5/5 and recalled 2/3 items in short term memory task. Pt presents with noted cognitive-linguistic deficits in problem solving, reasoning, word retrieval, and memory. Pt would benefit from ST to address these cognitive-linguistic deficits and assess higher level cognitive abilities.     Zechariah Copeland MA/DIANA/SLP

## 2017-09-18 NOTE — PROGRESS NOTES
09/17/17 2236   Oxygen Therapy   O2 Sat (%) 97 %   O2 Device BIPAP   FIO2 (%) 28 %   CPAP/BIPAP   CPAP/BIPAP Start/Stop On   Device Mode BIPAP   $$ Bipap Daily Yes   Mask Type and Size Full face   Skin Condition Good   PIP Observed 18 cm H20   IPAP (cm H2O) 18 cm H2O   EPAP (cm H2O) 8 cm H2O   Inspiratory Time (sec) 0.9 seconds   Vt Spont (ml) 600 ml   Ve Observed (l/min) 10 l/min   Backup Rate 10   Total RR (Spontaneous) 16 breaths per minute   Insp Rise Time (sec) 4   Leak (Estimated) 50 L/min   Pt's Home Machine No   Biomedical Check Performed Yes   Settings Verified Yes   Alarm Settings   High Pressure 50   Low Pressure 2   Apnea 30   Low Ve 2   High Rate 50   Low Rate 5   Pulmonary Toilet   Pulmonary Toilet H. O.B elevated

## 2017-09-18 NOTE — PROGRESS NOTES
PHYSICAL THERAPY DAILY NOTE  Time In: 1300  Time Out: 1348  Patient Seen For: PM;Patient education;Transfer training    Subjective: \"My back is killing me now. \" Patient agreeable to therapy. Objective: Vital Signs:   Patient Vitals for the past 8 hrs:   Temp Pulse Resp BP SpO2   09/18/17 1406 - 89 - - -   09/18/17 1119 - 77 - - -   09/18/17 0906 - (!) 136 - - -   09/18/17 0832 98.3 °F (36.8 °C) (!) 136 18 148/81 97 %         Pain level: 7/10  Pain location: Lower back  Pain interventions: Repositioned patient in bed, notified RN and MD.    Patient education: Educated patient on benefits of getting out of bed for increasing tolerance to upright positioning. Interdisciplinary Communication: Communicated with Pat Choi RN regarding patient's pain level. Contact ([facility])  GROSS ASSESSMENT Daily Assessment            BED/MAT MOBILITY Daily Assessment   Required for safety, patient required assistance secondary to increased fatigue and weakness. Patient unable to scoot up in bed with bed in level position. Patient required assist x2 for scooting up in bed for repositioning to decrease back pain. Rolling Right : 4 (Minimal assistance)  Rolling Left : 4 (Minimal assistance)  Supine to Sit : 0 (Not tested)  Sit to Supine : 0 (Not tested)       TRANSFERS Daily Assessment            GAIT Daily Assessment            STEPS or STAIRS Daily Assessment            BALANCE Daily Assessment            WHEELCHAIR MOBILITY Daily Assessment            LOWER EXTREMITY EXERCISES Daily Assessment    Extremity: Both  Exercise Type #1: Supine lower extremity strengthening  Sets Performed: 1  Reps Performed: 15  Level of Assist: Supervision     Patient left lying in bed with head of bed elevated and all needs in reach. Assessment: Patient limited this PM by back pain and reports of fatigue with bed mobility. Patient will benefit from further therapy to improve independence with transfers and bed mobility.          Plan of Care: Continue with POC and progress as tolerated.        Diane Aceves  9/18/2017

## 2017-09-18 NOTE — PROGRESS NOTES
[facility] CARDIOLOGY PROGRESS NOTE           9/18/2017 11:31 AM    Admit Date: 9/13/2017      Subjective:   He has persistent atrial flutter with RVR. He had a cardioversion about 3 weeks ago and was started on Amiodarone, but is now back in atrial flutter with uncontrolled HR despite multiple rate slowing meds. Will increase toprol to 50 mg BID and review other options if rate is not controlled - to include cardioversion, AVN ablation/pacemaker. ROS:  Cardiovascular:  As noted above    Objective:      Vitals:    09/18/17 0515 09/18/17 0832 09/18/17 0906 09/18/17 1119   BP:  148/81     Pulse:  (!) 136 (!) 136 77   Resp:  18     Temp:  98.3 °F (36.8 °C)     SpO2: 95% 97%         Physical Exam:  /min  General-No Acute Distress  Neck- supple, no JVD  CV- rapid rate, regular. Lung- clear bilaterally  Abd- soft, nontender, nondistended  Ext- no edema bilaterally.   Skin- warm and dry    Data Review:   Recent Labs      09/18/17   0400  09/17/17   0528  09/16/17   0613   WBC   --   4.2*  4.0*   HGB   --   9.3*  10.8*   HCT   --   28.9*  33.0*   PLT   --   65*  75*   INR  2.4*  2.2*  1.9*       Assessment/Plan:     Active Problems:    Respiratory failure (HCC) (9/13/2017)    Atrial flutter, RVR    Debility     Increase Toprol to 50 mg BID      Shant Edwards MD  9/18/2017 11:31 AM

## 2017-09-18 NOTE — PROGRESS NOTES
09/18/17 1012   Time Spent With Patient   Time In 0703   Time Out 0830   Patient Seen For: AM;ADLs   Feeding/Eating   Feeding/Eating Assistance Mod I   Comments Patient completes all container management this morning, wears dentures   Grooming   Grooming Assistance  S   Comments Setup to don deodorant and comb hair   Upper Body Bathing   Bathing Assistance, Upper Mod I   Position Performed Seated in chair   Adaptive Equipment Tub bench   Comments Increased time required d/t fatigue, patient requires tub bench for bathing d/t decreased activity tolerance and standing balance   Lower Body Bathing   Bathing Assistance, Lower  CGA   Position Performed Seated in chair;Standing   Adaptive Equipment Tub bench;Grab bar;Long handled sponge   Comments CGA and use of grab bar for standing balance as patient bathes bottom/perineal area, patient requires increased time and used long handled sponge d/t fatigue/back pain   Toileting   Toileting Assistance (FIM Score) Min A   Adaptive Equipment Grab bars; Other (comment)  (Wheelchair)   Upper Body Dressing    Dressing Assistance  S   Comments Setup assist only this session for overhead shirt   Lower Body Dressing    Dressing Assistance  CGA   Leg Crossed Method Used No   Position Performed Seated in chair;Standing   Don/Doff Anti-Embolic Stockings NA   Comments CGA for standing balance only this session as patient manages pants and underwear on/off hips, increased time and cues for encouragement to reach to feet bending forward at hips to complete   Functional Transfers   Toilet Transfer  Grab bars;Stand pivot transfer without device   Amount of Assistance Required CGA   Tub or Shower Type Shower   Amount of Assistance Required CGA   Adaptive Equipment Grab bars; Wheelchair     S: \"My back hurts. \" Agreeable to therapy. Focus of session was on ADL retraining and functional mobility.    Patient was able to transfer bed to wheelchair, wheelchair <> toilet, and wheelchair <> TTB SPT with CGA. Patient requires tub bench for bathing in shower d/t decreased activity tolerance and standing balance, patient completes majority of bathing while seated. Pain not rated, patient reports back pain with forward flexion however participates with encouragement. Collaborated with PT, Ovi Torres and confirmed patient is on track to reach goals as documented in the care plan. Patient tolerated session well, but activity tolerance, strength, coordination, balance, functional mobility, cognition are still below baseline and require skilled facilitation to successfully and safely complete ADL's and transfers. Patient ended session in wheelchair with breakfast, call remote and phone within reach.      Abdirashid Varner, OTR/L

## 2017-09-18 NOTE — PROGRESS NOTES
Warfarin dosing per pharmacist    Suzie Shows is a 67 y.o. male. Indication: A. Fib    Goal INR:  2-3    Home dose:  5 mg daily, 7.5 mg on Wednesday    Risk factors or significant drug interactions:  Antibiotics, prednisone    Other anticoagulants:  none    Daily Monitoring  Date  INR     Warfarin dose HGB              Notes  9/8  1.2  10mg  11.2    9/9  1.3  10mg  9.4  9/10  ---  10mg  9.7  9/11  2.1  10mg  10.8  9/12  2.9  10mg  8.9  9/13  3.5  Hold  8.4  Pharmacy Consulted  9/14  3.6  Held  8.5  9/15  2.5  5 mg  8.7  9/16  1.9  6 mg  10.8  9/17  2.2  6 mg  9.3  9/18  2.4  6 mg  --      INR 2.4. No changes      Thank you,  Queta Barnes, Pharm. D.   Clinical Pharmacist  [phone]

## 2017-09-18 NOTE — PROGRESS NOTES
MD Mateusz,   Medical Director  [address] [facility], [address] [facility]  Tel: [phone]       [facility] PROGRESS NOTE    Daisy Morgan  Admit Date: 9/13/2017  Admit Diagnosis: Cardiac Debility; Respiratory failure ([address]    Subjective     Patient seen and examined. Vss.afberile. HR in 130s this morning. Patient without CP, SOB, cough. Leg edema mildly improved. Cardiology f/u appreciated. Medical management , possible cardioversion, AVN ablation/pacemaker.      Objective:     Current Facility-Administered Medications   Medication Dose Route Frequency    metoprolol (LOPRESSOR) injection 5 mg  5 mg IntraVENous Q6H PRN    furosemide (LASIX) tablet 20 mg  20 mg Oral DAILY    potassium chloride (K-DUR, KLOR-CON) SR tablet 20 mEq  20 mEq Oral DAILY    dilTIAZem CD (CARDIZEM CD) capsule 180 mg  180 mg Oral DAILY    warfarin (COUMADIN) tablet 6 mg  6 mg Oral QPM    metoprolol succinate (TOPROL-XL) XL tablet 50 mg  50 mg Oral DAILY    0.9% sodium chloride infusion 250 mL  250 mL IntraVENous PRN    0.9% sodium chloride infusion 250 mL  250 mL IntraVENous PRN    nitroglycerin (NITROSTAT) tablet 0.4 mg  0.4 mg SubLINGual Q5MIN PRN    ondansetron (ZOFRAN) injection 4 mg  4 mg IntraVENous Q4H PRN    sodium chloride (NS) flush 5-10 mL  5-10 mL IntraVENous PRN    NUTRITIONAL SUPPORT ELECTROLYTE PRN ORDERS   Does Not Apply PRN    acetaminophen (TYLENOL) suppository 650 mg  650 mg Rectal Q4H PRN    acetaminophen (TYLENOL) tablet 650 mg  650 mg Oral Q4H PRN    albuterol (PROVENTIL HFA, VENTOLIN HFA, PROAIR HFA) inhaler 2 Puff  2 Puff Inhalation Q4H PRN    albuterol (PROVENTIL VENTOLIN) nebulizer solution 2.5 mg  2.5 mg Nebulization Q4H PRN    budesonide (PULMICORT) 500 mcg/2 ml nebulizer suspension  500 mcg Nebulization BID RT    And    albuterol CONCENTRATE 2.5mg/0.5 mL neb soln  2.5 mg Nebulization Q6H RT    dextrose (D50W) injection syrg 25 g  25 g IntraVENous PRN    digoxin (LANOXIN) tablet 0.25 mg  0.25 mg Oral DAILY    famotidine (PF) (PEPCID) 20 mg in sodium chloride 0.9 % 10 mL injection  20 mg IntraVENous DAILY    ferrous sulfate tablet 325 mg  1 Tab Oral DAILY WITH BREAKFAST    gabapentin (NEURONTIN) capsule 600 mg  600 mg Oral TID    guaiFENesin ER (MUCINEX) tablet 1,200 mg  1,200 mg Oral BID    heparin (porcine) pf 600 Units  600 Units InterCATHeter Q8H    heparin (porcine) pf 600 Units  600 Units InterCATHeter PRN    insulin lispro (HUMALOG) injection   SubCUTAneous AC&HS    povidone-iodine (BETADINE) 10 % topical solution   Topical DAILY    [START ON 9/29/2017] predniSONE (DELTASONE) tablet 10 mg  10 mg Oral DAILY WITH BREAKFAST    [START ON 9/22/2017] predniSONE (DELTASONE) tablet 20 mg  20 mg Oral DAILY WITH BREAKFAST    predniSONE (DELTASONE) tablet 30 mg  30 mg Oral DAILY WITH BREAKFAST    sodium chloride (NS) flush 20 mL  20 mL InterCATHeter Q8H    tiotropium (SPIRIVA) inhalation capsule 18 mcg  1 Cap Inhalation DAILY    traZODone (DESYREL) tablet 50 mg  50 mg Oral QHS PRN    influenza vaccine 2015-16 (36mos+)(PF) (FLUZONE/FLUARIX QUAD) injection 0.5 mL  0.5 mL IntraMUSCular PRIOR TO DISCHARGE     Review of Systems:    Denies chest pain, shortness of breath, cough, headache, visual problems, abdominal pain, dysurea, calf pain. Pertinent positives are as noted in the medical records and unremarkable otherwise. Visit Vitals    /81    Pulse (!) 136    Temp 98.3 °F (36.8 °C)    Resp 18    SpO2 97%      Physical Exam:   General: Alert and age appropriately oriented x 2. No acute cardio respiratory distress. HEENT: Normocephalic,no scleral icterus  Oral mucosa moist without cyanosis, No bruit, + JVD. Lungs: + bibasilar crackles. No wheezing. Respiration even and unlabored   Heart: irregular rate and rhythm, S1, S2   No  murmurs, clicks, rub or gallops   Abdomen: Soft, non-tender, nondistended. Bowel sounds present. No organomegaly. Genitourinary: defered   Neuromuscular:      PERRL, EOMI  Speech slow, dysarthric. Focus, attention poor. Follows simple commands consistently, with delay, corrections. Able to identify, recall repeat.      LUE     Shoulder abduction  5- /5              Elbow flexion:  5- /5               Wrist extension:   5-/5              GRRUKF flexion;  5- /5                        ADMQ:   4+/5  RUE    Shoulder abduction:  5-/5                Elbow flexion:   5-/5                         Wrist extension:  5-/5                        Finger flexion:   /5-5                        ADMQ:  5- /5  LLE     Hip flexion:  3+ /5              Knee extension:   4/5                         Ankle dorsiflexion: 4+ /5                        Ankle plantarflexion:  5- /5                                                                RLE     Hip flexion:  3 +/5                        Knee extension:  4 /5                         Ankle dorsiflexion: 4+ /5                        Ankle plantarflexion:   5-/5  Sensory - intact  No cerebellar signs. Plantars - down going  No atrophy, no fasciculations, no tremors. Skin/extremity: No rashes, no erythema. Calf non tender BLE. 2+ edema BLE.                                                                                    Functional Assessment:          Balance  Sitting - Static: Good (unsupported) (09/16/17 1300)  Sitting - Dynamic: Good (unsupported) (09/16/17 1300)  Standing - Static: Fair;Constant support (with RW) (09/16/17 1300)  Standing - Dynamic : Impaired (09/16/17 1300)                     Salrochelle Samuel Fall Risk Assessment:  Sallyann Samuel Fall Risk  Mobility: Ambulates or transfers with assist devices or assistance/unsteady gait (09/17/17 2031)  Mobility Interventions: Patient to call before getting OOB (09/17/17 2031)  Mentation: Alert, oriented x 3 (09/17/17 2031)  Mentation Interventions: Adequate sleep, hydration, pain control (09/17/17 2031)  Medication: Patient receiving anticonvulsants, sedatives(tranquilizers), psychotropics or hypnotics, hypoglycemics, narcotics, sleep aids, antihypertensives, laxatives, or diuretics (09/17/17 2031)  Medication Interventions: Patient to call before getting OOB (09/17/17 2031)  Elimination: Needs assistance with toileting (09/17/17 2031)  Elimination Interventions: Call light in reach (09/17/17 2031)  Prior Fall History: During admission (Date - Comment) (09/17/17 2031)  History of Falls Interventions: Door open when patient unattended (09/17/17 2031)  Total Score: 5 (09/17/17 2031)  Standard Fall Precautions: Yes (09/17/17 1109)  High Fall Risk: Yes (09/17/17 2031)     Speech Assessment:  Aspiration Signs/Symptoms: None (09/14/17 1036)      Ambulation:  Gait  Distance (ft): 30 Feet (ft) (09/16/17 1300)  Assistive Device: Walker, rolling;Gait belt (09/16/17 1300)     Labs/Studies:  Recent Results (from the past 72 hour(s))   PLATELETS, ALLOCATE    Collection Time: 09/15/17  9:45 AM   Result Value Ref Range    Unit number [id number]     Blood component type PLTPH LRIR,1     Unit division 00     Status of unit TRANSFUSED    GLUCOSE, POC    Collection Time: 09/15/17 11:18 AM   Result Value Ref Range    Glucose (POC) 118 (H) 65 - 100 mg/dL   GLUCOSE, POC    Collection Time: 09/15/17  4:24 PM   Result Value Ref Range    Glucose (POC) 153 (H) 65 - 100 mg/dL   GLUCOSE, POC    Collection Time: 09/15/17  8:06 PM   Result Value Ref Range    Glucose (POC) 187 (H) 65 - 100 mg/dL   GLUCOSE, POC    Collection Time: 09/16/17  6:09 AM   Result Value Ref Range    Glucose (POC) 65 65 - 100 mg/dL   CBC WITH AUTOMATED DIFF    Collection Time: 09/16/17  6:13 AM   Result Value Ref Range    WBC 4.0 (L) 4.3 - 11.1 K/uL    RBC 3.55 (L) 4.23 - 5.67 M/uL    HGB 10.8 (L) 13.6 - 17.2 g/dL    HCT 33.0 (L) 41.1 - 50.3 %    MCV 93.0 79.6 - 97.8 FL    MCH 30.4 26.1 - 32.9 PG    MCHC 32.7 31.4 - 35.0 g/dL    RDW 16.5 (H) 11.9 - 14.6 %    PLATELET 75 (L) 018 - 450 K/uL    MPV 11.0 10.8 - 14.1 FL DF AUTOMATED      NEUTROPHILS 77 43 - 78 %    LYMPHOCYTES 14 13 - 44 %    MONOCYTES 9 4.0 - 12.0 %    EOSINOPHILS 0 (L) 0.5 - 7.8 %    BASOPHILS 0 0.0 - 2.0 %    IMMATURE GRANULOCYTES 0.3 0.0 - 5.0 %    ABS. NEUTROPHILS 3.1 1.7 - 8.2 K/UL    ABS. LYMPHOCYTES 0.5 0.5 - 4.6 K/UL    ABS. MONOCYTES 0.4 0.1 - 1.3 K/UL    ABS. EOSINOPHILS 0.0 0.0 - 0.8 K/UL    ABS. BASOPHILS 0.0 0.0 - 0.2 K/UL    ABS. IMM. GRANS. 0.0 0.0 - 0.5 K/UL   PROTHROMBIN TIME + INR    Collection Time: 09/16/17  6:13 AM   Result Value Ref Range    Prothrombin time 21.0 (H) 9.6 - 12.0 sec    INR 1.9 (H) 0.9 - 1.2     GLUCOSE, POC    Collection Time: 09/16/17  6:53 AM   Result Value Ref Range    Glucose (POC) 87 65 - 100 mg/dL   EKG, 12 LEAD, INITIAL    Collection Time: 09/16/17 10:25 AM   Result Value Ref Range    Ventricular Rate 124 BPM    Atrial Rate 113 BPM    QRS Duration 164 ms    Q-T Interval 358 ms    QTC Calculation (Bezet) 514 ms    Calculated R Axis 120 degrees    Calculated T Axis -42 degrees    Diagnosis       Atrial fibrillation with rapid ventricular response  Right bundle branch block  Left posterior fascicular block  !!! Bifascicular block !!!   Abnormal ECG  When compared with ECG of 11-SEP-2017 21:32,  Previous ECG has undetermined rhythm, needs review  Nonspecific T wave abnormality now evident in Inferior leads  Confirmed by [facility] KALE KWOK (), HAN KRUSE ([id number]) on 9/16/2017 10:27:57 PM     GLUCOSE, POC    Collection Time: 09/16/17 11:15 AM   Result Value Ref Range    Glucose (POC) 101 (H) 65 - 100 mg/dL   GLUCOSE, POC    Collection Time: 09/16/17  4:29 PM   Result Value Ref Range    Glucose (POC) 200 (H) 65 - 100 mg/dL   GLUCOSE, POC    Collection Time: 09/16/17  9:04 PM   Result Value Ref Range    Glucose (POC) 111 (H) 65 - 100 mg/dL   CBC WITH AUTOMATED DIFF    Collection Time: 09/17/17  5:28 AM   Result Value Ref Range    WBC 4.2 (L) 4.3 - 11.1 K/uL    RBC 3.07 (L) 4.23 - 5.67 M/uL    HGB 9.3 (L) 13.6 - 17.2 g/dL    HCT 28.9 (L) 41.1 - 50.3 %    MCV 94.1 79.6 - 97.8 FL    MCH 30.3 26.1 - 32.9 PG    MCHC 32.2 31.4 - 35.0 g/dL    RDW 16.3 (H) 11.9 - 14.6 %    PLATELET 65 (L) 514 - 450 K/uL    MPV Cannot be calculated 10.8 - 14.1 FL    DF AUTOMATED      NEUTROPHILS 74 43 - 78 %    LYMPHOCYTES 21 13 - 44 %    MONOCYTES 5 4.0 - 12.0 %    EOSINOPHILS 0 (L) 0.5 - 7.8 %    BASOPHILS 0 0.0 - 2.0 %    IMMATURE GRANULOCYTES 0.4 0.0 - 5.0 %    ABS. NEUTROPHILS 4.9 1.7 - 8.2 K/UL    ABS. LYMPHOCYTES 1.4 0.5 - 4.6 K/UL    ABS. MONOCYTES 0.4 0.1 - 1.3 K/UL    ABS. EOSINOPHILS 0.0 0.0 - 0.8 K/UL    ABS. BASOPHILS 0.0 0.0 - 0.2 K/UL    ABS. IMM. GRANS. 0.0 0.0 - 0.5 K/UL   PROTHROMBIN TIME + INR    Collection Time: 09/17/17  5:28 AM   Result Value Ref Range    Prothrombin time 23.9 (H) 9.6 - 12.0 sec    INR 2.2 (H) 0.9 - 1.2     BNP    Collection Time: 09/17/17  5:28 AM   Result Value Ref Range    BNP 1446 pg/mL   GLUCOSE, POC    Collection Time: 09/17/17  7:43 AM   Result Value Ref Range    Glucose (POC) 82 65 - 100 mg/dL   GLUCOSE, POC    Collection Time: 09/17/17 11:07 AM   Result Value Ref Range    Glucose (POC) 286 (H) 65 - 100 mg/dL   EKG, 12 LEAD, SUBSEQUENT    Collection Time: 09/17/17 11:25 AM   Result Value Ref Range    Ventricular Rate 116 BPM    Atrial Rate 97 BPM    QRS Duration 174 ms    Q-T Interval 380 ms    QTC Calculation (Bezet) 528 ms    Calculated R Axis 130 degrees    Calculated T Axis -53 degrees    Diagnosis       Atrial fibrillation with rapid ventricular response  Right bundle branch block  Left posterior fascicular block  !!!  Bifascicular block !!!  T wave abnormality, consider lateral ischemia or digitalis effect  Abnormal ECG  When compared with ECG of 16-SEP-2017 10:25,  Inverted T waves have replaced nonspecific T wave abnormality in Lateral   leads  Confirmed by Melinda Ramirez ([id number]) on 9/17/2017 5:51:29 PM     CULTURE, BLOOD    Collection Time: 09/17/17 12:42 PM   Result Value Ref Range    Special Requests: LEFT  Antecubital        Culture result: NO GROWTH AFTER 17 HOURS     GLUCOSE, POC    Collection Time: 09/17/17  4:26 PM   Result Value Ref Range    Glucose (POC) 123 (H) 65 - 100 mg/dL   GLUCOSE, POC    Collection Time: 09/17/17  8:45 PM   Result Value Ref Range    Glucose (POC) 160 (H) 65 - 100 mg/dL   PROTHROMBIN TIME + INR    Collection Time: 09/18/17  4:00 AM   Result Value Ref Range    Prothrombin time 26.1 (H) 9.6 - 12.0 sec    INR 2.4 (H) 0.9 - 1.2     GLUCOSE, POC    Collection Time: 09/18/17  7:07 AM   Result Value Ref Range    Glucose (POC) 112 (H) 65 - 100 mg/dL       Assessment:     Problem List as of 9/18/2017  Date Reviewed: 9/9/2017          Codes Class Noted - Resolved    Respiratory failure ([facility] ICD-10-CM: J96.90  ICD-9-CM: 518.81  9/13/2017 - Present        MRSA nasal colonization ICD-10-CM: Z22.322  ICD-9-CM: V02.54  9/8/2017 - Present        COPD (chronic obstructive pulmonary disease) (HCC) (Chronic) ICD-10-CM: J44.9  ICD-9-CM: 496  8/30/2017 - Present        Acute respiratory failure with hypercapnia ([facility] ICD-10-CM: J96.02  ICD-9-CM: 518.81  8/29/2017 - Present        History of tobacco use (Chronic) ICD-10-CM: L12.591  ICD-9-CM: V15.82  8/29/2017 - Present        Hyponatremia ICD-10-CM: E87.1  ICD-9-CM: 276.1  8/29/2017 - Present        Acute encephalopathy ICD-10-CM: G93.40  ICD-9-CM: 348.30  8/29/2017 - Present        Thrombocytopenia ([facility] ICD-10-CM: D69.6  ICD-9-CM: 287.5  8/26/2017 - Present        Anticoagulant long-term use (Chronic) ICD-10-CM: Z79.01  ICD-9-CM: V58.61  8/23/2017 - Present        HTN (hypertension), benign (Chronic) ICD-10-CM: I10  ICD-9-CM: 401.1  8/23/2017 - Present        Peripheral vascular disease ([address] (Chronic) ICD-10-CM: I73.9  ICD-9-CM: 443.9  8/23/2017 - Present        Dyslipidemia (Chronic) ICD-10-CM: E78.5  ICD-9-CM: 272.4  8/23/2017 - Present        History of mechanical aortic valve replacement (Chronic) ICD-10-CM: Z95.2  ICD-9-CM: V43.3  8/23/2017 - Present    Overview Signed 8/30/2017 10:30 AM by Anuradha Paez NP     Placement 2000, on chronicCoumadin             Centrilobular emphysema ([address] (Chronic) ICD-10-CM: J43.2  ICD-9-CM: 492.8  8/23/2017 - Present    Overview Signed 9/8/2017  9:24 AM by Vera Kim NP      With last PFTs FVC 1.70 L or 44% predicted, FEV1 0.86 L or 29% predicted, FEV1/FVC 51%. This study was a postbronchodilator study performed at the [facility] on 8/22/2016                  Ischemic cardiomyopathy (Chronic) ICD-10-CM: I25.5  ICD-9-CM: 414.8  8/23/2017 - Present    Overview Signed 8/30/2017 10:29 AM by Anuradha Paez NP     8/23/17 ECHO:  EF 40 % to 45 %. There were no regional wall motion abnormalities. Moderate hypokinesis of the mid-apical anterior and apical wall(s). Atrial flutter with rapid ventricular response (HCC) ICD-10-CM: I48.92  ICD-9-CM: 427.32  8/23/2017 - Present        LV dysfunction (Chronic) ICD-10-CM: I51.9  ICD-9-CM: 429.9  10/19/2016 - Present        Atherosclerosis of native arteries of the extremities with intermittent claudication (Chronic) ICD-10-CM: F15.675  ICD-9-CM: 440.21  4/15/2014 - Present              Plan:      Rehabilitation Plan  The patient has shown the ability to tolerate and benefit from 3 hours of therapy daily and is being admitted to a comprehensive acute inpatient rehabilitation program consisting of at least 3 hours of combined physical and occupational therapies. Begin intensive Physical Therapy for a minimum of 1.5 hours a day, at least 5 out of 7 days per week to address bed mobility, transfers, ambulation, strengthening, balance, and endurance. Patient limited by poor endurance , weakness of his trunk and limbs.    Begin intensive Occupational Therapy for a minimum of 1.5 hours a day, at least 5 out of 7 days per week to address ADL ( bathing, LE dressing, toileting) and adaptive equipment as needed.        Continue ST for: dysarthria, impaired communication skills, hypophonia, dysphagia.      Continue 24-hour skilled rehabilitation nursing for bowel and bladder management, skin care for decubitus ulcer prevention , pain management and ongoing medication administration      Continue daily physician medical management:     Atrial flutter with rapid ventricular response ([address] (8/23/2017) sp cardioversion - monitor HR./ BP. / hx of CHF / Ischemic cardiomyopathy     - Digoxin, coreg.  - continue anticoagulation  - cardiac precautions.   - (9/16) -recurrent a.fib with RVR. HR 130s. - CxR pnding. Cardiology asked to follow. - may need to restart diuretics, as was at home. Check cxR.      History of mechanical aortic valve replacement 2000, on coumadin/ Anticoagulant long-term use. Goal 2.5-3.5.  - resume warfarin, monitor INR.   - INR 2.4 (9/18) . average out home dose ~> 6mg hs.   - Warfarin 6mg HS (9/16)  - pharmacy assisting.      Bacteremia - started on empiric antibiotic treatment. Aztreonam, vanco.   - BCx grew e.coli. discontinued vano. - final ID extended-spectrum beta-lactamases (ESBLs) , appears treated, though efficacy of vanco, aztreonam not optimal.  . Pt asymptomatic, WBC wnl. Will discontinue - aztreonam. .  - will monitor closely. - will have on contact precaution, infection control notified. HTN (hypertension) - monitor.  - continue Coreg/ digoxin        Thrombocytopenia/ ITP  - continued on steroids. - hematology following.   - HIT negative. - ivig per hematology direction. Will receive 1 more dose. -  received platelet 1unit (5/36). plt 25k -> 75k (9/16)-> 65k (9/17) -> (9/18) p       COPD   - continue respiratory treatments. Presently q6h, wean as appropriate. - resume spiriva, pulmicort  - SOB with activity. - normal CxR (9/15)  - will wean albuterol nebs today 9/18. Bid and as needed.         iron deficiency anemia -hgb 8.5-> 8.7(9/15)-. 10.8(9/18) . Monitor. Continue fe supplements.      Hyponatremia  - monitor.    Pneumonia prophylaxis- Insentive spirometer every hour while awake     DVT risk / DVT Prophylaxis- Will require daily physician exam to assess for signs and symptoms as patient is at increased risk for of thromboembolism. Mobilization as tolerated. Intermittent pneumatic compression devices when in bed Thigh-high or knee-high thromboembolic deterrent hose when out of bed.    - covered with warfarin.      Pain Control: stable, mild-to-moderate joint symptoms intermittently, reasonably well controlled by PRN meds. Will require regular pain assessment and comprenhensive pain management. - resume gabapentin.      Wound Care: Monitor wound status daily per staff and physician. At risk for failure. Will require 24/7 rehab nursing. Keep wound clean and dry  - excoriation at bridge of nose. Continue antibiotic ointment.      Diabetes mellitus - Uncontrolled. poor glycemic control. Will require daily, close FSG monitoring and medication adjustment to optimize glycemic control in setting of acute illness and hospitalization.   - SSI coverage with lispro.      Urinary retention/ neurogenic bladder - schedule voids q6-8 hrs. Check post-void residual every shift; In and Out catheterize if post-void residual is more than 400 cc.     bowel program - colace as needed. Time spent was 25 minutes with over 1/2 in direct patient care/examination, consultation and coordination of care.      Signed By: Simran Novak MD     September 17, 2017

## 2017-09-18 NOTE — PROGRESS NOTES
PHYSICAL THERAPY DAILY NOTE  Time In: 1115  Time Out: 6148  Patient Seen For: AM;Patient education; Therapeutic exercise    Subjective: \"I'm feeling like my heart is racing. \" Patient agreeable to therapy. Objective: Vital Signs:   Patient Vitals for the past 8 hrs:   Temp Pulse Resp BP SpO2   09/18/17 1119 - 77 - - -   09/18/17 0906 - (!) 136 - - -   09/18/17 0832 98.3 °F (36.8 °C) (!) 136 18 148/81 97 %   09/18/17 0515 - - - - 95 %         Pain level: No pain reported. Pain location: n/a  Pain interventions: n/a    Patient education: Educated patient on activity tolerance and pacing with exercise. Interdisciplinary Communication: Communicated with Jodie Valdes RN regarding patient's report of his heart feeling like it's racing and contact with telemetry regarding patient's HR. Contact (MRSA)    LOWER EXTREMITY EXERCISES Daily Assessment    Extremity: Both  Exercise Type #1: Supine lower extremity strengthening  Sets Performed: 2  Reps Performed: 10  Level of Assist: Supervision     Patient performed the following B LE exercises:  SUPINE EXERCISES Sets Reps Comments   Ankle Pumps 2 10    Quad Sets 2 10    Glut Sets 2 10    Heel Slides 2 10    Hip Abduction 2 10    Short Arc Quad 2 10    Straight Leg Raise 2 10      Patient left lying supine in bed with head of bed elevated and all needs in reach. Assessment: Patient limited this AM secondary to reports of exhaustion and feeling tired while lying supine in bed. Patient performed B LE exercises in bed with rest breaks (~1 minute) in between to provide for enough time for patient to regain strength and decrease stress placed on cardiovascular system. Plan of Care: Continue with POC and progress as tolerated.        Nazia Loaiza  9/18/2017

## 2017-09-18 NOTE — PROGRESS NOTES
Pt's wife asking what time pt will be cardioverted tomorrow. Called cath lab and was told tomorrow's schedule has not been made out yet. Informed pt and wife that specific time has not been designated for procedure as of now.

## 2017-09-19 LAB
ATRIAL RATE: 66 BPM
BACTERIA SPEC CULT: NORMAL
CALCULATED R AXIS, ECG10: 137 DEGREES
CALCULATED T AXIS, ECG11: -47 DEGREES
DIAGNOSIS, 93000: NORMAL
ERYTHROCYTE [DISTWIDTH] IN BLOOD BY AUTOMATED COUNT: 19.5 % (ref 11.9–14.6)
GLUCOSE BLD STRIP.AUTO-MCNC: 163 MG/DL (ref 65–100)
GLUCOSE BLD STRIP.AUTO-MCNC: 200 MG/DL (ref 65–100)
GLUCOSE BLD STRIP.AUTO-MCNC: 268 MG/DL (ref 65–100)
GLUCOSE BLD STRIP.AUTO-MCNC: 84 MG/DL (ref 65–100)
GLUCOSE BLD STRIP.AUTO-MCNC: 87 MG/DL (ref 65–100)
HCT VFR BLD AUTO: 28.2 % (ref 41.1–50.3)
HGB BLD-MCNC: 9.1 G/DL (ref 13.6–17.2)
INR PPP: 3.2 (ref 0.9–1.2)
MCH RBC QN AUTO: 31.2 PG (ref 26.1–32.9)
MCHC RBC AUTO-ENTMCNC: 32.3 G/DL (ref 31.4–35)
MCV RBC AUTO: 96.6 FL (ref 79.6–97.8)
PLATELET # BLD AUTO: 68 K/UL (ref 150–450)
PMV BLD AUTO: ABNORMAL FL (ref 10.8–14.1)
PROTHROMBIN TIME: 35.1 SEC (ref 9.6–12)
Q-T INTERVAL, ECG07: 352 MS
QRS DURATION, ECG06: 166 MS
QTC CALCULATION (BEZET), ECG08: 531 MS
RBC # BLD AUTO: 2.92 M/UL (ref 4.23–5.67)
SERVICE CMNT-IMP: NORMAL
VENTRICULAR RATE, ECG03: 137 BPM
WBC # BLD AUTO: 7.3 K/UL (ref 4.3–11.1)

## 2017-09-19 PROCEDURE — 85610 PROTHROMBIN TIME: CPT | Performed by: PHYSICAL MEDICINE & REHABILITATION

## 2017-09-19 PROCEDURE — 94660 CPAP INITIATION&MGMT: CPT

## 2017-09-19 PROCEDURE — 99232 SBSQ HOSP IP/OBS MODERATE 35: CPT | Performed by: PHYSICAL MEDICINE & REHABILITATION

## 2017-09-19 PROCEDURE — 82962 GLUCOSE BLOOD TEST: CPT

## 2017-09-19 PROCEDURE — 94760 N-INVAS EAR/PLS OXIMETRY 1: CPT

## 2017-09-19 PROCEDURE — 74011250637 HC RX REV CODE- 250/637: Performed by: INTERNAL MEDICINE

## 2017-09-19 PROCEDURE — 65310000000 HC RM PRIVATE REHAB

## 2017-09-19 PROCEDURE — 93005 ELECTROCARDIOGRAM TRACING: CPT | Performed by: INTERNAL MEDICINE

## 2017-09-19 PROCEDURE — 74011636637 HC RX REV CODE- 636/637: Performed by: PHYSICAL MEDICINE & REHABILITATION

## 2017-09-19 PROCEDURE — 36592 COLLECT BLOOD FROM PICC: CPT

## 2017-09-19 PROCEDURE — 87641 MR-STAPH DNA AMP PROBE: CPT | Performed by: PHYSICAL MEDICINE & REHABILITATION

## 2017-09-19 PROCEDURE — 74011250636 HC RX REV CODE- 250/636: Performed by: PHYSICAL MEDICINE & REHABILITATION

## 2017-09-19 PROCEDURE — 85027 COMPLETE CBC AUTOMATED: CPT | Performed by: PHYSICAL MEDICINE & REHABILITATION

## 2017-09-19 PROCEDURE — 74011250637 HC RX REV CODE- 250/637: Performed by: PHYSICAL MEDICINE & REHABILITATION

## 2017-09-19 PROCEDURE — 94640 AIRWAY INHALATION TREATMENT: CPT

## 2017-09-19 PROCEDURE — 74011000250 HC RX REV CODE- 250: Performed by: PHYSICAL MEDICINE & REHABILITATION

## 2017-09-19 RX ORDER — METOPROLOL SUCCINATE 50 MG/1
75 TABLET, EXTENDED RELEASE ORAL 2 TIMES DAILY
Status: DISCONTINUED | OUTPATIENT
Start: 2017-09-19 | End: 2017-09-20

## 2017-09-19 RX ADMIN — FERROUS SULFATE TAB 325 MG (65 MG ELEMENTAL FE) 325 MG: 325 (65 FE) TAB at 10:30

## 2017-09-19 RX ADMIN — Medication 600 UNITS: at 05:42

## 2017-09-19 RX ADMIN — GUAIFENESIN 1200 MG: 600 TABLET, EXTENDED RELEASE ORAL at 17:01

## 2017-09-19 RX ADMIN — BUDESONIDE 500 MCG: 0.5 INHALANT RESPIRATORY (INHALATION) at 05:29

## 2017-09-19 RX ADMIN — DILTIAZEM HYDROCHLORIDE 180 MG: 180 CAPSULE, EXTENDED RELEASE ORAL at 10:30

## 2017-09-19 RX ADMIN — TRAZODONE HYDROCHLORIDE 50 MG: 50 TABLET ORAL at 21:32

## 2017-09-19 RX ADMIN — Medication 20 ML: at 21:33

## 2017-09-19 RX ADMIN — POTASSIUM CHLORIDE 20 MEQ: 20 TABLET, EXTENDED RELEASE ORAL at 10:29

## 2017-09-19 RX ADMIN — BUDESONIDE 500 MCG: 0.5 INHALANT RESPIRATORY (INHALATION) at 17:19

## 2017-09-19 RX ADMIN — Medication 600 UNITS: at 13:47

## 2017-09-19 RX ADMIN — METOPROLOL TARTRATE 5 MG: 5 INJECTION INTRAVENOUS at 13:47

## 2017-09-19 RX ADMIN — INSULIN LISPRO 6 UNITS: 100 INJECTION, SOLUTION INTRAVENOUS; SUBCUTANEOUS at 21:33

## 2017-09-19 RX ADMIN — Medication 600 UNITS: at 21:33

## 2017-09-19 RX ADMIN — INSULIN LISPRO 2 UNITS: 100 INJECTION, SOLUTION INTRAVENOUS; SUBCUTANEOUS at 12:16

## 2017-09-19 RX ADMIN — ALBUTEROL SULFATE 2.5 MG: 2.5 SOLUTION RESPIRATORY (INHALATION) at 05:29

## 2017-09-19 RX ADMIN — FUROSEMIDE 20 MG: 20 TABLET ORAL at 10:30

## 2017-09-19 RX ADMIN — GABAPENTIN 600 MG: 300 CAPSULE ORAL at 21:32

## 2017-09-19 RX ADMIN — INSULIN LISPRO 4 UNITS: 100 INJECTION, SOLUTION INTRAVENOUS; SUBCUTANEOUS at 16:58

## 2017-09-19 RX ADMIN — GABAPENTIN 600 MG: 300 CAPSULE ORAL at 13:47

## 2017-09-19 RX ADMIN — POVIDONE-IODINE: 10 SOLUTION TOPICAL at 10:47

## 2017-09-19 RX ADMIN — TIOTROPIUM BROMIDE 18 MCG: 18 CAPSULE ORAL; RESPIRATORY (INHALATION) at 05:29

## 2017-09-19 RX ADMIN — Medication 20 ML: at 05:42

## 2017-09-19 RX ADMIN — METOPROLOL SUCCINATE 75 MG: 50 TABLET, EXTENDED RELEASE ORAL at 21:32

## 2017-09-19 RX ADMIN — ALBUTEROL SULFATE 2.5 MG: 2.5 SOLUTION RESPIRATORY (INHALATION) at 17:18

## 2017-09-19 RX ADMIN — GUAIFENESIN 1200 MG: 600 TABLET, EXTENDED RELEASE ORAL at 10:29

## 2017-09-19 RX ADMIN — DIGOXIN 0.25 MG: 250 TABLET ORAL at 10:30

## 2017-09-19 RX ADMIN — METOPROLOL SUCCINATE 50 MG: 50 TABLET, EXTENDED RELEASE ORAL at 10:30

## 2017-09-19 RX ADMIN — Medication 20 ML: at 13:47

## 2017-09-19 RX ADMIN — FAMOTIDINE 20 MG: 10 INJECTION, SOLUTION INTRAVENOUS at 10:30

## 2017-09-19 RX ADMIN — PREDNISONE 30 MG: 20 TABLET ORAL at 10:30

## 2017-09-19 NOTE — PROGRESS NOTES
MD Mateusz,   Medical Director  [address] [facility], [address] [facility]  Tel: [phone]       [facility] PROGRESS NOTE    Beverley Santiago  Admit Date: 9/13/2017  Admit Diagnosis: Cardiac Debility; Respiratory failure ([address]    Subjective     Patient seen and examined. Afberile. stil in a.fib, HR 130s. Feels fatigued. Patient without CP, SOB, cough. Cardiology f/u appreciated. Medical management , possible cardioversion, AVN ablation/pacemaker in am.   Case discussed in team conference. Therapy on hold due to a.fib/RVR.      Objective:     Current Facility-Administered Medications   Medication Dose Route Frequency    metoprolol succinate (TOPROL-XL) XL tablet 50 mg  50 mg Oral BID    albuterol CONCENTRATE 2.5mg/0.5 mL neb soln  2.5 mg Nebulization BID    And    budesonide (PULMICORT) 500 mcg/2 ml nebulizer suspension  500 mcg Nebulization BID RT    metoprolol (LOPRESSOR) injection 5 mg  5 mg IntraVENous Q6H PRN    furosemide (LASIX) tablet 20 mg  20 mg Oral DAILY    potassium chloride (K-DUR, KLOR-CON) SR tablet 20 mEq  20 mEq Oral DAILY    dilTIAZem CD (CARDIZEM CD) capsule 180 mg  180 mg Oral DAILY    warfarin (COUMADIN) tablet 6 mg  6 mg Oral QPM    0.9% sodium chloride infusion 250 mL  250 mL IntraVENous PRN    0.9% sodium chloride infusion 250 mL  250 mL IntraVENous PRN    nitroglycerin (NITROSTAT) tablet 0.4 mg  0.4 mg SubLINGual Q5MIN PRN    ondansetron (ZOFRAN) injection 4 mg  4 mg IntraVENous Q4H PRN    sodium chloride (NS) flush 5-10 mL  5-10 mL IntraVENous PRN    NUTRITIONAL SUPPORT ELECTROLYTE PRN ORDERS   Does Not Apply PRN    acetaminophen (TYLENOL) suppository 650 mg  650 mg Rectal Q4H PRN    acetaminophen (TYLENOL) tablet 650 mg  650 mg Oral Q4H PRN    albuterol (PROVENTIL HFA, VENTOLIN HFA, PROAIR HFA) inhaler 2 Puff  2 Puff Inhalation Q4H PRN    albuterol (PROVENTIL VENTOLIN) nebulizer solution 2.5 mg  2.5 mg Nebulization Q4H PRN    dextrose (D50W) injection syrg 25 g  25 g IntraVENous PRN    digoxin (LANOXIN) tablet 0.25 mg  0.25 mg Oral DAILY    famotidine (PF) (PEPCID) 20 mg in sodium chloride 0.9 % 10 mL injection  20 mg IntraVENous DAILY    ferrous sulfate tablet 325 mg  1 Tab Oral DAILY WITH BREAKFAST    gabapentin (NEURONTIN) capsule 600 mg  600 mg Oral TID    guaiFENesin ER (MUCINEX) tablet 1,200 mg  1,200 mg Oral BID    heparin (porcine) pf 600 Units  600 Units InterCATHeter Q8H    heparin (porcine) pf 600 Units  600 Units InterCATHeter PRN    insulin lispro (HUMALOG) injection   SubCUTAneous AC&HS    povidone-iodine (BETADINE) 10 % topical solution   Topical DAILY    [START ON 9/29/2017] predniSONE (DELTASONE) tablet 10 mg  10 mg Oral DAILY WITH BREAKFAST    [START ON 9/22/2017] predniSONE (DELTASONE) tablet 20 mg  20 mg Oral DAILY WITH BREAKFAST    predniSONE (DELTASONE) tablet 30 mg  30 mg Oral DAILY WITH BREAKFAST    sodium chloride (NS) flush 20 mL  20 mL InterCATHeter Q8H    tiotropium (SPIRIVA) inhalation capsule 18 mcg  1 Cap Inhalation DAILY    traZODone (DESYREL) tablet 50 mg  50 mg Oral QHS PRN    influenza vaccine 2015-16 (36mos+)(PF) (FLUZONE/FLUARIX QUAD) injection 0.5 mL  0.5 mL IntraMUSCular PRIOR TO DISCHARGE     Review of Systems:    Denies chest pain, shortness of breath, cough, headache, visual problems, abdominal pain, dysurea, calf pain. Pertinent positives are as noted in the medical records and unremarkable otherwise. Visit Vitals    /77    Pulse (!) 134    Temp 97.5 °F (36.4 °C)    Resp 18    SpO2 96%      Physical Exam:   General: Alert and age appropriately oriented x 2. No acute cardio respiratory distress. HEENT: Normocephalic,no scleral icterus  Oral mucosa moist without cyanosis, No bruit, + JVD. Lungs: + bibasilar crackles. No wheezing.    Respiration even and unlabored   Heart: irregular rate and rhythm, S1, S2   No  murmurs, clicks, rub or gallops   Abdomen: Soft, non-tender, nondistended. Bowel sounds present. No organomegaly. Genitourinary: defered   Neuromuscular:      PERRL, EOMI  Speech slow, dysarthric. Focus, attention poor. Follows simple commands consistently, with delay, corrections. Able to identify, recall repeat.      LUE     Shoulder abduction  5- /5              Elbow flexion:  5- /5               Wrist extension:   5-/5              RHBUSB flexion;  5- /5                        ADMQ:   4+/5  RUE    Shoulder abduction:  5-/5                Elbow flexion:   5-/5                         Wrist extension:  5-/5                        Finger flexion:   /5-5                        ADMQ:  5- /5  LLE     Hip flexion:  3+ /5              Knee extension:   4/5                         Ankle dorsiflexion: 4+ /5                        Ankle plantarflexion:  5- /5                                                                RLE     Hip flexion:  3 +/5                        Knee extension:  4 /5                         Ankle dorsiflexion: 4+ /5                        Ankle plantarflexion:   5-/5  Sensory - intact  No cerebellar signs. Plantars - down going  No atrophy, no fasciculations, no tremors. Skin/extremity: No rashes, no erythema. Calf non tender BLE. 2+ edema BLE.                                                                                Functional Assessment:          Balance  Sitting - Static: Good (unsupported) (09/16/17 1300)  Sitting - Dynamic: Good (unsupported) (09/16/17 1300)  Standing - Static: Fair;Constant support (with RW) (09/16/17 1300)  Standing - Dynamic : Impaired (09/16/17 1300)           Toileting  Adaptive Equipment: Grab bars; Other (comment) (Wheelchair) (09/18/17 1012)         Yolande Mueller Fall Risk Assessment:  Yolande Mueller Fall Risk  Mobility: Ambulates or transfers with assist devices or assistance/unsteady gait (09/18/17 2124)  Mobility Interventions: Utilize walker, cane, or other assitive device (09/18/17 2124)  Mentation: Alert, oriented x 3 (09/18/17 2124)  Mentation Interventions: Door open when patient unattended (09/18/17 2124)  Medication: Patient receiving anticonvulsants, sedatives(tranquilizers), psychotropics or hypnotics, hypoglycemics, narcotics, sleep aids, antihypertensives, laxatives, or diuretics (09/18/17 2124)  Medication Interventions: Patient to call before getting OOB (09/18/17 2124)  Elimination: Needs assistance with toileting (09/18/17 2124)  Elimination Interventions: Call light in reach (09/18/17 2124)  Prior Fall History: During admission (Date - Comment) (09/18/17 2124)  History of Falls Interventions: Door open when patient unattended (09/18/17 2124)  Total Score: 5 (09/18/17 2124)  Standard Fall Precautions: Yes (09/17/17 1109)  High Fall Risk: Yes (09/18/17 2124)     Speech Assessment:  Aspiration Signs/Symptoms: None (09/14/17 1036)      Ambulation:  Gait  Distance (ft): 30 Feet (ft) (09/16/17 1300)  Assistive Device: Walker, rolling;Gait belt (09/16/17 1300)     Labs/Studies:  Recent Results (from the past 72 hour(s))   EKG, 12 LEAD, INITIAL    Collection Time: 09/16/17 10:25 AM   Result Value Ref Range    Ventricular Rate 124 BPM    Atrial Rate 113 BPM    QRS Duration 164 ms    Q-T Interval 358 ms    QTC Calculation (Bezet) 514 ms    Calculated R Axis 120 degrees    Calculated T Axis -42 degrees    Diagnosis       Atrial fibrillation with rapid ventricular response  Right bundle branch block  Left posterior fascicular block  !!! Bifascicular block !!!   Abnormal ECG  When compared with ECG of 11-SEP-2017 21:32,  Previous ECG has undetermined rhythm, needs review  Nonspecific T wave abnormality now evident in Inferior leads  Confirmed by Tania Lerma MD (), HAN KRUSE ([id number]) on 9/16/2017 10:27:57 PM     GLUCOSE, POC    Collection Time: 09/16/17 11:15 AM   Result Value Ref Range    Glucose (POC) 101 (H) 65 - 100 mg/dL   GLUCOSE, POC    Collection Time: 09/16/17  4:29 PM   Result Value Ref Range    Glucose (POC) 200 (H) 65 - 100 mg/dL   GLUCOSE, POC    Collection Time: 09/16/17  9:04 PM   Result Value Ref Range    Glucose (POC) 111 (H) 65 - 100 mg/dL   CBC WITH AUTOMATED DIFF    Collection Time: 09/17/17  5:28 AM   Result Value Ref Range    WBC 4.2 (L) 4.3 - 11.1 K/uL    RBC 3.07 (L) 4.23 - 5.67 M/uL    HGB 9.3 (L) 13.6 - 17.2 g/dL    HCT 28.9 (L) 41.1 - 50.3 %    MCV 94.1 79.6 - 97.8 FL    MCH 30.3 26.1 - 32.9 PG    MCHC 32.2 31.4 - 35.0 g/dL    RDW 16.3 (H) 11.9 - 14.6 %    PLATELET 65 (L) 949 - 450 K/uL    MPV Cannot be calculated 10.8 - 14.1 FL    DF AUTOMATED      NEUTROPHILS 74 43 - 78 %    LYMPHOCYTES 21 13 - 44 %    MONOCYTES 5 4.0 - 12.0 %    EOSINOPHILS 0 (L) 0.5 - 7.8 %    BASOPHILS 0 0.0 - 2.0 %    IMMATURE GRANULOCYTES 0.4 0.0 - 5.0 %    ABS. NEUTROPHILS 4.9 1.7 - 8.2 K/UL    ABS. LYMPHOCYTES 1.4 0.5 - 4.6 K/UL    ABS. MONOCYTES 0.4 0.1 - 1.3 K/UL    ABS. EOSINOPHILS 0.0 0.0 - 0.8 K/UL    ABS. BASOPHILS 0.0 0.0 - 0.2 K/UL    ABS. IMM. GRANS. 0.0 0.0 - 0.5 K/UL   PROTHROMBIN TIME + INR    Collection Time: 09/17/17  5:28 AM   Result Value Ref Range    Prothrombin time 23.9 (H) 9.6 - 12.0 sec    INR 2.2 (H) 0.9 - 1.2     BNP    Collection Time: 09/17/17  5:28 AM   Result Value Ref Range    BNP 1446 pg/mL   GLUCOSE, POC    Collection Time: 09/17/17  7:43 AM   Result Value Ref Range    Glucose (POC) 82 65 - 100 mg/dL   GLUCOSE, POC    Collection Time: 09/17/17 11:07 AM   Result Value Ref Range    Glucose (POC) 286 (H) 65 - 100 mg/dL   EKG, 12 LEAD, SUBSEQUENT    Collection Time: 09/17/17 11:25 AM   Result Value Ref Range    Ventricular Rate 116 BPM    Atrial Rate 97 BPM    QRS Duration 174 ms    Q-T Interval 380 ms    QTC Calculation (Bezet) 528 ms    Calculated R Axis 130 degrees    Calculated T Axis -53 degrees    Diagnosis       Atrial fibrillation with rapid ventricular response  Right bundle branch block  Left posterior fascicular block  !!!  Bifascicular block !!!  T wave abnormality, consider lateral ischemia or digitalis effect  Abnormal ECG  When compared with ECG of 16-SEP-2017 10:25,  Inverted T waves have replaced nonspecific T wave abnormality in Lateral   leads  Confirmed by DEANDRA ROMAN ([id number]) on 9/17/2017 5:51:29 PM     CULTURE, BLOOD    Collection Time: 09/17/17 12:42 PM   Result Value Ref Range    Special Requests: LEFT  Antecubital        Culture result: NO GROWTH 2 DAYS     GLUCOSE, POC    Collection Time: 09/17/17  4:26 PM   Result Value Ref Range    Glucose (POC) 123 (H) 65 - 100 mg/dL   GLUCOSE, POC    Collection Time: 09/17/17  8:45 PM   Result Value Ref Range    Glucose (POC) 160 (H) 65 - 100 mg/dL   PROTHROMBIN TIME + INR    Collection Time: 09/18/17  4:00 AM   Result Value Ref Range    Prothrombin time 26.1 (H) 9.6 - 12.0 sec    INR 2.4 (H) 0.9 - 1.2     GLUCOSE, POC    Collection Time: 09/18/17  7:07 AM   Result Value Ref Range    Glucose (POC) 112 (H) 65 - 100 mg/dL   GLUCOSE, POC    Collection Time: 09/18/17 11:44 AM   Result Value Ref Range    Glucose (POC) 177 (H) 65 - 100 mg/dL   GLUCOSE, POC    Collection Time: 09/18/17  4:20 PM   Result Value Ref Range    Glucose (POC) 202 (H) 65 - 100 mg/dL   GLUCOSE, POC    Collection Time: 09/18/17  8:31 PM   Result Value Ref Range    Glucose (POC) 168 (H) 65 - 100 mg/dL   PROTHROMBIN TIME + INR    Collection Time: 09/19/17  5:29 AM   Result Value Ref Range    Prothrombin time 35.1 (H) 9.6 - 12.0 sec    INR 3.2 (H) 0.9 - 1.2     CBC W/O DIFF    Collection Time: 09/19/17  5:29 AM   Result Value Ref Range    WBC 7.3 4.3 - 11.1 K/uL    RBC 2.92 (L) 4.23 - 5.67 M/uL    HGB 9.1 (L) 13.6 - 17.2 g/dL    HCT 28.2 (L) 41.1 - 50.3 %    MCV 96.6 79.6 - 97.8 FL    MCH 31.2 26.1 - 32.9 PG    MCHC 32.3 31.4 - 35.0 g/dL    RDW 19.5 (H) 11.9 - 14.6 %    PLATELET 68 (L) 024 - 450 K/uL    MPV Cannot be calculated 10.8 - 14.1 FL   GLUCOSE, POC    Collection Time: 09/19/17  5:58 AM   Result Value Ref Range    Glucose (POC) 84 65 - 100 mg/dL   EKG, 12 LEAD, INITIAL Collection Time: 09/19/17  7:32 AM   Result Value Ref Range    Ventricular Rate 137 BPM    Atrial Rate 66 BPM    QRS Duration 166 ms    Q-T Interval 352 ms    QTC Calculation (Bezet) 531 ms    Calculated R Axis 137 degrees    Calculated T Axis -47 degrees    Diagnosis       Undetermined rhythm  Right bundle branch block  Left posterior fascicular block  !!!  Bifascicular block !!!  T wave abnormality, consider inferior ischemia  Abnormal ECG  When compared with ECG of 17-SEP-2017 11:25,  Current undetermined rhythm precludes rhythm comparison, needs review  T wave inversion no longer evident in Lateral leads         Assessment:     Problem List as of 9/19/2017  Date Reviewed: 9/9/2017          Codes Class Noted - Resolved    Respiratory failure ([facility] ICD-10-CM: J96.90  ICD-9-CM: 518.81  9/13/2017 - Present        MRSA nasal colonization ICD-10-CM: Z22.322  ICD-9-CM: V02.54  9/8/2017 - Present        COPD (chronic obstructive pulmonary disease) (HCC) (Chronic) ICD-10-CM: J44.9  ICD-9-CM: 496  8/30/2017 - Present        Acute respiratory failure with hypercapnia (HCC) ICD-10-CM: J96.02  ICD-9-CM: 518.81  8/29/2017 - Present        History of tobacco use (Chronic) ICD-10-CM: Z54.058  ICD-9-CM: V15.82  8/29/2017 - Present        Hyponatremia ICD-10-CM: E87.1  ICD-9-CM: 276.1  8/29/2017 - Present        Acute encephalopathy ICD-10-CM: G93.40  ICD-9-CM: 348.30  8/29/2017 - Present        Thrombocytopenia ([facility] ICD-10-CM: D69.6  ICD-9-CM: 287.5  8/26/2017 - Present        Anticoagulant long-term use (Chronic) ICD-10-CM: Z79.01  ICD-9-CM: V58.61  8/23/2017 - Present        HTN (hypertension), benign (Chronic) ICD-10-CM: I10  ICD-9-CM: 401.1  8/23/2017 - Present        Peripheral vascular disease ([facility] (Chronic) ICD-10-CM: I73.9  ICD-9-CM: 443.9  8/23/2017 - Present        Dyslipidemia (Chronic) ICD-10-CM: E78.5  ICD-9-CM: 272.4  8/23/2017 - Present        History of mechanical aortic valve replacement (Chronic) ICD-10-CM: Z95.2  ICD-9-CM: V43.3  8/23/2017 - Present    Overview Signed 8/30/2017 10:30 AM by Esperanza Otto NP     Placement 2000, on chronicCoumadin             Centrilobular emphysema ([address] (Chronic) ICD-10-CM: J43.2  ICD-9-CM: 492.8  8/23/2017 - Present    Overview Signed 9/8/2017  9:24 AM by Norberto Rodriguez NP      With last PFTs FVC 1.70 L or 44% predicted, FEV1 0.86 L or 29% predicted, FEV1/FVC 51%. This study was a postbronchodilator study performed at the [facility] on 8/22/2016                  Ischemic cardiomyopathy (Chronic) ICD-10-CM: I25.5  ICD-9-CM: 414.8  8/23/2017 - Present    Overview Signed 8/30/2017 10:29 AM by Esperanza Otto NP     8/23/17 ECHO:  EF 40 % to 45 %. There were no regional wall motion abnormalities. Moderate hypokinesis of the mid-apical anterior and apical wall(s). Atrial flutter with rapid ventricular response (HCC) ICD-10-CM: I48.92  ICD-9-CM: 427.32  8/23/2017 - Present        LV dysfunction (Chronic) ICD-10-CM: I51.9  ICD-9-CM: 429.9  10/19/2016 - Present        Atherosclerosis of native arteries of the extremities with intermittent claudication (Chronic) ICD-10-CM: N35.139  ICD-9-CM: 440.21  4/15/2014 - Present              Plan:      Rehabilitation Plan  The patient has shown the ability to tolerate and benefit from 3 hours of therapy daily and is being admitted to a comprehensive acute inpatient rehabilitation program consisting of at least 3 hours of combined physical and occupational therapies. Begin intensive Physical Therapy for a minimum of 1.5 hours a day, at least 5 out of 7 days per week to address bed mobility, transfers, ambulation, strengthening, balance, and endurance. Patient limited by poor endurance , weakness of his trunk and limbs.    Begin intensive Occupational Therapy for a minimum of 1.5 hours a day, at least 5 out of 7 days per week to address ADL ( bathing, LE dressing, toileting) and adaptive equipment as needed.        Continue ST for: dysarthria, impaired communication skills, hypophonia, dysphagia.      Continue 24-hour skilled rehabilitation nursing for bowel and bladder management, skin care for decubitus ulcer prevention , pain management and ongoing medication administration      Continue daily physician medical management:     Atrial flutter with rapid ventricular response ([address] (8/23/2017) sp cardioversion - monitor HR./ BP. / hx of CHF / Ischemic cardiomyopathy     - Digoxin, coreg.  - continue anticoagulation  - cardiac precautions.   - (9/16) -recurrent a.fib with RVR. HR 130s. - restarted diuretics, as was taking at home. - CxR 9/16 very small bilateral pleural effusions are stable. - 9/19-  possible cardioversion, AVN ablation/pacemaker in am per Cardiology/EP        History of mechanical aortic valve replacement 2000, on coumadin/ Anticoagulant long-term use. Goal 2.5-3.5.  - resume warfarin, monitor INR.   - INR 2.4 (9/18) . average out home dose ~> 6mg hs.   - Warfarin 6mg HS (9/16)  - pharmacy assisting. On hold(9/19) for procedure.      Bacteremia - started on empiric antibiotic treatment. Aztreonam, vanco.   - BCx grew e.coli. discontinued vano. - final ID extended-spectrum beta-lactamases (ESBLs) , appears treated, though efficacy of vanco, aztreonam not optimal.  . Pt asymptomatic, WBC wnl. Will discontinue - aztreonam. .  - will monitor closely. - will have on contact precaution, infection control notified.   - BCx redrawn 9/17, no growth, will lift contact precautions 9/19. HTN (hypertension) - monitor.  - continue Coreg/ digoxin        Thrombocytopenia/ ITP  - continued on steroids. - hematology following.   - HIT negative. - ivig per hematology direction. Will receive 1 more dose. -  received platelet 1unit (4/12). plt 25k -> 75k (9/16)-> 65k (9/17) -> 68 (9/19) monitor       COPD   - continue respiratory treatments. Presently q6h, wean as appropriate.     - resume spiriva, pulmicort  - SOB with activity. - normal CxR (9/15)  - will wean albuterol nebs 9/18. Bid and as needed.         iron deficiency anemia -hgb 8.5-> 8.7(9/15)-. 10.8(9/18) -> 9.3-> 9.1 (9/19) Monitor. Continue fe supplements.      Hyponatremia  - monitor.      Pneumonia prophylaxis- Insentive spirometer every hour while awake     DVT risk / DVT Prophylaxis- Will require daily physician exam to assess for signs and symptoms as patient is at increased risk for of thromboembolism. Mobilization as tolerated. Intermittent pneumatic compression devices when in bed Thigh-high or knee-high thromboembolic deterrent hose when out of bed.    - covered with warfarin.      Pain Control: stable, mild-to-moderate joint symptoms intermittently, reasonably well controlled by PRN meds. Will require regular pain assessment and comprenhensive pain management. - resume gabapentin.      Wound Care: Monitor wound status daily per staff and physician. At risk for failure. Will require 24/7 rehab nursing. Keep wound clean and dry  - excoriation at bridge of nose. Continue antibiotic ointment.      Diabetes mellitus - Uncontrolled. poor glycemic control. Will require daily, close FSG monitoring and medication adjustment to optimize glycemic control in setting of acute illness and hospitalization.   - SSI coverage with lispro.      Urinary retention/ neurogenic bladder - schedule voids q6-8 hrs. Check post-void residual every shift; In and Out catheterize if post-void residual is more than 400 cc.     bowel program - colace as needed. Time spent was 25 minutes with over 1/2 in direct patient care/examination, consultation and coordination of care.      Signed By: Nilson Willett MD     September 19, 2017

## 2017-09-19 NOTE — PROGRESS NOTES
Paged Dr Debbie Russell, Cardiology and spoke with him via telephone because De Messing, Cardiology PA states pt is not having ablation procedure today according to Dr Shant Monroy but did not receive any orders about keeping pt NPO and giving medications. Dr Debbie Russell states he will speak to partner and call back with orders.

## 2017-09-19 NOTE — PROGRESS NOTES
Pt not seen on 9/19/2017 secondary to being on hold due to medical status. Will resume treatment once pt. is off hold status as medically able to tolerate and as schedule allows.      Zenobia Juares MS, OTR/L  9/19/2017

## 2017-09-19 NOTE — PROGRESS NOTES
IV Metoprolol 5mg given for  and rhythm of a fib. Dr Lidia Woods notified via telephone and gave the ok for medication to be given with read back.

## 2017-09-19 NOTE — PROGRESS NOTES
Pt resting quietly in bed. States he had a good night and slept well. Denies needs or concerns at this time. Replaced telemetry leads. Alert and oriented x4. Lungs sounds diminished bilaterally with respirations even and unlabored. Bowel sounds active in all quadrants. Palpable pulses bilaterally in upper and lower extremities. Right upper arm PICC in place. Last dressing change 9/13/2017. Instructed to call for assistance. Call light in reach. Voiced understanding and identified call light.

## 2017-09-19 NOTE — PROGRESS NOTES
Patient not seen on 9/19/17 secondary to being placed on hold due to medical status. Patient will resume treatment when is off medical status and as schedule allows.     Connie Crane, PT, DPT  9/19/2017

## 2017-09-19 NOTE — PROGRESS NOTES
Monitor room called and stated pt was in A Flutter running 154. Doctor Zoe Hendrix informed. EKG has just been completed and rhythm reads aflutter at 135.

## 2017-09-19 NOTE — PROGRESS NOTES
Dr Tanner Corners called and states Dr Eileen Najera will be up to check pt out and to keep orders as is for the mean time.

## 2017-09-19 NOTE — PROGRESS NOTES
[facility] CARDIOLOGY PROGRESS NOTE           9/19/2017 7:31 AM    Admit Date: 9/13/2017      Subjective:   Comfortable night without cp, sob. Rhythm - continued atypical atrial flutter with RVR at 135 per minute. Hx of AVR 2000 on warfarin. LV EF 40%. Nuclear scan 11/2016 - large anteroapical and inferolateral scar, no ischemia. Hx of PAD. INR 3.2    ROS:  Cardiovascular:  As noted above    Objective:      Vitals:    09/18/17 2020 09/18/17 2200 09/19/17 0529 09/19/17 0726   BP: 139/78   115/77   Pulse: 92   (!) 134   Resp: 18   18   Temp: 98.6 °F (37 °C)   97.5 °F (36.4 °C)   SpO2: 97% 99% 100% 96%       Physical Exam:  General-No Acute Distress  Neck- supple, no JVD  CV- rapid regular tachycardia, no MRG  Lung- clear bilaterally  Abd- soft, nontender, nondistended  Ext- no edema bilaterally. Skin- warm and dry      EKG - Atypical atrial flutter, RVR, bifascicular block      Data Review:   Recent Labs      09/19/17   0529  09/18/17   0400  09/17/17   0528   WBC  7.3   --   4.2*   HGB  9.1*   --   9.3*   HCT  28.2*   --   28.9*   PLT  68*   --   65*   INR  3.2*  2.4*  2.2*       Assessment/Plan:     Active Problems:    Atypical atrial flutter    Respiratory failure ([address] (9/13/2017) - resolved. AVR 2000. Warfarin anticoagulation    ICM with LV EF 40%    HTN       Recommend EP consult to consider cardioversion versus ablation / device therapy.         Thien Birmingham MD  9/19/2017 7:31 AM

## 2017-09-19 NOTE — PROGRESS NOTES
Warfarin dosing per pharmacist    Ok Bryan is a 67 y.o. male. Indication: A. Fib    Goal INR:  2-3    Home dose:  5 mg daily, 7.5 mg on Wednesday    Risk factors or significant drug interactions:  Antibiotics, prednisone    Other anticoagulants:  none    Daily Monitoring  Date  INR     Warfarin dose HGB              Notes  9/8  1.2  10mg  11.2    9/9  1.3  10mg  9.4  9/10  ---  10mg  9.7  9/11  2.1  10mg  10.8  9/12  2.9  10mg  8.9  9/13  3.5  Hold  8.4  Pharmacy Consulted  9/14  3.6  Held  8.5  9/15  2.5  5 mg  8.7  9/16  1.9  6 mg  10.8  9/17  2.2  6 mg  9.3  9/18  2.4  6 mg  --  9/19  3.2  HOLD  9.1    Large jump in INR overnight. Will hold warfarin tonight and re-evaluate tomorrow.     Thank you,  Ada Dorsey, PharmD

## 2017-09-19 NOTE — PROGRESS NOTES
Problem: Falls - Risk of  Goal: *Absence of Falls  Document Lauren Fall Risk and appropriate interventions in the flowsheet.    Outcome: Progressing Towards Goal  Fall Risk Interventions:  Mobility Interventions: Patient to call before getting OOB, Strengthening exercises (ROM-active/passive), Utilize walker, cane, or other assitive device     Mentation Interventions: Door open when patient unattended, Evaluate medications/consider consulting pharmacy, Eyeglasses and hearing aids, Increase mobility     Medication Interventions: Evaluate medications/consider consulting pharmacy, Patient to call before getting OOB, Teach patient to arise slowly     Elimination Interventions: Call light in reach, Patient to call for help with toileting needs, Urinal in reach     History of Falls Interventions: Consult care management for discharge planning

## 2017-09-19 NOTE — PROGRESS NOTES
Speech therapy note  Attempted to see pt this am, however, RN reports pt on hold.      Nalini Bachelor MA/CCC/SLP

## 2017-09-19 NOTE — PROGRESS NOTES
Alexia, RN called Lorenzo Vu, pt's wife and left message telling her pt was having cardiac procedure today and asking her to please call nursing station and come on in to hospital.  Message stated it was not an emergency and pt was stable.

## 2017-09-19 NOTE — PROGRESS NOTES
Asked Dr Dahiana Lane if he wanted this nurse to give IV metoprolol. Dr Dahiana Lane stated not to give medication at this time. States he needs to has problem taken care of and probably will need an ablation.

## 2017-09-19 NOTE — PROGRESS NOTES
AM ADL session missed (45 minutes) due to patient on medical hold for complications with atrial flutter per RN/MD.    Titi Welsh OT  9/19/2017

## 2017-09-19 NOTE — PROGRESS NOTES
Problem: Falls - Risk of  Goal: *Absence of Falls  Document Lauren Fall Risk and appropriate interventions in the flowsheet.    Outcome: Progressing Towards Goal  Fall Risk Interventions:  Mobility Interventions: Utilize walker, cane, or other assitive device     Mentation Interventions: Door open when patient unattended     Medication Interventions: Patient to call before getting OOB     Elimination Interventions: Call light in reach     History of Falls Interventions: Door open when patient unattended

## 2017-09-20 ENCOUNTER — APPOINTMENT (OUTPATIENT)
Dept: GENERAL RADIOLOGY | Age: 72
DRG: 309 | End: 2017-09-20
Attending: PHYSICAL MEDICINE & REHABILITATION
Payer: MEDICARE

## 2017-09-20 LAB
ANION GAP SERPL CALC-SCNC: 4 MMOL/L (ref 7–16)
BUN SERPL-MCNC: 19 MG/DL (ref 8–23)
CALCIUM SERPL-MCNC: 8.2 MG/DL (ref 8.3–10.4)
CHLORIDE SERPL-SCNC: 103 MMOL/L (ref 98–107)
CO2 SERPL-SCNC: 34 MMOL/L (ref 21–32)
CREAT SERPL-MCNC: 0.9 MG/DL (ref 0.8–1.5)
DIGOXIN SERPL-MCNC: 1.1 NG/ML (ref 0.9–2.1)
ERYTHROCYTE [DISTWIDTH] IN BLOOD BY AUTOMATED COUNT: 20.7 % (ref 11.9–14.6)
GLUCOSE BLD STRIP.AUTO-MCNC: 101 MG/DL (ref 65–100)
GLUCOSE BLD STRIP.AUTO-MCNC: 162 MG/DL (ref 65–100)
GLUCOSE BLD STRIP.AUTO-MCNC: 168 MG/DL (ref 65–100)
GLUCOSE BLD STRIP.AUTO-MCNC: 176 MG/DL (ref 65–100)
GLUCOSE SERPL-MCNC: 84 MG/DL (ref 65–100)
HCT VFR BLD AUTO: 27.6 % (ref 41.1–50.3)
HGB BLD-MCNC: 8.8 G/DL (ref 13.6–17.2)
INR PPP: 3.4 (ref 0.9–1.2)
MCH RBC QN AUTO: 31.5 PG (ref 26.1–32.9)
MCHC RBC AUTO-ENTMCNC: 31.9 G/DL (ref 31.4–35)
MCV RBC AUTO: 98.8 FL (ref 79.6–97.8)
PLATELET # BLD AUTO: 52 K/UL (ref 150–450)
PMV BLD AUTO: 11.7 FL (ref 10.8–14.1)
POTASSIUM SERPL-SCNC: 3.9 MMOL/L (ref 3.5–5.1)
PROTHROMBIN TIME: 37.6 SEC (ref 9.6–12)
RBC # BLD AUTO: 2.79 M/UL (ref 4.23–5.67)
SODIUM SERPL-SCNC: 141 MMOL/L (ref 136–145)
WBC # BLD AUTO: 8.6 K/UL (ref 4.3–11.1)

## 2017-09-20 PROCEDURE — 74011250637 HC RX REV CODE- 250/637: Performed by: PHYSICAL MEDICINE & REHABILITATION

## 2017-09-20 PROCEDURE — 74011636637 HC RX REV CODE- 636/637: Performed by: PHYSICAL MEDICINE & REHABILITATION

## 2017-09-20 PROCEDURE — 80162 ASSAY OF DIGOXIN TOTAL: CPT | Performed by: INTERNAL MEDICINE

## 2017-09-20 PROCEDURE — 85610 PROTHROMBIN TIME: CPT | Performed by: PHYSICAL MEDICINE & REHABILITATION

## 2017-09-20 PROCEDURE — 36592 COLLECT BLOOD FROM PICC: CPT

## 2017-09-20 PROCEDURE — 74011000250 HC RX REV CODE- 250: Performed by: PHYSICAL MEDICINE & REHABILITATION

## 2017-09-20 PROCEDURE — 94660 CPAP INITIATION&MGMT: CPT

## 2017-09-20 PROCEDURE — 94760 N-INVAS EAR/PLS OXIMETRY 1: CPT

## 2017-09-20 PROCEDURE — 74011250636 HC RX REV CODE- 250/636: Performed by: PHYSICAL MEDICINE & REHABILITATION

## 2017-09-20 PROCEDURE — 65310000000 HC RM PRIVATE REHAB

## 2017-09-20 PROCEDURE — 82962 GLUCOSE BLOOD TEST: CPT

## 2017-09-20 PROCEDURE — 71010 XR CHEST SNGL V: CPT

## 2017-09-20 PROCEDURE — 94640 AIRWAY INHALATION TREATMENT: CPT

## 2017-09-20 PROCEDURE — 77030011256 HC DRSG MEPILEX <16IN NO BORD MOLN -A

## 2017-09-20 PROCEDURE — 80048 BASIC METABOLIC PNL TOTAL CA: CPT | Performed by: PHYSICAL MEDICINE & REHABILITATION

## 2017-09-20 PROCEDURE — 74011250637 HC RX REV CODE- 250/637: Performed by: INTERNAL MEDICINE

## 2017-09-20 PROCEDURE — 85027 COMPLETE CBC AUTOMATED: CPT | Performed by: PHYSICAL MEDICINE & REHABILITATION

## 2017-09-20 RX ORDER — AMIODARONE HYDROCHLORIDE 200 MG/1
200 TABLET ORAL 2 TIMES DAILY
Status: DISCONTINUED | OUTPATIENT
Start: 2017-09-20 | End: 2017-09-22 | Stop reason: HOSPADM

## 2017-09-20 RX ORDER — METOPROLOL SUCCINATE 100 MG/1
100 TABLET, EXTENDED RELEASE ORAL 2 TIMES DAILY
Status: DISCONTINUED | OUTPATIENT
Start: 2017-09-20 | End: 2017-09-22 | Stop reason: HOSPADM

## 2017-09-20 RX ORDER — FAMOTIDINE 20 MG/1
20 TABLET, FILM COATED ORAL 2 TIMES DAILY
Status: DISCONTINUED | OUTPATIENT
Start: 2017-09-20 | End: 2017-09-22 | Stop reason: HOSPADM

## 2017-09-20 RX ADMIN — BUDESONIDE 500 MCG: 0.5 INHALANT RESPIRATORY (INHALATION) at 17:18

## 2017-09-20 RX ADMIN — Medication 600 UNITS: at 14:03

## 2017-09-20 RX ADMIN — GUAIFENESIN 1200 MG: 600 TABLET, EXTENDED RELEASE ORAL at 17:21

## 2017-09-20 RX ADMIN — INSULIN LISPRO 2 UNITS: 100 INJECTION, SOLUTION INTRAVENOUS; SUBCUTANEOUS at 11:49

## 2017-09-20 RX ADMIN — TIOTROPIUM BROMIDE 18 MCG: 18 CAPSULE ORAL; RESPIRATORY (INHALATION) at 06:00

## 2017-09-20 RX ADMIN — GABAPENTIN 600 MG: 300 CAPSULE ORAL at 14:03

## 2017-09-20 RX ADMIN — BUDESONIDE 500 MCG: 0.5 INHALANT RESPIRATORY (INHALATION) at 05:50

## 2017-09-20 RX ADMIN — Medication 20 ML: at 14:03

## 2017-09-20 RX ADMIN — INSULIN LISPRO 2 UNITS: 100 INJECTION, SOLUTION INTRAVENOUS; SUBCUTANEOUS at 17:20

## 2017-09-20 RX ADMIN — AMIODARONE HYDROCHLORIDE 200 MG: 200 TABLET ORAL at 08:04

## 2017-09-20 RX ADMIN — DILTIAZEM HYDROCHLORIDE 180 MG: 180 CAPSULE, EXTENDED RELEASE ORAL at 08:04

## 2017-09-20 RX ADMIN — GUAIFENESIN 1200 MG: 600 TABLET, EXTENDED RELEASE ORAL at 08:05

## 2017-09-20 RX ADMIN — Medication 600 UNITS: at 20:56

## 2017-09-20 RX ADMIN — FAMOTIDINE 20 MG: 10 INJECTION, SOLUTION INTRAVENOUS at 08:06

## 2017-09-20 RX ADMIN — FERROUS SULFATE TAB 325 MG (65 MG ELEMENTAL FE) 325 MG: 325 (65 FE) TAB at 08:04

## 2017-09-20 RX ADMIN — FUROSEMIDE 20 MG: 20 TABLET ORAL at 08:05

## 2017-09-20 RX ADMIN — DIGOXIN 0.25 MG: 250 TABLET ORAL at 08:05

## 2017-09-20 RX ADMIN — ALBUTEROL SULFATE 2.5 MG: 2.5 SOLUTION RESPIRATORY (INHALATION) at 17:18

## 2017-09-20 RX ADMIN — METOPROLOL SUCCINATE 100 MG: 100 TABLET, EXTENDED RELEASE ORAL at 20:58

## 2017-09-20 RX ADMIN — AMIODARONE HYDROCHLORIDE 200 MG: 200 TABLET ORAL at 20:58

## 2017-09-20 RX ADMIN — Medication 20 ML: at 20:57

## 2017-09-20 RX ADMIN — POTASSIUM CHLORIDE 20 MEQ: 20 TABLET, EXTENDED RELEASE ORAL at 08:06

## 2017-09-20 RX ADMIN — TRAZODONE HYDROCHLORIDE 50 MG: 50 TABLET ORAL at 20:58

## 2017-09-20 RX ADMIN — FAMOTIDINE 20 MG: 20 TABLET ORAL at 17:21

## 2017-09-20 RX ADMIN — INSULIN LISPRO 2 UNITS: 100 INJECTION, SOLUTION INTRAVENOUS; SUBCUTANEOUS at 21:10

## 2017-09-20 RX ADMIN — Medication 600 UNITS: at 05:00

## 2017-09-20 RX ADMIN — ALBUTEROL SULFATE 2.5 MG: 2.5 SOLUTION RESPIRATORY (INHALATION) at 05:51

## 2017-09-20 RX ADMIN — METOPROLOL SUCCINATE 100 MG: 100 TABLET, EXTENDED RELEASE ORAL at 08:05

## 2017-09-20 RX ADMIN — Medication 20 ML: at 06:00

## 2017-09-20 RX ADMIN — GABAPENTIN 600 MG: 300 CAPSULE ORAL at 20:58

## 2017-09-20 RX ADMIN — PREDNISONE 30 MG: 20 TABLET ORAL at 08:05

## 2017-09-20 NOTE — PROGRESS NOTES
Problem: Falls - Risk of  Goal: *Absence of Falls  Document Lauren Fall Risk and appropriate interventions in the flowsheet.    Outcome: Progressing Towards Goal  Fall Risk Interventions:  Mobility Interventions: Patient to call before getting OOB, Utilize walker, cane, or other assitive device     Mentation Interventions: Door open when patient unattended     Medication Interventions: Patient to call before getting OOB     Elimination Interventions: Call light in reach, Patient to call for help with toileting needs, Toileting schedule/hourly rounds     History of Falls Interventions: Door open when patient unattended

## 2017-09-20 NOTE — PROGRESS NOTES
Tami Ferguson MD,   Medical Director  [address] [facility], [address] [facility]  Tel: [phone]       [facility] PROGRESS NOTE    Clover Burger  Admit Date: 9/13/2017  Admit Diagnosis: Cardiac Debility; Respiratory failure (HCC)    Subjective     Anxious. Feels like he doesn't know what is going on. Explained plan for cardioversion/ablation +/- pacer but INR remains too elevated to do procedure. Reports he feels fluttering in chest. Getting SOB with minimal activities.  No therapies yesterday due to medical hold     Objective:     Current Facility-Administered Medications   Medication Dose Route Frequency    amiodarone (CORDARONE) tablet 200 mg  200 mg Oral BID    metoprolol succinate (TOPROL-XL) tablet 100 mg  100 mg Oral BID    warfarin (COUMADIN) tablet-ON HOLD  6 mg Oral See Admin Instructions    albuterol CONCENTRATE 2.5mg/0.5 mL neb soln  2.5 mg Nebulization BID    And    budesonide (PULMICORT) 500 mcg/2 ml nebulizer suspension  500 mcg Nebulization BID RT    furosemide (LASIX) tablet 20 mg  20 mg Oral DAILY    potassium chloride (K-DUR, KLOR-CON) SR tablet 20 mEq  20 mEq Oral DAILY    dilTIAZem CD (CARDIZEM CD) capsule 180 mg  180 mg Oral DAILY    0.9% sodium chloride infusion 250 mL  250 mL IntraVENous PRN    0.9% sodium chloride infusion 250 mL  250 mL IntraVENous PRN    nitroglycerin (NITROSTAT) tablet 0.4 mg  0.4 mg SubLINGual Q5MIN PRN    ondansetron (ZOFRAN) injection 4 mg  4 mg IntraVENous Q4H PRN    sodium chloride (NS) flush 5-10 mL  5-10 mL IntraVENous PRN    NUTRITIONAL SUPPORT ELECTROLYTE PRN ORDERS   Does Not Apply PRN    acetaminophen (TYLENOL) suppository 650 mg  650 mg Rectal Q4H PRN    acetaminophen (TYLENOL) tablet 650 mg  650 mg Oral Q4H PRN    albuterol (PROVENTIL HFA, VENTOLIN HFA, PROAIR HFA) inhaler 2 Puff  2 Puff Inhalation Q4H PRN    albuterol (PROVENTIL VENTOLIN) nebulizer solution 2.5 mg  2.5 mg Nebulization Q4H PRN  dextrose (D50W) injection syrg 25 g  25 g IntraVENous PRN    digoxin (LANOXIN) tablet 0.25 mg  0.25 mg Oral DAILY    famotidine (PF) (PEPCID) 20 mg in sodium chloride 0.9 % 10 mL injection  20 mg IntraVENous DAILY    ferrous sulfate tablet 325 mg  1 Tab Oral DAILY WITH BREAKFAST    gabapentin (NEURONTIN) capsule 600 mg  600 mg Oral TID    guaiFENesin ER (MUCINEX) tablet 1,200 mg  1,200 mg Oral BID    heparin (porcine) pf 600 Units  600 Units InterCATHeter Q8H    heparin (porcine) pf 600 Units  600 Units InterCATHeter PRN    insulin lispro (HUMALOG) injection   SubCUTAneous AC&HS    povidone-iodine (BETADINE) 10 % topical solution   Topical DAILY    [START ON 9/29/2017] predniSONE (DELTASONE) tablet 10 mg  10 mg Oral DAILY WITH BREAKFAST    [START ON 9/22/2017] predniSONE (DELTASONE) tablet 20 mg  20 mg Oral DAILY WITH BREAKFAST    predniSONE (DELTASONE) tablet 30 mg  30 mg Oral DAILY WITH BREAKFAST    sodium chloride (NS) flush 20 mL  20 mL InterCATHeter Q8H    tiotropium (SPIRIVA) inhalation capsule 18 mcg  1 Cap Inhalation DAILY    traZODone (DESYREL) tablet 50 mg  50 mg Oral QHS PRN    influenza vaccine 2015-16 (36mos+)(PF) (FLUZONE/FLUARIX QUAD) injection 0.5 mL  0.5 mL IntraMUSCular PRIOR TO DISCHARGE     Review of Systems:Denies chest pain, +shortness of breath with minimal exertion with fatigue, Neg;cough, headache, visual problems, abdominal pain, dysurea, calf pain. Pertinent positives are as noted in the medical records and unremarkable otherwise. Visit Vitals    /72 (BP 1 Location: Left arm, BP Patient Position: At rest)    Pulse (!) 102    Temp 97.9 °F (36.6 °C)    Resp 18    SpO2 97%        Physical Exam:   General: Alert and age appropriately oriented. No acute cardio respiratory distress. HEENT: Normocephalic,no scleral icterus  Oral mucosa moist without cyanosis; wound on bridge of nose healing; c/d/i   Lungs: Bibasilar crackles  bilaterally.   Respiration even and unlabored   Heart: Irreg, rate 142 on exam  No  murmurs, clicks, rub or gallops   Abdomen: Soft, non-tender, nondistended. Bowel sounds present. No organomegaly. Genitourinary: deferred   Neuromuscular:      Speech clear, no inattn/neglect, no pronator drift  Generalized weakness throughout, prox> distal weakness  No sensory deficits distally. fcs   Skin/extremity: No rashes, no erythema. No calf tenderness BLE  1+ edema anna                                                                            Functional Assessment:          Balance  Sitting - Static: Good (unsupported) (09/16/17 1300)  Sitting - Dynamic: Good (unsupported) (09/16/17 1300)  Standing - Static: Fair;Constant support (with RW) (09/16/17 1300)  Standing - Dynamic : Impaired (09/16/17 1300)           Toileting  Adaptive Equipment: Grab bars; Other (comment) (Wheelchair) (09/18/17 1012)         Toni Lopes Fall Risk Assessment:  Toni Lopes Fall Risk  Mobility: Ambulates or transfers with assist devices or assistance/unsteady gait (09/20/17 0725)  Mobility Interventions: Patient to call before getting OOB;Utilize walker, cane, or other assitive device (09/20/17 0725)  Mentation: Alert, oriented x 3 (09/20/17 0725)  Mentation Interventions: Door open when patient unattended (09/20/17 0725)  Medication: Patient receiving anticonvulsants, sedatives(tranquilizers), psychotropics or hypnotics, hypoglycemics, narcotics, sleep aids, antihypertensives, laxatives, or diuretics (09/20/17 0725)  Medication Interventions: Patient to call before getting OOB (09/20/17 0725)  Elimination: Needs assistance with toileting (09/20/17 0725)  Elimination Interventions: Call light in reach; Patient to call for help with toileting needs; Toileting schedule/hourly rounds (09/20/17 0725)  Prior Fall History: During admission (Date - Comment) (09/20/17 0725)  History of Falls Interventions: Door open when patient unattended (09/20/17 0725)  Total Score: 5 (09/20/17 0725)  Standard Fall Precautions: Yes (09/17/17 1109)  High Fall Risk: Yes (09/20/17 0725)     Speech Assessment:  Aspiration Signs/Symptoms: None (09/14/17 1036)      Ambulation:  Gait  Distance (ft): 30 Feet (ft) (09/16/17 1300)  Assistive Device: Walker, rolling;Gait belt (09/16/17 1300)     Labs/Studies:  Recent Results (from the past 72 hour(s))   GLUCOSE, POC    Collection Time: 09/17/17 11:07 AM   Result Value Ref Range    Glucose (POC) 286 (H) 65 - 100 mg/dL   EKG, 12 LEAD, SUBSEQUENT    Collection Time: 09/17/17 11:25 AM   Result Value Ref Range    Ventricular Rate 116 BPM    Atrial Rate 97 BPM    QRS Duration 174 ms    Q-T Interval 380 ms    QTC Calculation (Bezet) 528 ms    Calculated R Axis 130 degrees    Calculated T Axis -53 degrees    Diagnosis       Atrial fibrillation with rapid ventricular response  Right bundle branch block  Left posterior fascicular block  !!!  Bifascicular block !!!  T wave abnormality, consider lateral ischemia or digitalis effect  Abnormal ECG  When compared with ECG of 16-SEP-2017 10:25,  Inverted T waves have replaced nonspecific T wave abnormality in Lateral   leads  Confirmed by Jacqueline Hashimoto ([id number]) on 9/17/2017 5:51:29 PM     CULTURE, BLOOD    Collection Time: 09/17/17 12:42 PM   Result Value Ref Range    Special Requests: LEFT  Antecubital        Culture result: NO GROWTH 3 DAYS     GLUCOSE, POC    Collection Time: 09/17/17  4:26 PM   Result Value Ref Range    Glucose (POC) 123 (H) 65 - 100 mg/dL   GLUCOSE, POC    Collection Time: 09/17/17  8:45 PM   Result Value Ref Range    Glucose (POC) 160 (H) 65 - 100 mg/dL   PROTHROMBIN TIME + INR    Collection Time: 09/18/17  4:00 AM   Result Value Ref Range    Prothrombin time 26.1 (H) 9.6 - 12.0 sec    INR 2.4 (H) 0.9 - 1.2     GLUCOSE, POC    Collection Time: 09/18/17  7:07 AM   Result Value Ref Range    Glucose (POC) 112 (H) 65 - 100 mg/dL   GLUCOSE, POC    Collection Time: 09/18/17 11:44 AM   Result Value Ref Range Glucose (POC) 177 (H) 65 - 100 mg/dL   GLUCOSE, POC    Collection Time: 09/18/17  4:20 PM   Result Value Ref Range    Glucose (POC) 202 (H) 65 - 100 mg/dL   GLUCOSE, POC    Collection Time: 09/18/17  8:31 PM   Result Value Ref Range    Glucose (POC) 168 (H) 65 - 100 mg/dL   PROTHROMBIN TIME + INR    Collection Time: 09/19/17  5:29 AM   Result Value Ref Range    Prothrombin time 35.1 (H) 9.6 - 12.0 sec    INR 3.2 (H) 0.9 - 1.2     CBC W/O DIFF    Collection Time: 09/19/17  5:29 AM   Result Value Ref Range    WBC 7.3 4.3 - 11.1 K/uL    RBC 2.92 (L) 4.23 - 5.67 M/uL    HGB 9.1 (L) 13.6 - 17.2 g/dL    HCT 28.2 (L) 41.1 - 50.3 %    MCV 96.6 79.6 - 97.8 FL    MCH 31.2 26.1 - 32.9 PG    MCHC 32.3 31.4 - 35.0 g/dL    RDW 19.5 (H) 11.9 - 14.6 %    PLATELET 68 (L) 190 - 450 K/uL    MPV Cannot be calculated 10.8 - 14.1 FL   GLUCOSE, POC    Collection Time: 09/19/17  5:58 AM   Result Value Ref Range    Glucose (POC) 84 65 - 100 mg/dL   EKG, 12 LEAD, INITIAL    Collection Time: 09/19/17  7:32 AM   Result Value Ref Range    Ventricular Rate 137 BPM    Atrial Rate 66 BPM    QRS Duration 166 ms    Q-T Interval 352 ms    QTC Calculation (Bezet) 531 ms    Calculated R Axis 137 degrees    Calculated T Axis -47 degrees    Diagnosis       atrial fib flutter with RVR   Right bundle branch block  Left posterior fascicular block  !!!  Bifascicular block !!!  T wave abnormality, consider inferior ischemia  Abnormal ECG  When compared with ECG of 17-SEP-2017 11:25,  T wave inversion no longer evident in Lateral leads  Confirmed by GLO KWOK (), [place] Monday ([id number]) on 9/19/2017 10:07:18 AM     MRSA SCREEN - PCR (NASAL)    Collection Time: 09/19/17  9:45 AM   Result Value Ref Range    Special Requests: NO SPECIAL REQUESTS      Culture result:        MRSA target DNA is not detected (presumptive not colonized with MRSA)   GLUCOSE, POC    Collection Time: 09/19/17 10:22 AM   Result Value Ref Range    Glucose (POC) 87 65 - 100 mg/dL   GLUCOSE, POC Collection Time: 09/19/17 11:15 AM   Result Value Ref Range    Glucose (POC) 163 (H) 65 - 100 mg/dL   GLUCOSE, POC    Collection Time: 09/19/17  4:31 PM   Result Value Ref Range    Glucose (POC) 200 (H) 65 - 100 mg/dL   GLUCOSE, POC    Collection Time: 09/19/17  8:37 PM   Result Value Ref Range    Glucose (POC) 268 (H) 65 - 100 mg/dL   PROTHROMBIN TIME + INR    Collection Time: 09/20/17  5:19 AM   Result Value Ref Range    Prothrombin time 37.6 (H) 9.6 - 12.0 sec    INR 3.4 (H) 0.9 - 1.2     CBC W/O DIFF    Collection Time: 09/20/17  5:19 AM   Result Value Ref Range    WBC 8.6 4.3 - 11.1 K/uL    RBC 2.79 (L) 4.23 - 5.67 M/uL    HGB 8.8 (L) 13.6 - 17.2 g/dL    HCT 27.6 (L) 41.1 - 50.3 %    MCV 98.8 (H) 79.6 - 97.8 FL    MCH 31.5 26.1 - 32.9 PG    MCHC 31.9 31.4 - 35.0 g/dL    RDW 20.7 (H) 11.9 - 14.6 %    PLATELET 52 (L) 024 - 450 K/uL    MPV 11.7 10.8 - 38.0 FL   METABOLIC PANEL, BASIC    Collection Time: 09/20/17  5:19 AM   Result Value Ref Range    Sodium 141 136 - 145 mmol/L    Potassium 3.9 3.5 - 5.1 mmol/L    Chloride 103 98 - 107 mmol/L    CO2 34 (H) 21 - 32 mmol/L    Anion gap 4 (L) 7 - 16 mmol/L    Glucose 84 65 - 100 mg/dL    BUN 19 8 - 23 MG/DL    Creatinine 0.90 0.8 - 1.5 MG/DL    GFR est AA >60 >60 ml/min/1.73m2    GFR est non-AA >60 >60 ml/min/1.73m2    Calcium 8.2 (L) 8.3 - 10.4 MG/DL   GLUCOSE, POC    Collection Time: 09/20/17  5:55 AM   Result Value Ref Range    Glucose (POC) 101 (H) 65 - 100 mg/dL       Assessment:     Problem List as of 9/20/2017  Date Reviewed: 9/9/2017          Codes Class Noted - Resolved    Respiratory failure ([facility] [address] ICD-10-CM: J96.90  ICD-9-CM: 518.81  9/13/2017 - Present        MRSA nasal colonization ICD-10-CM: Z22.322  ICD-9-CM: V02.54  9/8/2017 - Present        COPD (chronic obstructive pulmonary disease) (HCC) (Chronic) ICD-10-CM: J44.9  ICD-9-CM: 496  8/30/2017 - Present        Acute respiratory failure with hypercapnia (HCC) ICD-10-CM: J96.02  ICD-9-CM: 518.81  8/29/2017 - Present        History of tobacco use (Chronic) ICD-10-CM: S74.353  ICD-9-CM: V15.82  8/29/2017 - Present        Hyponatremia ICD-10-CM: E87.1  ICD-9-CM: 276.1  8/29/2017 - Present        Acute encephalopathy ICD-10-CM: G93.40  ICD-9-CM: 348.30  8/29/2017 - Present        Thrombocytopenia ([facility] [address] ICD-10-CM: D69.6  ICD-9-CM: 287.5  8/26/2017 - Present        Anticoagulant long-term use (Chronic) ICD-10-CM: Z79.01  ICD-9-CM: V58.61  8/23/2017 - Present        HTN (hypertension), benign (Chronic) ICD-10-CM: I10  ICD-9-CM: 401.1  8/23/2017 - Present        Peripheral vascular disease ([facility] [address] (Chronic) ICD-10-CM: I73.9  ICD-9-CM: 443.9  8/23/2017 - Present        Dyslipidemia (Chronic) ICD-10-CM: E78.5  ICD-9-CM: 272.4  8/23/2017 - Present        History of mechanical aortic valve replacement (Chronic) ICD-10-CM: Z95.2  ICD-9-CM: V43.3  8/23/2017 - Present    Overview Signed 8/30/2017 10:30 AM by Christal Goncalves NP     Placement 2000, on chronicCoumadin             Centrilobular emphysema ([facility] [address] (Chronic) ICD-10-CM: J43.2  ICD-9-CM: 492.8  8/23/2017 - Present    Overview Signed 9/8/2017  9:24 AM by Shaye Agarwal NP      With last PFTs FVC 1.70 L or 44% predicted, FEV1 0.86 L or 29% predicted, FEV1/FVC 51%. This study was a postbronchodilator study performed at the On license of [facility] on 8/22/2016                  Ischemic cardiomyopathy (Chronic) ICD-10-CM: I25.5  ICD-9-CM: 414.8  8/23/2017 - Present    Overview Signed 8/30/2017 10:29 AM by Christal Goncalves NP     8/23/17 ECHO:  EF 40 % to 45 %. There were no regional wall motion abnormalities. Moderate hypokinesis of the mid-apical anterior and apical wall(s).              Atrial flutter with rapid ventricular response (HCC) ICD-10-CM: I28.16  ICD-9-CM: 427.32  8/23/2017 - Present        LV dysfunction (Chronic) ICD-10-CM: I51.9  ICD-9-CM: 429.9  10/19/2016 - Present        Atherosclerosis of native arteries of the extremities with intermittent claudication (Chronic) ICD-10-CM: E35.973  ICD-9-CM: 440.21  4/15/2014 - Present          Assessment; Debility s/p resp failure, a flutter with RVR s/p cardioversion  PLAN  Continue daily physician medical management:      Atrial flutter with rapid ventricular response ([facility] [address] (8/23/2017) sp cardioversion - monitor HR./ BP. / hx of CHF / Ischemic cardiomyopathy     - Digoxin, coreg.  - continue anticoagulation. 9/20 has been on hold due to pending cardiac procedure; however INR now 3.4 from 3.2 9/19  - cardiac precautions.   - (9/16) -recurrent a.fib with RVR. HR 130s. 9/20 continues; metoprolol added 9/19; PT UNABLE TO PARTICIPATE IN THERAPIES. SHOULD BE TRANSFERRED TO TELEMETRY TO MANAGE CARDIAC ISSUES SO AS NOT TO USE OF ACUTE REHAB DAYS.    - restarted diuretics, as was taking at home. - CxR 9/16 very small bilateral pleural effusions are stable. - 9/19-  possible cardioversion, AVN ablation/pacemaker in am per Cardiology/EP    -9/20 HOLD proc due to elevated INR, cont to hold couadin       History of mechanical aortic valve replacement 2000, on coumadin/ Anticoagulant long-term use. Goal 2.5-3.5.  - resume warfarin, monitor INR.   - INR 2.4 (9/18) . average out home dose ~> 6mg hs.   - Warfarin 6mg HS (9/16)  - pharmacy assisting. On hold(9/19) for procedure.       Bacteremia - started on empiric antibiotic treatment. Aztreonam, vanco.   - BCx grew e.coli. discontinued vano. - final ID extended-spectrum beta-lactamases (ESBLs) , appears treated, though efficacy of vanco, aztreonam not optimal.  . Pt asymptomatic, WBC wnl. Will discontinue - aztreonam. .  - will monitor closely. - will have on contact precaution, infection control notified.   - BCx redrawn 9/17, no growth, will lift contact precautions 9/19.      HTN (hypertension) - monitor.  - continue Coreg/ digoxin          Thrombocytopenia/ ITP  - continued on steroids. - hematology following.   - HIT negative. - ivig per hematology direction.  Will receive 1 more dose. -  received platelet 1unit (8/02). plt 25k -> 75k (9/16)-> 65k (9/17) -> 68 (9/19) monitor; 9/20 plts 52; defer to hematology         COPD   - continue respiratory treatments. Presently q6h, wean as appropriate.    - resume spiriva, pulmicort  - SOB with activity. - normal CxR (9/15)  - will wean albuterol nebs 9/18. Bid and as needed. -9/20 SOB; sats 95% RA, bilateral crackles, increasing JIM; likely due to tachycardia; will f/u portable CXR; weaning steroids          iron deficiency anemia -hgb 8.5-> 8.7(9/15)-. 10.8(9/18) -> 9.3-> 9.1 (9/19) Monitor. Continue fe supplements.   -9/20 hgb 8.8; no evidence of acute bleed      Hyponatremia  - monitor.       Pneumonia prophylaxis- Insentive spirometer every hour while awake      DVT risk / DVT Prophylaxis- Will require daily physician exam to assess for signs and symptoms as patient is at increased risk for of thromboembolism. Mobilization as tolerated. Intermittent pneumatic compression devices when in bed Thigh-high or knee-high thromboembolic deterrent hose when out of bed.    - covered with warfarin. (inr supratherapeutic 9/20, coumadin on hold) no clinical evidence of DVT      Pain Control: stable, mild-to-moderate joint symptoms intermittently, reasonably well controlled by PRN meds. Will require regular pain assessment and comprenhensive pain management. - resume gabapentin.       Wound Care: Monitor wound status daily per staff and physician. At risk for failure. Will require 24/7 rehab nursing. Keep wound clean and dry  - excoriation at bridge of nose. Continue antibiotic ointment.       Diabetes mellitus - Uncontrolled. poor glycemic control. Will require daily, close FSG monitoring and medication adjustment to optimize glycemic control in setting of acute illness and hospitalization.   - SSI coverage with lispro. Poorly controlled , exacerbated by steroids        bowel program - colace as needed.     Gi prophylaxis; change pepcid to po 9/20             Time spent was 35 minutes with over 1/2 in direct patient care/examination, consultation and coordination of care.      Signed By: Isabelle Regalado MD     September 20, 2017

## 2017-09-20 NOTE — PROGRESS NOTES
[facility] CARDIOLOGY PROGRESS NOTE           9/20/2017 2:38 PM    Admit Date: 9/13/2017    Admit Diagnosis: Cardiac Debility; Respiratory failure (HCC)      Subjective:   Patient with ongoing A. Flutter and RVR. Objective:     Vitals:    09/19/17 1915 09/20/17 0550 09/20/17 0809 09/20/17 0919   BP: 116/72  100/69    Pulse: (!) 102  (!) 138 79   Resp: 18  18    Temp: 97.9 °F (36.6 °C)  98.5 °F (36.9 °C)    SpO2: 96% 97% 95%        Physical Exam:  General-Well Developed, Well Nourished, No Acute Distress, Alert & Oriented x 3, appropriate mood. Neck- supple, no JVD  CV- Tachycardia rate and rhythm no MRG  Lung- clear bilaterally  Abd- soft, nontender, nondistended  Ext- no edema bilaterally.   Skin- warm and dry    Current Facility-Administered Medications   Medication Dose Route Frequency    amiodarone (CORDARONE) tablet 200 mg  200 mg Oral BID    metoprolol succinate (TOPROL-XL) tablet 100 mg  100 mg Oral BID    famotidine (PEPCID) tablet 20 mg  20 mg Oral BID    warfarin (COUMADIN) tablet-ON HOLD  6 mg Oral See Admin Instructions    albuterol CONCENTRATE 2.5mg/0.5 mL neb soln  2.5 mg Nebulization BID    And    budesonide (PULMICORT) 500 mcg/2 ml nebulizer suspension  500 mcg Nebulization BID RT    furosemide (LASIX) tablet 20 mg  20 mg Oral DAILY    potassium chloride (K-DUR, KLOR-CON) SR tablet 20 mEq  20 mEq Oral DAILY    dilTIAZem CD (CARDIZEM CD) capsule 180 mg  180 mg Oral DAILY    0.9% sodium chloride infusion 250 mL  250 mL IntraVENous PRN    nitroglycerin (NITROSTAT) tablet 0.4 mg  0.4 mg SubLINGual Q5MIN PRN    ondansetron (ZOFRAN) injection 4 mg  4 mg IntraVENous Q4H PRN    sodium chloride (NS) flush 5-10 mL  5-10 mL IntraVENous PRN    NUTRITIONAL SUPPORT ELECTROLYTE PRN ORDERS   Does Not Apply PRN    acetaminophen (TYLENOL) suppository 650 mg  650 mg Rectal Q4H PRN    acetaminophen (TYLENOL) tablet 650 mg  650 mg Oral Q4H PRN    albuterol (PROVENTIL HFA, VENTOLIN HFA, PROAIR HFA) inhaler 2 Puff  2 Puff Inhalation Q4H PRN    albuterol (PROVENTIL VENTOLIN) nebulizer solution 2.5 mg  2.5 mg Nebulization Q4H PRN    dextrose (D50W) injection syrg 25 g  25 g IntraVENous PRN    digoxin (LANOXIN) tablet 0.25 mg  0.25 mg Oral DAILY    ferrous sulfate tablet 325 mg  1 Tab Oral DAILY WITH BREAKFAST    gabapentin (NEURONTIN) capsule 600 mg  600 mg Oral TID    guaiFENesin ER (MUCINEX) tablet 1,200 mg  1,200 mg Oral BID    heparin (porcine) pf 600 Units  600 Units InterCATHeter Q8H    heparin (porcine) pf 600 Units  600 Units InterCATHeter PRN    insulin lispro (HUMALOG) injection   SubCUTAneous AC&HS    povidone-iodine (BETADINE) 10 % topical solution   Topical DAILY    [START ON 9/29/2017] predniSONE (DELTASONE) tablet 10 mg  10 mg Oral DAILY WITH BREAKFAST    [START ON 9/22/2017] predniSONE (DELTASONE) tablet 20 mg  20 mg Oral DAILY WITH BREAKFAST    predniSONE (DELTASONE) tablet 30 mg  30 mg Oral DAILY WITH BREAKFAST    sodium chloride (NS) flush 20 mL  20 mL InterCATHeter Q8H    tiotropium (SPIRIVA) inhalation capsule 18 mcg  1 Cap Inhalation DAILY    traZODone (DESYREL) tablet 50 mg  50 mg Oral QHS PRN    influenza vaccine 2015-16 (36mos+)(PF) (FLUZONE/FLUARIX QUAD) injection 0.5 mL  0.5 mL IntraMUSCular PRIOR TO DISCHARGE     Data Review:   Recent Results (from the past 24 hour(s))   GLUCOSE, POC    Collection Time: 09/19/17  4:31 PM   Result Value Ref Range    Glucose (POC) 200 (H) 65 - 100 mg/dL   GLUCOSE, POC    Collection Time: 09/19/17  8:37 PM   Result Value Ref Range    Glucose (POC) 268 (H) 65 - 100 mg/dL   PROTHROMBIN TIME + INR    Collection Time: 09/20/17  5:19 AM   Result Value Ref Range    Prothrombin time 37.6 (H) 9.6 - 12.0 sec    INR 3.4 (H) 0.9 - 1.2     CBC W/O DIFF    Collection Time: 09/20/17  5:19 AM   Result Value Ref Range    WBC 8.6 4.3 - 11.1 K/uL    RBC 2.79 (L) 4.23 - 5.67 M/uL    HGB 8.8 (L) 13.6 - 17.2 g/dL    HCT 27.6 (L) 41.1 - 50.3 % MCV 98.8 (H) 79.6 - 97.8 FL    MCH 31.5 26.1 - 32.9 PG    MCHC 31.9 31.4 - 35.0 g/dL    RDW 20.7 (H) 11.9 - 14.6 %    PLATELET 52 (L) 086 - 450 K/uL    MPV 11.7 10.8 - 73.2 FL   METABOLIC PANEL, BASIC    Collection Time: 09/20/17  5:19 AM   Result Value Ref Range    Sodium 141 136 - 145 mmol/L    Potassium 3.9 3.5 - 5.1 mmol/L    Chloride 103 98 - 107 mmol/L    CO2 34 (H) 21 - 32 mmol/L    Anion gap 4 (L) 7 - 16 mmol/L    Glucose 84 65 - 100 mg/dL    BUN 19 8 - 23 MG/DL    Creatinine 0.90 0.8 - 1.5 MG/DL    GFR est AA >60 >60 ml/min/1.73m2    GFR est non-AA >60 >60 ml/min/1.73m2    Calcium 8.2 (L) 8.3 - 10.4 MG/DL   DIGOXIN    Collection Time: 09/20/17  5:19 AM   Result Value Ref Range    DIGOXIN 1.1 0.90 - 2.10 NG/ML   GLUCOSE, POC    Collection Time: 09/20/17  5:55 AM   Result Value Ref Range    Glucose (POC) 101 (H) 65 - 100 mg/dL   GLUCOSE, POC    Collection Time: 09/20/17 11:21 AM   Result Value Ref Range    Glucose (POC) 176 (H) 65 - 100 mg/dL     Assessment:     Active Problems:    Respiratory failure (HCC) (9/13/2017)      Plan:   1. A. Flutter - Patient was dx'ed with perm A. Fib, however, his atypical A. Flutter started in Aug 2017. Therefore, this is new onset of perst A. Fib/Flutter. He had a conversion on Aug but was unable maintain NSR for more then a few minutes. Patient UNABLE to have Ablation or Pacemaker secondary to thrombocytopenia and other medical issues. Will therefore reload with Amio and plan for CV as this is only option at this time. Ashish Vivar. Baljit Mir M.D., F.A.C.C, F.H.R.S.   Cardiology/Electrophysiology

## 2017-09-20 NOTE — PROGRESS NOTES
Speech therapy note  Attempted to see pt this am, however, pt on Hold. Spoke with MD. She is ok with SLP working if he will work with me. Pt refused, stating he doesn't feel like it.       Alexandro Hernandez MA/DIANA/SLP

## 2017-09-20 NOTE — PROGRESS NOTES
Warfarin dosing per pharmacist    Clover Burger is a 67 y.o. male. Indication: A. Fib    Goal INR:  2-3    Home dose:  5 mg daily, 7.5 mg on Wednesday    Risk factors or significant drug interactions:  Antibiotics, prednisone    Other anticoagulants:  none    Daily Monitoring  Date  INR     Warfarin dose HGB              Notes  9/8  1.2  10mg  11.2    9/9  1.3  10mg  9.4  9/10  ---  10mg  9.7  9/11  2.1  10mg  10.8  9/12  2.9  10mg  8.9  9/13  3.5  Hold  8.4  Pharmacy Consulted  9/14  3.6  Held  8.5  9/15  2.5  5 mg  8.7  9/16  1.9  6 mg  10.8  9/17  2.2  6 mg  9.3  9/18  2.4  6 mg  --  9/19  3.2  Hold  9.1  9/20  3.4  Hold  8.8    INR up to 3.4. Continue to hold warfarin. Plan to resume once INR begins to trend down. Daily INR. Pharmacy will continue to follow. Please call with any questions.     Thank you,  Gala Stubbs, PharmD  Clinical Pharmacist  [phone]

## 2017-09-21 LAB
ERYTHROCYTE [DISTWIDTH] IN BLOOD BY AUTOMATED COUNT: 21.9 % (ref 11.9–14.6)
GLUCOSE BLD STRIP.AUTO-MCNC: 114 MG/DL (ref 65–100)
GLUCOSE BLD STRIP.AUTO-MCNC: 139 MG/DL (ref 65–100)
GLUCOSE BLD STRIP.AUTO-MCNC: 353 MG/DL (ref 65–100)
GLUCOSE BLD STRIP.AUTO-MCNC: 93 MG/DL (ref 65–100)
HCT VFR BLD AUTO: 25.5 % (ref 41.1–50.3)
HGB BLD-MCNC: 8.1 G/DL (ref 13.6–17.2)
INR PPP: 2.3 (ref 0.9–1.2)
MCH RBC QN AUTO: 32 PG (ref 26.1–32.9)
MCHC RBC AUTO-ENTMCNC: 31.9 G/DL (ref 31.4–35)
MCV RBC AUTO: 100.4 FL (ref 79.6–97.8)
PLATELET # BLD AUTO: 36 K/UL (ref 150–450)
PMV BLD AUTO: 11.3 FL (ref 10.8–14.1)
PROTHROMBIN TIME: 24.8 SEC (ref 9.6–12)
RBC # BLD AUTO: 2.54 M/UL (ref 4.23–5.67)
WBC # BLD AUTO: 3.9 K/UL (ref 4.3–11.1)

## 2017-09-21 PROCEDURE — 97110 THERAPEUTIC EXERCISES: CPT

## 2017-09-21 PROCEDURE — 74011250637 HC RX REV CODE- 250/637: Performed by: INTERNAL MEDICINE

## 2017-09-21 PROCEDURE — 94760 N-INVAS EAR/PLS OXIMETRY 1: CPT

## 2017-09-21 PROCEDURE — 74011636637 HC RX REV CODE- 636/637: Performed by: PHYSICAL MEDICINE & REHABILITATION

## 2017-09-21 PROCEDURE — 85027 COMPLETE CBC AUTOMATED: CPT | Performed by: PHYSICAL MEDICINE & REHABILITATION

## 2017-09-21 PROCEDURE — 99232 SBSQ HOSP IP/OBS MODERATE 35: CPT | Performed by: PHYSICAL MEDICINE & REHABILITATION

## 2017-09-21 PROCEDURE — 94640 AIRWAY INHALATION TREATMENT: CPT

## 2017-09-21 PROCEDURE — 65310000000 HC RM PRIVATE REHAB

## 2017-09-21 PROCEDURE — 74011250637 HC RX REV CODE- 250/637: Performed by: PHYSICAL MEDICINE & REHABILITATION

## 2017-09-21 PROCEDURE — 92507 TX SP LANG VOICE COMM INDIV: CPT

## 2017-09-21 PROCEDURE — 74011000250 HC RX REV CODE- 250: Performed by: PHYSICAL MEDICINE & REHABILITATION

## 2017-09-21 PROCEDURE — 74011250636 HC RX REV CODE- 250/636: Performed by: PHYSICAL MEDICINE & REHABILITATION

## 2017-09-21 PROCEDURE — 77030011256 HC DRSG MEPILEX <16IN NO BORD MOLN -A

## 2017-09-21 PROCEDURE — 94660 CPAP INITIATION&MGMT: CPT

## 2017-09-21 PROCEDURE — 82962 GLUCOSE BLOOD TEST: CPT

## 2017-09-21 PROCEDURE — 85610 PROTHROMBIN TIME: CPT | Performed by: PHYSICAL MEDICINE & REHABILITATION

## 2017-09-21 PROCEDURE — 97535 SELF CARE MNGMENT TRAINING: CPT

## 2017-09-21 RX ORDER — WARFARIN SODIUM 5 MG/1
5 TABLET ORAL EVERY EVENING
Status: DISCONTINUED | OUTPATIENT
Start: 2017-09-21 | End: 2017-09-22 | Stop reason: HOSPADM

## 2017-09-21 RX ADMIN — BUDESONIDE 500 MCG: 0.5 INHALANT RESPIRATORY (INHALATION) at 17:52

## 2017-09-21 RX ADMIN — Medication 600 UNITS: at 14:06

## 2017-09-21 RX ADMIN — GUAIFENESIN 1200 MG: 600 TABLET, EXTENDED RELEASE ORAL at 08:20

## 2017-09-21 RX ADMIN — FAMOTIDINE 20 MG: 20 TABLET ORAL at 17:10

## 2017-09-21 RX ADMIN — FUROSEMIDE 20 MG: 20 TABLET ORAL at 08:20

## 2017-09-21 RX ADMIN — Medication 600 UNITS: at 05:39

## 2017-09-21 RX ADMIN — AMIODARONE HYDROCHLORIDE 200 MG: 200 TABLET ORAL at 21:25

## 2017-09-21 RX ADMIN — GABAPENTIN 600 MG: 300 CAPSULE ORAL at 21:23

## 2017-09-21 RX ADMIN — DIGOXIN 0.25 MG: 250 TABLET ORAL at 08:20

## 2017-09-21 RX ADMIN — GABAPENTIN 600 MG: 300 CAPSULE ORAL at 14:05

## 2017-09-21 RX ADMIN — DILTIAZEM HYDROCHLORIDE 180 MG: 180 CAPSULE, EXTENDED RELEASE ORAL at 08:20

## 2017-09-21 RX ADMIN — ALBUTEROL SULFATE 2.5 MG: 2.5 SOLUTION RESPIRATORY (INHALATION) at 05:23

## 2017-09-21 RX ADMIN — ALBUTEROL SULFATE 2.5 MG: 2.5 SOLUTION RESPIRATORY (INHALATION) at 17:52

## 2017-09-21 RX ADMIN — GUAIFENESIN 1200 MG: 600 TABLET, EXTENDED RELEASE ORAL at 17:09

## 2017-09-21 RX ADMIN — METOPROLOL SUCCINATE 100 MG: 100 TABLET, EXTENDED RELEASE ORAL at 08:20

## 2017-09-21 RX ADMIN — FERROUS SULFATE TAB 325 MG (65 MG ELEMENTAL FE) 325 MG: 325 (65 FE) TAB at 08:20

## 2017-09-21 RX ADMIN — Medication 600 UNITS: at 21:25

## 2017-09-21 RX ADMIN — AMIODARONE HYDROCHLORIDE 200 MG: 200 TABLET ORAL at 08:20

## 2017-09-21 RX ADMIN — BUDESONIDE 500 MCG: 0.5 INHALANT RESPIRATORY (INHALATION) at 05:23

## 2017-09-21 RX ADMIN — Medication 20 ML: at 14:06

## 2017-09-21 RX ADMIN — METOPROLOL SUCCINATE 100 MG: 100 TABLET, EXTENDED RELEASE ORAL at 21:23

## 2017-09-21 RX ADMIN — GABAPENTIN 600 MG: 300 CAPSULE ORAL at 05:39

## 2017-09-21 RX ADMIN — Medication 20 ML: at 21:25

## 2017-09-21 RX ADMIN — TIOTROPIUM BROMIDE 18 MCG: 18 CAPSULE ORAL; RESPIRATORY (INHALATION) at 05:27

## 2017-09-21 RX ADMIN — POTASSIUM CHLORIDE 20 MEQ: 20 TABLET, EXTENDED RELEASE ORAL at 08:20

## 2017-09-21 RX ADMIN — INSULIN LISPRO 10 UNITS: 100 INJECTION, SOLUTION INTRAVENOUS; SUBCUTANEOUS at 21:26

## 2017-09-21 RX ADMIN — WARFARIN SODIUM 5 MG: 5 TABLET ORAL at 17:09

## 2017-09-21 RX ADMIN — FAMOTIDINE 20 MG: 20 TABLET ORAL at 08:20

## 2017-09-21 RX ADMIN — Medication 20 ML: at 05:39

## 2017-09-21 RX ADMIN — PREDNISONE 30 MG: 20 TABLET ORAL at 08:20

## 2017-09-21 NOTE — PROGRESS NOTES
PHYSICAL THERAPY DAILY NOTE  Time In: 1030  Time Out: 1102  Patient Seen For: AM;Patient education; Therapeutic exercise    Subjective: \"I don't feel too bad today. \" Patient agreeable to therapy. Objective: Vital Signs:   Patient Vitals for the past 8 hrs:   Temp Pulse Resp BP SpO2   09/21/17 0709 97.5 °F (36.4 °C) 87 18 114/53 95 %   09/21/17 0527 - - - - 98 %           Pain level: No pain reported. Pain location: n/a  Pain interventions: n/a    Patient education: Educated patient on AROM to be performed in bed. Interdisciplinary Communication: Communicated with Blaine Saleh RN regarding patient's fatigue and schedule. Contact (MRSA)  GROSS ASSESSMENT Daily Assessment            BED/MAT MOBILITY Daily Assessment   Patient rolled to each side to place sharona sheet under patient to slide patient up in bed to prevent feet from hitting foot board. Rolling Right : 4 (Minimal assistance)  Rolling Left : 4 (Minimal assistance)  Supine to Sit : 0 (Not tested)  Sit to Supine : 0 (Not tested)       LOWER EXTREMITY EXERCISES Daily Assessment    Extremity: Both  Exercise Type #1: Supine lower extremity strengthening  Sets Performed: 2  Reps Performed: 10  Level of Assist: Stand-by assistance     Patient performed the following B LE exercises:  SUPINE EXERCISES Sets Reps Comments   Ankle Pumps 2 10    Quad Sets 2 10    Glut Sets 2 10    Heel Slides 2 10    Hip Abduction 2 10    Straight Leg Raise 2 10      Patient left lying supine in bed with head of bed elevated, positioned for comfort and all needs in reach. Assessment: Patient demonstrated improved endurance this AM with therapeutic exercise. Patient will benefit from further therapy to improve bed mobility and increase endurance and functional strength for transfer training. Plan of Care: Continue with POC and progress as tolerated.        [facility]  9/21/2017

## 2017-09-21 NOTE — PROGRESS NOTES
Pt  given trazodone for sleep, pt wore bi pap during night pt tolerated well, pt denies any pain, pt voids continently, pt has a fib, a flutter, telemetry intact, pulse varies from 37, 38, ( lowest) to 98 (highest) , pt denies dizziness or sob, no chest pain noted per pt,

## 2017-09-21 NOTE — PROGRESS NOTES
9/21/2017 PT : Patient declined therapy. Upon entering room patient supine in bed awaiting his procedure. He voiced how mad and upset he was for not having it yet and how hungry he is. Notified his nurse Ninfa Randolph of how patient was feeling .  Will check on for therapy in AM.   Mulu Mandel, PTA

## 2017-09-21 NOTE — PROGRESS NOTES
90 Minutes OT treatment time missed today as patient continues on medical hold per MD.    Chiquita Cook OT  9/21/2017

## 2017-09-21 NOTE — PROGRESS NOTES
Patient was not seen by physical therapy on 9/20/17 due to being placed on medical hold. PT will continue to follow and attempt to see patient when is off medical hold.     Hailey Huerta, PT, DPT

## 2017-09-21 NOTE — PROGRESS NOTES
09/21/17 1425   Time Spent With Patient   Time In 1355   Time Out 1423   Patient Seen For: PM ;Neuro-linguistics   Mental Status   Neurologic State Alert   Orientation Level Oriented X4   Cognition Appropriate decision making   Perception Appears intact   Perseveration No perseveration noted   Safety/Judgement Fall prevention   Pt completed problem solving tasks with 90% Accuracy. Attempted laryngeal exercises, however, report his throat is too dry to complete even though green swabs were provided to wet mouth.     Darrel Mendes MA/DIANA/SLP

## 2017-09-21 NOTE — PROGRESS NOTES
Rt picc clean dry and intact, left side upper chest dialysis port noted, not dry blood at insertion site, pt stated it was a new site, denies any c/o, call light in reach, resp even and unlabored

## 2017-09-21 NOTE — PROGRESS NOTES
OT Daily Note    Time In 0918   Time Out 1763     Subjective: \"I'm ready to work. \"  Pain: None indicated    Precautions: Contact (MRSA)    Strengthening   Patient completed 2 sets x 10 reps of BUE AROM therapeutic exercises seated in bed (no weight added to arms). Patient completed shoulder flexion, abduction, anterior punch, and elbow flexion/extension bilaterally provided cues for deep breathing and frequent, extended rest breaks to ensure stable HR. Note decreased short term recall for exercise techniques. Patient requests drink and cleared by RN for small sips of nectar-thick tea, patient opens container independently with supervision for small sips. Assessment: Patient tolerated well    Education: Purpose of therapy, NPO restrictions  Interdisciplinary Communication: Collaborated with Kavya Corea regarding patient's performance. Tolerating therapy at bed level. Plan: Continue to address ADL/IADL, functional mobility, activity tolerance, balance, strengthening, coordination, cognition.       Michelle Winkler, OTR/L

## 2017-09-21 NOTE — PROGRESS NOTES
OT WEEKLY PROGRESS NOTE    Time In: 9342  Time Out: 0831    COMPREHENSION MODE Initial Assessment Weekly Progress Assessment 9/21/2017   Score 6 6     EXPRESSION Initial Assessment Weekly Progress Assessment 9/21/2017   Primary Mode of Expression Verbal Verbal   Score 6 6   Comments Increased time, distress with speech d/t intubation Increased time     SOCIAL INTERACTION/ PRAGMATICS Initial Assessment Weekly Progress Assessment 9/21/2017   Score 5 6   Comments Monitoring or encouragement for participation/interaction Increased time required d/t fatigue     PROBLEM SOLVING Initial Assessment Weekly Progress Assessment 9/21/2017   Score 5 5   Comments Solves complex problems with cues, or basic problems 90% or more of the time. Solves complex problems with cues, or basic problems 90% or more of the time     MEMORY Initial Assessment Weekly Progress Assessment 9/21/2017   Score 5 5   Comments Recognizes, recalls, or executes 3 steps of 3 step request 90% of the time (cueing, reminders less than 10%, loses track of time) or cueing and coaxing under stressful/unfamiliar conditions. Recognizes, recalls, or executes 3 steps of 3 step request 90% of the time (cueing, reminders less than 10%, loses track of time) or cueing and coaxing under stressful/unfamiliar conditions.      EATING Initial Assessment Weekly Progress Assessment 9/21/2017   Functional Level 5 5   Comments Setup at tray table, nectar thick liquids Supervision to take small sips of drink only due to NPO status, patient opens juice container with supervision     GROOMING Initial Assessment Weekly Progress Assessment 9/21/2017   Functional Level 3 5   Tasks completed by patient Washed face, Brushed hair Washed face (Deodorant)   Comments Assist to comb hair d/t fatigue Setup/supervision, note patient asks therapist where to apply deodorant     BATHING Initial Assessment Weekly Progress Assessment 9/21/2017   Functional Level 3 3   Body parts patient bathed Abdomen, Arm, left, Arm, right, Chest, Hilaria area Abdomen; Chest;Arm, right;Arm, left   Comments Assist to complete d/t fatigue, stands with use of grab bars with CGA to have bottom bathed Patient demonstrates ability to complete bathing with minimal assist  since admission however limited by decreased activity tolerance today following medical hold. Requires assist from therapist to complete bed bath. TUB/SHOWER TRANSFER INDEPENDENCE Initial Assessment Weekly Progress Assessment 9/21/2017   Score 4 0   Comments CGA SPT using grab bars from wheelchair <> TTB Did not occur     UPPER BODY DRESSING/UNDRESSING Initial Assessment Weekly Progress Assessment 9/21/2017   Functional Level 3 5   Items applied/Steps completed Pullover (4 steps) Pullover (4 steps)   Comments Assist to progress shirt over RUE/down trunk Setup seated EOB     LOWER BODY DRESSING/UNDRESSING Initial Assessment Weekly Progress Assessment 9/21/2017   Functional Level 1 1   Items applied/Steps completed Sock, left (1 step), Sock, right (1 step), Underpants (3 steps), Elastic waist pants (3 steps) Elastic waist pants (3 steps); Underpants (3 steps)   Comments Total assist d/t fatigue end of session Again, therapist demonstrates ability to complete LB dressing with minimal assist since admission however limited by decreased activity tolerance today following medical hold. Therapy to tolerance per MD.     TOILETING Initial Assessment Weekly Progress Assessment 9/21/2017   Functional Level 1 0   Comments Assist with all aspects today Did not occur     TOILET TRANSFER INDEPENDENCE Initial Assessment Weekly Progress Assessment 9/21/2017   Transfer score 4 0   Comments SPT Did not occur     Plan of Care: Please see Care Plan for updated LTGs. Family Training:   To complete as appropriate prior to discharge    Summary of Progress: Patient has been limited by decreased activity tolerance, as well as medical complications related to atrial flutter and critically elevated heart rate resulting in medical hold per MD, during [facility] stay. Patient has demonstrated the ability to complete ADLs and functional transfers with as little as setup/CGA in past OT session provided increased time and frequent rest breaks, however demonstrated regression in performance today d/t decreased activity tolerance s/p two days of medical hold for therapies, participating in therapy to tolerance only today per MD.  Still, patient demonstrates minimal progress with ADL and functional mobility since admission, see above for FIM details. Summary of Session:     S: \"Does this [deodorant] go under my arms? \" Agreeable to therapy. Focus of session was on ADL retraining, activity tolerance, and bed mobility. Patient cleared for therapy to tolerance today per MD, Jeffery Titus. Patient was able to transfer supine to seated EOB with SBA and use/cues for bed rails for bed bath seated EOB, however required minimal assist to return seated to supine and total assist x 2 to scoot up in bed d/t fatigue end of session. Pain not indicated. Collaborated with MD, Nikita Vieira and RN, Aubree Rodriguez regarding patient's medical status. Goals updated in POC, to adjust as needed s/p pending cardioversion procedure. Patient tolerated session well, but activity tolerance, strength, coordination, cognition, balance, functional mobility are still below baseline and require skilled facilitation to successfully and safely complete ADL's and transfers. Patient ended session in bed with call remote and phone within reach.      Katharina Simpson, OTR/L

## 2017-09-21 NOTE — PROGRESS NOTES
Isidro Mukherjee MD,   Medical Director  [address] [facility], [address]  Tel: [phone]       [facility] PROGRESS NOTE    Melquiades Olmos  Admit Date: 9/13/2017  Admit Diagnosis: Cardiac Debility; Respiratory failure ([address]    Subjective     Worried. \"just want to get this over with. \" Dr. Galina Flores put in for NPO this a.m for cardioversion. Explained situation to pt. Has had asymptomatic bradycardia in the 30s last noc. Remote telemetry called very freq per RN. Pt denies dizziness, cp or SOB at rest. Tolerated Bipap at noc well. Still very fatigued with minimal activity.  Did not do therapies yesterday    Objective:     Current Facility-Administered Medications   Medication Dose Route Frequency    amiodarone (CORDARONE) tablet 200 mg  200 mg Oral BID    metoprolol succinate (TOPROL-XL) tablet 100 mg  100 mg Oral BID    famotidine (PEPCID) tablet 20 mg  20 mg Oral BID    warfarin (COUMADIN) tablet-ON HOLD  6 mg Oral See Admin Instructions    albuterol CONCENTRATE 2.5mg/0.5 mL neb soln  2.5 mg Nebulization BID    And    budesonide (PULMICORT) 500 mcg/2 ml nebulizer suspension  500 mcg Nebulization BID RT    furosemide (LASIX) tablet 20 mg  20 mg Oral DAILY    potassium chloride (K-DUR, KLOR-CON) SR tablet 20 mEq  20 mEq Oral DAILY    dilTIAZem CD (CARDIZEM CD) capsule 180 mg  180 mg Oral DAILY    0.9% sodium chloride infusion 250 mL  250 mL IntraVENous PRN    nitroglycerin (NITROSTAT) tablet 0.4 mg  0.4 mg SubLINGual Q5MIN PRN    ondansetron (ZOFRAN) injection 4 mg  4 mg IntraVENous Q4H PRN    sodium chloride (NS) flush 5-10 mL  5-10 mL IntraVENous PRN    NUTRITIONAL SUPPORT ELECTROLYTE PRN ORDERS   Does Not Apply PRN    acetaminophen (TYLENOL) suppository 650 mg  650 mg Rectal Q4H PRN    acetaminophen (TYLENOL) tablet 650 mg  650 mg Oral Q4H PRN    albuterol (PROVENTIL HFA, VENTOLIN HFA, PROAIR HFA) inhaler 2 Puff  2 Puff Inhalation Q4H PRN    albuterol (PROVENTIL VENTOLIN) nebulizer solution 2.5 mg  2.5 mg Nebulization Q4H PRN    dextrose (D50W) injection syrg 25 g  25 g IntraVENous PRN    digoxin (LANOXIN) tablet 0.25 mg  0.25 mg Oral DAILY    ferrous sulfate tablet 325 mg  1 Tab Oral DAILY WITH BREAKFAST    gabapentin (NEURONTIN) capsule 600 mg  600 mg Oral TID    guaiFENesin ER (MUCINEX) tablet 1,200 mg  1,200 mg Oral BID    heparin (porcine) pf 600 Units  600 Units InterCATHeter Q8H    heparin (porcine) pf 600 Units  600 Units InterCATHeter PRN    insulin lispro (HUMALOG) injection   SubCUTAneous AC&HS    povidone-iodine (BETADINE) 10 % topical solution   Topical DAILY    [START ON 9/29/2017] predniSONE (DELTASONE) tablet 10 mg  10 mg Oral DAILY WITH BREAKFAST    [START ON 9/22/2017] predniSONE (DELTASONE) tablet 20 mg  20 mg Oral DAILY WITH BREAKFAST    predniSONE (DELTASONE) tablet 30 mg  30 mg Oral DAILY WITH BREAKFAST    sodium chloride (NS) flush 20 mL  20 mL InterCATHeter Q8H    tiotropium (SPIRIVA) inhalation capsule 18 mcg  1 Cap Inhalation DAILY    traZODone (DESYREL) tablet 50 mg  50 mg Oral QHS PRN    influenza vaccine 2015-16 (36mos+)(PF) (FLUZONE/FLUARIX QUAD) injection 0.5 mL  0.5 mL IntraMUSCular PRIOR TO DISCHARGE     Review of Systems:Denies chest pain, shortness of breath, cough, headache, visual problems, abdominal pain, dysurea, calf pain. Pertinent positives are as noted in the medical records and unremarkable otherwise. Visit Vitals    /53 (BP 1 Location: Left arm)    Pulse 87    Temp 97.5 °F (36.4 °C)    Resp 18    SpO2 95%        Physical Exam:   General: Alert and age appropriately oriented. No acute cardio respiratory distress. Affect blunted   HEENT: Normocephalic,no scleral icterus  Oral mucosa moist without cyanosis   Lungs: Clear to auscultation  bilaterally.   Respiration even and unlabored   Heart: irreg, S1, S2   No  murmurs, clicks, rub or gallops   Abdomen: Soft, non-tender, nondistended. Bowel sounds present. No organomegaly. Genitourinary: defer   Neuromuscular:      Speech clear and appropriate, generalized weakness prox> distal  No sensory deficits   Skin/extremity: No rashes, no erythema. No calf tenderness BLE  1+ pedal edema                                                                            Functional Assessment:          Balance  Sitting - Static: Good (unsupported) (09/16/17 1300)  Sitting - Dynamic: Good (unsupported) (09/16/17 1300)  Standing - Static: Fair;Constant support (with RW) (09/16/17 1300)  Standing - Dynamic : Impaired (09/16/17 1300)           Toileting  Adaptive Equipment: Grab bars; Other (comment) (Wheelchair) (09/18/17 1012)         [place] Fall Risk Assessment:  [place] Fall Risk  Mobility: Ambulates or transfers with assist devices or assistance/unsteady gait (09/21/17 3730)  Mobility Interventions: Patient to call before getting OOB (09/21/17 1590)  Mentation: Alert, oriented x 3 (09/21/17 0666)  Mentation Interventions: Door open when patient unattended (09/21/17 3092)  Medication: Patient receiving anticonvulsants, sedatives(tranquilizers), psychotropics or hypnotics, hypoglycemics, narcotics, sleep aids, antihypertensives, laxatives, or diuretics (09/21/17 6193)  Medication Interventions: Patient to call before getting OOB (09/21/17 4709)  Elimination: Needs assistance with toileting (09/21/17 1683)  Elimination Interventions: Call light in reach; Patient to call for help with toileting needs; Toileting schedule/hourly rounds (09/21/17 0570)  Prior Fall History: During admission (Date - Comment) (09/21/17 0722)  History of Falls Interventions: Door open when patient unattended (09/21/17 0722)  Total Score: 5 (09/21/17 0722)  Standard Fall Precautions: Yes (09/17/17 1109)  High Fall Risk: Yes (09/21/17 0722)     Speech Assessment:  Aspiration Signs/Symptoms: None (09/14/17 1036)      Ambulation:  Gait  Distance (ft): 30 Feet (ft) (09/16/17 1300)  Assistive Device: Walker, rolling;Gait belt (09/16/17 1300)     Labs/Studies:  Recent Results (from the past 72 hour(s))   GLUCOSE, POC    Collection Time: 09/18/17 11:44 AM   Result Value Ref Range    Glucose (POC) 177 (H) 65 - 100 mg/dL   GLUCOSE, POC    Collection Time: 09/18/17  4:20 PM   Result Value Ref Range    Glucose (POC) 202 (H) 65 - 100 mg/dL   GLUCOSE, POC    Collection Time: 09/18/17  8:31 PM   Result Value Ref Range    Glucose (POC) 168 (H) 65 - 100 mg/dL   PROTHROMBIN TIME + INR    Collection Time: 09/19/17  5:29 AM   Result Value Ref Range    Prothrombin time 35.1 (H) 9.6 - 12.0 sec    INR 3.2 (H) 0.9 - 1.2     CBC W/O DIFF    Collection Time: 09/19/17  5:29 AM   Result Value Ref Range    WBC 7.3 4.3 - 11.1 K/uL    RBC 2.92 (L) 4.23 - 5.67 M/uL    HGB 9.1 (L) 13.6 - 17.2 g/dL    HCT 28.2 (L) 41.1 - 50.3 %    MCV 96.6 79.6 - 97.8 FL    MCH 31.2 26.1 - 32.9 PG    MCHC 32.3 31.4 - 35.0 g/dL    RDW 19.5 (H) 11.9 - 14.6 %    PLATELET 68 (L) 498 - 450 K/uL    MPV Cannot be calculated 10.8 - 14.1 FL   GLUCOSE, POC    Collection Time: 09/19/17  5:58 AM   Result Value Ref Range    Glucose (POC) 84 65 - 100 mg/dL   EKG, 12 LEAD, INITIAL    Collection Time: 09/19/17  7:32 AM   Result Value Ref Range    Ventricular Rate 137 BPM    Atrial Rate 66 BPM    QRS Duration 166 ms    Q-T Interval 352 ms    QTC Calculation (Bezet) 531 ms    Calculated R Axis 137 degrees    Calculated T Axis -47 degrees    Diagnosis       atrial fib flutter with RVR   Right bundle branch block  Left posterior fascicular block  !!!  Bifascicular block !!!  T wave abnormality, consider inferior ischemia  Abnormal ECG  When compared with ECG of 17-SEP-2017 11:25,  T wave inversion no longer evident in Lateral leads  Confirmed by GLO KWOK (), William Carter ([id number]) on 9/19/2017 10:07:18 AM     MRSA SCREEN - PCR (NASAL)    Collection Time: 09/19/17  9:45 AM   Result Value Ref Range    Special Requests: NO SPECIAL REQUESTS      Culture result: MRSA target DNA is not detected (presumptive not colonized with MRSA)   GLUCOSE, POC    Collection Time: 09/19/17 10:22 AM   Result Value Ref Range    Glucose (POC) 87 65 - 100 mg/dL   GLUCOSE, POC    Collection Time: 09/19/17 11:15 AM   Result Value Ref Range    Glucose (POC) 163 (H) 65 - 100 mg/dL   GLUCOSE, POC    Collection Time: 09/19/17  4:31 PM   Result Value Ref Range    Glucose (POC) 200 (H) 65 - 100 mg/dL   GLUCOSE, POC    Collection Time: 09/19/17  8:37 PM   Result Value Ref Range    Glucose (POC) 268 (H) 65 - 100 mg/dL   PROTHROMBIN TIME + INR    Collection Time: 09/20/17  5:19 AM   Result Value Ref Range    Prothrombin time 37.6 (H) 9.6 - 12.0 sec    INR 3.4 (H) 0.9 - 1.2     CBC W/O DIFF    Collection Time: 09/20/17  5:19 AM   Result Value Ref Range    WBC 8.6 4.3 - 11.1 K/uL    RBC 2.79 (L) 4.23 - 5.67 M/uL    HGB 8.8 (L) 13.6 - 17.2 g/dL    HCT 27.6 (L) 41.1 - 50.3 %    MCV 98.8 (H) 79.6 - 97.8 FL    MCH 31.5 26.1 - 32.9 PG    MCHC 31.9 31.4 - 35.0 g/dL    RDW 20.7 (H) 11.9 - 14.6 %    PLATELET 52 (L) 851 - 450 K/uL    MPV 11.7 10.8 - 46.3 FL   METABOLIC PANEL, BASIC    Collection Time: 09/20/17  5:19 AM   Result Value Ref Range    Sodium 141 136 - 145 mmol/L    Potassium 3.9 3.5 - 5.1 mmol/L    Chloride 103 98 - 107 mmol/L    CO2 34 (H) 21 - 32 mmol/L    Anion gap 4 (L) 7 - 16 mmol/L    Glucose 84 65 - 100 mg/dL    BUN 19 8 - 23 MG/DL    Creatinine 0.90 0.8 - 1.5 MG/DL    GFR est AA >60 >60 ml/min/1.73m2    GFR est non-AA >60 >60 ml/min/1.73m2    Calcium 8.2 (L) 8.3 - 10.4 MG/DL   DIGOXIN    Collection Time: 09/20/17  5:19 AM   Result Value Ref Range    DIGOXIN 1.1 0.90 - 2.10 NG/ML   GLUCOSE, POC    Collection Time: 09/20/17  5:55 AM   Result Value Ref Range    Glucose (POC) 101 (H) 65 - 100 mg/dL   GLUCOSE, POC    Collection Time: 09/20/17 11:21 AM   Result Value Ref Range    Glucose (POC) 176 (H) 65 - 100 mg/dL   GLUCOSE, POC    Collection Time: 09/20/17  4:25 PM   Result Value Ref Range Glucose (POC) 162 (H) 65 - 100 mg/dL   GLUCOSE, POC    Collection Time: 09/20/17  8:57 PM   Result Value Ref Range    Glucose (POC) 168 (H) 65 - 100 mg/dL   PROTHROMBIN TIME + INR    Collection Time: 09/21/17  5:49 AM   Result Value Ref Range    Prothrombin time 24.8 (H) 9.6 - 12.0 sec    INR 2.3 (H) 0.9 - 1.2     CBC W/O DIFF    Collection Time: 09/21/17  5:49 AM   Result Value Ref Range    WBC 3.9 (L) 4.3 - 11.1 K/uL    RBC 2.54 (L) 4.23 - 5.67 M/uL    HGB 8.1 (L) 13.6 - 17.2 g/dL    HCT 25.5 (L) 41.1 - 50.3 %    .4 (H) 79.6 - 97.8 FL    MCH 32.0 26.1 - 32.9 PG    MCHC 31.9 31.4 - 35.0 g/dL    RDW 21.9 (H) 11.9 - 14.6 %    PLATELET 36 (L) 230 - 450 K/uL    MPV 11.3 10.8 - 14.1 FL   GLUCOSE, POC    Collection Time: 09/21/17  7:13 AM   Result Value Ref Range    Glucose (POC) 93 65 - 100 mg/dL       Assessment:     Problem List as of 9/21/2017  Date Reviewed: 9/9/2017          Codes Class Noted - Resolved    Respiratory failure ([facility] [address] ICD-10-CM: J96.90  ICD-9-CM: 518.81  9/13/2017 - Present        MRSA nasal colonization ICD-10-CM: Z22.322  ICD-9-CM: V02.54  9/8/2017 - Present        COPD (chronic obstructive pulmonary disease) (HCC) (Chronic) ICD-10-CM: J44.9  ICD-9-CM: 496  8/30/2017 - Present        Acute respiratory failure with hypercapnia (HCC) ICD-10-CM: J96.02  ICD-9-CM: 518.81  8/29/2017 - Present        History of tobacco use (Chronic) ICD-10-CM: P70.567  ICD-9-CM: V15.82  8/29/2017 - Present        Hyponatremia ICD-10-CM: E87.1  ICD-9-CM: 276.1  8/29/2017 - Present        Acute encephalopathy ICD-10-CM: G93.40  ICD-9-CM: 348.30  8/29/2017 - Present        Thrombocytopenia (HCC) ICD-10-CM: D69.6  ICD-9-CM: 287.5  8/26/2017 - Present        Anticoagulant long-term use (Chronic) ICD-10-CM: Z79.01  ICD-9-CM: V58.61  8/23/2017 - Present        HTN (hypertension), benign (Chronic) ICD-10-CM: I10  ICD-9-CM: 401.1  8/23/2017 - Present        Peripheral vascular disease ([facility] [address] (Chronic) ICD-10-CM: I73.9  ICD-9-CM: 443.9  8/23/2017 - Present        Dyslipidemia (Chronic) ICD-10-CM: E78.5  ICD-9-CM: 272.4  8/23/2017 - Present        History of mechanical aortic valve replacement (Chronic) ICD-10-CM: Z95.2  ICD-9-CM: V43.3  8/23/2017 - Present    Overview Signed 8/30/2017 10:30 AM by Tania Gomez NP     Placement 2000, on chronicCoumadin             Centrilobular emphysema ([facility] [address] (Chronic) ICD-10-CM: J43.2  ICD-9-CM: 492.8  8/23/2017 - Present    Overview Signed 9/8/2017  9:24 AM by Concha Barone NP      With last PFTs FVC 1.70 L or 44% predicted, FEV1 0.86 L or 29% predicted, FEV1/FVC 51%. This study was a postbronchodilator study performed at the [place] in [facility] on 8/22/2016                  Ischemic cardiomyopathy (Chronic) ICD-10-CM: I25.5  ICD-9-CM: 414.8  8/23/2017 - Present    Overview Signed 8/30/2017 10:29 AM by Tania Gomez NP     8/23/17 ECHO:  EF 40 % to 45 %. There were no regional wall motion abnormalities. Moderate hypokinesis of the mid-apical anterior and apical wall(s). Atrial flutter with rapid ventricular response [facility]) ICD-10-CM: I48.92  ICD-9-CM: 427.32  8/23/2017 - Present        LV dysfunction (Chronic) ICD-10-CM: I51.9  ICD-9-CM: 429.9  10/19/2016 - Present        Atherosclerosis of native arteries of the extremities with intermittent claudication (Chronic) ICD-10-CM: R69.995  ICD-9-CM: 440.21  4/15/2014 - Present                      Assessment; Debility s/p resp failure, a flutter with RVR s/p cardioversion  PLAN  Continue daily physician medical management:      Atrial flutter with rapid ventricular response ([facility] [address] (8/23/2017) sp cardioversion - monitor HR./ BP. / hx of CHF / Ischemic cardiomyopathy     - Digoxin, coreg.  - continue anticoagulation. 9/20 has been on hold due to pending cardiac procedure; however INR now 3.4 from 3.2 9/19; 9/21 2.3  - cardiac precautions.   - (9/16) -recurrent a.fib with RVR. HR 130s.  9/20 continues; metoprolol added 9/19; PT UNABLE TO PARTICIPATE IN THERAPIES. SHOULD BE TRANSFERRED TO TELEMETRY TO MANAGE CARDIAC ISSUES SO AS NOT TO USE OF ACUTE REHAB DAYS. -9/21 will try to encourage participation in therapies today. Wouldn't even do ST at bedside yesterday. Will put parameters on when to hold therapies due to tachyc.       - restarted diuretics, as was taking at home. - CxR 9/16 very small bilateral pleural effusions are stable.    - 9/19-  possible cardioversion, AVN ablation/pacemaker in am per Cardiology/EP    -9/20 HOLD proc due to elevated INR, cont to hold coumadin ; 9/21 INR 2.3 from 3.4; restart coumadin after procedure today if ok with cardiology      History of mechanical aortic valve replacement 2000, on coumadin/ Anticoagulant long-term use. Goal 2.5-3.5.  - resume warfarin, monitor INR.   - INR 2.4 (9/18) . average out home dose ~> 6mg hs.   - Warfarin 6mg HS (9/16)  - pharmacy assisting. On hold(9/19) for procedure.   -9/21 INR now finally trending down; 2.3 from 3.4 yesterday;off coumadin; likely restart Coumadin after procedure      Bacteremia - started on empiric antibiotic treatment. Aztreonam, vanco.   - BCx grew e.coli. discontinued vano. - final ID extended-spectrum beta-lactamases (ESBLs) , appears treated, though efficacy of vanco, aztreonam not optimal.  . Pt asymptomatic, WBC wnl. Will discontinue - aztreonam. .  - will monitor closely. - will have on contact precaution, infection control notified.   - BCx redrawn 9/17, no growth, will lift contact precautions 9/19.       HTN (hypertension) - monitor.  - continue Coreg/ digoxin  -9/21 cardizem and lasix ; now off Coreg per cardiology for rate control but now bradycardia; really no rhyme or reason to his fluctuating HR; BP stable          Thrombocytopenia/ ITP  - continued on steroids. - hematology following.   - HIT negative. - ivig per hematology direction. Will receive 1 more dose. -  received platelet 1unit (7/08).  plt 25k -> 75k (9/16)-> 65k (9/17) -> 68 (9/19) monitor; 9/20 plts 52; defer to hematology  -9/21 plts 36. ? Repeat IVIG?? Continue prednisone taper. Will as hematology to address          COPD   - continue respiratory treatments. Presently q6h, wean as appropriate.    - resume spiriva, pulmicort  - SOB with activity. - normal CxR (9/15)  - will wean albuterol nebs 9/18. Bid and as needed. -9/20 SOB; sats 95% RA, bilateral crackles, increasing JIM; likely due to tachycardia; will f/u portable CXR; weaning steroids  -9/21 CXR; shows no significant change in small right pleural effusion and adjacent mild right basilar atelectasis/infiltrate. Continue to enc IS. bipap at noc; sats 95% RA             iron deficiency anemia -hgb 8.5-> 8.7(9/15)-. 10.8(9/18) -> 9.3-> 9.1 (9/19) Monitor. Continue fe supplements.   -9/20 hgb 8.8; no evidence of acute bleed; hgb 8.1 9/21; check stool  -9/21 pancytopenic, WBC has dropped no as well; monitor for now      Hyponatremia  - monitor.       Pneumonia prophylaxis- Insentive spirometer every hour while awake      DVT risk / DVT Prophylaxis- Will require daily physician exam to assess for signs and symptoms as patient is at increased risk for of thromboembolism. Mobilization as tolerated. Intermittent pneumatic compression devices when in bed Thigh-high or knee-high thromboembolic deterrent hose when out of bed.    - covered with warfarin. (inr supratherapeutic 9/20, coumadin on hold) no clinical evidence of DVT      Pain Control: stable, mild-to-moderate joint symptoms intermittently, reasonably well controlled by PRN meds. Will require regular pain assessment and comprenhensive pain management. - resume gabapentin.       Wound Care: Monitor wound status daily per staff and physician. At risk for failure. Will require 24/7 rehab nursing. Keep wound clean and dry  - excoriation at bridge of nose. Continue antibiotic ointment.       Diabetes mellitus - Uncontrolled. poor glycemic control.  Will require daily, close FSG monitoring and medication adjustment to optimize glycemic control in setting of acute illness and hospitalization.   - SSI coverage with lispro. Poorly controlled , exacerbated by steroids  -9/21 a bit better controlled, likely due to poor po intake      bowel program - colace as needed.     Gi prophylaxis; change pepcid to po 9/20                     Time spent was 25 minutes with over 1/2 in direct patient care/examination, consultation and coordination of care.      Signed By: Mc Hernandez MD     September 21, 2017

## 2017-09-21 NOTE — PROGRESS NOTES
Assessment completed, respiration even and unlabored, instructed to call for needs or assist , educated on risk and needs not to get up without assist, call light in reach,  Yj=[id number], 2 units of ssi given, trazodone given for sleep

## 2017-09-21 NOTE — PROGRESS NOTES
Warfarin dosing per pharmacist    Bing Cortes is a 67 y.o. male. Indication: A. Fib    Goal INR:  2-3    Home dose:  5 mg daily, 7.5 mg on Wednesday    Risk factors or significant drug interactions:  Antibiotics, prednisone    Other anticoagulants:  none    Daily Monitoring  Date  INR     Warfarin dose HGB              Notes  9/8  1.2  10mg  11.2    9/9  1.3  10mg  9.4  9/10  ---  10mg  9.7  9/11  2.1  10mg  10.8  9/12  2.9  10mg  8.9  9/13  3.5  Hold  8.4  Pharmacy Consulted  9/14  3.6  Held  8.5  9/15  2.5  5 mg  8.7  9/16  1.9  6 mg  10.8  9/17  2.2  6 mg  9.3  9/18  2.4  6 mg  --  9/19  3.2  Hold  9.1  9/20  3.4  Hold  8.8  9/21  2.3  5 mg  8.1    INR down to 2.3. Will restart this evening at 5 mg. Following daily. Thank you,  Zi Jimenez, Pharm. D.   Clinical Pharmacist  [phone]

## 2017-09-22 ENCOUNTER — HOSPITAL ENCOUNTER (OUTPATIENT)
Dept: CARDIAC CATH/INVASIVE PROCEDURES | Age: 72
Discharge: HOME | End: 2017-09-22
Attending: INTERNAL MEDICINE | Admitting: INTERNAL MEDICINE
Payer: MEDICARE

## 2017-09-22 VITALS
SYSTOLIC BLOOD PRESSURE: 127 MMHG | DIASTOLIC BLOOD PRESSURE: 61 MMHG | RESPIRATION RATE: 18 BRPM | HEART RATE: 65 BPM | OXYGEN SATURATION: 100 %

## 2017-09-22 LAB
ANION GAP SERPL CALC-SCNC: 5 MMOL/L (ref 7–16)
BACTERIA SPEC CULT: NORMAL
BASOPHILS # BLD: 0 K/UL (ref 0–0.2)
BASOPHILS NFR BLD: 0 % (ref 0–2)
BUN SERPL-MCNC: 20 MG/DL (ref 8–23)
CALCIUM SERPL-MCNC: 8 MG/DL (ref 8.3–10.4)
CHLORIDE SERPL-SCNC: 101 MMOL/L (ref 98–107)
CO2 SERPL-SCNC: 33 MMOL/L (ref 21–32)
CREAT SERPL-MCNC: 0.92 MG/DL (ref 0.8–1.5)
DIFFERENTIAL METHOD BLD: ABNORMAL
EOSINOPHIL # BLD: 0 K/UL (ref 0–0.8)
EOSINOPHIL NFR BLD: 1 % (ref 0.5–7.8)
ERYTHROCYTE [DISTWIDTH] IN BLOOD BY AUTOMATED COUNT: 24 % (ref 11.9–14.6)
GLUCOSE BLD STRIP.AUTO-MCNC: 132 MG/DL (ref 65–100)
GLUCOSE BLD STRIP.AUTO-MCNC: 133 MG/DL (ref 65–100)
GLUCOSE BLD STRIP.AUTO-MCNC: 89 MG/DL (ref 65–100)
GLUCOSE BLD STRIP.AUTO-MCNC: 94 MG/DL (ref 65–100)
GLUCOSE SERPL-MCNC: 118 MG/DL (ref 65–100)
HCT VFR BLD AUTO: 30.3 % (ref 41.1–50.3)
HGB BLD-MCNC: 9.5 G/DL (ref 13.6–17.2)
IMM GRANULOCYTES # BLD: 0 K/UL (ref 0–0.5)
IMM GRANULOCYTES NFR BLD: 0.3 % (ref 0–5)
INR PPP: 1.4 (ref 0.9–1.2)
LYMPHOCYTES # BLD: 0.6 K/UL (ref 0.5–4.6)
LYMPHOCYTES NFR BLD: 17 % (ref 13–44)
MCH RBC QN AUTO: 32.1 PG (ref 26.1–32.9)
MCHC RBC AUTO-ENTMCNC: 31.3 G/DL (ref 31.4–35)
MCV RBC AUTO: 102.6 FL (ref 79.6–97.8)
MONOCYTES # BLD: 0.1 K/UL (ref 0.1–1.3)
MONOCYTES NFR BLD: 4 % (ref 4–12)
NEUTS SEG # BLD: 2.7 K/UL (ref 1.7–8.2)
NEUTS SEG NFR BLD: 78 % (ref 43–78)
PLATELET # BLD AUTO: 19 K/UL (ref 150–450)
PMV BLD AUTO: 12 FL (ref 10.8–14.1)
POTASSIUM SERPL-SCNC: 3.8 MMOL/L (ref 3.5–5.1)
PROTHROMBIN TIME: 15.5 SEC (ref 9.6–12)
RBC # BLD AUTO: 2.95 M/UL (ref 4.23–5.67)
SERVICE CMNT-IMP: NORMAL
SODIUM SERPL-SCNC: 139 MMOL/L (ref 136–145)
WBC # BLD AUTO: 3.9 K/UL (ref 4.3–11.1)

## 2017-09-22 PROCEDURE — 74011250637 HC RX REV CODE- 250/637: Performed by: INTERNAL MEDICINE

## 2017-09-22 PROCEDURE — 99233 SBSQ HOSP IP/OBS HIGH 50: CPT | Performed by: INTERNAL MEDICINE

## 2017-09-22 PROCEDURE — 86644 CMV ANTIBODY: CPT | Performed by: PHYSICAL MEDICINE & REHABILITATION

## 2017-09-22 PROCEDURE — 94760 N-INVAS EAR/PLS OXIMETRY 1: CPT

## 2017-09-22 PROCEDURE — 92960 CARDIOVERSION ELECTRIC EXT: CPT

## 2017-09-22 PROCEDURE — 74011250636 HC RX REV CODE- 250/636

## 2017-09-22 PROCEDURE — 74011250637 HC RX REV CODE- 250/637: Performed by: PHYSICAL MEDICINE & REHABILITATION

## 2017-09-22 PROCEDURE — 94640 AIRWAY INHALATION TREATMENT: CPT

## 2017-09-22 PROCEDURE — 74011250636 HC RX REV CODE- 250/636: Performed by: PHYSICAL MEDICINE & REHABILITATION

## 2017-09-22 PROCEDURE — 80048 BASIC METABOLIC PNL TOTAL CA: CPT | Performed by: PHYSICAL MEDICINE & REHABILITATION

## 2017-09-22 PROCEDURE — 99152 MOD SED SAME PHYS/QHP 5/>YRS: CPT

## 2017-09-22 PROCEDURE — 99232 SBSQ HOSP IP/OBS MODERATE 35: CPT | Performed by: PHYSICAL MEDICINE & REHABILITATION

## 2017-09-22 PROCEDURE — 82962 GLUCOSE BLOOD TEST: CPT

## 2017-09-22 PROCEDURE — 85610 PROTHROMBIN TIME: CPT | Performed by: PHYSICAL MEDICINE & REHABILITATION

## 2017-09-22 PROCEDURE — 74011000250 HC RX REV CODE- 250: Performed by: PHYSICAL MEDICINE & REHABILITATION

## 2017-09-22 PROCEDURE — 77030011256 HC DRSG MEPILEX <16IN NO BORD MOLN -A

## 2017-09-22 PROCEDURE — 77010033678 HC OXYGEN DAILY

## 2017-09-22 PROCEDURE — 85025 COMPLETE CBC W/AUTO DIFF WBC: CPT | Performed by: INTERNAL MEDICINE

## 2017-09-22 PROCEDURE — 97535 SELF CARE MNGMENT TRAINING: CPT

## 2017-09-22 PROCEDURE — 65310000000 HC RM PRIVATE REHAB

## 2017-09-22 PROCEDURE — P9037 PLATE PHERES LEUKOREDU IRRAD: HCPCS | Performed by: PHYSICAL MEDICINE & REHABILITATION

## 2017-09-22 RX ORDER — DIGOXIN 250 MCG
0.25 TABLET ORAL DAILY
Status: DISCONTINUED | OUTPATIENT
Start: 2017-09-23 | End: 2017-09-26

## 2017-09-22 RX ORDER — POTASSIUM CHLORIDE 20 MEQ/1
20 TABLET, EXTENDED RELEASE ORAL DAILY
Status: DISCONTINUED | OUTPATIENT
Start: 2017-09-23 | End: 2017-10-03 | Stop reason: HOSPADM

## 2017-09-22 RX ORDER — FENTANYL CITRATE 50 UG/ML
25-100 INJECTION, SOLUTION INTRAMUSCULAR; INTRAVENOUS
Status: DISCONTINUED | OUTPATIENT
Start: 2017-09-22 | End: 2017-09-22 | Stop reason: HOSPADM

## 2017-09-22 RX ORDER — ACETAMINOPHEN 325 MG/1
650 TABLET ORAL
Status: DISCONTINUED | OUTPATIENT
Start: 2017-09-22 | End: 2017-10-03 | Stop reason: HOSPADM

## 2017-09-22 RX ORDER — SODIUM CHLORIDE 9 MG/ML
250 INJECTION, SOLUTION INTRAVENOUS AS NEEDED
Status: DISCONTINUED | OUTPATIENT
Start: 2017-09-22 | End: 2017-10-03 | Stop reason: HOSPADM

## 2017-09-22 RX ORDER — NITROGLYCERIN 0.4 MG/1
0.4 TABLET SUBLINGUAL
Status: DISCONTINUED | OUTPATIENT
Start: 2017-09-22 | End: 2017-10-03 | Stop reason: HOSPADM

## 2017-09-22 RX ORDER — BUDESONIDE 0.5 MG/2ML
500 INHALANT ORAL
Status: DISCONTINUED | OUTPATIENT
Start: 2017-09-22 | End: 2017-10-03 | Stop reason: HOSPADM

## 2017-09-22 RX ORDER — PREDNISONE 20 MG/1
20 TABLET ORAL
Status: COMPLETED | OUTPATIENT
Start: 2017-09-23 | End: 2017-09-29

## 2017-09-22 RX ORDER — ALBUTEROL SULFATE 0.83 MG/ML
2.5 SOLUTION RESPIRATORY (INHALATION)
Status: DISCONTINUED | OUTPATIENT
Start: 2017-09-22 | End: 2017-10-03 | Stop reason: HOSPADM

## 2017-09-22 RX ORDER — DILTIAZEM HYDROCHLORIDE 180 MG/1
180 CAPSULE, COATED, EXTENDED RELEASE ORAL DAILY
Status: DISCONTINUED | OUTPATIENT
Start: 2017-09-23 | End: 2017-09-25

## 2017-09-22 RX ORDER — GUAIFENESIN 600 MG/1
1200 TABLET, EXTENDED RELEASE ORAL EVERY 12 HOURS
Status: DISCONTINUED | OUTPATIENT
Start: 2017-09-22 | End: 2017-10-03 | Stop reason: HOSPADM

## 2017-09-22 RX ORDER — INSULIN LISPRO 100 [IU]/ML
INJECTION, SOLUTION INTRAVENOUS; SUBCUTANEOUS
Status: DISCONTINUED | OUTPATIENT
Start: 2017-09-22 | End: 2017-10-03 | Stop reason: HOSPADM

## 2017-09-22 RX ORDER — ONDANSETRON 2 MG/ML
4 INJECTION INTRAMUSCULAR; INTRAVENOUS
Status: DISCONTINUED | OUTPATIENT
Start: 2017-09-22 | End: 2017-10-03 | Stop reason: HOSPADM

## 2017-09-22 RX ORDER — METOPROLOL SUCCINATE 100 MG/1
100 TABLET, EXTENDED RELEASE ORAL 2 TIMES DAILY
Status: DISCONTINUED | OUTPATIENT
Start: 2017-09-22 | End: 2017-10-03 | Stop reason: HOSPADM

## 2017-09-22 RX ORDER — AMIODARONE HYDROCHLORIDE 200 MG/1
400 TABLET ORAL 2 TIMES DAILY
Status: DISCONTINUED | OUTPATIENT
Start: 2017-09-22 | End: 2017-09-26

## 2017-09-22 RX ORDER — FAMOTIDINE 20 MG/1
20 TABLET, FILM COATED ORAL 2 TIMES DAILY
Status: DISCONTINUED | OUTPATIENT
Start: 2017-09-22 | End: 2017-10-03 | Stop reason: HOSPADM

## 2017-09-22 RX ORDER — AMIODARONE HYDROCHLORIDE 200 MG/1
200 TABLET ORAL
Status: COMPLETED | OUTPATIENT
Start: 2017-09-22 | End: 2017-09-22

## 2017-09-22 RX ORDER — HEPARIN 100 UNIT/ML
600 SYRINGE INTRAVENOUS EVERY 8 HOURS
Status: DISCONTINUED | OUTPATIENT
Start: 2017-09-22 | End: 2017-09-29

## 2017-09-22 RX ORDER — TRAZODONE HYDROCHLORIDE 50 MG/1
50 TABLET ORAL
Status: DISCONTINUED | OUTPATIENT
Start: 2017-09-22 | End: 2017-10-03 | Stop reason: HOSPADM

## 2017-09-22 RX ORDER — ACETAMINOPHEN 650 MG/1
650 SUPPOSITORY RECTAL
Status: DISCONTINUED | OUTPATIENT
Start: 2017-09-22 | End: 2017-10-03 | Stop reason: HOSPADM

## 2017-09-22 RX ORDER — WARFARIN SODIUM 5 MG/1
5 TABLET ORAL EVERY EVENING
Status: DISCONTINUED | OUTPATIENT
Start: 2017-09-22 | End: 2017-09-26

## 2017-09-22 RX ORDER — ALBUTEROL SULFATE 90 UG/1
2 AEROSOL, METERED RESPIRATORY (INHALATION)
Status: DISCONTINUED | OUTPATIENT
Start: 2017-09-22 | End: 2017-10-03 | Stop reason: HOSPADM

## 2017-09-22 RX ORDER — ALBUTEROL SULFATE 2.5 MG/.5ML
2.5 SOLUTION RESPIRATORY (INHALATION) 2 TIMES DAILY
Status: DISCONTINUED | OUTPATIENT
Start: 2017-09-22 | End: 2017-10-03 | Stop reason: HOSPADM

## 2017-09-22 RX ORDER — DEXTROSE 50 % IN WATER (D50W) INTRAVENOUS SYRINGE
25 AS NEEDED
Status: DISCONTINUED | OUTPATIENT
Start: 2017-09-22 | End: 2017-10-03 | Stop reason: HOSPADM

## 2017-09-22 RX ORDER — AMIODARONE HYDROCHLORIDE 200 MG/1
200 TABLET ORAL 2 TIMES DAILY
Status: DISCONTINUED | OUTPATIENT
Start: 2017-09-22 | End: 2017-09-22

## 2017-09-22 RX ORDER — SODIUM CHLORIDE 0.9 % (FLUSH) 0.9 %
5-10 SYRINGE (ML) INJECTION AS NEEDED
Status: DISCONTINUED | OUTPATIENT
Start: 2017-09-22 | End: 2017-09-28

## 2017-09-22 RX ORDER — GABAPENTIN 300 MG/1
600 CAPSULE ORAL 3 TIMES DAILY
Status: DISCONTINUED | OUTPATIENT
Start: 2017-09-22 | End: 2017-10-03 | Stop reason: HOSPADM

## 2017-09-22 RX ORDER — MIDAZOLAM HYDROCHLORIDE 1 MG/ML
.5-5 INJECTION, SOLUTION INTRAMUSCULAR; INTRAVENOUS
Status: DISCONTINUED | OUTPATIENT
Start: 2017-09-22 | End: 2017-09-22 | Stop reason: HOSPADM

## 2017-09-22 RX ORDER — LANOLIN ALCOHOL/MO/W.PET/CERES
1 CREAM (GRAM) TOPICAL
Status: DISCONTINUED | OUTPATIENT
Start: 2017-09-23 | End: 2017-10-03 | Stop reason: HOSPADM

## 2017-09-22 RX ORDER — POVIDONE-IODINE 10 %
SOLUTION, NON-ORAL TOPICAL DAILY
Status: DISCONTINUED | OUTPATIENT
Start: 2017-09-23 | End: 2017-10-03 | Stop reason: HOSPADM

## 2017-09-22 RX ORDER — PREDNISONE 10 MG/1
10 TABLET ORAL
Status: DISCONTINUED | OUTPATIENT
Start: 2017-09-29 | End: 2017-10-03

## 2017-09-22 RX ORDER — SODIUM CHLORIDE 9 MG/ML
250 INJECTION, SOLUTION INTRAVENOUS AS NEEDED
Status: DISCONTINUED | OUTPATIENT
Start: 2017-09-22 | End: 2017-10-02 | Stop reason: SDUPTHER

## 2017-09-22 RX ORDER — SODIUM CHLORIDE 0.9 % (FLUSH) 0.9 %
20 SYRINGE (ML) INJECTION EVERY 8 HOURS
Status: DISCONTINUED | OUTPATIENT
Start: 2017-09-22 | End: 2017-09-29

## 2017-09-22 RX ORDER — FUROSEMIDE 20 MG/1
20 TABLET ORAL DAILY
Status: DISCONTINUED | OUTPATIENT
Start: 2017-09-23 | End: 2017-10-03 | Stop reason: HOSPADM

## 2017-09-22 RX ORDER — HEPARIN 100 UNIT/ML
600 SYRINGE INTRAVENOUS AS NEEDED
Status: DISCONTINUED | OUTPATIENT
Start: 2017-09-22 | End: 2017-09-28

## 2017-09-22 RX ADMIN — Medication 20 ML: at 06:00

## 2017-09-22 RX ADMIN — FENTANYL CITRATE 50 MCG: 50 INJECTION, SOLUTION INTRAMUSCULAR; INTRAVENOUS at 09:45

## 2017-09-22 RX ADMIN — TIOTROPIUM BROMIDE 18 MCG: 18 CAPSULE ORAL; RESPIRATORY (INHALATION) at 05:14

## 2017-09-22 RX ADMIN — FAMOTIDINE 20 MG: 20 TABLET ORAL at 17:15

## 2017-09-22 RX ADMIN — AMIODARONE HYDROCHLORIDE 400 MG: 200 TABLET ORAL at 21:03

## 2017-09-22 RX ADMIN — DIGOXIN 0.25 MG: 250 TABLET ORAL at 08:13

## 2017-09-22 RX ADMIN — ALBUTEROL SULFATE 2.5 MG: 2.5 SOLUTION RESPIRATORY (INHALATION) at 17:20

## 2017-09-22 RX ADMIN — SODIUM CHLORIDE, PRESERVATIVE FREE 600 UNITS: 5 INJECTION INTRAVENOUS at 14:15

## 2017-09-22 RX ADMIN — GUAIFENESIN 1200 MG: 600 TABLET, EXTENDED RELEASE ORAL at 21:03

## 2017-09-22 RX ADMIN — GABAPENTIN 600 MG: 300 CAPSULE ORAL at 21:03

## 2017-09-22 RX ADMIN — WARFARIN SODIUM 5 MG: 5 TABLET ORAL at 17:15

## 2017-09-22 RX ADMIN — AMIODARONE HYDROCHLORIDE 200 MG: 200 TABLET ORAL at 14:14

## 2017-09-22 RX ADMIN — BUDESONIDE 500 MCG: 0.5 INHALANT RESPIRATORY (INHALATION) at 17:20

## 2017-09-22 RX ADMIN — BUDESONIDE 500 MCG: 0.5 INHALANT RESPIRATORY (INHALATION) at 05:12

## 2017-09-22 RX ADMIN — AMIODARONE HYDROCHLORIDE 200 MG: 200 TABLET ORAL at 08:13

## 2017-09-22 RX ADMIN — GUAIFENESIN 1200 MG: 600 TABLET, EXTENDED RELEASE ORAL at 14:14

## 2017-09-22 RX ADMIN — Medication 20 ML: at 14:15

## 2017-09-22 RX ADMIN — MIDAZOLAM HYDROCHLORIDE 2 MG: 1 INJECTION, SOLUTION INTRAMUSCULAR; INTRAVENOUS at 09:45

## 2017-09-22 RX ADMIN — Medication 600 UNITS: at 06:00

## 2017-09-22 RX ADMIN — POVIDONE-IODINE: 10 SOLUTION TOPICAL at 08:17

## 2017-09-22 RX ADMIN — METOPROLOL SUCCINATE 100 MG: 100 TABLET, EXTENDED RELEASE ORAL at 21:06

## 2017-09-22 RX ADMIN — ALBUTEROL SULFATE 2.5 MG: 2.5 SOLUTION RESPIRATORY (INHALATION) at 05:12

## 2017-09-22 RX ADMIN — GABAPENTIN 600 MG: 300 CAPSULE ORAL at 14:15

## 2017-09-22 NOTE — PROGRESS NOTES
OT Daily Note    Time In 0832   Time Out 5645     Subjective: \"I'm ready to go to this procedure. \"  Pain: None indicated    Precautions: Contact (MRSA)    Self-Care   Attempted ADL session with patient this AM, patient seated in bed upon therapist arrival slid down in bed. Patient slides toward [facility] with total assist (+2) d/t fatigue. Patient prompted and completes UB dressing to doff overhead shirt seated in bed with moderate assistance, therapist helping shirt up back and over head. Transport arrives for cardioversion procedure, final 35 minutes of session missed. Patient dons hospital gown with setup assist to thread both arms through gown. Assessment: Patient tolerated beginning of session prior to procedure. Interdisciplinary Communication: Collaborated with Nikita Tamayo regarding patient's off-floor status. Plan: Continue to address ADL/IADL, functional mobility, activity tolerance, balance, strengthening, coordination, cognition.       Bartolo Walters, OTR/L

## 2017-09-22 NOTE — PROGRESS NOTES
Daily Progress Note -- Hematology/ Medical Oncology        Patient Name: Ok Bryan    Date of Visit: 2017  : 1945  Age:72 y.o. Interval history:  STAT CBC ordered twice this am-still no results   Sitting up in bed  Wife at bedside  Denies any bleeding.      Medications:   Current Facility-Administered Medications   Medication Dose Route Frequency Provider Last Rate Last Dose    albuterol CONCENTRATE 2.5mg/0.5 mL neb soln  2.5 mg Nebulization BID Marilu Bazzi MD        budesonide (PULMICORT) 500 mcg/2 ml nebulizer suspension  500 mcg Nebulization BID RT Marilu Chew MD        nitroglycerin (NITROSTAT) tablet 0.4 mg  0.4 mg SubLINGual Q5MIN PRN Marilu Chew MD        [facility]dansetron [facility]) injection 4 mg  4 mg IntraVENous Q4H PRN Marilu Bazzi MD        sodium chloride (NS) flush 5-10 mL  5-10 mL IntraVENous PRN Marilu Bazzi MD        NUTRITIONAL SUPPORT ELECTROLYTE PRN ORDERS   Does Not Apply PRN Marilu Chew MD        acetaminophen (TYLENOL) suppository 650 mg  650 mg Rectal Q4H PRN Marilu Chew MD        acetaminophen (TYLENOL) tablet 650 mg  650 mg Oral Q4H PRN Marilu Chew MD        albuterol (PROVENTIL HFA, VENTOLIN HFA, PROAIR HFA) inhaler 2 Puff  2 Puff Inhalation Q4H PRN Marilu Bazzi MD        albuterol (PROVENTIL VENTOLIN) nebulizer solution 2.5 mg  2.5 mg Nebulization Q4H PRN Marilu Chew MD        dextrose (D50W) injection syrg 25 g  25 g IntraVENous PRN Marilu Bazzi MD        [START ON 2017] digoxin (LANOXIN) tablet 0.25 mg  0.25 mg Oral DAILY Marilu Chew MD        [START ON 2017] ferrous sulfate tablet 325 mg  1 Tab Oral DAILY WITH BREAKFAST Marilu Chew MD        gabapentin (NEURONTIN) capsule 600 mg  600 mg Oral TID Marilu Chew MD        guaiFENesin ER [facility]) tablet 1,200 mg  1,200 mg Oral Q12H Marilu Jackson MD        heparin (porcine) pf 600 Units  600 Units InterCATHeter Q8H Marilu Jackson MD        heparin (porcine) pf 600 Units  600 Units InterCATHeter PRN Marilu Jackson MD        insulin lispro (HUMALOG) injection   SubCUTAneous AC&HS Marilu Beatty MD        [START ON 9/23/2017] povidone-iodine (BETADINE) 10 % topical solution   Topical DAILY Marilu Jackson MD        [START ON 9/29/2017] predniSONE (DELTASONE) tablet 10 mg  10 mg Oral DAILY WITH BREAKFAST Marilu Jackson MD        [START ON 9/23/2017] predniSONE (DELTASONE) tablet 20 mg  20 mg Oral DAILY WITH BREAKFAST Marilu Jackson MD        sodium chloride (NS) flush 20 mL  20 mL InterCATHeter Q8H Marilu Jackson MD       [facility] ON 9/23/2017] tiotropium (SPIRIVA) inhalation capsule 18 mcg  1 Cap Inhalation DAILY Marilu Jackson MD        traZODone (DESYREL) tablet 50 mg  50 mg Oral QHS PRN Marilu Jackson MD        influenza vaccine 2017-18 (3 yrs+)(PF) (FLUZONE QUAD/FLUARIX QUAD) injection 0.5 mL  0.5 mL IntraMUSCular PRIOR TO DISCHARGE Marilu Jackson MD        0.9% sodium chloride infusion 250 mL  250 mL IntraVENous PRN Marilu Beatty MD       [facility] [START ON 9/23/2017] furosemide (LASIX) tablet 20 mg  20 mg Oral DAILY Marilu Jackson MD        [START ON 9/23/2017] potassium chloride (K-DUR, KLOR-CON) SR tablet 20 mEq  20 mEq Oral DAILY Marilu Jackson MD       [facility] ON 9/23/2017] dilTIAZem CD (CARDIZEM CD) capsule 180 mg  180 mg Oral DAILY Marilu Jackson MD        amiodarone (CORDARONE) tablet 200 mg  200 mg Oral BID Marilu Jackson MD        metoprolol succinate (TOPROL-XL) tablet 100 mg  100 mg Oral BID Marilu Beatty MD        famotidine (PEPCID) tablet 20 mg  20 mg Oral BID Marilu Beatty MD        warfarin (COUMADIN) tablet 5 mg  5 mg Oral QPM Marilu Walden MD         Facility-Administered Medications Ordered in Other Encounters   Medication Dose Route Frequency Provider Last Rate Last Dose    fentaNYL citrate (PF) injection  mcg   mcg IntraVENous Multiple Irina Vera MD   50 mcg at 09/22/17 0945    midazolam (VERSED) injection 0.5-5 mg  0.5-5 mg IntraVENous Multiple Irina Vera MD   2 mg at 09/22/17 0945       Allergies: Allergies   Allergen Reactions    Levaquin [Levofloxacin] Other (comments)     GI upset    Metformin Other (comments)     Gi upset       Review of Systems: The Review of Systems is documented in full in the internal medical record. All systems are negative other than for those noted above. Physical Examination:  General Appearance: Chronically-ll appearing patient in no acute distress. Vital signs:   Visit Vitals    /64    Pulse 63    Temp 97.5 °F (36.4 °C)    Resp 16    SpO2 98%     HEENT: No oral or pharyngeal masses. There is no ulceration or thrush. Lungs: Bilateral breath sounds clear. No use of accessory muscles. O2 intact. Heart: There is no jugular venous distention. Regular, irregular. Generalized edema. Abdomen: Soft, non-tender, bowel sounds present and normal, no appreciated hepatosplenomegaly. No palpable masses. Skin: Multiple areas of ecchymoses on upper extremities bilaterally. No evidence of malignancy. Neuro: Alert today with no obvious focal deficits.    .     Labs/Imaging:  Lab Results   Component Value Date/Time    WBC 3.9 09/21/2017 05:49 AM    HGB 8.1 09/21/2017 05:49 AM    HCT 25.5 09/21/2017 05:49 AM    PLATELET 36 49/52/6692 05:49 AM    .4 09/21/2017 05:49 AM       Lab Results   Component Value Date/Time    Sodium 141 09/20/2017 05:19 AM    Potassium 3.9 09/20/2017 05:19 AM    Chloride 103 09/20/2017 05:19 AM    CO2 34 09/20/2017 05:19 AM    Anion gap 4 09/20/2017 05:19 AM    Glucose 84 09/20/2017 05:19 AM BUN 19 09/20/2017 05:19 AM    Creatinine 0.90 09/20/2017 05:19 AM    GFR est AA >60 09/20/2017 05:19 AM    GFR est non-AA >60 09/20/2017 05:19 AM    Calcium 8.2 09/20/2017 05:19 AM    AST (SGOT) 405 09/12/2017 06:31 AM    Alk. phosphatase 122 09/12/2017 06:31 AM    Protein, total 6.5 09/12/2017 06:31 AM    Albumin 2.3 09/12/2017 06:31 AM    Globulin 4.2 09/12/2017 06:31 AM    A-G Ratio 0.5 09/12/2017 06:31 AM    ALT (SGPT) 578 09/12/2017 06:31 AM     Initial consult HPI:   He has a history of mechanical AVR in 2000. He continues on chronic anticoagulation with coumadin, chronic systolic CHF/ICM EF 98%, COPD, PVD, DM II, HTN, iron deficiency, and dyslipidemia who developed worsening dyspnea at the beginning of the week. He presented to the ER and was found to be in atrial flutter with RVR now status post cardioversion and amiodarone. Of note, his platelets were 87,101 on admission and 30,000 today. No other lab comparisons here but can see through care everywhere that his platelets were 55,796 at [facility] about a year ago. He has no recollection of ever being told he has low platelets. He takes iron daily for his history of PRATIK and reports black stools due to this. Hgb on admission was 10.2 and 12.7 today. We have been consulted for his thrombocytopenia    PLAN:  - Presumed ITP:  09/07 Transfuse platelets as needed - keep above 50,000. Coumadin necessary due to mechanical heart valve. Continue daily CBC to monitor platelet count. BMBx can be done as outpatient or when out of ICU. Change solumedrol to prednisone taper. Begin with 40mg daily and taper down 10mg weekly. 09/13 Platelets 48,554 yesterday. Transfused 1 unit. Today platelets 95,149. HIT panel pending. IVIG today and tomorrow. Continue with steroids. We will follow up on labs tomorrow. 09/14 Platelets 82,037 today. HIT negative. IVIG today and tomorrow. Continue Prednisone 40 mg daily and taper by 10 mg weekly.  Will follow labs tomorrow and if acceptable, will sign off and see him in outpatient clinic.   09/15 Platelets down to 19,864 today. 2nd dose of IVIG today. Platelets ordered per primary team. Continue Prednisone 40 mg daily and taper by 10 mg weekly. Will continue to watch platelets daily and evaluate patient intermittently. 09/22 STAT CBC ordered this am. Ordered again this afternoon. We will follow along for labs. If platelets do not recover on their own over the weekend we plan to start Nplate on Monday. Henry Álvarez NP  [facility]  [address] [place] [address]  P [phone]                  Attending Addendum:  I personally evaluated the patient with Henry Álvarez N.P.,  and agree with the assessment, findings and plan as documented. Appears stable, heart regular without murmur, he may benefit from N-plate.               Octaviano Posadas MD  [address] [place] [address]  Office : [phone]  Fax : [phone]

## 2017-09-22 NOTE — PROGRESS NOTES
Warfarin dosing per pharmacist    Attila Mcneil is a 67 y.o. male. Indication: A. Fib    Goal INR:  2-3    Home dose:  5 mg daily, 7.5 mg on Wednesday    Risk factors or significant drug interactions:  Antibiotics, prednisone    Other anticoagulants:  none    Daily Monitoring  Date  INR     Warfarin dose HGB              Notes  9/8  1.2  10mg  11.2    9/9  1.3  10mg  9.4  9/10  ---  10mg  9.7  9/11  2.1  10mg  10.8  9/12  2.9  10mg  8.9  9/13  3.5  Hold  8.4  Pharmacy Consulted  9/14  3.6  Held  8.5  9/15  2.5  5 mg  8.7  9/16  1.9  6 mg  10.8  9/17  2.2  6 mg  9.3  9/18  2.4  6 mg  --  9/19  3.2  Hold  9.1  9/20  3.4  Hold  8.8  9/21  2.3  5 mg  8.1  9/22  1.4  5 mg  ---    INR down to 1.4. Warfarin restarted yesterday. Continue warfarin 5 mg qhs. Daily INR. Pharmacy will continue to follow. Please call with any questions.     Thank you,  Carrol Peters, PharmD  Clinical Pharmacist  [phone]

## 2017-09-22 NOTE — PROGRESS NOTES
Problem: Falls - Risk of  Goal: *Absence of Falls  Document Lauren Fall Risk and appropriate interventions in the flowsheet.    Outcome: Progressing Towards Goal  Fall Risk Interventions:  Mobility Interventions: Patient to call before getting OOB, Strengthening exercises (ROM-active/passive), Utilize walker, cane, or other assitive device     Mentation Interventions: Bed/chair exit alarm, Evaluate medications/consider consulting pharmacy, Increase mobility, More frequent rounding     Medication Interventions: Evaluate medications/consider consulting pharmacy, Patient to call before getting OOB, Teach patient to arise slowly     Elimination Interventions: Call light in reach, Patient to call for help with toileting needs, Urinal in reach     History of Falls Interventions: Evaluate medications/consider consulting pharmacy, Consult care management for discharge planning

## 2017-09-22 NOTE — PROGRESS NOTES
Final 35 minutes of AM OT session missed d/t patient transported off floor for cardioversion procedure. To make up missed therapy time when patient returns to floor, as patient is able to tolerate, and as scheduling allows.     Satish Gandhi, ADOLFO   9/22/2017

## 2017-09-22 NOTE — PROGRESS NOTES
TRANSFER - OUT REPORT:    Verbal report given to [facility], RN(name) on Rosa George  being transferred to 9th floor(unit) for routine progression of care       Report consisted of patients Situation, Background, Assessment and   Recommendations(SBAR). Information from the following report(s) SBAR was reviewed with the receiving nurse. is allergic to levaquin [levofloxacin] and metformin. Opportunity for questions and clarification was provided. Procedure Summary:Pt ha cardioversion with 150 joules x 1. Pt sachin well. Med Administration    Versed:  2 mg  Fentanyl: 50 mcg    Visit Vitals    /61    Pulse 65    Resp 18    SpO2 100%     Past Medical History:   Diagnosis Date    Acute diastolic CHF (congestive heart failure) ([facility] 9/15/2016    Arthritis     Chicken pox     Coronary artery disease 4/16/2014    DJD (degenerative joint disease)     Emphysema     Hx of aortic valve replacement, mechanical       doing well,. Had to hold 3 days for removal of skin ca. Will have to have add. resection.  Hypercholesterolemia     Hypertension     Palpitations      Holter showed  PVC's, PAC's, one short run SVT.     Pneumonia     Systolic CHF, acute ([facility] [address] 11/15/2016

## 2017-09-22 NOTE — PROGRESS NOTES
Patient returned to floor s/p cardioversion, however refusing PM OT session d/t decreased activity tolerance at this time despite encouragement and option for assist with ADL tasks at bed level. Patient reports \"I want to get back to [therapy] as much as you want me to,\" but reports unable to tolerate/participate in treatment today. Patient agreeable to resuming therapies tomorrow.     Iron Ritchie OT  9/22/2017

## 2017-09-22 NOTE — PROGRESS NOTES
Brief Cardiac Procedure Note    Patient: Sugar Sherwood MRN: [id number]  SSN: [id number]    YOB: 1945  Age: 67 y.o. Sex: male      Date of Procedure: 9/22/2017     Pre-procedure Diagnosis: Atrial Fibrillation/Atrial Flutter    Post-procedure Diagnosis: SAME    Procedure: Cardioversion    Brief Description of Procedure:   MODERATE SEDATION  120JOULE SINUS FROM     Performed By: Elmira Sanchez MD     Assistants: none    Anesthesia: Moderate Sedation    Estimated Blood Loss: Less than 10 mL      Specimens: None    Implants: None    Findings:  AFIB TO SINUS RHYTHM     Complications: none    Recommendations: CONTINUE AMIODARONE .     Signed By: Elmira Sanchez MD     September 22, 2017

## 2017-09-22 NOTE — PROCEDURES
[address]       Name:  Evens Borja   MR#:  [id number]   :  1945   Account #:  [de-identified]   Date of Adm:  2017       CLINICAL: A 54-year-old gentleman with recurrent atrial   fibrillation as well as multiple medical problems,   thrombocytopenia, recent MERLYN cardioversion with transient sinus   rhythm, now initiated on amiodarone and repeat cardioversion was   recommended. Risks and options discussed. Brought to the cath lab suite fasting. He was monitored   conscious with moderate , Versed 2 mg, fentanyl 50 mg. Oxygen   saturation, blood pressure, rhythm were monitored. He was   cardioverted with 120 joules biphasic energy AP from an atrial   fibrillation, controlled ventricular response to sinus rhythm at   a rate of 60. IMPRESSION: Successful cardioversion.         MD Carmen Mehta [place] [facility]   D:  2017   13:37   T:  2017   13:51   Job #:  [id number]

## 2017-09-22 NOTE — PROGRESS NOTES
Pt resting quietly in bed. Denies needs or concerns at this time. Alert and oriented x4. Lungs sounds diminished bilaterally with respirations even and unlabored. Irregular heart rate with long pauses. Bowel sounds active in all quadrants. Palpable pulses bilaterally in upper and lower extremities. Right upper arm PICC. Last dressing change 9/20/2017. Instructed to call for assistance. Call light in reach. Voiced understanding and identified call light.

## 2017-09-22 NOTE — PROGRESS NOTES
[facility] CARDIOLOGY PROGRESS NOTE      9/22/2017 7:34 AM    Admit Date: 9/13/2017    Admit Diagnosis: Cardiac Debility; Respiratory failure ([address]      Subjective:   Unable to get cardioversion yesterday secondary to miscommunication       Objective:      Vitals:    09/21/17 2123 09/21/17 2202 09/21/17 2315 09/22/17 0513   BP: 128/75 128/75     Pulse: 75 75     Resp:  18     Temp:  97.8 °F (36.6 °C)     SpO2:  96% 98% 93%       Physical Exam:  Neck-   CV- irr+r  Lungs- clear  Ext-  Skin- warm and dry        Data Review:   Recent Labs      09/21/17   0549  09/20/17   0519   NA   --   141   K   --   3.9   BUN   --   19   CREA   --   0.90   GLU   --   84   WBC  3.9*  8.6   HGB  8.1*  8.8*   HCT  25.5*  27.6*   PLT  36*  52*   INR  2.3*  3.4*       Assessment and Plan: Active Problems:    Ischemic cardiomyopathy (8/23/2017)      Overview: 8/23/17 ECHO:      EF 40 % to 45 %. There were no regional wall motion abnormalities. Moderate hypokinesis of the mid-apical anterior and apical wall(s).       Atrial flutter with rapid ventricular response ([address] (8/23/2017)   he is agreeable to cardioversion       Thrombocytopenia ([address] (8/26/2017)  still only 36K      COPD (chronic obstructive pulmonary disease) ([address] (8/30/2017)      Respiratory failure ([address] (9/13/2017)        Elda Baker MD

## 2017-09-22 NOTE — PROGRESS NOTES
Ailin Bolivar MD,   Medical Director  [facility], [address] [facility]  Tel: [phone]       [facility] PROGRESS NOTE    Juan Thompson  Admit Date: 9/13/2017  Admit Diagnosis: Cardiac Debility; Respiratory failure (HCC)    Subjective     Successful cardioversion this a.m by Dr. Marino Lopez. Tolerated well.  Denies cp/sob. + fatigue     Objective:     Current Facility-Administered Medications   Medication Dose Route Frequency    albuterol CONCENTRATE 2.5mg/0.5 mL neb soln  2.5 mg Nebulization BID    budesonide (PULMICORT) 500 mcg/2 ml nebulizer suspension  500 mcg Nebulization BID RT    nitroglycerin (NITROSTAT) tablet 0.4 mg  0.4 mg SubLINGual Q5MIN PRN    ondansetron (ZOFRAN) injection 4 mg  4 mg IntraVENous Q4H PRN    sodium chloride (NS) flush 5-10 mL  5-10 mL IntraVENous PRN    NUTRITIONAL SUPPORT ELECTROLYTE PRN ORDERS   Does Not Apply PRN    acetaminophen (TYLENOL) suppository 650 mg  650 mg Rectal Q4H PRN    acetaminophen (TYLENOL) tablet 650 mg  650 mg Oral Q4H PRN    albuterol (PROVENTIL HFA, VENTOLIN HFA, PROAIR HFA) inhaler 2 Puff  2 Puff Inhalation Q4H PRN    albuterol (PROVENTIL VENTOLIN) nebulizer solution 2.5 mg  2.5 mg Nebulization Q4H PRN    dextrose (D50W) injection syrg 25 g  25 g IntraVENous PRN    [START ON 9/23/2017] digoxin (LANOXIN) tablet 0.25 mg  0.25 mg Oral DAILY    [START ON 9/23/2017] ferrous sulfate tablet 325 mg  1 Tab Oral DAILY WITH BREAKFAST    gabapentin (NEURONTIN) capsule 600 mg  600 mg Oral TID    guaiFENesin ER (MUCINEX) tablet 1,200 mg  1,200 mg Oral Q12H    heparin (porcine) pf 600 Units  600 Units InterCATHeter Q8H    heparin (porcine) pf 600 Units  600 Units InterCATHeter PRN    insulin lispro (HUMALOG) injection   SubCUTAneous AC&HS    [START ON 9/23/2017] povidone-iodine (BETADINE) 10 % topical solution   Topical DAILY    [START ON 9/29/2017] predniSONE (DELTASONE) tablet 10 mg  10 mg Oral DAILY WITH BREAKFAST    [START ON 9/23/2017] predniSONE (DELTASONE) tablet 20 mg  20 mg Oral DAILY WITH BREAKFAST    sodium chloride (NS) flush 20 mL  20 mL InterCATHeter Q8H    [START ON 9/23/2017] tiotropium (SPIRIVA) inhalation capsule 18 mcg  1 Cap Inhalation DAILY    traZODone (DESYREL) tablet 50 mg  50 mg Oral QHS PRN    influenza vaccine 2015-16 (36mos+)(PF) (FLUZONE/FLUARIX QUAD) injection 0.5 mL  0.5 mL IntraMUSCular PRIOR TO DISCHARGE    0.9% sodium chloride infusion 250 mL  250 mL IntraVENous PRN    [START ON 9/23/2017] furosemide (LASIX) tablet 20 mg  20 mg Oral DAILY    [START ON 9/23/2017] potassium chloride (K-DUR, KLOR-CON) SR tablet 20 mEq  20 mEq Oral DAILY    [START ON 9/23/2017] dilTIAZem CD (CARDIZEM CD) capsule 180 mg  180 mg Oral DAILY    amiodarone (CORDARONE) tablet 200 mg  200 mg Oral BID    metoprolol succinate (TOPROL-XL) tablet 100 mg  100 mg Oral BID    famotidine (PEPCID) tablet 20 mg  20 mg Oral BID    warfarin (COUMADIN) tablet 5 mg  5 mg Oral QPM     Facility-Administered Medications Ordered in Other Encounters   Medication Dose Route Frequency    fentaNYL citrate (PF) injection  mcg   mcg IntraVENous Multiple    midazolam (VERSED) injection 0.5-5 mg  0.5-5 mg IntraVENous Multiple     Review of Systems:Denies chest pain, shortness of breath, cough, headache, visual problems, abdominal pain, dysurea, calf pain. Pertinent positives are as noted in the medical records and unremarkable otherwise. Visit Vitals    /64    Pulse 63    Temp 97.5 °F (36.4 °C)    Resp 16    SpO2 98%        Physical Exam:   General: Alert and age appropriately oriented. No acute cardio respiratory distress. HEENT: Normocephalic,no scleral icterus  Oral mucosa moist without cyanosis   Lungs: Clear to auscultation  bilaterally. Respiration even and unlabored   Heart: Regular rate and rhythm, S1, S2   No  murmurs, clicks, rub or gallops   Abdomen: Soft, non-tender, nondistended. Bowel sounds present. No organomegaly. Genitourinary: Benign . Neuromuscular:      Speech clear and appropriate, generalized weakness prox> distal  No sensory deficits   Skin/extremity: No rashes, no erythema. No calf tenderness BLE  No edema                                                                            Functional Assessment:          Balance  Sitting - Static: Good (unsupported) (09/16/17 1300)  Sitting - Dynamic: Good (unsupported) (09/16/17 1300)  Standing - Static: Fair;Constant support (with RW) (09/16/17 1300)  Standing - Dynamic : Impaired (09/16/17 1300)           Toileting  Adaptive Equipment: Grab bars; Other (comment) (Wheelchair) (09/18/17 1012)         Easter Getting Fall Risk Assessment:  Easter Getting Fall Risk  Mobility: Ambulates or transfers with assist devices or assistance/unsteady gait (09/22/17 0705)  Mobility Interventions: Patient to call before getting OOB; Strengthening exercises (ROM-active/passive); Utilize walker, cane, or other assitive device (09/22/17 0705)  Mentation: Alert, oriented x 3 (09/22/17 0705)  Mentation Interventions: Bed/chair exit alarm;Evaluate medications/consider consulting pharmacy; Increase mobility;More frequent rounding (09/22/17 0705)  Medication: Patient receiving anticonvulsants, sedatives(tranquilizers), psychotropics or hypnotics, hypoglycemics, narcotics, sleep aids, antihypertensives, laxatives, or diuretics (09/22/17 0705)  Medication Interventions: Evaluate medications/consider consulting pharmacy; Patient to call before getting OOB; Teach patient to arise slowly (09/22/17 6456)  Elimination: Needs assistance with toileting (09/22/17 0705)  Elimination Interventions: Call light in reach; Patient to call for help with toileting needs;Urinal in reach (09/22/17 8337)  Prior Fall History: During admission (Date - Comment) (09/22/17 0705)  History of Falls Interventions: Evaluate medications/consider consulting pharmacy; Consult care management for discharge planning (09/22/17 0705)  Total Score: 5 (09/22/17 0705)  Standard Fall Precautions: Yes (09/17/17 1109)  High Fall Risk: Yes (09/22/17 0705)     Speech Assessment:  Aspiration Signs/Symptoms: None (09/14/17 1036)      Ambulation:  Gait  Distance (ft): 30 Feet (ft) (09/16/17 1300)  Assistive Device: Walker, rolling;Gait belt (09/16/17 1300)     Labs/Studies:  Recent Results (from the past 72 hour(s))   GLUCOSE, POC    Collection Time: 09/19/17  4:31 PM   Result Value Ref Range    Glucose (POC) 200 (H) 65 - 100 mg/dL   GLUCOSE, POC    Collection Time: 09/19/17  8:37 PM   Result Value Ref Range    Glucose (POC) 268 (H) 65 - 100 mg/dL   PROTHROMBIN TIME + INR    Collection Time: 09/20/17  5:19 AM   Result Value Ref Range    Prothrombin time 37.6 (H) 9.6 - 12.0 sec    INR 3.4 (H) 0.9 - 1.2     CBC W/O DIFF    Collection Time: 09/20/17  5:19 AM   Result Value Ref Range    WBC 8.6 4.3 - 11.1 K/uL    RBC 2.79 (L) 4.23 - 5.67 M/uL    HGB 8.8 (L) 13.6 - 17.2 g/dL    HCT 27.6 (L) 41.1 - 50.3 %    MCV 98.8 (H) 79.6 - 97.8 FL    MCH 31.5 26.1 - 32.9 PG    MCHC 31.9 31.4 - 35.0 g/dL    RDW 20.7 (H) 11.9 - 14.6 %    PLATELET 52 (L) 993 - 450 K/uL    MPV 11.7 10.8 - 32.4 FL   METABOLIC PANEL, BASIC    Collection Time: 09/20/17  5:19 AM   Result Value Ref Range    Sodium 141 136 - 145 mmol/L    Potassium 3.9 3.5 - 5.1 mmol/L    Chloride 103 98 - 107 mmol/L    CO2 34 (H) 21 - 32 mmol/L    Anion gap 4 (L) 7 - 16 mmol/L    Glucose 84 65 - 100 mg/dL    BUN 19 8 - 23 MG/DL    Creatinine 0.90 0.8 - 1.5 MG/DL    GFR est AA >60 >60 ml/min/1.73m2    GFR est non-AA >60 >60 ml/min/1.73m2    Calcium 8.2 (L) 8.3 - 10.4 MG/DL   DIGOXIN    Collection Time: 09/20/17  5:19 AM   Result Value Ref Range    Digoxin level 1.1 0.90 - 2.10 NG/ML   GLUCOSE, POC    Collection Time: 09/20/17  5:55 AM   Result Value Ref Range    Glucose (POC) 101 (H) 65 - 100 mg/dL   GLUCOSE, POC    Collection Time: 09/20/17 11:21 AM   Result Value Ref Range    Glucose (POC) 176 (H) 65 - 100 mg/dL   GLUCOSE, POC    Collection Time: 09/20/17  4:25 PM   Result Value Ref Range    Glucose (POC) 162 (H) 65 - 100 mg/dL   GLUCOSE, POC    Collection Time: 09/20/17  8:57 PM   Result Value Ref Range    Glucose (POC) 168 (H) 65 - 100 mg/dL   PROTHROMBIN TIME + INR    Collection Time: 09/21/17  5:49 AM   Result Value Ref Range    Prothrombin time 24.8 (H) 9.6 - 12.0 sec    INR 2.3 (H) 0.9 - 1.2     CBC W/O DIFF    Collection Time: 09/21/17  5:49 AM   Result Value Ref Range    WBC 3.9 (L) 4.3 - 11.1 K/uL    RBC 2.54 (L) 4.23 - 5.67 M/uL    HGB 8.1 (L) 13.6 - 17.2 g/dL    HCT 25.5 (L) 41.1 - 50.3 %    .4 (H) 79.6 - 97.8 FL    MCH 32.0 26.1 - 32.9 PG    MCHC 31.9 31.4 - 35.0 g/dL    RDW 21.9 (H) 11.9 - 14.6 %    PLATELET 36 (L) 119 - 450 K/uL    MPV 11.3 10.8 - 14.1 FL   GLUCOSE, POC    Collection Time: 09/21/17  7:13 AM   Result Value Ref Range    Glucose (POC) 93 65 - 100 mg/dL   GLUCOSE, POC    Collection Time: 09/21/17 11:11 AM   Result Value Ref Range    Glucose (POC) 114 (H) 65 - 100 mg/dL   GLUCOSE, POC    Collection Time: 09/21/17  4:01 PM   Result Value Ref Range    Glucose (POC) 139 (H) 65 - 100 mg/dL   GLUCOSE, POC    Collection Time: 09/21/17  8:45 PM   Result Value Ref Range    Glucose (POC) 353 (H) 65 - 100 mg/dL   GLUCOSE, POC    Collection Time: 09/22/17  7:06 AM   Result Value Ref Range    Glucose (POC) 89 65 - 100 mg/dL   GLUCOSE, POC    Collection Time: 09/22/17 12:13 PM   Result Value Ref Range    Glucose (POC) 94 65 - 100 mg/dL       Assessment:     Problem List as of 9/22/2017  Date Reviewed: 9/9/2017          Codes Class Noted - Resolved    Respiratory failure ([address] ICD-10-CM: J96.90  ICD-9-CM: 518.81  9/13/2017 - Present        MRSA nasal colonization ICD-10-CM: Z22.322  ICD-9-CM: V02.54  9/8/2017 - Present        COPD (chronic obstructive pulmonary disease) (HCC) (Chronic) ICD-10-CM: J44.9  ICD-9-CM: 141  8/30/2017 - Present        Acute respiratory failure with hypercapnia ([facility] ICD-10-CM: J96.02  ICD-9-CM: 518.81  8/29/2017 - Present        History of tobacco use (Chronic) ICD-10-CM: Q82.201  ICD-9-CM: V15.82  8/29/2017 - Present        Hyponatremia ICD-10-CM: E87.1  ICD-9-CM: 276.1  8/29/2017 - Present        Acute encephalopathy ICD-10-CM: G93.40  ICD-9-CM: 348.30  8/29/2017 - Present        Thrombocytopenia (HCC) ICD-10-CM: D69.6  ICD-9-CM: 287.5  8/26/2017 - Present        Anticoagulant long-term use (Chronic) ICD-10-CM: Z79.01  ICD-9-CM: V58.61  8/23/2017 - Present        HTN (hypertension), benign (Chronic) ICD-10-CM: I10  ICD-9-CM: 401.1  8/23/2017 - Present        Peripheral vascular disease ([facility] (Chronic) ICD-10-CM: I73.9  ICD-9-CM: 443.9  8/23/2017 - Present        Dyslipidemia (Chronic) ICD-10-CM: E78.5  ICD-9-CM: 272.4  8/23/2017 - Present        History of mechanical aortic valve replacement (Chronic) ICD-10-CM: Z95.2  ICD-9-CM: V43.3  8/23/2017 - Present    Overview Signed 8/30/2017 10:30 AM by Renan Lin NP     Placement 2000, on chronicCoumadin             Centrilobular emphysema ([facility] (Chronic) ICD-10-CM: J43.2  ICD-9-CM: 492.8  8/23/2017 - Present    Overview Signed 9/8/2017  9:24 AM by Delmi Toribio NP      With last PFTs FVC 1.70 L or 44% predicted, FEV1 0.86 L or 29% predicted, FEV1/FVC 51%. This study was a postbronchodilator study performed at the [place] [facility] [place] [facility] on 8/22/2016                  Ischemic cardiomyopathy (Chronic) ICD-10-CM: I25.5  ICD-9-CM: 414.8  8/23/2017 - Present    Overview Signed 8/30/2017 10:29 AM by Renan Lin NP     8/23/17 ECHO:  EF 40 % to 45 %. There were no regional wall motion abnormalities. Moderate hypokinesis of the mid-apical anterior and apical wall(s).              Atrial flutter with rapid ventricular response (HCC) ICD-10-CM: I48.92  ICD-9-CM: 427.32  8/23/2017 - Present        LV dysfunction (Chronic) ICD-10-CM: I51.9  ICD-9-CM: 429.9  10/19/2016 - Present        Atherosclerosis of native arteries of the extremities with intermittent claudication (Chronic) ICD-10-CM: L54.739  ICD-9-CM: 440.21  4/15/2014 - Present                     Assessment; Debility s/p resp failure, a flutter with RVR s/p cardioversion  PLAN  Continue daily physician medical management:      Atrial flutter with rapid ventricular response ([address] (8/23/2017) sp cardioversion - monitor HR./ BP. / hx of CHF / Ischemic cardiomyopathy     - Digoxin, coreg.  - continue anticoagulation. 9/20 has been on hold due to pending cardiac procedure; however INR now 3.4 from 3.2 9/19; 9/21 2.3  - cardiac precautions.   - (9/16) -recurrent a.fib with RVR. HR 130s. 9/20 continues; metoprolol added 9/19; PT UNABLE TO PARTICIPATE IN THERAPIES. SHOULD BE TRANSFERRED TO TELEMETRY TO MANAGE CARDIAC ISSUES SO AS NOT TO USE OF ACUTE REHAB DAYS. -9/21 will try to encourage participation in therapies today. Wouldn't even do ST at bedside yesterday. Will put parameters on when to hold therapies due to tachyc.   -9/22 s/p successful cardioversion by Dr Felipe Chen; now in NSR, HR 65; restart full therapies (encourage participation); restart Coumadin 5mg daily        - restarted diuretics, as was taking at home. - CxR 9/16 very small bilateral pleural effusions are stable.    - 9/19-  possible cardioversion, AVN ablation/pacemaker in am per Cardiology/EP    -9/20 HOLD proc due to elevated INR, cont to hold coumadin ; 9/21 INR 2.3 from 3.4; restart coumadin after procedure today if ok with cardiology      History of mechanical aortic valve replacement 2000, on coumadin/ Anticoagulant long-term use. Goal 2.5-3.5.  - resume warfarin, monitor INR.   - INR 2.4 (9/18) . average out home dose ~> 6mg hs.   - Warfarin 6mg HS (9/16)  - pharmacy assisting.  On hold(9/19) for procedure.   -9/21 INR now finally trending down; 2.3 from 3.4 yesterday;off coumadin; likely restart Coumadin after procedure  -9/22 restart Coumadin 5mg daily      Bacteremia - started on empiric antibiotic treatment. Aztreonam, vanco.   - BCx grew e.coli. discontinued vano. - final ID extended-spectrum beta-lactamases (ESBLs) , appears treated, though efficacy of vanco, aztreonam not optimal.  . Pt asymptomatic, WBC wnl. Will discontinue - aztreonam. .  - will monitor closely. - will have on contact precaution, infection control notified.   - BCx redrawn 9/17, no growth, will lift contact precautions 9/19.       HTN (hypertension) - monitor.  - continue Coreg/ digoxin  -9/21 cardizem and lasix ; now off Coreg per cardiology for rate control but now bradycardia; really no rhyme or reason to his fluctuating HR; BP stable          Thrombocytopenia/ ITP  - continued on steroids. - hematology following.   - HIT negative. - ivig per hematology direction. Will receive 1 more dose. -  received platelet 1unit (3/27). plt 25k -> 75k (9/16)-> 65k (9/17) -> 68 (9/19) monitor; 9/20 plts 52; defer to hematology  -9/21 plts 36; pending 9/22          COPD   - continue respiratory treatments. Presently q6h, wean as appropriate.    - resume spiriva, pulmicort  - SOB with activity. - normal CxR (9/15)  - will wean albuterol nebs 9/18. Bid and as needed. -9/20 SOB; sats 95% RA, bilateral crackles, increasing JIM; likely due to tachycardia; will f/u portable CXR; weaning steroids  -9/21 CXR; shows no significant change in small right pleural effusion and adjacent mild right basilar atelectasis/infiltrate. Continue to enc IS. bipap at noc; sats 95% RA             iron deficiency anemia -hgb 8.5-> 8.7(9/15)-. 10.8(9/18) -> 9.3-> 9.1 (9/19) Monitor.  Continue fe supplements.   -9/20 hgb 8.8; no evidence of acute bleed; hgb 8.1 9/21; check stool  -9/21 pancytopenic, WBC has dropped  as well; monitor for now      Hyponatremia  - monitor.       Pneumonia prophylaxis- Insentive spirometer every hour while awake      DVT risk / DVT Prophylaxis- Will require daily physician exam to assess for signs and symptoms as patient is at increased risk for of thromboembolism. Mobilization as tolerated. Intermittent pneumatic compression devices when in bed Thigh-high or knee-high thromboembolic deterrent hose when out of bed.    - covered with warfarin. (inr supratherapeutic 9/20, coumadin on hold) no clinical evidence of DVT      Pain Control: stable, mild-to-moderate joint symptoms intermittently, reasonably well controlled by PRN meds. Will require regular pain assessment and comprenhensive pain management. - resume gabapentin.       Wound Care: Monitor wound status daily per staff and physician. At risk for failure. Will require 24/7 rehab nursing. Keep wound clean and dry  - excoriation at bridge of nose. Continue antibiotic ointment.       Diabetes mellitus - Uncontrolled. poor glycemic control. Will require daily, close FSG monitoring and medication adjustment to optimize glycemic control in setting of acute illness and hospitalization.   - SSI coverage with lispro. Poorly controlled , exacerbated by steroids  -9/21 a bit better controlled, likely due to poor po intake  09/22 BS varied greatly 80s to 350s; consider glucotrol       bowel program - colace as needed.      Gi prophylaxis; change pepcid to po 9/20            Time spent was 25 minutes with over 1/2 in direct patient care/examination, consultation and coordination of care.      Signed By: Chrystal Rudd MD     September 22, 2017

## 2017-09-22 NOTE — PROGRESS NOTES
Notified of critical platelet level of 19. Called Dr Prateek Loaiza. Ordered 1 unit of platelets with readback and confirmation along with CBC for AM per telephone orders.

## 2017-09-22 NOTE — PROGRESS NOTES
Speech therapy noteA  Attempted to see pt this am, however, pt off floor.      Madi Bauer MA/DIANA/SLP

## 2017-09-22 NOTE — PROGRESS NOTES
Following patient's return to floor s/p cardioversion, patient refused PM PT session due to \"feeling exhausted. \" Patient reports he has been through a lot today and would like to rest this afternoon but is agreeable to resume PT treatment tomorrow.     Roxanne Conte PT, DPT  9/22/17

## 2017-09-23 LAB
BASOPHILS # BLD: 0 K/UL (ref 0–0.2)
BASOPHILS NFR BLD: 0 % (ref 0–2)
BLD PROD TYP BPU: NORMAL
BPU ID: NORMAL
DIFFERENTIAL METHOD BLD: ABNORMAL
EOSINOPHIL # BLD: 0 K/UL (ref 0–0.8)
EOSINOPHIL NFR BLD: 1 % (ref 0.5–7.8)
ERYTHROCYTE [DISTWIDTH] IN BLOOD BY AUTOMATED COUNT: 23.9 % (ref 11.9–14.6)
FOLATE SERPL-MCNC: 16.1 NG/ML (ref 3.1–17.5)
GLUCOSE BLD STRIP.AUTO-MCNC: 119 MG/DL (ref 65–100)
GLUCOSE BLD STRIP.AUTO-MCNC: 165 MG/DL (ref 65–100)
GLUCOSE BLD STRIP.AUTO-MCNC: 172 MG/DL (ref 65–100)
GLUCOSE BLD STRIP.AUTO-MCNC: 178 MG/DL (ref 65–100)
HCT VFR BLD AUTO: 29.2 % (ref 41.1–50.3)
HGB BLD-MCNC: 8.9 G/DL (ref 13.6–17.2)
IMM GRANULOCYTES # BLD: 0 K/UL (ref 0–0.5)
IMM GRANULOCYTES NFR BLD: 0.3 % (ref 0–5)
INR PPP: 1.4 (ref 0.9–1.2)
LYMPHOCYTES # BLD: 0.6 K/UL (ref 0.5–4.6)
LYMPHOCYTES NFR BLD: 16 % (ref 13–44)
MCH RBC QN AUTO: 31.9 PG (ref 26.1–32.9)
MCHC RBC AUTO-ENTMCNC: 30.6 G/DL (ref 31.4–35)
MCV RBC AUTO: 104.3 FL (ref 79.6–97.8)
MONOCYTES # BLD: 0.2 K/UL (ref 0.1–1.3)
MONOCYTES NFR BLD: 5 % (ref 4–12)
NEUTS SEG # BLD: 3 K/UL (ref 1.7–8.2)
NEUTS SEG NFR BLD: 78 % (ref 43–78)
PLATELET # BLD AUTO: 46 K/UL (ref 150–450)
PMV BLD AUTO: 11.7 FL (ref 10.8–14.1)
PROTHROMBIN TIME: 15.2 SEC (ref 9.6–12)
RBC # BLD AUTO: 2.79 M/UL (ref 4.23–5.67)
STATUS OF UNIT,%ST: NORMAL
UNIT DIVISION, %UDIV: 0
VIT B12 SERPL-MCNC: 588 PG/ML (ref 193–986)
WBC # BLD AUTO: 4.3 K/UL (ref 4.3–11.1)

## 2017-09-23 PROCEDURE — 74011636637 HC RX REV CODE- 636/637: Performed by: PHYSICAL MEDICINE & REHABILITATION

## 2017-09-23 PROCEDURE — 77030011256 HC DRSG MEPILEX <16IN NO BORD MOLN -A

## 2017-09-23 PROCEDURE — 74011000250 HC RX REV CODE- 250: Performed by: PHYSICAL MEDICINE & REHABILITATION

## 2017-09-23 PROCEDURE — 94660 CPAP INITIATION&MGMT: CPT

## 2017-09-23 PROCEDURE — 97535 SELF CARE MNGMENT TRAINING: CPT

## 2017-09-23 PROCEDURE — 94640 AIRWAY INHALATION TREATMENT: CPT

## 2017-09-23 PROCEDURE — 74011250637 HC RX REV CODE- 250/637: Performed by: PHYSICAL MEDICINE & REHABILITATION

## 2017-09-23 PROCEDURE — 74011250637 HC RX REV CODE- 250/637: Performed by: INTERNAL MEDICINE

## 2017-09-23 PROCEDURE — 97530 THERAPEUTIC ACTIVITIES: CPT

## 2017-09-23 PROCEDURE — 86644 CMV ANTIBODY: CPT | Performed by: PHYSICAL MEDICINE & REHABILITATION

## 2017-09-23 PROCEDURE — 85025 COMPLETE CBC W/AUTO DIFF WBC: CPT | Performed by: PHYSICAL MEDICINE & REHABILITATION

## 2017-09-23 PROCEDURE — 85610 PROTHROMBIN TIME: CPT | Performed by: PHYSICAL MEDICINE & REHABILITATION

## 2017-09-23 PROCEDURE — 36430 TRANSFUSION BLD/BLD COMPNT: CPT

## 2017-09-23 PROCEDURE — 65310000000 HC RM PRIVATE REHAB

## 2017-09-23 PROCEDURE — 82746 ASSAY OF FOLIC ACID SERUM: CPT | Performed by: PHYSICAL MEDICINE & REHABILITATION

## 2017-09-23 PROCEDURE — 82962 GLUCOSE BLOOD TEST: CPT

## 2017-09-23 PROCEDURE — 74011250636 HC RX REV CODE- 250/636: Performed by: PHYSICAL MEDICINE & REHABILITATION

## 2017-09-23 PROCEDURE — 97110 THERAPEUTIC EXERCISES: CPT

## 2017-09-23 PROCEDURE — P9037 PLATE PHERES LEUKOREDU IRRAD: HCPCS | Performed by: PHYSICAL MEDICINE & REHABILITATION

## 2017-09-23 PROCEDURE — 94760 N-INVAS EAR/PLS OXIMETRY 1: CPT

## 2017-09-23 PROCEDURE — 99232 SBSQ HOSP IP/OBS MODERATE 35: CPT | Performed by: PHYSICAL MEDICINE & REHABILITATION

## 2017-09-23 PROCEDURE — 82607 VITAMIN B-12: CPT | Performed by: PHYSICAL MEDICINE & REHABILITATION

## 2017-09-23 RX ORDER — SODIUM CHLORIDE 9 MG/ML
250 INJECTION, SOLUTION INTRAVENOUS AS NEEDED
Status: DISCONTINUED | OUTPATIENT
Start: 2017-09-23 | End: 2017-10-02 | Stop reason: SDUPTHER

## 2017-09-23 RX ADMIN — AMIODARONE HYDROCHLORIDE 400 MG: 200 TABLET ORAL at 21:13

## 2017-09-23 RX ADMIN — GUAIFENESIN 1200 MG: 600 TABLET, EXTENDED RELEASE ORAL at 21:13

## 2017-09-23 RX ADMIN — FUROSEMIDE 20 MG: 20 TABLET ORAL at 08:05

## 2017-09-23 RX ADMIN — FAMOTIDINE 20 MG: 20 TABLET ORAL at 08:06

## 2017-09-23 RX ADMIN — ALBUTEROL SULFATE 2.5 MG: 2.5 SOLUTION RESPIRATORY (INHALATION) at 05:00

## 2017-09-23 RX ADMIN — DILTIAZEM HYDROCHLORIDE 180 MG: 180 CAPSULE, COATED, EXTENDED RELEASE ORAL at 08:06

## 2017-09-23 RX ADMIN — SODIUM CHLORIDE, PRESERVATIVE FREE 600 UNITS: 5 INJECTION INTRAVENOUS at 14:48

## 2017-09-23 RX ADMIN — GUAIFENESIN 1200 MG: 600 TABLET, EXTENDED RELEASE ORAL at 08:06

## 2017-09-23 RX ADMIN — INSULIN LISPRO 2 UNITS: 100 INJECTION, SOLUTION INTRAVENOUS; SUBCUTANEOUS at 12:10

## 2017-09-23 RX ADMIN — DIGOXIN 0.25 MG: 250 TABLET ORAL at 08:05

## 2017-09-23 RX ADMIN — FERROUS SULFATE TAB 325 MG (65 MG ELEMENTAL FE) 325 MG: 325 (65 FE) TAB at 08:05

## 2017-09-23 RX ADMIN — TIOTROPIUM BROMIDE 18 MCG: 18 CAPSULE ORAL; RESPIRATORY (INHALATION) at 05:00

## 2017-09-23 RX ADMIN — GABAPENTIN 600 MG: 300 CAPSULE ORAL at 21:13

## 2017-09-23 RX ADMIN — ALBUTEROL SULFATE 2.5 MG: 2.5 SOLUTION RESPIRATORY (INHALATION) at 15:54

## 2017-09-23 RX ADMIN — Medication 20 ML: at 21:13

## 2017-09-23 RX ADMIN — WARFARIN SODIUM 5 MG: 5 TABLET ORAL at 17:27

## 2017-09-23 RX ADMIN — GABAPENTIN 600 MG: 300 CAPSULE ORAL at 14:47

## 2017-09-23 RX ADMIN — METOPROLOL SUCCINATE 100 MG: 100 TABLET, EXTENDED RELEASE ORAL at 08:06

## 2017-09-23 RX ADMIN — FAMOTIDINE 20 MG: 20 TABLET ORAL at 17:27

## 2017-09-23 RX ADMIN — POVIDONE-IODINE: 10 SOLUTION TOPICAL at 08:08

## 2017-09-23 RX ADMIN — BUDESONIDE 500 MCG: 0.5 INHALANT RESPIRATORY (INHALATION) at 05:00

## 2017-09-23 RX ADMIN — GABAPENTIN 600 MG: 300 CAPSULE ORAL at 06:36

## 2017-09-23 RX ADMIN — BUDESONIDE 500 MCG: 0.5 INHALANT RESPIRATORY (INHALATION) at 15:54

## 2017-09-23 RX ADMIN — Medication 20 ML: at 06:38

## 2017-09-23 RX ADMIN — POTASSIUM CHLORIDE 20 MEQ: 20 TABLET, EXTENDED RELEASE ORAL at 08:05

## 2017-09-23 RX ADMIN — INSULIN LISPRO 2 UNITS: 100 INJECTION, SOLUTION INTRAVENOUS; SUBCUTANEOUS at 21:14

## 2017-09-23 RX ADMIN — SODIUM CHLORIDE, PRESERVATIVE FREE 600 UNITS: 5 INJECTION INTRAVENOUS at 06:37

## 2017-09-23 RX ADMIN — TRAZODONE HYDROCHLORIDE 50 MG: 50 TABLET ORAL at 21:13

## 2017-09-23 RX ADMIN — AMIODARONE HYDROCHLORIDE 400 MG: 200 TABLET ORAL at 08:05

## 2017-09-23 RX ADMIN — Medication 20 ML: at 14:49

## 2017-09-23 RX ADMIN — INSULIN LISPRO 2 UNITS: 100 INJECTION, SOLUTION INTRAVENOUS; SUBCUTANEOUS at 16:24

## 2017-09-23 RX ADMIN — SODIUM CHLORIDE, PRESERVATIVE FREE 600 UNITS: 5 INJECTION INTRAVENOUS at 21:13

## 2017-09-23 RX ADMIN — PREDNISONE 20 MG: 20 TABLET ORAL at 08:06

## 2017-09-23 NOTE — PROGRESS NOTES
PHYSICAL THERAPY DAILY NOTE  Time In: 1118  Time Out: 1148  Patient Seen For: AM, gait training, therex    Subjective: Pt. Grudgingly agrees to participate with PT today. Objective:  Contact (MRSA)   Vital Signs:    Visit Vitals    /68 (BP 1 Location: Left arm, BP Patient Position: At rest)    Pulse 76    Temp 98 °F (36.7 °C)    Resp 20    SpO2 100%     Patient education:  Mat mobility, transfer instruction, gait instruction. BED/MAT MOBILITY Daily Assessment    Rolling Right : 4 (Minimal assistance)  Rolling Left : 4 (Minimal assistance)  Supine to Sit : 4 (Minimal assistance)  Sit to Supine : 4 (Minimal assistance)       TRANSFERS Daily Assessment    Transfer Type: SPT without device  Transfer Assistance : 3 (Moderate assistance )  Sit to Stand Assistance: Moderate assistance       GAIT Daily Assessment    Amount of Assistance: 3 (Moderate assistance)  Distance (ft): 15 Feet (ft)   Pt. Only able to ambulate a few feet in his room with PT and RN assistance. STEPS or STAIRS Daily Assessment            BALANCE Daily Assessment    Sitting - Static: Good (unsupported)  Sitting - Dynamic: Fair (occasional)  Standing - Static: Poor       LOWER EXTREMITY EXERCISES Daily Assessment    Extremity: Both  Exercise Type #1: Seated lower extremity strengthening (seated hip flx, laq, ankle pumps)  Sets Performed: 1  Reps Performed: 10  Exercise Type #2: Supine lower extremity strengthening (bridges, slr, hooklying hip flexion)  Sets Performed: 1  Reps Performed: 10          Assessment:  Pt. weak and deconditioned with poor exercise tolerance. Only able to take a few steps to get from W/C back to bed with PT and nursing assistance. Tx consisted of bed exercises. Needed constant encouragement to keep exercising. Only able to perform 10 reps of prescribed exercises. Pt. left in room following session with all needs in need. Plan of Care:Progress as exercise tolerance improves.     [address] Delude Abbi Bennett, PT  9/23/2017

## 2017-09-23 NOTE — PROGRESS NOTES
9/23/2017 11:26 AM    Admit Date: 9/13/2017        Subjective:     Nayeli Dias denies chest pain, palpitations, He is weak Has been in the hospital for along time. Objective:      Visit Vitals    /68 (BP 1 Location: Left arm, BP Patient Position: At rest)    Pulse 76    Temp 98 °F (36.7 °C)    Resp 20    SpO2 100%       Physical Exam:  Heart: regular rate and rhythm  Lungs: clear to auscultation bilaterally    Data Review:   Labs:    Recent Results (from the past 24 hour(s))   GLUCOSE, POC    Collection Time: 09/22/17 12:13 PM   Result Value Ref Range    Glucose (POC) 94 65 - 100 mg/dL   PROTHROMBIN TIME + INR    Collection Time: 09/22/17  3:40 PM   Result Value Ref Range    Prothrombin time 15.5 (H) 9.6 - 12.0 sec    INR 1.4 (H) 0.9 - 1.2     CBC WITH AUTOMATED DIFF    Collection Time: 09/22/17  3:40 PM   Result Value Ref Range    WBC 3.9 (L) 4.3 - 11.1 K/uL    RBC 2.95 (L) 4.23 - 5.67 M/uL    HGB 9.5 (L) 13.6 - 17.2 g/dL    HCT 30.3 (L) 41.1 - 50.3 %    .6 (H) 79.6 - 97.8 FL    MCH 32.1 26.1 - 32.9 PG    MCHC 31.3 (L) 31.4 - 35.0 g/dL    RDW 24.0 (H) 11.9 - 14.6 %    PLATELET 19 (LL) 165 - 450 K/uL    MPV 12.0 10.8 - 14.1 FL    DF AUTOMATED      NEUTROPHILS 78 43 - 78 %    LYMPHOCYTES 17 13 - 44 %    MONOCYTES 4 4.0 - 12.0 %    EOSINOPHILS 1 0.5 - 7.8 %    BASOPHILS 0 0.0 - 2.0 %    IMMATURE GRANULOCYTES 0.3 0.0 - 5.0 %    ABS. NEUTROPHILS 2.7 1.7 - 8.2 K/UL    ABS. LYMPHOCYTES 0.6 0.5 - 4.6 K/UL    ABS. MONOCYTES 0.1 0.1 - 1.3 K/UL    ABS. EOSINOPHILS 0.0 0.0 - 0.8 K/UL    ABS. BASOPHILS 0.0 0.0 - 0.2 K/UL    ABS. IMM.  GRANS. 0.0 0.0 - 0.5 K/UL   GLUCOSE, POC    Collection Time: 09/22/17  4:10 PM   Result Value Ref Range    Glucose (POC) 133 (H) 65 - 297 mg/dL   METABOLIC PANEL, BASIC    Collection Time: 09/22/17  4:23 PM   Result Value Ref Range    Sodium 139 136 - 145 mmol/L    Potassium 3.8 3.5 - 5.1 mmol/L    Chloride 101 98 - 107 mmol/L    CO2 33 (H) 21 - 32 mmol/L    Anion gap 5 (L) 7 - 16 mmol/L    Glucose 118 (H) 65 - 100 mg/dL    BUN 20 8 - 23 MG/DL    Creatinine 0.92 0.8 - 1.5 MG/DL    GFR est AA >60 >60 ml/min/1.73m2    GFR est non-AA >60 >60 ml/min/1.73m2    Calcium 8.0 (L) 8.3 - 10.4 MG/DL   PLATELETS, ALLOCATE    Collection Time: 09/22/17  6:30 PM   Result Value Ref Range    Unit number [id number]     Blood component type PLTPH LRIR,1     Unit division 00     Status of unit TRANSFUSED    GLUCOSE, POC    Collection Time: 09/22/17  9:04 PM   Result Value Ref Range    Glucose (POC) 132 (H) 65 - 100 mg/dL   PROTHROMBIN TIME + INR    Collection Time: 09/23/17  6:29 AM   Result Value Ref Range    Prothrombin time 15.2 (H) 9.6 - 12.0 sec    INR 1.4 (H) 0.9 - 1.2     CBC WITH AUTOMATED DIFF    Collection Time: 09/23/17  6:29 AM   Result Value Ref Range    WBC 4.3 4.3 - 11.1 K/uL    RBC 2.79 (L) 4.23 - 5.67 M/uL    HGB 8.9 (L) 13.6 - 17.2 g/dL    HCT 29.2 (L) 41.1 - 50.3 %    .3 (H) 79.6 - 97.8 FL    MCH 31.9 26.1 - 32.9 PG    MCHC 30.6 (L) 31.4 - 35.0 g/dL    RDW 23.9 (H) 11.9 - 14.6 %    PLATELET 46 (L) 401 - 450 K/uL    MPV 11.7 10.8 - 14.1 FL    DF AUTOMATED      NEUTROPHILS 78 43 - 78 %    LYMPHOCYTES 16 13 - 44 %    MONOCYTES 5 4.0 - 12.0 %    EOSINOPHILS 1 0.5 - 7.8 %    BASOPHILS 0 0.0 - 2.0 %    IMMATURE GRANULOCYTES 0.3 0.0 - 5.0 %    ABS. NEUTROPHILS 3.0 1.7 - 8.2 K/UL    ABS. LYMPHOCYTES 0.6 0.5 - 4.6 K/UL    ABS. MONOCYTES 0.2 0.1 - 1.3 K/UL    ABS. EOSINOPHILS 0.0 0.0 - 0.8 K/UL    ABS. BASOPHILS 0.0 0.0 - 0.2 K/UL    ABS. IMM.  GRANS. 0.0 0.0 - 0.5 K/UL   GLUCOSE, POC    Collection Time: 09/23/17  6:48 AM   Result Value Ref Range    Glucose (POC) 119 (H) 65 - 100 mg/dL   FOLATE    Collection Time: 09/23/17  7:27 AM   Result Value Ref Range    Folate 16.1 3.1 - 17.5 ng/mL   VITAMIN B12    Collection Time: 09/23/17  7:27 AM   Result Value Ref Range    Vitamin B12 588 193 - 986 pg/mL   GLUCOSE, POC    Collection Time: 09/23/17 11:12 AM   Result Value Ref Range Glucose (POC) 172 (H) 65 - 100 mg/dL       Telemetry: normal sinus rhythm        Assessment:     Patient Active Problem List    Diagnosis Date Noted    Respiratory failure ([address] 09/13/2017    MRSA nasal colonization 09/08/2017    COPD (chronic obstructive pulmonary disease) ([address] 08/30/2017    Acute respiratory failure with hypercapnia ([address] 08/29/2017    History of tobacco use 08/29/2017    Hyponatremia 08/29/2017    Acute encephalopathy 08/29/2017    Thrombocytopenia ([address] 08/26/2017    Anticoagulant long-term use 08/23/2017    HTN (hypertension), benign 08/23/2017    Peripheral vascular disease ([address] 08/23/2017    Dyslipidemia 08/23/2017    History of mechanical aortic valve replacement 08/23/2017    Centrilobular emphysema ([address] 08/23/2017    Ischemic cardiomyopathy 08/23/2017    Atrial flutter with rapid ventricular response ([address] a fib/ flutter Had a cardioversion yesterday Reg rate today on PO amio 08/23/2017    LV dysfunction 10/19/2016    Atherosclerosis of native arteries of the extremities with intermittent claudication 04/15/2014       Plan:   Continue amio Very frail

## 2017-09-23 NOTE — PROGRESS NOTES
OT Daily Note  Time In 1300   Time Out 1325     Subjective: \"I really can't. I'm exhausted. \"   Pain: none reported  Education: importance of participating in therapy   Interdisciplinary Communication: with PT regarding duration of treatment   Precautions: Contact (MRSA)  Location on arrival: supine in bed    Activity Tolerance Daily Assessment   Patient agreed to participate with encouragement. Okay to be seen per nurse. Patient transferred supine to sitting edge of bed with min A and bedrail. Patient sat edge of bed with min A for balance while attempting to engage in visual perceptual reasoning task using bicolor blocks with visual guidelines x 1 pattern, working on visual perceptual skills for improved safety and independence with functional transfers. Focus was on increasing tolerance of unsupported sitting for increased independence with ADLs and bed mobility. Patient unable to tolerate more than 8 minutes of sitting edge of bed, requiring mod A for sit to supine transfer. Patient declined further OT today. Very limited activity tolerance and limited motivation to participate. At this time anticipate inpatient level of rehab care may be too intense for patient to tolerate. Education Daily Assessment   Educated patient on importance of participation with therapy, rehab process, and application of simple functional activities such as unsupported sitting as related to integration into ADLs and transfers. Limited carryover of understanding of education since ADL session. Patient was left supine in bed with call light/all needs in reach and in no distress. B heels floated. Assessment: At this time patient unwilling/unable to tolerate intense level of rehab due to exhaustion and medical complexity. Plan: Continue OT POC as medically able and willing to tolerate.       Luanne Owens MS, OTR/L  9/23/2017

## 2017-09-23 NOTE — PROGRESS NOTES
Carlo Montez MD,   Medical Director  [address] [facility], [address] [facility]  Tel: [phone]       [facility] PROGRESS NOTE    Melquiades Olmos  Admit Date: 9/13/2017  Admit Diagnosis: Cardiac Debility; Respiratory failure (HCC)    Subjective     Good spirits. Didn't sleep real well. Denies cp, sob,n. Appetite good.     Objective:     Current Facility-Administered Medications   Medication Dose Route Frequency    0.9% sodium chloride infusion 250 mL  250 mL IntraVENous PRN    albuterol CONCENTRATE 2.5mg/0.5 mL neb soln  2.5 mg Nebulization BID    budesonide (PULMICORT) 500 mcg/2 ml nebulizer suspension  500 mcg Nebulization BID RT    nitroglycerin (NITROSTAT) tablet 0.4 mg  0.4 mg SubLINGual Q5MIN PRN    ondansetron (ZOFRAN) injection 4 mg  4 mg IntraVENous Q4H PRN    sodium chloride (NS) flush 5-10 mL  5-10 mL IntraVENous PRN    NUTRITIONAL SUPPORT ELECTROLYTE PRN ORDERS   Does Not Apply PRN    acetaminophen (TYLENOL) suppository 650 mg  650 mg Rectal Q4H PRN    acetaminophen (TYLENOL) tablet 650 mg  650 mg Oral Q4H PRN    albuterol (PROVENTIL HFA, VENTOLIN HFA, PROAIR HFA) inhaler 2 Puff  2 Puff Inhalation Q4H PRN    albuterol (PROVENTIL VENTOLIN) nebulizer solution 2.5 mg  2.5 mg Nebulization Q4H PRN    dextrose (D50W) injection syrg 25 g  25 g IntraVENous PRN    digoxin (LANOXIN) tablet 0.25 mg  0.25 mg Oral DAILY    ferrous sulfate tablet 325 mg  1 Tab Oral DAILY WITH BREAKFAST    gabapentin (NEURONTIN) capsule 600 mg  600 mg Oral TID    guaiFENesin ER (MUCINEX) tablet 1,200 mg  1,200 mg Oral Q12H    heparin (porcine) pf 600 Units  600 Units InterCATHeter Q8H    heparin (porcine) pf 600 Units  600 Units InterCATHeter PRN    insulin lispro (HUMALOG) injection   SubCUTAneous AC&HS    povidone-iodine (BETADINE) 10 % topical solution   Topical DAILY    [START ON 9/29/2017] predniSONE (DELTASONE) tablet 10 mg  10 mg Oral DAILY WITH BREAKFAST    predniSONE (DELTASONE) tablet 20 mg  20 mg Oral DAILY WITH BREAKFAST    sodium chloride (NS) flush 20 mL  20 mL InterCATHeter Q8H    tiotropium (SPIRIVA) inhalation capsule 18 mcg  1 Cap Inhalation DAILY    traZODone (DESYREL) tablet 50 mg  50 mg Oral QHS PRN    influenza vaccine 2017-18 (3 yrs+)(PF) (FLUZONE QUAD/FLUARIX QUAD) injection 0.5 mL  0.5 mL IntraMUSCular PRIOR TO DISCHARGE    0.9% sodium chloride infusion 250 mL  250 mL IntraVENous PRN    furosemide (LASIX) tablet 20 mg  20 mg Oral DAILY    potassium chloride (K-DUR, KLOR-CON) SR tablet 20 mEq  20 mEq Oral DAILY    dilTIAZem CD (CARDIZEM CD) capsule 180 mg  180 mg Oral DAILY    metoprolol succinate (TOPROL-XL) tablet 100 mg  100 mg Oral BID    famotidine (PEPCID) tablet 20 mg  20 mg Oral BID    warfarin (COUMADIN) tablet 5 mg - pharmacy dosing  5 mg Oral QPM    amiodarone (CORDARONE) tablet 400 mg  400 mg Oral BID    0.9% sodium chloride infusion 250 mL  250 mL IntraVENous PRN     Review of Systems:Denies chest pain, shortness of breath, cough, headache, visual problems, abdominal pain, dysurea, calf pain. Pertinent positives are as noted in the medical records and unremarkable otherwise. Visit Vitals    /68 (BP 1 Location: Left arm, BP Patient Position: At rest)    Pulse 76    Temp 98 °F (36.7 °C)    Resp 20    SpO2 100%        Physical Exam:   General: Alert and age appropriately oriented. No acute cardio respiratory distress. HEENT: Normocephalic,no scleral icterus  Oral mucosa moist without cyanosis   Lungs: Clear to auscultation  bilaterally. Respiration even and unlabored   Heart: Irreg, rate controlled, 74  No  murmurs, clicks, rub or gallops   Abdomen: Soft, non-tender, nondistended. Bowel sounds present. No organomegaly. Genitourinary: Benign . Neuromuscular:      Generalized non focal weakness  AAOx4     Skin/extremity: No rashes, no erythema.  No calf tenderness BLE  No edema Functional Assessment:          Balance  Sitting - Static: Good (unsupported) (09/16/17 1300)  Sitting - Dynamic: Good (unsupported) (09/16/17 1300)  Standing - Static: Fair;Constant support (with RW) (09/16/17 1300)  Standing - Dynamic : Impaired (09/16/17 1300)           Toileting  Adaptive Equipment: Grab bars; Other (comment) (Wheelchair) (09/18/17 1012)         Nettie Moran Fall Risk Assessment:  Nettie Moran Fall Risk  Mobility: Ambulates or transfers with assist devices or assistance/unsteady gait (09/22/17 1530)  Mobility Interventions: Patient to call before getting OOB; Strengthening exercises (ROM-active/passive); Utilize walker, cane, or other assitive device (09/22/17 1530)  Mentation: Alert, oriented x 3 (09/22/17 1530)  Mentation Interventions: Evaluate medications/consider consulting pharmacy; Increase mobility;More frequent rounding (09/22/17 1530)  Medication: Patient receiving anticonvulsants, sedatives(tranquilizers), psychotropics or hypnotics, hypoglycemics, narcotics, sleep aids, antihypertensives, laxatives, or diuretics (09/22/17 1530)  Medication Interventions: Evaluate medications/consider consulting pharmacy; Patient to call before getting OOB; Teach patient to arise slowly (09/22/17 1530)  Elimination: Needs assistance with toileting (09/22/17 1530)  Elimination Interventions: Call light in reach; Patient to call for help with toileting needs;Urinal in reach (09/22/17 1530)  Prior Fall History: During admission (Date - Comment) (09/22/17 1530)  History of Falls Interventions: Evaluate medications/consider consulting pharmacy; Consult care management for discharge planning (09/22/17 0705)  Total Score: 5 (09/22/17 1530)  Standard Fall Precautions: Yes (09/17/17 1109)  High Fall Risk: Yes (09/22/17 1530)     Speech Assessment:  Aspiration Signs/Symptoms: None (09/14/17 1036)      Ambulation:  Gait  Distance (ft): 30 Feet (ft) (09/16/17 1300)  Assistive Device: Devyn Hood rolling;Gait belt (09/16/17 1300)     Labs/Studies:  Recent Results (from the past 72 hour(s))   GLUCOSE, POC    Collection Time: 09/20/17 11:21 AM   Result Value Ref Range    Glucose (POC) 176 (H) 65 - 100 mg/dL   GLUCOSE, POC    Collection Time: 09/20/17  4:25 PM   Result Value Ref Range    Glucose (POC) 162 (H) 65 - 100 mg/dL   GLUCOSE, POC    Collection Time: 09/20/17  8:57 PM   Result Value Ref Range    Glucose (POC) 168 (H) 65 - 100 mg/dL   PROTHROMBIN TIME + INR    Collection Time: 09/21/17  5:49 AM   Result Value Ref Range    Prothrombin time 24.8 (H) 9.6 - 12.0 sec    INR 2.3 (H) 0.9 - 1.2     CBC W/O DIFF    Collection Time: 09/21/17  5:49 AM   Result Value Ref Range    WBC 3.9 (L) 4.3 - 11.1 K/uL    RBC 2.54 (L) 4.23 - 5.67 M/uL    HGB 8.1 (L) 13.6 - 17.2 g/dL    HCT 25.5 (L) 41.1 - 50.3 %    .4 (H) 79.6 - 97.8 FL    MCH 32.0 26.1 - 32.9 PG    MCHC 31.9 31.4 - 35.0 g/dL    RDW 21.9 (H) 11.9 - 14.6 %    PLATELET 36 (L) 630 - 450 K/uL    MPV 11.3 10.8 - 14.1 FL   GLUCOSE, POC    Collection Time: 09/21/17  7:13 AM   Result Value Ref Range    Glucose (POC) 93 65 - 100 mg/dL   GLUCOSE, POC    Collection Time: 09/21/17 11:11 AM   Result Value Ref Range    Glucose (POC) 114 (H) 65 - 100 mg/dL   GLUCOSE, POC    Collection Time: 09/21/17  4:01 PM   Result Value Ref Range    Glucose (POC) 139 (H) 65 - 100 mg/dL   GLUCOSE, POC    Collection Time: 09/21/17  8:45 PM   Result Value Ref Range    Glucose (POC) 353 (H) 65 - 100 mg/dL   GLUCOSE, POC    Collection Time: 09/22/17  7:06 AM   Result Value Ref Range    Glucose (POC) 89 65 - 100 mg/dL   GLUCOSE, POC    Collection Time: 09/22/17 12:13 PM   Result Value Ref Range    Glucose (POC) 94 65 - 100 mg/dL   PROTHROMBIN TIME + INR    Collection Time: 09/22/17  3:40 PM   Result Value Ref Range    Prothrombin time 15.5 (H) 9.6 - 12.0 sec    INR 1.4 (H) 0.9 - 1.2     CBC WITH AUTOMATED DIFF    Collection Time: 09/22/17  3:40 PM   Result Value Ref Range    WBC 3.9 (L) 4.3 - 11.1 K/uL    RBC 2.95 (L) 4.23 - 5.67 M/uL    HGB 9.5 (L) 13.6 - 17.2 g/dL    HCT 30.3 (L) 41.1 - 50.3 %    .6 (H) 79.6 - 97.8 FL    MCH 32.1 26.1 - 32.9 PG    MCHC 31.3 (L) 31.4 - 35.0 g/dL    RDW 24.0 (H) 11.9 - 14.6 %    PLATELET 19 (LL) 858 - 450 K/uL    MPV 12.0 10.8 - 14.1 FL    DF AUTOMATED      NEUTROPHILS 78 43 - 78 %    LYMPHOCYTES 17 13 - 44 %    MONOCYTES 4 4.0 - 12.0 %    EOSINOPHILS 1 0.5 - 7.8 %    BASOPHILS 0 0.0 - 2.0 %    IMMATURE GRANULOCYTES 0.3 0.0 - 5.0 %    ABS. NEUTROPHILS 2.7 1.7 - 8.2 K/UL    ABS. LYMPHOCYTES 0.6 0.5 - 4.6 K/UL    ABS. MONOCYTES 0.1 0.1 - 1.3 K/UL    ABS. EOSINOPHILS 0.0 0.0 - 0.8 K/UL    ABS. BASOPHILS 0.0 0.0 - 0.2 K/UL    ABS. IMM.  GRANS. 0.0 0.0 - 0.5 K/UL   GLUCOSE, POC    Collection Time: 09/22/17  4:10 PM   Result Value Ref Range    Glucose (POC) 133 (H) 65 - 990 mg/dL   METABOLIC PANEL, BASIC    Collection Time: 09/22/17  4:23 PM   Result Value Ref Range    Sodium 139 136 - 145 mmol/L    Potassium 3.8 3.5 - 5.1 mmol/L    Chloride 101 98 - 107 mmol/L    CO2 33 (H) 21 - 32 mmol/L    Anion gap 5 (L) 7 - 16 mmol/L    Glucose 118 (H) 65 - 100 mg/dL    BUN 20 8 - 23 MG/DL    Creatinine 0.92 0.8 - 1.5 MG/DL    GFR est AA >60 >60 ml/min/1.73m2    GFR est non-AA >60 >60 ml/min/1.73m2    Calcium 8.0 (L) 8.3 - 10.4 MG/DL   PLATELETS, ALLOCATE    Collection Time: 09/22/17  6:30 PM   Result Value Ref Range    Unit number [id number]     Blood component type PLTPH LRIR,1     Unit division 00     Status of unit ISSUED    GLUCOSE, POC    Collection Time: 09/22/17  9:04 PM   Result Value Ref Range    Glucose (POC) 132 (H) 65 - 100 mg/dL   PROTHROMBIN TIME + INR    Collection Time: 09/23/17  6:29 AM   Result Value Ref Range    Prothrombin time 15.2 (H) 9.6 - 12.0 sec    INR 1.4 (H) 0.9 - 1.2     CBC WITH AUTOMATED DIFF    Collection Time: 09/23/17  6:29 AM   Result Value Ref Range    WBC 4.3 4.3 - 11.1 K/uL    RBC 2.79 (L) 4.23 - 5.67 M/uL    HGB 8.9 (L) 13.6 - 17.2 HTN (hypertension), benign (Chronic) ICD-10-CM: I10  ICD-9-CM: 401.1  8/23/2017 - Present        Peripheral vascular disease ([facility] (Chronic) ICD-10-CM: I73.9  ICD-9-CM: 443.9  8/23/2017 - Present        Dyslipidemia (Chronic) ICD-10-CM: E78.5  ICD-9-CM: 272.4  8/23/2017 - Present        History of mechanical aortic valve replacement (Chronic) ICD-10-CM: Z95.2  ICD-9-CM: V43.3  8/23/2017 - Present    Overview Signed 8/30/2017 10:30 AM by Tanner Lee NP     Placement 2000, on chronicCoumadin             Centrilobular emphysema ([facility] [address] (Chronic) ICD-10-CM: J43.2  ICD-9-CM: 492.8  8/23/2017 - Present    Overview Signed 9/8/2017  9:24 AM by Julieta Cook NP      With last PFTs FVC 1.70 L or 44% predicted, FEV1 0.86 L or 29% predicted, FEV1/FVC 51%. This study was a postbronchodilator study performed at the [facility] on 8/22/2016                  Ischemic cardiomyopathy (Chronic) ICD-10-CM: I25.5  ICD-9-CM: 414.8  8/23/2017 - Present    Overview Signed 8/30/2017 10:29 AM by Tanner Lee NP     8/23/17 ECHO:  EF 40 % to 45 %. There were no regional wall motion abnormalities. Moderate hypokinesis of the mid-apical anterior and apical wall(s).              Atrial flutter with rapid ventricular response (HCC) ICD-10-CM: I48.92  ICD-9-CM: 427.32  8/23/2017 - Present        LV dysfunction (Chronic) ICD-10-CM: I51.9  ICD-9-CM: 429.9  10/19/2016 - Present        Atherosclerosis of native arteries of the extremities with intermittent claudication (Chronic) ICD-10-CM: Z44.264  ICD-9-CM: 440.21  4/15/2014 - Present                      Assessment; Debility s/p resp failure, a flutter with RVR s/p cardioversion  PLAN  Continue daily physician medical management:      Atrial flutter with rapid ventricular response ([facility] [address] (8/23/2017) sp cardioversion - monitor HR./ BP. / hx of CHF / Ischemic cardiomyopathy     - Digoxin, coreg.  - continue anticoagulation. 9/20 has been on hold due to pending cardiac procedure; however INR now 3.4 from 3.2 9/19; 9/21 2.3.9/22 and 23 1.4; restarted coumadin 9/22 5mg daily; pharmacy to dose  - cardiac precautions.   - (9/16) -recurrent a.fib with RVR. HR 130s. 9/20 continues; metoprolol added 9/19; PT UNABLE TO PARTICIPATE IN THERAPIES. SHOULD BE TRANSFERRED TO TELEMETRY TO MANAGE CARDIAC ISSUES SO AS NOT TO USE OF ACUTE REHAB DAYS. -9/21 will try to encourage participation in therapies today. Wouldn't even do ST at bedside yesterday. Will put parameters on when to hold therapies due to tachyc.   -9/22 s/p successful cardioversion by Dr Carlos Umana; now in NSR, HR 65; restart full therapies (encourage participation); restart Coumadin 5mg daily         - restarted diuretics, as was taking at home. - CxR 9/16 very small bilateral pleural effusions are stable.    - 9/19-  possible cardioversion, AVN ablation/pacemaker in am per Cardiology/EP    -9/20 HOLD proc due to elevated INR, cont to hold coumadin ; 9/21 INR 2.3 from 3.4; restart coumadin after procedure today if ok with cardiology  9-23 coumadin restarted 9/22      History of mechanical aortic valve replacement 2000, on coumadin/ Anticoagulant long-term use. Goal 2.5-3.5.  - resume warfarin, monitor INR.   - INR 2.4 (9/18) . average out home dose ~> 6mg hs.   - Warfarin 6mg HS (9/16)  - pharmacy assisting. On hold(9/19) for procedure.   -9/21 INR now finally trending down; 2.3 from 3.4 yesterday;off coumadin; likely restart Coumadin after procedure  -9/22 restart Coumadin 5mg daily      Bacteremia - started on empiric antibiotic treatment. Aztreonam, vanco.   - BCx grew e.coli. discontinued vano. - final ID extended-spectrum beta-lactamases (ESBLs) , appears treated, though efficacy of vanco, aztreonam not optimal.  . Pt asymptomatic, WBC wnl. Will discontinue - aztreonam. .  - will monitor closely. - will have on contact precaution, infection control notified.   - BCx redrawn 9/17, no growth, will lift contact precautions 9/19.     HTN (hypertension) - monitor.  - continue Coreg/ digoxin  -9/21 cardizem and lasix ; now off Coreg per cardiology for rate control but now bradycardia; really no rhyme or reason to his fluctuating HR; BP stable  -9/23 -162 acceptable          Thrombocytopenia/ ITP  - continued on steroids. - hematology following.   - HIT negative. - ivig per hematology direction. Will receive 1 more dose. -  received platelet 1unit (8/62). plt 25k -> 75k (9/16)-> 65k (9/17) -> 68 (9/19) monitor; 9/20 plts 52; defer to hematology  -9/21 plts 36; pending 9/22; 19k; will give one unit plts  -9/23 plts 46; want to keep above 50K , thus one unit today. ? Nplate          COPD   - continue respiratory treatments. Presently q6h, wean as appropriate.    - resume spiriva, pulmicort  - SOB with activity. - normal CxR (9/15)  - will wean albuterol nebs 9/18. Bid and as needed. -9/20 SOB; sats 95% RA, bilateral crackles, increasing JIM; likely due to tachycardia; will f/u portable CXR; weaning steroids  -9/21 CXR; shows no significant change in small right pleural effusion and adjacent mild right basilar atelectasis/infiltrate. Continue to enc IS. bipap at noc; sats 95% RA  -9/23 wean O2 s/p cardioversion; 100% sats 2 L NC          iron deficiency anemia -hgb 8.5-> 8.7(9/15)-. 10.8(9/18) -> 9.3-> 9.1 (9/19) Monitor. Continue fe supplements.   -9/20 hgb 8.8; no evidence of acute bleed; hgb 8.1 9/21; check stool  -9/21 pancytopenic, WBC has dropped  as well; monitor for now; hgb 8.1  -9/23 hbg 8.9 ; macrocytosis; B12 and folate wnl      Hyponatremia  - monitor. 139 on 9/22      Pneumonia prophylaxis- Insentive spirometer every hour while awake      DVT risk / DVT Prophylaxis- Will require daily physician exam to assess for signs and symptoms as patient is at increased risk for of thromboembolism. Mobilization as tolerated.  Intermittent pneumatic compression devices when in bed Thigh-high or knee-high thromboembolic deterrent hose when out of bed.    - covered with warfarin. (inr supratherapeutic 9/20, coumadin on hold) no clinical evidence of DVT      Pain Control: stable, mild-to-moderate joint symptoms intermittently, reasonably well controlled by PRN meds. Will require regular pain assessment and comprenhensive pain management. - resume gabapentin.       Wound Care: Monitor wound status daily per staff and physician. At risk for failure. Will require 24/7 rehab nursing. Keep wound clean and dry  - excoriation at bridge of nose. Continue antibiotic ointment.       Diabetes mellitus - Uncontrolled. poor glycemic control. Will require daily, close FSG monitoring and medication adjustment to optimize glycemic control in setting of acute illness and hospitalization.   - SSI coverage with lispro. Poorly controlled , exacerbated by steroids  -9/21 a bit better controlled, likely due to poor po intake  09/22 BS varied greatly 80s to 350s; consider glucotrol       bowel program - colace as needed.      Gi prophylaxis; change pepcid to po 9/20                  Time spent was 25 minutes with over 1/2 in direct patient care/examination, consultation and coordination of care.      Signed By: Mc Hernandez MD     September 23, 2017

## 2017-09-23 NOTE — PROGRESS NOTES
Problem: Falls - Risk of  Goal: *Absence of Falls  Document Lauren Fall Risk and appropriate interventions in the flowsheet.    Outcome: Progressing Towards Goal  Fall Risk Interventions:  Mobility Interventions: Patient to call before getting OOB     Mentation Interventions: Evaluate medications/consider consulting pharmacy     Medication Interventions: Evaluate medications/consider consulting pharmacy     Elimination Interventions: Call light in reach     History of Falls Interventions: Evaluate medications/consider consulting pharmacy

## 2017-09-23 NOTE — PROGRESS NOTES
Pt resting in bed . PT  did some excerisis in  The room with pt  . Pt ate a good lunch . Changed out left  Heel drsg. Small open area noted to heel . Wife stated he had had this about 2 weeks . New  mepelix  Dressing applied to heel both heels floated on pillows . Pt resting in bed .

## 2017-09-23 NOTE — PROGRESS NOTES
Platelets started via picc line transfusing without difficulty no complaints of dizziness or back pain

## 2017-09-23 NOTE — PROGRESS NOTES
09/23/17 1101   Time Spent With Patient   Time In 0913   Time Out 1013   Patient Seen For: AM;ADLs   Upper Body Bathing   Bathing Assistance, Upper Dep   Position Performed Seated edge of bed;Long sitting on bed   Comments able to wash; unable to rinse or dry due to fatigue. started seated edge of bed; completed long sitting    Lower Body Bathing   Bathing Assistance, Lower  Min A   Comments assist for B feet   Upper Body Dressing    Dressing Assistance  Max A   Comments assist for shirt over head and down in front and back    Lower Body Dressing    Dressing Assistance  Mod A   Leg Crossed Method Used No   Position Performed Seated edge of bed;Standing   Comments assist with R sock onto sock-aid, underwear and pants up, threading LLE into pants    S: \"I can't do it. I'm exhausted. \"   O: Patient presented supine in bed. Agreeable to treatment. Patient completed ADL; see above for FIMs. Required multiple seated and supine rest breaks due to fatigue. Required encouragement to participate and encouragement to complete ADL tasks. Educated patient in use of sock-aid and use of reacher with fair carryover. Would benefit from further education for use of AE. Educated patient on rehab process and on importance of attempting to participate in therapy as much as possible. A: Patient does not appear very motivated to participate and would benefit from further education on rehab process and disease process and management. Patient tolerated session fairly with no complaint of pain but positive for complaint of fatigue. P: Continue OT POC with focus on ADL skills, bed mobility, motivation to participate, and activity tolerance. Patient was left seated up in [facility] with call bell/all other needs in reach. Wife present. Interdisciplinary communication: Nurse aware that patient completed ADL.      Alfredo Costa MS, OTR/L  9/23/2017

## 2017-09-23 NOTE — PROGRESS NOTES
Warfarin dosing per pharmacist    Ld Scott is a 67 y.o. male. Indication: A. Fib    Goal INR:  2-3    Home dose:  5 mg daily, 7.5 mg on Wednesday    Risk factors or significant drug interactions:  Antibiotics, prednisone    Other anticoagulants:  none    Daily Monitoring  Date  INR     Warfarin dose HGB              Notes  9/8  1.2  10mg  11.2    9/9  1.3  10mg  9.4  9/10  ---  10mg  9.7  9/11  2.1  10mg  10.8  9/12  2.9  10mg  8.9  9/13  3.5  Hold  8.4  Pharmacy Consulted  9/14  3.6  Held  8.5  9/15  2.5  5 mg  8.7  9/16  1.9  6 mg  10.8  9/17  2.2  6 mg  9.3  9/18  2.4  6 mg  --  9/19  3.2  Hold  9.1  9/20  3.4  Hold  8.8  9/21  2.3  5 mg  8.1  9/22  1.4  5 mg  ---  9/23  1.4  5 mg  8.9    INR down to 1.4 after held doses. Continue warfarin 5 mg qhs. Daily INR. Pharmacy will continue to follow. Please call with any questions.     Thank you,  Jaime Holguin, PharmD  Clinical Pharmacist  [phone]

## 2017-09-24 LAB
BASOPHILS # BLD: 0 K/UL (ref 0–0.2)
BASOPHILS NFR BLD: 0 % (ref 0–2)
BLD PROD TYP BPU: NORMAL
BPU ID: NORMAL
DIFFERENTIAL METHOD BLD: ABNORMAL
EOSINOPHIL # BLD: 0.1 K/UL (ref 0–0.8)
EOSINOPHIL NFR BLD: 2 % (ref 0.5–7.8)
ERYTHROCYTE [DISTWIDTH] IN BLOOD BY AUTOMATED COUNT: 25 % (ref 11.9–14.6)
GLUCOSE BLD STRIP.AUTO-MCNC: 148 MG/DL (ref 65–100)
GLUCOSE BLD STRIP.AUTO-MCNC: 247 MG/DL (ref 65–100)
GLUCOSE BLD STRIP.AUTO-MCNC: 312 MG/DL (ref 65–100)
GLUCOSE BLD STRIP.AUTO-MCNC: 81 MG/DL (ref 65–100)
HCT VFR BLD AUTO: 25.4 % (ref 41.1–50.3)
HGB BLD-MCNC: 7.9 G/DL (ref 13.6–17.2)
IMM GRANULOCYTES # BLD: 0 K/UL (ref 0–0.5)
IMM GRANULOCYTES NFR BLD: 0.4 % (ref 0–5)
INR PPP: 1.6 (ref 0.9–1.2)
LYMPHOCYTES # BLD: 0.6 K/UL (ref 0.5–4.6)
LYMPHOCYTES NFR BLD: 21 % (ref 13–44)
MCH RBC QN AUTO: 32.4 PG (ref 26.1–32.9)
MCHC RBC AUTO-ENTMCNC: 31.2 G/DL (ref 31.4–35)
MCV RBC AUTO: 104.1 FL (ref 79.6–97.8)
MONOCYTES # BLD: 0.2 K/UL (ref 0.1–1.3)
MONOCYTES NFR BLD: 6 % (ref 4–12)
NEUTS SEG # BLD: 1.9 K/UL (ref 1.7–8.2)
NEUTS SEG NFR BLD: 71 % (ref 43–78)
PLATELET # BLD AUTO: 54 K/UL (ref 150–450)
PMV BLD AUTO: 10.9 FL (ref 10.8–14.1)
PROTHROMBIN TIME: 17.1 SEC (ref 9.6–12)
RBC # BLD AUTO: 2.44 M/UL (ref 4.23–5.67)
STATUS OF UNIT,%ST: NORMAL
UNIT DIVISION, %UDIV: 0
WBC # BLD AUTO: 3 K/UL (ref 4.3–11.1)

## 2017-09-24 PROCEDURE — 94640 AIRWAY INHALATION TREATMENT: CPT

## 2017-09-24 PROCEDURE — 65310000000 HC RM PRIVATE REHAB

## 2017-09-24 PROCEDURE — 97530 THERAPEUTIC ACTIVITIES: CPT

## 2017-09-24 PROCEDURE — 82272 OCCULT BLD FECES 1-3 TESTS: CPT | Performed by: PHYSICAL MEDICINE & REHABILITATION

## 2017-09-24 PROCEDURE — 85025 COMPLETE CBC W/AUTO DIFF WBC: CPT | Performed by: INTERNAL MEDICINE

## 2017-09-24 PROCEDURE — 74011250637 HC RX REV CODE- 250/637: Performed by: PHYSICAL MEDICINE & REHABILITATION

## 2017-09-24 PROCEDURE — 77010033678 HC OXYGEN DAILY

## 2017-09-24 PROCEDURE — 94760 N-INVAS EAR/PLS OXIMETRY 1: CPT

## 2017-09-24 PROCEDURE — 82962 GLUCOSE BLOOD TEST: CPT

## 2017-09-24 PROCEDURE — 74011000250 HC RX REV CODE- 250: Performed by: PHYSICAL MEDICINE & REHABILITATION

## 2017-09-24 PROCEDURE — 94660 CPAP INITIATION&MGMT: CPT

## 2017-09-24 PROCEDURE — 74011250637 HC RX REV CODE- 250/637: Performed by: INTERNAL MEDICINE

## 2017-09-24 PROCEDURE — 99233 SBSQ HOSP IP/OBS HIGH 50: CPT | Performed by: PHYSICAL MEDICINE & REHABILITATION

## 2017-09-24 PROCEDURE — 74011250636 HC RX REV CODE- 250/636: Performed by: PHYSICAL MEDICINE & REHABILITATION

## 2017-09-24 PROCEDURE — 97116 GAIT TRAINING THERAPY: CPT

## 2017-09-24 PROCEDURE — 85610 PROTHROMBIN TIME: CPT | Performed by: PHYSICAL MEDICINE & REHABILITATION

## 2017-09-24 PROCEDURE — 97110 THERAPEUTIC EXERCISES: CPT

## 2017-09-24 PROCEDURE — 74011636637 HC RX REV CODE- 636/637: Performed by: PHYSICAL MEDICINE & REHABILITATION

## 2017-09-24 RX ADMIN — GUAIFENESIN 1200 MG: 600 TABLET, EXTENDED RELEASE ORAL at 08:20

## 2017-09-24 RX ADMIN — INSULIN LISPRO 6 UNITS: 100 INJECTION, SOLUTION INTRAVENOUS; SUBCUTANEOUS at 21:46

## 2017-09-24 RX ADMIN — METOPROLOL SUCCINATE 100 MG: 100 TABLET, EXTENDED RELEASE ORAL at 08:20

## 2017-09-24 RX ADMIN — SODIUM CHLORIDE, PRESERVATIVE FREE 600 UNITS: 5 INJECTION INTRAVENOUS at 15:30

## 2017-09-24 RX ADMIN — FUROSEMIDE 20 MG: 20 TABLET ORAL at 08:21

## 2017-09-24 RX ADMIN — DILTIAZEM HYDROCHLORIDE 180 MG: 180 CAPSULE, COATED, EXTENDED RELEASE ORAL at 08:20

## 2017-09-24 RX ADMIN — POTASSIUM CHLORIDE 20 MEQ: 20 TABLET, EXTENDED RELEASE ORAL at 08:21

## 2017-09-24 RX ADMIN — FAMOTIDINE 20 MG: 20 TABLET ORAL at 08:21

## 2017-09-24 RX ADMIN — SODIUM CHLORIDE, PRESERVATIVE FREE 600 UNITS: 5 INJECTION INTRAVENOUS at 06:42

## 2017-09-24 RX ADMIN — POVIDONE-IODINE: 10 SOLUTION TOPICAL at 08:25

## 2017-09-24 RX ADMIN — ALBUTEROL SULFATE 2.5 MG: 2.5 SOLUTION RESPIRATORY (INHALATION) at 17:26

## 2017-09-24 RX ADMIN — AMIODARONE HYDROCHLORIDE 400 MG: 200 TABLET ORAL at 08:21

## 2017-09-24 RX ADMIN — GUAIFENESIN 1200 MG: 600 TABLET, EXTENDED RELEASE ORAL at 21:45

## 2017-09-24 RX ADMIN — Medication 20 ML: at 15:29

## 2017-09-24 RX ADMIN — BUDESONIDE 500 MCG: 0.5 INHALANT RESPIRATORY (INHALATION) at 05:44

## 2017-09-24 RX ADMIN — DIGOXIN 0.25 MG: 250 TABLET ORAL at 08:22

## 2017-09-24 RX ADMIN — Medication 20 ML: at 06:42

## 2017-09-24 RX ADMIN — WARFARIN SODIUM 5 MG: 5 TABLET ORAL at 17:14

## 2017-09-24 RX ADMIN — GABAPENTIN 600 MG: 300 CAPSULE ORAL at 06:43

## 2017-09-24 RX ADMIN — SODIUM CHLORIDE, PRESERVATIVE FREE 600 UNITS: 5 INJECTION INTRAVENOUS at 21:45

## 2017-09-24 RX ADMIN — FERROUS SULFATE TAB 325 MG (65 MG ELEMENTAL FE) 325 MG: 325 (65 FE) TAB at 08:14

## 2017-09-24 RX ADMIN — GABAPENTIN 600 MG: 300 CAPSULE ORAL at 21:45

## 2017-09-24 RX ADMIN — GABAPENTIN 600 MG: 300 CAPSULE ORAL at 15:28

## 2017-09-24 RX ADMIN — FAMOTIDINE 20 MG: 20 TABLET ORAL at 17:14

## 2017-09-24 RX ADMIN — ALBUTEROL SULFATE 2.5 MG: 2.5 SOLUTION RESPIRATORY (INHALATION) at 05:44

## 2017-09-24 RX ADMIN — Medication 20 ML: at 21:50

## 2017-09-24 RX ADMIN — TIOTROPIUM BROMIDE 18 MCG: 18 CAPSULE ORAL; RESPIRATORY (INHALATION) at 05:45

## 2017-09-24 RX ADMIN — PREDNISONE 20 MG: 20 TABLET ORAL at 08:13

## 2017-09-24 RX ADMIN — INSULIN LISPRO 8 UNITS: 100 INJECTION, SOLUTION INTRAVENOUS; SUBCUTANEOUS at 17:09

## 2017-09-24 RX ADMIN — BUDESONIDE 500 MCG: 0.5 INHALANT RESPIRATORY (INHALATION) at 17:26

## 2017-09-24 RX ADMIN — TRAZODONE HYDROCHLORIDE 50 MG: 50 TABLET ORAL at 21:45

## 2017-09-24 NOTE — PROGRESS NOTES
Patient resting up in bed. Alert and oriented. Lung sounds diminished in bases. S1S2, bowel sounds active. Nectar thickened fluids at bedside. Urinal in reach. No complaint of pain or discomfort. Old abrasion to bridge of nose. Repositioned up in bed for breakfast tray. No other verbalized needs made known at present time. Assessment completed. See doc flow sheet for further assessments.

## 2017-09-24 NOTE — PROGRESS NOTES
PHYSICAL THERAPY DAILY NOTE  Time In: 5550  Time Out: 1110  Patient Seen For: AM;Balance activities;Gait training;Patient education; Therapeutic exercise;Transfer training; Other (see progress notes)    Subjective: Patient reporting he feels better today. Reports he was able to stand up with the RW when the CNA was assisting him with his bath. Reports no dizziness in standing but feels unsteady. Agreeable to come to rehab gym for treatment         Objective:Vital Signs:Initial  Sitting /49 HR 47 02 sat 100% on 02 at 2 lpm. 02 sat 100% and HR 60 after ambulating 50ft  Patient Vitals for the past 12 hrs:   Temp Pulse Resp BP SpO2   09/24/17 0747 98.1 °F (36.7 °C) 60 18 130/60 98 %   09/24/17 0545 - - - - 98 %     Pain level:No c/o pain  Pain location:NA  Pain interventions:NA    Patient education:Bed mobility training,transfer training, gait training, fall precautions, activity pacing, body mechanics,energy conservation, Patient verbalizing understanding and demonstrating partial understanding of patient education. Recommend follow up education. Interdisciplinary Communication:spoke with MD and RN regarding vital signs. MD assisting with gait training. Contact ([facility])  GROSS ASSESSMENT Daily Assessment     NA       BED/MAT MOBILITY Daily Assessment   Increased time and effort to complete using bed and head of bed elevated.  Rolling Right : 4 (Minimal assistance)  Rolling Left : 4 (Minimal assistance)  Supine to Sit : 4 (Minimal assistance)  Sit to Supine : 0 (Not tested)       TRANSFERS Daily Assessment    Transfer Type: SPT with walker  Transfer Assistance : 4 (Minimal assistance) (verbal cues for body mechanics)  Sit to Stand Assistance: Minimal assistance (verbal cues for body mechanics)  Car Transfers: Not tested       GAIT Daily Assessment    Amount of Assistance: 1 (Dependent/total assistance) (Min assist x 1  second person assist following with w/c)  Distance (ft): 50 Feet (ft)  Assistive Device: [employer]Motions - Eye Tracking, rolling   Slow cont partial step through gait with decreased step length and narrow base of support. Foot flat initial contact    STEPS or STAIRS Daily Assessment    Steps/Stairs Ambulated (#): 0  Level of Assist : 0 (Not tested)       BALANCE Daily Assessment   Static standing with RW and CGA facilitating upright posture, tendency to loose balance posteriorly  Sitting - Static: Good (unsupported)  Sitting - Dynamic: Fair (occasional)  Standing - Static: Constant support (with RW)  Standing - Dynamic : Impaired       WHEELCHAIR MOBILITY Daily Assessment    Curbs/Ramps Assist Required (FIM Score): 0 (Not tested)  Wheelchair Setup Assist Required : 0 (Not tested)       LOWER EXTREMITY EXERCISES Daily Assessment    Extremity: Both  Exercise Type #1: Seated lower extremity strengthening  Sets Performed: 2  Reps Performed: 10  Level of Assist: Minimal assistance  Exercise Type #2: Other (comment) (LE motomed x 5 mins at level 1)  Level of Assist: Supervision          Assessment: Improved tolerance to treatment this AM. Vital signs stable during treatment. Improved motivation to participate this AM. Requires multiple and frequent rest breaks during treatment due to decreased functional endurance. Patient return to room at end of treatment and remained up in wheelchair with needs in reach. 02 at 2 lpm    Plan of Care: Continue with POC and progress as tolerated.      Geovani Padilla, PT  9/24/2017

## 2017-09-24 NOTE — PROGRESS NOTES
Warfarin dosing per pharmacist    Kathy Langley is a 67 y.o. male. Indication: A. Fib    Goal INR:  2-3    Home dose:  5 mg daily, 7.5 mg on Wednesday    Risk factors or significant drug interactions:  Antibiotics, prednisone    Other anticoagulants:  none    Daily Monitoring  Date  INR     Warfarin dose HGB              Notes  9/8  1.2  10mg  11.2    9/9  1.3  10mg  9.4  9/10  ---  10mg  9.7  9/11  2.1  10mg  10.8  9/12  2.9  10mg  8.9  9/13  3.5  Hold  8.4  Pharmacy Consulted  9/14  3.6  Held  8.5  9/15  2.5  5 mg  8.7  9/16  1.9  6 mg  10.8  9/17  2.2  6 mg  9.3  9/18  2.4  6 mg  --  9/19  3.2  Hold  9.1  9/20  3.4  Hold  8.8  9/21  2.3  5 mg  8.1  9/22  1.4  5 mg  ---  9/23  1.4  5 mg  8.9  9/24  1.6  5 mg  7.9    INR trending back up after held doses. Continue warfarin 5 mg qhs. Daily INR. Pharmacy will continue to follow. Please call with any questions.     Thank you,  Anthony Sheth, PharmD  Clinical Pharmacist  [phone]

## 2017-09-24 NOTE — PROGRESS NOTES
Problem: Falls - Risk of  Goal: *Absence of Falls  Document Lauren Fall Risk and appropriate interventions in the flowsheet.    Outcome: Progressing Towards Goal  Fall Risk Interventions:  Mobility Interventions: Utilize walker, cane, or other assitive device     Mentation Interventions: Evaluate medications/consider consulting pharmacy     Medication Interventions: Patient to call before getting OOB     Elimination Interventions: Call light in reach, Patient to call for help with toileting needs     History of Falls Interventions: Door open when patient unattended

## 2017-09-24 NOTE — PROGRESS NOTES
Problem: Falls - Risk of  Goal: *Absence of Falls  Document Lauren Fall Risk and appropriate interventions in the flowsheet.    Outcome: Progressing Towards Goal  Fall Risk Interventions:  Mobility Interventions: Utilize walker, cane, or other assitive device     Mentation Interventions: Adequate sleep, hydration, pain control, Evaluate medications/consider consulting pharmacy, Eyeglasses and hearing aids     Medication Interventions: Patient to call before getting OOB     Elimination Interventions: Call light in reach, Patient to call for help with toileting needs     History of Falls Interventions: Consult care management for discharge planning, Door open when patient unattended

## 2017-09-24 NOTE — PROGRESS NOTES
Christos Patricio MD,   Medical Director  [address] [facility], [address] [facility]  Tel: [phone]       [facility] PROGRESS NOTE    Ginny Ribeiro  Admit Date: 9/13/2017  Admit Diagnosis: Cardiac Debility; Respiratory failure (HCC)    Subjective     Feels fatigued but denies cp, sob,n. Not tolerating therapies very well. Therapists recommending lower level of care. Pt claims that he will try harder.  Denies dizziness, heart palpitations  Complains of left heel pain  Objective:     Current Facility-Administered Medications   Medication Dose Route Frequency    0.9% sodium chloride infusion 250 mL  250 mL IntraVENous PRN    albuterol CONCENTRATE 2.5mg/0.5 mL neb soln  2.5 mg Nebulization BID    budesonide (PULMICORT) 500 mcg/2 ml nebulizer suspension  500 mcg Nebulization BID RT    nitroglycerin (NITROSTAT) tablet 0.4 mg  0.4 mg SubLINGual Q5MIN PRN    ondansetron (ZOFRAN) injection 4 mg  4 mg IntraVENous Q4H PRN    sodium chloride (NS) flush 5-10 mL  5-10 mL IntraVENous PRN    NUTRITIONAL SUPPORT ELECTROLYTE PRN ORDERS   Does Not Apply PRN    acetaminophen (TYLENOL) suppository 650 mg  650 mg Rectal Q4H PRN    acetaminophen (TYLENOL) tablet 650 mg  650 mg Oral Q4H PRN    albuterol (PROVENTIL HFA, VENTOLIN HFA, PROAIR HFA) inhaler 2 Puff  2 Puff Inhalation Q4H PRN    albuterol (PROVENTIL VENTOLIN) nebulizer solution 2.5 mg  2.5 mg Nebulization Q4H PRN    dextrose (D50W) injection syrg 25 g  25 g IntraVENous PRN    digoxin (LANOXIN) tablet 0.25 mg  0.25 mg Oral DAILY    ferrous sulfate tablet 325 mg  1 Tab Oral DAILY WITH BREAKFAST    gabapentin (NEURONTIN) capsule 600 mg  600 mg Oral TID    guaiFENesin ER (MUCINEX) tablet 1,200 mg  1,200 mg Oral Q12H    heparin (porcine) pf 600 Units  600 Units InterCATHeter Q8H    heparin (porcine) pf 600 Units  600 Units InterCATHeter PRN    insulin lispro (HUMALOG) injection   SubCUTAneous AC&HS    povidone-iodine (BETADINE) 10 % topical solution   Topical DAILY    [START ON 9/29/2017] predniSONE (DELTASONE) tablet 10 mg  10 mg Oral DAILY WITH BREAKFAST    predniSONE (DELTASONE) tablet 20 mg  20 mg Oral DAILY WITH BREAKFAST    sodium chloride (NS) flush 20 mL  20 mL InterCATHeter Q8H    tiotropium (SPIRIVA) inhalation capsule 18 mcg  1 Cap Inhalation DAILY    traZODone (DESYREL) tablet 50 mg  50 mg Oral QHS PRN    influenza vaccine 2017-18 (3 yrs+)(PF) (FLUZONE QUAD/FLUARIX QUAD) injection 0.5 mL  0.5 mL IntraMUSCular PRIOR TO DISCHARGE    0.9% sodium chloride infusion 250 mL  250 mL IntraVENous PRN    furosemide (LASIX) tablet 20 mg  20 mg Oral DAILY    potassium chloride (K-DUR, KLOR-CON) SR tablet 20 mEq  20 mEq Oral DAILY    dilTIAZem CD (CARDIZEM CD) capsule 180 mg  180 mg Oral DAILY    metoprolol succinate (TOPROL-XL) tablet 100 mg  100 mg Oral BID    famotidine (PEPCID) tablet 20 mg  20 mg Oral BID    warfarin (COUMADIN) tablet 5 mg - pharmacy dosing  5 mg Oral QPM    amiodarone (CORDARONE) tablet 400 mg  400 mg Oral BID    0.9% sodium chloride infusion 250 mL  250 mL IntraVENous PRN     Review of Systems:Denies chest pain, shortness of breath, cough, headache, visual problems, abdominal pain, dysurea, calf pain. Pertinent positives are as noted in the medical records and unremarkable otherwise. Visit Vitals    /60 (BP 1 Location: Left arm, BP Patient Position: At rest)    Pulse 60    Temp 98.1 °F (36.7 °C)    Resp 18    SpO2 98%   HR 49-60     Physical Exam:   General: Alert and age appropriately oriented. No acute cardio respiratory distress. Frail appearing   HEENT: Normocephalic,no scleral icterus  Oral mucosa moist without cyanosis   Lungs: Clear to auscultation  bilaterally. Respiration even and unlabored   Heart: Regular rate and rhythm, S1, S2 , pauses notes  No  murmurs, clicks, rub or gallops   Abdomen: Soft, non-tender, nondistended. Bowel sounds present. No organomegaly. Genitourinary: Benign . Neuromuscular:      Generalized non focal weakness  sens intact   Skin/extremity: No rashes, no erythema. No calf tenderness BLE  Wound left heel ; boggy, red                                                                            Functional Assessment:          Balance  Sitting - Static: Good (unsupported) (09/23/17 1118)  Sitting - Dynamic: Fair (occasional) (09/23/17 1118)  Standing - Static: Poor (09/23/17 1118)  Standing - Dynamic : Impaired (09/16/17 1300)           Toileting  Adaptive Equipment: Grab bars; Other (comment) (Wheelchair) (09/18/17 1012)         Billie Dominguez Fall Risk Assessment:  Billie Dominguez Fall Risk  Mobility: Ambulates or transfers with assist devices or assistance/unsteady gait (09/23/17 2058)  Mobility Interventions: Utilize walker, cane, or other assitive device (09/23/17 2058)  Mentation: Alert, oriented x 3 (09/23/17 2058)  Mentation Interventions: Evaluate medications/consider consulting pharmacy (09/23/17 1879)  Medication: Patient receiving anticonvulsants, sedatives(tranquilizers), psychotropics or hypnotics, hypoglycemics, narcotics, sleep aids, antihypertensives, laxatives, or diuretics (09/23/17 2058)  Medication Interventions: Patient to call before getting OOB (09/23/17 2058)  Elimination: Needs assistance with toileting (09/23/17 2058)  Elimination Interventions: Call light in reach; Patient to call for help with toileting needs (09/23/17 2058)  Prior Fall History: During admission (Date - Comment) (09/23/17 2058)  History of Falls Interventions: Door open when patient unattended (09/23/17 2058)  Total Score: 5 (09/23/17 2058)  Standard Fall Precautions: Yes (09/17/17 1109)  High Fall Risk: Yes (09/23/17 2058)     Speech Assessment:  Aspiration Signs/Symptoms: None (09/14/17 1036)      Ambulation:  Gait  Distance (ft): 15 Feet (ft) (09/23/17 1118)  Assistive Device: [place], rolling;Gait belt (09/16/17 1300)     Labs/Studies:  Recent Results (from the past 67 hour(s))   GLUCOSE, POC    Collection Time: 09/21/17 11:11 AM   Result Value Ref Range    Glucose (POC) 114 (H) 65 - 100 mg/dL   GLUCOSE, POC    Collection Time: 09/21/17  4:01 PM   Result Value Ref Range    Glucose (POC) 139 (H) 65 - 100 mg/dL   GLUCOSE, POC    Collection Time: 09/21/17  8:45 PM   Result Value Ref Range    Glucose (POC) 353 (H) 65 - 100 mg/dL   GLUCOSE, POC    Collection Time: 09/22/17  7:06 AM   Result Value Ref Range    Glucose (POC) 89 65 - 100 mg/dL   GLUCOSE, POC    Collection Time: 09/22/17 12:13 PM   Result Value Ref Range    Glucose (POC) 94 65 - 100 mg/dL   PROTHROMBIN TIME + INR    Collection Time: 09/22/17  3:40 PM   Result Value Ref Range    Prothrombin time 15.5 (H) 9.6 - 12.0 sec    INR 1.4 (H) 0.9 - 1.2     CBC WITH AUTOMATED DIFF    Collection Time: 09/22/17  3:40 PM   Result Value Ref Range    WBC 3.9 (L) 4.3 - 11.1 K/uL    RBC 2.95 (L) 4.23 - 5.67 M/uL    HGB 9.5 (L) 13.6 - 17.2 g/dL    HCT 30.3 (L) 41.1 - 50.3 %    .6 (H) 79.6 - 97.8 FL    MCH 32.1 26.1 - 32.9 PG    MCHC 31.3 (L) 31.4 - 35.0 g/dL    RDW 24.0 (H) 11.9 - 14.6 %    PLATELET 19 (LL) 930 - 450 K/uL    MPV 12.0 10.8 - 14.1 FL    DF AUTOMATED      NEUTROPHILS 78 43 - 78 %    LYMPHOCYTES 17 13 - 44 %    MONOCYTES 4 4.0 - 12.0 %    EOSINOPHILS 1 0.5 - 7.8 %    BASOPHILS 0 0.0 - 2.0 %    IMMATURE GRANULOCYTES 0.3 0.0 - 5.0 %    ABS. NEUTROPHILS 2.7 1.7 - 8.2 K/UL    ABS. LYMPHOCYTES 0.6 0.5 - 4.6 K/UL    ABS. MONOCYTES 0.1 0.1 - 1.3 K/UL    ABS. EOSINOPHILS 0.0 0.0 - 0.8 K/UL    ABS. BASOPHILS 0.0 0.0 - 0.2 K/UL    ABS. IMM.  GRANS. 0.0 0.0 - 0.5 K/UL   GLUCOSE, POC    Collection Time: 09/22/17  4:10 PM   Result Value Ref Range    Glucose (POC) 133 (H) 65 - 790 mg/dL   METABOLIC PANEL, BASIC    Collection Time: 09/22/17  4:23 PM   Result Value Ref Range    Sodium 139 136 - 145 mmol/L    Potassium 3.8 3.5 - 5.1 mmol/L    Chloride 101 98 - 107 mmol/L    CO2 33 (H) 21 - 32 mmol/L    Anion gap 5 (L) 7 - 16 mmol/L Glucose 118 (H) 65 - 100 mg/dL    BUN 20 8 - 23 MG/DL    Creatinine 0.92 0.8 - 1.5 MG/DL    GFR est AA >60 >60 ml/min/1.73m2    GFR est non-AA >60 >60 ml/min/1.73m2    Calcium 8.0 (L) 8.3 - 10.4 MG/DL   PLATELETS, ALLOCATE    Collection Time: 09/22/17  6:30 PM   Result Value Ref Range    Unit number [id number]     Blood component type PLTPH LRIR,1     Unit division 00     Status of unit TRANSFUSED    GLUCOSE, POC    Collection Time: 09/22/17  9:04 PM   Result Value Ref Range    Glucose (POC) 132 (H) 65 - 100 mg/dL   PROTHROMBIN TIME + INR    Collection Time: 09/23/17  6:29 AM   Result Value Ref Range    Prothrombin time 15.2 (H) 9.6 - 12.0 sec    INR 1.4 (H) 0.9 - 1.2     CBC WITH AUTOMATED DIFF    Collection Time: 09/23/17  6:29 AM   Result Value Ref Range    WBC 4.3 4.3 - 11.1 K/uL    RBC 2.79 (L) 4.23 - 5.67 M/uL    HGB 8.9 (L) 13.6 - 17.2 g/dL    HCT 29.2 (L) 41.1 - 50.3 %    .3 (H) 79.6 - 97.8 FL    MCH 31.9 26.1 - 32.9 PG    MCHC 30.6 (L) 31.4 - 35.0 g/dL    RDW 23.9 (H) 11.9 - 14.6 %    PLATELET 46 (L) 661 - 450 K/uL    MPV 11.7 10.8 - 14.1 FL    DF AUTOMATED      NEUTROPHILS 78 43 - 78 %    LYMPHOCYTES 16 13 - 44 %    MONOCYTES 5 4.0 - 12.0 %    EOSINOPHILS 1 0.5 - 7.8 %    BASOPHILS 0 0.0 - 2.0 %    IMMATURE GRANULOCYTES 0.3 0.0 - 5.0 %    ABS. NEUTROPHILS 3.0 1.7 - 8.2 K/UL    ABS. LYMPHOCYTES 0.6 0.5 - 4.6 K/UL    ABS. MONOCYTES 0.2 0.1 - 1.3 K/UL    ABS. EOSINOPHILS 0.0 0.0 - 0.8 K/UL    ABS. BASOPHILS 0.0 0.0 - 0.2 K/UL    ABS. IMM.  GRANS. 0.0 0.0 - 0.5 K/UL   GLUCOSE, POC    Collection Time: 09/23/17  6:48 AM   Result Value Ref Range    Glucose (POC) 119 (H) 65 - 100 mg/dL   FOLATE    Collection Time: 09/23/17  7:27 AM   Result Value Ref Range    Folate 16.1 3.1 - 17.5 ng/mL   VITAMIN B12    Collection Time: 09/23/17  7:27 AM   Result Value Ref Range    Vitamin B12 588 193 - 986 pg/mL   PLATELETS, ALLOCATE    Collection Time: 09/23/17  8:45 AM   Result Value Ref Range    Unit number [id number]     Blood component type [facility] LRIR,3     Unit division 00     Status of unit ISSUED    GLUCOSE, POC    Collection Time: 09/23/17 11:12 AM   Result Value Ref Range    Glucose (POC) 172 (H) 65 - 100 mg/dL   GLUCOSE, POC    Collection Time: 09/23/17  4:10 PM   Result Value Ref Range    Glucose (POC) 178 (H) 65 - 100 mg/dL   GLUCOSE, POC    Collection Time: 09/23/17  8:58 PM   Result Value Ref Range    Glucose (POC) 165 (H) 65 - 100 mg/dL   PROTHROMBIN TIME + INR    Collection Time: 09/24/17  6:50 AM   Result Value Ref Range    Prothrombin time 17.1 (H) 9.6 - 12.0 sec    INR 1.6 (H) 0.9 - 1.2     GLUCOSE, POC    Collection Time: 09/24/17  7:01 AM   Result Value Ref Range    Glucose (POC) 81 65 - 100 mg/dL   CBC WITH AUTOMATED DIFF    Collection Time: 09/24/17  7:40 AM   Result Value Ref Range    WBC 3.0 (L) 4.3 - 11.1 K/uL    RBC 2.44 (L) 4.23 - 5.67 M/uL    HGB 7.9 (L) 13.6 - 17.2 g/dL    HCT 25.4 (L) 41.1 - 50.3 %    .1 (H) 79.6 - 97.8 FL    MCH 32.4 26.1 - 32.9 PG    MCHC 31.2 (L) 31.4 - 35.0 g/dL    RDW 25.0 (H) 11.9 - 14.6 %    PLATELET 54 (L) 011 - 450 K/uL    MPV 10.9 10.8 - 14.1 FL    DF AUTOMATED      NEUTROPHILS 71 43 - 78 %    LYMPHOCYTES 21 13 - 44 %    MONOCYTES 6 4.0 - 12.0 %    EOSINOPHILS 2 0.5 - 7.8 %    BASOPHILS 0 0.0 - 2.0 %    IMMATURE GRANULOCYTES 0.4 0.0 - 5.0 %    ABS. NEUTROPHILS 1.9 1.7 - 8.2 K/UL    ABS. LYMPHOCYTES 0.6 0.5 - 4.6 K/UL    ABS. MONOCYTES 0.2 0.1 - 1.3 K/UL    ABS. EOSINOPHILS 0.1 0.0 - 0.8 K/UL    ABS. BASOPHILS 0.0 0.0 - 0.2 K/UL    ABS. IMM.  GRANS. 0.0 0.0 - 0.5 K/UL       Assessment:     Problem List as of 9/24/2017  Date Reviewed: 9/9/2017          Codes Class Noted - Resolved    Respiratory failure ([facility] [address] ICD-10-CM: J96.90  ICD-9-CM: 518.81  9/13/2017 - Present        MRSA nasal colonization ICD-10-CM: Z22.322  ICD-9-CM: V02.54  9/8/2017 - Present        COPD (chronic obstructive pulmonary disease) (HCC) (Chronic) ICD-10-CM: J44.9  ICD-9-CM: 878 8/30/2017 - Present        Acute respiratory failure with hypercapnia (HCC) ICD-10-CM: J96.02  ICD-9-CM: 518.81  8/29/2017 - Present        History of tobacco use (Chronic) ICD-10-CM: S49.180  ICD-9-CM: V15.82  8/29/2017 - Present        Hyponatremia ICD-10-CM: E87.1  ICD-9-CM: 276.1  8/29/2017 - Present        Acute encephalopathy ICD-10-CM: G93.40  ICD-9-CM: 348.30  8/29/2017 - Present        Thrombocytopenia (HCC) ICD-10-CM: D69.6  ICD-9-CM: 287.5  8/26/2017 - Present        Anticoagulant long-term use (Chronic) ICD-10-CM: Z79.01  ICD-9-CM: V58.61  8/23/2017 - Present        HTN (hypertension), benign (Chronic) ICD-10-CM: I10  ICD-9-CM: 401.1  8/23/2017 - Present        Peripheral vascular disease ([facility] [address] (Chronic) ICD-10-CM: I73.9  ICD-9-CM: 443.9  8/23/2017 - Present        Dyslipidemia (Chronic) ICD-10-CM: E78.5  ICD-9-CM: 272.4  8/23/2017 - Present        History of mechanical aortic valve replacement (Chronic) ICD-10-CM: Z95.2  ICD-9-CM: V43.3  8/23/2017 - Present    Overview Signed 8/30/2017 10:30 AM by Tanner Lee NP     Placement 2000, on chronicCoumadin             Centrilobular emphysema ([facility] [address] (Chronic) ICD-10-CM: J43.2  ICD-9-CM: 492.8  8/23/2017 - Present    Overview Signed 9/8/2017  9:24 AM by Julieta Cook NP      With last PFTs FVC 1.70 L or 44% predicted, FEV1 0.86 L or 29% predicted, FEV1/FVC 51%. This study was a postbronchodilator study performed at the [place] in [facility] on 8/22/2016                  Ischemic cardiomyopathy (Chronic) ICD-10-CM: I25.5  ICD-9-CM: 414.8  8/23/2017 - Present    Overview Signed 8/30/2017 10:29 AM by Tanner Lee NP     8/23/17 ECHO:  EF 40 % to 45 %. There were no regional wall motion abnormalities. Moderate hypokinesis of the mid-apical anterior and apical wall(s).              Atrial flutter with rapid ventricular response (HCC) ICD-10-CM: M08.48  ICD-9-CM: 427.32  8/23/2017 - Present        LV dysfunction (Chronic) ICD-10-CM: I51.9  ICD-9-CM: 429.9  10/19/2016 - Present        Atherosclerosis of native arteries of the extremities with intermittent claudication (Chronic) ICD-10-CM: K78.400  ICD-9-CM: 440.21  4/15/2014 - Present              Plan:        Assessment; Debility s/p resp failure, a flutter with RVR s/p cardioversion  PLAN  Continue daily physician medical management:      Atrial flutter with rapid ventricular response ([address] (8/23/2017) sp cardioversion - monitor HR./ BP. / hx of CHF / Ischemic cardiomyopathy     - Digoxin, coreg.  - continue anticoagulation. 9/20 has been on hold due to pending cardiac procedure; however INR now 3.4 from 3.2 9/19; 9/21 2.3.9/22 and 23 1.4; restarted coumadin 9/22 5mg daily; pharmacy to dose  - cardiac precautions.   - (9/16) -recurrent a.fib with RVR. HR 130s. 9/20 continues; metoprolol added 9/19; PT UNABLE TO PARTICIPATE IN THERAPIES. SHOULD BE TRANSFERRED TO TELEMETRY TO MANAGE CARDIAC ISSUES SO AS NOT TO USE OF ACUTE REHAB DAYS. -9/21 will try to encourage participation in therapies today. Wouldn't even do ST at bedside yesterday. Will put parameters on when to hold therapies due to tachyc.   -9/22 s/p successful cardioversion by Dr Toyin Segura; now in NSR, HR 65; restart full therapies (encourage participation); restart Coumadin 5mg daily; 9/25 INR 1.6 ( goal 2-3)  -HR 40-69; ? Decreasing beta blk since weve gone up on amio and now s/p cardioversion; will defer to cardiology  HR 60 after ambulation; which is actually the best he's done in therapy in a long time.          - restarted diuretics, as was taking at home.    - CxR 9/16 very small bilateral pleural effusions are stable.    - 9/19-  possible cardioversion, AVN ablation/pacemaker in am per Cardiology/EP    -9/20 HOLD proc due to elevated INR, cont to hold coumadin ; 9/21 INR 2.3 from 3.4; restart coumadin after procedure today if ok with cardiology  9-23 coumadin restarted 9/22      History of mechanical aortic valve replacement 2000, on coumadin/ Anticoagulant long-term use. Goal 2.5-3.5.  - resume warfarin, monitor INR.   - INR 2.4 (9/18) . average out home dose ~> 6mg hs.   - Warfarin 6mg HS (9/16)  - pharmacy assisting. On hold(9/19) for procedure.   -9/21 INR now finally trending down; 2.3 from 3.4 yesterday;off coumadin; likely restart Coumadin after procedure  -9/22 restart Coumadin 5mg daily      Bacteremia - started on empiric antibiotic treatment. Aztreonam, vanco.   - BCx grew e.coli. discontinued vano. - final ID extended-spectrum beta-lactamases (ESBLs) , appears treated, though efficacy of vanco, aztreonam not optimal.  . Pt asymptomatic, WBC wnl. Will discontinue - aztreonam. .  - will monitor closely. - will have on contact precaution, infection control notified.   - BCx redrawn 9/17, no growth, will lift contact precautions 9/19.       HTN (hypertension) - monitor.  - continue Coreg/ digoxin  -9/21 cardizem and lasix ; now off Coreg per cardiology for rate control but now bradycardia; really no rhyme or reason to his fluctuating HR; BP stable  -9/23 -162 acceptable          Thrombocytopenia/ ITP  - continued on steroids. - hematology following.   - HIT negative. - ivig per hematology direction. Will receive 1 more dose. -  received platelet 1unit (7/16). plt 25k -> 75k (9/16)-> 65k (9/17) -> 68 (9/19) monitor; 9/20 plts 52; defer to hematology  -9/21 plts 36; pending 9/22; 19k; will give one unit plts  -9/23 plts 46; want to keep above 50K , thus one unit today. ? Nplate  -2/12 36W after one unit yesterday; monitor; patient pancytopenic; Hematology to f/u          COPD   - continue respiratory treatments. Presently q6h, wean as appropriate.    - resume spiriva, pulmicort  - SOB with activity. - normal CxR (9/15)  - will wean albuterol nebs 9/18. Bid and as needed.    -9/20 SOB; sats 95% RA, bilateral crackles, increasing JIM; likely due to tachycardia; will f/u portable CXR; weaning steroids  -9/21 CXR; shows no significant change in small right pleural effusion and adjacent mild right basilar atelectasis/infiltrate. Continue to enc IS. bipap at noc; sats 95% RA  -9/23 wean O2 s/p cardioversion; 100% sats 2 L NC  -9/23 was weaned from 3L to 1L; sats 98%; BiPap at noc    Dysphagia; still req NTL, mech soft 9/24 ; ST to follow up          iron deficiency anemia -hgb 8.5-> 8.7(9/15)-. 10.8(9/18) -> 9.3-> 9.1 (9/19) Monitor. Continue fe supplements.   -9/20 hgb 8.8; no evidence of acute bleed; hgb 8.1 9/21; check stool  -9/21 pancytopenic, WBC has dropped  as well; monitor for now; hgb 8.1  -9/23 hbg 8.9 ; macrocytosis; B12 and folate wnl  -9/24 hgb 7. 9?? no evidence of bleed. Transfuse if less than 7.2 . Order hemoccult of stool. Differential normal ( no abd pain or flank/back pain to be suspicious for retroperitoneal bleed. Recheck in a.m. 9/25      Hyponatremia  - monitor. 139 on 9/22      Pneumonia prophylaxis- Insentive spirometer every hour while awake      DVT risk / DVT Prophylaxis- Will require daily physician exam to assess for signs and symptoms as patient is at increased risk for of thromboembolism. Mobilization as tolerated. Intermittent pneumatic compression devices when in bed Thigh-high or knee-high thromboembolic deterrent hose when out of bed.    - covered with warfarin. (inr supratherapeutic 9/20, coumadin on hold) no clinical evidence of DVT      Pain Control: stable, mild-to-moderate joint symptoms intermittently, reasonably well controlled by PRN meds. Will require regular pain assessment and comprenhensive pain management. - resume gabapentin.       Wound Care: Monitor wound status daily per staff and physician. At risk for failure. Will require 24/7 rehab nursing. Keep wound clean and dry  - excoriation at bridge of nose. Continue antibiotic ointment.   -left heel boggy, erythema; order rooke boot; floating heel not working      Diabetes mellitus - Uncontrolled. poor glycemic control.  Will require daily, close FSG monitoring and medication adjustment to optimize glycemic control in setting of acute illness and hospitalization.   - SSI coverage with lispro. Poorly controlled , exacerbated by steroids  -9/21 a bit better controlled, likely due to poor po intake  09/22 BS varied greatly 80s to 350s; consider glucotrol  0/24 better control. 70s to [address] [place] program - colace as needed.      Gi prophylaxis; change pepcid to po 9/20              Time spent was 35 minutes with over 1/2 in direct patient care/examination, consultation and coordination of care.      Signed By: Francisca Pinzon MD     September 24, 2017

## 2017-09-25 LAB
BASOPHILS # BLD: 0 K/UL (ref 0–0.2)
BASOPHILS NFR BLD: 0 % (ref 0–2)
DIFFERENTIAL METHOD BLD: ABNORMAL
EOSINOPHIL # BLD: 0 K/UL (ref 0–0.8)
EOSINOPHIL NFR BLD: 2 % (ref 0.5–7.8)
ERYTHROCYTE [DISTWIDTH] IN BLOOD BY AUTOMATED COUNT: 25.6 % (ref 11.9–14.6)
GLUCOSE BLD STRIP.AUTO-MCNC: 122 MG/DL (ref 65–100)
GLUCOSE BLD STRIP.AUTO-MCNC: 159 MG/DL (ref 65–100)
GLUCOSE BLD STRIP.AUTO-MCNC: 191 MG/DL (ref 65–100)
GLUCOSE BLD STRIP.AUTO-MCNC: 82 MG/DL (ref 65–100)
HCT VFR BLD AUTO: 26 % (ref 41.1–50.3)
HEMOCCULT STL QL: NEGATIVE
HGB BLD-MCNC: 8 G/DL (ref 13.6–17.2)
IMM GRANULOCYTES # BLD: 0 K/UL (ref 0–0.5)
IMM GRANULOCYTES NFR BLD: 0 % (ref 0–5)
INR PPP: 1.9 (ref 0.9–1.2)
LYMPHOCYTES # BLD: 0.9 K/UL (ref 0.5–4.6)
LYMPHOCYTES NFR BLD: 34 % (ref 13–44)
MCH RBC QN AUTO: 32.3 PG (ref 26.1–32.9)
MCHC RBC AUTO-ENTMCNC: 30.9 G/DL (ref 31.4–35)
MCV RBC AUTO: 104.4 FL (ref 79.6–97.8)
MONOCYTES # BLD: 0.2 K/UL (ref 0.1–1.3)
MONOCYTES NFR BLD: 8 % (ref 4–12)
NEUTS SEG # BLD: 1.6 K/UL (ref 1.7–8.2)
NEUTS SEG NFR BLD: 56 % (ref 43–78)
PLATELET # BLD AUTO: 54 K/UL (ref 150–450)
PMV BLD AUTO: 10.7 FL (ref 10.8–14.1)
PROTHROMBIN TIME: 21.3 SEC (ref 9.6–12)
RBC # BLD AUTO: 2.49 M/UL (ref 4.23–5.67)
WBC # BLD AUTO: 3 K/UL (ref 4.3–11.1)

## 2017-09-25 PROCEDURE — 74011250636 HC RX REV CODE- 250/636: Performed by: PHYSICAL MEDICINE & REHABILITATION

## 2017-09-25 PROCEDURE — 74011636637 HC RX REV CODE- 636/637: Performed by: PHYSICAL MEDICINE & REHABILITATION

## 2017-09-25 PROCEDURE — 74011250637 HC RX REV CODE- 250/637: Performed by: PHYSICAL MEDICINE & REHABILITATION

## 2017-09-25 PROCEDURE — 85025 COMPLETE CBC W/AUTO DIFF WBC: CPT | Performed by: PHYSICAL MEDICINE & REHABILITATION

## 2017-09-25 PROCEDURE — 97535 SELF CARE MNGMENT TRAINING: CPT

## 2017-09-25 PROCEDURE — 82962 GLUCOSE BLOOD TEST: CPT

## 2017-09-25 PROCEDURE — 85610 PROTHROMBIN TIME: CPT | Performed by: PHYSICAL MEDICINE & REHABILITATION

## 2017-09-25 PROCEDURE — 92526 ORAL FUNCTION THERAPY: CPT

## 2017-09-25 PROCEDURE — 94760 N-INVAS EAR/PLS OXIMETRY 1: CPT

## 2017-09-25 PROCEDURE — 97110 THERAPEUTIC EXERCISES: CPT

## 2017-09-25 PROCEDURE — 65310000000 HC RM PRIVATE REHAB

## 2017-09-25 PROCEDURE — 97116 GAIT TRAINING THERAPY: CPT

## 2017-09-25 PROCEDURE — 97530 THERAPEUTIC ACTIVITIES: CPT

## 2017-09-25 PROCEDURE — 74011250637 HC RX REV CODE- 250/637: Performed by: INTERNAL MEDICINE

## 2017-09-25 PROCEDURE — 74011000250 HC RX REV CODE- 250: Performed by: PHYSICAL MEDICINE & REHABILITATION

## 2017-09-25 PROCEDURE — 94640 AIRWAY INHALATION TREATMENT: CPT

## 2017-09-25 RX ADMIN — TIOTROPIUM BROMIDE 18 MCG: 18 CAPSULE ORAL; RESPIRATORY (INHALATION) at 05:40

## 2017-09-25 RX ADMIN — INSULIN LISPRO 2 UNITS: 100 INJECTION, SOLUTION INTRAVENOUS; SUBCUTANEOUS at 12:09

## 2017-09-25 RX ADMIN — AMIODARONE HYDROCHLORIDE 400 MG: 200 TABLET ORAL at 20:03

## 2017-09-25 RX ADMIN — GUAIFENESIN 1200 MG: 600 TABLET, EXTENDED RELEASE ORAL at 20:01

## 2017-09-25 RX ADMIN — GUAIFENESIN 1200 MG: 600 TABLET, EXTENDED RELEASE ORAL at 08:33

## 2017-09-25 RX ADMIN — WARFARIN SODIUM 5 MG: 5 TABLET ORAL at 17:44

## 2017-09-25 RX ADMIN — SODIUM CHLORIDE, PRESERVATIVE FREE 600 UNITS: 5 INJECTION INTRAVENOUS at 05:55

## 2017-09-25 RX ADMIN — GABAPENTIN 600 MG: 300 CAPSULE ORAL at 16:36

## 2017-09-25 RX ADMIN — Medication 20 ML: at 05:55

## 2017-09-25 RX ADMIN — BUDESONIDE 500 MCG: 0.5 INHALANT RESPIRATORY (INHALATION) at 05:40

## 2017-09-25 RX ADMIN — POTASSIUM CHLORIDE 20 MEQ: 20 TABLET, EXTENDED RELEASE ORAL at 08:34

## 2017-09-25 RX ADMIN — METOPROLOL SUCCINATE 100 MG: 100 TABLET, EXTENDED RELEASE ORAL at 20:04

## 2017-09-25 RX ADMIN — PREDNISONE 20 MG: 20 TABLET ORAL at 08:37

## 2017-09-25 RX ADMIN — Medication 20 ML: at 20:06

## 2017-09-25 RX ADMIN — ALBUTEROL SULFATE 2.5 MG: 2.5 SOLUTION RESPIRATORY (INHALATION) at 05:40

## 2017-09-25 RX ADMIN — FAMOTIDINE 20 MG: 20 TABLET ORAL at 08:38

## 2017-09-25 RX ADMIN — FAMOTIDINE 20 MG: 20 TABLET ORAL at 17:44

## 2017-09-25 RX ADMIN — BUDESONIDE 500 MCG: 0.5 INHALANT RESPIRATORY (INHALATION) at 18:09

## 2017-09-25 RX ADMIN — GABAPENTIN 600 MG: 300 CAPSULE ORAL at 20:02

## 2017-09-25 RX ADMIN — DILTIAZEM HYDROCHLORIDE 180 MG: 180 CAPSULE, COATED, EXTENDED RELEASE ORAL at 08:39

## 2017-09-25 RX ADMIN — DIGOXIN 0.25 MG: 250 TABLET ORAL at 08:37

## 2017-09-25 RX ADMIN — AMIODARONE HYDROCHLORIDE 400 MG: 200 TABLET ORAL at 08:38

## 2017-09-25 RX ADMIN — POVIDONE-IODINE: 10 SOLUTION TOPICAL at 08:40

## 2017-09-25 RX ADMIN — SODIUM CHLORIDE, PRESERVATIVE FREE 600 UNITS: 5 INJECTION INTRAVENOUS at 20:05

## 2017-09-25 RX ADMIN — ALBUTEROL SULFATE 2.5 MG: 2.5 SOLUTION RESPIRATORY (INHALATION) at 18:09

## 2017-09-25 RX ADMIN — FUROSEMIDE 20 MG: 20 TABLET ORAL at 08:37

## 2017-09-25 RX ADMIN — SODIUM CHLORIDE, PRESERVATIVE FREE 600 UNITS: 5 INJECTION INTRAVENOUS at 16:39

## 2017-09-25 RX ADMIN — Medication 20 ML: at 16:38

## 2017-09-25 RX ADMIN — INSULIN LISPRO 2 UNITS: 100 INJECTION, SOLUTION INTRAVENOUS; SUBCUTANEOUS at 20:23

## 2017-09-25 RX ADMIN — GABAPENTIN 600 MG: 300 CAPSULE ORAL at 05:55

## 2017-09-25 RX ADMIN — FERROUS SULFATE TAB 325 MG (65 MG ELEMENTAL FE) 325 MG: 325 (65 FE) TAB at 08:38

## 2017-09-25 NOTE — PROGRESS NOTES
Problem: Falls - Risk of  Goal: *Absence of Falls  Document Lauren Fall Risk and appropriate interventions in the flowsheet.    Outcome: Progressing Towards Goal  Fall Risk Interventions:  Mobility Interventions: Utilize walker, cane, or other assitive device     Mentation Interventions: Adequate sleep, hydration, pain control, Door open when patient unattended, Evaluate medications/consider consulting pharmacy, Eyeglasses and hearing aids     Medication Interventions: Patient to call before getting OOB, Evaluate medications/consider consulting pharmacy     Elimination Interventions: Call light in reach     History of Falls Interventions: Consult care management for discharge planning, Door open when patient unattended

## 2017-09-25 NOTE — PROGRESS NOTES
PHYSICAL THERAPY DAILY NOTE  Time In: 5822  Time Out: 1520  Patient Seen For: PM;Gait training;Patient education; Therapeutic exercise;Transfer training    Subjective: \"My legs feel a little tired this afternoon. \" Patient agreeable to therapy. Objective: Vital Signs: HR and O2 saturation measured throughout treatment session: HR remained between 56-72, O2 % remained between 91%-94% on room air. Pain level: No pain reported. Pain location: n/a  Pain interventions: n/a    Patient education: Educated patient on safety with ambulation and activity pacing. Interdisciplinary Communication: Communicated with Frida Parada OT regarding patient's POC. Contact (MRSA)  GROSS ASSESSMENT Daily Assessment            BED/MAT MOBILITY Daily Assessment    Rolling Right : 0 (Not tested)  Rolling Left : 0 (Not tested)  Supine to Sit : 0 (Not tested)  Sit to Supine : 0 (Not tested)       TRANSFERS Daily Assessment   Required for safety. Transfer Type: SPT with walker  Transfer Assistance : 4 (Contact guard assistance)  Sit to Stand Assistance: Contact guard assistance  Car Transfers: Not tested       GAIT Daily Assessment   Patient ambulated with mild forward head flexion, decreased gait speed, and decreased foot clearance B LE. Verbal cues for upright posture.  Amount of Assistance: 4 (Contact guard assistance)  Distance (ft): 50 Feet (ft) (x1, 30' x1)  Assistive Device: Walker, rolling;Gait belt       STEPS or STAIRS Daily Assessment            BALANCE Daily Assessment    Sitting - Static: Good (unsupported)  Sitting - Dynamic: Good (unsupported)  Standing - Static: Fair  Standing - Dynamic : Impaired       WHEELCHAIR MOBILITY Daily Assessment            LOWER EXTREMITY EXERCISES Daily Assessment    Extremity: Both  Exercise Type #1: Seated lower extremity strengthening  Sets Performed: 1  Reps Performed: 10  Level of Assist: Supervision  Exercise Type #2: Other (comment) (Motomed x 5 minutes, Level 1)     Patient performed the following B LE exercises:  SEATED EXERCISES Sets Reps Comments   Ankle Pumps 1 10    Hip Flexion 1 10    Long Arc Quads 1 10    Hip Adduction/Ball Squeeze 1 10    Hip Abduction with Green Theraband 1 10    Hamstring Curls with Green Theraband 1 10      Patient returned to room and handed off to Mansoor Ochoa who was assisting patient to bathroom. Assessment: Patient continues to make progress with activity tolerance and tolerance to household ambulation. Patient requires verbal cues for deeper and slower breathing with activity as patient becomes visibly fatigued. Patient will benefit from further therapy to increase tolerance to upright activity and increase safety awareness. Plan of Care: Continue with POC and progress as tolerated.        Derek Fat  9/25/2017

## 2017-09-25 NOTE — PROGRESS NOTES
OT Daily Note    Time In 1302   Time Out 1349     Subjective: \"I'm beat. \"  Pain: None indicated    Precautions: Contact (MRSA)    Self-Care   Patient encountered seated in wheelchair in hospital room, shirt dirty from lunch. Patient completed UB dressing to doff front-opening jacket and change into clean overhead shirt while seated with minimal assist and cues for problem solving to access jacket to pull overhead for doffing. Patient requires intermittent rest breaks to complete. Patient requests toileting, transfers wheelchair <> toilet SPT with CGA and use of grab bars. Patient reports underwear and pants soiled from prior attempt at use of urinal, patient doffs underwear and pants with CGA for standing balance and use of dressing stick. Patient dons clean underwear and pants using reacher/dressing stick to start over feet, note patient requires increased time and minimal assist/cues for problem solving use of reacher to start feet through correct holes in underwear. Patient completes grooming seated at sink to wash hands and comb hair with setup to gather materials. Assessment: Patient continues to be limited by decreased activity tolerance requiring increased time for all tasks. Patient also with decreased problem-solving/self-help for basic ADL tasks. Education: Purpose of therapy  Interdisciplinary Communication: Collaborated with Alton Underwood and agreed patient is progressing well and on-track to meet goals as stated in POC. Plan: Continue to address ADL/IADL, functional mobility, activity tolerance, balance, strengthening, coordination, cognition.       Negar Monahan, OTR/L

## 2017-09-25 NOTE — PROGRESS NOTES
09/25/17 1200   Time Spent With Patient   Time In 1125   Time Out 1157   Patient Seen For: AM;Laryngeal exercises   Mental Status   Neurologic State Alert   Orientation Level Oriented X4   Cognition Appropriate decision making   Perception Appears intact   Perseveration No perseveration noted   Safety/Judgement Fall prevention   Pt completed laryngeal exercises 2 x 5 with min cues with 80% accuracy with needed rest breaks.     Vilma Hickman MA/DIANA/SLP

## 2017-09-25 NOTE — PROGRESS NOTES
PHYSICAL THERAPY DAILY NOTE  Time In: 0830  Time Out: 1550  Patient Seen For: AM;Gait training;Patient education; Therapeutic exercise;Transfer training    Subjective: \"When do you think I'll be ready to go home? \" Patient agreeable to therapy. Objective: Vital Signs: Patient's HR and O2 saturation measured throughout PT session: HR remained in 54-60 range with activity and O2 remained 94-98% on room air. Patient Vitals for the past 8 hrs:   Temp Pulse Resp BP SpO2   09/25/17 0800 97.6 °F (36.4 °C) (!) 58 18 110/56 93 %   09/25/17 0540 - - - - 97 %         Pain level: No pain reported. Pain location: n/a  Pain interventions: n/a    Patient education: Educated patient on activity pacing. Interdisciplinary Communication: Communicated with Amanda Guzman OT regarding patient's POC. Contact (MRSA)  GROSS ASSESSMENT Daily Assessment            BED/MAT MOBILITY Daily Assessment    Rolling Right : 0 (Not tested)  Rolling Left : 0 (Not tested)  Supine to Sit : 0 (Not tested)  Sit to Supine : 0 (Not tested)       TRANSFERS Daily Assessment   Required for safety. Transfer Type: SPT without device  Transfer Assistance : 5 (Stand-by assistance)  Sit to Stand Assistance: Stand-by assistance  Car Transfers: Not tested       GAIT Daily Assessment   Patient ambulated with slow step through gait pattern with mild forward flexion at trunk and foot flat at IC of B LE. Verbal cues for upright posture and deep breathing.  Amount of Assistance: 4 (Minimal assistance)  Distance (ft): 70 Feet (ft) (x1, 61' x1)  Assistive Device: Walker, rolling;Gait belt       STEPS or STAIRS Daily Assessment            BALANCE Daily Assessment    Sitting - Static: Good (unsupported)  Sitting - Dynamic: Good (unsupported)  Standing - Static: Fair  Standing - Dynamic : Impaired       WHEELCHAIR MOBILITY Daily Assessment            LOWER EXTREMITY EXERCISES Daily Assessment    Extremity: Both  Exercise Type #1: Seated lower extremity strengthening  Sets Performed: 1  Reps Performed: 15  Level of Assist: Supervision     Patient performed the following B  LE exercises:  SEATED EXERCISES Sets Reps Comments   Ankle Pumps 1 15    Hip Flexion 1 15    Long Arc Quads 1 15    Hip Adduction/Ball Squeeze 1 15    Hip Abduction with Green Theraband 1 15    Hamstring Curls with Green Theraband 1 15    Hip Extension with Green Theraband 1 15      Patient returned to room sitting in recliner with all needs in reach. Assessment: Patient demonstrated improved tolerance to activity this AM. Patient is very motivated to work with PT to return to pre-morbid function. Patient will benefit from further therapy to improve endurance with household ambulation and improve safety awareness. Plan of Care: Continue with POC and progress as tolerated.        Kermit Woodard  9/25/2017

## 2017-09-25 NOTE — PROGRESS NOTES
[facility] CARDIOLOGY PROGRESS NOTE           9/25/2017 10:25 AM    Admit Date: 9/13/2017      Subjective:   No CP, in chair. Wants to go home. Remains in sinus. ROS:  Cardiovascular:  As noted above    Objective:      Vitals:    09/24/17 2041 09/24/17 2200 09/25/17 0540 09/25/17 0800   BP: 135/69   110/56   Pulse: (!) 45   (!) 58   Resp: 17 18   Temp: 96.4 °F (35.8 °C)   97.6 °F (36.4 °C)   SpO2: 100% 100% 97% 93%       Physical Exam:  General-No Acute Distress  Neck- supple, no JVD  CV- regular rate and rhythm no MRG  Lung- clear bilaterally with dec BS in the bases  Abd- soft, nontender, nondistended  Ext- no edema bilaterally. Skin- warm and dry    Data Review:   Recent Labs      09/25/17   0543  09/24/17   0740  09/24/17   0650   09/22/17   1623   NA   --    --    --    --   139   K   --    --    --    --   3.8   BUN   --    --    --    --   20   CREA   --    --    --    --   0.92   GLU   --    --    --    --   118*   WBC  3.0*  3.0*   --    < >   --    HGB  8.0*  7.9*   --    < >   --    HCT  26.0*  25.4*   --    < >   --    PLT  54*  54*   --    < >   --    INR  1.9*   --   1.6*   < >   --     < > = values in this interval not displayed. Assessment/Plan:     Active Problems:    Ischemic cardiomyopathy (8/23/2017)  Follow, no angina now. EF 40%, remain on toprol, dig. Add Ace as BP will allow. Atrial flutter with rapid ventricular response ([address] (8/23/2017)  Recent cardioversion. Remain on Amio and coumadin. Stop PO dilt due to low HR. Follow for SSS. Remain on toprol and dig for now.         Thrombocytopenia ([address] (8/26/2017)  Follow, monitor on coumadin      COPD (chronic obstructive pulmonary disease) ([address] (8/30/2017)  Continue meds      Respiratory failure ([facility] [address] (9/13/2017)  Better, follow          Lilliam Ortiz DO  9/25/2017 10:25 AM

## 2017-09-25 NOTE — PROGRESS NOTES
Patient resting up in bed. Alert and oriented. Lung sounds with coarseness. S1S2, bowel sounds active. Continues on telemetry. PICC line patent and clean. Denies any pain. Repositioned up in bed. No acute signs of distress. Assessment completed. See doc flow sheet for further assessments.

## 2017-09-25 NOTE — PROGRESS NOTES
OT Daily Note    Time In 1032   Time Out 1115     Subjective: \"This is hard. \"  Pain: None indicated    Precautions: Contact (MRSA)    Strengthening   Patient completed 2 sets x 10 reps of BUE AROM therapeutic exercises seated in wheelchair, note patient does not tolerate weight added. Patient completed shoulder flexion, shoulder abduction, scapular protraction/retraction, elbow flexion, and elbow extension exercises. Assessment: Patient with increased activity tolerance today however continues with decreased strength in BUEs for therapeutic exercise. Education: Purpose of therapy  Interdisciplinary Communication: Collaborated with Serina Negrete and agreed patient is progressing well and on-track to meet goals as stated in POC. Plan: Continue to address ADL/IADL, functional mobility, activity tolerance, balance, strengthening, coordination, cognition.       Rama Mota, OTR/L

## 2017-09-25 NOTE — PROGRESS NOTES
09/25/17 1019   Time Spent With Patient   Time In 0749   Time Out 0830   Patient Seen For: AM;ADLs   Grooming   Grooming Assistance  S   Comments Setup to gather materials   Upper Body Bathing   Bathing Assistance, Upper Mod I   Position Performed Seated edge of bed   Comments Intermittent rest breaks and increased time required   Lower Body Bathing   Bathing Assistance, Lower  Min A   Position Performed Seated edge of bed   Comments Assist for B feet d/t fatigue this morning, CGA for standing balance as patient bathes bottom/perineal area   Upper Body Dressing    [facility] to gather overhead shirt, supervision for orientation of shirt   Lower Body Dressing    Dressing Assistance  CGA   Leg Crossed Method Used No   Position Performed Seated edge of bed;Standing   Adaptive Equipment Used Reacher;Dressing stick; Sock aid   Don/Doff Anti-Embolic Stockings NA   Comments CGA for standing balance as patient manages pants/underwear over hips, patient uses dressing stick to manage clothing off feet, reacher to start underwear/pants over feet, and rigid sock aid to don socks with intermittent rest breaks and increased time/cues for problem solving this morning     S: \"My legs are sore this morning. \" Agreeable to therapy. Focus of session was on ADL and functional mobility. Patient was able to transfer supine to seated EOB with supervision and use of bed rails. Patient completes ADL seated EOB. Pain not indicated. Collaborated with Liseth LINDA and confirmed patient is on track to reach goals as documented in the care plan. Patient tolerated session well, but activity tolerance, strength, coordination, cognition, balance, functional mobility are still below baseline and require skilled facilitation to successfully and safely complete ADL's and transfers. Patient ended session seated EOB with Jf LINDA present.      Volodymyr Raya, OTR/L

## 2017-09-25 NOTE — PROGRESS NOTES
SFD PROGRESS NOTE    Nayeli Sero  Admit Date: 9/13/2017  Admit Diagnosis: Cardiac Debility; Respiratory failure (HCC)    Subjective     Vss. Afebrile, remains in SR. Mild bradycardia  noted yesterday and this am. Denies chest pain, SOB, cough, palpitations. Therapy fairly tolerated this morning. Walked 79 ' with RW, min assist level.      Objective:     Current Facility-Administered Medications   Medication Dose Route Frequency    0.9% sodium chloride infusion 250 mL  250 mL IntraVENous PRN    albuterol CONCENTRATE 2.5mg/0.5 mL neb soln  2.5 mg Nebulization BID    budesonide (PULMICORT) 500 mcg/2 ml nebulizer suspension  500 mcg Nebulization BID RT    nitroglycerin (NITROSTAT) tablet 0.4 mg  0.4 mg SubLINGual Q5MIN PRN    ondansetron (ZOFRAN) injection 4 mg  4 mg IntraVENous Q4H PRN    sodium chloride (NS) flush 5-10 mL  5-10 mL IntraVENous PRN    NUTRITIONAL SUPPORT ELECTROLYTE PRN ORDERS   Does Not Apply PRN    acetaminophen (TYLENOL) suppository 650 mg  650 mg Rectal Q4H PRN    acetaminophen (TYLENOL) tablet 650 mg  650 mg Oral Q4H PRN    albuterol (PROVENTIL HFA, VENTOLIN HFA, PROAIR HFA) inhaler 2 Puff  2 Puff Inhalation Q4H PRN    albuterol (PROVENTIL VENTOLIN) nebulizer solution 2.5 mg  2.5 mg Nebulization Q4H PRN    dextrose (D50W) injection syrg 25 g  25 g IntraVENous PRN    digoxin (LANOXIN) tablet 0.25 mg  0.25 mg Oral DAILY    ferrous sulfate tablet 325 mg  1 Tab Oral DAILY WITH BREAKFAST    gabapentin (NEURONTIN) capsule 600 mg  600 mg Oral TID    guaiFENesin ER (MUCINEX) tablet 1,200 mg  1,200 mg Oral Q12H    heparin (porcine) pf 600 Units  600 Units InterCATHeter Q8H    heparin (porcine) pf 600 Units  600 Units InterCATHeter PRN    insulin lispro (HUMALOG) injection   SubCUTAneous AC&HS    povidone-iodine (BETADINE) 10 % topical solution   Topical DAILY    [START ON 9/29/2017] predniSONE (DELTASONE) tablet 10 mg  10 mg Oral DAILY WITH BREAKFAST    predniSONE (DELTASONE) tablet 20 mg  20 mg Oral DAILY WITH BREAKFAST    sodium chloride (NS) flush 20 mL  20 mL InterCATHeter Q8H    tiotropium (SPIRIVA) inhalation capsule 18 mcg  1 Cap Inhalation DAILY    traZODone (DESYREL) tablet 50 mg  50 mg Oral QHS PRN    influenza vaccine 2017-18 (3 yrs+)(PF) (FLUZONE QUAD/FLUARIX QUAD) injection 0.5 mL  0.5 mL IntraMUSCular PRIOR TO DISCHARGE    0.9% sodium chloride infusion 250 mL  250 mL IntraVENous PRN    furosemide (LASIX) tablet 20 mg  20 mg Oral DAILY    potassium chloride (K-DUR, KLOR-CON) SR tablet 20 mEq  20 mEq Oral DAILY    metoprolol succinate (TOPROL-XL) tablet 100 mg  100 mg Oral BID    famotidine (PEPCID) tablet 20 mg  20 mg Oral BID    warfarin (COUMADIN) tablet 5 mg - pharmacy dosing  5 mg Oral QPM    amiodarone (CORDARONE) tablet 400 mg  400 mg Oral BID    0.9% sodium chloride infusion 250 mL  250 mL IntraVENous PRN     Review of Systems:Denies chest pain, shortness of breath, cough, headache, visual problems, abdominal pain, dysurea, calf pain. Pertinent positives are as noted in the medical records and unremarkable otherwise. Visit Vitals    /56 (BP 1 Location: Left arm, BP Patient Position: At rest)    Pulse (!) 58    Temp 97.6 °F (36.4 °C)    Resp 18    SpO2 93%   HR 49-60     Physical Exam:   General: Alert and age appropriately oriented. No acute cardio respiratory distress. Frail appearing   HEENT: Normocephalic,no scleral icterus  Oral mucosa moist without cyanosis   Lungs: Clear to auscultation  bilaterally. Respiration even and unlabored   Heart: Regular rate and rhythm, S1, S2 , pauses notes  No  murmurs, clicks, rub or gallops   Abdomen: Soft, non-tender, nondistended. Bowel sounds present. No organomegaly. Genitourinary: Benign . Neuromuscular:      Generalized non focal weakness  sens intact   Skin/extremity: No rashes, no erythema.  No calf tenderness BLE  Wound left heel ; boggy, red Functional Assessment:          Balance  Sitting - Static: Good (unsupported) (09/25/17 1100)  Sitting - Dynamic: Good (unsupported) (09/25/17 1100)  Standing - Static: Fair (09/25/17 1100)  Standing - Dynamic : Impaired (09/25/17 1100)           Toileting  Adaptive Equipment: Grab bars; Other (comment) (Wheelchair) (09/18/17 1012)         Kirby Palacios Fall Risk Assessment:  Kirby Palacios Fall Risk  Mobility: Ambulates or transfers with assist devices or assistance/unsteady gait (09/24/17 2022)  Mobility Interventions: Utilize walker, cane, or other assitive device (09/24/17 2022)  Mentation: Alert, oriented x 3 (09/24/17 2022)  Mentation Interventions: Adequate sleep, hydration, pain control;Evaluate medications/consider consulting pharmacy; Eyeglasses and hearing aids (09/24/17 0705)  Medication: Patient receiving anticonvulsants, sedatives(tranquilizers), psychotropics or hypnotics, hypoglycemics, narcotics, sleep aids, antihypertensives, laxatives, or diuretics (09/24/17 2022)  Medication Interventions: Patient to call before getting OOB (09/24/17 2022)  Elimination: Needs assistance with toileting (09/24/17 2022)  Elimination Interventions: Call light in reach (09/24/17 2022)  Prior Fall History: During admission (Date - Comment) (09/24/17 2022)  History of Falls Interventions: Door open when patient unattended (09/24/17 2022)  Total Score: 5 (09/24/17 2022)  Standard Fall Precautions: Yes (09/24/17 0705)  High Fall Risk: Yes (09/24/17 2022)     Speech Assessment:  Aspiration Signs/Symptoms: None (09/14/17 1036)      Ambulation:  Gait  Distance (ft): 70 Feet (ft) (x1, 60' x1) (09/25/17 1100)  Assistive Device: Walker, rolling;Gait belt (09/25/17 1100)     Labs/Studies:  Recent Results (from the past 72 hour(s))   GLUCOSE, POC    Collection Time: 09/22/17 12:13 PM   Result Value Ref Range    Glucose (POC) 94 65 - 100 mg/dL   PROTHROMBIN TIME + INR    Collection Time: 09/22/17  3:40 PM Result Value Ref Range    Prothrombin time 15.5 (H) 9.6 - 12.0 sec    INR 1.4 (H) 0.9 - 1.2     CBC WITH AUTOMATED DIFF    Collection Time: 09/22/17  3:40 PM   Result Value Ref Range    WBC 3.9 (L) 4.3 - 11.1 K/uL    RBC 2.95 (L) 4.23 - 5.67 M/uL    HGB 9.5 (L) 13.6 - 17.2 g/dL    HCT 30.3 (L) 41.1 - 50.3 %    .6 (H) 79.6 - 97.8 FL    MCH 32.1 26.1 - 32.9 PG    MCHC 31.3 (L) 31.4 - 35.0 g/dL    RDW 24.0 (H) 11.9 - 14.6 %    PLATELET 19 (LL) 881 - 450 K/uL    MPV 12.0 10.8 - 14.1 FL    DF AUTOMATED      NEUTROPHILS 78 43 - 78 %    LYMPHOCYTES 17 13 - 44 %    MONOCYTES 4 4.0 - 12.0 %    EOSINOPHILS 1 0.5 - 7.8 %    BASOPHILS 0 0.0 - 2.0 %    IMMATURE GRANULOCYTES 0.3 0.0 - 5.0 %    ABS. NEUTROPHILS 2.7 1.7 - 8.2 K/UL    ABS. LYMPHOCYTES 0.6 0.5 - 4.6 K/UL    ABS. MONOCYTES 0.1 0.1 - 1.3 K/UL    ABS. EOSINOPHILS 0.0 0.0 - 0.8 K/UL    ABS. BASOPHILS 0.0 0.0 - 0.2 K/UL    ABS. IMM.  GRANS. 0.0 0.0 - 0.5 K/UL   GLUCOSE, POC    Collection Time: 09/22/17  4:10 PM   Result Value Ref Range    Glucose (POC) 133 (H) 65 - 167 mg/dL   METABOLIC PANEL, BASIC    Collection Time: 09/22/17  4:23 PM   Result Value Ref Range    Sodium 139 136 - 145 mmol/L    Potassium 3.8 3.5 - 5.1 mmol/L    Chloride 101 98 - 107 mmol/L    CO2 33 (H) 21 - 32 mmol/L    Anion gap 5 (L) 7 - 16 mmol/L    Glucose 118 (H) 65 - 100 mg/dL    BUN 20 8 - 23 MG/DL    Creatinine 0.92 0.8 - 1.5 MG/DL    GFR est AA >60 >60 ml/min/1.73m2    GFR est non-AA >60 >60 ml/min/1.73m2    Calcium 8.0 (L) 8.3 - 10.4 MG/DL   PLATELETS, ALLOCATE    Collection Time: 09/22/17  6:30 PM   Result Value Ref Range    Unit number [id number]     Blood component type PLTPH LRIR,1     Unit division 00     Status of unit TRANSFUSED    GLUCOSE, POC    Collection Time: 09/22/17  9:04 PM   Result Value Ref Range    Glucose (POC) 132 (H) 65 - 100 mg/dL   PROTHROMBIN TIME + INR    Collection Time: 09/23/17  6:29 AM   Result Value Ref Range    Prothrombin time 15.2 (H) 9.6 - 12.0 sec INR 1.4 (H) 0.9 - 1.2     CBC WITH AUTOMATED DIFF    Collection Time: 09/23/17  6:29 AM   Result Value Ref Range    WBC 4.3 4.3 - 11.1 K/uL    RBC 2.79 (L) 4.23 - 5.67 M/uL    HGB 8.9 (L) 13.6 - 17.2 g/dL    HCT 29.2 (L) 41.1 - 50.3 %    .3 (H) 79.6 - 97.8 FL    MCH 31.9 26.1 - 32.9 PG    MCHC 30.6 (L) 31.4 - 35.0 g/dL    RDW 23.9 (H) 11.9 - 14.6 %    PLATELET 46 (L) 704 - 450 K/uL    MPV 11.7 10.8 - 14.1 FL    DF AUTOMATED      NEUTROPHILS 78 43 - 78 %    LYMPHOCYTES 16 13 - 44 %    MONOCYTES 5 4.0 - 12.0 %    EOSINOPHILS 1 0.5 - 7.8 %    BASOPHILS 0 0.0 - 2.0 %    IMMATURE GRANULOCYTES 0.3 0.0 - 5.0 %    ABS. NEUTROPHILS 3.0 1.7 - 8.2 K/UL    ABS. LYMPHOCYTES 0.6 0.5 - 4.6 K/UL    ABS. MONOCYTES 0.2 0.1 - 1.3 K/UL    ABS. EOSINOPHILS 0.0 0.0 - 0.8 K/UL    ABS. BASOPHILS 0.0 0.0 - 0.2 K/UL    ABS. IMM.  GRANS. 0.0 0.0 - 0.5 K/UL   GLUCOSE, POC    Collection Time: 09/23/17  6:48 AM   Result Value Ref Range    Glucose (POC) 119 (H) 65 - 100 mg/dL   FOLATE    Collection Time: 09/23/17  7:27 AM   Result Value Ref Range    Folate 16.1 3.1 - 17.5 ng/mL   VITAMIN B12    Collection Time: 09/23/17  7:27 AM   Result Value Ref Range    Vitamin B12 588 193 - 986 pg/mL   PLATELETS, ALLOCATE    Collection Time: 09/23/17  8:45 AM   Result Value Ref Range    Unit number [id number]     Blood component type [facility] LRIR,3     Unit division 00     Status of unit TRANSFUSED    GLUCOSE, POC    Collection Time: 09/23/17 11:12 AM   Result Value Ref Range    Glucose (POC) 172 (H) 65 - 100 mg/dL   GLUCOSE, POC    Collection Time: 09/23/17  4:10 PM   Result Value Ref Range    Glucose (POC) 178 (H) 65 - 100 mg/dL   GLUCOSE, POC    Collection Time: 09/23/17  8:58 PM   Result Value Ref Range    Glucose (POC) 165 (H) 65 - 100 mg/dL   PROTHROMBIN TIME + INR    Collection Time: 09/24/17  6:50 AM   Result Value Ref Range    Prothrombin time 17.1 (H) 9.6 - 12.0 sec    INR 1.6 (H) 0.9 - 1.2     GLUCOSE, POC    Collection Time: 09/24/17 7:01 AM   Result Value Ref Range    Glucose (POC) 81 65 - 100 mg/dL   CBC WITH AUTOMATED DIFF    Collection Time: 09/24/17  7:40 AM   Result Value Ref Range    WBC 3.0 (L) 4.3 - 11.1 K/uL    RBC 2.44 (L) 4.23 - 5.67 M/uL    HGB 7.9 (L) 13.6 - 17.2 g/dL    HCT 25.4 (L) 41.1 - 50.3 %    .1 (H) 79.6 - 97.8 FL    MCH 32.4 26.1 - 32.9 PG    MCHC 31.2 (L) 31.4 - 35.0 g/dL    RDW 25.0 (H) 11.9 - 14.6 %    PLATELET 54 (L) 989 - 450 K/uL    MPV 10.9 10.8 - 14.1 FL    DF AUTOMATED      NEUTROPHILS 71 43 - 78 %    LYMPHOCYTES 21 13 - 44 %    MONOCYTES 6 4.0 - 12.0 %    EOSINOPHILS 2 0.5 - 7.8 %    BASOPHILS 0 0.0 - 2.0 %    IMMATURE GRANULOCYTES 0.4 0.0 - 5.0 %    ABS. NEUTROPHILS 1.9 1.7 - 8.2 K/UL    ABS. LYMPHOCYTES 0.6 0.5 - 4.6 K/UL    ABS. MONOCYTES 0.2 0.1 - 1.3 K/UL    ABS. EOSINOPHILS 0.1 0.0 - 0.8 K/UL    ABS. BASOPHILS 0.0 0.0 - 0.2 K/UL    ABS. IMM.  GRANS. 0.0 0.0 - 0.5 K/UL   GLUCOSE, POC    Collection Time: 09/24/17 11:11 AM   Result Value Ref Range    Glucose (POC) 148 (H) 65 - 100 mg/dL   GLUCOSE, POC    Collection Time: 09/24/17  4:06 PM   Result Value Ref Range    Glucose (POC) 312 (H) 65 - 100 mg/dL   GLUCOSE, POC    Collection Time: 09/24/17  8:40 PM   Result Value Ref Range    Glucose (POC) 247 (H) 65 - 100 mg/dL   PROTHROMBIN TIME + INR    Collection Time: 09/25/17  5:43 AM   Result Value Ref Range    Prothrombin time 21.3 (H) 9.6 - 12.0 sec    INR 1.9 (H) 0.9 - 1.2     CBC WITH AUTOMATED DIFF    Collection Time: 09/25/17  5:43 AM   Result Value Ref Range    WBC 3.0 (L) 4.3 - 11.1 K/uL    RBC 2.49 (L) 4.23 - 5.67 M/uL    HGB 8.0 (L) 13.6 - 17.2 g/dL    HCT 26.0 (L) 41.1 - 50.3 %    .4 (H) 79.6 - 97.8 FL    MCH 32.3 26.1 - 32.9 PG    MCHC 30.9 (L) 31.4 - 35.0 g/dL    RDW 25.6 (H) 11.9 - 14.6 %    PLATELET 54 (L) 597 - 450 K/uL    MPV 10.7 (L) 10.8 - 14.1 FL    DF AUTOMATED      NEUTROPHILS 56 43 - 78 %    LYMPHOCYTES 34 13 - 44 %    MONOCYTES 8 4.0 - 12.0 %    EOSINOPHILS 2 0.5 - 7.8 % BASOPHILS 0 0.0 - 2.0 %    IMMATURE GRANULOCYTES 0.0 0.0 - 5.0 %    ABS. NEUTROPHILS 1.6 (L) 1.7 - 8.2 K/UL    ABS. LYMPHOCYTES 0.9 0.5 - 4.6 K/UL    ABS. MONOCYTES 0.2 0.1 - 1.3 K/UL    ABS. EOSINOPHILS 0.0 0.0 - 0.8 K/UL    ABS. BASOPHILS 0.0 0.0 - 0.2 K/UL    ABS. IMM.  GRANS. 0.0 0.0 - 0.5 K/UL   GLUCOSE, POC    Collection Time: 09/25/17  6:08 AM   Result Value Ref Range    Glucose (POC) 82 65 - 100 mg/dL       Assessment:     Problem List as of 9/25/2017  Date Reviewed: 9/9/2017          Codes Class Noted - Resolved    Respiratory failure ([facility] ICD-10-CM: J96.90  ICD-9-CM: 518.81  9/13/2017 - Present        MRSA nasal colonization ICD-10-CM: Z22.322  ICD-9-CM: V02.54  9/8/2017 - Present        COPD (chronic obstructive pulmonary disease) (HCC) (Chronic) ICD-10-CM: J44.9  ICD-9-CM: 496  8/30/2017 - Present        Acute respiratory failure with hypercapnia (HCC) ICD-10-CM: J96.02  ICD-9-CM: 518.81  8/29/2017 - Present        History of tobacco use (Chronic) ICD-10-CM: H87.173  ICD-9-CM: V15.82  8/29/2017 - Present        Hyponatremia ICD-10-CM: E87.1  ICD-9-CM: 276.1  8/29/2017 - Present        Acute encephalopathy ICD-10-CM: G93.40  ICD-9-CM: 348.30  8/29/2017 - Present        Thrombocytopenia ([facility] ICD-10-CM: D69.6  ICD-9-CM: 287.5  8/26/2017 - Present        Anticoagulant long-term use (Chronic) ICD-10-CM: Z79.01  ICD-9-CM: V58.61  8/23/2017 - Present        HTN (hypertension), benign (Chronic) ICD-10-CM: I10  ICD-9-CM: 401.1  8/23/2017 - Present        Peripheral vascular disease ([facility] (Chronic) ICD-10-CM: I73.9  ICD-9-CM: 443.9  8/23/2017 - Present        Dyslipidemia (Chronic) ICD-10-CM: E78.5  ICD-9-CM: 272.4  8/23/2017 - Present        History of mechanical aortic valve replacement (Chronic) ICD-10-CM: Z95.2  ICD-9-CM: V43.3  8/23/2017 - Present    Overview Signed 8/30/2017 10:30 AM by Katie Weber NP     Placement 2000, on chronicCoumadin             Centrilobular emphysema ([facility] (Chronic) ICD-10-CM: J43.2  ICD-9-CM: 492.8  8/23/2017 - Present    Overview Signed 9/8/2017  9:24 AM by Gerry Shannon NP      With last PFTs FVC 1.70 L or 44% predicted, FEV1 0.86 L or 29% predicted, FEV1/FVC 51%. This study was a postbronchodilator study performed at the [facility] in [facility] on 8/22/2016                  Ischemic cardiomyopathy (Chronic) ICD-10-CM: I25.5  ICD-9-CM: 414.8  8/23/2017 - Present    Overview Signed 8/30/2017 10:29 AM by Yarely Juares NP     8/23/17 ECHO:  EF 40 % to 45 %. There were no regional wall motion abnormalities. Moderate hypokinesis of the mid-apical anterior and apical wall(s). Atrial flutter with rapid ventricular response (HCC) ICD-10-CM: I48.92  ICD-9-CM: 427.32  8/23/2017 - Present        LV dysfunction (Chronic) ICD-10-CM: I51.9  ICD-9-CM: 429.9  10/19/2016 - Present        Atherosclerosis of native arteries of the extremities with intermittent claudication (Chronic) ICD-10-CM: D31.810  ICD-9-CM: 440.21  4/15/2014 - Present              Assessment/ plan:     Debility s/p resp failure, a flutter with RVR s/p cardioversion    Continue daily physician medical management:      Atrial flutter with rapid ventricular response ([address] (8/23/2017) sp cardioversion - monitor HR./ BP. / hx of CHF / Ischemic cardiomyopathy     - Digoxin, coreg.  - continue anticoagulation. Continued on adjustment daily for INR control. pharmacy to dose  - cardiac precautions.   - (9/16) -recurrent a.fib with RVR. HR 130s. 9/20 continues; metoprolol added 9/19; PT UNABLE TO PARTICIPATE IN THERAPIES. SHOULD BE TRANSFERRED TO TELEMETRY TO MANAGE CARDIAC ISSUES SO AS NOT TO USE OF ACUTE REHAB DAYS. -9/21 will try to encourage participation in therapies today. Wouldn't even do ST at bedside yesterday.  Will put parameters on when to hold therapies due to tachyc.   -9/22 s/p successful cardioversion by Dr Lyla Washington; now in NSR, HR 65; restart full therapies (encourage participation); restart Coumadin 5mg daily; 9/25 INR 1.6 ( goal 2-3)  -HR 40-69; ? Decreasing beta blk since weve gone up on amio and now s/p cardioversion; will defer to cardiology  HR 60 after ambulation; which is actually the best he's done in therapy in a long time. - 9/25  In Sinus. Mild bradycardia. Discontinued diltiazem due to low HR.         - restarted diuretics, as was taking at home. - CxR 9/16 very small bilateral pleural effusions are stable.    - 9/19-  possible cardioversion, AVN ablation/pacemaker in am per Cardiology/EP    -9/20 HOLD proc due to elevated INR, cont to hold coumadin ; 9/21 INR 2.3 from 3.4; restart coumadin after procedure today if ok with cardiology  9-23 coumadin restarted 9/22      History of mechanical aortic valve replacement 2000, on coumadin/ Anticoagulant long-term use. Goal 2.5-3.5.  - resume warfarin, monitor INR.   - INR 2.4 (9/18) . average out home dose ~> 6mg hs.   - Warfarin 6mg HS (9/16)  - pharmacy assisting. On hold(9/19) for procedure.   -9/21 INR now finally trending down; 2.3 from 3.4 yesterday;off coumadin; likely restart Coumadin after procedure  -9/22 restart Coumadin 5mg daily      Bacteremia - started on empiric antibiotic treatment. Aztreonam, vanco.   - BCx grew e.coli. discontinued vano. - final ID extended-spectrum beta-lactamases (ESBLs) , appears treated, though efficacy of vanco, aztreonam not optimal.  . Pt asymptomatic, WBC wnl. Will discontinue - aztreonam. .  - will monitor closely. - will have on contact precaution, infection control notified.   - BCx redrawn 9/17, no growth, will lift contact precautions 9/19.       HTN (hypertension) - monitor.  - continue Coreg/ digoxin  -9/21 cardizem and lasix ; now off Coreg per cardiology for rate control but now bradycardia; really no rhyme or reason to his fluctuating HR; BP stable  -9/23 -162 acceptable  - 9/25 - SBP. Appears in control.           Thrombocytopenia/ ITP  - continued on steroids.    - hematology following.   - HIT negative. - ivig per hematology direction. Will receive 1 more dose. -  received platelet 1unit (0/49). plt 25k -> 75k (9/16)-> 65k (9/17) -> 68 (9/19) monitor; 9/20 plts 52; defer to hematology  -9/21 plts 36; pending 9/22; 19k; will give one unit plts  -9/23 plts 46; want to keep above 50K , thus one unit today. ? Nplate  -0/46 21Y after one unit yesterday; monitor; patient pancytopenic; Hematology to f/u          COPD   - continue respiratory treatments. Presently q6h, wean as appropriate.    - resume spiriva, pulmicort  - SOB with activity. - normal CxR (9/15)  - will wean albuterol nebs 9/18. Bid and as needed. -9/20 SOB; sats 95% RA, bilateral crackles, increasing JIM; likely due to tachycardia; will f/u portable CXR; weaning steroids  -9/21 CXR; shows no significant change in small right pleural effusion and adjacent mild right basilar atelectasis/infiltrate. Continue to enc IS. bipap at noc; sats 95% RA  -9/23 wean O2 s/p cardioversion; 100% sats 2 L NC  -9/23 was weaned from 3L to 1L; sats 98%; BiPap at noc  - 9/25 continue BiPAP HS, no acute flairs. Dysphagia; still req NTL, mech soft 9/24 ; ST to follow up          iron deficiency anemia -hgb 8.5-> 8.7(9/15)-. 10.8(9/18) -> 9.3-> 9.1 (9/19) Monitor. Continue fe supplements.   -9/20 hgb 8.8; no evidence of acute bleed; hgb 8.1 9/21; check stool  -9/21 pancytopenic, WBC has dropped  as well; monitor for now; hgb 8.1  -9/23 hbg 8.9 ; macrocytosis; B12 and folate wnl  -no evidence of bleed. continue to monitor. Hematology following/       Hyponatremia  - monitor. 139 on 9/22      Pneumonia prophylaxis- Insentive spirometer every hour while awake      DVT risk / DVT Prophylaxis- Will require daily physician exam to assess for signs and symptoms as patient is at increased risk for of thromboembolism. Mobilization as tolerated.  Intermittent pneumatic compression devices when in bed Thigh-high or knee-high thromboembolic deterrent hose when out of bed.    - covered with warfarin.   - no signs of DVT.     Pain Control: mild joint symptoms intermittently, reasonably well controlled by PRN meds. - cont. regular pain assessment and comprenhensive pain management. - resume gabapentin.       Wound Care: Monitor wound status daily per staff and physician. At risk for failure. Will require 24/7 rehab nursing. Keep wound clean and dry  - excoriation at bridge of nose. Continue antibiotic ointment.   -left heel boggy, erythema; order rooke boot; floating heel not working      Diabetes mellitus - Uncontrolled. poor glycemic control. Will require daily, close FSG monitoring and medication adjustment to optimize glycemic control in setting of acute illness and hospitalization.   - SSI coverage with lispro. Poorly controlled , exacerbated by steroids  -9/21 a bit better controlled, likely due to poor po intake  - 9/22 BS varied greatly 80s to 350s; consider glucotrol  - 9/24 better control. 70s to 170  - 9/25 serum glucose better this morning. Monitor.        bowel program - colace as needed.      Gi prophylaxis; change pepcid to po 9/20              Time spent was 25 minutes with over 1/2 in direct patient care/examination, consultation and coordination of care.      Signed By: Sandra Vincent MD     September 25, 2017

## 2017-09-25 NOTE — PROGRESS NOTES
Warfarin dosing per pharmacist    Beverley Santiago is a 67 y.o. male. Indication: A. Fib    Goal INR:  2-3    Home dose:  5 mg daily, 7.5 mg on Wednesday    Risk factors or significant drug interactions:  Antibiotics, prednisone    Other anticoagulants:  none    Daily Monitoring  Date  INR     Warfarin dose HGB              Notes  9/8  1.2  10mg  11.2    9/9  1.3  10mg  9.4  9/10  ---  10mg  9.7  9/11  2.1  10mg  10.8  9/12  2.9  10mg  8.9  9/13  3.5  Hold  8.4  Pharmacy Consulted  9/14  3.6  Held  8.5  9/15  2.5  5 mg  8.7  9/16  1.9  6 mg  10.8  9/17  2.2  6 mg  9.3  9/18  2.4  6 mg  --  9/19  3.2  Hold  9.1  9/20  3.4  Hold  8.8  9/21  2.3  5 mg  8.1  9/22  1.4  5 mg  ---  9/23  1.4  5 mg  8.9  9/24  1.6  5 mg  7.9  9/25  1.9  5 mg  8.0    INR to 1.9. Continue 5 mg currently. Following daily. Pharmacy will continue to follow. Please call with any questions. Thank you,  Tho Ryan, Pharm. D.   Clinical Pharmacist  [phone]

## 2017-09-26 LAB
ANION GAP SERPL CALC-SCNC: 3 MMOL/L (ref 7–16)
BASOPHILS # BLD: 0 K/UL (ref 0–0.2)
BASOPHILS NFR BLD: 0 % (ref 0–2)
BUN SERPL-MCNC: 25 MG/DL (ref 8–23)
CALCIUM SERPL-MCNC: 7.9 MG/DL (ref 8.3–10.4)
CHLORIDE SERPL-SCNC: 104 MMOL/L (ref 98–107)
CO2 SERPL-SCNC: 36 MMOL/L (ref 21–32)
CREAT SERPL-MCNC: 0.96 MG/DL (ref 0.8–1.5)
DIFFERENTIAL METHOD BLD: ABNORMAL
EOSINOPHIL # BLD: 0 K/UL (ref 0–0.8)
EOSINOPHIL NFR BLD: 0 % (ref 0.5–7.8)
ERYTHROCYTE [DISTWIDTH] IN BLOOD BY AUTOMATED COUNT: 24.7 % (ref 11.9–14.6)
GLUCOSE BLD STRIP.AUTO-MCNC: 119 MG/DL (ref 65–100)
GLUCOSE BLD STRIP.AUTO-MCNC: 131 MG/DL (ref 65–100)
GLUCOSE BLD STRIP.AUTO-MCNC: 197 MG/DL (ref 65–100)
GLUCOSE BLD STRIP.AUTO-MCNC: 202 MG/DL (ref 65–100)
GLUCOSE SERPL-MCNC: 119 MG/DL (ref 65–100)
HCT VFR BLD AUTO: 26.3 % (ref 41.1–50.3)
HGB BLD-MCNC: 8 G/DL (ref 13.6–17.2)
IMM GRANULOCYTES # BLD: 0 K/UL (ref 0–0.5)
IMM GRANULOCYTES NFR BLD: 0.3 % (ref 0–5)
INR PPP: 2.6 (ref 0.9–1.2)
LYMPHOCYTES # BLD: 0.7 K/UL (ref 0.5–4.6)
LYMPHOCYTES NFR BLD: 18 % (ref 13–44)
MCH RBC QN AUTO: 33 PG (ref 26.1–32.9)
MCHC RBC AUTO-ENTMCNC: 30.5 G/DL (ref 31.4–35)
MCV RBC AUTO: 108.1 FL (ref 79.6–97.8)
MONOCYTES # BLD: 0.3 K/UL (ref 0.1–1.3)
MONOCYTES NFR BLD: 8 % (ref 4–12)
NEUTS SEG # BLD: 2.9 K/UL (ref 1.7–8.2)
NEUTS SEG NFR BLD: 74 % (ref 43–78)
PLATELET # BLD AUTO: 36 K/UL (ref 150–450)
PMV BLD AUTO: 12.5 FL (ref 10.8–14.1)
POTASSIUM SERPL-SCNC: 3.7 MMOL/L (ref 3.5–5.1)
PROTHROMBIN TIME: 28.4 SEC (ref 9.6–12)
RBC # BLD AUTO: 2.43 M/UL (ref 4.23–5.67)
SODIUM SERPL-SCNC: 143 MMOL/L (ref 136–145)
WBC # BLD AUTO: 4.4 K/UL (ref 4.3–11.1)

## 2017-09-26 PROCEDURE — 97530 THERAPEUTIC ACTIVITIES: CPT

## 2017-09-26 PROCEDURE — 74011000250 HC RX REV CODE- 250: Performed by: PHYSICAL MEDICINE & REHABILITATION

## 2017-09-26 PROCEDURE — 82962 GLUCOSE BLOOD TEST: CPT

## 2017-09-26 PROCEDURE — 80048 BASIC METABOLIC PNL TOTAL CA: CPT | Performed by: PHYSICAL MEDICINE & REHABILITATION

## 2017-09-26 PROCEDURE — 77030011256 HC DRSG MEPILEX <16IN NO BORD MOLN -A

## 2017-09-26 PROCEDURE — 65310000000 HC RM PRIVATE REHAB

## 2017-09-26 PROCEDURE — 74011636637 HC RX REV CODE- 636/637: Performed by: PHYSICAL MEDICINE & REHABILITATION

## 2017-09-26 PROCEDURE — 74011250636 HC RX REV CODE- 250/636: Performed by: PHYSICAL MEDICINE & REHABILITATION

## 2017-09-26 PROCEDURE — 97110 THERAPEUTIC EXERCISES: CPT

## 2017-09-26 PROCEDURE — 94640 AIRWAY INHALATION TREATMENT: CPT

## 2017-09-26 PROCEDURE — 97116 GAIT TRAINING THERAPY: CPT

## 2017-09-26 PROCEDURE — 92526 ORAL FUNCTION THERAPY: CPT

## 2017-09-26 PROCEDURE — 85025 COMPLETE CBC W/AUTO DIFF WBC: CPT | Performed by: PHYSICAL MEDICINE & REHABILITATION

## 2017-09-26 PROCEDURE — 94760 N-INVAS EAR/PLS OXIMETRY 1: CPT

## 2017-09-26 PROCEDURE — 99233 SBSQ HOSP IP/OBS HIGH 50: CPT | Performed by: INTERNAL MEDICINE

## 2017-09-26 PROCEDURE — 74011250637 HC RX REV CODE- 250/637: Performed by: PHYSICAL MEDICINE & REHABILITATION

## 2017-09-26 PROCEDURE — 77030019605

## 2017-09-26 PROCEDURE — 97535 SELF CARE MNGMENT TRAINING: CPT

## 2017-09-26 PROCEDURE — 74011250637 HC RX REV CODE- 250/637: Performed by: INTERNAL MEDICINE

## 2017-09-26 PROCEDURE — 85610 PROTHROMBIN TIME: CPT | Performed by: PHYSICAL MEDICINE & REHABILITATION

## 2017-09-26 PROCEDURE — 94660 CPAP INITIATION&MGMT: CPT

## 2017-09-26 RX ORDER — ENALAPRIL MALEATE 5 MG/1
5 TABLET ORAL DAILY
Status: DISCONTINUED | OUTPATIENT
Start: 2017-09-27 | End: 2017-10-03 | Stop reason: HOSPADM

## 2017-09-26 RX ORDER — AMIODARONE HYDROCHLORIDE 200 MG/1
200 TABLET ORAL 2 TIMES DAILY
Status: DISCONTINUED | OUTPATIENT
Start: 2017-09-26 | End: 2017-10-01

## 2017-09-26 RX ADMIN — Medication 20 ML: at 20:39

## 2017-09-26 RX ADMIN — PREDNISONE 20 MG: 20 TABLET ORAL at 09:45

## 2017-09-26 RX ADMIN — ALBUTEROL SULFATE 2.5 MG: 2.5 SOLUTION RESPIRATORY (INHALATION) at 18:09

## 2017-09-26 RX ADMIN — ALBUTEROL SULFATE 2.5 MG: 2.5 SOLUTION RESPIRATORY (INHALATION) at 05:47

## 2017-09-26 RX ADMIN — METOPROLOL SUCCINATE 100 MG: 100 TABLET, EXTENDED RELEASE ORAL at 20:39

## 2017-09-26 RX ADMIN — BUDESONIDE 500 MCG: 0.5 INHALANT RESPIRATORY (INHALATION) at 05:47

## 2017-09-26 RX ADMIN — GABAPENTIN 600 MG: 300 CAPSULE ORAL at 20:38

## 2017-09-26 RX ADMIN — SODIUM CHLORIDE, PRESERVATIVE FREE 600 UNITS: 5 INJECTION INTRAVENOUS at 05:39

## 2017-09-26 RX ADMIN — FAMOTIDINE 20 MG: 20 TABLET ORAL at 17:43

## 2017-09-26 RX ADMIN — GUAIFENESIN 1200 MG: 600 TABLET, EXTENDED RELEASE ORAL at 08:20

## 2017-09-26 RX ADMIN — METOPROLOL SUCCINATE 100 MG: 100 TABLET, EXTENDED RELEASE ORAL at 08:20

## 2017-09-26 RX ADMIN — TIOTROPIUM BROMIDE 18 MCG: 18 CAPSULE ORAL; RESPIRATORY (INHALATION) at 05:47

## 2017-09-26 RX ADMIN — GABAPENTIN 600 MG: 300 CAPSULE ORAL at 05:39

## 2017-09-26 RX ADMIN — SODIUM CHLORIDE, PRESERVATIVE FREE 600 UNITS: 5 INJECTION INTRAVENOUS at 20:39

## 2017-09-26 RX ADMIN — FAMOTIDINE 20 MG: 20 TABLET ORAL at 08:21

## 2017-09-26 RX ADMIN — FERROUS SULFATE TAB 325 MG (65 MG ELEMENTAL FE) 325 MG: 325 (65 FE) TAB at 08:21

## 2017-09-26 RX ADMIN — SODIUM CHLORIDE, PRESERVATIVE FREE 600 UNITS: 5 INJECTION INTRAVENOUS at 14:30

## 2017-09-26 RX ADMIN — AMIODARONE HYDROCHLORIDE 400 MG: 200 TABLET ORAL at 08:19

## 2017-09-26 RX ADMIN — INSULIN LISPRO 4 UNITS: 100 INJECTION, SOLUTION INTRAVENOUS; SUBCUTANEOUS at 21:53

## 2017-09-26 RX ADMIN — INSULIN LISPRO 2 UNITS: 100 INJECTION, SOLUTION INTRAVENOUS; SUBCUTANEOUS at 16:25

## 2017-09-26 RX ADMIN — WARFARIN SODIUM 2.5 MG: 2 TABLET ORAL at 17:43

## 2017-09-26 RX ADMIN — AMIODARONE HYDROCHLORIDE 200 MG: 200 TABLET ORAL at 20:37

## 2017-09-26 RX ADMIN — BUDESONIDE 500 MCG: 0.5 INHALANT RESPIRATORY (INHALATION) at 18:09

## 2017-09-26 RX ADMIN — Medication 20 ML: at 05:39

## 2017-09-26 RX ADMIN — GABAPENTIN 600 MG: 300 CAPSULE ORAL at 14:00

## 2017-09-26 RX ADMIN — DIGOXIN 0.25 MG: 250 TABLET ORAL at 08:20

## 2017-09-26 RX ADMIN — FUROSEMIDE 20 MG: 20 TABLET ORAL at 08:22

## 2017-09-26 RX ADMIN — POTASSIUM CHLORIDE 20 MEQ: 20 TABLET, EXTENDED RELEASE ORAL at 08:19

## 2017-09-26 RX ADMIN — Medication 20 ML: at 16:15

## 2017-09-26 RX ADMIN — POVIDONE-IODINE: 10 SOLUTION TOPICAL at 16:08

## 2017-09-26 RX ADMIN — GUAIFENESIN 1200 MG: 600 TABLET, EXTENDED RELEASE ORAL at 20:36

## 2017-09-26 NOTE — PROGRESS NOTES
PHYSICAL THERAPY DAILY NOTE  Time In: 1300  Time Out: 1389  Patient Seen For: Patient education; Therapeutic exercise;Transfer training;PM    Subjective: \"I'm feeling ok this afternoon. \" Patient agreeable to therapy. Objective: Vital Signs:   Patient Vitals for the past 8 hrs:   Pulse SpO2   09/26/17 1400 (!) 50 97 %   09/26/17 0800 (!) 58 94 %         Pain level: No pain reported. Pain location: n/a  Pain interventions: n/a    Patient education: Educated patient on activity pacing and safety with transfers. Interdisciplinary Communication: Communicated with Thien Sparks OT regarding patient's POC. Contact (MRSA)  GROSS ASSESSMENT Daily Assessment            BED/MAT MOBILITY Daily Assessment   Required for safety. Rolling Right : 0 (Not tested)  Rolling Left : 0 (Not tested)  Supine to Sit : 5 (Supervision)  Sit to Supine : 5 (Supervision)       TRANSFERS Daily Assessment   Required for safety. Transfer Type: SPT with walker  Transfer Assistance : 4 (Contact guard assistance)  Sit to Stand Assistance: Contact guard assistance  Car Transfers: Not tested       GAIT Daily Assessment           STEPS or STAIRS Daily Assessment            BALANCE Daily Assessment    Sitting - Static: Good (unsupported)  Sitting - Dynamic: Good (unsupported)  Standing - Static: Fair  Standing - Dynamic : Impaired       WHEELCHAIR MOBILITY Daily Assessment            LOWER EXTREMITY EXERCISES Daily Assessment    Extremity: Both  Exercise Type #1: Seated lower extremity strengthening  Sets Performed: 1  Reps Performed: 15  Level of Assist: Supervision  Exercise Type #2: Other (comment) (Motomed x10 minutes, Level 2)     Patient performed the following B LE exercises:  SUPINE EXERCISES Sets Reps Comments   Ankle Pumps 1 15    Quad Sets 1 15    Glut Sets 1 15    Heel Slides 1 15    Hip Abduction 1 15    Short Arc Quad 1 15    Straight Leg Raise 1 15      Patient handed off to Thien Sparks OT in therapy gym following PT session. Assessment: Patient continues to make progress with endurance and activity tolerance. Patient will benefit from further therapy to increase independence with transfers and household ambulation. Plan of Care: Continue with POC and progress as tolerated.        [facility]  9/26/2017

## 2017-09-26 NOTE — PROGRESS NOTES
[facility] CARDIOLOGY PROGRESS NOTE           9/26/2017 10:48 AM    Admit Date: 9/13/2017      Subjective:   Patient currently receiving PT. Denies any chest pain, SOB or heart palpitations. Remote tele shows SR.     ROS:  Cardiovascular:  As noted above    Objective:      Vitals:    09/25/17 2003 09/26/17 0547 09/26/17 0715 09/26/17 0800   BP: 161/61  114/48    Pulse: 72  60 (!) 58   Resp: 18  18    Temp: 98 °F (36.7 °C)  97.9 °F (36.6 °C)    SpO2: 94% 95% 93% 94%       Physical Exam:  General-No Acute Distress  Neck- supple, no JVD  CV- regular rate and rhythm no MRG  Lung- clear bilaterally  Abd- soft, nontender, nondistended  Ext- no edema bilaterally. Skin- warm and dry    Data Review:   Recent Labs      09/26/17   0537  09/25/17   0543   NA  143   --    K  3.7   --    BUN  25*   --    CREA  0.96   --    GLU  119*   --    WBC  4.4  3.0*   HGB  8.0*  8.0*   HCT  26.3*  26.0*   PLT  36*  54*   INR  2.6*  1.9*       Assessment/Plan:     Active Problems:    Ischemic cardiomyopathy (8/23/2017)  Follow, no angina now. EF 40%, remain on toprol, dig. Will discuss with Dr. Hank Riddle re: stopping dig (last dig level 1.1 on 9/20). Can consider adding Ace-I outpatient once B/P improves this am B/P was 114/48.        Atrial flutter with rapid ventricular response ([facility] [address] (8/23/2017)  Recent cardioversion. Remain on Amio and coumadin. Off po dilt due to low HR. Follow for SSS. Remain on toprol. ? If needs dig. INR 2.6.        Thrombocytopenia (H[facility]) (8/26/2017)  Follow, monitor on coumadin       COPD (chronic obstructive pulmonary disease) ([facility] [address] (8/30/2017)  Continue meds       Respiratory failure ([address] (9/13/2017)  Better, follow    Anemia- Hgb today 8.0    Will discuss with Dr. Hank Riddle if we can stop dig (level was 1.1 on 9/20/17) also discuss decreasing amiodarone to 200mg bid.            Kyle Cancino NP  9/26/2017 10:48 AM

## 2017-09-26 NOTE — PROGRESS NOTES
Team conference; discharge scheduled for 10/3 with  PT, OT, ST and RN. Discussed with pt and spouse; agreeable.

## 2017-09-26 NOTE — PROGRESS NOTES
Betadine applied to the bridge of nose, area is healing with dried scabbing on small part of nose.  No drainage or discomfort

## 2017-09-26 NOTE — PROGRESS NOTES
09/26/17 1201   Time Spent With Patient   Time In 1130   Time Out 1201   Patient Seen For: AM;Laryngeal exercises   Mental Status   Neurologic State Alert   Orientation Level Oriented X4   Cognition Appropriate decision making   Perception Appears intact   Perseveration No perseveration noted   Safety/Judgement Fall prevention   Pt completed laryngeal exercises x 5 with 80% Accuracy. Pt completed voice exercises x 6 with 70% accuracy.     Arcenio Lamar MA/DIANA/SLP

## 2017-09-26 NOTE — PROGRESS NOTES
Spoke with Dr Candido Mo regarding heart rate for parameters of when to call him, no orders received. Treatmt plan from Onc/Hem relayed to Dr Candido Mo.

## 2017-09-26 NOTE — PROGRESS NOTES
PHYSICAL THERAPY DAILY NOTE  Time In: 1030  Time Out: 2030  Patient Seen For: AM;Gait training;Patient education; Therapeutic exercise;Transfer training    Subjective: \"I feel pretty good. \" Patient agreeable to therapy. Objective: Vital Signs:   Patient Vitals for the past 8 hrs:   Temp Pulse Resp BP SpO2   09/26/17 0800 - (!) 58 - - 94 %   09/26/17 0715 97.9 °F (36.6 °C) 60 18 114/48 93 %   09/26/17 0547 - - - - 95 %         Pain level: No pain reported. Pain location: n/a  Pain interventions: n/a    Patient education: Educated patient on endurance training with ambulation with RW. Interdisciplinary Communication: Communicated with Flavio Phan, OT regarding patient's POC. Contact (MRSA)  GROSS ASSESSMENT Daily Assessment            BED/MAT MOBILITY Daily Assessment    Rolling Right : 0 (Not tested)  Rolling Left : 0 (Not tested)  Supine to Sit : 0 (Not tested)  Sit to Supine : 0 (Not tested)       TRANSFERS Daily Assessment   Required for safety. Transfer Type: SPT with walker  Transfer Assistance : 4 (Contact guard assistance)  Sit to Stand Assistance: Contact guard assistance  Car Transfers: Not tested       GAIT Daily Assessment   Patient ambulated with decreased gait speed, mild forward flexed posture, and decreased foot clearance B LE.  Amount of Assistance: 4 (Contact guard assistance)  Distance (ft): 120 Feet (ft) (x2)  Assistive Device: Walker, rolling;Gait belt       STEPS or STAIRS Daily Assessment            BALANCE Daily Assessment    Sitting - Static: Good (unsupported)  Sitting - Dynamic: Good (unsupported)  Standing - Static: Fair  Standing - Dynamic : Impaired       WHEELCHAIR MOBILITY Daily Assessment            LOWER EXTREMITY EXERCISES Daily Assessment    Extremity: Both  Exercise Type #1: Seated lower extremity strengthening  Sets Performed: 1  Reps Performed: 15  Level of Assist: Supervision     Patient performed the following B LE exercises:  SEATED EXERCISES Sets Reps Comments Ankle Pumps 1 15    Hip Flexion 1 15    Long Arc Quads 1 15    Hip Adduction/Ball Squeeze 1 15    Hip Abduction with Green Theraband 1 15    Hamstring Curls with Green Theraband 1 15      Patient returned to room sitting in w/c with all needs in reach. Assessment: Patient demonstrated improved endurance with ambulation this AM. Patient requires rest breaks with activity and therapeutic exercise and encouragement with deep breathing. Patient will benefit from further therapy to increase independence with transfers and household ambulation. Plan of Care: Continue with POC and progress as tolerated.        Rodrigo Achilles  9/26/2017

## 2017-09-26 NOTE — PROGRESS NOTES
09/26/17 0818   Time Spent With Patient   Time In 0701   Time Out 0832   Patient Seen For: AM;ADLs   Feeding/Eating   Feeding/Eating Assistance Mod I   Comments Patient wears dentures   Grooming   Grooming Assistance  S   Comments Setup to gather materials   Upper Body Bathing   Bathing Assistance, Upper Mod I   Position Performed Seated in chair   Adaptive Equipment Tub bench   Comments Intermittent rest breaks and increased time required   Lower Body Bathing   Bathing Assistance, Lower  CGA   Adaptive Equipment Grab bar   Position Performed Seated in chair;Standing   Adaptive Equipment Tub bench   Comments CGA for standing balance as patient stands to bathe bottom/perineal area, cue for use of long handled songe to bathe B feet   Upper Body Dressing    Dressing Assistance  S   Comments Setup/supervision for problem solving orientation   Lower Body Dressing    Dressing Assistance  CGA   Leg Crossed Method Used No   Position Performed Seated in chair;Standing   Adaptive Equipment Used Sock aid;Reacher   Don/Doff Anti-Embolic Stockings NA   Comments CGA for standing balance as patient manages pants and underwear over hips. Patient uses reacher with increased time and cues for problem solving to start pants and underwear over feet. Use of sock aid to don B socks. Functional Transfers   Tub or Shower Type Shower   Amount of Assistance Required CGA   Adaptive Equipment Grab bars; Tub transfer bench; Wheelchair       S: \"I'm exhausted. \" Agreeable to therapy. Focus of session was on ADL retraining and functional mobility. Patient was able to transfer bed to wheelchair and wheelchair <> TTB SPT with CGA. Pain not indicated. Collaborated with PT, Nyla Stevens and confirmed patient is on track to reach goals as documented in the care plan.    Patient tolerated session well, but activity tolerance, strength, coordination, balance, functional mobility, cognition are still below baseline and require skilled facilitation to successfully and safely complete ADL's and transfers. Patient ended session in wheelchair with call remote and phone within reach.      Hema Ribeiro, OTR/L

## 2017-09-26 NOTE — PROGRESS NOTES
OT Daily Note    Time In 1346   Time Out 1431     Subjective: \"I'm wore out. \"  Pain: None indicated    Precautions: Contact (MRSA)    Activity Tolerance   Patient completed standing trials at elevated tabletop for BUE endurance task with no weight added to arms. Patient transferred sit to stand and maintained standing balance with SBA ~5 minute trials before requiring seated rest breaks. Patient used alternating UEs to move 15 plastic rings individually up/down vertical ladder unilaterally and crossing midline as cued by therapist.     Cognition   Patient completed cognitive/FMC task seated at tabletop for small pegboard task using RUE according to visual pattern. Note patient required repeated cues to avoid using L hand to assist with peg translation, increased time and moderate cues for problem solving pattern. Unable to complete before time expires this session. Assessment: Patient continues to demonstrate decreased problem solving and activity tolerance, see above for details. Education: Purpose of therapy  Interdisciplinary Communication: Collaborated with Serina Negrete and agreed patient is progressing well and on-track to meet goals as stated in POC. Plan: Continue to address ADL/IADL, functional mobility, activity tolerance, balance, strengthening, coordination, cognition.       Rama Mota, OTR/L

## 2017-09-26 NOTE — PROGRESS NOTES
End of shift rounding completed. Denies any discomfort at this time. Hourly rounding completed throughout shift.  Assisted up to lucas, wife gone for the evening

## 2017-09-26 NOTE — PROGRESS NOTES
SFD PROGRESS NOTE    Kathy Langley  Admit Date: 9/13/2017  Admit Diagnosis: Cardiac Debility; Respiratory failure ([address]    Subjective     Patient seen and examined. Vss. Afebrile. Remote telemetry shows NSR. Resuming PT, OT as tolerated. cardiac precautions. Managing to walk with CGA.      Objective:     Current Facility-Administered Medications   Medication Dose Route Frequency    warfarin (COUMADIN) tablet 2.5 mg - pharmacy dosing  2.5 mg Oral QPM    0.9% sodium chloride infusion 250 mL  250 mL IntraVENous PRN    albuterol CONCENTRATE 2.5mg/0.5 mL neb soln  2.5 mg Nebulization BID    budesonide (PULMICORT) 500 mcg/2 ml nebulizer suspension  500 mcg Nebulization BID RT    nitroglycerin (NITROSTAT) tablet 0.4 mg  0.4 mg SubLINGual Q5MIN PRN    ondansetron (ZOFRAN) injection 4 mg  4 mg IntraVENous Q4H PRN    sodium chloride (NS) flush 5-10 mL  5-10 mL IntraVENous PRN    NUTRITIONAL SUPPORT ELECTROLYTE PRN ORDERS   Does Not Apply PRN    acetaminophen (TYLENOL) suppository 650 mg  650 mg Rectal Q4H PRN    acetaminophen (TYLENOL) tablet 650 mg  650 mg Oral Q4H PRN    albuterol (PROVENTIL HFA, VENTOLIN HFA, PROAIR HFA) inhaler 2 Puff  2 Puff Inhalation Q4H PRN    albuterol (PROVENTIL VENTOLIN) nebulizer solution 2.5 mg  2.5 mg Nebulization Q4H PRN    dextrose (D50W) injection syrg 25 g  25 g IntraVENous PRN    digoxin (LANOXIN) tablet 0.25 mg  0.25 mg Oral DAILY    ferrous sulfate tablet 325 mg  1 Tab Oral DAILY WITH BREAKFAST    gabapentin (NEURONTIN) capsule 600 mg  600 mg Oral TID    guaiFENesin ER (MUCINEX) tablet 1,200 mg  1,200 mg Oral Q12H    heparin (porcine) pf 600 Units  600 Units InterCATHeter Q8H    heparin (porcine) pf 600 Units  600 Units InterCATHeter PRN    insulin lispro (HUMALOG) injection   SubCUTAneous AC&HS    povidone-iodine (BETADINE) 10 % topical solution   Topical DAILY    [START ON 9/29/2017] predniSONE (DELTASONE) tablet 10 mg  10 mg Oral DAILY WITH BREAKFAST  predniSONE (DELTASONE) tablet 20 mg  20 mg Oral DAILY WITH BREAKFAST    sodium chloride (NS) flush 20 mL  20 mL InterCATHeter Q8H    tiotropium (SPIRIVA) inhalation capsule 18 mcg  1 Cap Inhalation DAILY    traZODone (DESYREL) tablet 50 mg  50 mg Oral QHS PRN    influenza vaccine 2017-18 (3 yrs+)(PF) (FLUZONE QUAD/FLUARIX QUAD) injection 0.5 mL  0.5 mL IntraMUSCular PRIOR TO DISCHARGE    0.9% sodium chloride infusion 250 mL  250 mL IntraVENous PRN    furosemide (LASIX) tablet 20 mg  20 mg Oral DAILY    potassium chloride (K-DUR, KLOR-CON) SR tablet 20 mEq  20 mEq Oral DAILY    metoprolol succinate (TOPROL-XL) tablet 100 mg  100 mg Oral BID    famotidine (PEPCID) tablet 20 mg  20 mg Oral BID    amiodarone (CORDARONE) tablet 400 mg  400 mg Oral BID    0.9% sodium chloride infusion 250 mL  250 mL IntraVENous PRN     Review of Systems:Denies chest pain, shortness of breath, cough, headache, visual problems, abdominal pain, dysurea, calf pain. Pertinent positives are as noted in the medical records and unremarkable otherwise. Visit Vitals    /48    Pulse (!) 58    Temp 97.9 °F (36.6 °C)    Resp 18    SpO2 94%   HR 49-60     Physical Exam:   General: Alert and age appropriately oriented. No acute cardio respiratory distress. Frail appearing   HEENT: Normocephalic,no scleral icterus  Oral mucosa moist without cyanosis   Lungs: Clear to auscultation  bilaterally. Respiration even and unlabored   Heart: Regular rate and rhythm, S1, S2 , pauses notes  No  murmurs, clicks, rub or gallops   Abdomen: Soft, non-tender, nondistended. Bowel sounds present. No organomegaly. Genitourinary: Benign . Neuromuscular:      Generalized non focal weakness  sens intact   Skin/extremity: No rashes, no erythema.  No calf tenderness BLE  Wound left heel ; boggy, red                                                                            Functional Assessment:          Balance  Sitting - Static: Good (unsupported) (09/25/17 1600)  Sitting - Dynamic: Good (unsupported) (09/25/17 1600)  Standing - Static: Fair (09/25/17 1600)  Standing - Dynamic : Impaired (09/25/17 1600)           Toileting  Adaptive Equipment: Grab bars; Other (comment) (Wheelchair) (09/18/17 1012)         Aylin Michaelynes Fall Risk Assessment:  Aylin Johnson Fall Risk  Mobility: Ambulates or transfers with assist devices or assistance/unsteady gait (09/25/17 2000)  Mobility Interventions: Patient to call before getting OOB (09/25/17 2000)  Mentation: Alert, oriented x 3 (09/25/17 2000)  Mentation Interventions: Adequate sleep, hydration, pain control (09/25/17 2000)  Medication: Patient receiving anticonvulsants, sedatives(tranquilizers), psychotropics or hypnotics, hypoglycemics, narcotics, sleep aids, antihypertensives, laxatives, or diuretics (09/25/17 2000)  Medication Interventions: Patient to call before getting OOB (09/25/17 2000)  Elimination: Needs assistance with toileting (09/25/17 2000)  Elimination Interventions: Call light in reach (09/25/17 2000)  Prior Fall History: During admission (Date - Comment) (09/25/17 2000)  History of Falls Interventions: Door open when patient unattended (09/25/17 2000)  Total Score: 5 (09/25/17 2000)  Standard Fall Precautions: Yes (09/25/17 0720)  High Fall Risk: Yes (09/25/17 2000)     Speech Assessment:  Aspiration Signs/Symptoms: None (09/14/17 1036)      Ambulation:  Gait  Distance (ft): 50 Feet (ft) (x1, 30' x1) (09/25/17 1600)  Assistive Device: Walker, rolling;Gait belt (09/25/17 1600)     Labs/Studies:  Recent Results (from the past 72 hour(s))   GLUCOSE, POC    Collection Time: 09/23/17  4:10 PM   Result Value Ref Range    Glucose (POC) 178 (H) 65 - 100 mg/dL   GLUCOSE, POC    Collection Time: 09/23/17  8:58 PM   Result Value Ref Range    Glucose (POC) 165 (H) 65 - 100 mg/dL   PROTHROMBIN TIME + INR    Collection Time: 09/24/17  6:50 AM   Result Value Ref Range    Prothrombin time 17.1 (H) 9.6 - 12.0 sec INR 1.6 (H) 0.9 - 1.2     GLUCOSE, POC    Collection Time: 09/24/17  7:01 AM   Result Value Ref Range    Glucose (POC) 81 65 - 100 mg/dL   CBC WITH AUTOMATED DIFF    Collection Time: 09/24/17  7:40 AM   Result Value Ref Range    WBC 3.0 (L) 4.3 - 11.1 K/uL    RBC 2.44 (L) 4.23 - 5.67 M/uL    HGB 7.9 (L) 13.6 - 17.2 g/dL    HCT 25.4 (L) 41.1 - 50.3 %    .1 (H) 79.6 - 97.8 FL    MCH 32.4 26.1 - 32.9 PG    MCHC 31.2 (L) 31.4 - 35.0 g/dL    RDW 25.0 (H) 11.9 - 14.6 %    PLATELET 54 (L) 250 - 450 K/uL    MPV 10.9 10.8 - 14.1 FL    DF AUTOMATED      NEUTROPHILS 71 43 - 78 %    LYMPHOCYTES 21 13 - 44 %    MONOCYTES 6 4.0 - 12.0 %    EOSINOPHILS 2 0.5 - 7.8 %    BASOPHILS 0 0.0 - 2.0 %    IMMATURE GRANULOCYTES 0.4 0.0 - 5.0 %    ABS. NEUTROPHILS 1.9 1.7 - 8.2 K/UL    ABS. LYMPHOCYTES 0.6 0.5 - 4.6 K/UL    ABS. MONOCYTES 0.2 0.1 - 1.3 K/UL    ABS. EOSINOPHILS 0.1 0.0 - 0.8 K/UL    ABS. BASOPHILS 0.0 0.0 - 0.2 K/UL    ABS. IMM.  GRANS. 0.0 0.0 - 0.5 K/UL   GLUCOSE, POC    Collection Time: 09/24/17 11:11 AM   Result Value Ref Range    Glucose (POC) 148 (H) 65 - 100 mg/dL   OCCULT BLOOD, STOOL    Collection Time: 09/24/17  3:40 PM   Result Value Ref Range    Occult blood, stool NEGATIVE  NEG     GLUCOSE, POC    Collection Time: 09/24/17  4:06 PM   Result Value Ref Range    Glucose (POC) 312 (H) 65 - 100 mg/dL   GLUCOSE, POC    Collection Time: 09/24/17  8:40 PM   Result Value Ref Range    Glucose (POC) 247 (H) 65 - 100 mg/dL   PROTHROMBIN TIME + INR    Collection Time: 09/25/17  5:43 AM   Result Value Ref Range    Prothrombin time 21.3 (H) 9.6 - 12.0 sec    INR 1.9 (H) 0.9 - 1.2     CBC WITH AUTOMATED DIFF    Collection Time: 09/25/17  5:43 AM   Result Value Ref Range    WBC 3.0 (L) 4.3 - 11.1 K/uL    RBC 2.49 (L) 4.23 - 5.67 M/uL    HGB 8.0 (L) 13.6 - 17.2 g/dL    HCT 26.0 (L) 41.1 - 50.3 %    .4 (H) 79.6 - 97.8 FL    MCH 32.3 26.1 - 32.9 PG    MCHC 30.9 (L) 31.4 - 35.0 g/dL    RDW 25.6 (H) 11.9 - 14.6 % PLATELET 54 (L) 262 - 450 K/uL    MPV 10.7 (L) 10.8 - 14.1 FL    DF AUTOMATED      NEUTROPHILS 56 43 - 78 %    LYMPHOCYTES 34 13 - 44 %    MONOCYTES 8 4.0 - 12.0 %    EOSINOPHILS 2 0.5 - 7.8 %    BASOPHILS 0 0.0 - 2.0 %    IMMATURE GRANULOCYTES 0.0 0.0 - 5.0 %    ABS. NEUTROPHILS 1.6 (L) 1.7 - 8.2 K/UL    ABS. LYMPHOCYTES 0.9 0.5 - 4.6 K/UL    ABS. MONOCYTES 0.2 0.1 - 1.3 K/UL    ABS. EOSINOPHILS 0.0 0.0 - 0.8 K/UL    ABS. BASOPHILS 0.0 0.0 - 0.2 K/UL    ABS. IMM. GRANS. 0.0 0.0 - 0.5 K/UL   GLUCOSE, POC    Collection Time: 09/25/17  6:08 AM   Result Value Ref Range    Glucose (POC) 82 65 - 100 mg/dL   GLUCOSE, POC    Collection Time: 09/25/17 11:16 AM   Result Value Ref Range    Glucose (POC) 159 (H) 65 - 100 mg/dL   GLUCOSE, POC    Collection Time: 09/25/17  4:01 PM   Result Value Ref Range    Glucose (POC) 122 (H) 65 - 100 mg/dL   GLUCOSE, POC    Collection Time: 09/25/17  8:18 PM   Result Value Ref Range    Glucose (POC) 191 (H) 65 - 100 mg/dL   PROTHROMBIN TIME + INR    Collection Time: 09/26/17  5:37 AM   Result Value Ref Range    Prothrombin time 28.4 (H) 9.6 - 12.0 sec    INR 2.6 (H) 0.9 - 1.2     CBC WITH AUTOMATED DIFF    Collection Time: 09/26/17  5:37 AM   Result Value Ref Range    WBC 4.4 4.3 - 11.1 K/uL    RBC 2.43 (L) 4.23 - 5.67 M/uL    HGB 8.0 (L) 13.6 - 17.2 g/dL    HCT 26.3 (L) 41.1 - 50.3 %    .1 (H) 79.6 - 97.8 FL    MCH 33.0 (H) 26.1 - 32.9 PG    MCHC 30.5 (L) 31.4 - 35.0 g/dL    RDW 24.7 (H) 11.9 - 14.6 %    PLATELET 36 (L) 590 - 450 K/uL    MPV 12.5 10.8 - 14.1 FL    DF AUTOMATED      NEUTROPHILS 74 43 - 78 %    LYMPHOCYTES 18 13 - 44 %    MONOCYTES 8 4.0 - 12.0 %    EOSINOPHILS 0 (L) 0.5 - 7.8 %    BASOPHILS 0 0.0 - 2.0 %    IMMATURE GRANULOCYTES 0.3 0.0 - 5.0 %    ABS. NEUTROPHILS 2.9 1.7 - 8.2 K/UL    ABS. LYMPHOCYTES 0.7 0.5 - 4.6 K/UL    ABS. MONOCYTES 0.3 0.1 - 1.3 K/UL    ABS. EOSINOPHILS 0.0 0.0 - 0.8 K/UL    ABS. BASOPHILS 0.0 0.0 - 0.2 K/UL    ABS. IMM.  GRANS. 0.0 0.0 - 0.5 K/UL   METABOLIC PANEL, BASIC    Collection Time: 09/26/17  5:37 AM   Result Value Ref Range    Sodium 143 136 - 145 mmol/L    Potassium 3.7 3.5 - 5.1 mmol/L    Chloride 104 98 - 107 mmol/L    CO2 36 (H) 21 - 32 mmol/L    Anion gap 3 (L) 7 - 16 mmol/L    Glucose 119 (H) 65 - 100 mg/dL    BUN 25 (H) 8 - 23 MG/DL    Creatinine 0.96 0.8 - 1.5 MG/DL    GFR est AA >60 >60 ml/min/1.73m2    GFR est non-AA >60 >60 ml/min/1.73m2    Calcium 7.9 (L) 8.3 - 10.4 MG/DL   GLUCOSE, POC    Collection Time: 09/26/17  8:16 AM   Result Value Ref Range    Glucose (POC) 131 (H) 65 - 100 mg/dL   GLUCOSE, POC    Collection Time: 09/26/17 11:10 AM   Result Value Ref Range    Glucose (POC) 119 (H) 65 - 100 mg/dL       Assessment:     Problem List as of 9/26/2017  Date Reviewed: 9/9/2017          Codes Class Noted - Resolved    [facility] ([facility] ICD-10-CM: J96.90  ICD-9-CM: 518.81  9/13/2017 - Present        MRSA nasal colonization ICD-10-CM: Z22.322  ICD-9-CM: V02.54  9/8/2017 - Present        COPD (chronic obstructive pulmonary disease) (HCC) (Chronic) ICD-10-CM: J44.9  ICD-9-CM: 496  8/30/2017 - Present        Acute respiratory failure with hypercapnia (HCC) ICD-10-CM: J96.02  ICD-9-CM: 518.81  8/29/2017 - Present        History of tobacco use (Chronic) ICD-10-CM: A23.405  ICD-9-CM: V15.82  8/29/2017 - Present        Hyponatremia ICD-10-CM: E87.1  ICD-9-CM: 276.1  8/29/2017 - Present        Acute encephalopathy ICD-10-CM: G93.40  ICD-9-CM: 348.30  8/29/2017 - Present        Thrombocytopenia (HCC) ICD-10-CM: D69.6  ICD-9-CM: 287.5  8/26/2017 - Present        Anticoagulant long-term use (Chronic) ICD-10-CM: Z79.01  ICD-9-CM: V58.61  8/23/2017 - Present        HTN (hypertension), benign (Chronic) ICD-10-CM: I10  ICD-9-CM: 401.1  8/23/2017 - Present        Peripheral vascular disease (HCC) (Chronic) ICD-10-CM: I73.9  ICD-9-CM: 443.9  8/23/2017 - Present        Dyslipidemia (Chronic) ICD-10-CM: E78.5  ICD-9-CM: 272.4  8/23/2017 - Present        History of mechanical aortic valve replacement (Chronic) ICD-10-CM: Z95.2  ICD-9-CM: V43.3  8/23/2017 - Present    Overview Signed 8/30/2017 10:30 AM by Tania Gomez NP     Placement 2000, on chronicCoumadin             Centrilobular emphysema ([facility] [address] (Chronic) ICD-10-CM: J43.2  ICD-9-CM: 492.8  8/23/2017 - Present    Overview Signed 9/8/2017  9:24 AM by Concha Barone NP      With last PFTs FVC 1.70 L or 44% predicted, FEV1 0.86 L or 29% predicted, FEV1/FVC 51%. This study was a postbronchodilator study performed at the [facility] on 8/22/2016                  Ischemic cardiomyopathy (Chronic) ICD-10-CM: I25.5  ICD-9-CM: 414.8  8/23/2017 - Present    Overview Signed 8/30/2017 10:29 AM by Tania Gomez NP     8/23/17 ECHO:  EF 40 % to 45 %. There were no regional wall motion abnormalities. Moderate hypokinesis of the mid-apical anterior and apical wall(s). Atrial flutter with rapid ventricular response (HCC) ICD-10-CM: I48.92  ICD-9-CM: 427.32  8/23/2017 - Present        LV dysfunction (Chronic) ICD-10-CM: I51.9  ICD-9-CM: 429.9  10/19/2016 - Present        Atherosclerosis of native arteries of the extremities with intermittent claudication (Chronic) ICD-10-CM: A14.885  ICD-9-CM: 440.21  4/15/2014 - Present              Assessment/ plan:     Debility s/p resp failure, a flutter with RVR s/p cardioversion    Continue daily physician medical management:      Atrial flutter with rapid ventricular response ([facility] [address] (8/23/2017) sp cardioversion - monitor HR./ BP. / hx of CHF / Ischemic cardiomyopathy     - Digoxin, coreg.  - continue anticoagulation. Continued on adjustment daily for INR control. pharmacy to dose  - cardiac precautions.   - (9/16) -recurrent a.fib with RVR. HR 130s. 9/20 continues; metoprolol added 9/19; PT UNABLE TO PARTICIPATE IN THERAPIES. SHOULD BE TRANSFERRED TO TELEMETRY TO MANAGE CARDIAC ISSUES SO AS NOT TO USE OF ACUTE REHAB DAYS.   -9/21 will try to encourage participation in therapies today. Wouldn't even do ST at bedside yesterday. Will put parameters on when to hold therapies due to tachyc.   -9/22 s/p successful cardioversion by Dr Candido Trinh; now in NSR, HR 65; restart full therapies (encourage participation); restart Coumadin 5mg daily; 9/25 INR 1.6 ( goal 2-3)  -HR 40-69; ? Decreasing beta blk since weve gone up on amio and now s/p cardioversion; will defer to cardiology  HR 60 after ambulation; which is actually the best he's done in therapy in a long time. - 9/25  In Sinus. Mild bradycardia. Discontinued diltiazem due to low HR.   - 9/26 cardiology following. Will consider d/c digoxin. Consider decreasing amiodarone to 200mg bid.           - restarted diuretics, as was taking at home. - CxR 9/16 very small bilateral pleural effusions are stable.    - 9/19-  possible cardioversion, AVN ablation/pacemaker in am per Cardiology/EP    -9/20 HOLD proc due to elevated INR, cont to hold coumadin ; 9/21 INR 2.3 from 3.4; restart coumadin after procedure today if ok with cardiology  - 9-23 coumadin restarted 9/22  - 9/26 - monitor INR, coumadin dosing.       History of mechanical aortic valve replacement 2000, on coumadin/ Anticoagulant long-term use. Goal 2.5-3.5.  - resume warfarin, monitor INR.   - INR 2.4 (9/18) . average out home dose ~> 6mg hs.   - Warfarin 6mg HS (9/16)  - pharmacy assisting. On hold(9/19) for procedure.   -9/21 INR now finally trending down; 2.3 from 3.4 yesterday;off coumadin; likely restart Coumadin after procedure  -9/22 restart Coumadin 5mg daily   - 9/26 - monitor INR, coumadin dosing. Bacteremia - started on empiric antibiotic treatment. Aztreonam, vanco.   - BCx grew e.coli. discontinued vano. - final ID extended-spectrum beta-lactamases (ESBLs) , appears treated, though efficacy of vanco, aztreonam not optimal.  . Pt asymptomatic, WBC wnl. Will discontinue - aztreonam. .  - will monitor closely.   - will have on contact precaution, infection control notified.   - BCx redrawn 9/17, no growth, will lift contact precautions 9/19.       HTN (hypertension) - monitor.  - continue Coreg/ digoxin  -9/21 cardizem and lasix ; now off Coreg per cardiology for rate control but now bradycardia; really no rhyme or reason to his fluctuating HR; BP stable  -9/23 -162 acceptable  - 9/25 - SBP. Appears in control.           Thrombocytopenia/ ITP  - continued on steroids. - hematology following.   - HIT negative. - ivig per hematology direction. Will receive 1 more dose. -  received platelet 1unit (2/72). plt 25k -> 75k (9/16)-> 65k (9/17) -> 68 (9/19) monitor; 9/20 plts 52; defer to hematology  -9/21 plts 36; pending 9/22; 19k; will give one unit plts  -9/23 plts 46; want to keep above 50K , thus one unit today. ? Nplate  -9/92 79U after one unit yesterday; monitor; patient pancytopenic; Hematology to f/u          COPD   - continue respiratory treatments. Presently q6h, wean as appropriate.    - resume spiriva, pulmicort  - SOB with activity. - normal CxR (9/15)  - will wean albuterol nebs 9/18. Bid and as needed. -9/20 SOB; sats 95% RA, bilateral crackles, increasing JIM; likely due to tachycardia; will f/u portable CXR; weaning steroids  -9/21 CXR; shows no significant change in small right pleural effusion and adjacent mild right basilar atelectasis/infiltrate. Continue to enc IS. bipap at [facility]; sats 95% RA  -9/23 wean O2 s/p cardioversion; 100% sats 2 L NC  -9/23 was weaned from 3L to 1L; sats 98%; BiPap at [facility]  - 9/25 continue BiPAP HS, no acute flairs. Dysphagia; still req NTL, mech soft 9/24 ; ST to follow up          iron deficiency anemia -hgb 8.5-> 8.7(9/15)-. 10.8(9/18) -> 9.3-> 9.1 (9/19) Monitor.  Continue fe supplements.   -9/20 hgb 8.8; no evidence of acute bleed; hgb 8.1 9/21; check stool  -9/21 pancytopenic, WBC has dropped  as well; monitor for now; hgb 8.1  -9/23 hbg 8.9 ; macrocytosis; B12 and folate wnl  -no evidence of bleed. continue to monitor. Hematology following/       Hyponatremia  - monitor. 139 on 9/22      Pneumonia prophylaxis- Insentive spirometer every hour while awake      DVT risk / DVT Prophylaxis- Will require daily physician exam to assess for signs and symptoms as patient is at increased risk for of thromboembolism. Mobilization as tolerated. Intermittent pneumatic compression devices when in bed Thigh-high or knee-high thromboembolic deterrent hose when out of bed.    - covered with warfarin.   - no signs of DVT.     Pain Control: mild joint symptoms intermittently, reasonably well controlled by PRN meds. - cont. regular pain assessment and comprenhensive pain management. - resume gabapentin.       Wound Care: Monitor wound status daily per staff and physician. At risk for failure. Will require 24/7 rehab nursing. Keep wound clean and dry  - excoriation at bridge of nose. Continue antibiotic ointment.   -left heel boggy, erythema; order rooke boot; floating heel not working      Diabetes mellitus - Uncontrolled. poor glycemic control. Will require daily, close FSG monitoring and medication adjustment to optimize glycemic control in setting of acute illness and hospitalization.   - SSI coverage with lispro. Poorly controlled , exacerbated by steroids  -9/21 a bit better controlled, likely due to poor po intake  - 9/22 BS varied greatly 80s to 350s; consider glucotrol  - 9/24 better control. 70s to 170  - 9/25 serum glucose better this morning. Monitor.        bowel program - colace as needed.      Gi prophylaxis; change pepcid to po 9/20              Time spent was 25 minutes with over 1/2 in direct patient care/examination, consultation and coordination of care.      Signed By: Keya Ashley MD     September 26, 2017

## 2017-09-26 NOTE — PROGRESS NOTES
Warfarin dosing per pharmacist    Ginny Ribeiro is a 67 y.o. male. Indication: A. Fib    Goal INR:  2-3    Home dose:  5 mg daily, 7.5 mg on Wednesday    Risk factors or significant drug interactions:  Antibiotics, prednisone    Other anticoagulants:  none    Daily Monitoring  Date  INR     Warfarin dose HGB              Notes  9/8  1.2  10mg  11.2    9/9  1.3  10mg  9.4  9/10  ---  10mg  9.7  9/11  2.1  10mg  10.8  9/12  2.9  10mg  8.9  9/13  3.5  Hold  8.4  Pharmacy Consulted  9/14  3.6  Held  8.5  9/15  2.5  5 mg  8.7  9/16  1.9  6 mg  10.8  9/17  2.2  6 mg  9.3  9/18  2.4  6 mg  --  9/19  3.2  Hold  9.1  9/20  3.4  Hold  8.8  9/21  2.3  5 mg  8.1  9/22  1.4  5 mg  ---  9/23  1.4  5 mg  8.9  9/24  1.6  5 mg  7.9  9/25  1.9  5 mg  8.0  9/26  2.6  2.5 mg  ---    INR with quick jump to 2.6. Will give 2.5 mg dose tonight and then increase back to 5 mg depending on INR. Following daily. Thank you,  Nazia Alfaro, Pharm. D.   Clinical Pharmacist  [phone]

## 2017-09-26 NOTE — PROGRESS NOTES
Assessment completed, respiration even and unlabored, instructed to call for needs or assist , educated on risk and needs not to get up without assist, call light in reach, [phone] units of ssi given, hs snack given

## 2017-09-26 NOTE — PROGRESS NOTES
Daily Progress Note -- Hematology/ Medical Oncology        Patient Name: Nayeli Dias    Date of Visit: 2017  : 1945  Age:72 y.o.        Interval history:  Platelets 81,355 today  Feeling well   No complaints    Medications:   Current Facility-Administered Medications   Medication Dose Route Frequency Provider Last Rate Last Dose    warfarin (COUMADIN) tablet 2.5 mg - pharmacy dosing  2.5 mg Oral QPM Marilu Estevez MD        0.9% sodium chloride infusion 250 mL  250 mL IntraVENous PRN Marilu Estevez MD        albuterol CONCENTRATE 2.5mg/0.5 mL neb soln  2.5 mg Nebulization BID Marilu Stoddard MD   2.5 mg at 17 0547    budesonide (PULMICORT) 500 mcg/2 ml nebulizer suspension  500 mcg Nebulization BID RT Marilu Stoddard MD   500 mcg at 17 0547    nitroglycerin (NITROSTAT) tablet 0.4 mg  0.4 mg SubLINGual Q5MIN PRN Marilu Stoddard MD        ondansetron [facility]) injection 4 mg  4 mg IntraVENous Q4H PRN Marilu Stoddard MD        sodium chloride (NS) flush 5-10 mL  5-10 mL IntraVENous PRN Marilu Stoddard MD        NUTRITIONAL SUPPORT ELECTROLYTE PRN ORDERS   Does Not Apply PRN Marilu Stoddard MD        acetaminophen (TYLENOL) suppository 650 mg  650 mg Rectal Q4H PRN Marilu Stoddard MD        acetaminophen (TYLENOL) tablet 650 mg  650 mg Oral Q4H PRN Marilu Stoddard MD        albuterol (PROVENTIL HFA, VENTOLIN HFA, PROAIR HFA) inhaler 2 Puff  2 Puff Inhalation Q4H PRN Marilu Stoddard MD        albuterol (PROVENTIL VENTOLIN) nebulizer solution 2.5 mg  2.5 mg Nebulization Q4H PRN Marilu Stoddard MD        dextrose (D50W) injection syrg 25 g  25 g IntraVENous PRN Marilu Estevez MD        digoxin (LANOXIN) tablet 0.25 mg  0.25 mg Oral DAILY Marilu Stoddard MD   0.25 mg at 17 0820    ferrous sulfate tablet 325 mg  1 Tab Oral DAILY WITH BREAKFAST Marilu Miller MD   325 mg at 09/26/17 0213    gabapentin (NEURONTIN) capsule 600 mg  600 mg Oral TID Laure Skelton MD   600 mg at 09/26/17 0539    guaiFENesin ER (MUCINEX) tablet 1,200 mg  1,200 mg Oral Q12H Marilu Miller MD   1,200 mg at 09/26/17 0820    heparin (porcine) pf 600 Units  600 Units InterCATHeter Q8H Marilu Miller MD   600 Units at 09/26/17 0539    heparin (porcine) pf 600 Units  600 Units InterCATHeter PRN Marilu Miller MD        insulin lispro (HUMALOG) injection   SubCUTAneous AC&HS Marilu Miller MD   Stopped at 09/26/17 9393    povidone-iodine (BETADINE) 10 % topical solution   Topical DAILY MD Fabien Andino [START ON 9/29/2017] predniSONE (DELTASONE) tablet 10 mg  10 mg Oral DAILY WITH BREAKFAST Marilu Miller MD        predniSONE (DELTASONE) tablet 20 mg  20 mg Oral DAILY WITH BREAKFAST Marilu Miller MD   20 mg at 09/26/17 0945    sodium chloride (NS) flush 20 mL  20 mL InterCATHeter Q8H Marilu Miller MD   20 mL at 09/26/17 0539    tiotropium (SPIRIVA) inhalation capsule 18 mcg  1 Cap Inhalation DAILY Marilu Miller MD   18 mcg at 09/26/17 0547    traZODone (DESYREL) tablet 50 mg  50 mg Oral QHS PRN Marilu Walters MD   50 mg at 09/24/17 2145    influenza vaccine 2017-18 (3 yrs+)(PF) (FLUZONE QUAD/FLUARIX QUAD) injection 0.5 mL  0.5 mL IntraMUSCular PRIOR TO DISCHARGE Marilu Walters MD        0.9% sodium chloride infusion 250 mL  250 mL IntraVENous PRN Marilu Walters MD        furosemide (LASIX) tablet 20 mg  20 mg Oral DAILY Marilu Miller MD   20 mg at 09/26/17 9522    potassium chloride (K-DUR, KLOR-CON) SR tablet 20 mEq  20 mEq Oral DAILY Marilu Miller MD   20 mEq at 09/26/17 0819    metoprolol succinate (TOPROL-XL) tablet 100 mg  100 mg Oral BID Marilu Miller, MD   100 mg at 09/26/17 0820    famotidine (PEPCID) tablet 20 mg  20 mg Oral BID Marilu Carlin MD   20 mg at 09/26/17 0801    amiodarone (CORDARONE) tablet 400 mg  400 mg Oral BID Michael Stevens MD   400 mg at 09/26/17 8380    0.9% sodium chloride infusion 250 mL  250 mL IntraVENous PRN Marilu Darling MD           Allergies: Allergies   Allergen Reactions    Levaquin [Levofloxacin] Other (comments)     GI upset    Metformin Other (comments)     Gi upset       Review of Systems: The Review of Systems is documented in full in the internal medical record. All systems are negative other than for those noted above. Physical Examination:  General Appearance: Chronically-ll appearing patient in no acute distress. Vital signs:   Visit Vitals    /48    Pulse (!) 58    Temp 97.9 °F (36.6 °C)    Resp 18    SpO2 94%     HEENT: No oral or pharyngeal masses. There is no ulceration or thrush. Lungs: Bilateral breath sounds clear. No use of accessory muscles. O2 intact. Heart: There is no jugular venous distention. Regular, irregular. .   Abdomen: Soft, non-tender, bowel sounds present and normal.  Skin: Multiple areas of ecchymoses on upper extremities bilaterally. Neuro: Alert today with no obvious focal deficits.    .     Labs/Imaging:  Lab Results   Component Value Date/Time    WBC 4.4 09/26/2017 05:37 AM    HGB 8.0 09/26/2017 05:37 AM    HCT 26.3 09/26/2017 05:37 AM    PLATELET 36 78/94/2259 05:37 AM    .1 09/26/2017 05:37 AM       Lab Results   Component Value Date/Time    Sodium 143 09/26/2017 05:37 AM    Potassium 3.7 09/26/2017 05:37 AM    Chloride 104 09/26/2017 05:37 AM    CO2 36 09/26/2017 05:37 AM    Anion gap 3 09/26/2017 05:37 AM    Glucose 119 09/26/2017 05:37 AM    BUN 25 09/26/2017 05:37 AM    Creatinine 0.96 09/26/2017 05:37 AM    GFR est AA >60 09/26/2017 05:37 AM    GFR est non-AA >60 09/26/2017 05:37 AM    Calcium 7.9 09/26/2017 05:37 AM    AST (SGOT) 405 09/12/2017 06:31 AM    Alk. phosphatase 122 09/12/2017 06:31 AM    Protein, total 6.5 09/12/2017 06:31 AM    Albumin 2.3 09/12/2017 06:31 AM    Globulin 4.2 09/12/2017 06:31 AM    A-G Ratio 0.5 09/12/2017 06:31 AM    ALT (SGPT) 578 09/12/2017 06:31 AM     Initial consult HPI:   He has a history of mechanical AVR in 2000. He continues on chronic anticoagulation with coumadin, chronic systolic CHF/ICM EF 93%, COPD, PVD, DM II, HTN, iron deficiency, and dyslipidemia who developed worsening dyspnea at the beginning of the week. He presented to the ER and was found to be in atrial flutter with RVR now status post cardioversion and amiodarone. Of note, his platelets were 35,905 on admission and 30,000 today. No other lab comparisons here but can see through care everywhere that his platelets were 55,741 at [facility] about a year ago. He has no recollection of ever being told he has low platelets. He takes iron daily for his history of PRATIK and reports black stools due to this. Hgb on admission was 10.2 and 12.7 today. We have been consulted for his thrombocytopenia    PLAN:  - Presumed ITP:  09/07 Transfuse platelets as needed - keep above 50,000. Coumadin necessary due to mechanical heart valve. Continue daily CBC to monitor platelet count. BMBx can be done as outpatient or when out of ICU. Change solumedrol to prednisone taper. Begin with 40mg daily and taper down 10mg weekly. 09/13 Platelets 01,018 yesterday. Transfused 1 unit. Today platelets 95,195. HIT panel pending. IVIG today and tomorrow. Continue with steroids. We will follow up on labs tomorrow. 09/14 Platelets 38,271 today. HIT negative. IVIG today and tomorrow. Continue Prednisone 40 mg daily and taper by 10 mg weekly. Will follow labs tomorrow and if acceptable, will sign off and see him in outpatient clinic.   09/15 Platelets down to 42,289 today. 2nd dose of IVIG today.  Platelets ordered per primary team. Continue Prednisone 40 mg daily and taper by 10 mg weekly. Will continue to watch platelets daily and evaluate patient intermittently. 09/22 STAT CBC ordered this am. Ordered again this afternoon. We will follow along for labs. 09/26Patient received IVIG 9/14 and 9/15. He has received 2 units of platelets while on 9th floor. One unit on 9/15 for platelets of 30,177 and one unit on 9/23 for platelet count 13,994. He received platelets while receiving IVIG therefore making it difficult to monitor the effect of the IVIG. He continues on prednisone taper currently at 20 mg. We discussed bone marrow biopsy with patient and he is in agreement. Addendum: Spoke with IR regarding BMBX. Patient's INR must be less than 2. Patient is on coumadin for mechanical heart valve. We will discuss again tomorrow the risk vs benefits. Lupe Mai, NP [facility]  [address] [place] [address]  P [phone]  Patient seen, examed and discussed with NP, agree with above history/assessment/plan. 72 y.o.male of extensive cardiac history, mechanical valve replacement, CHF, admitted for afib RVR with ICU stay, initially seen by Dr. Jay Hickman for thrombocytopenia, had tried steroid and IVIG without clear response, however responded to plt transfusion, discussed further eval with marrow biopsy. Vikash Bond M.D.   [address], [address] [place] [address]  Office : [phone]  Fax : [phone]

## 2017-09-27 LAB
BASOPHILS # BLD: 0 K/UL (ref 0–0.2)
BASOPHILS NFR BLD: 0 % (ref 0–2)
DIFFERENTIAL METHOD BLD: ABNORMAL
EOSINOPHIL # BLD: 0 K/UL (ref 0–0.8)
EOSINOPHIL NFR BLD: 1 % (ref 0.5–7.8)
ERYTHROCYTE [DISTWIDTH] IN BLOOD BY AUTOMATED COUNT: 25.2 % (ref 11.9–14.6)
GLUCOSE BLD STRIP.AUTO-MCNC: 104 MG/DL (ref 65–100)
GLUCOSE BLD STRIP.AUTO-MCNC: 123 MG/DL (ref 65–100)
GLUCOSE BLD STRIP.AUTO-MCNC: 154 MG/DL (ref 65–100)
HCT VFR BLD AUTO: 27.2 % (ref 41.1–50.3)
HGB BLD-MCNC: 8.4 G/DL (ref 13.6–17.2)
IMM GRANULOCYTES # BLD: 0 K/UL (ref 0–0.5)
IMM GRANULOCYTES NFR BLD: 0 % (ref 0–5)
INR PPP: 2.6 (ref 0.9–1.2)
LYMPHOCYTES # BLD: 1 K/UL (ref 0.5–4.6)
LYMPHOCYTES NFR BLD: 38 % (ref 13–44)
MCH RBC QN AUTO: 32.2 PG (ref 26.1–32.9)
MCHC RBC AUTO-ENTMCNC: 30.8 G/DL (ref 31.4–35)
MCV RBC AUTO: 104.7 FL (ref 79.6–97.8)
MONOCYTES # BLD: 0.2 K/UL (ref 0.1–1.3)
MONOCYTES NFR BLD: 7 % (ref 4–12)
NEUTS SEG # BLD: 1.5 K/UL (ref 1.7–8.2)
NEUTS SEG NFR BLD: 54 % (ref 43–78)
PLATELET # BLD AUTO: 65 K/UL (ref 150–450)
PMV BLD AUTO: 12.4 FL (ref 10.8–14.1)
PROTHROMBIN TIME: 28.7 SEC (ref 9.6–12)
RBC # BLD AUTO: 2.6 M/UL (ref 4.23–5.67)
WBC # BLD AUTO: 3.1 K/UL (ref 4.3–11.1)

## 2017-09-27 PROCEDURE — 77030011256 HC DRSG MEPILEX <16IN NO BORD MOLN -A

## 2017-09-27 PROCEDURE — 97110 THERAPEUTIC EXERCISES: CPT

## 2017-09-27 PROCEDURE — 97116 GAIT TRAINING THERAPY: CPT

## 2017-09-27 PROCEDURE — 94640 AIRWAY INHALATION TREATMENT: CPT

## 2017-09-27 PROCEDURE — 99232 SBSQ HOSP IP/OBS MODERATE 35: CPT | Performed by: INTERNAL MEDICINE

## 2017-09-27 PROCEDURE — 97150 GROUP THERAPEUTIC PROCEDURES: CPT

## 2017-09-27 PROCEDURE — 97530 THERAPEUTIC ACTIVITIES: CPT

## 2017-09-27 PROCEDURE — 77030027688 HC DRSG MEPILEX 16-48IN NO BORD MOLN -A

## 2017-09-27 PROCEDURE — 82962 GLUCOSE BLOOD TEST: CPT

## 2017-09-27 PROCEDURE — 94660 CPAP INITIATION&MGMT: CPT

## 2017-09-27 PROCEDURE — 85025 COMPLETE CBC W/AUTO DIFF WBC: CPT | Performed by: PHYSICAL MEDICINE & REHABILITATION

## 2017-09-27 PROCEDURE — 74011250637 HC RX REV CODE- 250/637: Performed by: INTERNAL MEDICINE

## 2017-09-27 PROCEDURE — 74011250637 HC RX REV CODE- 250/637: Performed by: PHYSICAL MEDICINE & REHABILITATION

## 2017-09-27 PROCEDURE — 92526 ORAL FUNCTION THERAPY: CPT

## 2017-09-27 PROCEDURE — 74011250636 HC RX REV CODE- 250/636: Performed by: PHYSICAL MEDICINE & REHABILITATION

## 2017-09-27 PROCEDURE — 74011636637 HC RX REV CODE- 636/637: Performed by: PHYSICAL MEDICINE & REHABILITATION

## 2017-09-27 PROCEDURE — 94760 N-INVAS EAR/PLS OXIMETRY 1: CPT

## 2017-09-27 PROCEDURE — 97535 SELF CARE MNGMENT TRAINING: CPT

## 2017-09-27 PROCEDURE — 65310000000 HC RM PRIVATE REHAB

## 2017-09-27 PROCEDURE — 74011000250 HC RX REV CODE- 250: Performed by: PHYSICAL MEDICINE & REHABILITATION

## 2017-09-27 PROCEDURE — 85610 PROTHROMBIN TIME: CPT | Performed by: PHYSICAL MEDICINE & REHABILITATION

## 2017-09-27 RX ORDER — WARFARIN SODIUM 5 MG/1
5 TABLET ORAL EVERY EVENING
Status: DISCONTINUED | OUTPATIENT
Start: 2017-09-27 | End: 2017-09-29

## 2017-09-27 RX ADMIN — METOPROLOL SUCCINATE 100 MG: 100 TABLET, EXTENDED RELEASE ORAL at 09:25

## 2017-09-27 RX ADMIN — FERROUS SULFATE TAB 325 MG (65 MG ELEMENTAL FE) 325 MG: 325 (65 FE) TAB at 09:26

## 2017-09-27 RX ADMIN — ENALAPRIL MALEATE 5 MG: 5 TABLET ORAL at 09:27

## 2017-09-27 RX ADMIN — Medication 20 ML: at 22:49

## 2017-09-27 RX ADMIN — GUAIFENESIN 1200 MG: 600 TABLET, EXTENDED RELEASE ORAL at 09:25

## 2017-09-27 RX ADMIN — Medication 20 ML: at 16:43

## 2017-09-27 RX ADMIN — GABAPENTIN 600 MG: 300 CAPSULE ORAL at 16:33

## 2017-09-27 RX ADMIN — METOPROLOL SUCCINATE 100 MG: 100 TABLET, EXTENDED RELEASE ORAL at 21:00

## 2017-09-27 RX ADMIN — GABAPENTIN 600 MG: 300 CAPSULE ORAL at 05:05

## 2017-09-27 RX ADMIN — SODIUM CHLORIDE, PRESERVATIVE FREE 600 UNITS: 5 INJECTION INTRAVENOUS at 05:51

## 2017-09-27 RX ADMIN — ACETAMINOPHEN 650 MG: 325 TABLET ORAL at 03:21

## 2017-09-27 RX ADMIN — GABAPENTIN 600 MG: 300 CAPSULE ORAL at 22:41

## 2017-09-27 RX ADMIN — PREDNISONE 20 MG: 20 TABLET ORAL at 09:26

## 2017-09-27 RX ADMIN — FAMOTIDINE 20 MG: 20 TABLET ORAL at 18:13

## 2017-09-27 RX ADMIN — WARFARIN SODIUM 5 MG: 5 TABLET ORAL at 18:13

## 2017-09-27 RX ADMIN — INSULIN LISPRO 2 UNITS: 100 INJECTION, SOLUTION INTRAVENOUS; SUBCUTANEOUS at 16:45

## 2017-09-27 RX ADMIN — Medication 20 ML: at 05:52

## 2017-09-27 RX ADMIN — BUDESONIDE 500 MCG: 0.5 INHALANT RESPIRATORY (INHALATION) at 05:29

## 2017-09-27 RX ADMIN — TRAZODONE HYDROCHLORIDE 50 MG: 50 TABLET ORAL at 20:30

## 2017-09-27 RX ADMIN — POTASSIUM CHLORIDE 20 MEQ: 20 TABLET, EXTENDED RELEASE ORAL at 09:26

## 2017-09-27 RX ADMIN — POVIDONE-IODINE: 10 SOLUTION TOPICAL at 09:39

## 2017-09-27 RX ADMIN — ALBUTEROL SULFATE 2.5 MG: 2.5 SOLUTION RESPIRATORY (INHALATION) at 16:45

## 2017-09-27 RX ADMIN — FUROSEMIDE 20 MG: 20 TABLET ORAL at 09:27

## 2017-09-27 RX ADMIN — GUAIFENESIN 1200 MG: 600 TABLET, EXTENDED RELEASE ORAL at 22:41

## 2017-09-27 RX ADMIN — ACETAMINOPHEN 650 MG: 325 TABLET ORAL at 20:30

## 2017-09-27 RX ADMIN — TIOTROPIUM BROMIDE 18 MCG: 18 CAPSULE ORAL; RESPIRATORY (INHALATION) at 05:30

## 2017-09-27 RX ADMIN — ALBUTEROL SULFATE 2.5 MG: 2.5 SOLUTION RESPIRATORY (INHALATION) at 05:29

## 2017-09-27 RX ADMIN — FAMOTIDINE 20 MG: 20 TABLET ORAL at 09:25

## 2017-09-27 RX ADMIN — BUDESONIDE 500 MCG: 0.5 INHALANT RESPIRATORY (INHALATION) at 16:45

## 2017-09-27 RX ADMIN — SODIUM CHLORIDE, PRESERVATIVE FREE 600 UNITS: 5 INJECTION INTRAVENOUS at 22:47

## 2017-09-27 RX ADMIN — SODIUM CHLORIDE, PRESERVATIVE FREE 600 UNITS: 5 INJECTION INTRAVENOUS at 01:00

## 2017-09-27 RX ADMIN — AMIODARONE HYDROCHLORIDE 200 MG: 200 TABLET ORAL at 22:41

## 2017-09-27 RX ADMIN — AMIODARONE HYDROCHLORIDE 200 MG: 200 TABLET ORAL at 09:25

## 2017-09-27 NOTE — PROGRESS NOTES
09/27/17 1513   Time Spent With Patient   Time In 0833   Time Out 0957   Patient Seen For: AM;ADLs   Grooming   Grooming Assistance  S   Comments Setup to gather materials   Upper Body Bathing   Bathing Assistance, Upper Mod I   Position Performed Seated in chair   Adaptive Equipment Other (comment)  (Wheelchair at sink)   Lower Body [address], Lower  S   Position Performed Seated in chair;Standing   Adaptive Equipment Other (comment)  (Wheelchair, stabilizing on sink counter)   Comments Patient props feet on step stool to bathe this session seated in wheelchair at sink, supervision for standing balance to bathe bottom/gary area   [address] (FIM Score) Min A   Adaptive Equipment Grab bars; Walker   Upper Body Dressing    Dressing Assistance  S   Comments Setup   Lower Body Dressing    Dressing Assistance  CGA   Leg Crossed Method Used No   Position Performed Seated in chair;Standing   Adaptive Equipment Used Other(comment)  (Step stool)   Don/Doff Anti-Embolic Stockings NA   Comments Use of step stool propping feet to start pants/underwear and doff/don socks, cues for problem solving and increased time/intermittent rest breaks required. CGA for standing balance to manage pants and underwear over hips. Functional Transfers   Toilet Transfer  Grab bars;Stand pivot transfer with walker   Amount of Assistance Required CGA       S: \"I feel off because I didn't sleep last night. \" Agreeable to therapy. Focus of session was on ADL retraining and functional mobility. Patient was able to ambulate ~15 feet using a RW with CGA. Pain not indicated. Patient completed 1 set x 10 reps of BUE therapeutic exercises seated in wheelchair following ADL utilizing unweighted dowel (d/t fatigue). Patient completed overhead press, chest press, forward row, backward row, and elbow flexion/extension.     Collaborated with PT, Merryene Cousins and confirmed patient is on track to reach goals as documented in the care plan. Patient tolerated session well, but activity tolerance, strength, coordination, balance, functional mobility, cognition are still below baseline and require skilled facilitation to successfully and safely complete ADL's and transfers. Patient ended session in wheelchair with PT, Bailey Alvarenga.      Ozzy Turpin OTR/L

## 2017-09-27 NOTE — PROGRESS NOTES
Spoke with Jamie Gomez, made aware per RAN Hall PA-C, IR unable to perform bone marrow due to increased INR of 2.6. Jamie Gomez to make ordering MD aware.

## 2017-09-27 NOTE — PROGRESS NOTES
SFD PROGRESS NOTE    Melquiades Olmos  Admit Date: 9/13/2017  Admit Diagnosis: Cardiac Debility; Respiratory failure (HCC)    Subjective     Vss. Afebrile, remains in SR. Continued need for remote telemetry. N acute cardiopulmonary issues at therapy. Steady progress seen again. Ambulating at [facility] level.      Objective:     Current Facility-Administered Medications   Medication Dose Route Frequency    warfarin (COUMADIN) tablet 2.5 mg - pharmacy dosing  2.5 mg Oral QPM    amiodarone (CORDARONE) tablet 200 mg  200 mg Oral BID    enalapril (VASOTEC) tablet 5 mg  5 mg Oral DAILY    0.9% sodium chloride infusion 250 mL  250 mL IntraVENous PRN    albuterol CONCENTRATE 2.5mg/0.5 mL neb soln  2.5 mg Nebulization BID    budesonide (PULMICORT) 500 mcg/2 ml nebulizer suspension  500 mcg Nebulization BID RT    nitroglycerin (NITROSTAT) tablet 0.4 mg  0.4 mg SubLINGual Q5MIN PRN    ondansetron (ZOFRAN) injection 4 mg  4 mg IntraVENous Q4H PRN    sodium chloride (NS) flush 5-10 mL  5-10 mL IntraVENous PRN    NUTRITIONAL SUPPORT ELECTROLYTE PRN ORDERS   Does Not Apply PRN    acetaminophen (TYLENOL) suppository 650 mg  650 mg Rectal Q4H PRN    acetaminophen (TYLENOL) tablet 650 mg  650 mg Oral Q4H PRN    albuterol (PROVENTIL HFA, VENTOLIN HFA, PROAIR HFA) inhaler 2 Puff  2 Puff Inhalation Q4H PRN    albuterol (PROVENTIL VENTOLIN) nebulizer solution 2.5 mg  2.5 mg Nebulization Q4H PRN    dextrose (D50W) injection syrg 25 g  25 g IntraVENous PRN    ferrous sulfate tablet 325 mg  1 Tab Oral DAILY WITH BREAKFAST    gabapentin (NEURONTIN) capsule 600 mg  600 mg Oral TID    guaiFENesin ER (MUCINEX) tablet 1,200 mg  1,200 mg Oral Q12H    heparin (porcine) pf 600 Units  600 Units InterCATHeter Q8H    heparin (porcine) pf 600 Units  600 Units InterCATHeter PRN    insulin lispro (HUMALOG) injection   SubCUTAneous AC&HS    povidone-iodine (BETADINE) 10 % topical solution   Topical DAILY    [START ON 9/29/2017] predniSONE (DELTASONE) tablet 10 mg  10 mg Oral DAILY WITH BREAKFAST    predniSONE (DELTASONE) tablet 20 mg  20 mg Oral DAILY WITH BREAKFAST    sodium chloride (NS) flush 20 mL  20 mL InterCATHeter Q8H    tiotropium (SPIRIVA) inhalation capsule 18 mcg  1 Cap Inhalation DAILY    traZODone (DESYREL) tablet 50 mg  50 mg Oral QHS PRN    influenza vaccine 2017-18 (3 yrs+)(PF) (FLUZONE QUAD/FLUARIX QUAD) injection 0.5 mL  0.5 mL IntraMUSCular PRIOR TO DISCHARGE    0.9% sodium chloride infusion 250 mL  250 mL IntraVENous PRN    furosemide (LASIX) tablet 20 mg  20 mg Oral DAILY    potassium chloride (K-DUR, KLOR-CON) SR tablet 20 mEq  20 mEq Oral DAILY    metoprolol succinate (TOPROL-XL) tablet 100 mg  100 mg Oral BID    famotidine (PEPCID) tablet 20 mg  20 mg Oral BID    0.9% sodium chloride infusion 250 mL  250 mL IntraVENous PRN     Review of Systems:Denies chest pain, shortness of breath, cough, headache, visual problems, abdominal pain, dysurea, calf pain. Pertinent positives are as noted in the medical records and unremarkable otherwise. Visit Vitals    /67 (BP 1 Location: Left arm)    Pulse (!) 55    Temp 98 °F (36.7 °C)    Resp 16    SpO2 96%   HR 49-60     Physical Exam:   General: Alert and age appropriately oriented. No acute cardio respiratory distress. Frail appearing   HEENT: Normocephalic,no scleral icterus  Oral mucosa moist without cyanosis   Lungs: Clear to auscultation  bilaterally. Respiration even and unlabored   Heart: Regular rate and rhythm, S1, S2 , pauses notes  No  murmurs, clicks, rub or gallops   Abdomen: Soft, non-tender, nondistended. Bowel sounds present. No organomegaly. Genitourinary: Benign . Neuromuscular:      Generalized non focal weakness  sens intact   Skin/extremity: No rashes, no erythema.  No calf tenderness BLE  Wound left heel ; boggy, red                                                                            Functional Assessment: Balance  Sitting - Static: Good (unsupported) (09/26/17 1500)  Sitting - Dynamic: Good (unsupported) (09/26/17 1500)  Standing - Static: Fair (09/26/17 1500)  Standing - Dynamic : Impaired (09/26/17 1500)           Toileting  Adaptive Equipment: Grab bars; Other (comment) (Wheelchair) (09/18/17 1012)         Dex Vazquez Fall Risk Assessment:  Dex Vazquez Fall Risk  Mobility: Ambulates or transfers with assist devices or assistance/unsteady gait (09/26/17 1900)  Mobility Interventions: Patient to call before getting OOB (09/26/17 1900)  Mentation: Alert, oriented x 3 (09/26/17 1900)  Mentation Interventions: Adequate sleep, hydration, pain control (09/26/17 1900)  Medication: Patient receiving anticonvulsants, sedatives(tranquilizers), psychotropics or hypnotics, hypoglycemics, narcotics, sleep aids, antihypertensives, laxatives, or diuretics (09/26/17 1900)  Medication Interventions: Patient to call before getting OOB (09/26/17 1900)  Elimination: Needs assistance with toileting (09/26/17 1900)  Elimination Interventions: Call light in reach (09/26/17 1900)  Prior Fall History: During admission (Date - Comment) (09/26/17 1900)  History of Falls Interventions: Door open when patient unattended (09/26/17 1900)  Total Score: 5 (09/26/17 1900)  Standard Fall Precautions: Yes (09/25/17 0720)  High Fall Risk: Yes (09/26/17 1900)     Speech Assessment:  Aspiration Signs/Symptoms: None (09/14/17 1036)      Ambulation:  Gait  Distance (ft): 120 Feet (ft) (x2) (09/26/17 1200)  Assistive Device: Walker, rolling;Gait belt (09/26/17 1200)     Labs/Studies:  Recent Results (from the past 72 hour(s))   GLUCOSE, POC    Collection Time: 09/24/17 11:11 AM   Result Value Ref Range    Glucose (POC) 148 (H) 65 - 100 mg/dL   OCCULT BLOOD, STOOL    Collection Time: 09/24/17  3:40 PM   Result Value Ref Range    Occult blood, stool NEGATIVE  NEG     GLUCOSE, POC    Collection Time: 09/24/17  4:06 PM   Result Value Ref Range    Glucose (POC) 312 (H) 65 - 100 mg/dL   GLUCOSE, POC    Collection Time: 09/24/17  8:40 PM   Result Value Ref Range    Glucose (POC) 247 (H) 65 - 100 mg/dL   PROTHROMBIN TIME + INR    Collection Time: 09/25/17  5:43 AM   Result Value Ref Range    Prothrombin time 21.3 (H) 9.6 - 12.0 sec    INR 1.9 (H) 0.9 - 1.2     CBC WITH AUTOMATED DIFF    Collection Time: 09/25/17  5:43 AM   Result Value Ref Range    WBC 3.0 (L) 4.3 - 11.1 K/uL    RBC 2.49 (L) 4.23 - 5.67 M/uL    HGB 8.0 (L) 13.6 - 17.2 g/dL    HCT 26.0 (L) 41.1 - 50.3 %    .4 (H) 79.6 - 97.8 FL    MCH 32.3 26.1 - 32.9 PG    MCHC 30.9 (L) 31.4 - 35.0 g/dL    RDW 25.6 (H) 11.9 - 14.6 %    PLATELET 54 (L) 840 - 450 K/uL    MPV 10.7 (L) 10.8 - 14.1 FL    DF AUTOMATED      NEUTROPHILS 56 43 - 78 %    LYMPHOCYTES 34 13 - 44 %    MONOCYTES 8 4.0 - 12.0 %    EOSINOPHILS 2 0.5 - 7.8 %    BASOPHILS 0 0.0 - 2.0 %    IMMATURE GRANULOCYTES 0.0 0.0 - 5.0 %    ABS. NEUTROPHILS 1.6 (L) 1.7 - 8.2 K/UL    ABS. LYMPHOCYTES 0.9 0.5 - 4.6 K/UL    ABS. MONOCYTES 0.2 0.1 - 1.3 K/UL    ABS. EOSINOPHILS 0.0 0.0 - 0.8 K/UL    ABS. BASOPHILS 0.0 0.0 - 0.2 K/UL    ABS. IMM.  GRANS. 0.0 0.0 - 0.5 K/UL   GLUCOSE, POC    Collection Time: 09/25/17  6:08 AM   Result Value Ref Range    Glucose (POC) 82 65 - 100 mg/dL   GLUCOSE, POC    Collection Time: 09/25/17 11:16 AM   Result Value Ref Range    Glucose (POC) 159 (H) 65 - 100 mg/dL   GLUCOSE, POC    Collection Time: 09/25/17  4:01 PM   Result Value Ref Range    Glucose (POC) 122 (H) 65 - 100 mg/dL   GLUCOSE, POC    Collection Time: 09/25/17  8:18 PM   Result Value Ref Range    Glucose (POC) 191 (H) 65 - 100 mg/dL   PROTHROMBIN TIME + INR    Collection Time: 09/26/17  5:37 AM   Result Value Ref Range    Prothrombin time 28.4 (H) 9.6 - 12.0 sec    INR 2.6 (H) 0.9 - 1.2     CBC WITH AUTOMATED DIFF    Collection Time: 09/26/17  5:37 AM   Result Value Ref Range    WBC 4.4 4.3 - 11.1 K/uL    RBC 2.43 (L) 4.23 - 5.67 M/uL    HGB 8.0 (L) 13.6 - 17.2 g/dL    HCT 26.3 (L) 41.1 - 50.3 %    .1 (H) 79.6 - 97.8 FL    MCH 33.0 (H) 26.1 - 32.9 PG    MCHC 30.5 (L) 31.4 - 35.0 g/dL    RDW 24.7 (H) 11.9 - 14.6 %    PLATELET 36 (L) 233 - 450 K/uL    MPV 12.5 10.8 - 14.1 FL    DF AUTOMATED      NEUTROPHILS 74 43 - 78 %    LYMPHOCYTES 18 13 - 44 %    MONOCYTES 8 4.0 - 12.0 %    EOSINOPHILS 0 (L) 0.5 - 7.8 %    BASOPHILS 0 0.0 - 2.0 %    IMMATURE GRANULOCYTES 0.3 0.0 - 5.0 %    ABS. NEUTROPHILS 2.9 1.7 - 8.2 K/UL    ABS. LYMPHOCYTES 0.7 0.5 - 4.6 K/UL    ABS. MONOCYTES 0.3 0.1 - 1.3 K/UL    ABS. EOSINOPHILS 0.0 0.0 - 0.8 K/UL    ABS. BASOPHILS 0.0 0.0 - 0.2 K/UL    ABS. IMM.  GRANS. 0.0 0.0 - 0.5 K/UL   METABOLIC PANEL, BASIC    Collection Time: 09/26/17  5:37 AM   Result Value Ref Range    Sodium 143 136 - 145 mmol/L    Potassium 3.7 3.5 - 5.1 mmol/L    Chloride 104 98 - 107 mmol/L    CO2 36 (H) 21 - 32 mmol/L    Anion gap 3 (L) 7 - 16 mmol/L    Glucose 119 (H) 65 - 100 mg/dL    BUN 25 (H) 8 - 23 MG/DL    Creatinine 0.96 0.8 - 1.5 MG/DL    GFR est AA >60 >60 ml/min/1.73m2    GFR est non-AA >60 >60 ml/min/1.73m2    Calcium 7.9 (L) 8.3 - 10.4 MG/DL   GLUCOSE, POC    Collection Time: 09/26/17  8:16 AM   Result Value Ref Range    Glucose (POC) 131 (H) 65 - 100 mg/dL   GLUCOSE, POC    Collection Time: 09/26/17 11:10 AM   Result Value Ref Range    Glucose (POC) 119 (H) 65 - 100 mg/dL   GLUCOSE, POC    Collection Time: 09/26/17  4:25 PM   Result Value Ref Range    Glucose (POC) 197 (H) 65 - 100 mg/dL   GLUCOSE, POC    Collection Time: 09/26/17  9:03 PM   Result Value Ref Range    Glucose (POC) 202 (H) 65 - 100 mg/dL   PROTHROMBIN TIME + INR    Collection Time: 09/27/17  5:50 AM   Result Value Ref Range    Prothrombin time 28.7 (H) 9.6 - 12.0 sec    INR 2.6 (H) 0.9 - 1.2     CBC WITH AUTOMATED DIFF    Collection Time: 09/27/17  5:50 AM   Result Value Ref Range    WBC 3.1 (L) 4.3 - 11.1 K/uL    RBC 2.60 (L) 4.23 - 5.67 M/uL    HGB 8.4 (L) 13.6 - 17.2 g/dL    HCT 27.2 (L) 41.1 - 50.3 %    MCV 104.7 (H) 79.6 - 97.8 FL    MCH 32.2 26.1 - 32.9 PG    MCHC 30.8 (L) 31.4 - 35.0 g/dL    RDW 25.2 (H) 11.9 - 14.6 %    PLATELET 65 (L) 683 - 450 K/uL    MPV 12.4 10.8 - 14.1 FL    DF AUTOMATED      NEUTROPHILS 54 43 - 78 %    LYMPHOCYTES 38 13 - 44 %    MONOCYTES 7 4.0 - 12.0 %    EOSINOPHILS 1 0.5 - 7.8 %    BASOPHILS 0 0.0 - 2.0 %    IMMATURE GRANULOCYTES 0.0 0.0 - 5.0 %    ABS. NEUTROPHILS 1.5 (L) 1.7 - 8.2 K/UL    ABS. LYMPHOCYTES 1.0 0.5 - 4.6 K/UL    ABS. MONOCYTES 0.2 0.1 - 1.3 K/UL    ABS. EOSINOPHILS 0.0 0.0 - 0.8 K/UL    ABS. BASOPHILS 0.0 0.0 - 0.2 K/UL    ABS. IMM.  GRANS. 0.0 0.0 - 0.5 K/UL   GLUCOSE, POC    Collection Time: 09/27/17  7:18 AM   Result Value Ref Range    Glucose (POC) 123 (H) 65 - 100 mg/dL       Assessment:     Problem List as of 9/27/2017  Date Reviewed: 9/9/2017          Codes Class Noted - Resolved    Respiratory failure ([facility] [address] ICD-10-CM: J96.90  ICD-9-CM: 518.81  9/13/2017 - Present        MRSA nasal colonization ICD-10-CM: Z22.322  ICD-9-CM: V02.54  9/8/2017 - Present        COPD (chronic obstructive pulmonary disease) (HCC) (Chronic) ICD-10-CM: J44.9  ICD-9-CM: 496  8/30/2017 - Present        Acute respiratory failure with hypercapnia (HCC) ICD-10-CM: J96.02  ICD-9-CM: 518.81  8/29/2017 - Present        History of tobacco use (Chronic) ICD-10-CM: R25.585  ICD-9-CM: V15.82  8/29/2017 - Present        Hyponatremia ICD-10-CM: E87.1  ICD-9-CM: 276.1  8/29/2017 - Present        Acute encephalopathy ICD-10-CM: G93.40  ICD-9-CM: 348.30  8/29/2017 - Present        Thrombocytopenia ([facility] ICD-10-CM: D69.6  ICD-9-CM: 287.5  8/26/2017 - Present        Anticoagulant long-term use (Chronic) ICD-10-CM: Z79.01  ICD-9-CM: V58.61  8/23/2017 - Present        HTN (hypertension), benign (Chronic) ICD-10-CM: I10  ICD-9-CM: 401.1  8/23/2017 - Present        Peripheral vascular disease ([facility] (Chronic) ICD-10-CM: I73.9  ICD-9-CM: 443.9  8/23/2017 - Present        Dyslipidemia (Chronic) ICD-10-CM: E78.5  ICD-9-CM: 272.4  8/23/2017 - Present        History of mechanical aortic valve replacement (Chronic) ICD-10-CM: Z95.2  ICD-9-CM: V43.3  8/23/2017 - Present    Overview Signed 8/30/2017 10:30 AM by Freda Reyes NP     Placement 2000, on chronicCoumadin             Centrilobular emphysema ([facility] [address] (Chronic) ICD-10-CM: J43.2  ICD-9-CM: 492.8  8/23/2017 - Present    Overview Signed 9/8/2017  9:24 AM by Meryle Pascal, NP      With last PFTs FVC 1.70 L or 44% predicted, FEV1 0.86 L or 29% predicted, FEV1/FVC 51%. This study was a postbronchodilator study performed at the [facility] in [facility] on 8/22/2016                  Ischemic cardiomyopathy (Chronic) ICD-10-CM: I25.5  ICD-9-CM: 414.8  8/23/2017 - Present    Overview Signed 8/30/2017 10:29 AM by Freda Reyes NP     8/23/17 ECHO:  EF 40 % to 45 %. There were no regional wall motion abnormalities. Moderate hypokinesis of the mid-apical anterior and apical wall(s). Atrial flutter with rapid ventricular response (HCC) ICD-10-CM: I48.92  ICD-9-CM: 427.32  8/23/2017 - Present        LV dysfunction (Chronic) ICD-10-CM: I51.9  ICD-9-CM: 429.9  10/19/2016 - Present        Atherosclerosis of native arteries of the extremities with intermittent claudication (Chronic) ICD-10-CM: N18.474  ICD-9-CM: 440.21  4/15/2014 - Present              Assessment/ plan:     Debility s/p resp failure, a flutter with RVR s/p cardioversion    Continue daily physician medical management:      Atrial flutter with rapid ventricular response ([address] (8/23/2017) sp cardioversion - monitor HR./ BP. / hx of CHF / Ischemic cardiomyopathy     - Digoxin, coreg.  - continue anticoagulation. Continued on adjustment daily for INR control. pharmacy to dose  - cardiac precautions.   - (9/16) -recurrent a.fib with RVR. HR 130s. 9/20 continues; metoprolol added 9/19; PT UNABLE TO PARTICIPATE IN THERAPIES.  SHOULD BE TRANSFERRED TO TELEMETRY TO MANAGE CARDIAC ISSUES SO AS NOT TO USE OF ACUTE REHAB DAYS. -9/21 will try to encourage participation in therapies today. Wouldn't even do ST at bedside yesterday. Will put parameters on when to hold therapies due to tachyc.   -9/22 s/p successful cardioversion by Dr Marylee Balo; now in NSR, HR 65; restart full therapies (encourage participation); restart Coumadin 5mg daily; 9/25 INR 1.6 ( goal 2-3)  -HR 40-69; ? Decreasing beta blk since weve gone up on amio and now s/p cardioversion; will defer to cardiology  HR 60 after ambulation; which is actually the best he's done in therapy in a long time. - 9/25  In Sinus. Mild bradycardia. Discontinued diltiazem due to low HR.   - 9/27 - no acute changes. Following HR/ BP closely.           - restarted diuretics, as was taking at home. - CxR 9/16 very small bilateral pleural effusions are stable.    - 9/19-  possible cardioversion, AVN ablation/pacemaker in am per Cardiology/EP    -9/20 HOLD proc due to elevated INR, cont to hold coumadin ; 9/21 INR 2.3 from 3.4; restart coumadin after procedure today if ok with cardiology  9-23 coumadin restarted 9/22      History of mechanical aortic valve replacement 2000, on coumadin/ Anticoagulant long-term use. Goal 2.5-3.5.  - resume warfarin, monitor INR.   - INR 2.4 (9/18) . average out home dose ~> 6mg hs.   - Warfarin 6mg HS (9/16)  - pharmacy assisting. On hold(9/19) for procedure.   -9/21 INR now finally trending down; 2.3 from 3.4 yesterday;off coumadin; likely restart Coumadin after procedure  -9/22 restart Coumadin 5mg daily  - 89/27 -monitor INR. +2.6 remains therapuetic.       Bacteremia - started on empiric antibiotic treatment. Aztreonam, vanco.   - BCx grew e.coli. discontinued vano. - final ID extended-spectrum beta-lactamases (ESBLs) , appears treated, though efficacy of vanco, aztreonam not optimal.  . Pt asymptomatic, WBC wnl. Will discontinue - aztreonam. .  - will monitor closely.   - will have on contact precaution, infection control notified.   - BCx redrawn 9/17, no growth, will lift contact precautions 9/19.       HTN (hypertension) - monitor.  - continue Coreg/ digoxin  -9/21 cardizem and lasix ; now off Coreg per cardiology for rate control but now bradycardia; really no rhyme or reason to his fluctuating HR; BP stable  -9/23 -162 acceptable  - 9/25 - SBP. Appears in control.   - 9/27 - good range.           Thrombocytopenia/ ITP  - continued on steroids. - hematology following.   - HIT negative. - ivig per hematology direction. Will receive 1 more dose. -  received platelet 1unit (1/73). plt 25k -> 75k (9/16)-> 65k (9/17) -> 68 (9/19) monitor; 9/20 plts 52; defer to hematology  -9/21 plts 36; pending 9/22; 19k; will give one unit plts  -9/23 plts 46; want to keep above 50K , thus one unit today. ? Nplate  -3/65 74C after one unit yesterday; monitor; patient pancytopenic; Hematology to f/u          COPD   - continue respiratory treatments. Presently q6h, wean as appropriate.    - resume spiriva, pulmicort  - SOB with activity. - normal CxR (9/15)  - will wean albuterol nebs 9/18. Bid and as needed. -9/20 SOB; sats 95% RA, bilateral crackles, increasing JIM; likely due to tachycardia; will f/u portable CXR; weaning steroids  -9/21 CXR; shows no significant change in small right pleural effusion and adjacent mild right basilar atelectasis/infiltrate. Continue to enc IS. bipap at [facility]; sats 95% RA  -9/23 wean O2 s/p cardioversion; 100% sats 2 L NC  -9/23 was weaned from 3L to 1L; sats 98%; BiPap at noc  - 9/25 continue BiPAP HS, no acute flairs. Dysphagia; still req NTL, mech soft 9/24 ; ST to follow up          iron deficiency anemia -hgb 8.5-> 8.7(9/15)-. 10.8(9/18) -> 9.3-> 9.1 (9/19) Monitor. Continue fe supplements.   -9/20 hgb 8.8; no evidence of acute bleed; hgb 8.1 9/21; check stool  -9/21 pancytopenic, WBC has dropped  as well; monitor for now; hgb 8.1  -9/23 hbg 8.9 ; macrocytosis; B12 and folate wnl  -no evidence of bleed. continue to monitor. Hematology following/       Hyponatremia  - monitor. 139 on 9/22      Pneumonia prophylaxis- Insentive spirometer every hour while awake      DVT risk / DVT Prophylaxis- Will require daily physician exam to assess for signs and symptoms as patient is at increased risk for of thromboembolism. Mobilization as tolerated. Intermittent pneumatic compression devices when in bed Thigh-high or knee-high thromboembolic deterrent hose when out of bed.    - covered with warfarin.   - no signs of DVT.     Pain Control: mild joint symptoms intermittently, reasonably well controlled by PRN meds. - cont. regular pain assessment and comprenhensive pain management. - resume gabapentin.       Wound Care: Monitor wound status daily per staff and physician. At risk for failure. Will require 24/7 rehab nursing. Keep wound clean and dry  - excoriation at bridge of nose. Continue antibiotic ointment.   -left heel boggy, erythema; order rooke boot; floating heel not working      Diabetes mellitus - Uncontrolled. poor glycemic control. Will require daily, close FSG monitoring and medication adjustment to optimize glycemic control in setting of acute illness and hospitalization.   - SSI coverage with lispro. Poorly controlled , exacerbated by steroids  -9/21 a bit better controlled, likely due to poor po intake  - 9/22 BS varied greatly 80s to 350s; consider glucotrol  - 9/24 better control. 70s to 170  - 9/25 serum glucose better this morning. Monitor.        bowel program - colace as needed.      Gi prophylaxis; change pepcid to po 9/20              Time spent was 25 minutes with over 1/2 in direct patient care/examination, consultation and coordination of care.      Signed By: Era Peterson MD     September 27, 2017

## 2017-09-27 NOTE — PROGRESS NOTES
PHYSICAL THERAPY DAILY NOTE  Time In: 1300  Time Out: 3419  Patient Seen For: PM;Patient education; Therapeutic exercise;Transfer training    Subjective: \"I don't think I've got enough energy to walk this afternoon. \" Patient agreeable to therapy. Objective: Vital Signs:   Patient Vitals for the past 8 hrs:   Pulse SpO2   09/27/17 0833 (!) 55 94 %           Pain level: No pain reported. Pain location: n/a  Pain interventions: n/a    Patient education: Educated patient on activity pacing. Interdisciplinary Communication: Communicated with Wisam Lambert OT regarding patient's POC. Contact (MRSA)  GROSS ASSESSMENT Daily Assessment            BED/MAT MOBILITY Daily Assessment   Required for safety. Rolling Right : 0 (Not tested)  Rolling Left : 0 (Not tested)  Supine to Sit : 5 (Stand-by assistance)  Sit to Supine : 4 (Minimal assistance) (CGA)       TRANSFERS Daily Assessment   Required for safety.  Bed ->w/c -> mat ->w/c -> bed Transfer Type: SPT without device  Transfer Assistance : 4 (Contact guard assistance)  Sit to Stand Assistance: Contact guard assistance  Car Transfers: Not tested       GAIT Daily Assessment           STEPS or STAIRS Daily Assessment            BALANCE Daily Assessment    Sitting - Static: Good (unsupported)  Sitting - Dynamic: Good (unsupported)  Standing - Static: Fair  Standing - Dynamic : Impaired       WHEELCHAIR MOBILITY Daily Assessment            LOWER EXTREMITY EXERCISES Daily Assessment    Extremity: Both  Exercise Type #1: Supine lower extremity strengthening  Sets Performed: 1  Reps Performed: 15  Level of Assist: Supervision  Exercise Type #2: Other (comment) (Mot[employer] x10 minutes, Level 1)     Patient performed the following B LE exercises:  SUPINE EXERCISES Sets Reps Comments   Ankle Pumps 1 15    Quad Sets 1 15    Glut Sets 1 15    Heel Slides 1 15    Hip Abduction 1 15    Short Arc Quad 1 15    Straight Leg Raise 1 15      Patient returned to room lying supine in bed with all needs in reach. Assessment: Patient limited this PM with gait training secondary to patient reported fatigue. Patient demonstrated improved balance with SPT and no device. Patient required extra time for transfers and functional mobility secondary to fatigue. Patient will benefit from further therapy to increase independence with transfers and household ambulation. Plan of Care: Continue with POC and progress as tolerated.        Chantal Rodriguez  9/27/2017

## 2017-09-27 NOTE — PROGRESS NOTES
Assessment completed, respiration even and unlabored, instructed to call for needs or assist , educated on risk and needs not to get up without assist, call light in reach, telly intact, denies any c/o

## 2017-09-27 NOTE — PROGRESS NOTES
PHYSICAL THERAPY DAILY NOTE  Time In: 1000  Time Out: 8590  Patient Seen For: AM;Gait training;Patient education; Therapeutic exercise    Subjective: \"I feel like I can walk some this morning. \" Patient agreeable to therapy. Objective: Vital Signs:   Patient Vitals for the past 8 hrs:   Temp Pulse Resp BP SpO2   09/27/17 0700 98 °F (36.7 °C) (!) 55 16 150/67 96 %   09/27/17 0530 - - - - 97 %         Pain level: No pain reported. Pain location: n/a  Pain interventions: n/a    Patient education: Educated patient on deep breathing while ambulating with RW. Interdisciplinary Communication: Communicated with Sharyn Owens OT regarding patient's POC. Contact ([facility]SA)  GROSS ASSESSMENT Daily Assessment            BED/MAT MOBILITY Daily Assessment    Rolling Right : 0 (Not tested)  Rolling Left : 0 (Not tested)  Supine to Sit : 0 (Not tested)  Sit to Supine : 0 (Not tested)       TRANSFERS Daily Assessment   Required for safety. Transfer Type: SPT with walker  Transfer Assistance : 4 (Contact guard assistance)  Sit to Stand Assistance: Contact guard assistance  Car Transfers: Not tested       GAIT Daily Assessment   Patient ambulated with mild forward flexion, decreased gait speed, and decreased step clearance B LE.  Amount of Assistance: 4 (Contact guard assistance)  Distance (ft): 150 Feet (ft) (x1, 80' x1)  Assistive Device: Walker, rolling;Gait belt       STEPS or STAIRS Daily Assessment            BALANCE Daily Assessment    Sitting - Static: Good (unsupported)  Sitting - Dynamic: Good (unsupported)  Standing - Static: Fair  Standing - Dynamic : Impaired       WHEELCHAIR MOBILITY Daily Assessment            LOWER EXTREMITY EXERCISES Daily Assessment    Extremity: Both  Exercise Type #1: Seated lower extremity strengthening  Sets Performed: 1  Reps Performed: 15  Level of Assist: Supervision     Patient performed the following B LE exercises:  SEATED EXERCISES Sets Reps Comments   Ankle Pumps 1 15    Hip Flexion 1 15    Long Arc Quads 1 15    Hip Adduction/Ball Squeeze 1 15    Hip Abduction with Green Theraband 1 15    Hamstring Curls with Green Theraband 1 15    Hip Extension with Green Theraband 1 15      Patient returned to room sitting in w/c with all needs in reach and spouse in recliner. Assessment: Patient demonstrated improved endurance with ambulation with RW this AM. Patient is motivated to get better and work with PT. Patient will benefit from further therapy to increase independence with transfers and household ambulation. Plan of Care: Continue with POC and progress as tolerated.        Chelita Buckley  9/27/2017

## 2017-09-27 NOTE — PROGRESS NOTES
Problem: Falls - Risk of  Goal: *Absence of Falls  Document Lauren Fall Risk and appropriate interventions in the flowsheet.    Outcome: Progressing Towards Goal  Fall Risk Interventions:  Mobility Interventions: PT Consult for assist device competence, Strengthening exercises (ROM-active/passive), Communicate number of staff needed for ambulation/transfer     Mentation Interventions: Adequate sleep, hydration, pain control, Eyeglasses and hearing aids, Family/sitter at bedside, Evaluate medications/consider consulting pharmacy, Door open when patient unattended, More frequent rounding     Medication Interventions: Patient to call before getting OOB     Elimination Interventions: Call light in reach     History of Falls Interventions: Door open when patient unattended, Evaluate medications/consider consulting pharmacy, Consult care management for discharge planning

## 2017-09-27 NOTE — PROGRESS NOTES
Daily Progress Note -- Hematology/ Medical Oncology        Patient Name: David Ortiz    Date of Visit: 2017  : 1945  Age:72 y.o.        Interval history:  Platelets 73,669 today without transfusion  Feeling well   No complaints    Medications:   Current Facility-Administered Medications   Medication Dose Route Frequency Provider Last Rate Last Dose    warfarin (COUMADIN) tablet 5 mg - pharmacy dosing  5 mg Oral QPM Pushpa Campos MD        amiodarone (CORDARONE) tablet 200 mg  200 mg Oral BID Jimi Castellanos MD   200 mg at 17 0925    enalapril (VASOTEC) tablet 5 mg  5 mg Oral DAILY Jimi Castellanos MD   5 mg at 17 0384    0.9% sodium chloride infusion 250 mL  250 mL IntraVENous PRN Marilu Mcintyre MD        albuterol CONCENTRATE 2.5mg/0.5 mL neb soln  2.5 mg Nebulization BID Marilu Bender MD   2.5 mg at 17 0529    budesonide (PULMICORT) 500 mcg/2 ml nebulizer suspension  500 mcg Nebulization BID RT Marilu Bender MD   500 mcg at 17 0529    nitroglycerin (NITROSTAT) tablet 0.4 mg  0.4 mg SubLINGual Q5MIN PRN Marilu Bender MD        ondansetron [facility]) injection 4 mg  4 mg IntraVENous Q4H PRN Marilu Bendre MD        sodium chloride (NS) flush 5-10 mL  5-10 mL IntraVENous PRN Marilu Bender MD        NUTRITIONAL SUPPORT ELECTROLYTE PRN ORDERS   Does Not Apply PRN Marilu Bender MD        acetaminophen (TYLENOL) suppository 650 mg  650 mg Rectal Q4H PRN Marilu Bender MD        acetaminophen (TYLENOL) tablet 650 mg  650 mg Oral Q4H PRN Marilu Bender MD   650 mg at 17 0321    albuterol (PROVENTIL HFA, VENTOLIN HFA, PROAIR HFA) inhaler 2 Puff  2 Puff Inhalation Q4H PRN Marilu Bender MD        albuterol (PROVENTIL VENTOLIN) nebulizer solution 2.5 mg  2.5 mg Nebulization Q4H PRN Marilu Bender MD        dextrose (D50W) injection syrg 25 g  25 g IntraVENous PRN Marilu Lemons MD        ferrous sulfate tablet 325 mg  1 Tab Oral DAILY WITH BREAKFAST Marilu Lemons MD   325 mg at 09/27/17 9614    gabapentin (NEURONTIN) capsule 600 mg  600 mg Oral TID Federico King MD   600 mg at 09/27/17 0505    guaiFENesin ER (MUCINEX) tablet 1,200 mg  1,200 mg Oral Q12H Marilu Lemons MD   1,200 mg at 09/27/17 0925    heparin (porcine) pf 600 Units  600 Units InterCATHeter Q8H Marilu Lemons MD   600 Units at 09/27/17 0551    heparin (porcine) pf 600 Units  600 Units InterCATHeter PRN Marilu Lemons MD        insulin lispro (HUMALOG) injection   SubCUTAneous AC&HS Marilu Lemons MD   Stopped at 09/27/17 8399    povidone-iodine (BETADINE) 10 % topical solution   Topical DAILY Marilu Lemons MD        [START ON 9/29/2017] predniSONE (DELTASONE) tablet 10 mg  10 mg Oral DAILY WITH BREAKFAST Marilu Lemons MD        predniSONE (DELTASONE) tablet 20 mg  20 mg Oral DAILY WITH BREAKFAST Marilu Lemons MD   20 mg at 09/27/17 0926    sodium chloride (NS) flush 20 mL  20 mL InterCATHeter Q8H Marilu Lemons MD   20 mL at 09/27/17 0552    tiotropium (SPIRIVA) inhalation capsule 18 mcg  1 Cap Inhalation DAILY Marilu Lemons MD   18 mcg at 09/27/17 0530    traZODone (DESYREL) tablet 50 mg  50 mg Oral QHS PRN Marilu Mayer MD   50 mg at 09/24/17 2145    influenza vaccine 2017-18 (3 yrs+)(PF) (FLUZONE QUAD/FLUARIX QUAD) injection 0.5 mL  0.5 mL IntraMUSCular PRIOR TO DISCHARGE Marilu Mayer MD        0.9% sodium chloride infusion 250 mL  250 mL IntraVENous PRN Marilu Mayer MD        furosemide (LASIX) tablet 20 mg  20 mg Oral DAILY Marilu Lemons MD   20 mg at 09/27/17 3338    potassium chloride (K-DUR, KLOR-CON) SR tablet 20 mEq  20 mEq Oral DAILY Marilu Lemons MD   20 mEq at 09/27/17 0926    metoprolol succinate (TOPROL-XL) tablet 100 mg  100 mg Oral BID Marilu Glasgow MD   100 mg at 09/27/17 0925    famotidine (PEPCID) tablet 20 mg  20 mg Oral BID Marilu Glasgow MD   20 mg at 09/27/17 0925    0.9% sodium chloride infusion 250 mL  250 mL IntraVENous PRN Marilu Delgadillo MD           Allergies: Allergies   Allergen Reactions    Levaquin [Levofloxacin] Other (comments)     GI upset    Metformin Other (comments)     Gi upset       Review of Systems: The Review of Systems is documented in full in the internal medical record. All systems are negative other than for those noted above. Physical Examination:  General Appearance: Chronically-ll appearing patient in no acute distress. Vital signs:   Visit Vitals    /67 (BP 1 Location: Left arm)    Pulse (!) 55    Temp 98 °F (36.7 °C)    Resp 16    SpO2 96%     HEENT: No oral or pharyngeal masses. There is no ulceration or thrush. Lungs: Bilateral breath sounds clear. No use of accessory muscles. O2 intact. Heart: There is no jugular venous distention. Regular, irregular. .   Abdomen: Soft, non-tender, bowel sounds present and normal.  Skin: Multiple areas of ecchymoses on upper extremities bilaterally. Neuro: Alert today with no obvious focal deficits.    .     Labs/Imaging:  Lab Results   Component Value Date/Time    WBC 3.1 09/27/2017 05:50 AM    HGB 8.4 09/27/2017 05:50 AM    HCT 27.2 09/27/2017 05:50 AM    PLATELET 65 80/05/3367 05:50 AM    .7 09/27/2017 05:50 AM       Lab Results   Component Value Date/Time    Sodium 143 09/26/2017 05:37 AM    Potassium 3.7 09/26/2017 05:37 AM    Chloride 104 09/26/2017 05:37 AM    CO2 36 09/26/2017 05:37 AM    Anion gap 3 09/26/2017 05:37 AM    Glucose 119 09/26/2017 05:37 AM    BUN 25 09/26/2017 05:37 AM    Creatinine 0.96 09/26/2017 05:37 AM    GFR est AA >60 09/26/2017 05:37 AM    GFR est non-AA >60 09/26/2017 05:37 AM    Calcium 7.9 09/26/2017 05:37 AM    AST (SGOT) 405 09/12/2017 06:31 AM    Alk. phosphatase 122 09/12/2017 06:31 AM    Protein, total 6.5 09/12/2017 06:31 AM    Albumin 2.3 09/12/2017 06:31 AM    Globulin 4.2 09/12/2017 06:31 AM    A-G Ratio 0.5 09/12/2017 06:31 AM    ALT (SGPT) 578 09/12/2017 06:31 AM     Initial consult HPI:   He has a history of mechanical AVR in 2000. He continues on chronic anticoagulation with coumadin, chronic systolic CHF/ICM EF 43%, COPD, PVD, DM II, HTN, iron deficiency, and dyslipidemia who developed worsening dyspnea at the beginning of the week. He presented to the ER and was found to be in atrial flutter with RVR now status post cardioversion and amiodarone. Of note, his platelets were 83,486 on admission and 30,000 today. No other lab comparisons here but can see through care everywhere that his platelets were 67,492 at [facility] about a year ago. He has no recollection of ever being told he has low platelets. He takes iron daily for his history of PRATIK and reports black stools due to this. Hgb on admission was 10.2 and 12.7 today. We have been consulted for his thrombocytopenia    PLAN:  - Presumed ITP:  09/07 Transfuse platelets as needed - keep above 50,000. Coumadin necessary due to mechanical heart valve. Continue daily CBC to monitor platelet count. BMBx can be done as outpatient or when out of ICU. Change solumedrol to prednisone taper. Begin with 40mg daily and taper down 10mg weekly. 09/13 Platelets 76,866 yesterday. Transfused 1 unit. Today platelets 36,438. HIT panel pending. IVIG today and tomorrow. Continue with steroids. We will follow up on labs tomorrow. 09/14 Platelets 50,765 today. HIT negative. IVIG today and tomorrow. Continue Prednisone 40 mg daily and taper by 10 mg weekly. Will follow labs tomorrow and if acceptable, will sign off and see him in outpatient clinic.   09/15 Platelets down to 91,081 today. 2nd dose of IVIG today.  Platelets ordered per primary team. Continue Prednisone 40 mg daily and taper by 10 mg weekly. Will continue to watch platelets daily and evaluate patient intermittently. 09/22 STAT CBC ordered this am. Ordered again this afternoon. We will follow along for labs. 09/26Patient received IVIG 9/14 and 9/15. He has received 2 units of platelets while on 9th floor. One unit on 9/15 for platelets of 96,775 and one unit on 9/23 for platelet count 76,586. He received platelets while receiving IVIG therefore making it difficult to monitor the effect of the IVIG. He continues on prednisone taper currently at 20 mg. We discussed bone marrow biopsy with patient and he is in agreement. Addendum: Spoke with IR regarding BMBX. Patient's INR must be less than 2. Patient is on coumadin for mechanical heart valve. Plan: Today Mr. Nazia Lyon platelets are up to 37,944 from 36,000 yesterday without transfusion. We will hold on BMbx and continue to monitor counts. He will need to follow up with Dr. Karis Denton one month from discharge. We recommend continuing prednisone 10 mg daily until his appointment with Dr. Karis Denton. We will sign off today and be available if there are any changes noted. Thank you for allowing us to participate in the care of Mr. Alannah Crowder.      Jose D Nieves, NISREEN  [facility] Hematology & Oncology  [address] [place] [address] [phone]    Patient seen, examed and discussed with NP, agree with above history/assessment/plan. 72 y.o.male of extensive cardiac history, mechanical valve replacement, CHF, admitted for afib RVR with ICU stay, initially seen by Dr. Diane Manzanares for thrombocytopenia, had tried steroid and IVIG without clear response, however responded to plt transfusion, discussed further eval with marrow biopsy however can not stop coumadin due to mechanical valve, meanwhile ptl increased to 65, possible to improve as his general condition recovers, wean prednisone to 10mg daily then follow Dr. Karis Denton in office in a month for further needs.       Sandrine Kimball M.D.   [address]  Office : [phone]  Fax : [phone]

## 2017-09-27 NOTE — PROGRESS NOTES
Warfarin dosing per pharmacist    Ld Scott is a 67 y.o. male. Indication: A. Fib    Goal INR:  2-3    Home dose:  5 mg daily, 7.5 mg on Wednesday    Risk factors or significant drug interactions:  Antibiotics, prednisone    Other anticoagulants:  none    Daily Monitoring  Date  INR     Warfarin dose HGB              Notes  9/8  1.2  10mg  11.2    9/9  1.3  10mg  9.4  9/10  ---  10mg  9.7  9/11  2.1  10mg  10.8  9/12  2.9  10mg  8.9  9/13  3.5  Hold  8.4  Pharmacy Consulted  9/14  3.6  Held  8.5  9/15  2.5  5 mg  8.7  9/16  1.9  6 mg  10.8  9/17  2.2  6 mg  9.3  9/18  2.4  6 mg  --  9/19  3.2  Hold  9.1  9/20  3.4  Hold  8.8  9/21  2.3  5 mg  8.1  9/22  1.4  5 mg  ---  9/23  1.4  5 mg  8.9  9/24  1.6  5 mg  7.9  9/25  1.9  5 mg  8.0  9/26  2.6  2.5 mg  ---  9/27  2.6  5 mg  8.4    INR stayed at 2.6 after decreased dose. Will change back to 5 mg tonight. Following daily. Thank you,  Eri Ramirez, Pharm. D.   Clinical Pharmacist  [phone]

## 2017-09-27 NOTE — PROGRESS NOTES
[facility] CARDIOLOGY PROGRESS NOTE           9/27/2017 7:17 AM    Admit Date: 9/13/2017      Subjective:   Resting in bed, eating breakfast, denies any chest pain, SOB or heart palpitations. ROS:  Cardiovascular:  As noted above    Objective:      Vitals:    09/26/17 1903 09/26/17 2313 09/27/17 0212 09/27/17 0530   BP: 130/65      Pulse: 60      Resp: 16      Temp: 97.8 °F (36.6 °C)      SpO2: 94% 97% 100% 97%       Physical Exam:  General-No Acute Distress  Neck- supple, no JVD  CV- R7W6 with click no murmur heard. Lung- clear bilaterally  Abd- soft, nontender, nondistended  Ext- no edema bilaterally. Skin- warm and dry    Data Review:   Recent Labs      09/27/17   0550  09/26/17   0537   NA   --   143   K   --   3.7   BUN   --   25*   CREA   --   0.96   GLU   --   119*   WBC  3.1*  4.4   HGB  8.4*  8.0*   HCT  27.2*  26.3*   PLT  65*  36*   INR  2.6*  2.6*       Assessment/Plan:     Active Problems:    Ischemic cardiomyopathy (8/23/2017)  Follow, no angina now.   EF 40%, remain on toprol. Stopped digoxin yesterday with level elevated on 9/20/17 and per DIG triaL data. Added low dose ACE-I yesterday.         Atrial flutter with rapid ventricular response ([address] (8/23/2017)  Recent cardioversion.  Changed on Amiodarone to 200 mg bid yesterday then in abt 2 weeks (10/10/17) will need to decreased to 200mg daily. Continue coumadin but if bone marrow bx needed will need to stop coumadin and start hep gtt. Waiting on heme/onco to make decision on BMBX. INR today 2.6        Thrombocytopenia ([address] (8/26/2017) plts 65.    Follow, monitor on coumadin        COPD (chronic obstructive pulmonary disease) ([address] (8/30/2017)  Continue meds        Respiratory failure ([address] (9/13/2017)  Better, follow     Anemia- Hgb today 8.4       Bahman Sainz NP  9/27/2017 7:17 AM

## 2017-09-27 NOTE — PROGRESS NOTES
Assessment completed, respiration even and unlabored, instructed to call for needs or assist , educated on risk and needs not to get up without assist, call light in reach, pt denies any c/o at present

## 2017-09-28 LAB
ANION GAP SERPL CALC-SCNC: 5 MMOL/L (ref 7–16)
BASOPHILS # BLD: 0 K/UL (ref 0–0.2)
BASOPHILS NFR BLD: 0 % (ref 0–2)
BUN SERPL-MCNC: 22 MG/DL (ref 8–23)
CALCIUM SERPL-MCNC: 8 MG/DL (ref 8.3–10.4)
CHLORIDE SERPL-SCNC: 104 MMOL/L (ref 98–107)
CO2 SERPL-SCNC: 34 MMOL/L (ref 21–32)
CREAT SERPL-MCNC: 0.92 MG/DL (ref 0.8–1.5)
DIFFERENTIAL METHOD BLD: ABNORMAL
EOSINOPHIL # BLD: 0 K/UL (ref 0–0.8)
EOSINOPHIL NFR BLD: 0 % (ref 0.5–7.8)
ERYTHROCYTE [DISTWIDTH] IN BLOOD BY AUTOMATED COUNT: 24.5 % (ref 11.9–14.6)
GLUCOSE BLD STRIP.AUTO-MCNC: 104 MG/DL (ref 65–100)
GLUCOSE BLD STRIP.AUTO-MCNC: 123 MG/DL (ref 65–100)
GLUCOSE BLD STRIP.AUTO-MCNC: 217 MG/DL (ref 65–100)
GLUCOSE BLD STRIP.AUTO-MCNC: 233 MG/DL (ref 65–100)
GLUCOSE SERPL-MCNC: 111 MG/DL (ref 65–100)
HCT VFR BLD AUTO: 27.3 % (ref 41.1–50.3)
HGB BLD-MCNC: 8.4 G/DL (ref 13.6–17.2)
IMM GRANULOCYTES # BLD: 0 K/UL (ref 0–0.5)
IMM GRANULOCYTES NFR BLD: 0.3 % (ref 0–5)
INR PPP: 2.8 (ref 0.9–1.2)
LYMPHOCYTES # BLD: 0.9 K/UL (ref 0.5–4.6)
LYMPHOCYTES NFR BLD: 15 % (ref 13–44)
MAGNESIUM SERPL-MCNC: 1.9 MG/DL (ref 1.8–2.4)
MCH RBC QN AUTO: 32.3 PG (ref 26.1–32.9)
MCHC RBC AUTO-ENTMCNC: 30.6 G/DL (ref 31.4–35)
MCV RBC AUTO: 105.5 FL (ref 79.6–97.8)
MONOCYTES # BLD: 0.2 K/UL (ref 0.1–1.3)
MONOCYTES NFR BLD: 3 % (ref 4–12)
NEUTS SEG # BLD: 4.9 K/UL (ref 1.7–8.2)
NEUTS SEG NFR BLD: 82 % (ref 43–78)
PLATELET # BLD AUTO: 61 K/UL (ref 150–450)
PMV BLD AUTO: 11 FL (ref 10.8–14.1)
POTASSIUM SERPL-SCNC: 3.8 MMOL/L (ref 3.5–5.1)
PROTHROMBIN TIME: 31.2 SEC (ref 9.6–12)
RBC # BLD AUTO: 2.59 M/UL (ref 4.23–5.67)
SODIUM SERPL-SCNC: 143 MMOL/L (ref 136–145)
WBC # BLD AUTO: 6.7 K/UL (ref 4.3–11.1)

## 2017-09-28 PROCEDURE — 74011250637 HC RX REV CODE- 250/637: Performed by: INTERNAL MEDICINE

## 2017-09-28 PROCEDURE — 36592 COLLECT BLOOD FROM PICC: CPT

## 2017-09-28 PROCEDURE — 97110 THERAPEUTIC EXERCISES: CPT

## 2017-09-28 PROCEDURE — 74011250636 HC RX REV CODE- 250/636: Performed by: PHYSICAL MEDICINE & REHABILITATION

## 2017-09-28 PROCEDURE — 94760 N-INVAS EAR/PLS OXIMETRY 1: CPT

## 2017-09-28 PROCEDURE — 85610 PROTHROMBIN TIME: CPT | Performed by: PHYSICAL MEDICINE & REHABILITATION

## 2017-09-28 PROCEDURE — 97530 THERAPEUTIC ACTIVITIES: CPT

## 2017-09-28 PROCEDURE — 74011636637 HC RX REV CODE- 636/637: Performed by: PHYSICAL MEDICINE & REHABILITATION

## 2017-09-28 PROCEDURE — 85025 COMPLETE CBC W/AUTO DIFF WBC: CPT | Performed by: PHYSICAL MEDICINE & REHABILITATION

## 2017-09-28 PROCEDURE — 74011250637 HC RX REV CODE- 250/637: Performed by: PHYSICAL MEDICINE & REHABILITATION

## 2017-09-28 PROCEDURE — 80048 BASIC METABOLIC PNL TOTAL CA: CPT | Performed by: PHYSICAL MEDICINE & REHABILITATION

## 2017-09-28 PROCEDURE — 83735 ASSAY OF MAGNESIUM: CPT | Performed by: PHYSICAL MEDICINE & REHABILITATION

## 2017-09-28 PROCEDURE — 94660 CPAP INITIATION&MGMT: CPT

## 2017-09-28 PROCEDURE — 97116 GAIT TRAINING THERAPY: CPT

## 2017-09-28 PROCEDURE — 82962 GLUCOSE BLOOD TEST: CPT

## 2017-09-28 PROCEDURE — 92526 ORAL FUNCTION THERAPY: CPT

## 2017-09-28 PROCEDURE — 94640 AIRWAY INHALATION TREATMENT: CPT

## 2017-09-28 PROCEDURE — 97535 SELF CARE MNGMENT TRAINING: CPT

## 2017-09-28 PROCEDURE — 74011000250 HC RX REV CODE- 250: Performed by: PHYSICAL MEDICINE & REHABILITATION

## 2017-09-28 PROCEDURE — 65310000000 HC RM PRIVATE REHAB

## 2017-09-28 RX ORDER — SIMETHICONE 80 MG
80 TABLET,CHEWABLE ORAL
Status: DISCONTINUED | OUTPATIENT
Start: 2017-09-28 | End: 2017-10-03 | Stop reason: HOSPADM

## 2017-09-28 RX ORDER — SIMETHICONE 80 MG
80 TABLET,CHEWABLE ORAL
Status: COMPLETED | OUTPATIENT
Start: 2017-09-28 | End: 2017-09-28

## 2017-09-28 RX ADMIN — BUDESONIDE 500 MCG: 0.5 INHALANT RESPIRATORY (INHALATION) at 17:20

## 2017-09-28 RX ADMIN — ALBUTEROL SULFATE 2.5 MG: 2.5 SOLUTION RESPIRATORY (INHALATION) at 17:20

## 2017-09-28 RX ADMIN — SIMETHICONE CHEW TAB 80 MG 80 MG: 80 TABLET ORAL at 12:10

## 2017-09-28 RX ADMIN — ALBUTEROL SULFATE 2.5 MG: 2.5 SOLUTION RESPIRATORY (INHALATION) at 05:00

## 2017-09-28 RX ADMIN — METOPROLOL SUCCINATE 100 MG: 100 TABLET, EXTENDED RELEASE ORAL at 21:10

## 2017-09-28 RX ADMIN — SODIUM CHLORIDE, PRESERVATIVE FREE 600 UNITS: 5 INJECTION INTRAVENOUS at 06:00

## 2017-09-28 RX ADMIN — GABAPENTIN 600 MG: 300 CAPSULE ORAL at 05:59

## 2017-09-28 RX ADMIN — AMIODARONE HYDROCHLORIDE 200 MG: 200 TABLET ORAL at 08:16

## 2017-09-28 RX ADMIN — FAMOTIDINE 20 MG: 20 TABLET ORAL at 08:16

## 2017-09-28 RX ADMIN — GUAIFENESIN 1200 MG: 600 TABLET, EXTENDED RELEASE ORAL at 08:16

## 2017-09-28 RX ADMIN — AMIODARONE HYDROCHLORIDE 200 MG: 200 TABLET ORAL at 21:10

## 2017-09-28 RX ADMIN — TIOTROPIUM BROMIDE 18 MCG: 18 CAPSULE ORAL; RESPIRATORY (INHALATION) at 05:00

## 2017-09-28 RX ADMIN — POTASSIUM CHLORIDE 20 MEQ: 20 TABLET, EXTENDED RELEASE ORAL at 08:16

## 2017-09-28 RX ADMIN — FERROUS SULFATE TAB 325 MG (65 MG ELEMENTAL FE) 325 MG: 325 (65 FE) TAB at 08:16

## 2017-09-28 RX ADMIN — BUDESONIDE 500 MCG: 0.5 INHALANT RESPIRATORY (INHALATION) at 05:00

## 2017-09-28 RX ADMIN — METOPROLOL SUCCINATE 100 MG: 100 TABLET, EXTENDED RELEASE ORAL at 08:16

## 2017-09-28 RX ADMIN — GUAIFENESIN 1200 MG: 600 TABLET, EXTENDED RELEASE ORAL at 21:09

## 2017-09-28 RX ADMIN — INSULIN LISPRO 4 UNITS: 100 INJECTION, SOLUTION INTRAVENOUS; SUBCUTANEOUS at 21:21

## 2017-09-28 RX ADMIN — FUROSEMIDE 20 MG: 20 TABLET ORAL at 08:16

## 2017-09-28 RX ADMIN — WARFARIN SODIUM 5 MG: 5 TABLET ORAL at 17:28

## 2017-09-28 RX ADMIN — Medication 20 ML: at 06:00

## 2017-09-28 RX ADMIN — GABAPENTIN 600 MG: 300 CAPSULE ORAL at 21:09

## 2017-09-28 RX ADMIN — PREDNISONE 20 MG: 20 TABLET ORAL at 08:16

## 2017-09-28 RX ADMIN — INSULIN LISPRO 4 UNITS: 100 INJECTION, SOLUTION INTRAVENOUS; SUBCUTANEOUS at 17:36

## 2017-09-28 RX ADMIN — ENALAPRIL MALEATE 5 MG: 5 TABLET ORAL at 08:16

## 2017-09-28 RX ADMIN — GABAPENTIN 600 MG: 300 CAPSULE ORAL at 14:22

## 2017-09-28 RX ADMIN — FAMOTIDINE 20 MG: 20 TABLET ORAL at 17:28

## 2017-09-28 NOTE — PROGRESS NOTES
OT WEEKLY PROGRESS NOTE    Time In: 0701  Time Out: 0830    COMPREHENSION MODE Initial Assessment Weekly Progress Assessment 9/28/2017   Score 6 6     EXPRESSION Initial Assessment Weekly Progress Assessment 9/28/2017   Primary Mode of Expression Verbal Verbal   Score 6 6   Comments Increased time, distress with speech d/t intubation Increased time d/t hoarseness     SOCIAL INTERACTION/ PRAGMATICS Initial Assessment Weekly Progress Assessment 9/28/2017   Score 5 6   Comments Monitoring or encouragement for participation/interaction Increased time for responses     PROBLEM SOLVING Initial Assessment Weekly Progress Assessment 9/28/2017   Score 5 5   Comments Solves complex problems with cues, or basic problems 90% or more of the time. Solves complex problems with cues, or basic problems 90% or more of the time     MEMORY Initial Assessment Weekly Progress Assessment 9/28/2017   Score 5 5   Comments Recognizes, recalls, or executes 3 steps of 3 step request 90% of the time (cueing, reminders less than 10%, loses track of time) or cueing and coaxing under stressful/unfamiliar conditions. Recognizes, recalls, or executes 3 steps of 3 step request 90% of the time (cueing, reminders less than 10%, loses track of time) or cueing and coaxing under stressful/unfamiliar conditions. EATING Initial Assessment Weekly Progress Assessment 9/28/2017   Functional Level 5 6   Comments Setup at tray table, nectar thick liquids Patient wears dentures     GROOMING Initial Assessment Weekly Progress Assessment 9/28/2017   Functional Level 3 7   Tasks completed by patient Washed face, Brushed hair Washed face; Washed hands;Brushed hair   Comments Assist to comb hair d/t fatigue Independent seated at sink     BATHING Initial Assessment Weekly Progress Assessment 9/28/2017   Functional Level 3 5   Body parts patient bathed Abdomen, Arm, left, Arm, right, Chest, Hilaria area Abdomen;Arm, left;Arm, right;Buttocks; Chest;Lower leg and foot, left;Lower leg and foot, right;Hilaria area; Thigh, left; Thigh, right   Comments Assist to complete d/t fatigue, stands with use of grab bars with CGA to have bottom bathed Supervision for standing balance, cues/encouragement for problem solving. Patient able to utilize cross-leg method for bathing feet today. TUB/SHOWER TRANSFER INDEPENDENCE Initial Assessment Weekly Progress Assessment 9/28/2017   Score 4 4   Comments CGA SPT using grab bars from wheelchair <> TTB SPT with CGA (at last attempt)     UPPER BODY DRESSING/UNDRESSING Initial Assessment Weekly Progress Assessment 9/28/2017   Functional Level 3 5   Items applied/Steps completed Pullover (4 steps) Pullover (4 steps)   Comments Assist to progress shirt over RUE/down trunk Setup     LOWER BODY DRESSING/UNDRESSING Initial Assessment Weekly Progress Assessment 9/28/2017   Functional Level 1 5   Items applied/Steps completed Sock, left (1 step), Sock, right (1 step), Underpants (3 steps), Elastic waist pants (3 steps) Underpants (3 steps); Sock, right (1 step); Sock, left (1 step); Elastic waist pants (3 steps)   Comments Total assist d/t fatigue end of session Supervision for standing balance, cues/encouragement for problem solving and use of reacher to thread shorts over L foot. Note confusion/decreased sequencing this morning attempting to manage underwear and shorts over hips. TOILETING Initial Assessment Weekly Progress Assessment 9/28/2017   Functional Level 1 5   Comments Assist with all aspects today Supervision for standing balance and use of grab bars     TOILET TRANSFER INDEPENDENCE Initial Assessment Weekly Progress Assessment 9/28/2017   Transfer score 4 5   Comments SPT SPT wheelchair <> toilet with SBA and use of grab bar     Plan of Care: Please see Care Plan for updated LTGs. Family Training:   To complete prior to discharge    Summary of Progress: Patient demonstrates minimal progress with BUE strength, coordination, activity tolerance, balance, cognition, and functional mobility since admission. Patient has been limited by medical complications during [facility] stay resulting in medical hold for multiple days. Patient demonstrates decreased problem-solving/self-help skills requiring increased cuing at times, however participates in all ADL tasks this morning with supervision only provided encouragement. Patient continues to be limited by decreased activity tolerance, requiring frequent intermittent rest breaks to complete tasks. Patient declines participation in shower (versus sponge bath at sink) today and on multiple occasions d/t fatigue level. See above for FIM details. Summary of Session:      S: \"I feel much better after that [bowel movement]. \" Agreeable to therapy. Focus of session was on ADL and functional mobility. Patient was able to transfer wheelchair <> toilet SPT with SBA and use of grab bar, patient declines ambulating with RW due to fatigue/abdominal pain. Pain 5/10 in abdomen, patient reports subsides with bowel movement x 2 this morning. Collaborated with PT, Sherry Henderson and confirmed patient is on track to reach goals as documented in the care plan. Patient tolerated session well, but activity tolerance, strength, coordination, balance, functional mobility, cognition are still below baseline and require skilled facilitation to successfully and safely complete ADL's and transfers. Patient ended session in wheelchair with call remote and phone within reach.      Billie Fuentes OTR/L

## 2017-09-28 NOTE — PROGRESS NOTES
Pt C/O abd discomfort, up to bedside commode for BM. States discomfort is a 5 on 1-10 scale. Denies n/v/d. Denies any associated symptoms. Bowel sounds are active, abd is slightly distended. Able to have a medium size formed BM, stool specimen sent to lab as ordered. Back into bed, States discomfort stays in abd area, no radiation into back or chest. O2 sat 92% room air.  Request pain medication,

## 2017-09-28 NOTE — PROGRESS NOTES
Warfarin dosing per pharmacist    Beverley Santiago is a 67 y.o. male. Indication: A. Fib    Goal INR:  2-3    Home dose:  5 mg daily, 7.5 mg on Wednesday    Risk factors or significant drug interactions:  Antibiotics, prednisone    Other anticoagulants:  none    Daily Monitoring  Date  INR     Warfarin dose HGB              Notes  9/8  1.2  10mg  11.2    9/9  1.3  10mg  9.4  9/10  ---  10mg  9.7  9/11  2.1  10mg  10.8  9/12  2.9  10mg  8.9  9/13  3.5  Hold  8.4  Pharmacy Consulted  9/14  3.6  Held  8.5  9/15  2.5  5 mg  8.7  9/16  1.9  6 mg  10.8  9/17  2.2  6 mg  9.3  9/18  2.4  6 mg  --  9/19  3.2  Hold  9.1  9/20  3.4  Hold  8.8  9/21  2.3  5 mg  8.1  9/22  1.4  5 mg  ---  9/23  1.4  5 mg  8.9  9/24  1.6  5 mg  7.9  9/25  1.9  5 mg  8.0  9/26  2.6  2.5 mg  ---  9/27  2.6  5 mg  8.4  9/28  2.8  5mg  8.4    INR up to 2.8. Will give 5mg again tonight and continue to follow daily.     Thank you,  Jason Rajput, PharmD

## 2017-09-28 NOTE — PROGRESS NOTES
09/28/17 1215   Time Spent With Patient   Time In 1123   Time Out 1150   Patient Seen For: AM;Dysphagia exercises   Team Conference   Team Conference Date 09/26/17   Family/Caregiver Training   Family Training Has taken place   Mental Status   Neurologic State Alert   Orientation Level Oriented X4   Cognition Appropriate decision making   Perception Appears intact   Perseveration No perseveration noted   Safety/Judgement Fall prevention   Comprehension (Native Language)   Primary Mode of Comprehension Auditory   Score 6   Expression (Native Language)   Primary Mode of Expression Verbal   Score 6   Social Interaction/Pragmatics   Score 6   Problem Solving   Score 5   Memory   Score 5   Pt making slow progress toward his goals. Pt tolerating mechanical soft textures with nectar thick liquids. Pt to have a re Modified barium swallow study to rule out aspiration and assess pharyngeal swallow tomorrow am. Would benefit from continued ST services 5 times a week to address dysphagia and voice. Pt completed laryngeal and voice exercises x 5 with min cues with 85% accuracy.    Vilma Hickman MA/DIANA/SLP

## 2017-09-28 NOTE — PROGRESS NOTES
PHYSICAL THERAPY DAILY NOTE  Time In: 6801  Time Out: 6124  Patient Seen For: PM;Gait training;Patient education; Therapeutic exercise;Transfer training    Subjective: \"I want to work so I can get out of here. \" Patient agreeable to therapy. Objective: Vital Signs: No vitals taken this PM.        Pain level: No pain reported. Pain location: n/a  Pain interventions: n/a    Patient education: Educated patient on safety with ambulation and use of RW. Interdisciplinary Communication: Communicated with ADOLFO Ferrari regarding patient's POC. Contact (MRSA)  GROSS ASSESSMENT Daily Assessment            BED/MAT MOBILITY Daily Assessment   Required for safety. Rolling Right : 5 (Supervision)  Rolling Left : 5 (Supervision)  Supine to Sit : 5 (Supervision)  Sit to Supine : 5 (Supervision)       TRANSFERS Daily Assessment   Required for safety. Transfer Type: SPT without device  Transfer Assistance : 5 (Stand-by assistance)  Sit to Stand Assistance: Contact guard assistance  Car Transfers: Not tested       GAIT Daily Assessment   Patient ambulated with mild forward head posture and decreased gait speed.  Amount of Assistance: 4 (Contact guard assistance)  Distance (ft): 125 Feet (ft) (x1, 50' x1)  Assistive Device: Walker, rolling;Gait belt       STEPS or STAIRS Daily Assessment    Steps/Stairs Ambulated (#): 4  Level of Assist : 4 (Minimal assistance)  Rail Use: Both       BALANCE Daily Assessment    Sitting - Static: Good (unsupported)  Sitting - Dynamic: Good (unsupported)  Standing - Static: Fair  Standing - Dynamic : Impaired       WHEELCHAIR MOBILITY Daily Assessment            LOWER EXTREMITY EXERCISES Daily Assessment    Extremity: Both  Exercise Type #1: Supine lower extremity strengthening  Sets Performed: 1  Reps Performed: 15  Level of Assist: Supervision     Patient performed the following B LE exercises:  SUPINE EXERCISES Sets Reps Comments   Ankle Pumps 1 15    Quad Sets 1 15    Glut Sets 1 15 Heel Slides 1 15    Hip Abduction 1 15    Short Arc Quad 1 15    Straight Leg Raise 1 15      Patient returned to room lying supine in bed with all needs in reach. Assessment: Patient continues to make progress with ambulation and safety with transfers. Patient will benefit from further therapy to increase independence with transfers and household ambulation. Plan of Care: Continue with POC and progress as tolerated.        Rodrigo Mao  9/28/2017

## 2017-09-28 NOTE — PROGRESS NOTES
[facility] PROGRESS NOTE    Melquiades Olmos  Admit Date: 9/13/2017  Admit Diagnosis: Cardiac Debility; Respiratory failure (HCC)    Subjective     Vss. Afebrile, remains in SR. Mild bradycardia  noted yesterday and this am. Denies chest pain, SOB, cough, palpitations. Therapy fairly tolerated this morning. Walked 79 ' with RW, min assist level.      Objective:     Current Facility-Administered Medications   Medication Dose Route Frequency    warfarin (COUMADIN) tablet 5 mg - pharmacy dosing  5 mg Oral QPM    amiodarone (CORDARONE) tablet 200 mg  200 mg Oral BID    enalapril (VASOTEC) tablet 5 mg  5 mg Oral DAILY    0.9% sodium chloride infusion 250 mL  250 mL IntraVENous PRN    albuterol CONCENTRATE 2.5mg/0.5 mL neb soln  2.5 mg Nebulization BID    budesonide (PULMICORT) 500 mcg/2 ml nebulizer suspension  500 mcg Nebulization BID RT    nitroglycerin (NITROSTAT) tablet 0.4 mg  0.4 mg SubLINGual Q5MIN PRN    ondansetron (ZOFRAN) injection 4 mg  4 mg IntraVENous Q4H PRN    sodium chloride (NS) flush 5-10 mL  5-10 mL IntraVENous PRN    NUTRITIONAL SUPPORT ELECTROLYTE PRN ORDERS   Does Not Apply PRN    acetaminophen (TYLENOL) suppository 650 mg  650 mg Rectal Q4H PRN    acetaminophen (TYLENOL) tablet 650 mg  650 mg Oral Q4H PRN    albuterol (PROVENTIL HFA, VENTOLIN HFA, PROAIR HFA) inhaler 2 Puff  2 Puff Inhalation Q4H PRN    albuterol (PROVENTIL VENTOLIN) nebulizer solution 2.5 mg  2.5 mg Nebulization Q4H PRN    dextrose (D50W) injection syrg 25 g  25 g IntraVENous PRN    ferrous sulfate tablet 325 mg  1 Tab Oral DAILY WITH BREAKFAST    gabapentin (NEURONTIN) capsule 600 mg  600 mg Oral TID    guaiFENesin ER (MUCINEX) tablet 1,200 mg  1,200 mg Oral Q12H    heparin (porcine) pf 600 Units  600 Units InterCATHeter Q8H    heparin (porcine) pf 600 Units  600 Units InterCATHeter PRN    insulin lispro (HUMALOG) injection   SubCUTAneous AC&HS    povidone-iodine (BETADINE) 10 % topical solution   Topical DAILY    [START ON 9/29/2017] predniSONE (DELTASONE) tablet 10 mg  10 mg Oral DAILY WITH BREAKFAST    predniSONE (DELTASONE) tablet 20 mg  20 mg Oral DAILY WITH BREAKFAST    sodium chloride (NS) flush 20 mL  20 mL InterCATHeter Q8H    tiotropium (SPIRIVA) inhalation capsule 18 mcg  1 Cap Inhalation DAILY    traZODone (DESYREL) tablet 50 mg  50 mg Oral QHS PRN    influenza vaccine 2017-18 (3 yrs+)(PF) (FLUZONE QUAD/FLUARIX QUAD) injection 0.5 mL  0.5 mL IntraMUSCular PRIOR TO DISCHARGE    0.9% sodium chloride infusion 250 mL  250 mL IntraVENous PRN    furosemide (LASIX) tablet 20 mg  20 mg Oral DAILY    potassium chloride (K-DUR, KLOR-CON) SR tablet 20 mEq  20 mEq Oral DAILY    metoprolol succinate (TOPROL-XL) tablet 100 mg  100 mg Oral BID    famotidine (PEPCID) tablet 20 mg  20 mg Oral BID    0.9% sodium chloride infusion 250 mL  250 mL IntraVENous PRN     Review of Systems:Denies chest pain, shortness of breath, cough, headache, visual problems, abdominal pain, dysurea, calf pain. Pertinent positives are as noted in the medical records and unremarkable otherwise. Visit Vitals    /51 (BP Patient Position: At rest)    Pulse (!) 55    Temp 97.5 °F (36.4 °C)    Resp 18    SpO2 95%   HR 49-60     Physical Exam:   General: Alert and age appropriately oriented. No acute cardio respiratory distress. Frail appearing   HEENT: Normocephalic,no scleral icterus  Oral mucosa moist without cyanosis   Lungs: Clear to auscultation  bilaterally. Respiration even and unlabored   Heart: Regular rate and rhythm, S1, S2 , pauses notes  No  murmurs, clicks, rub or gallops   Abdomen: Soft, non-tender, nondistended. Bowel sounds present. No organomegaly. Genitourinary: Benign . Neuromuscular:      Generalized non focal weakness  sens intact   Skin/extremity: No rashes, no erythema.  No calf tenderness BLE  Wound left heel ; boggy, red Functional Assessment:          Balance  Sitting - Static: Good (unsupported) (09/27/17 1600)  Sitting - Dynamic: Good (unsupported) (09/27/17 1600)  Standing - Static: Fair (09/27/17 1600)  Standing - Dynamic : Impaired (09/27/17 1600)           Toileting  Adaptive Equipment: Grab bars; Walker (09/27/17 1513)         Karyn Latin Fall Risk Assessment:  Karyn Latin Fall Risk  Mobility: Ambulates or transfers with assist devices or assistance/unsteady gait (09/28/17 0720)  Mobility Interventions: Patient to call before getting OOB (09/28/17 0720)  Mentation: Alert, oriented x 3 (09/28/17 0720)  Mentation Interventions: Toileting rounds (09/28/17 0720)  Medication: Patient receiving anticonvulsants, sedatives(tranquilizers), psychotropics or hypnotics, hypoglycemics, narcotics, sleep aids, antihypertensives, laxatives, or diuretics (09/28/17 0720)  Medication Interventions: Patient to call before getting OOB (09/28/17 0720)  Elimination: Needs assistance with toileting (09/28/17 0720)  Elimination Interventions: Call light in reach; Patient to call for help with toileting needs; Toileting schedule/hourly rounds (09/28/17 0720)  Prior Fall History: During admission (Date - Comment) (09/28/17 0720)  History of Falls Interventions: Door open when patient unattended (09/28/17 0720)  Total Score: 5 (09/28/17 0720)  Standard Fall Precautions: Yes (09/27/17 2251)  High Fall Risk: Yes (09/28/17 0720)     Speech Assessment:  Aspiration Signs/Symptoms: None (09/14/17 1036)      Ambulation:  Gait  Distance (ft): 150 Feet (ft) (x1, 80' x1) (09/27/17 1200)  Assistive Device: [address], rolling;Gait belt (09/27/17 1200)     Labs/Studies:  Recent Results (from the past 72 hour(s))   GLUCOSE, POC    Collection Time: 09/25/17 11:16 AM   Result Value Ref Range    Glucose (POC) 159 (H) 65 - 100 mg/dL   GLUCOSE, POC    Collection Time: 09/25/17  4:01 PM   Result Value Ref Range    Glucose (POC) 122 (H) 65 - 100 mg/dL   GLUCOSE, POC    Collection Time: 09/25/17  8:18 PM   Result Value Ref Range    Glucose (POC) 191 (H) 65 - 100 mg/dL   PROTHROMBIN TIME + INR    Collection Time: 09/26/17  5:37 AM   Result Value Ref Range    Prothrombin time 28.4 (H) 9.6 - 12.0 sec    INR 2.6 (H) 0.9 - 1.2     CBC WITH AUTOMATED DIFF    Collection Time: 09/26/17  5:37 AM   Result Value Ref Range    WBC 4.4 4.3 - 11.1 K/uL    RBC 2.43 (L) 4.23 - 5.67 M/uL    HGB 8.0 (L) 13.6 - 17.2 g/dL    HCT 26.3 (L) 41.1 - 50.3 %    .1 (H) 79.6 - 97.8 FL    MCH 33.0 (H) 26.1 - 32.9 PG    MCHC 30.5 (L) 31.4 - 35.0 g/dL    RDW 24.7 (H) 11.9 - 14.6 %    PLATELET 36 (L) 591 - 450 K/uL    MPV 12.5 10.8 - 14.1 FL    DF AUTOMATED      NEUTROPHILS 74 43 - 78 %    LYMPHOCYTES 18 13 - 44 %    MONOCYTES 8 4.0 - 12.0 %    EOSINOPHILS 0 (L) 0.5 - 7.8 %    BASOPHILS 0 0.0 - 2.0 %    IMMATURE GRANULOCYTES 0.3 0.0 - 5.0 %    ABS. NEUTROPHILS 2.9 1.7 - 8.2 K/UL    ABS. LYMPHOCYTES 0.7 0.5 - 4.6 K/UL    ABS. MONOCYTES 0.3 0.1 - 1.3 K/UL    ABS. EOSINOPHILS 0.0 0.0 - 0.8 K/UL    ABS. BASOPHILS 0.0 0.0 - 0.2 K/UL    ABS. IMM.  GRANS. 0.0 0.0 - 0.5 K/UL   METABOLIC PANEL, BASIC    Collection Time: 09/26/17  5:37 AM   Result Value Ref Range    Sodium 143 136 - 145 mmol/L    Potassium 3.7 3.5 - 5.1 mmol/L    Chloride 104 98 - 107 mmol/L    CO2 36 (H) 21 - 32 mmol/L    Anion gap 3 (L) 7 - 16 mmol/L    Glucose 119 (H) 65 - 100 mg/dL    BUN 25 (H) 8 - 23 MG/DL    Creatinine 0.96 0.8 - 1.5 MG/DL    GFR est AA >60 >60 ml/min/1.73m2    GFR est non-AA >60 >60 ml/min/1.73m2    Calcium 7.9 (L) 8.3 - 10.4 MG/DL   GLUCOSE, POC    Collection Time: 09/26/17  8:16 AM   Result Value Ref Range    Glucose (POC) 131 (H) 65 - 100 mg/dL   GLUCOSE, POC    Collection Time: 09/26/17 11:10 AM   Result Value Ref Range    Glucose (POC) 119 (H) 65 - 100 mg/dL   GLUCOSE, POC    Collection Time: 09/26/17  4:25 PM   Result Value Ref Range    Glucose (POC) 197 (H) 65 - 100 mg/dL   GLUCOSE, POC    Collection Time: 09/26/17  9:03 PM   Result Value Ref Range    Glucose (POC) 202 (H) 65 - 100 mg/dL   PROTHROMBIN TIME + INR    Collection Time: 09/27/17  5:50 AM   Result Value Ref Range    Prothrombin time 28.7 (H) 9.6 - 12.0 sec    INR 2.6 (H) 0.9 - 1.2     CBC WITH AUTOMATED DIFF    Collection Time: 09/27/17  5:50 AM   Result Value Ref Range    WBC 3.1 (L) 4.3 - 11.1 K/uL    RBC 2.60 (L) 4.23 - 5.67 M/uL    HGB 8.4 (L) 13.6 - 17.2 g/dL    HCT 27.2 (L) 41.1 - 50.3 %    .7 (H) 79.6 - 97.8 FL    MCH 32.2 26.1 - 32.9 PG    MCHC 30.8 (L) 31.4 - 35.0 g/dL    RDW 25.2 (H) 11.9 - 14.6 %    PLATELET 65 (L) 140 - 450 K/uL    MPV 12.4 10.8 - 14.1 FL    DF AUTOMATED      NEUTROPHILS 54 43 - 78 %    LYMPHOCYTES 38 13 - 44 %    MONOCYTES 7 4.0 - 12.0 %    EOSINOPHILS 1 0.5 - 7.8 %    BASOPHILS 0 0.0 - 2.0 %    IMMATURE GRANULOCYTES 0.0 0.0 - 5.0 %    ABS. NEUTROPHILS 1.5 (L) 1.7 - 8.2 K/UL    ABS. LYMPHOCYTES 1.0 0.5 - 4.6 K/UL    ABS. MONOCYTES 0.2 0.1 - 1.3 K/UL    ABS. EOSINOPHILS 0.0 0.0 - 0.8 K/UL    ABS. BASOPHILS 0.0 0.0 - 0.2 K/UL    ABS. IMM.  GRANS. 0.0 0.0 - 0.5 K/UL   GLUCOSE, POC    Collection Time: 09/27/17  7:18 AM   Result Value Ref Range    Glucose (POC) 123 (H) 65 - 100 mg/dL   GLUCOSE, POC    Collection Time: 09/27/17 11:08 AM   Result Value Ref Range    Glucose (POC) 104 (H) 65 - 100 mg/dL   GLUCOSE, POC    Collection Time: 09/27/17  4:32 PM   Result Value Ref Range    Glucose (POC) 154 (H) 65 - 100 mg/dL   PROTHROMBIN TIME + INR    Collection Time: 09/28/17  5:53 AM   Result Value Ref Range    Prothrombin time 31.2 (H) 9.6 - 12.0 sec    INR 2.8 (H) 0.9 - 1.2     CBC WITH AUTOMATED DIFF    Collection Time: 09/28/17  5:53 AM   Result Value Ref Range    WBC 6.7 4.3 - 11.1 K/uL    RBC 2.59 (L) 4.23 - 5.67 M/uL    HGB 8.4 (L) 13.6 - 17.2 g/dL    HCT 27.3 (L) 41.1 - 50.3 %    .5 (H) 79.6 - 97.8 FL    MCH 32.3 26.1 - 32.9 PG    MCHC 30.6 (L) 31.4 - 35.0 g/dL    RDW 24.5 (H) 11.9 - 14.6 %    PLATELET 61 (L) 195 - 450 K/uL    MPV 11.0 10.8 - 14.1 FL    DF AUTOMATED      NEUTROPHILS 82 (H) 43 - 78 %    LYMPHOCYTES 15 13 - 44 %    MONOCYTES 3 (L) 4.0 - 12.0 %    EOSINOPHILS 0 (L) 0.5 - 7.8 %    BASOPHILS 0 0.0 - 2.0 %    IMMATURE GRANULOCYTES 0.3 0.0 - 5.0 %    ABS. NEUTROPHILS 4.9 1.7 - 8.2 K/UL    ABS. LYMPHOCYTES 0.9 0.5 - 4.6 K/UL    ABS. MONOCYTES 0.2 0.1 - 1.3 K/UL    ABS. EOSINOPHILS 0.0 0.0 - 0.8 K/UL    ABS. BASOPHILS 0.0 0.0 - 0.2 K/UL    ABS. IMM.  GRANS. 0.0 0.0 - 0.5 K/UL   METABOLIC PANEL, BASIC    Collection Time: 09/28/17  5:53 AM   Result Value Ref Range    Sodium 143 136 - 145 mmol/L    Potassium 3.8 3.5 - 5.1 mmol/L    Chloride 104 98 - 107 mmol/L    CO2 34 (H) 21 - 32 mmol/L    Anion gap 5 (L) 7 - 16 mmol/L    Glucose 111 (H) 65 - 100 mg/dL    BUN 22 8 - 23 MG/DL    Creatinine 0.92 0.8 - 1.5 MG/DL    GFR est AA >60 >60 ml/min/1.73m2    GFR est non-AA >60 >60 ml/min/1.73m2    Calcium 8.0 (L) 8.3 - 10.4 MG/DL   MAGNESIUM    Collection Time: 09/28/17  5:53 AM   Result Value Ref Range    Magnesium 1.9 1.8 - 2.4 mg/dL   GLUCOSE, POC    Collection Time: 09/28/17  6:07 AM   Result Value Ref Range    Glucose (POC) 123 (H) 65 - 100 mg/dL       Assessment:     Problem List as of 9/28/2017  Date Reviewed: 9/9/2017          Codes Class Noted - Resolved    Respiratory failure ([facility] [address] ICD-10-CM: J96.90  ICD-9-CM: 518.81  9/13/2017 - Present        MRSA nasal colonization ICD-10-CM: Z22.322  ICD-9-CM: V02.54  9/8/2017 - Present        COPD (chronic obstructive pulmonary disease) (HCC) (Chronic) ICD-10-CM: J44.9  ICD-9-CM: 496  8/30/2017 - Present        Acute respiratory failure with hypercapnia (HCC) ICD-10-CM: J96.02  ICD-9-CM: 518.81  8/29/2017 - Present        History of tobacco use (Chronic) ICD-10-CM: X46.659  ICD-9-CM: V15.82  8/29/2017 - Present        Hyponatremia ICD-10-CM: E87.1  ICD-9-CM: 276.1  8/29/2017 - Present        Acute encephalopathy ICD-10-CM: G93.40  ICD-9-CM: 348.30  8/29/2017 - Present        Thrombocytopenia ([address] ICD-10-CM: D69.6  ICD-9-CM: 287.5  8/26/2017 - Present        Anticoagulant long-term use (Chronic) ICD-10-CM: Z79.01  ICD-9-CM: V58.61  8/23/2017 - Present        HTN (hypertension), benign (Chronic) ICD-10-CM: I10  ICD-9-CM: 401.1  8/23/2017 - Present        Peripheral vascular disease ([facility] [address] (Chronic) ICD-10-CM: I73.9  ICD-9-CM: 443.9  8/23/2017 - Present        Dyslipidemia (Chronic) ICD-10-CM: E78.5  ICD-9-CM: 272.4  8/23/2017 - Present        History of mechanical aortic valve replacement (Chronic) ICD-10-CM: Z95.2  ICD-9-CM: V43.3  8/23/2017 - Present    Overview Signed 8/30/2017 10:30 AM by Christal Goncalves NP     Placement 2000, on chronicCoumadin             Centrilobular emphysema ([facility] [address] (Chronic) ICD-10-CM: J43.2  ICD-9-CM: 492.8  8/23/2017 - Present    Overview Signed 9/8/2017  9:24 AM by Shaye Agarwal NP      With last PFTs FVC 1.70 L or 44% predicted, FEV1 0.86 L or 29% predicted, FEV1/FVC 51%. This study was a postbronchodilator study performed at the [place] [facility] on 8/22/2016                  Ischemic cardiomyopathy (Chronic) ICD-10-CM: I25.5  ICD-9-CM: 414.8  8/23/2017 - Present    Overview Signed 8/30/2017 10:29 AM by Christal Goncalves NP     8/23/17 ECHO:  EF 40 % to 45 %. There were no regional wall motion abnormalities. Moderate hypokinesis of the mid-apical anterior and apical wall(s).              Atrial flutter with rapid ventricular response (HCC) ICD-10-CM: H56.40  ICD-9-CM: 427.32  8/23/2017 - Present        LV dysfunction (Chronic) ICD-10-CM: I51.9  ICD-9-CM: 429.9  10/19/2016 - Present        Atherosclerosis of native arteries of the extremities with intermittent claudication (Chronic) ICD-10-CM: D21.540  ICD-9-CM: 440.21  4/15/2014 - Present              Assessment/ plan:     Debility s/p resp failure, a flutter with RVR s/p cardioversion    Continue daily physician medical management:      Atrial flutter with rapid ventricular response ([facility] [address] (8/23/2017) sp cardioversion - monitor HR./ BP. / hx of CHF / Ischemic cardiomyopathy     - Digoxin, coreg.  - continue anticoagulation. Continued on adjustment daily for INR control. pharmacy to dose  - cardiac precautions.   - (9/16) -recurrent a.fib with RVR. HR 130s. 9/20 continues; metoprolol added 9/19; PT UNABLE TO PARTICIPATE IN THERAPIES. SHOULD BE TRANSFERRED TO TELEMETRY TO MANAGE CARDIAC ISSUES SO AS NOT TO USE OF ACUTE REHAB DAYS. -9/21 will try to encourage participation in therapies today. Wouldn't even do ST at bedside yesterday. Will put parameters on when to hold therapies due to tachyc.   -9/22 s/p successful cardioversion by Dr Toyin Segura; now in NSR, HR 65; restart full therapies (encourage participation); restart Coumadin 5mg daily; 9/25 INR 1.6 ( goal 2-3)  -HR 40-69; ? Decreasing beta blk since weve gone up on amio and now s/p cardioversion; will defer to cardiology  HR 60 after ambulation; which is actually the best he's done in therapy in a long time. - 9/25  In Sinus. Mild bradycardia. Discontinued diltiazem due to low HR.   - 9/28- continues to be n NSR. HR in good range, 50-70s. . No new changes.         - restarted diuretics, as was taking at home. - CxR 9/16 very small bilateral pleural effusions are stable.    - 9/19-  possible cardioversion, AVN ablation/pacemaker in am per Cardiology/EP    -9/20 HOLD proc due to elevated INR, cont to hold coumadin ; 9/21 INR 2.3 from 3.4; restart coumadin after procedure today if ok with cardiology  - 9-23 coumadin restarted 9/22  - 9/28, appears euvolemic, continue lasix 20mg daily.       History of mechanical aortic valve replacement 2000, on coumadin/ Anticoagulant long-term use. Goal 2.5-3.5.  - resume warfarin, monitor INR.   - INR 2.4 (9/18) . average out home dose ~> 6mg hs.   - Warfarin 6mg HS (9/16)  - pharmacy assisting.  On hold(9/19) for procedure.   -9/21 INR now finally trending down; 2.3 from 3.4 yesterday;off coumadin; likely restart Coumadin after procedure  -9/22 restart Coumadin 5mg daily  - 9/28- INR therapeutic, continued on warfarin 5mg . INR -2.8      Bacteremia - started on empiric antibiotic treatment. Aztreonam, vanco.   - BCx grew e.coli. discontinued vano. - final ID extended-spectrum beta-lactamases (ESBLs) , appears treated, though efficacy of vanco, aztreonam not optimal.  . Pt asymptomatic, WBC wnl. Will discontinue - aztreonam. .  - will monitor closely. - will have on contact precaution, infection control notified.   - BCx redrawn 9/17, no growth, will lift contact precautions 9/19.   - 9/28, no signs of infection, 9/28, remove PICC      HTN (hypertension) - monitor.  - continue Coreg/ digoxin  -9/21 cardizem and lasix ; now off Coreg per cardiology for rate control but now bradycardia; really no rhyme or reason to his fluctuating HR; BP stable  -9/23 -162 acceptable  - 9/25 - SBP. Appears in control.   - 9/28. Continue current regimen, enalapril      Thrombocytopenia/ ITP  - continued on steroids. - hematology following.   - HIT negative. - ivig per hematology direction. Will receive 1 more dose. -  received platelet 1unit (0/77). plt 25k -> 75k (9/16)-> 65k (9/17) -> 68 (9/19) monitor; 9/20 plts 52; defer to hematology  -9/21 plts 36; pending 9/22; 19k; will give one unit plts  -9/23 plts 46; want to keep above 50K , thus one unit today. ? Nplate  -4/54 75B after one unit yesterday; monitor; patient pancytopenic; Hematology to f/u  - 9/28 - plt 61, no change. Monitor. BMB cancelled due to high INR, on anticoagulation for mech valve.           COPD   - continue respiratory treatments. Presently q6h, wean as appropriate.    - resume spiriva, pulmicort  - SOB with activity. - normal CxR (9/15)  - will wean albuterol nebs 9/18. Bid and as needed.    -9/20 SOB; sats 95% RA, bilateral crackles, increasing JIM; likely due to tachycardia; will f/u portable CXR; weaning steroids  -9/21 CXR; shows no significant change in small right pleural effusion and adjacent mild right basilar atelectasis/infiltrate. Continue to enc IS. bipap at noc; sats 95% RA  -9/23 wean O2 s/p cardioversion; 100% sats 2 L NC  -9/23 was weaned from 3L to 1L; sats 98%; BiPap at noc  - 9/25 continue BiPAP HS, no acute flairs. - 9/28, no compromize. Continue BiPAP at night. Dysphagia; still req NTL, mech soft 9/24 ; ST to follow up          iron deficiency anemia -hgb 8.5-> 8.7(9/15)-. 10.8(9/18) -> 9.3-> 9.1 (9/19) Monitor. Continue fe supplements.   -9/20 hgb 8.8; no evidence of acute bleed; hgb 8.1 9/21; check stool  -9/21 pancytopenic, WBC has dropped  as well; monitor for now; hgb 8.1  -9/23 hbg 8.9 ; macrocytosis; B12 and folate wnl  -no evidence of bleed. continue to monitor.    - 9/28, stable hgb , 8.4      Hyponatremia  - monitor. 139 on 9/22      Pneumonia prophylaxis- Insentive spirometer every hour while awake      DVT risk / DVT Prophylaxis- Will require daily physician exam to assess for signs and symptoms as patient is at increased risk for of thromboembolism. Mobilization as tolerated. Intermittent pneumatic compression devices when in bed Thigh-high or knee-high thromboembolic deterrent hose when out of bed.    - covered with warfarin.   - no signs of DVT.     Pain Control: mild joint symptoms intermittently, reasonably well controlled by PRN meds. - cont. regular pain assessment and comprenhensive pain management. - resume gabapentin.       Wound Care: Monitor wound status daily per staff and physician. At risk for failure. Will require 24/7 rehab nursing. Keep wound clean and dry  - excoriation at bridge of nose. Continue antibiotic ointment.   -left heel boggy, erythema; order rooke boot; floating heel not working  - 9/28. Stable wound, continue to pressure relief. Expect improvement with more mobility and strength.       Diabetes mellitus - Uncontrolled. poor glycemic control.  Will require daily, close FSG monitoring and medication adjustment to optimize glycemic control in setting of acute illness and hospitalization.   - SSI coverage with lispro. Poorly controlled , exacerbated by steroids  -9/21 a bit better controlled, likely due to poor po intake  - 9/22 BS varied greatly 80s to 350s; consider glucotrol  - 9/24 better control. 70s to 170  - 9/25 serum glucose better this morning. Monitor.   - 9/28 - fair control. Mild fluctuations. 100-200s. No change.        bowel program - colace as needed.      Gi prophylaxis; change pepcid to po 9/20              Time spent was 25 minutes with over 1/2 in direct patient care/examination, consultation and coordination of care.      Signed By: Amada Moritz, MD     September 28, 2017

## 2017-09-28 NOTE — PROGRESS NOTES
PT WEEKLY PROGRESS NOTE   Time In: 1890   Time Out: 0959    Subjective: \"I feel fair this morning. \" Patient agreeable to therapy. Objective:   Contact (MRSA)    Outcome Measures: Vital Signs:   Patient Vitals for the past 8 hrs:   Temp Pulse Resp BP SpO2   09/28/17 0713 97.5 °F (36.4 °C) (!) 55 18 103/51 95 %   09/28/17 0500 - - - - 92 %           Pain level: No pain reported. Pain location: n/a  Pain interventions: n/a    Patient education: Educated patient on safety with stair ascend/descend. Interdisciplinary Communication: Communicated with Thien Sparks OT regarding patient's POC. [facility]    FIM SCORES Initial Assessment Weekly Progress Assessment 9/28/2017   Bed/Chair/Wheelchair Transfers 4 4   Wheelchair Mobility 0 (Secondary to patient fatigue.) NT- patient's primary mode of locomotion is ambulation   Walking [place] 1 4   Steps/Stairs 0 (Secondary to patient fatigue) 2   Please see [facility] Interdisciplinary Eval: Coordination/Balance Section for details regarding FIM score description.     BED/CHAIR/WHEELCHAIR TRANSFERS Initial Assessment Weekly Progress Assessment 9/28/2017   Rolling Right 0 (Not tested) 0 (Not tested)   Rolling Left 0 (Not tested) 0 (Not tested)   Supine to Sit 4 (Contact guard assistance) 0 (Not tested)   Sit to Stand Contact guard assistance Contact guard assistance   Sit to Supine 4 (Minimal assistance) (CGA) 0 (Not tested)   Transfer Type SPT with walker SPT without device   Comments Patient unsteady with standing, takes short, cautious steps     Car Transfer Not tested Not tested   Car Type         GROSS ASSESSMENT Weekly Progress Assessment 9/28/2017   AROM     Strength     Coordination     Tone     Sensation     PROM       POSTURE Weekly Progress Assessment 9/28/2017   Posture (WDL)     Posture Assessment       WHEELCHAIR MOBILITY/MANAGEMENT Initial Assessment Weekly Progress Assessment 9/28/2017   Able to Propel       Curbs/ramps assistance required 0 (Not tested) 0 (Not tested- patient's primary mode of locomotion is ambulation)   Wheelchair set up assistance required 0 (Not tested)     Wheelchair management         WALKING INDEPENDENCE Initial Assessment Weekly Progress Assessment 9/28/2017   Assistive device  (No Device- per pre-morbid level of function) Walker, rolling;Gait belt   Ambulation assistance - level surface 4 (Minimal assistance)     Distance 5 Feet (ft) 175 Feet (ft)   Comments Decreased dynamic standing balance, patient ambulated with decreased step length B LE, decreased arm swing B UE, decreased trunk sway, mild forward flexed posture Patient ambulated with decreased gait speed, step through pattern, and improved upright posture. GAIT Weekly Progress Assessment 9/28/2017   Gait Description (WDL)     Gait Abnormalities       STEPS/STAIRS Initial Assessment Weekly Progress Assessment 9/28/2017   Steps/Stairs ambulated 0 4   Rail Use Both Both   Comments    Patent performed stairs with slow one step at a time method for improved balance and safety. Curbs/Ramps         Patient performed the following B LE exercises:  SEATED EXERCISES Sets Reps Comments   Ankle Pumps 1 10    Hip Flexion 1 10    Long Arc Quads 1 10    Hip Adduction/Ball Squeeze 1 10    Hip Abduction with Green Theraband 1 10    Hamstring Curls with Green Theraband 1 10    Hip Extension with Green Theraband 1 10        Patient returned to room lying supine in bed with head of bed elevated. Assessment: Patient continues to demonstrate progress with ambulation distance and tolerance to activity. Patient also demonstrated increased endurance with stair ascend/descend this AM. Further education and training required for stair ascend/descend. Patient will benefit from further therapy to increase independence with transfers and household ambulation. Plan of Care: Please see Care Plan for updated LTGs.     Family Training: Has taken place    Stone Chery  9/28/2017

## 2017-09-28 NOTE — PROGRESS NOTES
Medicated with Tylenol as requested for pain. Sitting back into bed with HOB elevated.  VSS and O2 sat within normal limits for pt, 94% on room air

## 2017-09-28 NOTE — PROGRESS NOTES
[facility] CARDIOLOGY PROGRESS NOTE           9/28/2017 10:55 AM    Admit Date: 9/13/2017      Subjective:     Bradycardic overnight     Review of Systems   Constitutional: Negative for fever. HENT: Negative for ear pain. Respiratory: Negative for cough. Musculoskeletal: Negative for falls. Skin: Negative for rash. Psychiatric/Behavioral: The patient is not nervous/anxious. Objective:      Vitals:    09/27/17 1645 09/27/17 1955 09/28/17 0500 09/28/17 0713   BP:  121/67  103/51   Pulse:  79  (!) 55   Resp:  20  18   Temp:  97.9 °F (36.6 °C)  97.5 °F (36.4 °C)   SpO2: 92% 90% 92% 95%     Past Surgical History:   Procedure Laterality Date    HX AORTIC VALVE REPLACEMENT N/A 2000    Aortic Valve Replacement w/ Mechanical Valve    HX HEART CATHETERIZATION  07/21/2004    [facility]         Physical Exam   Constitutional: He is oriented to person, place, and time. He appears well-developed. HENT:   Head: Normocephalic and atraumatic. Eyes: Pupils are equal, round, and reactive to light. Neck: Normal range of motion. Cardiovascular: Normal rate. Murmur heard. Pulmonary/Chest: Effort normal.   Abdominal: Soft. He exhibits no distension. There is no tenderness. Musculoskeletal: He exhibits no edema. Neurological: He is alert and oriented to person, place, and time. No cranial nerve deficit. Skin: Skin is warm and dry. No rash noted. No erythema. Psychiatric: He has a normal mood and affect.  His behavior is normal.       Data Review:   Recent Labs      09/28/17   0553  09/27/17   0550  09/26/17   0537   NA  143   --   143   K  3.8   --   3.7   MG  1.9   --    --    BUN  22   --   25*   CREA  0.92   --   0.96   GLU  111*   --   119*   WBC  6.7  3.1*  4.4   HGB  8.4*  8.4*  8.0*   HCT  27.3*  27.2*  26.3*   PLT  61*  65*  36*   INR  2.8*  2.6*  2.6*         Intake/Output Summary (Last 24 hours) at 09/28/17 1055  Last data filed at 09/28/17 0839   Gross per 24 hour   Intake 120 ml   Output                2 ml   Net              118 ml     Current Facility-Administered Medications   Medication Dose Route Frequency    warfarin (COUMADIN) tablet 5 mg - pharmacy dosing  5 mg Oral QPM    amiodarone (CORDARONE) tablet 200 mg  200 mg Oral BID    enalapril (VASOTEC) tablet 5 mg  5 mg Oral DAILY    0.9% sodium chloride infusion 250 mL  250 mL IntraVENous PRN    albuterol CONCENTRATE 2.5mg/0.5 mL neb soln  2.5 mg Nebulization BID    budesonide (PULMICORT) 500 mcg/2 ml nebulizer suspension  500 mcg Nebulization BID RT    nitroglycerin (NITROSTAT) tablet 0.4 mg  0.4 mg SubLINGual Q5MIN PRN    ondansetron (ZOFRAN) injection 4 mg  4 mg IntraVENous Q4H PRN    sodium chloride (NS) flush 5-10 mL  5-10 mL IntraVENous PRN    NUTRITIONAL SUPPORT ELECTROLYTE PRN ORDERS   Does Not Apply PRN    acetaminophen (TYLENOL) suppository 650 mg  650 mg Rectal Q4H PRN    acetaminophen (TYLENOL) tablet 650 mg  650 mg Oral Q4H PRN    albuterol (PROVENTIL HFA, VENTOLIN HFA, PROAIR HFA) inhaler 2 Puff  2 Puff Inhalation Q4H PRN    albuterol (PROVENTIL VENTOLIN) nebulizer solution 2.5 mg  2.5 mg Nebulization Q4H PRN    dextrose (D50W) injection syrg 25 g  25 g IntraVENous PRN    ferrous sulfate tablet 325 mg  1 Tab Oral DAILY WITH BREAKFAST    gabapentin (NEURONTIN) capsule 600 mg  600 mg Oral TID    guaiFENesin ER (MUCINEX) tablet 1,200 mg  1,200 mg Oral Q12H    heparin (porcine) pf 600 Units  600 Units InterCATHeter Q8H    heparin (porcine) pf 600 Units  600 Units InterCATHeter PRN    insulin lispro (HUMALOG) injection   SubCUTAneous AC&HS    povidone-iodine (BETADINE) 10 % topical solution   Topical DAILY    [START ON 9/29/2017] predniSONE (DELTASONE) tablet 10 mg  10 mg Oral DAILY WITH BREAKFAST    predniSONE (DELTASONE) tablet 20 mg  20 mg Oral DAILY WITH BREAKFAST    sodium chloride (NS) flush 20 mL  20 mL InterCATHeter Q8H    tiotropium (SPIRIVA) inhalation capsule 18 mcg  1 Cap Inhalation DAILY    traZODone (DESYREL) tablet 50 mg  50 mg Oral QHS PRN    influenza vaccine 2017-18 (3 yrs+)(PF) (FLUZONE QUAD/FLUARIX QUAD) injection 0.5 mL  0.5 mL IntraMUSCular PRIOR TO DISCHARGE    0.9% sodium chloride infusion 250 mL  250 mL IntraVENous PRN    furosemide (LASIX) tablet 20 mg  20 mg Oral DAILY    potassium chloride (K-DUR, KLOR-CON) SR tablet 20 mEq  20 mEq Oral DAILY    metoprolol succinate (TOPROL-XL) tablet 100 mg  100 mg Oral BID    famotidine (PEPCID) tablet 20 mg  20 mg Oral BID    0.9% sodium chloride infusion 250 mL  250 mL IntraVENous PRN           Assessment/Plan:     1. Cardiomyopathy watching volume status continue beta blocker metoprolol XL plan to initiate ace/ARB as blood pressure will allow. 2. Atrial fibrillation previous bradycardia overnight with rates of 40 bpm range. Diltiazem and digoxin held. Continue amiodarone anticoagulation with warfarin  3. Mechanical valve in aortic position previously bridged last INR 2.8 patient on Coumadin.               Izabella Alexander MD  9/28/2017 10:55 AM

## 2017-09-29 ENCOUNTER — APPOINTMENT (OUTPATIENT)
Dept: GENERAL RADIOLOGY | Age: 72
DRG: 309 | End: 2017-09-29
Attending: PHYSICAL MEDICINE & REHABILITATION
Payer: MEDICARE

## 2017-09-29 LAB
BASOPHILS # BLD: 0 K/UL (ref 0–0.2)
BASOPHILS NFR BLD: 0 % (ref 0–2)
DIFFERENTIAL METHOD BLD: ABNORMAL
EOSINOPHIL # BLD: 0 K/UL (ref 0–0.8)
EOSINOPHIL NFR BLD: 0 % (ref 0.5–7.8)
ERYTHROCYTE [DISTWIDTH] IN BLOOD BY AUTOMATED COUNT: 24.6 % (ref 11.9–14.6)
GLUCOSE BLD STRIP.AUTO-MCNC: 107 MG/DL (ref 65–100)
GLUCOSE BLD STRIP.AUTO-MCNC: 205 MG/DL (ref 65–100)
GLUCOSE BLD STRIP.AUTO-MCNC: 217 MG/DL (ref 65–100)
GLUCOSE BLD STRIP.AUTO-MCNC: 80 MG/DL (ref 65–100)
HCT VFR BLD AUTO: 27.8 % (ref 41.1–50.3)
HGB BLD-MCNC: 8.5 G/DL (ref 13.6–17.2)
IMM GRANULOCYTES # BLD: 0 K/UL (ref 0–0.5)
IMM GRANULOCYTES NFR BLD: 0.2 % (ref 0–5)
INR PPP: 3.5 (ref 0.9–1.2)
LYMPHOCYTES # BLD: 1.1 K/UL (ref 0.5–4.6)
LYMPHOCYTES NFR BLD: 22 % (ref 13–44)
MCH RBC QN AUTO: 32.2 PG (ref 26.1–32.9)
MCHC RBC AUTO-ENTMCNC: 30.4 G/DL (ref 31.4–35)
MCV RBC AUTO: 105.9 FL (ref 79.6–97.8)
MONOCYTES # BLD: 0.2 K/UL (ref 0.1–1.3)
MONOCYTES NFR BLD: 4 % (ref 4–12)
NEUTS SEG # BLD: 3.8 K/UL (ref 1.7–8.2)
NEUTS SEG NFR BLD: 74 % (ref 43–78)
PLATELET # BLD AUTO: 46 K/UL (ref 150–450)
PMV BLD AUTO: 13 FL (ref 10.8–14.1)
PROTHROMBIN TIME: 38.7 SEC (ref 9.6–12)
RBC # BLD AUTO: 2.63 M/UL (ref 4.23–5.67)
WBC # BLD AUTO: 5.6 K/UL (ref 4.3–11.1)

## 2017-09-29 PROCEDURE — 74011000255 HC RX REV CODE- 255: Performed by: PHYSICAL MEDICINE & REHABILITATION

## 2017-09-29 PROCEDURE — 97530 THERAPEUTIC ACTIVITIES: CPT

## 2017-09-29 PROCEDURE — 74230 X-RAY XM SWLNG FUNCJ C+: CPT

## 2017-09-29 PROCEDURE — 97535 SELF CARE MNGMENT TRAINING: CPT

## 2017-09-29 PROCEDURE — 74011250637 HC RX REV CODE- 250/637: Performed by: PHYSICAL MEDICINE & REHABILITATION

## 2017-09-29 PROCEDURE — 74011636637 HC RX REV CODE- 636/637: Performed by: PHYSICAL MEDICINE & REHABILITATION

## 2017-09-29 PROCEDURE — 85610 PROTHROMBIN TIME: CPT | Performed by: PHYSICAL MEDICINE & REHABILITATION

## 2017-09-29 PROCEDURE — 94640 AIRWAY INHALATION TREATMENT: CPT

## 2017-09-29 PROCEDURE — 94760 N-INVAS EAR/PLS OXIMETRY 1: CPT

## 2017-09-29 PROCEDURE — 36415 COLL VENOUS BLD VENIPUNCTURE: CPT | Performed by: PHYSICAL MEDICINE & REHABILITATION

## 2017-09-29 PROCEDURE — 92611 MOTION FLUOROSCOPY/SWALLOW: CPT

## 2017-09-29 PROCEDURE — 97116 GAIT TRAINING THERAPY: CPT

## 2017-09-29 PROCEDURE — 94660 CPAP INITIATION&MGMT: CPT

## 2017-09-29 PROCEDURE — 82962 GLUCOSE BLOOD TEST: CPT

## 2017-09-29 PROCEDURE — 74011000250 HC RX REV CODE- 250: Performed by: PHYSICAL MEDICINE & REHABILITATION

## 2017-09-29 PROCEDURE — 74011250637 HC RX REV CODE- 250/637: Performed by: INTERNAL MEDICINE

## 2017-09-29 PROCEDURE — 85025 COMPLETE CBC W/AUTO DIFF WBC: CPT | Performed by: PHYSICAL MEDICINE & REHABILITATION

## 2017-09-29 PROCEDURE — 97110 THERAPEUTIC EXERCISES: CPT

## 2017-09-29 PROCEDURE — 77030031642 HC BOOT FT ROOKE HFS OSBM -B

## 2017-09-29 PROCEDURE — 77030011256 HC DRSG MEPILEX <16IN NO BORD MOLN -A

## 2017-09-29 PROCEDURE — 65310000000 HC RM PRIVATE REHAB

## 2017-09-29 RX ORDER — WARFARIN 2 MG/1
2 TABLET ORAL EVERY EVENING
Status: DISCONTINUED | OUTPATIENT
Start: 2017-09-29 | End: 2017-10-01

## 2017-09-29 RX ADMIN — BARIUM SULFATE 15 ML: 400 SUSPENSION ORAL at 08:55

## 2017-09-29 RX ADMIN — FERROUS SULFATE TAB 325 MG (65 MG ELEMENTAL FE) 325 MG: 325 (65 FE) TAB at 09:03

## 2017-09-29 RX ADMIN — PREDNISONE 10 MG: 20 TABLET ORAL at 09:03

## 2017-09-29 RX ADMIN — GUAIFENESIN 1200 MG: 600 TABLET, EXTENDED RELEASE ORAL at 21:25

## 2017-09-29 RX ADMIN — GABAPENTIN 600 MG: 300 CAPSULE ORAL at 05:48

## 2017-09-29 RX ADMIN — INSULIN LISPRO 4 UNITS: 100 INJECTION, SOLUTION INTRAVENOUS; SUBCUTANEOUS at 21:24

## 2017-09-29 RX ADMIN — GUAIFENESIN 1200 MG: 600 TABLET, EXTENDED RELEASE ORAL at 09:02

## 2017-09-29 RX ADMIN — BARIUM SULFATE 90 ML: 980 POWDER, FOR SUSPENSION ORAL at 08:54

## 2017-09-29 RX ADMIN — AMIODARONE HYDROCHLORIDE 200 MG: 200 TABLET ORAL at 09:06

## 2017-09-29 RX ADMIN — AMIODARONE HYDROCHLORIDE 200 MG: 200 TABLET ORAL at 21:25

## 2017-09-29 RX ADMIN — ALBUTEROL SULFATE 2.5 MG: 2.5 SOLUTION RESPIRATORY (INHALATION) at 18:00

## 2017-09-29 RX ADMIN — PREDNISONE 20 MG: 20 TABLET ORAL at 09:05

## 2017-09-29 RX ADMIN — ENALAPRIL MALEATE 5 MG: 5 TABLET ORAL at 09:02

## 2017-09-29 RX ADMIN — BUDESONIDE 500 MCG: 0.5 INHALANT RESPIRATORY (INHALATION) at 17:55

## 2017-09-29 RX ADMIN — ALBUTEROL SULFATE 2.5 MG: 2.5 SOLUTION RESPIRATORY (INHALATION) at 06:06

## 2017-09-29 RX ADMIN — INSULIN LISPRO 4 UNITS: 100 INJECTION, SOLUTION INTRAVENOUS; SUBCUTANEOUS at 17:07

## 2017-09-29 RX ADMIN — TRAZODONE HYDROCHLORIDE 50 MG: 50 TABLET ORAL at 21:37

## 2017-09-29 RX ADMIN — FUROSEMIDE 20 MG: 20 TABLET ORAL at 09:03

## 2017-09-29 RX ADMIN — POTASSIUM CHLORIDE 20 MEQ: 20 TABLET, EXTENDED RELEASE ORAL at 09:06

## 2017-09-29 RX ADMIN — FAMOTIDINE 20 MG: 20 TABLET ORAL at 17:09

## 2017-09-29 RX ADMIN — FAMOTIDINE 20 MG: 20 TABLET ORAL at 09:04

## 2017-09-29 RX ADMIN — WARFARIN SODIUM 2 MG: 2 TABLET ORAL at 17:08

## 2017-09-29 RX ADMIN — METOPROLOL SUCCINATE 100 MG: 100 TABLET, EXTENDED RELEASE ORAL at 21:25

## 2017-09-29 RX ADMIN — GABAPENTIN 600 MG: 300 CAPSULE ORAL at 21:25

## 2017-09-29 RX ADMIN — GABAPENTIN 600 MG: 300 CAPSULE ORAL at 14:14

## 2017-09-29 RX ADMIN — METOPROLOL SUCCINATE 100 MG: 100 TABLET, EXTENDED RELEASE ORAL at 09:05

## 2017-09-29 RX ADMIN — BUDESONIDE 500 MCG: 0.5 INHALANT RESPIRATORY (INHALATION) at 06:06

## 2017-09-29 NOTE — PROGRESS NOTES
Warfarin dosing per pharmacist    Darien Dubois is a 67 y.o. male. Indication: A. Fib    Goal INR:  2-3    Home dose:  5 mg daily, 7.5 mg on Wednesday    Risk factors or significant drug interactions:  Antibiotics, prednisone    Other anticoagulants:  none    Daily Monitoring  Date  INR     Warfarin dose HGB              Notes  9/8  1.2  10mg  11.2    9/9  1.3  10mg  9.4  9/10  ---  10mg  9.7  9/11  2.1  10mg  10.8  9/12  2.9  10mg  8.9  9/13  3.5  Hold  8.4  Pharmacy Consulted  9/14  3.6  Held  8.5  9/15  2.5  5 mg  8.7  9/16  1.9  6 mg  10.8  9/17  2.2  6 mg  9.3  9/18  2.4  6 mg  --  9/19  3.2  Hold  9.1  9/20  3.4  Hold  8.8  9/21  2.3  5 mg  8.1  9/22  1.4  5 mg  ---  9/23  1.4  5 mg  8.9  9/24  1.6  5 mg  7.9  9/25  1.9  5 mg  8.0  9/26  2.6  2.5 mg  ---  9/27  2.6  5 mg  8.4  9/28  2.8  5 mg  8.4  9/29  3.5  2 mg  8.5    INR jump to 3.5. Decrease dose to 2 mg this evening with jump to upper limit of therapeutic range. Regimen looks to be an alternating dose of 5 and 2.5 mg currently. Following daily INR. Thank you,  Maty Baptiste, Pharm. D.   Clinical Pharmacist  [phone]

## 2017-09-29 NOTE — PROGRESS NOTES
Spoke to Rob Donis at [place] regarding TTB for pt. He is attempting to get an order from one of there MDs. Faxed OT note to justify need.

## 2017-09-29 NOTE — PROGRESS NOTES
PHYSICAL THERAPY DAILY NOTE  Time In: 5443  Time Out: 7462  Patient Seen For: AM;Equipment assessment;Gait training;Patient education; Therapeutic exercise;Transfer training    Subjective: \"I want one of those (pointing to rollator). \" Patient agreeable to therapy. Objective: Vital Signs:   Patient Vitals for the past 8 hrs:   Temp Pulse Resp BP SpO2   09/29/17 0717 97.6 °F (36.4 °C) 80 16 130/66 92 %           Pain level: No pain reported. Pain location: n/a  Pain interventions: n/a    Patient education: Educated patient on safety with rollator and functions of brakes for safety. Interdisciplinary Communication: Communicated with Zahraa Gomez OT regarding patient's POC. Contact (MRSA)  GROSS ASSESSMENT Daily Assessment            BED/MAT MOBILITY Daily Assessment    Rolling Right : 0 (Not tested)  Rolling Left : 0 (Not tested)  Supine to Sit : 0 (Not tested)  Sit to Supine : 0 (Not tested)       TRANSFERS Daily Assessment   Required for safety. Transfer Type: SPT with walker  Transfer Assistance : 5 (Stand-by assistance)  Sit to Stand Assistance: Stand-by assistance  Car Transfers: Not tested       GAIT Daily Assessment   Patient ambulated with rollator x1 demonstrating increased postural sway and decreased balance with ambulation. Patient utilized RW for further gait training with increased upright posture and improved balance and control of ambulation.  Amount of Assistance: 5 (Stand-by assistance)  Distance (ft): 100 Feet (ft) (x5)  Assistive Device: Walker, rolling;Gait belt (1x with rollator)       STEPS or STAIRS Daily Assessment            BALANCE Daily Assessment    Sitting - Static: Good (unsupported)  Sitting - Dynamic: Good (unsupported)  Standing - Static: Fair  Standing - Dynamic : Impaired       WHEELCHAIR MOBILITY Daily Assessment            LOWER EXTREMITY EXERCISES Daily Assessment    Extremity: Both  Exercise Type #1: Seated lower extremity strengthening  Sets Performed: 1  Reps Performed: 15  Level of Assist: Supervision     Patient performed the following B LE exercises:  SEATED EXERCISES Sets Reps Comments   Ankle Pumps 1 15    Hip Flexion 1 15    Long Arc Quads 1 15    Hip Adduction/Ball Squeeze 1 15    Hip Abduction with green Theraband 1 15    Hamstring Curls with green Theraband 1 15    Hip Extension with green Theraband 1 15      Patient returned to room sitting in w/c with all needs in reach. Assessment: Patient demonstrated decreased ability to use rollator per request of patient. PT previously instructed patient that rollator did not provide enough stability for safe ambulation secondary to decreased balance and endurance of B LE. Patient's gait mechanics and safety improved with use of RW, patient agreeable to use of RW for ambulation. Patient will benefit from further therapy to increase independence with transfers and household ambulation. Plan of Care: Continue with POC and progress as tolerated.        Gabino Armstrong  9/29/2017

## 2017-09-29 NOTE — PROGRESS NOTES
OT Daily Note    Time In 1031   Time Out 1116     Subjective: \"I make the coffee at home. \"  Pain: None    Precautions: Contact (MRSA)    Self-Care   Patient transferred <> toilet ambulating with RW with CGA. Patient completed toileting with SBA for standing balance for clothing management. Washed hands standing at sink with SBA. Home Management   Patient educated regarding environmental organization, safe item transport, and functional mobility for kitchen management ambulating with RW at discharge. Patient verbalized and demonstrated understanding, completing hot beverage prep in kitchen to retrieve items from drawers and high cabinet to prepare coffee ambulating with RW with CGA and transporting items appropriately, cues for recall. Activity Tolerance   Patient completed standing trial at elevated tray table for BUE endurance/cognitive task. Patient transferred sit to stand and maintained standing balance with SBA approximately 6 minutes before time expires this session. Patient completed pipe tree constructions according to visual patterns with 100% accuracy provided increased time, note patient self-corrects for minimal mistakes, using BUEs during standing trail. Assessment: Patient tolerated well    Education: Purpose of therapy  Interdisciplinary Communication: Collaborated with Kimberley Gill and agreed patient is progressing well and on-track to meet goals as stated in POC. Plan: Continue to address ADL/IADL, functional mobility, activity tolerance, balance, strengthening, coordination, cognition.       Rae Valencia, OTR/L

## 2017-09-29 NOTE — PROGRESS NOTES
[facility] CARDIOLOGY PROGRESS NOTE           9/29/2017 10:55 AM    Admit Date: 9/13/2017      Subjective:     Patient feels as if breathing has improved. Review of Systems   Constitutional: Negative for fever. HENT: Negative for ear pain. Respiratory: Negative for cough. Musculoskeletal: Negative for falls. Skin: Negative for rash. Psychiatric/Behavioral: The patient is not nervous/anxious. Objective:      Vitals:    09/28/17 1939 09/28/17 2109 09/28/17 2145 09/29/17 0717   BP: 134/69 134/69  130/66   Pulse: 63 63  80   Resp: 16   16   Temp: 97.5 °F (36.4 °C)   97.6 °F (36.4 °C)   SpO2: 95%  95% 92%     Past Surgical History:   Procedure Laterality Date    HX AORTIC VALVE REPLACEMENT N/A 2000    Aortic Valve Replacement w/ Mechanical Valve    HX HEART CATHETERIZATION  07/21/2004    [facility]         Physical Exam   Constitutional: He is oriented to person, place, and time. He appears well-developed. HENT:   Head: Normocephalic and atraumatic. Eyes: Pupils are equal, round, and reactive to light. Neck: Normal range of motion. Cardiovascular: Normal rate. Murmur heard. Pulmonary/Chest: Effort normal.   Abdominal: Soft. He exhibits no distension. There is no tenderness. Musculoskeletal: He exhibits no edema. Neurological: He is alert and oriented to person, place, and time. No cranial nerve deficit. Skin: Skin is warm and dry. No rash noted. No erythema. Psychiatric: He has a normal mood and affect.  His behavior is normal.       Data Review:   Recent Labs      09/28/17   0553  09/27/17   0550   NA  143   --    K  3.8   --    MG  1.9   --    BUN  22   --    CREA  0.92   --    GLU  111*   --    WBC  6.7  3.1*   HGB  8.4*  8.4*   HCT  27.3*  27.2*   PLT  61*  65*   INR  2.8*  2.6*         Intake/Output Summary (Last 24 hours) at 09/29/17 0776  Last data filed at 09/28/17 1419   Gross per 24 hour   Intake              240 ml   Output                0 ml   Net 240 ml     Current Facility-Administered Medications   Medication Dose Route Frequency    simethicone (MYLICON) tablet 80 mg  80 mg Oral QID PRN    warfarin (COUMADIN) tablet 5 mg - pharmacy dosing  5 mg Oral QPM    amiodarone (CORDARONE) tablet 200 mg  200 mg Oral BID    enalapril (VASOTEC) tablet 5 mg  5 mg Oral DAILY    0.9% sodium chloride infusion 250 mL  250 mL IntraVENous PRN    albuterol CONCENTRATE 2.5mg/0.5 mL neb soln  2.5 mg Nebulization BID    budesonide (PULMICORT) 500 mcg/2 ml nebulizer suspension  500 mcg Nebulization BID RT    nitroglycerin (NITROSTAT) tablet 0.4 mg  0.4 mg SubLINGual Q5MIN PRN    ondansetron (ZOFRAN) injection 4 mg  4 mg IntraVENous Q4H PRN    NUTRITIONAL SUPPORT ELECTROLYTE PRN ORDERS   Does Not Apply PRN    acetaminophen (TYLENOL) suppository 650 mg  650 mg Rectal Q4H PRN    acetaminophen (TYLENOL) tablet 650 mg  650 mg Oral Q4H PRN    albuterol (PROVENTIL HFA, VENTOLIN HFA, PROAIR HFA) inhaler 2 Puff  2 Puff Inhalation Q4H PRN    albuterol (PROVENTIL VENTOLIN) nebulizer solution 2.5 mg  2.5 mg Nebulization Q4H PRN    dextrose (D50W) injection syrg 25 g  25 g IntraVENous PRN    ferrous sulfate tablet 325 mg  1 Tab Oral DAILY WITH BREAKFAST    gabapentin (NEURONTIN) capsule 600 mg  600 mg Oral TID    guaiFENesin ER (MUCINEX) tablet 1,200 mg  1,200 mg Oral Q12H    insulin lispro (HUMALOG) injection   SubCUTAneous AC&HS    povidone-iodine (BETADINE) 10 % topical solution   Topical DAILY    predniSONE (DELTASONE) tablet 10 mg  10 mg Oral DAILY WITH BREAKFAST    predniSONE (DELTASONE) tablet 20 mg  20 mg Oral DAILY WITH BREAKFAST    tiotropium (SPIRIVA) inhalation capsule 18 mcg  1 Cap Inhalation DAILY    traZODone (DESYREL) tablet 50 mg  50 mg Oral QHS PRN    influenza vaccine 2017-18 (3 yrs+)(PF) (FLUZONE QUAD/FLUARIX QUAD) injection 0.5 mL  0.5 mL IntraMUSCular PRIOR TO DISCHARGE    0.9% sodium chloride infusion 250 mL  250 mL IntraVENous PRN    furosemide (LASIX) tablet 20 mg  20 mg Oral DAILY    potassium chloride (K-DUR, KLOR-CON) SR tablet 20 mEq  20 mEq Oral DAILY    metoprolol succinate (TOPROL-XL) tablet 100 mg  100 mg Oral BID    famotidine (PEPCID) tablet 20 mg  20 mg Oral BID    0.9% sodium chloride infusion 250 mL  250 mL IntraVENous PRN           Assessment/Plan:       1. Cardiomyopathy last EF 40% 8/10/2017 previous treatment with carvedilol and ACE inhibitor/ARB held due to previous hypotension infection etc.  Plan to add low-dose lisinopril 9/29/2017. ow-dose Lasix  2. Atrial fibrillation previous bradycardia now on amiodarone and Cardizem diltiazem discontinued previous rates of 40 bpm 9/28/2017 now rates of improved with lowest heart rate overnight 60 bpm no AV block. Continue to monitor on telemetry  3. Mechanical valve in aortic position previous bridging with heparin last INR 2.8 pharmacy consultation placed for Coumadin dosing monitoring. Sonya Marina MD  9/29/2017 10:55 AM

## 2017-09-29 NOTE — PROGRESS NOTES
09/29/17 0909   Mental Status   Neurologic State Alert   Orientation Level Oriented X4   Cognition Appropriate decision making   Perception Appears intact   Perseveration No perseveration noted   Safety/Judgement Fall prevention   Oral Assessment   Labial No impairment   Dentition Upper & lower dentures   Oral Hygiene adequate   Lingual Decreased rate   Swallowing Study Trial   Type of Study Modified barium swallow   Film Views Lateral;Fluoro   Radiologist [place]   Patient Position up in chair   Consistency Presented Mixed consistency; Nectar thick liquid;Pudding; Solid; Thin liquid   How Presented Self-fed/presented;SLP-fed/presented;Cup/sip;Spoon;Straw   Bolus Acceptance No impairment   Bolus Formation/Control Impaired   Type of Impairment Mastication   Propulsion No impairment   Oral Residue None   Initiation of Swallow Triggered at vallecula   Timing Pooling 1-5 sec   Penetration During swallow   Aspiration/Timing No evidence of aspiration   Pharyngeal Clearance Less than 10%   Attempted Modifications Chin tuck   Effective Modifications Chin tuck   Swallowing Physiology   Decreased Tongue Base Retraction? No   Laryngeal Elevation Inadequate epiglottic inversion;Minimal movement of larynx/epiglottis   Aspiration/Penetration Score 2 (Penetration/No residue-Contrast enters the airway penetrates, remains above the folds/cords, and is cleared)   Pharyngeal Symmetry Not assessed   Pharyngeal-Esophageal Segment No impairment   Pharyngeal Dysfunction Decreased strength   Findings   Oral Phase Severity Mild   Pharyngeal Phase Severity Mild   Treatment Diagnosis   Treatment Diagnosis R13.12 oropharyngeal phase dysphagia   Pt completed a Modified barium swallow study with recommendations of a diet upgrade to mechanical soft textures with thin liquids with medications whole in pureed. Pt must use the safe swallow compensatory strategy of a chin tuck.     Pt with a delayed swallow down to the valleculae with all presented trials. Pt with penetration during the swallow with thin liquids. The safe swallow compensatory strategy of a chin tuck was effective in protecting the airway with thin liquids. Pt tolerated pureed, mixed and solid. Increased/slow mastication with chewable textures. Pt with minimal to mild valleculae residue with all consistencies. Recommend mechanical soft textures with thin liquids with a chin tuck with medications whole in pureed. Will follow for dysphagia management.      Time in 830  Time out Yaw MonrealJacobson MA/DIANA/SLP

## 2017-09-29 NOTE — PROGRESS NOTES
SFD PROGRESS NOTE    David Ortiz  Admit Date: 9/13/2017  Admit Diagnosis: Cardiac Debility; Respiratory failure [facility])  Chief Complaint : Gait dysfunction secondary to below. Admit Diagnosis: Atrial flutter with rapid ventricular response (HCC)  Atrial flutter with rapid ventricular response (HCC) (8/23/2017) sp cardioversion  Anticoagulant long-term use  HTN (hypertension)  Peripheral vascular disease   Dyslipidemia   History of mechanical aortic valve replacement 2000, on coumadin  Ischemic cardiomyopathy    Thrombocytopenia/ ITP  Hypoglycemia  COPD  DM II   iron deficiency anemia  hyponatremia  Acute Rehab Dx:  cognitive deficit  Weakness  spasticity  Debility    deconditioning  Mobility and ambulation deficits  Self Care/ADL deficits  Subjective     Vss. Afebrile. Feels well. No difficulty with PT in am. Denies CP, SOB, palpitations. Continues to do well in PT, OT.      Objective:     Current Facility-Administered Medications   Medication Dose Route Frequency    barium sulfate (VARIBAR PUDDING) 40 % (w/v), 30% (w/w) contrast oral paste 30 mL  30 mL Oral RAD ONCE    warfarin (COUMADIN) tablet 2 mg - pharmacy dosing  2 mg Oral QPM    simethicone (MYLICON) tablet 80 mg  80 mg Oral QID PRN    amiodarone (CORDARONE) tablet 200 mg  200 mg Oral BID    enalapril (VASOTEC) tablet 5 mg  5 mg Oral DAILY    0.9% sodium chloride infusion 250 mL  250 mL IntraVENous PRN    albuterol CONCENTRATE 2.5mg/0.5 mL neb soln  2.5 mg Nebulization BID    budesonide (PULMICORT) 500 mcg/2 ml nebulizer suspension  500 mcg Nebulization BID RT    nitroglycerin (NITROSTAT) tablet 0.4 mg  0.4 mg SubLINGual Q5MIN PRN    ondansetron (ZOFRAN) injection 4 mg  4 mg IntraVENous Q4H PRN    NUTRITIONAL SUPPORT ELECTROLYTE PRN ORDERS   Does Not Apply PRN    acetaminophen (TYLENOL) suppository 650 mg  650 mg Rectal Q4H PRN    acetaminophen (TYLENOL) tablet 650 mg  650 mg Oral Q4H PRN    albuterol (PROVENTIL HFA, VENTOLIN HFA, PROAIR HFA) inhaler 2 Puff  2 Puff Inhalation Q4H PRN    albuterol (PROVENTIL VENTOLIN) nebulizer solution 2.5 mg  2.5 mg Nebulization Q4H PRN    dextrose (D50W) injection syrg 25 g  25 g IntraVENous PRN    ferrous sulfate tablet 325 mg  1 Tab Oral DAILY WITH BREAKFAST    gabapentin (NEURONTIN) capsule 600 mg  600 mg Oral TID    guaiFENesin ER (MUCINEX) tablet 1,200 mg  1,200 mg Oral Q12H    insulin lispro (HUMALOG) injection   SubCUTAneous AC&HS    povidone-iodine (BETADINE) 10 % topical solution   Topical DAILY    predniSONE (DELTASONE) tablet 10 mg  10 mg Oral DAILY WITH BREAKFAST    tiotropium (SPIRIVA) inhalation capsule 18 mcg  1 Cap Inhalation DAILY    traZODone (DESYREL) tablet 50 mg  50 mg Oral QHS PRN    influenza vaccine 2017-18 (3 yrs+)(PF) (FLUZONE QUAD/FLUARIX QUAD) injection 0.5 mL  0.5 mL IntraMUSCular PRIOR TO DISCHARGE    0.9% sodium chloride infusion 250 mL  250 mL IntraVENous PRN    furosemide (LASIX) tablet 20 mg  20 mg Oral DAILY    potassium chloride (K-DUR, KLOR-CON) SR tablet 20 mEq  20 mEq Oral DAILY    metoprolol succinate (TOPROL-XL) tablet 100 mg  100 mg Oral BID    famotidine (PEPCID) tablet 20 mg  20 mg Oral BID    0.9% sodium chloride infusion 250 mL  250 mL IntraVENous PRN     Review of Systems:Denies chest pain, shortness of breath, cough, headache, visual problems, abdominal pain, dysurea, calf pain. Pertinent positives are as noted in the medical records and unremarkable otherwise. Visit Vitals    /66    Pulse 80    Temp 97.6 °F (36.4 °C)    Resp 16    SpO2 92%   HR 49-60     Physical Exam:   General: Alert and age appropriately oriented. No acute cardio respiratory distress. Frail appearing   HEENT: Normocephalic,no scleral icterus  Oral mucosa moist without cyanosis   Lungs: Clear to auscultation  bilaterally.   Respiration even and unlabored   Heart: Regular rate and rhythm, S1, S2 , pauses notes  No  murmurs, clicks, rub or gallops   Abdomen: Soft, non-tender, nondistended. Bowel sounds present. No organomegaly. Genitourinary: Benign . Neuromuscular:      Generalized non focal weakness  sens intact   Skin/extremity: No rashes, no erythema. No calf tenderness BLE  Wound left heel ; boggy, red                                                                            Functional Assessment:          Balance  Sitting - Static: Good (unsupported) (09/28/17 1400)  Sitting - Dynamic: Good (unsupported) (09/28/17 1400)  Standing - Static: Fair (09/28/17 1400)  Standing - Dynamic : Impaired (09/28/17 1400)           Toileting  Adaptive Equipment: Grab bars; Walker (09/27/17 1513)         Kirby Palacios Fall Risk Assessment:  Kirby Palacios Fall Risk  Mobility: Ambulates or transfers with assist devices or assistance/unsteady gait (09/28/17 1946)  Mobility Interventions: Patient to call before getting OOB (09/28/17 1946)  Mentation: Alert, oriented x 3 (09/28/17 1946)  Mentation Interventions: Toileting rounds (09/28/17 1946)  Medication: Patient receiving anticonvulsants, sedatives(tranquilizers), psychotropics or hypnotics, hypoglycemics, narcotics, sleep aids, antihypertensives, laxatives, or diuretics (09/28/17 1946)  Medication Interventions: Patient to call before getting OOB (09/28/17 1946)  Elimination: Needs assistance with toileting (09/28/17 1946)  Elimination Interventions: Call light in reach; Patient to call for help with toileting needs (09/28/17 1946)  Prior Fall History: During admission (Date - Comment) (09/28/17 1946)  History of Falls Interventions: Door open when patient unattended (09/28/17 1946)  Total Score: 5 (09/28/17 1946)  Standard Fall Precautions: Yes (09/27/17 2251)  High Fall Risk: Yes (09/28/17 1519)     Speech Assessment:  Aspiration Signs/Symptoms: None (09/14/17 1036)      Ambulation:  Gait  Distance (ft): 125 Feet (ft) (x1, 50' x1) (09/28/17 1400)  Assistive Device: Walker, rolling;Gait belt (09/28/17 1400)  Rail Use: Both (09/28/17 1215) Labs/Studies:  Recent Results (from the past 72 hour(s))   GLUCOSE, POC    Collection Time: 09/26/17  4:25 PM   Result Value Ref Range    Glucose (POC) 197 (H) 65 - 100 mg/dL   GLUCOSE, POC    Collection Time: 09/26/17  9:03 PM   Result Value Ref Range    Glucose (POC) 202 (H) 65 - 100 mg/dL   PROTHROMBIN TIME + INR    Collection Time: 09/27/17  5:50 AM   Result Value Ref Range    Prothrombin time 28.7 (H) 9.6 - 12.0 sec    INR 2.6 (H) 0.9 - 1.2     CBC WITH AUTOMATED DIFF    Collection Time: 09/27/17  5:50 AM   Result Value Ref Range    WBC 3.1 (L) 4.3 - 11.1 K/uL    RBC 2.60 (L) 4.23 - 5.67 M/uL    HGB 8.4 (L) 13.6 - 17.2 g/dL    HCT 27.2 (L) 41.1 - 50.3 %    .7 (H) 79.6 - 97.8 FL    MCH 32.2 26.1 - 32.9 PG    MCHC 30.8 (L) 31.4 - 35.0 g/dL    RDW 25.2 (H) 11.9 - 14.6 %    PLATELET 65 (L) 641 - 450 K/uL    MPV 12.4 10.8 - 14.1 FL    DF AUTOMATED      NEUTROPHILS 54 43 - 78 %    LYMPHOCYTES 38 13 - 44 %    MONOCYTES 7 4.0 - 12.0 %    EOSINOPHILS 1 0.5 - 7.8 %    BASOPHILS 0 0.0 - 2.0 %    IMMATURE GRANULOCYTES 0.0 0.0 - 5.0 %    ABS. NEUTROPHILS 1.5 (L) 1.7 - 8.2 K/UL    ABS. LYMPHOCYTES 1.0 0.5 - 4.6 K/UL    ABS. MONOCYTES 0.2 0.1 - 1.3 K/UL    ABS. EOSINOPHILS 0.0 0.0 - 0.8 K/UL    ABS. BASOPHILS 0.0 0.0 - 0.2 K/UL    ABS. IMM.  GRANS. 0.0 0.0 - 0.5 K/UL   GLUCOSE, POC    Collection Time: 09/27/17  7:18 AM   Result Value Ref Range    Glucose (POC) 123 (H) 65 - 100 mg/dL   GLUCOSE, POC    Collection Time: 09/27/17 11:08 AM   Result Value Ref Range    Glucose (POC) 104 (H) 65 - 100 mg/dL   GLUCOSE, POC    Collection Time: 09/27/17  4:32 PM   Result Value Ref Range    Glucose (POC) 154 (H) 65 - 100 mg/dL   PROTHROMBIN TIME + INR    Collection Time: 09/28/17  5:53 AM   Result Value Ref Range    Prothrombin time 31.2 (H) 9.6 - 12.0 sec    INR 2.8 (H) 0.9 - 1.2     CBC WITH AUTOMATED DIFF    Collection Time: 09/28/17  5:53 AM   Result Value Ref Range    WBC 6.7 4.3 - 11.1 K/uL    RBC 2.59 (L) 4.23 - 5.67 M/uL HGB 8.4 (L) 13.6 - 17.2 g/dL    HCT 27.3 (L) 41.1 - 50.3 %    .5 (H) 79.6 - 97.8 FL    MCH 32.3 26.1 - 32.9 PG    MCHC 30.6 (L) 31.4 - 35.0 g/dL    RDW 24.5 (H) 11.9 - 14.6 %    PLATELET 61 (L) 464 - 450 K/uL    MPV 11.0 10.8 - 14.1 FL    DF AUTOMATED      NEUTROPHILS 82 (H) 43 - 78 %    LYMPHOCYTES 15 13 - 44 %    MONOCYTES 3 (L) 4.0 - 12.0 %    EOSINOPHILS 0 (L) 0.5 - 7.8 %    BASOPHILS 0 0.0 - 2.0 %    IMMATURE GRANULOCYTES 0.3 0.0 - 5.0 %    ABS. NEUTROPHILS 4.9 1.7 - 8.2 K/UL    ABS. LYMPHOCYTES 0.9 0.5 - 4.6 K/UL    ABS. MONOCYTES 0.2 0.1 - 1.3 K/UL    ABS. EOSINOPHILS 0.0 0.0 - 0.8 K/UL    ABS. BASOPHILS 0.0 0.0 - 0.2 K/UL    ABS. IMM.  GRANS. 0.0 0.0 - 0.5 K/UL   METABOLIC PANEL, BASIC    Collection Time: 09/28/17  5:53 AM   Result Value Ref Range    Sodium 143 136 - 145 mmol/L    Potassium 3.8 3.5 - 5.1 mmol/L    Chloride 104 98 - 107 mmol/L    CO2 34 (H) 21 - 32 mmol/L    Anion gap 5 (L) 7 - 16 mmol/L    Glucose 111 (H) 65 - 100 mg/dL    BUN 22 8 - 23 MG/DL    Creatinine 0.92 0.8 - 1.5 MG/DL    GFR est AA >60 >60 ml/min/1.73m2    GFR est non-AA >60 >60 ml/min/1.73m2    Calcium 8.0 (L) 8.3 - 10.4 MG/DL   MAGNESIUM    Collection Time: 09/28/17  5:53 AM   Result Value Ref Range    Magnesium 1.9 1.8 - 2.4 mg/dL   GLUCOSE, POC    Collection Time: 09/28/17  6:07 AM   Result Value Ref Range    Glucose (POC) 123 (H) 65 - 100 mg/dL   GLUCOSE, POC    Collection Time: 09/28/17 11:10 AM   Result Value Ref Range    Glucose (POC) 104 (H) 65 - 100 mg/dL   GLUCOSE, POC    Collection Time: 09/28/17  3:49 PM   Result Value Ref Range    Glucose (POC) 233 (H) 65 - 100 mg/dL   GLUCOSE, POC    Collection Time: 09/28/17  8:46 PM   Result Value Ref Range    Glucose (POC) 217 (H) 65 - 100 mg/dL   GLUCOSE, POC    Collection Time: 09/29/17  6:28 AM   Result Value Ref Range    Glucose (POC) 80 65 - 100 mg/dL   PROTHROMBIN TIME + INR    Collection Time: 09/29/17  7:21 AM   Result Value Ref Range    Prothrombin time 38.7 (H) 9.6 - 12.0 sec    INR 3.5 (H) 0.9 - 1.2     CBC WITH AUTOMATED DIFF    Collection Time: 09/29/17  7:21 AM   Result Value Ref Range    WBC 5.6 4.3 - 11.1 K/uL    RBC 2.63 (L) 4.23 - 5.67 M/uL    HGB 8.5 (L) 13.6 - 17.2 g/dL    HCT 27.8 (L) 41.1 - 50.3 %    .9 (H) 79.6 - 97.8 FL    MCH 32.2 26.1 - 32.9 PG    MCHC 30.4 (L) 31.4 - 35.0 g/dL    RDW 24.6 (H) 11.9 - 14.6 %    PLATELET 46 (L) 492 - 450 K/uL    MPV 13.0 10.8 - 14.1 FL    DF AUTOMATED      NEUTROPHILS 74 43 - 78 %    LYMPHOCYTES 22 13 - 44 %    MONOCYTES 4 4.0 - 12.0 %    EOSINOPHILS 0 (L) 0.5 - 7.8 %    BASOPHILS 0 0.0 - 2.0 %    IMMATURE GRANULOCYTES 0.2 0.0 - 5.0 %    ABS. NEUTROPHILS 3.8 1.7 - 8.2 K/UL    ABS. LYMPHOCYTES 1.1 0.5 - 4.6 K/UL    ABS. MONOCYTES 0.2 0.1 - 1.3 K/UL    ABS. EOSINOPHILS 0.0 0.0 - 0.8 K/UL    ABS. BASOPHILS 0.0 0.0 - 0.2 K/UL    ABS. IMM.  GRANS. 0.0 0.0 - 0.5 K/UL       Assessment:     Problem List as of 9/29/2017  Date Reviewed: 9/9/2017          Codes Class Noted - Resolved    Respiratory failure ([facility] ICD-10-CM: J96.90  ICD-9-CM: 518.81  9/13/2017 - Present        MRSA nasal colonization ICD-10-CM: Z22.322  ICD-9-CM: V02.54  9/8/2017 - Present        COPD (chronic obstructive pulmonary disease) (HCC) (Chronic) ICD-10-CM: J44.9  ICD-9-CM: 496  8/30/2017 - Present        Acute respiratory failure with hypercapnia ([facility] ICD-10-CM: J96.02  ICD-9-CM: 518.81  8/29/2017 - Present        History of tobacco use (Chronic) ICD-10-CM: E24.028  ICD-9-CM: V15.82  8/29/2017 - Present        Hyponatremia ICD-10-CM: E87.1  ICD-9-CM: 276.1  8/29/2017 - Present        Acute encephalopathy ICD-10-CM: G93.40  ICD-9-CM: 348.30  8/29/2017 - Present        Thrombocytopenia ([address] ICD-10-CM: D69.6  ICD-9-CM: 287.5  8/26/2017 - Present        Anticoagulant long-term use (Chronic) ICD-10-CM: Z79.01  ICD-9-CM: V58.61  8/23/2017 - Present        HTN (hypertension), benign (Chronic) ICD-10-CM: I10  ICD-9-CM: 401.1  8/23/2017 - Present Peripheral vascular disease ([facility] (Chronic) ICD-10-CM: I73.9  ICD-9-CM: 443.9  8/23/2017 - Present        Dyslipidemia (Chronic) ICD-10-CM: E78.5  ICD-9-CM: 272.4  8/23/2017 - Present        History of mechanical aortic valve replacement (Chronic) ICD-10-CM: Z95.2  ICD-9-CM: V43.3  8/23/2017 - Present    Overview Signed 8/30/2017 10:30 AM by Diamantina Osgood, NP     Placement 2000, on chronicCoumadin             Centrilobular emphysema ([facility] (Chronic) ICD-10-CM: J43.2  ICD-9-CM: 492.8  8/23/2017 - Present    Overview Signed 9/8/2017  9:24 AM by Billy London NP      With last PFTs FVC 1.70 L or 44% predicted, FEV1 0.86 L or 29% predicted, FEV1/FVC 51%. This study was a postbronchodilator study performed at the [facility] in [facility] [place] on 8/22/2016                  Ischemic cardiomyopathy (Chronic) ICD-10-CM: I25.5  ICD-9-CM: 414.8  8/23/2017 - Present    Overview Signed 8/30/2017 10:29 AM by Diamantina Osgood, NP     8/23/17 ECHO:  EF 40 % to 45 %. There were no regional wall motion abnormalities. Moderate hypokinesis of the mid-apical anterior and apical wall(s). Atrial flutter with rapid ventricular response (HCC) ICD-10-CM: I48.92  ICD-9-CM: 427.32  8/23/2017 - Present        LV dysfunction (Chronic) ICD-10-CM: I51.9  ICD-9-CM: 429.9  10/19/2016 - Present        Atherosclerosis of native arteries of the extremities with intermittent claudication (Chronic) ICD-10-CM: Y46.854  ICD-9-CM: 440.21  4/15/2014 - Present              Assessment/ plan:     Debility s/p resp failure, a flutter with RVR s/p cardioversion    Continue daily physician medical management:      Atrial flutter with rapid ventricular response ([facility] (8/23/2017) sp cardioversion - monitor HR./ BP. / hx of CHF / Ischemic cardiomyopathy     - Digoxin, coreg.  - continue anticoagulation. Continued on adjustment daily for INR control. pharmacy to dose  - cardiac precautions.   - (9/16) -recurrent a.fib with RVR. HR 130s.  9/20 continues; metoprolol added 9/19; PT UNABLE TO PARTICIPATE IN THERAPIES. SHOULD BE TRANSFERRED TO TELEMETRY TO MANAGE CARDIAC ISSUES SO AS NOT TO USE OF ACUTE REHAB DAYS. -9/21 will try to encourage participation in therapies today. Wouldn't even do ST at bedside yesterday. Will put parameters on when to hold therapies due to tachyc.   -9/22 s/p successful cardioversion by Dr Sarah Beth Ramirez; now in NSR, HR 65; restart full therapies (encourage participation); restart Coumadin 5mg daily; 9/25 INR 1.6 ( goal 2-3)  -HR 40-69; ? Decreasing beta blk since weve gone up on amio and now s/p cardioversion; will defer to cardiology  HR 60 after ambulation; which is actually the best he's done in therapy in a long time. - 9/25  In Sinus. Mild bradycardia. Discontinued diltiazem due to low HR.   - 9/29 Continue to monitor on telemetry. HR in control. Cardiology following. Appreciated.           - restarted diuretics, as was taking at home. - CxR 9/16 very small bilateral pleural effusions are stable.    - 9/19-  possible cardioversion, AVN ablation/pacemaker in am per Cardiology/EP    -9/20 HOLD proc due to elevated INR, cont to hold coumadin ; 9/21 INR 2.3 from 3.4; restart coumadin after procedure today if ok with cardiology  9-23 coumadin restarted 9/22 9/29 - warfarin management. INR therapeutic.       History of mechanical aortic valve replacement 2000, on coumadin/ Anticoagulant long-term use. Goal 2.5-3.5.  - resume warfarin, monitor INR.   - INR 2.4 (9/18) . average out home dose ~> 6mg hs.   - Warfarin 6mg HS (9/16)  - pharmacy assisting. On hold(9/19) for procedure.   -9/21 INR now finally trending down; 2.3 from 3.4 yesterday;off coumadin; likely restart Coumadin after procedure  -9/22 restart Coumadin 5mg daily  - 9/29 - Continue to monitor on telemetry      Bacteremia - started on empiric antibiotic treatment. Aztreonam, vanco.   - BCx grew e.coli. discontinued vano.    - final ID extended-spectrum beta-lactamases (ESBLs) , appears treated, though efficacy of vanco, aztreonam not optimal.  . Pt asymptomatic, WBC wnl. Will discontinue - aztreonam. .  - will monitor closely. - will have on contact precaution, infection control notified.   - BCx redrawn 9/17, no growth, will lift contact precautions 9/19.       HTN (hypertension) - monitor.  - continue Coreg/ digoxin  -9/21 cardizem and lasix ; now off Coreg per cardiology for rate control but now bradycardia; really no rhyme or reason to his fluctuating HR; BP stable  -9/23 -162 acceptable  - 9/25 - SBP. Appears in control.   - BP in control. Monitor.           Thrombocytopenia/ ITP  - continued on steroids. - hematology following.   - HIT negative. - ivig per hematology direction. Will receive 1 more dose. -  received platelet 1unit (5/99). plt 25k -> 75k (9/16)-> 65k (9/17) -> 68 (9/19) monitor; 9/20 plts 52; defer to hematology  -9/21 plts 36; pending 9/22; 19k; will give one unit plts  -9/23 plts 46; want to keep above 50K , thus one unit today. ? Nplate  -1/92 81Y after one unit yesterday; monitor; patient pancytopenic; Hematology to f/u  - 9/29 - plt - 46. Hematology following.       COPD   - continue respiratory treatments. Presently q6h, wean as appropriate.    - resume spiriva, pulmicort  - SOB with activity. - normal CxR (9/15)  - will wean albuterol nebs 9/18. Bid and as needed. -9/20 SOB; sats 95% RA, bilateral crackles, increasing JIM; likely due to tachycardia; will f/u portable CXR; weaning steroids  -9/21 CXR; shows no significant change in small right pleural effusion and adjacent mild right basilar atelectasis/infiltrate. Continue to enc IS. bipap at noc; sats 95% RA  -9/23 wean O2 s/p cardioversion; 100% sats 2 L NC  -9/23 was weaned from 3L to 1L; sats 98%; BiPap at noc  - 9/25 continue BiPAP HS, no acute flairs.      Dysphagia; still req NTL, mech soft 9/24 ; ST to follow up          iron deficiency anemia -hgb 8.5-> 8.7(9/15)-. 10.8(9/18) -> 9.3-> 9.1 (9/19) Monitor. Continue fe supplements.   -9/20 hgb 8.8; no evidence of acute bleed; hgb 8.1 9/21; check stool  -9/21 pancytopenic, WBC has dropped  as well; monitor for now; hgb 8.1  -9/23 hbg 8.9 ; macrocytosis; B12 and folate wnl  -no evidence of bleed. continue to monitor. Hematology following/       Hyponatremia  - monitor. 139 on 9/22      Pneumonia prophylaxis- Insentive spirometer every hour while awake      DVT risk / DVT Prophylaxis- Will require daily physician exam to assess for signs and symptoms as patient is at increased risk for of thromboembolism. Mobilization as tolerated. Intermittent pneumatic compression devices when in bed Thigh-high or knee-high thromboembolic deterrent hose when out of bed.    - covered with warfarin.   - no signs of DVT.     Pain Control: mild joint symptoms intermittently, reasonably well controlled by PRN meds. - cont. regular pain assessment and comprenhensive pain management. - resume gabapentin.       Wound Care: Monitor wound status daily per staff and physician. At risk for failure. Will require 24/7 rehab nursing. Keep wound clean and dry  - excoriation at bridge of nose. Continue antibiotic ointment.   -left heel boggy, erythema; order rooke boot; floating heel not working      Diabetes mellitus - Uncontrolled. poor glycemic control. Will require daily, close FSG monitoring and medication adjustment to optimize glycemic control in setting of acute illness and hospitalization.   - SSI coverage with lispro. Poorly controlled , exacerbated by steroids  -9/21 a bit better controlled, likely due to poor po intake  - 9/22 BS varied greatly 80s to 350s; consider glucotrol  - 9/24 better control. 70s to 170  - 9/25 serum glucose better this morning.  Monitor.        bowel program - colace as needed.      Gi prophylaxis; change pepcid to po 9/20              Time spent was 25 minutes with over 1/2 in direct patient care/examination, consultation and coordination of care.      Signed By: Salas Paul MD     September 29, 2017

## 2017-09-29 NOTE — PROGRESS NOTES
PHYSICAL THERAPY DAILY NOTE  Time In: 1300  Time Out: 8396  Patient Seen For: PM;Patient education; Therapeutic exercise;Transfer training    Subjective: \"I'm doing alright, just a little tired. \" Patient agreeable to therapy. Objective: Vital Signs:   Patient Vitals for the past 8 hrs:   Temp Pulse Resp BP SpO2   09/29/17 0717 97.6 °F (36.4 °C) 80 16 130/66 92 %         Pain level: No pain reported. Pain location: n/a  Pain interventions: n/a    Patient education: Educated patient on [employer] use. Interdisciplinary Communication: Communicated with Mar García OT regarding patient's POC. Contact (MRSA)  GROSS ASSESSMENT Daily Assessment            BED/MAT MOBILITY Daily Assessment   Required for safety. Rolling Right : 0 (Not tested)  Rolling Left : 0 (Not tested)  Supine to Sit : 4 (Minimal assistance)  Sit to Supine : 4 (Minimal assistance)       TRANSFERS Daily Assessment   Required for safety. Transfer Type: SPT without device  Transfer Assistance : 4 (Contact guard assistance)  Sit to Stand Assistance: Contact guard assistance  Car Transfers: Not tested       GAIT Daily Assessment           STEPS or STAIRS Daily Assessment   Patient performed stairs with one step a time method for improved safety with use of B rails.  Steps/Stairs Ambulated (#): 4 (x2)  Level of Assist : 5 (Stand-by assistance)  Rail Use: Both       BALANCE Daily Assessment    Sitting - Static: Good (unsupported)  Sitting - Dynamic: Good (unsupported)  Standing - Static: Fair  Standing - Dynamic : Impaired       WHEELCHAIR MOBILITY Daily Assessment            LOWER EXTREMITY EXERCISES Daily Assessment   Patient also performed Motomed x10 minutes on Level 2 Extremity: Both  Exercise Type #1: Supine lower extremity strengthening  Sets Performed: 1  Reps Performed: 15  Level of Assist: Supervision     Patient performed the following B LE exercises:  SUPINE EXERCISES Sets Reps Comments   Ankle Pumps 1 15    Quad Sets 1 15    Glut Sets 1 15    Heel Slides 1 15    Hip Abduction 1 15    Short Arc Quad 1 15    Straight Leg Raise 1 15      Patient returned to room lying supine in bed with all needs in reach and Rooke boot on L LE. Assessment: Patient demonstrated improvement with stair ascend/descend this AM. Patient understands safety precautions with stair negotiation. Patient will benefit from further therapy to complete family training and improve independence with transfers and household ambulation. Plan of Care: Continue with POC and progress as tolerated.        Riana Parsons  9/29/2017

## 2017-09-29 NOTE — PROGRESS NOTES
Problem: Falls - Risk of  Goal: *Absence of Falls  Document Lauren Fall Risk and appropriate interventions in the flowsheet. Outcome: Progressing Towards Goal  Fall Risk Interventions:  Mobility Interventions: Patient to call before getting OOB     Mentation Interventions:  Toileting rounds     Medication Interventions: Patient to call before getting OOB     Elimination Interventions: Call light in reach     History of Falls Interventions: Door open when patient unattended

## 2017-09-29 NOTE — PROGRESS NOTES
09/29/17 1022   Time Spent With Patient   Time In 0743   Time Out 0828   Patient Seen For: AM;ADLs   Grooming   Grooming Assistance  I   Comments Seated at sink   Upper Body Dressing    [address]   Comments Assist to complete button on polo shirt   Lower Body [address]  S   Leg Crossed Method Used Yes   Position Performed Seated in chair;Standing   Comments Supervision in stance to manage pants over hips, patient changes socks using cross-leg method       S: \"I think I'll skip washing up today. \" Agreeable to therapy. Focus of session was on dressing and grooming. Patient was able to ambulate ~10 feet using a RW with CGA. Pain not indicated. Collaborated with PT, Amairani Marcelo and confirmed patient is on track to reach goals as documented in the care plan. Patient tolerated session well, but activity tolerance, balance, functional mobility, strength, coordination, cognition are still below baseline and require skilled facilitation to successfully and safely complete ADL's and transfers. Patient ended session in wheelchair with call remote and phone within reach.      Rodney Leung OTR/L

## 2017-09-29 NOTE — PROGRESS NOTES
Problem: Nutrition Deficit  Goal: *Optimize nutritional status  Nutrition  Reason for assessment: LOS Day 16   Assessment:   Diet order(s): Mechanical Soft  Food/Nutrition Patient History:  Patient presents with no acute nutrition risk factors identified by malnutrition screening tool upon admission. The patient is known to [address] [place] [address] staff. The patient was previously inpatient and required nutrition support for sometime. The patient's appetite was slow to  but continues to improve on a daily basis. The patient is being followed by the SLP and currently upgraded to thin liquids. The patient has not been weighed since admission to [facility] floor however states that he has probably lost a few pounds. Anthropometrics:  Height: 5' 6\", Weight: 78.6 kg (173 lb), BMI: 28, BMI class of normal weight for age >71. Macronutrient needs:  EER:  2833-3266 kcal /day (20-25 kcal/kg listed BW)  EPR:  65-77 grams protein/day (1-1.2 grams/kg IBW)  Intake/Comparative Standards: Average intake for past 14 recorded meal(s): 61%. This potentially meets ~85% of kcal and ~86% of protein needs     Nutrition Diagnosis: Inadequate oral intake related to decreased appetite as evidenced by patient unable to meet estimated needs since admission to 9 floor rehab unit. Intervention:  Meals and snacks: Continue current diet. Nutrition Supplement Therapy: Add Ensure once daily   Nutrition Discharge Plan: Continue Ensure once daily until appetite returns to normal     Chase Sanchez Jhonathan [address], [address] [place] [address], [phone]

## 2017-09-30 LAB
BASOPHILS # BLD: 0 K/UL (ref 0–0.2)
BASOPHILS NFR BLD: 0 % (ref 0–2)
DIFFERENTIAL METHOD BLD: ABNORMAL
EOSINOPHIL # BLD: 0 K/UL (ref 0–0.8)
EOSINOPHIL NFR BLD: 1 % (ref 0.5–7.8)
ERYTHROCYTE [DISTWIDTH] IN BLOOD BY AUTOMATED COUNT: 24.2 % (ref 11.9–14.6)
GLUCOSE BLD STRIP.AUTO-MCNC: 136 MG/DL (ref 65–100)
GLUCOSE BLD STRIP.AUTO-MCNC: 139 MG/DL (ref 65–100)
GLUCOSE BLD STRIP.AUTO-MCNC: 161 MG/DL (ref 65–100)
GLUCOSE BLD STRIP.AUTO-MCNC: 71 MG/DL (ref 65–100)
HCT VFR BLD AUTO: 30.8 % (ref 41.1–50.3)
HGB BLD-MCNC: 9.5 G/DL (ref 13.6–17.2)
IMM GRANULOCYTES # BLD: 0 K/UL (ref 0–0.5)
IMM GRANULOCYTES NFR BLD: 0.3 % (ref 0–5)
INR PPP: 3.4 (ref 0.9–1.2)
LYMPHOCYTES # BLD: 0.9 K/UL (ref 0.5–4.6)
LYMPHOCYTES NFR BLD: 23 % (ref 13–44)
MCH RBC QN AUTO: 32.5 PG (ref 26.1–32.9)
MCHC RBC AUTO-ENTMCNC: 30.7 G/DL (ref 31.4–35)
MCV RBC AUTO: 105.7 FL (ref 79.6–97.8)
MONOCYTES # BLD: 0.2 K/UL (ref 0.1–1.3)
MONOCYTES NFR BLD: 5 % (ref 4–12)
NEUTS SEG # BLD: 2.7 K/UL (ref 1.7–8.2)
NEUTS SEG NFR BLD: 71 % (ref 43–78)
PLATELET # BLD AUTO: 107 K/UL (ref 150–450)
PMV BLD AUTO: 10.7 FL (ref 10.8–14.1)
PROTHROMBIN TIME: 37.5 SEC (ref 9.6–12)
RBC # BLD AUTO: 2.92 M/UL (ref 4.23–5.67)
WBC # BLD AUTO: 4.1 K/UL (ref 4.3–11.1)

## 2017-09-30 PROCEDURE — 36415 COLL VENOUS BLD VENIPUNCTURE: CPT | Performed by: PHYSICAL MEDICINE & REHABILITATION

## 2017-09-30 PROCEDURE — 85025 COMPLETE CBC W/AUTO DIFF WBC: CPT | Performed by: PHYSICAL MEDICINE & REHABILITATION

## 2017-09-30 PROCEDURE — 74011250637 HC RX REV CODE- 250/637: Performed by: PHYSICAL MEDICINE & REHABILITATION

## 2017-09-30 PROCEDURE — 94640 AIRWAY INHALATION TREATMENT: CPT

## 2017-09-30 PROCEDURE — 74011250637 HC RX REV CODE- 250/637: Performed by: INTERNAL MEDICINE

## 2017-09-30 PROCEDURE — 85610 PROTHROMBIN TIME: CPT | Performed by: PHYSICAL MEDICINE & REHABILITATION

## 2017-09-30 PROCEDURE — 65310000000 HC RM PRIVATE REHAB

## 2017-09-30 PROCEDURE — 74011636637 HC RX REV CODE- 636/637: Performed by: PHYSICAL MEDICINE & REHABILITATION

## 2017-09-30 PROCEDURE — 82962 GLUCOSE BLOOD TEST: CPT

## 2017-09-30 PROCEDURE — 94760 N-INVAS EAR/PLS OXIMETRY 1: CPT

## 2017-09-30 PROCEDURE — 74011000250 HC RX REV CODE- 250: Performed by: PHYSICAL MEDICINE & REHABILITATION

## 2017-09-30 PROCEDURE — 77030019605

## 2017-09-30 RX ADMIN — METOPROLOL SUCCINATE 100 MG: 100 TABLET, EXTENDED RELEASE ORAL at 21:18

## 2017-09-30 RX ADMIN — BUDESONIDE 500 MCG: 0.5 INHALANT RESPIRATORY (INHALATION) at 05:30

## 2017-09-30 RX ADMIN — WARFARIN SODIUM 2 MG: 2 TABLET ORAL at 18:11

## 2017-09-30 RX ADMIN — METOPROLOL SUCCINATE 100 MG: 100 TABLET, EXTENDED RELEASE ORAL at 09:16

## 2017-09-30 RX ADMIN — GABAPENTIN 600 MG: 300 CAPSULE ORAL at 05:04

## 2017-09-30 RX ADMIN — AMIODARONE HYDROCHLORIDE 200 MG: 200 TABLET ORAL at 21:18

## 2017-09-30 RX ADMIN — INSULIN LISPRO 2 UNITS: 100 INJECTION, SOLUTION INTRAVENOUS; SUBCUTANEOUS at 22:28

## 2017-09-30 RX ADMIN — AMIODARONE HYDROCHLORIDE 200 MG: 200 TABLET ORAL at 09:15

## 2017-09-30 RX ADMIN — ALBUTEROL SULFATE 2.5 MG: 2.5 SOLUTION RESPIRATORY (INHALATION) at 05:30

## 2017-09-30 RX ADMIN — FERROUS SULFATE TAB 325 MG (65 MG ELEMENTAL FE) 325 MG: 325 (65 FE) TAB at 09:16

## 2017-09-30 RX ADMIN — FUROSEMIDE 20 MG: 20 TABLET ORAL at 09:15

## 2017-09-30 RX ADMIN — GABAPENTIN 600 MG: 300 CAPSULE ORAL at 18:11

## 2017-09-30 RX ADMIN — BUDESONIDE 500 MCG: 0.5 INHALANT RESPIRATORY (INHALATION) at 17:31

## 2017-09-30 RX ADMIN — ALBUTEROL SULFATE 2.5 MG: 2.5 SOLUTION RESPIRATORY (INHALATION) at 17:31

## 2017-09-30 RX ADMIN — FAMOTIDINE 20 MG: 20 TABLET ORAL at 18:11

## 2017-09-30 RX ADMIN — ENALAPRIL MALEATE 5 MG: 5 TABLET ORAL at 09:16

## 2017-09-30 RX ADMIN — FAMOTIDINE 20 MG: 20 TABLET ORAL at 09:15

## 2017-09-30 RX ADMIN — PREDNISONE 10 MG: 20 TABLET ORAL at 09:16

## 2017-09-30 RX ADMIN — TIOTROPIUM BROMIDE 18 MCG: 18 CAPSULE ORAL; RESPIRATORY (INHALATION) at 05:30

## 2017-09-30 RX ADMIN — GUAIFENESIN 1200 MG: 600 TABLET, EXTENDED RELEASE ORAL at 09:16

## 2017-09-30 RX ADMIN — POTASSIUM CHLORIDE 20 MEQ: 20 TABLET, EXTENDED RELEASE ORAL at 09:15

## 2017-09-30 RX ADMIN — GABAPENTIN 600 MG: 300 CAPSULE ORAL at 21:18

## 2017-09-30 RX ADMIN — GUAIFENESIN 1200 MG: 600 TABLET, EXTENDED RELEASE ORAL at 21:18

## 2017-09-30 NOTE — PROGRESS NOTES
Warfarin dosing per pharmacist    Mia Chambers is a 67 y.o. male. Indication: A. Fib    Goal INR:  2-3    Home dose:  5 mg daily, 7.5 mg on Wednesday    Risk factors or significant drug interactions:  Antibiotics, prednisone    Other anticoagulants:  none    Daily Monitoring  Date  INR     Warfarin dose HGB              Notes  9/8  1.2  10mg  11.2    9/9  1.3  10mg  9.4  9/10  ---  10mg  9.7  9/11  2.1  10mg  10.8  9/12  2.9  10mg  8.9  9/13  3.5  Hold  8.4  Pharmacy Consulted  9/14  3.6  Held  8.5  9/15  2.5  5 mg  8.7  9/16  1.9  6 mg  10.8  9/17  2.2  6 mg  9.3  9/18  2.4  6 mg  --  9/19  3.2  Hold  9.1  9/20  3.4  Hold  8.8  9/21  2.3  5 mg  8.1  9/22  1.4  5 mg  ---  9/23  1.4  5 mg  8.9  9/24  1.6  5 mg  7.9  9/25  1.9  5 mg  8.0  9/26  2.6  2.5 mg  ---  9/27  2.6  5 mg  8.4  9/28  2.8  5 mg  8.4  9/29  3.5  2 mg  8.5  9/30  3.4  2 mg  9.5    INR to 3.4. Give 2 mg again this evening with jump to upper limit of therapeutic range. Regimen looks to be an alternating dose of 5 and 2.5 mg currently. Following daily INR.     Thank you,  Elizabeth Quesada, PharmD  Clinical Pharmacist  [phone]

## 2017-09-30 NOTE — PROGRESS NOTES
[facility] PROGRESS NOTE    Norbert Saucedo  Admit Date: 9/13/2017  Admit Diagnosis: Cardiac Debility  Respiratory failure (HCC)    Subjective     Vss. Afebrile, remains in SR. Mild bradycardia  noted yesterday and this am. Denies chest pain, SOB, cough, palpitations. Therapy fairly tolerated this morning. Walked 79 ' with RW, min assist level. Patient seen and examined. No SOB at rest. C/o mild JIM during therapy. Denies dizziness, palpations, nausea or pain. Participating in therapy.     Objective:     Current Facility-Administered Medications   Medication Dose Route Frequency    warfarin (COUMADIN) tablet 2 mg - pharmacy dosing  2 mg Oral QPM    simethicone (MYLICON) tablet 80 mg  80 mg Oral QID PRN    amiodarone (CORDARONE) tablet 200 mg  200 mg Oral BID    enalapril (VASOTEC) tablet 5 mg  5 mg Oral DAILY    0.9% sodium chloride infusion 250 mL  250 mL IntraVENous PRN    albuterol CONCENTRATE 2.5mg/0.5 mL neb soln  2.5 mg Nebulization BID    budesonide (PULMICORT) 500 mcg/2 ml nebulizer suspension  500 mcg Nebulization BID RT    nitroglycerin (NITROSTAT) tablet 0.4 mg  0.4 mg SubLINGual Q5MIN PRN    ondansetron (ZOFRAN) injection 4 mg  4 mg IntraVENous Q4H PRN    NUTRITIONAL SUPPORT ELECTROLYTE PRN ORDERS   Does Not Apply PRN    acetaminophen (TYLENOL) suppository 650 mg  650 mg Rectal Q4H PRN    acetaminophen (TYLENOL) tablet 650 mg  650 mg Oral Q4H PRN    albuterol (PROVENTIL HFA, VENTOLIN HFA, PROAIR HFA) inhaler 2 Puff  2 Puff Inhalation Q4H PRN    albuterol (PROVENTIL VENTOLIN) nebulizer solution 2.5 mg  2.5 mg Nebulization Q4H PRN    dextrose (D50W) injection syrg 25 g  25 g IntraVENous PRN    ferrous sulfate tablet 325 mg  1 Tab Oral DAILY WITH BREAKFAST    gabapentin (NEURONTIN) capsule 600 mg  600 mg Oral TID    guaiFENesin ER (MUCINEX) tablet 1,200 mg  1,200 mg Oral Q12H    insulin lispro (HUMALOG) injection   SubCUTAneous AC&HS    povidone-iodine (BETADINE) 10 % topical solution   Topical DAILY    predniSONE (DELTASONE) tablet 10 mg  10 mg Oral DAILY WITH BREAKFAST    tiotropium (SPIRIVA) inhalation capsule 18 mcg  1 Cap Inhalation DAILY    traZODone (DESYREL) tablet 50 mg  50 mg Oral QHS PRN    influenza vaccine 2017-18 (3 yrs+)(PF) (FLUZONE QUAD/FLUARIX QUAD) injection 0.5 mL  0.5 mL IntraMUSCular PRIOR TO DISCHARGE    0.9% sodium chloride infusion 250 mL  250 mL IntraVENous PRN    furosemide (LASIX) tablet 20 mg  20 mg Oral DAILY    potassium chloride (K-DUR, KLOR-CON) SR tablet 20 mEq  20 mEq Oral DAILY    metoprolol succinate (TOPROL-XL) tablet 100 mg  100 mg Oral BID    famotidine (PEPCID) tablet 20 mg  20 mg Oral BID    0.9% sodium chloride infusion 250 mL  250 mL IntraVENous PRN     Review of Systems:Denies chest pain, shortness of breath, cough, headache, visual problems, abdominal pain, dysurea, calf pain. Pertinent positives are as noted in the medical records and unremarkable otherwise. Visit Vitals    /65 (BP 1 Location: Right arm)    Pulse 66    Temp 98.9 °F (37.2 °C)    Resp 16    SpO2 92%   HR 49-60     Physical Exam:   General: Alert and age appropriately oriented. No acute cardio respiratory distress. Frail appearing   HEENT: Normocephalic,no scleral icterus  Oral mucosa moist without cyanosis   Lungs: Bilateral basilar crackles , fair breath sounds,   LLL with focal wheezing   Heart: Regular rate and rhythm, + mechanical click   Abdomen: Soft, non-tender, nondistended. Bowel sounds present. Genitourinary: Benign . Neuromuscular:      Generalized non focal weakness. sensation intact   Skin/extremity: No rashes, no erythema.  No calf tenderness BLE  Wound left heel ; boggy, red                                                                            Functional Assessment:          Balance  Sitting - Static: Good (unsupported) (09/29/17 1400)  Sitting - Dynamic: Good (unsupported) (09/29/17 1400)  Standing - Static: Fair (09/29/17 1400)  Standing - Dynamic : Impaired (09/29/17 1400)           Toileting  Adaptive Equipment: Grab bars; Walker (09/27/17 1513)         Tia Manus Fall Risk Assessment:  Tia Manus Fall Risk  Mobility: Ambulates or transfers with assist devices or assistance/unsteady gait (09/29/17 1930)  Mobility Interventions: Patient to call before getting OOB (09/29/17 1930)  Mentation: Alert, oriented x 3 (09/29/17 1930)  Mentation Interventions: Door open when patient unattended (09/29/17 1930)  Medication: Patient receiving anticonvulsants, sedatives(tranquilizers), psychotropics or hypnotics, hypoglycemics, narcotics, sleep aids, antihypertensives, laxatives, or diuretics (09/29/17 1930)  Medication Interventions: Patient to call before getting OOB (09/29/17 1930)  Elimination: Needs assistance with toileting (09/29/17 1930)  Elimination Interventions: Call light in reach (09/29/17 1930)  Prior Fall History: During admission (Date - Comment) (09/29/17 1930)  History of Falls Interventions: Door open when patient unattended (09/29/17 1602)  Total Score: 5 (09/29/17 1930)  Standard Fall Precautions: Yes (09/27/17 2251)  High Fall Risk: Yes (09/29/17 1930)     Speech Assessment:  Aspiration Signs/Symptoms: None (09/14/17 1036)      Ambulation:  Gait  Distance (ft): 100 Feet (ft) (x5) (09/29/17 1200)  Assistive Device: Lucinda Real, rolling;Gait belt (1x with rollator) (09/29/17 1200)  Rail Use: Both (09/29/17 1400)     Labs/Studies:  Recent Results (from the past 72 hour(s))   GLUCOSE, POC    Collection Time: 09/27/17 11:08 AM   Result Value Ref Range    Glucose (POC) 104 (H) 65 - 100 mg/dL   GLUCOSE, POC    Collection Time: 09/27/17  4:32 PM   Result Value Ref Range    Glucose (POC) 154 (H) 65 - 100 mg/dL   PROTHROMBIN TIME + INR    Collection Time: 09/28/17  5:53 AM   Result Value Ref Range    Prothrombin time 31.2 (H) 9.6 - 12.0 sec    INR 2.8 (H) 0.9 - 1.2     CBC WITH AUTOMATED DIFF    Collection Time: 09/28/17  5:53 AM   Result Value Ref Range    WBC 6.7 4.3 - 11.1 K/uL    RBC 2.59 (L) 4.23 - 5.67 M/uL    HGB 8.4 (L) 13.6 - 17.2 g/dL    HCT 27.3 (L) 41.1 - 50.3 %    .5 (H) 79.6 - 97.8 FL    MCH 32.3 26.1 - 32.9 PG    MCHC 30.6 (L) 31.4 - 35.0 g/dL    RDW 24.5 (H) 11.9 - 14.6 %    PLATELET 61 (L) 276 - 450 K/uL    MPV 11.0 10.8 - 14.1 FL    DF AUTOMATED      NEUTROPHILS 82 (H) 43 - 78 %    LYMPHOCYTES 15 13 - 44 %    MONOCYTES 3 (L) 4.0 - 12.0 %    EOSINOPHILS 0 (L) 0.5 - 7.8 %    BASOPHILS 0 0.0 - 2.0 %    IMMATURE GRANULOCYTES 0.3 0.0 - 5.0 %    ABS. NEUTROPHILS 4.9 1.7 - 8.2 K/UL    ABS. LYMPHOCYTES 0.9 0.5 - 4.6 K/UL    ABS. MONOCYTES 0.2 0.1 - 1.3 K/UL    ABS. EOSINOPHILS 0.0 0.0 - 0.8 K/UL    ABS. BASOPHILS 0.0 0.0 - 0.2 K/UL    ABS. IMM.  GRANS. 0.0 0.0 - 0.5 K/UL   METABOLIC PANEL, BASIC    Collection Time: 09/28/17  5:53 AM   Result Value Ref Range    Sodium 143 136 - 145 mmol/L    Potassium 3.8 3.5 - 5.1 mmol/L    Chloride 104 98 - 107 mmol/L    CO2 34 (H) 21 - 32 mmol/L    Anion gap 5 (L) 7 - 16 mmol/L    Glucose 111 (H) 65 - 100 mg/dL    BUN 22 8 - 23 MG/DL    Creatinine 0.92 0.8 - 1.5 MG/DL    GFR est AA >60 >60 ml/min/1.73m2    GFR est non-AA >60 >60 ml/min/1.73m2    Calcium 8.0 (L) 8.3 - 10.4 MG/DL   MAGNESIUM    Collection Time: 09/28/17  5:53 AM   Result Value Ref Range    Magnesium 1.9 1.8 - 2.4 mg/dL   GLUCOSE, POC    Collection Time: 09/28/17  6:07 AM   Result Value Ref Range    Glucose (POC) 123 (H) 65 - 100 mg/dL   GLUCOSE, POC    Collection Time: 09/28/17 11:10 AM   Result Value Ref Range    Glucose (POC) 104 (H) 65 - 100 mg/dL   GLUCOSE, POC    Collection Time: 09/28/17  3:49 PM   Result Value Ref Range    Glucose (POC) 233 (H) 65 - 100 mg/dL   GLUCOSE, POC    Collection Time: 09/28/17  8:46 PM   Result Value Ref Range    Glucose (POC) 217 (H) 65 - 100 mg/dL   GLUCOSE, POC    Collection Time: 09/29/17  6:28 AM   Result Value Ref Range    Glucose (POC) 80 65 - 100 mg/dL   PROTHROMBIN TIME + INR    Collection Time: 09/29/17  7:21 AM   Result Value Ref Range    Prothrombin time 38.7 (H) 9.6 - 12.0 sec    INR 3.5 (H) 0.9 - 1.2     CBC WITH AUTOMATED DIFF    Collection Time: 09/29/17  7:21 AM   Result Value Ref Range    WBC 5.6 4.3 - 11.1 K/uL    RBC 2.63 (L) 4.23 - 5.67 M/uL    HGB 8.5 (L) 13.6 - 17.2 g/dL    HCT 27.8 (L) 41.1 - 50.3 %    .9 (H) 79.6 - 97.8 FL    MCH 32.2 26.1 - 32.9 PG    MCHC 30.4 (L) 31.4 - 35.0 g/dL    RDW 24.6 (H) 11.9 - 14.6 %    PLATELET 46 (L) 842 - 450 K/uL    MPV 13.0 10.8 - 14.1 FL    DF AUTOMATED      NEUTROPHILS 74 43 - 78 %    LYMPHOCYTES 22 13 - 44 %    MONOCYTES 4 4.0 - 12.0 %    EOSINOPHILS 0 (L) 0.5 - 7.8 %    BASOPHILS 0 0.0 - 2.0 %    IMMATURE GRANULOCYTES 0.2 0.0 - 5.0 %    ABS. NEUTROPHILS 3.8 1.7 - 8.2 K/UL    ABS. LYMPHOCYTES 1.1 0.5 - 4.6 K/UL    ABS. MONOCYTES 0.2 0.1 - 1.3 K/UL    ABS. EOSINOPHILS 0.0 0.0 - 0.8 K/UL    ABS. BASOPHILS 0.0 0.0 - 0.2 K/UL    ABS. IMM.  GRANS. 0.0 0.0 - 0.5 K/UL   GLUCOSE, POC    Collection Time: 09/29/17 11:31 AM   Result Value Ref Range    Glucose (POC) 107 (H) 65 - 100 mg/dL   GLUCOSE, POC    Collection Time: 09/29/17  4:13 PM   Result Value Ref Range    Glucose (POC) 217 (H) 65 - 100 mg/dL   GLUCOSE, POC    Collection Time: 09/29/17  8:47 PM   Result Value Ref Range    Glucose (POC) 205 (H) 65 - 100 mg/dL   PROTHROMBIN TIME + INR    Collection Time: 09/30/17  7:21 AM   Result Value Ref Range    Prothrombin time 37.5 (H) 9.6 - 12.0 sec    INR 3.4 (H) 0.9 - 1.2     CBC WITH AUTOMATED DIFF    Collection Time: 09/30/17  7:21 AM   Result Value Ref Range    WBC 4.1 (L) 4.3 - 11.1 K/uL    RBC 2.92 (L) 4.23 - 5.67 M/uL    HGB 9.5 (L) 13.6 - 17.2 g/dL    HCT 30.8 (L) 41.1 - 50.3 %    .7 (H) 79.6 - 97.8 FL    MCH 32.5 26.1 - 32.9 PG    MCHC 30.7 (L) 31.4 - 35.0 g/dL    RDW 24.2 (H) 11.9 - 14.6 %    PLATELET 630 (L) 905 - 450 K/uL    MPV 10.7 (L) 10.8 - 14.1 FL    DF AUTOMATED      NEUTROPHILS 71 43 - 78 %    LYMPHOCYTES 23 13 - 44 %    MONOCYTES 5 4.0 - 12.0 %    EOSINOPHILS 1 0.5 - 7.8 %    BASOPHILS 0 0.0 - 2.0 %    IMMATURE GRANULOCYTES 0.3 0.0 - 5.0 %    ABS. NEUTROPHILS 2.7 1.7 - 8.2 K/UL    ABS. LYMPHOCYTES 0.9 0.5 - 4.6 K/UL    ABS. MONOCYTES 0.2 0.1 - 1.3 K/UL    ABS. EOSINOPHILS 0.0 0.0 - 0.8 K/UL    ABS. BASOPHILS 0.0 0.0 - 0.2 K/UL    ABS. IMM.  GRANS. 0.0 0.0 - 0.5 K/UL   GLUCOSE, POC    Collection Time: 09/30/17  7:34 AM   Result Value Ref Range    Glucose (POC) 71 65 - 100 mg/dL       Assessment:     Problem List as of 9/30/2017  Date Reviewed: 9/9/2017          Codes Class Noted - Resolved    Respiratory failure ([facility] ICD-10-CM: J96.90  ICD-9-CM: 518.81  9/13/2017 - Present        MRSA nasal colonization ICD-10-CM: Z22.322  ICD-9-CM: V02.54  9/8/2017 - Present        COPD (chronic obstructive pulmonary disease) (HCC) (Chronic) ICD-10-CM: J44.9  ICD-9-CM: 496  8/30/2017 - Present        Acute respiratory failure with hypercapnia (HCC) ICD-10-CM: J96.02  ICD-9-CM: 518.81  8/29/2017 - Present        History of tobacco use (Chronic) ICD-10-CM: E01.804  ICD-9-CM: V15.82  8/29/2017 - Present        Hyponatremia ICD-10-CM: E87.1  ICD-9-CM: 276.1  8/29/2017 - Present        Acute encephalopathy ICD-10-CM: G93.40  ICD-9-CM: 348.30  8/29/2017 - Present        Thrombocytopenia ([facility] ICD-10-CM: D69.6  ICD-9-CM: 287.5  8/26/2017 - Present        Anticoagulant long-term use (Chronic) ICD-10-CM: Z79.01  ICD-9-CM: V58.61  8/23/2017 - Present        HTN (hypertension), benign (Chronic) ICD-10-CM: I10  ICD-9-CM: 401.1  8/23/2017 - Present        Peripheral vascular disease ([facility] (Chronic) ICD-10-CM: I73.9  ICD-9-CM: 443.9  8/23/2017 - Present        Dyslipidemia (Chronic) ICD-10-CM: E78.5  ICD-9-CM: 272.4  8/23/2017 - Present        History of mechanical aortic valve replacement (Chronic) ICD-10-CM: Z95.2  ICD-9-CM: V43.3  8/23/2017 - Present    Overview Signed 8/30/2017 10:30 AM by Renan Lin NP     Placement 2000, on chronicCoumadin Centrilobular emphysema ([facility] (Chronic) ICD-10-CM: J43.2  ICD-9-CM: 492.8  8/23/2017 - Present    Overview Signed 9/8/2017  9:24 AM by Torres Juarez NP      With last PFTs FVC 1.70 L or 44% predicted, FEV1 0.86 L or 29% predicted, FEV1/FVC 51%. This study was a postbronchodilator study performed at the [facility] on 8/22/2016                  Ischemic cardiomyopathy (Chronic) ICD-10-CM: I25.5  ICD-9-CM: 414.8  8/23/2017 - Present    Overview Signed 8/30/2017 10:29 AM by Cody Kincaid NP     8/23/17 ECHO:  EF 40 % to 45 %. There were no regional wall motion abnormalities. Moderate hypokinesis of the mid-apical anterior and apical wall(s). Atrial flutter with rapid ventricular response (HCC) ICD-10-CM: I48.92  ICD-9-CM: 427.32  8/23/2017 - Present        LV dysfunction (Chronic) ICD-10-CM: I51.9  ICD-9-CM: 429.9  10/19/2016 - Present        Atherosclerosis of native arteries of the extremities with intermittent claudication (Chronic) ICD-10-CM: E68.952  ICD-9-CM: 440.21  4/15/2014 - Present              Assessment/ plan:     Debility s/p resp failure, a flutter with RVR s/p cardioversion    Continue daily physician medical management:      Atrial flutter with rapid ventricular response ([facility] (8/23/2017) sp cardioversion - monitor HR./ BP. / hx of CHF / Ischemic cardiomyopathy     - Digoxin, coreg.  - continue anticoagulation. Continued on adjustment daily for INR control. pharmacy to dose  - cardiac precautions.   - (9/16) -recurrent a.fib with RVR. HR 130s. 9/20 continues; metoprolol added 9/19; PT UNABLE TO PARTICIPATE IN THERAPIES. SHOULD BE TRANSFERRED TO TELEMETRY TO MANAGE CARDIAC ISSUES SO AS NOT TO USE OF ACUTE REHAB DAYS. -9/21 will try to encourage participation in therapies today. Wouldn't even do ST at bedside yesterday.  Will put parameters on when to hold therapies due to tachyc.   -9/22 s/p successful cardioversion by Dr Violeta Acevedo; now in NSR, HR 65; restart full therapies (encourage participation); restart Coumadin 5mg daily; 9/25 INR 1.6 ( goal 2-3)  -HR 40-69; ? Decreasing beta blk since weve gone up on amio and now s/p cardioversion; will defer to cardiology  HR 60 after ambulation; which is actually the best he's done in therapy in a long time. - 9/25  In Sinus. Mild bradycardia. Discontinued diltiazem due to low HR.   - 9/28- continues to be n NSR. HR in good range, 50-70s. . No new changes. - 9/30, cardiology note appreciated, A. Fib/cardiomyopathy with EF - 40% - on telemetry, controlled on lasix, enalapril, amiodarone, lopressor. HR rates in the 57-80s range.      - restarted diuretics, as was taking at home. - CxR 9/16 very small bilateral pleural effusions are stable.    - 9/19-  possible cardioversion, AVN ablation/pacemaker in am per Cardiology/EP    -9/20 HOLD proc due to elevated INR, cont to hold coumadin ; 9/21 INR 2.3 from 3.4; restart coumadin after procedure today if ok with cardiology  - 9-23 coumadin restarted 9/22  - 9/28, appears euvolemic, continue lasix 20mg daily.       History of mechanical aortic valve replacement 2000, on coumadin/ Anticoagulant long-term use. Goal 2.5-3.5.  - resume warfarin, monitor INR.   - INR 2.4 (9/18) . average out home dose ~> 6mg hs.   - Warfarin 6mg HS (9/16)  - pharmacy assisting. On hold(9/19) for procedure.   -9/21 INR now finally trending down; 2.3 from 3.4 yesterday;off coumadin; likely restart Coumadin after procedure  -9/22 restart Coumadin 5mg daily  - 9/28- INR therapeutic, continued on warfarin 5mg . INR -2.8  - 9/30  INR - 3.4 , therapeutic      Bacteremia - started on empiric antibiotic treatment. Aztreonam, vanco.   - BCx grew e.coli. discontinued vano. - final ID extended-spectrum beta-lactamases (ESBLs) , appears treated, though efficacy of vanco, aztreonam not optimal.  . Pt asymptomatic, WBC wnl. Will discontinue - aztreonam. .  - will monitor closely.   - will have on contact precaution, infection control notified.   - BCx redrawn 9/17, no growth, will lift contact precautions 9/19.   - 9/28, no signs of infection, 9/28, remove PICC      HTN (hypertension) - monitor.  - continue Coreg/ digoxin  -9/21 cardizem and lasix ; now off Coreg per cardiology for rate control but now bradycardia; really no rhyme or reason to his fluctuating HR; BP stable  -9/23 -162 acceptable  - 9/25 - SBP. Appears in control.   - 9/28. Continue current regimen, enalapril      Thrombocytopenia/ ITP  - continued on steroids. - hematology following.   - HIT negative. - ivig per hematology direction. Will receive 1 more dose. -  received platelet 1unit (0/85). plt 25k -> 75k (9/16)-> 65k (9/17) -> 68 (9/19) monitor; 9/20 plts 52; defer to hematology  -9/21 plts 36; pending 9/22; 19k; will give one unit plts  -9/23 plts 46; want to keep above 50K , thus one unit today. ? Nplate  -8/84 30A after one unit yesterday; monitor; patient pancytopenic; Hematology to f/u  - 9/28 - plt 61, no change. Monitor. BMB cancelled due to high INR, on anticoagulation for mech valve. - 9/30 platelets - 46 > 174      COPD   - continue respiratory treatments. Presently q6h, wean as appropriate.    - resume spiriva, pulmicort  - SOB with activity. - normal CxR (9/15)  - will wean albuterol nebs 9/18. Bid and as needed. -9/20 SOB; sats 95% RA, bilateral crackles, increasing JIM; likely due to tachycardia; will f/u portable CXR; weaning steroids  -9/21 CXR; shows no significant change in small right pleural effusion and adjacent mild right basilar atelectasis/infiltrate. Continue to enc IS. bipap at noc; sats 95% RA  -9/23 wean O2 s/p cardioversion; 100% sats 2 L NC  -9/23 was weaned from 3L to 1L; sats 98%; BiPap at noc  - 9/25 continue BiPAP HS, no acute flairs. - 9/28, no compromize. Continue BiPAP at night.      Dysphagia - improving , MBS completed, diet advanced to mechanical soft/thin liquids with swallowing compensatory manuevers. Medications in pureed.       iron deficiency anemia -hgb 8.5-> 8.7(9/15)-. 10.8(9/18) -> 9.3-> 9.1 (9/19) Monitor. Continue fe supplements.   -9/20 hgb 8.8; no evidence of acute bleed; hgb 8.1 9/21; check stool  -9/21 pancytopenic, WBC has dropped  as well; monitor for now; hgb 8.1  -9/23 hbg 8.9 ; macrocytosis; B12 and folate wnl  -no evidence of bleed. continue to monitor.    - 9/28, stable hgb , 8.4  - 9/30 Hgb - 9.5, low, yet stable      Hyponatremia  - monitor. 139 on 9/22      Pneumonia prophylaxis- Insentive spirometer every hour while awake      DVT risk / DVT Prophylaxis- Will require daily physician exam to assess for signs and symptoms as patient is at increased risk for of thromboembolism. Mobilization as tolerated. Intermittent pneumatic compression devices when in bed Thigh-high or knee-high thromboembolic deterrent hose when out of bed.    - covered with warfarin.   - no signs of DVT.     Pain Control: mild joint symptoms intermittently, reasonably well controlled by PRN meds. - cont. regular pain assessment and comprenhensive pain management. - resume gabapentin.       Wound Care: Monitor wound status daily per staff and physician. At risk for failure. Will require 24/7 rehab nursing. Keep wound clean and dry  - excoriation at bridge of nose. Continue antibiotic ointment.   -left heel boggy, erythema; order rooke boot; floating heel not working  - 9/28. Stable wound, continue to pressure relief. Expect improvement with more mobility and strength.       Diabetes mellitus - Uncontrolled. poor glycemic control. Will require daily, close FSG monitoring and medication adjustment to optimize glycemic control in setting of acute illness and hospitalization.   - SSI coverage with lispro. Poorly controlled , exacerbated by steroids  -9/21 a bit better controlled, likely due to poor po intake  - 9/22 BS varied greatly 80s to 350s; consider glucotrol  - 9/24 better control.  70s to 170  - 9/25 serum glucose better this morning. Monitor.   - 9/28 - fair control. Mild fluctuations. 100-200s. No change.       Bowel program - colace as needed.      Gi prophylaxis; change pepcid to po 9/20           Time spent was 25 minutes with over 1/2 in direct patient care/examination, consultation and coordination of care.      Signed By: Kathrine Kruger MD     September 30, 2017

## 2017-09-30 NOTE — PROGRESS NOTES
09/29/17 2230   CPAP/BIPAP   CPAP/BIPAP Start/Stop On   Device Mode BIPAP   $$ Bipap Daily Yes   Mask Type and Size Medium   PIP Observed 16 cm H20   IPAP (cm H2O) 16 cm H2O   EPAP (cm H2O) 8 cm H2O   Inspiratory Time (sec) 0.9 seconds   Vt Spont (ml) 560 ml   Ve Observed (l/min) 8 l/min   Backup Rate 10   Total RR (Spontaneous) 15 breaths per minute   Insp Rise Time (sec) 4   Leak (Estimated) 56 L/min   Pt's Home Machine No   Biomedical Check Performed Yes   Settings Verified Yes

## 2017-09-30 NOTE — PROGRESS NOTES
[facility] CARDIOLOGY PROGRESS NOTE           9/30/2017 10:55 AM    Admit Date: 9/13/2017      Subjective:     Patient feels as if breathing has improved. Review of Systems   Constitutional: Negative for fever. HENT: Negative for ear pain. Respiratory: Negative for cough. Musculoskeletal: Negative for falls. Skin: Negative for rash. Psychiatric/Behavioral: The patient is not nervous/anxious. Objective:      Vitals:    09/29/17 1755 09/29/17 2036 09/30/17 0530 09/30/17 0657   BP:  137/57  125/65   Pulse:  61  66   Resp:  18  16   Temp:  97.8 °F (36.6 °C)  98.9 °F (37.2 °C)   SpO2: 97% 96% 96% 92%     Past Surgical History:   Procedure Laterality Date    HX AORTIC VALVE REPLACEMENT N/A 2000    Aortic Valve Replacement w/ Mechanical Valve    HX HEART CATHETERIZATION  07/21/2004    [facility]         Physical Exam   Constitutional: He is oriented to person, place, and time. He appears well-developed. HENT:   Head: Normocephalic and atraumatic. Eyes: Pupils are equal, round, and reactive to light. Neck: Normal range of motion. Cardiovascular: Normal rate. Murmur heard. Pulmonary/Chest: Effort normal.   Abdominal: Soft. He exhibits no distension. There is no tenderness. Musculoskeletal: He exhibits no edema. Neurological: He is alert and oriented to person, place, and time. No cranial nerve deficit. Skin: Skin is warm and dry. No rash noted. No erythema. Psychiatric: He has a normal mood and affect.  His behavior is normal.       Data Review:   Recent Labs      09/30/17   0721  09/29/17   0721  09/28/17   0553   NA   --    --   143   K   --    --   3.8   MG   --    --   1.9   BUN   --    --   22   CREA   --    --   0.92   GLU   --    --   111*   WBC  4.1*  5.6  6.7   HGB  9.5*  8.5*  8.4*   HCT  30.8*  27.8*  27.3*   PLT  107*  46*  61*   INR  3.4*  3.5*  2.8*         Intake/Output Summary (Last 24 hours) at 09/30/17 1426  Last data filed at 09/30/17 1415   Gross per 24 hour   Intake              240 ml   Output              575 ml   Net             -335 ml     Current Facility-Administered Medications   Medication Dose Route Frequency    warfarin (COUMADIN) tablet 2 mg - pharmacy dosing  2 mg Oral QPM    simethicone (MYLICON) tablet 80 mg  80 mg Oral QID PRN    amiodarone (CORDARONE) tablet 200 mg  200 mg Oral BID    enalapril (VASOTEC) tablet 5 mg  5 mg Oral DAILY    0.9% sodium chloride infusion 250 mL  250 mL IntraVENous PRN    albuterol CONCENTRATE 2.5mg/0.5 mL neb soln  2.5 mg Nebulization BID    budesonide (PULMICORT) 500 mcg/2 ml nebulizer suspension  500 mcg Nebulization BID RT    nitroglycerin (NITROSTAT) tablet 0.4 mg  0.4 mg SubLINGual Q5MIN PRN    ondansetron (ZOFRAN) injection 4 mg  4 mg IntraVENous Q4H PRN    NUTRITIONAL SUPPORT ELECTROLYTE PRN ORDERS   Does Not Apply PRN    acetaminophen (TYLENOL) suppository 650 mg  650 mg Rectal Q4H PRN    acetaminophen (TYLENOL) tablet 650 mg  650 mg Oral Q4H PRN    albuterol (PROVENTIL HFA, VENTOLIN HFA, PROAIR HFA) inhaler 2 Puff  2 Puff Inhalation Q4H PRN    albuterol (PROVENTIL VENTOLIN) nebulizer solution 2.5 mg  2.5 mg Nebulization Q4H PRN    dextrose (D50W) injection syrg 25 g  25 g IntraVENous PRN    ferrous sulfate tablet 325 mg  1 Tab Oral DAILY WITH BREAKFAST    gabapentin (NEURONTIN) capsule 600 mg  600 mg Oral TID    guaiFENesin ER (MUCINEX) tablet 1,200 mg  1,200 mg Oral Q12H    insulin lispro (HUMALOG) injection   SubCUTAneous AC&HS    povidone-iodine (BETADINE) 10 % topical solution   Topical DAILY    predniSONE (DELTASONE) tablet 10 mg  10 mg Oral DAILY WITH BREAKFAST    tiotropium (SPIRIVA) inhalation capsule 18 mcg  1 Cap Inhalation DAILY    traZODone (DESYREL) tablet 50 mg  50 mg Oral QHS PRN    influenza vaccine 2017-18 (3 yrs+)(PF) (FLUZONE QUAD/FLUARIX QUAD) injection 0.5 mL  0.5 mL IntraMUSCular PRIOR TO DISCHARGE    0.9% sodium chloride infusion 250 mL  250 mL IntraVENous PRN    furosemide (LASIX) tablet 20 mg  20 mg Oral DAILY    potassium chloride (K-DUR, KLOR-CON) SR tablet 20 mEq  20 mEq Oral DAILY    metoprolol succinate (TOPROL-XL) tablet 100 mg  100 mg Oral BID    famotidine (PEPCID) tablet 20 mg  20 mg Oral BID    0.9% sodium chloride infusion 250 mL  250 mL IntraVENous PRN           Assessment/Plan:       1. Cardiomyopathy last EF 40% 8/10/2017 previous treatment with carvedilol and ACE inhibitor/ARB held due to previous hypotension infection etc. Add low-dose lisinopril 9/29/2017. low-dose Lasix. Documented by EP previously Patient UNABLE to have Ablation or Pacemaker secondary to thrombocytopenia and other medical issues. 2. Atrial fibrillation previous bradycardia now on amiodarone and Cardizem diltiazem discontinued previous rates of 40 bpm 9/28/2017 now rates of improved with lowest heart rate overnight 39 bpm  When sleeping no AV block. Continue to monitor on telemetry. 3. Mechanical valve in aortic position previous bridging with heparin last INR 2.8 pharmacy consultation placed for Coumadin dosing monitoring. INR increased Pharmacy following.        Tiburcio Jacobs MD  9/30/2017 10:55 AM

## 2017-09-30 NOTE — PROGRESS NOTES
Head to toe assessment completed and noted on flowsheet. Wife at bedside assisting patient. Denies any discomfort or pain. Minimal SOB with exertion.

## 2017-10-01 LAB
BASOPHILS # BLD: 0 K/UL (ref 0–0.2)
BASOPHILS NFR BLD: 0 % (ref 0–2)
DIFFERENTIAL METHOD BLD: ABNORMAL
EOSINOPHIL # BLD: 0 K/UL (ref 0–0.8)
EOSINOPHIL NFR BLD: 1 % (ref 0.5–7.8)
ERYTHROCYTE [DISTWIDTH] IN BLOOD BY AUTOMATED COUNT: 23.7 % (ref 11.9–14.6)
GLUCOSE BLD STRIP.AUTO-MCNC: 109 MG/DL (ref 65–100)
GLUCOSE BLD STRIP.AUTO-MCNC: 135 MG/DL (ref 65–100)
GLUCOSE BLD STRIP.AUTO-MCNC: 135 MG/DL (ref 65–100)
GLUCOSE BLD STRIP.AUTO-MCNC: 183 MG/DL (ref 65–100)
HCT VFR BLD AUTO: 31.1 % (ref 41.1–50.3)
HGB BLD-MCNC: 9.5 G/DL (ref 13.6–17.2)
IMM GRANULOCYTES # BLD: 0 K/UL (ref 0–0.5)
IMM GRANULOCYTES NFR BLD: 0.3 % (ref 0–5)
INR PPP: 2.9 (ref 0.9–1.2)
LYMPHOCYTES # BLD: 0.9 K/UL (ref 0.5–4.6)
LYMPHOCYTES NFR BLD: 23 % (ref 13–44)
MCH RBC QN AUTO: 32.2 PG (ref 26.1–32.9)
MCHC RBC AUTO-ENTMCNC: 30.4 G/DL (ref 31.4–35)
MCV RBC AUTO: 106 FL (ref 79.6–97.8)
MONOCYTES # BLD: 0.2 K/UL (ref 0.1–1.3)
MONOCYTES NFR BLD: 5 % (ref 4–12)
NEUTS SEG # BLD: 2.8 K/UL (ref 1.7–8.2)
NEUTS SEG NFR BLD: 71 % (ref 43–78)
PLATELET # BLD AUTO: 154 K/UL (ref 150–450)
PMV BLD AUTO: 10.8 FL (ref 10.8–14.1)
PROTHROMBIN TIME: 32.2 SEC (ref 9.6–12)
RBC # BLD AUTO: 2.94 M/UL (ref 4.23–5.67)
WBC # BLD AUTO: 4.3 K/UL (ref 4.3–11.1)

## 2017-10-01 PROCEDURE — 74011250637 HC RX REV CODE- 250/637: Performed by: PHYSICAL MEDICINE & REHABILITATION

## 2017-10-01 PROCEDURE — 94640 AIRWAY INHALATION TREATMENT: CPT

## 2017-10-01 PROCEDURE — 74011000250 HC RX REV CODE- 250: Performed by: PHYSICAL MEDICINE & REHABILITATION

## 2017-10-01 PROCEDURE — 74011636637 HC RX REV CODE- 636/637: Performed by: PHYSICAL MEDICINE & REHABILITATION

## 2017-10-01 PROCEDURE — 85025 COMPLETE CBC W/AUTO DIFF WBC: CPT | Performed by: PHYSICAL MEDICINE & REHABILITATION

## 2017-10-01 PROCEDURE — 74011250637 HC RX REV CODE- 250/637: Performed by: INTERNAL MEDICINE

## 2017-10-01 PROCEDURE — 36415 COLL VENOUS BLD VENIPUNCTURE: CPT | Performed by: PHYSICAL MEDICINE & REHABILITATION

## 2017-10-01 PROCEDURE — 82962 GLUCOSE BLOOD TEST: CPT

## 2017-10-01 PROCEDURE — 65310000000 HC RM PRIVATE REHAB

## 2017-10-01 PROCEDURE — 85610 PROTHROMBIN TIME: CPT | Performed by: PHYSICAL MEDICINE & REHABILITATION

## 2017-10-01 PROCEDURE — 94760 N-INVAS EAR/PLS OXIMETRY 1: CPT

## 2017-10-01 PROCEDURE — 94660 CPAP INITIATION&MGMT: CPT

## 2017-10-01 RX ORDER — AMIODARONE HYDROCHLORIDE 200 MG/1
200 TABLET ORAL DAILY
Status: DISCONTINUED | OUTPATIENT
Start: 2017-10-02 | End: 2017-10-03 | Stop reason: HOSPADM

## 2017-10-01 RX ADMIN — GABAPENTIN 600 MG: 300 CAPSULE ORAL at 05:27

## 2017-10-01 RX ADMIN — METOPROLOL SUCCINATE 100 MG: 100 TABLET, EXTENDED RELEASE ORAL at 09:12

## 2017-10-01 RX ADMIN — METOPROLOL SUCCINATE 100 MG: 100 TABLET, EXTENDED RELEASE ORAL at 20:44

## 2017-10-01 RX ADMIN — POTASSIUM CHLORIDE 20 MEQ: 20 TABLET, EXTENDED RELEASE ORAL at 09:12

## 2017-10-01 RX ADMIN — FUROSEMIDE 20 MG: 20 TABLET ORAL at 09:13

## 2017-10-01 RX ADMIN — PREDNISONE 10 MG: 20 TABLET ORAL at 09:12

## 2017-10-01 RX ADMIN — ALBUTEROL SULFATE 2.5 MG: 2.5 SOLUTION RESPIRATORY (INHALATION) at 18:05

## 2017-10-01 RX ADMIN — AMIODARONE HYDROCHLORIDE 200 MG: 200 TABLET ORAL at 09:12

## 2017-10-01 RX ADMIN — GUAIFENESIN 1200 MG: 600 TABLET, EXTENDED RELEASE ORAL at 20:44

## 2017-10-01 RX ADMIN — FERROUS SULFATE TAB 325 MG (65 MG ELEMENTAL FE) 325 MG: 325 (65 FE) TAB at 09:13

## 2017-10-01 RX ADMIN — GABAPENTIN 600 MG: 300 CAPSULE ORAL at 05:38

## 2017-10-01 RX ADMIN — FAMOTIDINE 20 MG: 20 TABLET ORAL at 09:12

## 2017-10-01 RX ADMIN — POVIDONE-IODINE: 10 SOLUTION TOPICAL at 09:24

## 2017-10-01 RX ADMIN — INSULIN LISPRO 2 UNITS: 100 INJECTION, SOLUTION INTRAVENOUS; SUBCUTANEOUS at 17:00

## 2017-10-01 RX ADMIN — ALBUTEROL SULFATE 2.5 MG: 2.5 SOLUTION RESPIRATORY (INHALATION) at 06:01

## 2017-10-01 RX ADMIN — GABAPENTIN 600 MG: 300 CAPSULE ORAL at 20:43

## 2017-10-01 RX ADMIN — FAMOTIDINE 20 MG: 20 TABLET ORAL at 17:42

## 2017-10-01 RX ADMIN — GUAIFENESIN 1200 MG: 600 TABLET, EXTENDED RELEASE ORAL at 09:13

## 2017-10-01 RX ADMIN — TIOTROPIUM BROMIDE 18 MCG: 18 CAPSULE ORAL; RESPIRATORY (INHALATION) at 05:56

## 2017-10-01 RX ADMIN — GABAPENTIN 600 MG: 300 CAPSULE ORAL at 17:40

## 2017-10-01 RX ADMIN — WARFARIN SODIUM 2.5 MG: 2 TABLET ORAL at 17:40

## 2017-10-01 RX ADMIN — TIOTROPIUM BROMIDE 18 MCG: 18 CAPSULE ORAL; RESPIRATORY (INHALATION) at 05:58

## 2017-10-01 RX ADMIN — BUDESONIDE 500 MCG: 0.5 INHALANT RESPIRATORY (INHALATION) at 18:05

## 2017-10-01 RX ADMIN — ENALAPRIL MALEATE 5 MG: 5 TABLET ORAL at 09:12

## 2017-10-01 NOTE — PROGRESS NOTES
Hourly rounding completed throughout shift. Patient sitting up in bed with wife at bedside watching TV.

## 2017-10-01 NOTE — PROGRESS NOTES
[facility] CARDIOLOGY PROGRESS NOTE           10/1/2017 10:55 AM    Admit Date: 9/13/2017      Subjective:     Patient feels as if breathing has improved. Lowest rates 47 BPM last night. Review of Systems   Constitutional: Negative for fever. HENT: Negative for ear pain. Respiratory: Negative for cough. Musculoskeletal: Negative for falls. Skin: Negative for rash. Psychiatric/Behavioral: The patient is not nervous/anxious. Objective:      Vitals:    10/01/17 0605 10/01/17 0656 10/01/17 0912 10/01/17 1550   BP:  126/60  146/73   Pulse:  (!) 47 (!) 58 (!) 50   Resp:  18  18   Temp:  98 °F (36.7 °C)  97.2 °F (36.2 °C)   SpO2: 95% 96%  97%     Past Surgical History:   Procedure Laterality Date    HX AORTIC VALVE REPLACEMENT N/A 2000    Aortic Valve Replacement w/ Mechanical Valve    HX HEART CATHETERIZATION  07/21/2004    [facility]         Physical Exam   Constitutional: He is oriented to person, place, and time. He appears well-developed. HENT:   Head: Normocephalic and atraumatic. Eyes: Pupils are equal, round, and reactive to light. Neck: Normal range of motion. Cardiovascular: Normal rate. Murmur heard. Pulmonary/Chest: Effort normal.   Abdominal: Soft. He exhibits no distension. There is no tenderness. Musculoskeletal: He exhibits no edema. Neurological: He is alert and oriented to person, place, and time. No cranial nerve deficit. Skin: Skin is warm and dry. No rash noted. No erythema. Psychiatric: He has a normal mood and affect.  His behavior is normal.       Data Review:   Recent Labs      10/01/17   0749  10/01/17   0630  09/30/17   0721   WBC  4.3   --   4.1*   HGB  9.5*   --   9.5*   HCT  31.1*   --   30.8*   PLT  154   --   107*   INR   --   2.9*  3.4*         Intake/Output Summary (Last 24 hours) at 10/01/17 8091  Last data filed at 10/01/17 0525   Gross per 24 hour   Intake                0 ml   Output              300 ml   Net             -300 ml Current Facility-Administered Medications   Medication Dose Route Frequency    warfarin (COUMADIN) tablet 2.5 mg  2.5 mg Oral QPM    simethicone (MYLICON) tablet 80 mg  80 mg Oral QID PRN    amiodarone (CORDARONE) tablet 200 mg  200 mg Oral BID    enalapril (VASOTEC) tablet 5 mg  5 mg Oral DAILY    0.9% sodium chloride infusion 250 mL  250 mL IntraVENous PRN    albuterol CONCENTRATE 2.5mg/0.5 mL neb soln  2.5 mg Nebulization BID    budesonide (PULMICORT) 500 mcg/2 ml nebulizer suspension  500 mcg Nebulization BID RT    nitroglycerin (NITROSTAT) tablet 0.4 mg  0.4 mg SubLINGual Q5MIN PRN    ondansetron (ZOFRAN) injection 4 mg  4 mg IntraVENous Q4H PRN    NUTRITIONAL SUPPORT ELECTROLYTE PRN ORDERS   Does Not Apply PRN    acetaminophen (TYLENOL) suppository 650 mg  650 mg Rectal Q4H PRN    acetaminophen (TYLENOL) tablet 650 mg  650 mg Oral Q4H PRN    albuterol (PROVENTIL HFA, VENTOLIN HFA, PROAIR HFA) inhaler 2 Puff  2 Puff Inhalation Q4H PRN    albuterol (PROVENTIL VENTOLIN) nebulizer solution 2.5 mg  2.5 mg Nebulization Q4H PRN    dextrose (D50W) injection syrg 25 g  25 g IntraVENous PRN    ferrous sulfate tablet 325 mg  1 Tab Oral DAILY WITH BREAKFAST    gabapentin (NEURONTIN) capsule 600 mg  600 mg Oral TID    guaiFENesin ER (MUCINEX) tablet 1,200 mg  1,200 mg Oral Q12H    insulin lispro (HUMALOG) injection   SubCUTAneous AC&HS    povidone-iodine (BETADINE) 10 % topical solution   Topical DAILY    predniSONE (DELTASONE) tablet 10 mg  10 mg Oral DAILY WITH BREAKFAST    tiotropium (SPIRIVA) inhalation capsule 18 mcg  1 Cap Inhalation DAILY    traZODone (DESYREL) tablet 50 mg  50 mg Oral QHS PRN    influenza vaccine 2017-18 (3 yrs+)(PF) (FLUZONE QUAD/FLUARIX QUAD) injection 0.5 mL  0.5 mL IntraMUSCular PRIOR TO DISCHARGE    0.9% sodium chloride infusion 250 mL  250 mL IntraVENous PRN    furosemide (LASIX) tablet 20 mg  20 mg Oral DAILY    potassium chloride (K-DUR, KLOR-CON) SR tablet 20 mEq  20 mEq Oral DAILY    metoprolol succinate (TOPROL-XL) tablet 100 mg  100 mg Oral BID    famotidine (PEPCID) tablet 20 mg  20 mg Oral BID    0.9% sodium chloride infusion 250 mL  250 mL IntraVENous PRN           Assessment/Plan:       1. Cardiomyopathy last EF 40% 8/10/2017 previous treatment with carvedilol and ACE inhibitor/ARB held due to previous hypotension infection etc. Add low-dose lisinopril 9/29/2017. low-dose Lasix. Documented by EP previously Patient UNABLE to have Ablation or Pacemaker secondary to thrombocytopenia and other medical issues. 2. Atrial fibrillation previous bradycardia now on amiodarone metoprolol. Cardizem discontinued previous rates of 40 bpm 9/28/2017 now rates of improved. Continue to monitor on telemetry. Change amiodarone to 200 mg daily after load. Will set to adjust in the system   3. Mechanical valve in aortic position previous bridging with heparin  pharmacy consultation placed for Coumadin dosing monitoring.  Per pharmacy      Leyla Mullins MD  10/1/2017 10:55 AM

## 2017-10-01 NOTE — PROGRESS NOTES
Report received on pt's history and status. Pt sitting up in bed, awake.  Denies any discomfort or pain

## 2017-10-01 NOTE — PROGRESS NOTES
SFD PROGRESS NOTE    Prashanth Gutiérrez  Admit Date: 9/13/2017  Admit Diagnosis: Cardiac Debility  Respiratory failure (HCC)    Subjective     Vss. Afebrile, remains in SR. Mild bradycardia  noted yesterday and this am. Denies chest pain, SOB, cough, palpitations. Therapy fairly tolerated this morning. Walked 79 ' with RW, min assist level. Patient seen and examined. No SOB at rest. No pain complaints. Denies dizziness, palpations or nausea. Bed mobility - SBA. Participating in therapy.     Objective:     Current Facility-Administered Medications   Medication Dose Route Frequency    warfarin (COUMADIN) tablet 2.5 mg  2.5 mg Oral QPM    simethicone (MYLICON) tablet 80 mg  80 mg Oral QID PRN    amiodarone (CORDARONE) tablet 200 mg  200 mg Oral BID    enalapril (VASOTEC) tablet 5 mg  5 mg Oral DAILY    0.9% sodium chloride infusion 250 mL  250 mL IntraVENous PRN    albuterol CONCENTRATE 2.5mg/0.5 mL neb soln  2.5 mg Nebulization BID    budesonide (PULMICORT) 500 mcg/2 ml nebulizer suspension  500 mcg Nebulization BID RT    nitroglycerin (NITROSTAT) tablet 0.4 mg  0.4 mg SubLINGual Q5MIN PRN    ondansetron (ZOFRAN) injection 4 mg  4 mg IntraVENous Q4H PRN    NUTRITIONAL SUPPORT ELECTROLYTE PRN ORDERS   Does Not Apply PRN    acetaminophen (TYLENOL) suppository 650 mg  650 mg Rectal Q4H PRN    acetaminophen (TYLENOL) tablet 650 mg  650 mg Oral Q4H PRN    albuterol (PROVENTIL HFA, VENTOLIN HFA, PROAIR HFA) inhaler 2 Puff  2 Puff Inhalation Q4H PRN    albuterol (PROVENTIL VENTOLIN) nebulizer solution 2.5 mg  2.5 mg Nebulization Q4H PRN    dextrose (D50W) injection syrg 25 g  25 g IntraVENous PRN    ferrous sulfate tablet 325 mg  1 Tab Oral DAILY WITH BREAKFAST    gabapentin (NEURONTIN) capsule 600 mg  600 mg Oral TID    guaiFENesin ER (MUCINEX) tablet 1,200 mg  1,200 mg Oral Q12H    insulin lispro (HUMALOG) injection   SubCUTAneous AC&HS    povidone-iodine (BETADINE) 10 % topical solution Topical DAILY    predniSONE (DELTASONE) tablet 10 mg  10 mg Oral DAILY WITH BREAKFAST    tiotropium (SPIRIVA) inhalation capsule 18 mcg  1 Cap Inhalation DAILY    traZODone (DESYREL) tablet 50 mg  50 mg Oral QHS PRN    influenza vaccine 2017-18 (3 yrs+)(PF) (FLUZONE QUAD/FLUARIX QUAD) injection 0.5 mL  0.5 mL IntraMUSCular PRIOR TO DISCHARGE    0.9% sodium chloride infusion 250 mL  250 mL IntraVENous PRN    furosemide (LASIX) tablet 20 mg  20 mg Oral DAILY    potassium chloride (K-DUR, KLOR-CON) SR tablet 20 mEq  20 mEq Oral DAILY    metoprolol succinate (TOPROL-XL) tablet 100 mg  100 mg Oral BID    famotidine (PEPCID) tablet 20 mg  20 mg Oral BID    0.9% sodium chloride infusion 250 mL  250 mL IntraVENous PRN     Review of Systems:Denies chest pain, shortness of breath, cough, headache, visual problems, abdominal pain, dysurea, calf pain. Pertinent positives are as noted in the medical records and unremarkable otherwise. Visit Vitals    /60 (BP 1 Location: Left arm)    Pulse (!) 58    Temp 98 °F (36.7 °C)    Resp 18    SpO2 96%   HR 49-60     Physical Exam:   General: Alert and age appropriately oriented. No acute cardio respiratory distress. Frail appearing   HEENT: Normocephalic,no scleral icterus  Oral mucosa moist without cyanosis   Lungs: CTA bilaterally   Heart: Regular rate and rhythm, + mechanical click   Abdomen: Soft, non-tender, nondistended. Bowel sounds present. Genitourinary: Benign . Neuromuscular:      Generalized non focal weakness. sensation intact   Skin/extremity: No rashes, no erythema.  No calf tenderness BLE  Wound left heel ; boggy, red                                                                            Functional Assessment:          Balance  Sitting - Static: Good (unsupported) (09/29/17 1400)  Sitting - Dynamic: Good (unsupported) (09/29/17 1400)  Standing - Static: Fair (09/29/17 1400)  Standing - Dynamic : Impaired (09/29/17 1400) Toileting  Adaptive Equipment: Grab bars; Jessy Hoit (09/27/17 1513)         Maddy Aguilar Fall Risk Assessment:  Maddy Aguilar Fall Risk  Mobility: Ambulates or transfers with assist devices or assistance/unsteady gait (10/01/17 0014)  Mobility Interventions: Patient to call before getting OOB;Utilize walker, cane, or other assitive device (10/01/17 0014)  Mentation: Alert, oriented x 3 (10/01/17 0014)  Mentation Interventions: Door open when patient unattended;Reorient patient; Toileting rounds (10/01/17 0014)  Medication: Patient receiving anticonvulsants, sedatives(tranquilizers), psychotropics or hypnotics, hypoglycemics, narcotics, sleep aids, antihypertensives, laxatives, or diuretics (10/01/17 0014)  Medication Interventions: Patient to call before getting OOB; Teach patient to arise slowly (10/01/17 0014)  Elimination: Needs assistance with toileting (10/01/17 0014)  Elimination Interventions: Call light in reach; Toilet paper/wipes in reach; Patient to call for help with toileting needs; Toileting schedule/hourly rounds;Urinal in reach (10/01/17 0014)  Prior Fall History: During admission (Date - Comment) (10/01/17 0014)  History of Falls Interventions: Door open when patient unattended (10/01/17 0014)  Total Score: 5 (10/01/17 0014)  Standard Fall Precautions: Yes (09/27/17 2251)  High Fall Risk: Yes (10/01/17 0014)     Speech Assessment:  Aspiration Signs/Symptoms: None (09/14/17 1036)      Ambulation:  Gait  Distance (ft): 100 Feet (ft) (x5) (09/29/17 1200)  Assistive Device: Jessy Hoit, rolling;Gait belt (1x with rollator) (09/29/17 1200)  Rail Use: Both (09/29/17 1400)     Labs/Studies:  Recent Results (from the past 72 hour(s))   GLUCOSE, POC    Collection Time: 09/28/17  3:49 PM   Result Value Ref Range    Glucose (POC) 233 (H) 65 - 100 mg/dL   GLUCOSE, POC    Collection Time: 09/28/17  8:46 PM   Result Value Ref Range    Glucose (POC) 217 (H) 65 - 100 mg/dL   GLUCOSE, POC    Collection Time: 09/29/17  6:28 AM   Result Value Ref Range    Glucose (POC) 80 65 - 100 mg/dL   PROTHROMBIN TIME + INR    Collection Time: 09/29/17  7:21 AM   Result Value Ref Range    Prothrombin time 38.7 (H) 9.6 - 12.0 sec    INR 3.5 (H) 0.9 - 1.2     CBC WITH AUTOMATED DIFF    Collection Time: 09/29/17  7:21 AM   Result Value Ref Range    WBC 5.6 4.3 - 11.1 K/uL    RBC 2.63 (L) 4.23 - 5.67 M/uL    HGB 8.5 (L) 13.6 - 17.2 g/dL    HCT 27.8 (L) 41.1 - 50.3 %    .9 (H) 79.6 - 97.8 FL    MCH 32.2 26.1 - 32.9 PG    MCHC 30.4 (L) 31.4 - 35.0 g/dL    RDW 24.6 (H) 11.9 - 14.6 %    PLATELET 46 (L) 238 - 450 K/uL    MPV 13.0 10.8 - 14.1 FL    DF AUTOMATED      NEUTROPHILS 74 43 - 78 %    LYMPHOCYTES 22 13 - 44 %    MONOCYTES 4 4.0 - 12.0 %    EOSINOPHILS 0 (L) 0.5 - 7.8 %    BASOPHILS 0 0.0 - 2.0 %    IMMATURE GRANULOCYTES 0.2 0.0 - 5.0 %    ABS. NEUTROPHILS 3.8 1.7 - 8.2 K/UL    ABS. LYMPHOCYTES 1.1 0.5 - 4.6 K/UL    ABS. MONOCYTES 0.2 0.1 - 1.3 K/UL    ABS. EOSINOPHILS 0.0 0.0 - 0.8 K/UL    ABS. BASOPHILS 0.0 0.0 - 0.2 K/UL    ABS. IMM.  GRANS. 0.0 0.0 - 0.5 K/UL   GLUCOSE, POC    Collection Time: 09/29/17 11:31 AM   Result Value Ref Range    Glucose (POC) 107 (H) 65 - 100 mg/dL   GLUCOSE, POC    Collection Time: 09/29/17  4:13 PM   Result Value Ref Range    Glucose (POC) 217 (H) 65 - 100 mg/dL   GLUCOSE, POC    Collection Time: 09/29/17  8:47 PM   Result Value Ref Range    Glucose (POC) 205 (H) 65 - 100 mg/dL   PROTHROMBIN TIME + INR    Collection Time: 09/30/17  7:21 AM   Result Value Ref Range    Prothrombin time 37.5 (H) 9.6 - 12.0 sec    INR 3.4 (H) 0.9 - 1.2     CBC WITH AUTOMATED DIFF    Collection Time: 09/30/17  7:21 AM   Result Value Ref Range    WBC 4.1 (L) 4.3 - 11.1 K/uL    RBC 2.92 (L) 4.23 - 5.67 M/uL    HGB 9.5 (L) 13.6 - 17.2 g/dL    HCT 30.8 (L) 41.1 - 50.3 %    .7 (H) 79.6 - 97.8 FL    MCH 32.5 26.1 - 32.9 PG    MCHC 30.7 (L) 31.4 - 35.0 g/dL    RDW 24.2 (H) 11.9 - 14.6 %    PLATELET 862 (L) 763 - 450 K/uL    MPV 10.7 (L) 10.8 - 14.1 FL DF AUTOMATED      NEUTROPHILS 71 43 - 78 %    LYMPHOCYTES 23 13 - 44 %    MONOCYTES 5 4.0 - 12.0 %    EOSINOPHILS 1 0.5 - 7.8 %    BASOPHILS 0 0.0 - 2.0 %    IMMATURE GRANULOCYTES 0.3 0.0 - 5.0 %    ABS. NEUTROPHILS 2.7 1.7 - 8.2 K/UL    ABS. LYMPHOCYTES 0.9 0.5 - 4.6 K/UL    ABS. MONOCYTES 0.2 0.1 - 1.3 K/UL    ABS. EOSINOPHILS 0.0 0.0 - 0.8 K/UL    ABS. BASOPHILS 0.0 0.0 - 0.2 K/UL    ABS. IMM. GRANS. 0.0 0.0 - 0.5 K/UL   GLUCOSE, POC    Collection Time: 09/30/17  7:34 AM   Result Value Ref Range    Glucose (POC) 71 65 - 100 mg/dL   GLUCOSE, POC    Collection Time: 09/30/17 10:59 AM   Result Value Ref Range    Glucose (POC) 139 (H) 65 - 100 mg/dL   GLUCOSE, POC    Collection Time: 09/30/17  4:07 PM   Result Value Ref Range    Glucose (POC) 136 (H) 65 - 100 mg/dL   GLUCOSE, POC    Collection Time: 09/30/17  9:32 PM   Result Value Ref Range    Glucose (POC) 161 (H) 65 - 100 mg/dL   PROTHROMBIN TIME + INR    Collection Time: 10/01/17  6:30 AM   Result Value Ref Range    Prothrombin time 32.2 (H) 9.6 - 12.0 sec    INR 2.9 (H) 0.9 - 1.2     GLUCOSE, POC    Collection Time: 10/01/17  7:27 AM   Result Value Ref Range    Glucose (POC) 109 (H) 65 - 100 mg/dL   CBC WITH AUTOMATED DIFF    Collection Time: 10/01/17  7:49 AM   Result Value Ref Range    WBC 4.3 4.3 - 11.1 K/uL    RBC 2.94 (L) 4.23 - 5.67 M/uL    HGB 9.5 (L) 13.6 - 17.2 g/dL    HCT 31.1 (L) 41.1 - 50.3 %    .0 (H) 79.6 - 97.8 FL    MCH 32.2 26.1 - 32.9 PG    MCHC 30.4 (L) 31.4 - 35.0 g/dL    RDW 23.7 (H) 11.9 - 14.6 %    PLATELET 836 782 - 025 K/uL    MPV 10.8 10.8 - 14.1 FL    DF AUTOMATED      NEUTROPHILS 71 43 - 78 %    LYMPHOCYTES 23 13 - 44 %    MONOCYTES 5 4.0 - 12.0 %    EOSINOPHILS 1 0.5 - 7.8 %    BASOPHILS 0 0.0 - 2.0 %    IMMATURE GRANULOCYTES 0.3 0.0 - 5.0 %    ABS. NEUTROPHILS 2.8 1.7 - 8.2 K/UL    ABS. LYMPHOCYTES 0.9 0.5 - 4.6 K/UL    ABS. MONOCYTES 0.2 0.1 - 1.3 K/UL    ABS. EOSINOPHILS 0.0 0.0 - 0.8 K/UL    ABS.  BASOPHILS 0.0 0.0 - 0.2 K/UL    ABS. IMM. GRANS. 0.0 0.0 - 0.5 K/UL   GLUCOSE, POC    Collection Time: 10/01/17 11:25 AM   Result Value Ref Range    Glucose (POC) 135 (H) 65 - 100 mg/dL       Assessment:     Problem List as of 10/1/2017  Date Reviewed: 9/9/2017          Codes Class Noted - Resolved    Respiratory failure ([facility] ICD-10-CM: J96.90  ICD-9-CM: 518.81  9/13/2017 - Present        MRSA nasal colonization ICD-10-CM: Z22.322  ICD-9-CM: V02.54  9/8/2017 - Present        COPD (chronic obstructive pulmonary disease) (HCC) (Chronic) ICD-10-CM: J44.9  ICD-9-CM: 496  8/30/2017 - Present        Acute respiratory failure with hypercapnia (HCC) ICD-10-CM: J96.02  ICD-9-CM: 518.81  8/29/2017 - Present        History of tobacco use (Chronic) ICD-10-CM: Q65.039  ICD-9-CM: V15.82  8/29/2017 - Present        Hyponatremia ICD-10-CM: E87.1  ICD-9-CM: 276.1  8/29/2017 - Present        Acute encephalopathy ICD-10-CM: G93.40  ICD-9-CM: 348.30  8/29/2017 - Present        Thrombocytopenia ([facility] ICD-10-CM: D69.6  ICD-9-CM: 287.5  8/26/2017 - Present        Anticoagulant long-term use (Chronic) ICD-10-CM: Z79.01  ICD-9-CM: V58.61  8/23/2017 - Present        HTN (hypertension), benign (Chronic) ICD-10-CM: I10  ICD-9-CM: 401.1  8/23/2017 - Present        Peripheral vascular disease ([facility] (Chronic) ICD-10-CM: I73.9  ICD-9-CM: 443.9  8/23/2017 - Present        Dyslipidemia (Chronic) ICD-10-CM: E78.5  ICD-9-CM: 272.4  8/23/2017 - Present        History of mechanical aortic valve replacement (Chronic) ICD-10-CM: Z95.2  ICD-9-CM: V43.3  8/23/2017 - Present    Overview Signed 8/30/2017 10:30 AM by Renan Lin NP     Placement 2000, on chronicCoumadin             Centrilobular emphysema ([facility] (Chronic) ICD-10-CM: J43.2  ICD-9-CM: 492.8  8/23/2017 - Present    Overview Signed 9/8/2017  9:24 AM by Delmi Toribio NP      With last PFTs FVC 1.70 L or 44% predicted, FEV1 0.86 L or 29% predicted, FEV1/FVC 51%.  This study was a postbronchodilator study performed at the [facility] on 8/22/2016                  Ischemic cardiomyopathy (Chronic) ICD-10-CM: I25.5  ICD-9-CM: 414.8  8/23/2017 - Present    Overview Signed 8/30/2017 10:29 AM by Tania Gomez NP     8/23/17 ECHO:  EF 40 % to 45 %. There were no regional wall motion abnormalities. Moderate hypokinesis of the mid-apical anterior and apical wall(s). Atrial flutter with rapid ventricular response (HCC) ICD-10-CM: I48.92  ICD-9-CM: 427.32  8/23/2017 - Present        LV dysfunction (Chronic) ICD-10-CM: I51.9  ICD-9-CM: 429.9  10/19/2016 - Present        Atherosclerosis of native arteries of the extremities with intermittent claudication (Chronic) ICD-10-CM: K88.439  ICD-9-CM: 440.21  4/15/2014 - Present              Assessment/ plan:     Debility s/p resp failure, a flutter with RVR s/p cardioversion    Continue daily physician medical management:      Atrial flutter with rapid ventricular response ([facility] (8/23/2017) sp cardioversion - monitor HR./ BP. / hx of CHF / Ischemic cardiomyopathy     - Digoxin, coreg.  - continue anticoagulation. Continued on adjustment daily for INR control. pharmacy to dose  - cardiac precautions.   - (9/16) -recurrent a.fib with RVR. HR 130s. 9/20 continues; metoprolol added 9/19; PT UNABLE TO PARTICIPATE IN THERAPIES. SHOULD BE TRANSFERRED TO TELEMETRY TO MANAGE CARDIAC ISSUES SO AS NOT TO USE OF ACUTE REHAB DAYS. -9/21 will try to encourage participation in therapies today. Wouldn't even do ST at bedside yesterday. Will put parameters on when to hold therapies due to tachyc.   -9/22 s/p successful cardioversion by Dr Belen Hughes; now in NSR, HR 65; restart full therapies (encourage participation); restart Coumadin 5mg daily; 9/25 INR 1.6 ( goal 2-3)  -HR 40-69; ? Decreasing beta blk since weve gone up on amio and now s/p cardioversion; will defer to cardiology  HR 60 after ambulation; which is actually the best he's done in therapy in a long time.   - 9/25  In Sinus. Mild bradycardia. Discontinued diltiazem due to low HR.   - 9/28- continues to be n NSR. HR in good range, 50-70s. . No new changes. - 10/1, cardiology note appreciated, A. Fib/cardiomyopathy with EF - 40% - on telemetry, controlled on lasix, enalapril, amiodarone, lopressor. HR rates in the 47-66 range.      - restarted diuretics, as was taking at home. - CxR 9/16 very small bilateral pleural effusions are stable.    - 9/19-  possible cardioversion, AVN ablation/pacemaker in am per Cardiology/EP    -9/20 HOLD proc due to elevated INR, cont to hold coumadin ; 9/21 INR 2.3 from 3.4; restart coumadin after procedure today if ok with cardiology  - 9-23 coumadin restarted 9/22  - 9/28, appears euvolemic, continue lasix 20mg daily.       History of mechanical aortic valve replacement 2000, on coumadin/ Anticoagulant long-term use. Goal 2.5-3.5.  - resume warfarin, monitor INR.   - INR 2.4 (9/18) . average out home dose ~> 6mg hs.   - Warfarin 6mg HS (9/16)  - pharmacy assisting. On hold(9/19) for procedure.   -9/21 INR now finally trending down; 2.3 from 3.4 yesterday;off coumadin; likely restart Coumadin after procedure  -9/22 restart Coumadin 5mg daily  - 9/28- INR therapeutic, continued on warfarin 5mg . INR -2.8  - 10/1 INR - 2.9 , therapeutic      Bacteremia - started on empiric antibiotic treatment. Aztreonam, vanco.   - BCx grew e.coli. discontinued vano. - final ID extended-spectrum beta-lactamases (ESBLs) , appears treated, though efficacy of vanco, aztreonam not optimal.  . Pt asymptomatic, WBC wnl. Will discontinue - aztreonam. .  - will monitor closely.   - will have on contact precaution, infection control notified.   - BCx redrawn 9/17, no growth, will lift contact precautions 9/19.   - 9/28, no signs of infection, 9/28, remove PICC      HTN (hypertension) - monitor.  - continue Coreg/ digoxin  -9/21 cardizem and lasix ; now off Coreg per cardiology for rate control but now bradycardia; really no rhyme or reason to his fluctuating HR; BP stable  -9/23 -162 acceptable  - 9/25 - SBP. Appears in control.   - 9/28. Continue current regimen, enalapril  -10/1 BP wnml      Thrombocytopenia/ ITP  - continued on steroids. - hematology following.   - HIT negative. - ivig per hematology direction. Will receive 1 more dose. -  received platelet 1unit (0/42). plt 25k -> 75k (9/16)-> 65k (9/17) -> 68 (9/19) monitor; 9/20 plts 52; defer to hematology  -9/21 plts 36; pending 9/22; 19k; will give one unit plts  -9/23 plts 46; want to keep above 50K , thus one unit today. ? Nplate  -5/97 79Q after one unit yesterday; monitor; patient pancytopenic; Hematology to f/u  - 9/28 - plt 61, no change. Monitor. BMB cancelled due to high INR, on anticoagulation for mech valve. - 10/1 platelets - 46 > 628> 154      COPD   - continue respiratory treatments. Presently q6h, wean as appropriate.    - resume spiriva, pulmicort  - SOB with activity. - normal CxR (9/15)  - will wean albuterol nebs 9/18. Bid and as needed. -9/20 SOB; sats 95% RA, bilateral crackles, increasing JIM; likely due to tachycardia; will f/u portable CXR; weaning steroids  -9/21 CXR; shows no significant change in small right pleural effusion and adjacent mild right basilar atelectasis/infiltrate. Continue to enc IS. bipap at noc; sats 95% RA  -9/23 wean O2 s/p cardioversion; 100% sats 2 L NC  -9/23 was weaned from 3L to 1L; sats 98%; BiPap at noc  - 9/25 continue BiPAP HS, no acute flairs. - 9/28, no compromize. Continue BiPAP at night. Dysphagia - improving , MBS completed, diet advanced to mechanical soft/thin liquids with swallowing compensatory manuevers. Medications in pureed.       iron deficiency anemia -hgb 8.5-> 8.7(9/15)-. 10.8(9/18) -> 9.3-> 9.1 (9/19) Monitor.  Continue fe supplements.   -9/20 hgb 8.8; no evidence of acute bleed; hgb 8.1 9/21; check stool  -9/21 pancytopenic, WBC has dropped  as well; monitor for now; hgb 8.1  -9/23 hbg 8.9 ; macrocytosis; B12 and folate wnl  -no evidence of bleed. continue to monitor.    - 9/28, stable hgb , 8.4  - 10/1 Hgb - 9.5 > 9.5, low, yet stable      Hyponatremia  - monitor. 139 on 9/22      Pneumonia prophylaxis- Insentive spirometer every hour while awake      DVT risk / DVT Prophylaxis- Will require daily physician exam to assess for signs and symptoms as patient is at increased risk for of thromboembolism. Mobilization as tolerated. Intermittent pneumatic compression devices when in bed Thigh-high or knee-high thromboembolic deterrent hose when out of bed.    - covered with warfarin.   - no signs of DVT.     Pain Control: mild joint symptoms intermittently, reasonably well controlled by PRN meds. - cont. regular pain assessment and comprenhensive pain management. - resume gabapentin.       Wound Care: Monitor wound status daily per staff and physician. At risk for failure. Will require 24/7 rehab nursing. Keep wound clean and dry  - excoriation at bridge of nose. Continue antibiotic ointment.   -left heel boggy, erythema; order rooke boot; floating heel not working  - 9/28. Stable wound, continue to pressure relief. Expect improvement with more mobility and strength.       Diabetes mellitus - Uncontrolled. poor glycemic control. Will require daily, close FSG monitoring and medication adjustment to optimize glycemic control in setting of acute illness and hospitalization.   - SSI coverage with lispro. Poorly controlled , exacerbated by steroids  -9/21 a bit better controlled, likely due to poor po intake  - 9/22 BS varied greatly 80s to 350s; consider glucotrol  - 9/24 better control. 70s to 170  - 9/25 serum glucose better this morning. Monitor.   - 9/28 - fair control. Mild fluctuations. 100-200s.  No change.   - 10/1 BG - 135      Bowel program - colace as needed.      Gi prophylaxis; change pepcid to po 9/20       Time spent was 15 minutes with over 1/2 in direct patient care/examination, consultation and coordination of care.      Signed By: Jina Holly MD     October 1, 2017

## 2017-10-01 NOTE — PROGRESS NOTES
Warfarin dosing per pharmacist    Stephenie Perry is a 67 y.o. male. Indication: A. Fib    Goal INR:  2-3    Home dose:  5 mg daily, 7.5 mg on Wednesday    Risk factors or significant drug interactions:  Antibiotics, prednisone    Other anticoagulants:  none    Daily Monitoring  Date  INR     Warfarin dose HGB              Notes  9/8  1.2  10mg  11.2    9/9  1.3  10mg  9.4  9/10  ---  10mg  9.7  9/11  2.1  10mg  10.8  9/12  2.9  10mg  8.9  9/13  3.5  Hold  8.4  Pharmacy Consulted  9/14  3.6  Held  8.5  9/15  2.5  5 mg  8.7  9/16  1.9  6 mg  10.8  9/17  2.2  6 mg  9.3  9/18  2.4  6 mg  --  9/19  3.2  Hold  9.1  9/20  3.4  Hold  8.8  9/21  2.3  5 mg  8.1  9/22  1.4  5 mg  ---  9/23  1.4  5 mg  8.9  9/24  1.6  5 mg  7.9  9/25  1.9  5 mg  8.0  9/26  2.6  2.5 mg  ---  9/27  2.6  5 mg  8.4  9/28  2.8  5 mg  8.4  9/29  3.5  2 mg  8.5  9/30  3.4  2 mg  9.5  10/1  2.9  2.5 mg  9.5    INR down to 2.9 today. Will give 2.5 mg this evening with jump to upper limit of therapeutic range. Pt may need higher dose tomorrow and 2-3 times per week going forward. Regimen looks to be an alternating dose of 5 and 2.5 mg currently. Following daily INR.     Thank you,  Jemma Oliver, PharmD  Clinical Pharmacist  [phone]

## 2017-10-01 NOTE — PROGRESS NOTES
Problem: Falls - Risk of  Goal: *Absence of Falls  Document Lauren Fall Risk and appropriate interventions in the flowsheet.    Outcome: Progressing Towards Goal  Fall Risk Interventions:  Mobility Interventions: Patient to call before getting OOB, Utilize walker, cane, or other assitive device     Mentation Interventions: Door open when patient unattended, Reorient patient, Toileting rounds     Medication Interventions: Patient to call before getting OOB, Teach patient to arise slowly     Elimination Interventions: Call light in reach, Toilet paper/wipes in reach, Patient to call for help with toileting needs, Toileting schedule/hourly rounds, Urinal in reach     History of Falls Interventions: Door open when patient unattended

## 2017-10-02 LAB
GLUCOSE BLD STRIP.AUTO-MCNC: 101 MG/DL (ref 65–100)
GLUCOSE BLD STRIP.AUTO-MCNC: 131 MG/DL (ref 65–100)
GLUCOSE BLD STRIP.AUTO-MCNC: 134 MG/DL (ref 65–100)
GLUCOSE BLD STRIP.AUTO-MCNC: 151 MG/DL (ref 65–100)
INR PPP: 2.7 (ref 0.9–1.2)
PROTHROMBIN TIME: 29.1 SEC (ref 9.6–12)

## 2017-10-02 PROCEDURE — 77030011256 HC DRSG MEPILEX <16IN NO BORD MOLN -A

## 2017-10-02 PROCEDURE — 97116 GAIT TRAINING THERAPY: CPT

## 2017-10-02 PROCEDURE — 94660 CPAP INITIATION&MGMT: CPT

## 2017-10-02 PROCEDURE — 36415 COLL VENOUS BLD VENIPUNCTURE: CPT | Performed by: PHYSICAL MEDICINE & REHABILITATION

## 2017-10-02 PROCEDURE — 74011000250 HC RX REV CODE- 250: Performed by: PHYSICAL MEDICINE & REHABILITATION

## 2017-10-02 PROCEDURE — 97110 THERAPEUTIC EXERCISES: CPT

## 2017-10-02 PROCEDURE — 74011250637 HC RX REV CODE- 250/637: Performed by: INTERNAL MEDICINE

## 2017-10-02 PROCEDURE — 85610 PROTHROMBIN TIME: CPT | Performed by: PHYSICAL MEDICINE & REHABILITATION

## 2017-10-02 PROCEDURE — 94640 AIRWAY INHALATION TREATMENT: CPT

## 2017-10-02 PROCEDURE — 65310000000 HC RM PRIVATE REHAB

## 2017-10-02 PROCEDURE — 82962 GLUCOSE BLOOD TEST: CPT

## 2017-10-02 PROCEDURE — 97535 SELF CARE MNGMENT TRAINING: CPT

## 2017-10-02 PROCEDURE — 92526 ORAL FUNCTION THERAPY: CPT

## 2017-10-02 PROCEDURE — 97530 THERAPEUTIC ACTIVITIES: CPT

## 2017-10-02 PROCEDURE — 74011250637 HC RX REV CODE- 250/637: Performed by: PHYSICAL MEDICINE & REHABILITATION

## 2017-10-02 PROCEDURE — 94760 N-INVAS EAR/PLS OXIMETRY 1: CPT

## 2017-10-02 PROCEDURE — 74011636637 HC RX REV CODE- 636/637: Performed by: PHYSICAL MEDICINE & REHABILITATION

## 2017-10-02 RX ORDER — WARFARIN SODIUM 5 MG/1
5 TABLET ORAL EVERY EVENING
Status: DISCONTINUED | OUTPATIENT
Start: 2017-10-02 | End: 2017-10-03 | Stop reason: HOSPADM

## 2017-10-02 RX ADMIN — GABAPENTIN 600 MG: 300 CAPSULE ORAL at 05:19

## 2017-10-02 RX ADMIN — PREDNISONE 10 MG: 20 TABLET ORAL at 09:26

## 2017-10-02 RX ADMIN — POVIDONE-IODINE: 10 SOLUTION TOPICAL at 12:31

## 2017-10-02 RX ADMIN — ALBUTEROL SULFATE 2.5 MG: 2.5 SOLUTION RESPIRATORY (INHALATION) at 18:05

## 2017-10-02 RX ADMIN — METOPROLOL SUCCINATE 100 MG: 100 TABLET, EXTENDED RELEASE ORAL at 09:26

## 2017-10-02 RX ADMIN — POVIDONE-IODINE: 10 SOLUTION TOPICAL at 18:07

## 2017-10-02 RX ADMIN — FAMOTIDINE 20 MG: 20 TABLET ORAL at 18:01

## 2017-10-02 RX ADMIN — TRAZODONE HYDROCHLORIDE 50 MG: 50 TABLET ORAL at 22:18

## 2017-10-02 RX ADMIN — FUROSEMIDE 20 MG: 20 TABLET ORAL at 09:25

## 2017-10-02 RX ADMIN — WARFARIN SODIUM 5 MG: 5 TABLET ORAL at 18:01

## 2017-10-02 RX ADMIN — POTASSIUM CHLORIDE 20 MEQ: 20 TABLET, EXTENDED RELEASE ORAL at 09:26

## 2017-10-02 RX ADMIN — ENALAPRIL MALEATE 5 MG: 5 TABLET ORAL at 09:26

## 2017-10-02 RX ADMIN — METOPROLOL SUCCINATE 100 MG: 100 TABLET, EXTENDED RELEASE ORAL at 21:25

## 2017-10-02 RX ADMIN — GABAPENTIN 600 MG: 300 CAPSULE ORAL at 18:00

## 2017-10-02 RX ADMIN — FAMOTIDINE 20 MG: 20 TABLET ORAL at 09:26

## 2017-10-02 RX ADMIN — FERROUS SULFATE TAB 325 MG (65 MG ELEMENTAL FE) 325 MG: 325 (65 FE) TAB at 09:25

## 2017-10-02 RX ADMIN — GUAIFENESIN 1200 MG: 600 TABLET, EXTENDED RELEASE ORAL at 09:24

## 2017-10-02 RX ADMIN — AMIODARONE HYDROCHLORIDE 200 MG: 200 TABLET ORAL at 09:26

## 2017-10-02 RX ADMIN — TIOTROPIUM BROMIDE 18 MCG: 18 CAPSULE ORAL; RESPIRATORY (INHALATION) at 05:32

## 2017-10-02 RX ADMIN — ALBUTEROL SULFATE 2.5 MG: 2.5 SOLUTION RESPIRATORY (INHALATION) at 05:32

## 2017-10-02 RX ADMIN — GUAIFENESIN 1200 MG: 600 TABLET, EXTENDED RELEASE ORAL at 21:25

## 2017-10-02 RX ADMIN — GABAPENTIN 600 MG: 300 CAPSULE ORAL at 21:25

## 2017-10-02 RX ADMIN — BUDESONIDE 500 MCG: 0.5 INHALANT RESPIRATORY (INHALATION) at 05:32

## 2017-10-02 NOTE — PROGRESS NOTES
10/02/17 1216   Time Spent With Patient   Time In 1200   Time Out 1223   Patient Seen For: PM ;Dysphagia treatment   Mental Status   Neurologic State Alert   Orientation Level Oriented X4   Cognition Appropriate decision making   Perception Appears intact   Perseveration No perseveration noted   Safety/Judgement Fall prevention   Pt seen with lunch meal. Pt demonstrating the chin tuck with 100% accuracy. No overt signs. sx of aspiration noted. Pt reports his cognition is at baseline. Pt is to have a ENT consult at [place] on October 6th. No further ST at this time. Recommend continued chin tuck with thin liquids.     Pau Gant MA/DIANA/SLP

## 2017-10-02 NOTE — PROGRESS NOTES
SFD PROGRESS NOTE    Ami Mandril  Admit Date: 9/13/2017  Admit Diagnosis: Cardiac Debility; Respiratory failure (HCC)    Subjective     Vss. Afebrile, remains in SR. HR remains in good range since cardioversion. Patient denies chest pain, SOB, cough, palpitations. Pt, OT continuing well, without setbacks. Patient tolerating thin liquids with chin tuck. Patient has ENT consult 10/6, at [place]. Anticipate safe discharge in am tomorrow.      Objective:     Current Facility-Administered Medications   Medication Dose Route Frequency    warfarin (COUMADIN) tablet 2.5 mg  2.5 mg Oral QPM    amiodarone (CORDARONE) tablet 200 mg  200 mg Oral DAILY    simethicone (MYLICON) tablet 80 mg  80 mg Oral QID PRN    enalapril (VASOTEC) tablet 5 mg  5 mg Oral DAILY    0.9% sodium chloride infusion 250 mL  250 mL IntraVENous PRN    albuterol CONCENTRATE 2.5mg/0.5 mL neb soln  2.5 mg Nebulization BID    budesonide (PULMICORT) 500 mcg/2 ml nebulizer suspension  500 mcg Nebulization BID RT    nitroglycerin (NITROSTAT) tablet 0.4 mg  0.4 mg SubLINGual Q5MIN PRN    ondansetron (ZOFRAN) injection 4 mg  4 mg IntraVENous Q4H PRN    NUTRITIONAL SUPPORT ELECTROLYTE PRN ORDERS   Does Not Apply PRN    acetaminophen (TYLENOL) suppository 650 mg  650 mg Rectal Q4H PRN    acetaminophen (TYLENOL) tablet 650 mg  650 mg Oral Q4H PRN    albuterol (PROVENTIL HFA, VENTOLIN HFA, PROAIR HFA) inhaler 2 Puff  2 Puff Inhalation Q4H PRN    albuterol (PROVENTIL VENTOLIN) nebulizer solution 2.5 mg  2.5 mg Nebulization Q4H PRN    dextrose (D50W) injection syrg 25 g  25 g IntraVENous PRN    ferrous sulfate tablet 325 mg  1 Tab Oral DAILY WITH BREAKFAST    gabapentin (NEURONTIN) capsule 600 mg  600 mg Oral TID    guaiFENesin ER (MUCINEX) tablet 1,200 mg  1,200 mg Oral Q12H    insulin lispro (HUMALOG) injection   SubCUTAneous AC&HS    povidone-iodine (BETADINE) 10 % topical solution   Topical DAILY    predniSONE (DELTASONE) tablet 10 mg  10 mg Oral DAILY WITH BREAKFAST    tiotropium (SPIRIVA) inhalation capsule 18 mcg  1 Cap Inhalation DAILY    traZODone (DESYREL) tablet 50 mg  50 mg Oral QHS PRN    influenza vaccine 2017-18 (3 yrs+)(PF) (FLUZONE QUAD/FLUARIX QUAD) injection 0.5 mL  0.5 mL IntraMUSCular PRIOR TO DISCHARGE    0.9% sodium chloride infusion 250 mL  250 mL IntraVENous PRN    furosemide (LASIX) tablet 20 mg  20 mg Oral DAILY    potassium chloride (K-DUR, KLOR-CON) SR tablet 20 mEq  20 mEq Oral DAILY    metoprolol succinate (TOPROL-XL) tablet 100 mg  100 mg Oral BID    famotidine (PEPCID) tablet 20 mg  20 mg Oral BID    0.9% sodium chloride infusion 250 mL  250 mL IntraVENous PRN     Review of Systems:Denies chest pain, shortness of breath, cough, headache, visual problems, abdominal pain, dysurea, calf pain. Pertinent positives are as noted in the medical records and unremarkable otherwise. Visit Vitals    /68 (BP 1 Location: Left arm)    Pulse 63    Temp 97.9 °F (36.6 °C)    Resp 16    SpO2 93%   HR 49-60     Physical Exam:   General: Alert and age appropriately oriented. No acute cardio respiratory distress. Frail appearing   HEENT: Normocephalic,no scleral icterus  Oral mucosa moist without cyanosis   Lungs: CTA bilaterally   Heart: Regular rate and rhythm, + mechanical click   Abdomen: Soft, non-tender, nondistended. Bowel sounds present. Genitourinary: Benign . Neuromuscular:      Generalized non focal weakness. sensation intact   Skin/extremity: No rashes, no erythema. No calf tenderness BLE  Wound left heel ; boggy, red                                                                            Functional Assessment:          Balance  Sitting - Static: Good (unsupported) (09/29/17 1400)  Sitting - Dynamic: Good (unsupported) (09/29/17 1400)  Standing - Static: Fair (09/29/17 1400)  Standing - Dynamic : Impaired (09/29/17 1400)           Toileting  Adaptive Equipment: Grab bars; [address] (09/27/17 [address])         Psychiatric Fall Risk Assessment:  Jer Brock Fall Risk  Mobility: Ambulates or transfers with assist devices or assistance/unsteady gait (10/01/17 1927)  Mobility Interventions: Patient to call before getting OOB;Utilize walker, cane, or other assitive device (10/01/17 1927)  Mentation: Alert, oriented x 3 (10/01/17 1927)  Mentation Interventions: Door open when patient unattended;Reorient patient; Toileting rounds;Update white board (10/01/17 1927)  Medication: Patient receiving anticonvulsants, sedatives(tranquilizers), psychotropics or hypnotics, hypoglycemics, narcotics, sleep aids, antihypertensives, laxatives, or diuretics (10/01/17 1927)  Medication Interventions: Patient to call before getting OOB; Teach patient to arise slowly (10/01/17 1927)  Elimination: Needs assistance with toileting (10/01/17 1927)  Elimination Interventions: Call light in reach; Patient to call for help with toileting needs; Toilet paper/wipes in reach; Toileting schedule/hourly rounds;Urinal in reach (10/01/17 1927)  Prior Fall History: During admission (Date - Comment) (10/01/17 1927)  History of Falls Interventions: Door open when patient unattended (10/01/17 1927)  Total Score: 5 (10/01/17 1927)  Standard Fall Precautions: Yes (09/27/17 2251)  High Fall Risk: Yes (10/01/17 1927)     Speech Assessment:  Aspiration Signs/Symptoms: None (09/14/17 1036)      Ambulation:  Gait  Distance (ft): 100 Feet (ft) (x5) (09/29/17 1200)  Assistive Device: Walker, rolling;Gait belt (1x with rollator) (09/29/17 1200)  Rail Use: Both (09/29/17 1400)     Labs/Studies:  Recent Results (from the past 72 hour(s))   GLUCOSE, POC    Collection Time: 09/29/17 11:31 AM   Result Value Ref Range    Glucose (POC) 107 (H) 65 - 100 mg/dL   GLUCOSE, POC    Collection Time: 09/29/17  4:13 PM   Result Value Ref Range    Glucose (POC) 217 (H) 65 - 100 mg/dL   GLUCOSE, POC    Collection Time: 09/29/17  8:47 PM   Result Value Ref Range    Glucose (POC) 205 (H) 65 - 100 mg/dL   PROTHROMBIN TIME + INR    Collection Time: 09/30/17  7:21 AM   Result Value Ref Range    Prothrombin time 37.5 (H) 9.6 - 12.0 sec    INR 3.4 (H) 0.9 - 1.2     CBC WITH AUTOMATED DIFF    Collection Time: 09/30/17  7:21 AM   Result Value Ref Range    WBC 4.1 (L) 4.3 - 11.1 K/uL    RBC 2.92 (L) 4.23 - 5.67 M/uL    HGB 9.5 (L) 13.6 - 17.2 g/dL    HCT 30.8 (L) 41.1 - 50.3 %    .7 (H) 79.6 - 97.8 FL    MCH 32.5 26.1 - 32.9 PG    MCHC 30.7 (L) 31.4 - 35.0 g/dL    RDW 24.2 (H) 11.9 - 14.6 %    PLATELET 487 (L) 903 - 450 K/uL    MPV 10.7 (L) 10.8 - 14.1 FL    DF AUTOMATED      NEUTROPHILS 71 43 - 78 %    LYMPHOCYTES 23 13 - 44 %    MONOCYTES 5 4.0 - 12.0 %    EOSINOPHILS 1 0.5 - 7.8 %    BASOPHILS 0 0.0 - 2.0 %    IMMATURE GRANULOCYTES 0.3 0.0 - 5.0 %    ABS. NEUTROPHILS 2.7 1.7 - 8.2 K/UL    ABS. LYMPHOCYTES 0.9 0.5 - 4.6 K/UL    ABS. MONOCYTES 0.2 0.1 - 1.3 K/UL    ABS. EOSINOPHILS 0.0 0.0 - 0.8 K/UL    ABS. BASOPHILS 0.0 0.0 - 0.2 K/UL    ABS. IMM.  GRANS. 0.0 0.0 - 0.5 K/UL   GLUCOSE, POC    Collection Time: 09/30/17  7:34 AM   Result Value Ref Range    Glucose (POC) 71 65 - 100 mg/dL   GLUCOSE, POC    Collection Time: 09/30/17 10:59 AM   Result Value Ref Range    Glucose (POC) 139 (H) 65 - 100 mg/dL   GLUCOSE, POC    Collection Time: 09/30/17  4:07 PM   Result Value Ref Range    Glucose (POC) 136 (H) 65 - 100 mg/dL   GLUCOSE, POC    Collection Time: 09/30/17  9:32 PM   Result Value Ref Range    Glucose (POC) 161 (H) 65 - 100 mg/dL   PROTHROMBIN TIME + INR    Collection Time: 10/01/17  6:30 AM   Result Value Ref Range    Prothrombin time 32.2 (H) 9.6 - 12.0 sec    INR 2.9 (H) 0.9 - 1.2     GLUCOSE, POC    Collection Time: 10/01/17  7:27 AM   Result Value Ref Range    Glucose (POC) 109 (H) 65 - 100 mg/dL   CBC WITH AUTOMATED DIFF    Collection Time: 10/01/17  7:49 AM   Result Value Ref Range    WBC 4.3 4.3 - 11.1 K/uL    RBC 2.94 (L) 4.23 - 5.67 M/uL    HGB 9.5 (L) 13.6 - 17.2 g/dL    HCT 31.1 (L) 41.1 - 50.3 %    .0 (H) 79.6 - 97.8 FL    MCH 32.2 26.1 - 32.9 PG    MCHC 30.4 (L) 31.4 - 35.0 g/dL    RDW 23.7 (H) 11.9 - 14.6 %    PLATELET 853 309 - 845 K/uL    MPV 10.8 10.8 - 14.1 FL    DF AUTOMATED      NEUTROPHILS 71 43 - 78 %    LYMPHOCYTES 23 13 - 44 %    MONOCYTES 5 4.0 - 12.0 %    EOSINOPHILS 1 0.5 - 7.8 %    BASOPHILS 0 0.0 - 2.0 %    IMMATURE GRANULOCYTES 0.3 0.0 - 5.0 %    ABS. NEUTROPHILS 2.8 1.7 - 8.2 K/UL    ABS. LYMPHOCYTES 0.9 0.5 - 4.6 K/UL    ABS. MONOCYTES 0.2 0.1 - 1.3 K/UL    ABS. EOSINOPHILS 0.0 0.0 - 0.8 K/UL    ABS. BASOPHILS 0.0 0.0 - 0.2 K/UL    ABS. IMM.  GRANS. 0.0 0.0 - 0.5 K/UL   GLUCOSE, POC    Collection Time: 10/01/17 11:25 AM   Result Value Ref Range    Glucose (POC) 135 (H) 65 - 100 mg/dL   GLUCOSE, POC    Collection Time: 10/01/17  4:12 PM   Result Value Ref Range    Glucose (POC) 183 (H) 65 - 100 mg/dL   GLUCOSE, POC    Collection Time: 10/01/17  8:52 PM   Result Value Ref Range    Glucose (POC) 135 (H) 65 - 100 mg/dL   PROTHROMBIN TIME + INR    Collection Time: 10/02/17  6:31 AM   Result Value Ref Range    Prothrombin time 29.1 (H) 9.6 - 12.0 sec    INR 2.7 (H) 0.9 - 1.2     GLUCOSE, POC    Collection Time: 10/02/17  7:15 AM   Result Value Ref Range    Glucose (POC) 101 (H) 65 - 100 mg/dL       Assessment:     Problem List as of 10/2/2017  Date Reviewed: 9/9/2017          Codes Class Noted - Resolved    Respiratory failure ([facility] ICD-10-CM: J96.90  ICD-9-CM: 518.81  9/13/2017 - Present        MRSA nasal colonization ICD-10-CM: Z22.322  ICD-9-CM: V02.54  9/8/2017 - Present        COPD (chronic obstructive pulmonary disease) (HCC) (Chronic) ICD-10-CM: J44.9  ICD-9-CM: 496  8/30/2017 - Present        Acute respiratory failure with hypercapnia (HCC) ICD-10-CM: J96.02  ICD-9-CM: 518.81  8/29/2017 - Present        History of tobacco use (Chronic) ICD-10-CM: I43.419  ICD-9-CM: V15.82  8/29/2017 - Present        Hyponatremia ICD-10-CM: E87.1  ICD-9-CM: 276.1  8/29/2017 - Present Acute encephalopathy ICD-10-CM: G93.40  ICD-9-CM: 348.30  8/29/2017 - Present        Thrombocytopenia ([facility] ICD-10-CM: D69.6  ICD-9-CM: 287.5  8/26/2017 - Present        Anticoagulant long-term use (Chronic) ICD-10-CM: Z79.01  ICD-9-CM: V58.61  8/23/2017 - Present        HTN (hypertension), benign (Chronic) ICD-10-CM: I10  ICD-9-CM: 401.1  8/23/2017 - Present        Peripheral vascular disease ([facility] (Chronic) ICD-10-CM: I73.9  ICD-9-CM: 443.9  8/23/2017 - Present        Dyslipidemia (Chronic) ICD-10-CM: E78.5  ICD-9-CM: 272.4  8/23/2017 - Present        History of mechanical aortic valve replacement (Chronic) ICD-10-CM: Z95.2  ICD-9-CM: V43.3  8/23/2017 - Present    Overview Signed 8/30/2017 10:30 AM by Caryle Dach, NP     Placement 2000, on chronicCoumadin             Centrilobular emphysema ([facility] (Chronic) ICD-10-CM: J43.2  ICD-9-CM: 492.8  8/23/2017 - Present    Overview Signed 9/8/2017  9:24 AM by Augusta Romero NP      With last PFTs FVC 1.70 L or 44% predicted, FEV1 0.86 L or 29% predicted, FEV1/FVC 51%. This study was a postbronchodilator study performed at the [facility] on 8/22/2016                  Ischemic cardiomyopathy (Chronic) ICD-10-CM: I25.5  ICD-9-CM: 414.8  8/23/2017 - Present    Overview Signed 8/30/2017 10:29 AM by Caryle Dach, NP     8/23/17 ECHO:  EF 40 % to 45 %. There were no regional wall motion abnormalities. Moderate hypokinesis of the mid-apical anterior and apical wall(s).              Atrial flutter with rapid ventricular response (HCC) ICD-10-CM: V83.12  ICD-9-CM: 427.32  8/23/2017 - Present        LV dysfunction (Chronic) ICD-10-CM: I51.9  ICD-9-CM: 429.9  10/19/2016 - Present        Atherosclerosis of native arteries of the extremities with intermittent claudication (Chronic) ICD-10-CM: Y82.582  ICD-9-CM: 440.21  4/15/2014 - Present              Assessment/ plan:     Debility s/p resp failure, a flutter with RVR s/p cardioversion    Continue daily physician medical management:      Atrial flutter with rapid ventricular response ([facility] (8/23/2017) sp cardioversion - monitor HR./ BP. / hx of CHF / Ischemic cardiomyopathy / hypertension    - Digoxin, coreg.  - continue anticoagulation. Continued on adjustment daily for INR control. pharmacy to dose  - cardiac precautions.   - (9/16) -recurrent a.fib with RVR. HR 130s. 9/20 continues; metoprolol added 9/19; PT UNABLE TO PARTICIPATE IN THERAPIES. SHOULD BE TRANSFERRED TO TELEMETRY TO MANAGE CARDIAC ISSUES SO AS NOT TO USE OF ACUTE REHAB DAYS. -9/21 will try to encourage participation in therapies today. Wouldn't even do ST at bedside yesterday. Will put parameters on when to hold therapies due to tachyc.   -9/22 s/p successful cardioversion by Dr Janene Bennett; now in NSR, HR 65; restart full therapies (encourage participation); restart Coumadin 5mg daily; 9/25 INR 1.6 ( goal 2-3)  -HR 40-69; ? Decreasing beta blk since weve gone up on amio and now s/p cardioversion; will defer to cardiology  HR 60 after ambulation; which is actually the best he's done in therapy in a long time. - 9/25  In Sinus. Mild bradycardia. Discontinued diltiazem due to low HR.   - 9/28- continues to be n NSR. HR in good range, 50-70s. . No new changes. - 10/1, cardiology note appreciated, A. Fib/cardiomyopathy with EF - 40% - on telemetry, controlled on lasix, enalapril, amiodarone, lopressor. HR rates in the 47-66 range.  - 10/2 HR remains in good range. Continue telemetry. Continue amiodarone 200 daily. Continue BB, lasix, enalapril at current dose.           History of mechanical aortic valve replacement 2000, on coumadin/ Anticoagulant long-term use. Goal 2.5-3.5.  - resume warfarin, monitor INR.   - INR 2.4 (9/18) . average out home dose ~> 6mg hs.   - Warfarin 6mg HS (9/16)  - pharmacy assisting.  On hold(9/19) for procedure.   -9/21 INR now finally trending down; 2.3 from 3.4 yesterday;off coumadin; likely restart Coumadin after procedure  -9/22 restart Coumadin 5mg daily  - 9/28- INR therapeutic, continued on warfarin 5mg . INR -2.8  - 10/1 INR - 2.9   - 10/2 - INR 2.7. Current dose continued.       Bacteremia - started on empiric antibiotic treatment. Aztreonam, vanco.   - BCx grew e.coli. discontinued vano. - final ID extended-spectrum beta-lactamases (ESBLs) , appears treated, though efficacy of vanco, aztreonam not optimal.  . Pt asymptomatic, WBC wnl. Will discontinue - aztreonam. .  - will monitor closely. - will have on contact precaution, infection control notified.   - BCx redrawn 9/17, no growth, will lift contact precautions 9/19.   - 9/28, no signs of infection, 9/28, remove PICC  - 10/2 stable exam. No new infection.       HTN (hypertension) - monitor.  - continue Coreg/ digoxin  -9/21 cardizem and lasix ; now off Coreg per cardiology for rate control but now bradycardia; really no rhyme or reason to his fluctuating HR; BP stable  -9/23 -162 acceptable  - 9/25 - SBP. Appears in control.   - 9/28. Continue current regimen, enalapril  -10/1 BP wnml   - 10/2 no new antihypertensive changes.       Thrombocytopenia/ ITP  - continued on steroids. - hematology following.   - HIT negative. - ivig per hematology direction. Will receive 1 more dose. -  received platelet 1unit (3/28). plt 25k -> 75k (9/16)-> 65k (9/17) -> 68 (9/19) monitor; 9/20 plts 52; defer to hematology  -9/21 plts 36; pending 9/22; 19k; will give one unit plts  -9/23 plts 46; want to keep above 50K , thus one unit today. ? Nplate  -9/27 99P after one unit yesterday; monitor; patient pancytopenic; Hematology to f/u  - 9/28 - plt 61, no change. Monitor. BMB cancelled due to high INR, on anticoagulation for mech valve. - 10/1 platelets - 46 > 648> 154(10/2). [facility].       COPD   - continue respiratory treatments. Presently q6h, wean as appropriate.    - resume spiriva, pulmicort  - SOB with activity.    - normal CxR (9/15)  - will wean albuterol nebs 9/18. Bid and as needed. -9/20 SOB; sats 95% RA, bilateral crackles, increasing JIM; likely due to tachycardia; will f/u portable CXR; weaning steroids  -9/21 CXR; shows no significant change in small right pleural effusion and adjacent mild right basilar atelectasis/infiltrate. Continue to enc IS. bipap at noc; sats 95% RA  -9/23 wean O2 s/p cardioversion; 100% sats 2 L NC  -9/23 was weaned from 3L to 1L; sats 98%; BiPap at noc  - 9/25 continue BiPAP HS, no acute flairs. - 9/28, no compromize. Continue BiPAP at night. - continue to have Bipap at night. Dysphagia - improving , MBS completed, diet advanced to mechanical soft/thin liquids with swallowing compensatory manuevers. Medications in pureed.       iron deficiency anemia -hgb 8.5-> 8.7(9/15)-. 10.8(9/18) -> 9.3-> 9.1 (9/19) Monitor. Continue fe supplements.   -9/20 hgb 8.8; no evidence of acute bleed; hgb 8.1 9/21; check stool  -9/21 pancytopenic, WBC has dropped  as well; monitor for now; hgb 8.1  -9/23 hbg 8.9 ; macrocytosis; B12 and folate wnl  -no evidence of bleed. continue to monitor.    - 9/28, stable hgb , 8.4  - 10/1 Hgb - 9.5 > 9.5, low, yet stable      Hyponatremia  - monitor. 139 on 9/22      Pneumonia prophylaxis- Insentive spirometer every hour while awake      DVT risk / DVT Prophylaxis- Will require daily physician exam to assess for signs and symptoms as patient is at increased risk for of thromboembolism. Mobilization as tolerated. Intermittent pneumatic compression devices when in bed Thigh-high or knee-high thromboembolic deterrent hose when out of bed.    - covered with warfarin.   - no signs of DVT.     Pain Control: mild joint symptoms intermittently, reasonably well controlled by PRN meds. - cont. regular pain assessment and comprenhensive pain management. - continue gabapentin. No new pain complaint       Wound Care: Monitor wound status daily per staff and physician.  At risk for failure. Will require 24/7 rehab nursing. Keep wound clean and dry  - excoriation at bridge of nose. Continue antibiotic ointment.   -left heel boggy, erythema; order rooke boot; floating heel not working  - 9/28. Stable wound, continue to pressure relief. Expect improvement with more mobility and strength.       Diabetes mellitus - Uncontrolled. poor glycemic control. Will require daily, close FSG monitoring and medication adjustment to optimize glycemic control in setting of acute illness and hospitalization.   - SSI coverage with lispro. Poorly controlled , exacerbated by steroids  -9/21 a bit better controlled, likely due to poor po intake  - 9/22 BS varied greatly 80s to 350s; consider glucotrol  - 9/24 better control. 70s to 170  - 9/25 serum glucose better this morning. Monitor.   - 9/28 - fair control. Mild fluctuations. 100-200s. No change.   - 10/1 BG - 135    - 10/2 - glycemic control good. No SSI coverage needed. Bowel program - colace as needed.      Gi prophylaxis; change pepcid to po 9/20       Time spent was 25 minutes with over 1/2 in direct patient care/examination, consultation and coordination of care.      Signed By: Sarah Doe MD     October 2, 2017

## 2017-10-02 NOTE — PROGRESS NOTES
OT Daily Note    Time In 1301   Time Out 1348     Subjective: \"I'll do these at home. \"  Pain: None indicated, fatigue, therapy to tolerance    Precautions: Contact (MRSA)    Education   Completed HEP education for BUE with patient seated in wheelchair. Patient verbalized and demonstrated understanding for all 7 BUE therapeutic exercises included in HEP utilizing yellow Theraband. Patient completed 2 sets x 10 reps of scapular retraction, shoulder flexion, shoulder extension, shoulder horizontal abduction, anterior punch, elbow flexion, and elbow extension exercises. Printed copy of HEP and yellow Theraband provided to patient. Assessment: Patient tolerated well    Education: HEP for BUE  Interdisciplinary Communication: Collaborated with Mily Brown and agreed patient is ready for discharge. Plan: Continue to address ADL/IADL, functional mobility, activity tolerance, balance, strengthening, coordination, cognition.       Stella Ibarra, OTR/L

## 2017-10-02 NOTE — PROGRESS NOTES
[facility] CARDIOLOGY PROGRESS NOTE           10/2/2017 10:12 AM    Admit Date: 9/13/2017      Subjective:   Participating in PT. Denies complaints. ROS:  GEN:  No fever or chills  Cardiovascular:  As noted above:no CP or palpitations. Pulmonary:  As noted above:No SOB. Neuro:  No new focal motor or sensory loss    Objective:      Vitals:    10/01/17 2036 10/01/17 2322 10/02/17 0533 10/02/17 0650   BP: 133/60   151/68   Pulse: 65   63   Resp: 18   16   Temp: 97.9 °F (36.6 °C)   97.9 °F (36.6 °C)   SpO2: 96% 98% 96% 93%       Physical Exam:  General-no distress  Neck- supple, no JVD  CV- regular rate and rhythm with mechanical Ao sound  Lung- clear bilaterally  Abd- soft, nontender, nondistended  Ext- trace edema bilaterally. Skin- warm and dry  Psychiatric:  Normal mood and affect. Neurologic:  Alert and oriented X 3      Data Review:   Recent Labs      10/02/17   0631  10/01/17   0749  10/01/17   0630  09/30/17   0721   WBC   --   4.3   --   4.1*   HGB   --   9.5*   --   9.5*   HCT   --   31.1*   --   30.8*   PLT   --   154   --   107*   INR  2.7*   --   2.9*  3.4*       TELEMETRY:  NSR    Assessment/Plan:     Active Problems:    Ischemic cardiomyopathy (8/23/2017): Stable on Toprol &Vasotec & Lasix. Overview: 8/23/17 ECHO:      EF 40 % to 45 %. There were no regional wall motion abnormalities. Moderate hypokinesis of the mid-apical anterior and apical wall(s). Atrial flutter with rapid ventricular response (HCC) (8/23/2017):NSR with IVCD. Continue Warfarin and Amiodarone. Thrombocytopenia ([address] (8/26/2017)      COPD (chronic obstructive pulmonary disease) ([address] (8/30/2017)      Respiratory failure ([address] (9/13/2017)     AVR:Stable on therapeutic Warfarin.               Bro Meade MD  10/2/2017 10:12 AM

## 2017-10-02 NOTE — PROGRESS NOTES
Warfarin dosing per pharmacist    Delfina Mooney is a 67 y.o. male. Indication: A. Fib    Goal INR:  2-3    Home dose:  5 mg daily, 7.5 mg on Wednesday    Risk factors or significant drug interactions: amiodarone    Other anticoagulants:  none    Daily Monitoring  Date  INR     Warfarin dose HGB              Notes  9/8  1.2  10mg  11.2    9/9  1.3  10mg  9.4  9/10  ---  10mg  9.7  9/11  2.1  10mg  10.8  9/12  2.9  10mg  8.9  9/13  3.5  Hold  8.4  Pharmacy Consulted  9/14  3.6  Held  8.5  9/15  2.5  5 mg  8.7  9/16  1.9  6 mg  10.8  9/17  2.2  6 mg  9.3  9/18  2.4  6 mg  --  9/19  3.2  Hold  9.1  9/20  3.4  Hold  8.8  9/21  2.3  5 mg  8.1  9/22  1.4  5 mg  ---  9/23  1.4  5 mg  8.9  9/24  1.6  5 mg  7.9  9/25  1.9  5 mg  8.0  9/26  2.6  2.5 mg  ---  9/27  2.6  5 mg  8.4  9/28  2.8  5 mg  8.4  9/29  3.5  2 mg  8.5  9/30  3.4  2 mg  9.5  10/1  2.9  2.5 mg  9.5  10/2  2.7  5 mg  ---    INR down to 2.7 today. Give warfarin 5 mg tonight. Looks like patient was therapeutic on a dose of about 30 mg weekly. Would plan to give two days of 2.5 mg doses and the rest 5 mg. Follow daily INR for now to determine dosing schedule. Pharmacy will continue to follow. Please call with any questions.     Thank you,  Sangeeta Valdez, PharmD  Clinical Pharmacist  [phone]

## 2017-10-02 NOTE — PROGRESS NOTES
PHYSICAL THERAPY DISCHARGE SUMMARY    Time in: 1521  Time out: 1606     Precautions at discharge: Other (comment) (Decreased endurance)    Problem List:    Decreased strength B LE  [x]     Decreased strength trunk/core  []     Decreased AROM   []     Decreased PROM  []     Decreased balance sitting  []     Decreased balance standing  [x]     Decreased endurance  [x]     Pain  []       Functional Limitations:   Decreased independence with bed mobility  [x]     Decreased independence with functional transfers  [x]     Decreased independence with ambulation  [x]     Decreased independence with stair negotiation  [x]            Outcome Measures: Vital Signs: No vitals taken this PM.    Pain level: No pain reported. Pain location: n/a  Pain interventions: n/a    Patient education: Educated patient on safety with home environment. Interdisciplinary Communication: Communicated with OT regarding patient's POC.          MMT Initial Asssessment   Right Lower Extremity Left Lower Extremity   Hip Flexion 4 4   Knee Extension 4 4   Knee Flexion 4- 4-   Ankle Dorsiflexion 4 4      MMT Discharge Assessment   Right Lower Extremity Left Lower Extremity   Hip Flexion 4 4+   Knee Extension 4+ 4+   Knee Flexion 4+ 4   Ankle Dorsiflexion 4+ 4+   0/5 No palpable muscle contraction  1/5 Palpable muscle contraction, no joint movement  2-/5 Less than full range of motion in gravity eliminated position  2/5 Able to complete full range of motion in gravity eliminated position  2+/5 Able to initiate movement against gravity  3-/5 More than half but not full range of motion against gravity  3/5 Able to complete full range of motion against gravity  3+/5 Completes full range of motion against gravity with minimal resistance  4-/5 Completes full range of motion against gravity with minimal-moderate resistance  4/5 Completes full range of motion against gravity with moderate resistance  4+/5 Completes full range of motion against gravity with moderate-maximum resistance  5/5 Completes full range of motion against gravity with maximum resistance     AROM: WFL    FIM SCORES Initial Assessment Discharge Assessment   Bed/Chair/Wheelchair Transfers 4 6   Wheelchair Mobility 0 (Secondary to patient fatigue.)  0 (Patient's primary mode of locomotion is ambulation)   Walking [place] 1 6   Steps/Stairs 0 (Secondary to patient fatigue) 2   PRIMARY MODE OF LOCOMOTION: Ambulation  Please see [facility] Interdisciplinary Eval: Coordination/Balance Section for details regarding FIM score description.     BED/CHAIR/WHEELCHAIR TRANSFERS Initial Assessment Discharge Assessment   Rolling Right 0 (Not tested) 6 (Modified independent)   Rolling Left 0 (Not tested) 6 (Modified independent)   Supine to Sit 4 (Contact guard assistance) 6 (Modified independent)   Sit to Stand Contact guard assistance Modified independent   Sit to Supine 4 (Minimal assistance) (CGA) 6 (Modified independent)   Transfer Assist Score 4 6   Transfer Type SPT with walker SPT with walker   Comments Patient unsteady with standing, takes short, cautious steps     Car Transfer Not tested Not tested   Car Type           WHEELCHAIR MOBILITY/MANAGEMENT Initial Assessment Discharge Assessment   Able to Propel       Functional Level 0 (Secondary to patient fatigue.)  0 (Not tested- secondary to patient's primary mode of locomotion is ambulation)   Curbs/ramps assistance required 0 (Not tested)     Wheelchair set up assistance required 0 (Not tested)     Wheelchair management           WALKING INDEPENDENCE Initial Assessment Discharge Assessment   Assistive device  (No Device- per pre-morbid level of function) Walker, rolling   Ambulation assistance - level surface 4 (Minimal assistance) 6 (Modified independent)   Distance 5 Feet (ft) 220 Feet (ft)   Functional Level 1 6   Comments Decreased dynamic standing balance, patient ambulated with decreased step length B LE, decreased arm swing B UE, decreased trunk sway, mild forward flexed posture  Patient ambulated with slow, reciprocal pattern with improved upright posture. Ambulation assistance - unlevel surface 0 (Not tested)  6 (Modified Independent)       STEPS/STAIRS Initial Assessment Discharge Assessment   Steps/Stairs ambulated 0 8   Rail Use Both Both   Functional Level 0 (Secondary to patient fatigue) 2   Comments    Patient performed one step at a time method with use of B rails for safety. Curbs/Ramps           QUALITY INDICATOR ASSIST COMMENTS   Walk 10 feet MOD I    Walk 50 feet with 2 turns MOD I    Walk 150 feet MOD I    Walk 10 feet on uneven  MOD I    1 step/curb Supervision    4 steps Supervision    12 steps NT     object JAMAR    Wheel 50' w/2 turns NT Patient's primary mode of locomotion is ambulation   Wheel 150' NT      Patient returned to room lying supine in bed with all needs in reach. PHYSICAL THERAPY PLAN OF CARE    LTGs: For updated LTG's please see Care Plan. Pt would benefit from continued skilled physical therapy in order to improve independent functional mobility within the home with use of least restrictive device. Interventions may include range of motion (AROM, PROM B LE/trunk), motor function (B LE/trunk strengthening/coordination), activity tolerance (vitals, oxygen saturation levels), bed mobility training, balance activities, gait training (progressive ambulation program), and functional transfer training. HEP handout: Given to patient on 10/2/17. Therapy Recommendations upon discharge: Noemi Freire needs at discharge:    None    Please see [id number] Peggy HYDE; Interdisciplinary Eval, Care Plan, and Patient Education for further information regarding physical therapy discharge summary and plan of care.      Christiano Bosch  10/2/2017

## 2017-10-02 NOTE — PROGRESS NOTES
OT DISCHARGE REPORT    Time In: 0830  Time Out: [address] Initial Assessment Discharge Assessment 10/2/2017   Score 6 6 ([place])     EXPRESSION Initial Assessment Discharge Assessment 10/2/2017   Primary Mode of Expression Verbal Verbal   Score 6 6   Comments Increased time, distress with speech d/t intubation Increased time d/t hoarseness     SOCIAL INTERACTION/ PRAGMATICS Initial Assessment Discharge Assessment 10/2/2017   Score 5 7   Comments Monitoring or encouragement for participation/interaction Pleasant and agreeable     PROBLEM SOLVING Initial Assessment Discharge Assessment 10/2/2017   Score 5 5   Comments Solves complex problems with cues, or basic problems 90% or more of the time. Solves complex problems with cues, or basic problems 90% or more of the time. MEMORY Initial Assessment Discharge Assessment 10/2/2017   Score 5 5   Comments Recognizes, recalls, or executes 3 steps of 3 step request 90% of the time (cueing, reminders less than 10%, loses track of time) or cueing and coaxing under stressful/unfamiliar conditions. Recognizes, recalls, or executes 3 steps of 3 step request 90% of the time (cueing, reminders less than 10%, loses track of time) or cueing and coaxing under stressful/unfamiliar conditions. EATING Initial Assessment Discharge Assessment 10/2/2017   Functional Level 5 6   Comments Setup at tray table, nectar thick liquids Patient wears dentures     GROOMING Initial Assessment Discharge Assessment 10/2/2017   Functional Level 3 7   Tasks completed by patient Washed face, Brushed hair Brushed hair;Brushed teeth; Washed face; Washed hands   Comments Assist to comb hair d/t fatigue Seated at sink     BATHING Initial Assessment Discharge Assessment 10/2/2017   Functional Level 3 6   Body parts patient bathed Abdomen, Arm, left, Arm, right, Chest, Hilaria area Abdomen;Arm, left;Arm, right;Buttocks; Chest;Lower leg and foot, left; Lower leg and foot, right;Hilaria area; Thigh, left;Thigh, right   Comments Assist to complete d/t fatigue, stands with use of grab bars with CGA to have bottom bathed Patient requires seated on TTB for majority of bathing in shower d/t decreased activity tolerance     TUB/SHOWER TRANSFER INDEPENDENCE Initial Assessment Discharge Assessment 10/2/2017   Score 4 5  Type of Shower: Shower  Adaptive  Equipment:Tub transfer bench, Grab bars and Walker   Comments CGA SPT using grab bars from wheelchair <> TTB Ambulating with RW     UPPER BODY DRESSING/UNDRESSING Initial Assessment Discharge Assessment 10/2/2017   Functional Level 3 7   Items applied/Steps completed Pullover (4 steps) Pullover (4 steps)   Comments Assist to progress shirt over RUE/down trunk Patient gathers clothing ambulating with RW     LOWER BODY DRESSING/UNDRESSING Initial Assessment Discharge Assessment 10/2/2017   Functional Level 1 5   Items applied/Steps completed Sock, left (1 step), Sock, right (1 step), Underpants (3 steps), Elastic waist pants (3 steps) Elastic waist pants (3 steps); Sock, left (1 step); Sock, right (1 step); Underpants (3 steps)   Comments Total assist d/t fatigue end of session Cues to sequence managing underwear and shorts over hips     TOILETING Initial Assessment Discharge Assessment 10/2/2017   Functional Level 1 6   Comments Assist with all aspects today Use of grab bar/RW to stabilize in stance     TOILET TRANSFER INDEPENDENCE Initial Assessment Discharge Assessment 10/2/2017   Transfer score 4 5   Comments SPT Ambulating with RW     Plan of Care: Please see Care Plan for progress towards goals during stay  Precautions at Discharge: Falls and decreased activity tolerance  Discharge Location: Private Residence    Safety/Supervision Recommendations/Functional Level: Recommending 24 hour supervision at discharge for standing ADL tasks and ambulating with RW d/t residual cognitive deficits, decreased functional mobility, and decreased activity tolerance.     Family Training: Completed family training this session with patient's wife present. Patient's wife verbalized and demonstrated understanding of patient's functional status at discharge, level of assist required, need for supervision with ADL/IADL due to residual deficits, and HEP for BUE. 24 hour supervision available at discharge from patient's wife. Patient and wife educated regarding TTB (being provided by [place]) per therapist recommendation. Verbalized and demonstrated understanding of safe transfer techniques, DME setup, reimbursement issues, and recommended features according to home setup. Patient provided printed copy of Theraband HEP for BUE and yellow Theraband for discharge. Patient demonstrated and verbalized understanding of techniques, frequency, and intensity for first 4 of 7 BUE therapeutic exercises included in HEP. To complete HEP training during PM session. Patient requires supervision for technique with HEP, patient's wife verbalizes understanding. Recommended Continuing Therapy: 24 Hour Supervision  Residual Deficits: Decreased self-help, problem solving, recall, activity tolerance, functional mobility requiring 24 hour supervision at discharge. Progress over LOS: Patient demonstrates great progress with activity tolerance, functional mobility, flexibility, problem solving, and strength since admission. See above for FIM details. Summary of Session:   S: \"I gotta tell you, that shower felt good. \" Agreeable to therapy. Focus of session was on Family Training and Discharge ADL evaluation, as well as TTB and HEP training. Patient was able to ambulate ~200 feet (overall, intermittent seated rest breaks) using a RW with SBA. Pain not indicated. Collaborated with PT, Braydon Blunt and confirmed patient is on track to reach goals as documented in the care plan. To complete HEP training during PM session. Patient ended session in wheelchair with call remote and phone within reach.      Michelle Winkler OTR/L

## 2017-10-02 NOTE — PROGRESS NOTES
Problem: Falls - Risk of  Goal: *Absence of Falls  Document Lauren Fall Risk and appropriate interventions in the flowsheet.    Outcome: Progressing Towards Goal  Fall Risk Interventions:  Mobility Interventions: Patient to call before getting OOB, Utilize walker, cane, or other assitive device     Mentation Interventions: Door open when patient unattended, Reorient patient, Toileting rounds, Update white board     Medication Interventions: Patient to call before getting OOB, Teach patient to arise slowly     Elimination Interventions: Call light in reach, Patient to call for help with toileting needs, Toilet paper/wipes in reach, Toileting schedule/hourly rounds, Urinal in reach     History of Falls Interventions: Door open when patient unattended

## 2017-10-02 NOTE — PROGRESS NOTES
Patient resting up in bed. Alert and oriented with pleasant affect. Lung sounds diminished in bases. S1S2,  Bowel sounds active. Continues on telemetry. No complaints of pain at present time. No acute signs of distress. Assessment completed. See doc flow sheet for further assessments.

## 2017-10-02 NOTE — PROGRESS NOTES
Problem: Falls - Risk of  Goal: *Absence of Falls  Document Lauren Fall Risk and appropriate interventions in the flowsheet.    Outcome: Progressing Towards Goal  Fall Risk Interventions:  Mobility Interventions: Communicate number of staff needed for ambulation/transfer     Mentation Interventions: Door open when patient unattended, Evaluate medications/consider consulting pharmacy     Medication Interventions: Patient to call before getting OOB, Evaluate medications/consider consulting pharmacy     Elimination Interventions: Call light in reach, Patient to call for help with toileting needs     History of Falls Interventions: Door open when patient unattended, Consult care management for discharge planning

## 2017-10-03 VITALS
SYSTOLIC BLOOD PRESSURE: 128 MMHG | HEART RATE: 59 BPM | OXYGEN SATURATION: 93 % | TEMPERATURE: 97.5 F | RESPIRATION RATE: 18 BRPM | DIASTOLIC BLOOD PRESSURE: 68 MMHG

## 2017-10-03 LAB
GLUCOSE BLD STRIP.AUTO-MCNC: 140 MG/DL (ref 65–100)
INR PPP: 2.2 (ref 0.9–1.2)
PROTHROMBIN TIME: 24.5 SEC (ref 9.6–12)

## 2017-10-03 PROCEDURE — 74011250637 HC RX REV CODE- 250/637: Performed by: INTERNAL MEDICINE

## 2017-10-03 PROCEDURE — 74011250637 HC RX REV CODE- 250/637: Performed by: PHYSICAL MEDICINE & REHABILITATION

## 2017-10-03 PROCEDURE — 74011000250 HC RX REV CODE- 250: Performed by: PHYSICAL MEDICINE & REHABILITATION

## 2017-10-03 PROCEDURE — 36415 COLL VENOUS BLD VENIPUNCTURE: CPT | Performed by: PHYSICAL MEDICINE & REHABILITATION

## 2017-10-03 PROCEDURE — 82962 GLUCOSE BLOOD TEST: CPT

## 2017-10-03 PROCEDURE — 74011636637 HC RX REV CODE- 636/637: Performed by: PHYSICAL MEDICINE & REHABILITATION

## 2017-10-03 PROCEDURE — 94760 N-INVAS EAR/PLS OXIMETRY 1: CPT

## 2017-10-03 PROCEDURE — 94640 AIRWAY INHALATION TREATMENT: CPT

## 2017-10-03 PROCEDURE — 85610 PROTHROMBIN TIME: CPT | Performed by: PHYSICAL MEDICINE & REHABILITATION

## 2017-10-03 RX ORDER — ENALAPRIL MALEATE 5 MG/1
5 TABLET ORAL DAILY
Qty: 30 TAB | Refills: 2 | Status: SHIPPED | OUTPATIENT
Start: 2017-10-03 | End: 2019-07-09

## 2017-10-03 RX ORDER — AMIODARONE HYDROCHLORIDE 200 MG/1
200 TABLET ORAL DAILY
Qty: 30 TAB | Refills: 2 | Status: SHIPPED | OUTPATIENT
Start: 2017-10-03 | End: 2017-12-21 | Stop reason: SDUPTHER

## 2017-10-03 RX ORDER — PREDNISONE 5 MG/1
5 TABLET ORAL
Qty: 30 TAB | Refills: 1 | Status: SHIPPED | OUTPATIENT
Start: 2017-10-04 | End: 2017-11-02

## 2017-10-03 RX ORDER — METOPROLOL SUCCINATE 100 MG/1
100 TABLET, EXTENDED RELEASE ORAL 2 TIMES DAILY
Qty: 60 TAB | Refills: 2 | Status: SHIPPED | OUTPATIENT
Start: 2017-10-03 | End: 2020-01-01

## 2017-10-03 RX ORDER — GABAPENTIN 300 MG/1
600 CAPSULE ORAL 3 TIMES DAILY
Qty: 90 CAP | Refills: 2 | Status: SHIPPED | OUTPATIENT
Start: 2017-10-03

## 2017-10-03 RX ORDER — PREDNISONE 5 MG/1
5 TABLET ORAL
Status: DISCONTINUED | OUTPATIENT
Start: 2017-10-04 | End: 2017-10-03 | Stop reason: HOSPADM

## 2017-10-03 RX ORDER — GLIPIZIDE 10 MG/1
10 TABLET ORAL 2 TIMES DAILY
COMMUNITY
End: 2017-10-10 | Stop reason: ALTCHOICE

## 2017-10-03 RX ORDER — PREDNISONE 10 MG/1
5 TABLET ORAL DAILY
Qty: 30 TAB | Refills: 0 | Status: SHIPPED | OUTPATIENT
Start: 2017-10-03 | End: 2017-10-09

## 2017-10-03 RX ORDER — POTASSIUM CHLORIDE 20 MEQ/1
20 TABLET, EXTENDED RELEASE ORAL DAILY
Qty: 30 TAB | Refills: 1 | Status: SHIPPED | OUTPATIENT
Start: 2017-10-03

## 2017-10-03 RX ORDER — ALBUTEROL SULFATE 90 UG/1
2 AEROSOL, METERED RESPIRATORY (INHALATION)
Qty: 1 INHALER | Refills: 3 | Status: SHIPPED | OUTPATIENT
Start: 2017-10-03

## 2017-10-03 RX ORDER — NITROGLYCERIN 0.4 MG/1
0.4 TABLET SUBLINGUAL
Qty: 15 TAB | Refills: 0 | Status: SHIPPED | OUTPATIENT
Start: 2017-10-03

## 2017-10-03 RX ORDER — FUROSEMIDE 20 MG/1
20 TABLET ORAL DAILY
Qty: 30 TAB | Refills: 1 | Status: SHIPPED | OUTPATIENT
Start: 2017-10-03 | End: 2017-11-02 | Stop reason: DRUGHIGH

## 2017-10-03 RX ADMIN — METOPROLOL SUCCINATE 100 MG: 100 TABLET, EXTENDED RELEASE ORAL at 09:24

## 2017-10-03 RX ADMIN — PREDNISONE 10 MG: 20 TABLET ORAL at 09:24

## 2017-10-03 RX ADMIN — POTASSIUM CHLORIDE 20 MEQ: 20 TABLET, EXTENDED RELEASE ORAL at 09:24

## 2017-10-03 RX ADMIN — BUDESONIDE 500 MCG: 0.5 INHALANT RESPIRATORY (INHALATION) at 05:20

## 2017-10-03 RX ADMIN — GABAPENTIN 600 MG: 300 CAPSULE ORAL at 05:32

## 2017-10-03 RX ADMIN — TIOTROPIUM BROMIDE 18 MCG: 18 CAPSULE ORAL; RESPIRATORY (INHALATION) at 05:20

## 2017-10-03 RX ADMIN — FERROUS SULFATE TAB 325 MG (65 MG ELEMENTAL FE) 325 MG: 325 (65 FE) TAB at 09:24

## 2017-10-03 RX ADMIN — GUAIFENESIN 1200 MG: 600 TABLET, EXTENDED RELEASE ORAL at 09:25

## 2017-10-03 RX ADMIN — ENALAPRIL MALEATE 5 MG: 5 TABLET ORAL at 09:25

## 2017-10-03 RX ADMIN — FAMOTIDINE 20 MG: 20 TABLET ORAL at 09:24

## 2017-10-03 RX ADMIN — ALBUTEROL SULFATE 2.5 MG: 2.5 SOLUTION RESPIRATORY (INHALATION) at 05:20

## 2017-10-03 RX ADMIN — AMIODARONE HYDROCHLORIDE 200 MG: 200 TABLET ORAL at 09:24

## 2017-10-03 RX ADMIN — FUROSEMIDE 20 MG: 20 TABLET ORAL at 09:24

## 2017-10-03 NOTE — PROGRESS NOTES
Care Management Interventions  PCP Verified by CM: Yes  Mode of Transport at Discharge: Other (see comment) (family car)  Transition of Care Consult (CM Consult): Home Health (PT, OT and RN)  [facility] - INPATIENT: No  Reason Outside [place]: Out of service area ([address] [facility])  Discharge Durable Medical Equipment: Yes (TTB form [facility])  Physical Therapy Consult: Yes  Occupational Therapy Consult: Yes  Speech Therapy Consult: Yes  Confirm Follow Up Transport: Family  Plan discussed with Pt/Family/Caregiver: Yes  Freedom of Choice Offered: Yes  Discharge Location  Discharge Placement: Home with home health ([facility])    Pt discharged to home with spouse and HH PT, OT and RN. Referral faxed to [address] [place] [address]. [address]  clinic and scheduled f/u appt. Info in avs.  Disabled parking placard and RX given to pt.

## 2017-10-03 NOTE — PROGRESS NOTES
Warfarin dosing per pharmacist    Clover Burger is a 67 y.o. male. Indication: A. Fib    Goal INR:  2-3    Home dose:  5 mg daily, 7.5 mg on Wednesday    Risk factors or significant drug interactions: amiodarone    Other anticoagulants:  none    Daily Monitoring  Date  INR     Warfarin dose HGB              Notes  9/8  1.2  10mg  11.2    9/9  1.3  10mg  9.4  9/10  ---  10mg  9.7  9/11  2.1  10mg  10.8  9/12  2.9  10mg  8.9  9/13  3.5  Hold  8.4  Pharmacy Consulted  9/14  3.6  Held  8.5  9/15  2.5  5 mg  8.7  9/16  1.9  6 mg  10.8  9/17  2.2  6 mg  9.3  9/18  2.4  6 mg  --  9/19  3.2  Hold  9.1  9/20  3.4  Hold  8.8  9/21  2.3  5 mg  8.1  9/22  1.4  5 mg  ---  9/23  1.4  5 mg  8.9  9/24  1.6  5 mg  7.9  9/25  1.9  5 mg  8.0  9/26  2.6  2.5 mg  ---  9/27  2.6  5 mg  8.4  9/28  2.8  5 mg  8.4  9/29  3.5  2 mg  8.5  9/30  3.4  2 mg  9.5  10/1  2.9  2.5 mg  9.5  10/2  2.7  5 mg  ---  10/3  2.2  5 mg  ---    INR down to 2.2 today after 3 decreased doses in a row. Continue 5 mg tonight. Patient has been therapeutic on a dose of about 30 mg weekly. Would plan to give two days of 2.5 mg doses and the rest 5 mg. Follow daily INR for now. Thank you,  Sonia Stewart, Pharm. D.   Clinical Pharmacist  [phone]

## 2017-10-03 NOTE — PROGRESS NOTES
Problem: Self Care Deficits Care Plan (Adult)  Goal: *Therapy Goal (Edit Goal, Insert Text)  STG 4: Patient will be supervision with shower transfer using DME PRN within 7 days.   9/21/2017 Not met d/t medical complications  CONTINUE GOAL (9/28/2017)    LTG 4: Patient will be modified independent with tub/shower transfer using AE/DME PRN within 14 days  10/3/2017 Patient continues to require supervision ambulating with RW, limited by medical complications during [facility] stay for progress with functional mobility  Outcome: Not Met  Variance: Patient/Family Other  Comments: Patient continues to require supervision ambulating with RW, limited by medical complications during [facility] stay for progress with functional mobility

## 2017-10-03 NOTE — PROGRESS NOTES
[facility] regarding pt's Trilogy. They will deliver Trilogy to pt today prior to discharge.

## 2017-10-03 NOTE — PROGRESS NOTES
Problem: Mobility Impaired (Adult and Pediatric)  Goal: *Therapy Goal (Edit Goal, Insert Text)  STG 4. Patient ambulate up/down 4 steps with B rails and supervision assist in 1 week (10/2/17: Goal met)  LTG 4. Patient ambulate up/down 4 steps with B rails and MOD I in 2 weeks (10/2/17: Goal not met, patient requires supervision with ascend/descend stairs secondary to decreased dynamic standing balance)   Outcome: Not Met  Patient requires supervision with ascend/descend stairs secondary to decreased dynamic standing balance.

## 2017-10-03 NOTE — PROGRESS NOTES
Problem: Self Care Deficits Care Plan (Adult)  Goal: *Therapy Goal (Edit Goal, Insert Text)  STG 3: Patient will be supervision with toilet transfer using DME PRN within 7 days.   9/21/2017 Not met d/t medical complications  GOAL MET 7/53/0647     LTG 3: Patient will be modified independent with toilet transfer using AE/DME PRN within 14 days  CONTINUE GOAL (9/28/2017)  10/3/2017 Patient continues to require supervision ambulating with RW, limited by medical complications during [facility] stay for progress with functional mobility     Outcome: Not Met  Variance: Patient/Family Other  Comments: Patient continues to require supervision ambulating with RW, limited by medical complications during [facility] stay for progress with functional mobility

## 2017-10-03 NOTE — PROGRESS NOTES
[facility] CARDIOLOGY PROGRESS NOTE           10/3/2017 9:15 AM    Admit Date: 9/13/2017      Subjective:   He is ready for discharge. Remains in SR after prior cardioversion. He needs to continue po Amiodarone, warfarin and other cardiac meds. He needs a F/U appt with Dr. Giuseppe Santana in 2 weeks. ROS:  Cardiovascular:  As noted above    Objective:      Vitals:    10/02/17 1805 10/02/17 2135 10/03/17 0520 10/03/17 0658   BP:  127/56  128/68   Pulse:  61  (!) 59   Resp:  17  18   Temp:  97.7 °F (36.5 °C)  97.5 °F (36.4 °C)   SpO2: 91% 93% 92% 93%       Physical Exam:  General-No Acute Distress  Neck- supple, no JVD  CV- regular rate and rhythm no MRG  Lung- clear bilaterally  Abd- soft, nontender, nondistended  Ext- no edema bilaterally. Skin- warm and dry    Data Review:   Recent Labs      10/03/17   0605  10/02/17   0631  10/01/17   0749   WBC   --    --   4.3   HGB   --    --   9.5*   HCT   --    --   31.1*   PLT   --    --   154   INR  2.2*  2.7*   --        Assessment/Plan:     Active Problems:    Ischemic cardiomyopathy (8/23/2017) - stable      Atrial flutter with rapid ventricular response ([address] (8/23/2017) - post cardioversion,    Amiodarone load. Thrombocytopenia ([address] (8/26/2017) - resolved. COPD (chronic obstructive pulmonary disease) ([address] (8/30/2017)        Respiratory failure ([address] (9/13/2017) - resolved.            Carole Moreno MD  10/3/2017 9:15 AM

## 2017-10-03 NOTE — DISCHARGE SUMMARY
Kali Larios MD  Medical Director  [address] [place] [address], [facility]  Tel: [phone]       REHABILITATION DISCHARGE SUMMARY     Patient: Quintin Dhillon MRN: [id number]  SSN: [id number]    YOB: 1945  Age: 67 y.o. Sex: male      Date: 10/3/2017  Admit Date: 9/13/2017  Discharge Date: 10/3/2017    Admitting Physician: Angelic Brown MD   Primary Care Physician: Aminah Leung MD     Admission Condition: good  Chief Complaint : Gait dysfunction secondary to below. Admit Diagnosis: Atrial flutter with rapid ventricular response (HCC)  Atrial flutter with rapid ventricular response (HCC) (8/23/2017) sp cardioversion  Anticoagulant long-term use  HTN (hypertension)  Peripheral vascular disease   Dyslipidemia   History of mechanical aortic valve replacement 2000, on coumadin  Ischemic cardiomyopathy    Thrombocytopenia/ ITP  Hypoglycemia  COPD  DM II   iron deficiency anemia  hyponatremia  Acute Rehab Dx:  cognitive deficit  Weakness  spasticity  Debility    deconditioning  Mobility and ambulation deficits  Self Care/ADL deficits    HPI: Bismark Mendes a 67 y. o. male patient at [address] [place] [address] was admitted on 8/23/2017  by Ellen Llamas below mentioned medical history ,is being seen for Physical Medicine and Rehabilitation. Thierry Chain with worsening dyspnea. He was found to be in atrial flutter with 's, was started on treatment and was admitted. Patient continued to have a.fib with RVR, therefore underwent a MERLYN-guided   Cardioversion with success. EE demonstrated no evidence of left atrial, left atrial appendage, right atrium, right atrial appendage thrombus. Patient's admission course was complicated by thrombocytopenia. Hematology diagnosed possible ITP, and started on trial IVIG. On third day of admission, he was noted to be more confused, weak and unable to carry out his normal activity.  He then developed hypercapnic respiratory failure requiring admission to the ICU. There he required off bipap support, o2 supplement. His afib recurred, requiring continued medical treatment for control at ICU. Patient meanwhile continued to receive hematology treatment for declining platelets, ivig and iv/ po steroids. Patient also had G- bacteremia, elevated WBCs prior to admisison to IRU. Empiric iv antibiotics have been started. Patient has been seen and evaluated by acute PT. he requires CGA, minimum assist for transfers and gait. started to participate in therapies with acute PT, OT and ST. He shows significant right sided deficits, limiting his mobility, ambulation and ADLs. He is managing his gait with minimal assist.    Marixa Kern seen and examined today. Medical Records reviewed. Patient was active and independent with all activities. He had no gait  Problems prior to this admission. Physical therapy was initiated and patient was starting to mobilize. However, she shows significant functional deficits due to prolonged immobility and hospitalization. Our service was consulted for rehab needs and we recommended inpatient hospital rehabilitation is reasonable and necessary due to ongoing acute medical issues which have not changed since initial pre-admission evaluation. and rehab needs still likely best managed in IRU setting. The patient was evaluated by [facility] admissions coordinators. I reviewed the preadmission screening and have approved for admission to the [facility]. The patient was cleared for transfer to rehab today.  Patient continues to have ongoing  medical issues outlined above requiring physician medical supervision and functional deficits which would benefit from continued intensive therapies.          Rehabiitation Course:     Home Architecture: Home Situation  Home Environment: Private residence (09/14/17 1004)  # Steps to Enter: 3 (09/14/17 1004)  Rails to Enter: Yes (09/14/17 1004)  Hand Rails : Bilateral (09/14/17 1004)  One/Two Story Residence: One story (09/14/17 1004)  Living Alone: No (09/14/17 1004)  Support Systems: Spouse/Significant Other/Partner (09/14/17 1004)  Patient Expects to be Discharged to[de-identified] Private residence (09/14/17 1004)  Current DME Used/Available at Home: [address], rollator (09/14/17 1004)  Tub or Shower Type: Shower (09/26/17 0818)     Past Medical History:   Diagnosis Date    Acute diastolic CHF (congestive heart failure) ([facility] [address] 9/15/2016    Arthritis     Chicken pox     Coronary artery disease 4/16/2014    DJD (degenerative joint disease)     Emphysema     Hx of aortic valve replacement, mechanical       doing well,. Had to hold 3 days for removal of skin ca. Will have to have add. resection.  Hypercholesterolemia     Hypertension     Palpitations      Holter showed  PVC's, PAC's, one short run SVT.  Pneumonia     Systolic CHF, acute ([facility] [address] 11/15/2016      Past Surgical History:   Procedure Laterality Date    HX AORTIC VALVE REPLACEMENT N/A 2000    Aortic Valve Replacement w/ Mechanical Valve    HX HEART CATHETERIZATION  07/21/2004    [facility]      No family history on file. Social History   Substance Use Topics    Smoking status: Former Smoker     Packs/day: 1.50     Years: 40.00    Smokeless tobacco: Never Used    Alcohol use No       Allergies   Allergen Reactions    Levaquin [Levofloxacin] Other (comments)     GI upset    Metformin Other (comments)     Gi upset       Prior to Admission medications    Medication Sig Start Date End Date Taking? Authorizing Provider   carvedilol (COREG) 3.125 mg tablet Take 1 Tab by mouth two (2) times daily (with meals). 9/13/17  Yes Natasha Romano NP   aztreonam 2 gram 2 g IVPB 2 g by IntraVENous route every eight (8) hours. Indications: +GNR in [facility] 9/13/17  Yes Natasha Romano NP   ferrous sulfate 325 mg (65 mg iron) tablet Take 1 Tab by mouth daily (with breakfast).  9/13/17  Yes Natasha Romano NP   insulin lispro (HUMALOG) 100 unit/mL injection See above 9/13/17  Yes Cat Lemus NP   sodium chloride (NS) 0.9 % 20 mL by InterCATHeter route every eight (8) hours. 9/13/17  Yes Cat Lemus NP   VANCOMYCIN/0.9 % SOD CHLORIDE (VANCOMYCIN IN 0.9% SODIUM CL) 1.25 gram/250 mL soln 250 mL by IntraVENous route every twelve (12) hours every twelve (12) hours. Indications: +GNR in [facility] 9/13/17  Yes Cat Lemus NP   gabapentin (NEURONTIN) 300 mg capsule Take 2 Caps by mouth three (3) times daily for 30 days. 9/12/17 10/12/17 Yes Mily Gallo MD   digoxin (LANOXIN) 0.25 mg tablet Take 1 Tab by mouth daily for 30 days. 9/12/17 10/12/17 Yes Mily Gallo MD   budesonide-formoterol (SYMBICORT) 160-4.5 mcg/actuation HFA inhaler Take 2 Puffs by inhalation. Yes Historical Provider   albuterol (PROVENTIL HFA) 90 mcg/actuation inhaler Take 2 Puffs by inhalation. Yes Historical Provider   tiotropium (SPIRIVA) 18 mcg inhalation capsule Take 1 Puff by inhalation. Yes Historical Provider   predniSONE (DELTASONE) 10 mg tablet Take 1 Tab by mouth daily (with breakfast) for 6 days. 9/29/17 10/5/17  Cat Lemus NP   sodium chloride (NS) 0.9 % 20 mL by InterCATHeter route as needed. 9/13/17   Cat Lemus NP   traZODone (DESYREL) 50 mg tablet Take 1 Tab by mouth nightly as needed for Sleep. 9/12/17   Mily Gallo MD   furosemide (LASIX) 40 mg tablet Take 1 Tab by mouth daily. 9/15/16   Maria Teresa Suero MD   potassium chloride (K-DUR, KLOR-CON) 10 mEq tablet Take 1 Tab by mouth daily. 9/15/16   Maria Teresa Suero MD   Omeprazole delayed release (PRILOSEC D/R) 20 mg tablet Take 20 mg by mouth. Historical Provider   flunisolide (NASAREL) 25 mcg (0.025 %) spry 2 Sprays by Nasal route. Historical Provider   simvastatin (ZOCOR) 80 mg tablet Take 1 Tab by mouth nightly.  6/23/14   Mark Long MD       Current Medications:  Current Facility-Administered Medications   Medication Dose Route Frequency Provider Last Rate Last Dose    warfarin (COUMADIN) tablet 5 mg - pharmacy dosing  5 mg Oral QPM Inder Swanson MD   5 mg at 10/02/17 1801    amiodarone (CORDARONE) tablet 200 mg  200 mg Oral DAILY Claude Joshua MD   200 mg at 10/03/17 0924    simethicone (MYLICON) tablet 80 mg  80 mg Oral QID PRN Inder Swanson MD        enalapril (VASOTEC) tablet 5 mg  5 mg Oral DAILY Diana Posadas MD   5 mg at 10/03/17 0925    albuterol CONCENTRATE 2.5mg/0.5 mL neb soln  2.5 mg Nebulization BID Marilu Fenton MD   2.5 mg at 10/03/17 0520    budesonide (PULMICORT) 500 mcg/2 ml nebulizer suspension  500 mcg Nebulization BID RT Marilu Fenton MD   500 mcg at 10/03/17 0520    nitroglycerin (NITROSTAT) tablet 0.4 mg  0.4 mg SubLINGual Q5MIN PRN Marilu Fenton MD        ondansetron [facility]) injection 4 mg  4 mg IntraVENous Q4H PRN Marilu Nayak MD        NUTRITIONAL SUPPORT ELECTROLYTE PRN ORDERS   Does Not Apply PRN Marilu Nayak MD        acetaminophen (TYLENOL) suppository 650 mg  650 mg Rectal Q4H PRN Marilu Fenton MD        acetaminophen (TYLENOL) tablet 650 mg  650 mg Oral Q4H PRN Marilu Nayak MD   650 mg at 09/27/17 2030    albuterol (PROVENTIL HFA, VENTOLIN HFA, PROAIR HFA) inhaler 2 Puff  2 Puff Inhalation Q4H PRN Marilu Nayak MD        albuterol (PROVENTIL VENTOLIN) nebulizer solution 2.5 mg  2.5 mg Nebulization Q4H PRN Marilu Nayak MD        dextrose (D50W) injection syrg 25 g  25 g IntraVENous PRN Marilu Fenton MD        ferrous sulfate tablet 325 mg  1 Tab Oral DAILY WITH BREAKFAST Marilu Nayak MD   325 mg at 10/03/17 1531    gabapentin (NEURONTIN) capsule 600 mg  600 mg Oral TID Kay Vasques MD   600 mg at 10/03/17 0532    guaiFENesin ER (MUCINEX) tablet 1,200 mg  1,200 mg Oral Q12H Marilu Fenton MD   1,200 mg at 10/03/17 0925    insulin lispro (HUMALOG) injection   SubCUTAneous AC&HS Nathan Barrios MD   Stopped at 10/01/17 1741    povidone-iodine (BETADINE) 10 % topical solution   Topical DAILY Marilu Hernandez MD        predniSONE (DELTASONE) tablet 10 mg  10 mg Oral DAILY WITH BREAKFAST Marilu Hernandez MD   10 mg at 10/03/17 0924    tiotropium (SPIRIVA) inhalation capsule 18 mcg  1 Cap Inhalation DAILY Marilu Hernandez MD   18 mcg at 10/03/17 0520    traZODone (DESYREL) tablet 50 mg  50 mg Oral QHS PRN Marilu Becker MD   50 mg at 10/02/17 2218    influenza vaccine 2017-18 (3 yrs+)(PF) (FLUZONE QUAD/FLUARIX QUAD) injection 0.5 mL  0.5 mL IntraMUSCular PRIOR TO DISCHARGE Marilu Becker MD        furosemide (LASIX) tablet 20 mg  20 mg Oral DAILY Marilu Hernandez MD   20 mg at 10/03/17 2921    potassium chloride (K-DUR, KLOR-CON) SR tablet 20 mEq  20 mEq Oral DAILY Marilu Hernandez MD   20 mEq at 10/03/17 9333    metoprolol succinate (TOPROL-XL) tablet 100 mg  100 mg Oral BID Marilu Becker MD   100 mg at 10/03/17 8155    famotidine (PEPCID) tablet 20 mg  20 mg Oral BID Marilu Becker MD   20 mg at 10/03/17 3309    0.9% sodium chloride infusion 250 mL  250 mL IntraVENous PRN Marilu Becker MD            Review of Systems: Denies chest pain, shortness of breath, cough, headache, visual problems, abdominal pain, dysurea, calf pain. Pertinent positives are as noted in the medical records and unremarkable otherwise. Vital Signs: Patient Vitals for the past 8 hrs:   BP Temp Pulse Resp SpO2   10/03/17 0658 128/68 97.5 °F (36.4 °C) (!) 59 18 93 %   10/03/17 0520 - - - - 92 %        Physical Exam:   General: Alert and age appropriately oriented. No acute cardio respiratory distress.  Frail appearing   HEENT: Normocephalic,no scleral icterus  Oral mucosa moist without cyanosis   Lungs: CTA bilaterally   Heart: Regular rate and rhythm, + mechanical click   Abdomen: Soft, non-tender, nondistended. Bowel sounds present. Genitourinary: Benign . Neuromuscular:     Generalized non focal weakness. sensation intact   Skin/extremity: No rashes, no erythema. No calf tenderness BLE  Wound left heel ; boggy, red     Lab Review:   Recent Results (from the past 72 hour(s))   GLUCOSE, POC    Collection Time: 09/30/17 10:59 AM   Result Value Ref Range    Glucose (POC) 139 (H) 65 - 100 mg/dL   GLUCOSE, POC    Collection Time: 09/30/17  4:07 PM   Result Value Ref Range    Glucose (POC) 136 (H) 65 - 100 mg/dL   GLUCOSE, POC    Collection Time: 09/30/17  9:32 PM   Result Value Ref Range    Glucose (POC) 161 (H) 65 - 100 mg/dL   PROTHROMBIN TIME + INR    Collection Time: 10/01/17  6:30 AM   Result Value Ref Range    Prothrombin time 32.2 (H) 9.6 - 12.0 sec    INR 2.9 (H) 0.9 - 1.2     GLUCOSE, POC    Collection Time: 10/01/17  7:27 AM   Result Value Ref Range    Glucose (POC) 109 (H) 65 - 100 mg/dL   CBC WITH AUTOMATED DIFF    Collection Time: 10/01/17  7:49 AM   Result Value Ref Range    WBC 4.3 4.3 - 11.1 K/uL    RBC 2.94 (L) 4.23 - 5.67 M/uL    HGB 9.5 (L) 13.6 - 17.2 g/dL    HCT 31.1 (L) 41.1 - 50.3 %    .0 (H) 79.6 - 97.8 FL    MCH 32.2 26.1 - 32.9 PG    MCHC 30.4 (L) 31.4 - 35.0 g/dL    RDW 23.7 (H) 11.9 - 14.6 %    PLATELET 804 617 - 926 K/uL    MPV 10.8 10.8 - 14.1 FL    DF AUTOMATED      NEUTROPHILS 71 43 - 78 %    LYMPHOCYTES 23 13 - 44 %    MONOCYTES 5 4.0 - 12.0 %    EOSINOPHILS 1 0.5 - 7.8 %    BASOPHILS 0 0.0 - 2.0 %    IMMATURE GRANULOCYTES 0.3 0.0 - 5.0 %    ABS. NEUTROPHILS 2.8 1.7 - 8.2 K/UL    ABS. LYMPHOCYTES 0.9 0.5 - 4.6 K/UL    ABS. MONOCYTES 0.2 0.1 - 1.3 K/UL    ABS. EOSINOPHILS 0.0 0.0 - 0.8 K/UL    ABS. BASOPHILS 0.0 0.0 - 0.2 K/UL    ABS. IMM.  GRANS. 0.0 0.0 - 0.5 K/UL   GLUCOSE, POC    Collection Time: 10/01/17 11:25 AM   Result Value Ref Range    Glucose (POC) 135 (H) 65 - 100 mg/dL   GLUCOSE, POC    Collection Time: 10/01/17  4:12 PM Result Value Ref Range    Glucose (POC) 183 (H) 65 - 100 mg/dL   GLUCOSE, POC    Collection Time: 10/01/17  8:52 PM   Result Value Ref Range    Glucose (POC) 135 (H) 65 - 100 mg/dL   PROTHROMBIN TIME + INR    Collection Time: 10/02/17  6:31 AM   Result Value Ref Range    Prothrombin time 29.1 (H) 9.6 - 12.0 sec    INR 2.7 (H) 0.9 - 1.2     GLUCOSE, POC    Collection Time: 10/02/17  7:15 AM   Result Value Ref Range    Glucose (POC) 101 (H) 65 - 100 mg/dL   GLUCOSE, POC    Collection Time: 10/02/17 11:22 AM   Result Value Ref Range    Glucose (POC) 131 (H) 65 - 100 mg/dL   GLUCOSE, POC    Collection Time: 10/02/17  4:30 PM   Result Value Ref Range    Glucose (POC) 134 (H) 65 - 100 mg/dL   GLUCOSE, POC    Collection Time: 10/02/17  9:30 PM   Result Value Ref Range    Glucose (POC) 151 (H) 65 - 100 mg/dL   PROTHROMBIN TIME + INR    Collection Time: 10/03/17  6:05 AM   Result Value Ref Range    Prothrombin time 24.5 (H) 9.6 - 12.0 sec    INR 2.2 (H) 0.9 - 1.2         PT Initial  PT Most Recent                                       Amount of Assistance: 0 (Not tested) (09/14/17 1200) 5 (Supervision/setup) (10/02/17 1200)   Distance (ft): 5 Feet (ft) (09/14/17 0900) 220 Feet (ft) (10/02/17 1600)   Assistive Device:  (No Device- per pre-morbid level of function) (09/14/17 0900) Walker, rolling (10/02/17 1600)       OT Initial OT Most Recent   Feeding/Eating Assistance: 5 (Supervision/setup) (09/15/17 1009) 6 (Modified independent) (09/26/17 0818)   Grooming Assistance : 5 (Supervision) (09/15/17 1009) 7 (Independent) (09/29/17 1022)   Bathing Assistance, Upper: 6 (Modified independent) (09/15/17 1009) 6 (Modified independent) (09/27/17 1513)   Bathing Assistance, Lower : 3 (Moderate assistance ) (09/15/17 1009) 5 (Supervision) (09/27/17 1513)   Toileting Assistance (FIM Score): 4 (Minimal assistance) (09/18/17 1012) 4 (Minimal assistance) (09/27/17 1513)   Dressing Assistance : 4 (Minimal assistance) (09/15/17 1009) 4 (Minimal assistance) (09/29/17 1022)   Dressing Assistance : 3 (Moderate assistance) (09/15/17 1009) 5 (Supervision) (09/29/17 1022)     ST Initial ST Most Recent          Aspiration Signs/Symptoms: None (09/14/17 1036) None (09/14/17 1036)           Active Problems:    Ischemic cardiomyopathy (8/23/2017)      Overview: 8/23/17 ECHO:      EF 40 % to 45 %. There were no regional wall motion abnormalities. Moderate hypokinesis of the mid-apical anterior and apical wall(s). Atrial flutter with rapid ventricular response ([address] (8/23/2017)      Thrombocytopenia ([address] (8/26/2017)      COPD (chronic obstructive pulmonary disease) ([address] (8/30/2017)      Respiratory failure ([address] (9/13/2017)        Discharge Instructions:      Atrial flutter with rapid ventricular response ([address] (8/23/2017) sp cardioversion - monitor HR./ BP. / hx of CHF / Ischemic cardiomyopathy / hypertension    - Digoxin, coreg.  - continue anticoagulation. Continued on adjustment daily for INR control. pharmacy to dose  - A. Fib/cardiomyopathy with EF - 40% - on telemetry, controlled on lasix, enalapril, amiodarone, lopressor.           History of mechanical aortic valve replacement 2000, on coumadin/ Anticoagulant long-term use. Goal 2.5-3.5.  - resume warfarin, monitor INR. -       Bacteremia - resolved.      HTN (hypertension) - monitor.  - continue Coreg/ digoxin/ toprol, enalapril        Thrombocytopenia/ ITP  - continued on deltasone 10mg daily.   - HIT negative. - ivig per hematology direction. Will receive 1 more dose. -  received platelet transfusions at hospital, treatment for ITP. - 10/1 platelets - 46 > 191> 154(10/1). -  [facility].       COPD   - continue respiratory treatments. Presently q6h, wean as appropriate.    - resume spiriva, pulmicort  -  Continue BiPAP at night. Dysphagia - improved. Thin liquids with chin tuck.      iron deficiency anemia -   - 10/1 Hgb - 9.5 > 9.5, low, yet stable         Pain Control:  - continue gabapentin. No new pain complaint       Wound Care:   - left heel boggy, erythema; order rooke boot; floating heel not working  - 9/28. Stable wound, continue to pressure relief. Expect improvement with more mobility and strength.       Diabetes mellitus   - glycemic control good. No SSI coverage needed.      Bowel program - colace as needed. Follow up with Dr Candido Mo  2 weeks after discharge from rehab. [phone]. [facility] cardiology in 1- 2 weeks. [facility] in 1-2 weeks. [facility] hematology/ Oncology 1 week    P [phone]:/ [phone]    Discharge Medications:      warfarin (COUMADIN) tablet 4mg Ordered Dose: 4mg Route: Oral Frequency:  EVERY EVENING        metoprolol succinate (TOPROL-XL) tablet 100 mg  Ordered Dose: 100mg Route: Oral Frequency:  2 TIMES DAILY      guaiFENesin ER (MUCINEX) tablet 1,200 mg   Ordered Dose: 1,200 mg Route: Oral Frequency: EVERY 12 HOURS  As needed for cough        amiodarone (CORDARONE) tablet 200 mg     Ordered Dose: 5mg Route: Oral Frequency: daily            enalapril (VASOTEC) tablet 5 mg Ordered Dose: 2 Tab Route: Oral Frequency: DAILY       Current Discharge Medication List      CONTINUE these medications which have NOT CHANGED    Details                                  famotidine (PEPCID) tablet 20 mg Take 1 Tab by mouth two (2) times a day. - Oral              gabapentin (NEURONTIN) 600 mg capsule Take 2 Caps by mouth three (3) times daily for 30 days. Qty: 180 Cap, Refills: 2             budesonide-formoterol (SYMBICORT) 160-4.5 mcg/actuation HFA inhaler Take 2 Puffs by inhalation. 2 times daily      albuterol (PROVENTIL HFA) 90 mcg/actuation inhaler Take 2 Puffs by inhalation. tiotropium (SPIRIVA) 18 mcg inhalation capsule Take 1 Puff by inhalation. predniSONE (DELTASONE) 5 mg tablet Take 1 Tab by mouth daily (with breakfast) for 6 days.                  traZODone (DESYREL) 50 mg tablet Take 1 Tab by mouth nightly as needed for Sleep.         furosemide (LASIX) 20 mg tablet Take 1 Tab by mouth daily. potassium chloride (K-DUR, KLOR-CON) 20 mEq tablet Take 1 Tab by mouth daily. Associated Diagnoses: HLD (hyperlipidemia)       ! ! - Potential duplicate medications found. Please discuss with provider. STOP taking these medications       povidone-iodine (BETADINE) 10 % external solution Comments:   Reason for Stopping:         guaiFENesin ER (MUCINEX) 1,200 mg Ta12 ER tablet Comments:   Reason for Stopping:             O.K. Admit to sub acute rehab today. Discharge time: 35 minutes.     Signed By: Era Peterson MD     October 3, 2017

## 2017-10-03 NOTE — PROGRESS NOTES
Report received from previous shift nurse Carrillo RN on pts history and status. Pt sitting up on bedside.  Denies any discomfort or pain

## 2017-10-03 NOTE — PROGRESS NOTES
Discharge instructions given to wife and patient with prescriptions. Questions and concerns addressed. Taken out to auto via wheelchair.

## 2017-10-10 ENCOUNTER — HOSPITAL ENCOUNTER (OUTPATIENT)
Dept: LAB | Age: 72
Discharge: HOME | End: 2017-10-10
Payer: MEDICARE

## 2017-10-10 DIAGNOSIS — D69.6 THROMBOCYTOPENIA (HCC): ICD-10-CM

## 2017-10-10 LAB
ALBUMIN SERPL-MCNC: 2.3 G/DL (ref 3.2–4.6)
ALBUMIN/GLOB SERPL: 0.5 {RATIO} (ref 1.2–3.5)
ALP SERPL-CCNC: 118 U/L (ref 50–136)
ALT SERPL-CCNC: 60 U/L (ref 12–65)
ANION GAP SERPL CALC-SCNC: 8 MMOL/L (ref 7–16)
AST SERPL-CCNC: 50 U/L (ref 15–37)
BASOPHILS # BLD: 0.1 K/UL (ref 0–0.2)
BASOPHILS NFR BLD: 1 % (ref 0–2)
BILIRUB SERPL-MCNC: 0.7 MG/DL (ref 0.2–1.1)
BUN SERPL-MCNC: 14 MG/DL (ref 8–23)
CALCIUM SERPL-MCNC: ABNORMAL MG/DL (ref 8.3–10.4)
CHLORIDE SERPL-SCNC: 105 MMOL/L (ref 98–107)
CO2 SERPL-SCNC: 21 MMOL/L (ref 21–32)
CREAT SERPL-MCNC: 1.15 MG/DL (ref 0.8–1.5)
DIFFERENTIAL METHOD BLD: ABNORMAL
EOSINOPHIL # BLD: 0 K/UL (ref 0–0.8)
EOSINOPHIL NFR BLD: 0 % (ref 0.5–7.8)
ERYTHROCYTE [DISTWIDTH] IN BLOOD BY AUTOMATED COUNT: 21.1 % (ref 11.9–14.6)
GLOBULIN SER CALC-MCNC: 4.7 G/DL (ref 2.3–3.5)
GLUCOSE SERPL-MCNC: 70 MG/DL (ref 65–100)
HCT VFR BLD AUTO: 36.7 % (ref 41.1–50.3)
HGB BLD-MCNC: 11.2 G/DL (ref 13.6–17.2)
LYMPHOCYTES # BLD: 1.2 K/UL (ref 0.5–4.6)
LYMPHOCYTES NFR BLD: 20 % (ref 13–44)
MCH RBC QN AUTO: 33.1 PG (ref 26.1–32.9)
MCHC RBC AUTO-ENTMCNC: 30.5 G/DL (ref 31.4–35)
MCV RBC AUTO: 108.6 FL (ref 79.6–97.8)
MONOCYTES # BLD: 0.4 K/UL (ref 0.1–1.3)
MONOCYTES NFR BLD: 6 % (ref 4–12)
NEUTS SEG # BLD: 4.3 K/UL (ref 1.7–8.2)
NEUTS SEG NFR BLD: 73 % (ref 43–78)
NRBC # BLD: 0 K/UL (ref 0–0.2)
NRBC BLD-RTO: 0 PER 100 WBC (ref 0–2)
PLATELET # BLD AUTO: 217 K/UL (ref 150–450)
PLATELET COMMENTS,PCOM: ADEQUATE
PMV BLD AUTO: 11.6 FL (ref 10.8–14.1)
POTASSIUM SERPL-SCNC: 4.6 MMOL/L (ref 3.5–5.1)
PROT SERPL-MCNC: 7 G/DL (ref 6.3–8.2)
RBC # BLD AUTO: 3.38 M/UL (ref 4.23–5.67)
RBC MORPH BLD: ABNORMAL
SODIUM SERPL-SCNC: 134 MMOL/L (ref 136–145)
WBC # BLD AUTO: 6 K/UL (ref 4.3–11.1)
WBC MORPH BLD: ABNORMAL

## 2017-10-10 PROCEDURE — 80053 COMPREHEN METABOLIC PANEL: CPT | Performed by: INTERNAL MEDICINE

## 2017-10-10 PROCEDURE — 36415 COLL VENOUS BLD VENIPUNCTURE: CPT | Performed by: INTERNAL MEDICINE

## 2017-10-10 PROCEDURE — 85025 COMPLETE CBC W/AUTO DIFF WBC: CPT | Performed by: INTERNAL MEDICINE

## 2017-10-12 ENCOUNTER — HOSPITAL ENCOUNTER (OUTPATIENT)
Dept: RESPIRATORY THERAPY | Age: 72
Discharge: HOME | End: 2017-10-12
Attending: INTERNAL MEDICINE
Payer: MEDICARE

## 2017-10-12 DIAGNOSIS — I48.92 ATRIAL FLUTTER WITH RAPID VENTRICULAR RESPONSE (HCC): ICD-10-CM

## 2017-10-12 DIAGNOSIS — J43.2 CENTRILOBULAR EMPHYSEMA (HCC): Chronic | ICD-10-CM

## 2017-10-12 PROCEDURE — 94726 PLETHYSMOGRAPHY LUNG VOLUMES: CPT

## 2017-10-12 PROCEDURE — 94729 DIFFUSING CAPACITY: CPT

## 2017-10-12 PROCEDURE — 94010 BREATHING CAPACITY TEST: CPT

## 2017-11-03 PROBLEM — R53.81 DEBILITY: Status: ACTIVE | Noted: 2017-11-03

## 2017-11-03 PROBLEM — D69.3 ACUTE ITP (HCC): Status: ACTIVE | Noted: 2017-11-03

## 2017-11-03 PROBLEM — E11.9 DIABETES MELLITUS WITHOUT COMPLICATION (HCC): Status: ACTIVE | Noted: 2017-11-03

## 2017-11-10 ENCOUNTER — HOSPITAL ENCOUNTER (OUTPATIENT)
Dept: LAB | Age: 72
Discharge: HOME | End: 2017-11-10
Payer: MEDICARE

## 2017-11-10 DIAGNOSIS — D69.6 THROMBOCYTOPENIA (HCC): ICD-10-CM

## 2017-11-10 LAB
ALBUMIN SERPL-MCNC: 3.3 G/DL (ref 3.2–4.6)
ALBUMIN/GLOB SERPL: 0.7 {RATIO} (ref 1.2–3.5)
ALP SERPL-CCNC: 112 U/L (ref 50–136)
ALT SERPL-CCNC: 25 U/L (ref 12–65)
ANION GAP SERPL CALC-SCNC: 8 MMOL/L (ref 7–16)
AST SERPL-CCNC: 19 U/L (ref 15–37)
BASOPHILS # BLD: 0.1 K/UL (ref 0–0.2)
BASOPHILS NFR BLD: 1 % (ref 0–2)
BILIRUB SERPL-MCNC: 0.4 MG/DL (ref 0.2–1.1)
BUN SERPL-MCNC: 13 MG/DL (ref 8–23)
CALCIUM SERPL-MCNC: 9.1 MG/DL (ref 8.3–10.4)
CHLORIDE SERPL-SCNC: 101 MMOL/L (ref 98–107)
CO2 SERPL-SCNC: 30 MMOL/L (ref 21–32)
CREAT SERPL-MCNC: 1.14 MG/DL (ref 0.8–1.5)
DIFFERENTIAL METHOD BLD: ABNORMAL
EOSINOPHIL # BLD: 0.1 K/UL (ref 0–0.8)
EOSINOPHIL NFR BLD: 2 % (ref 0.5–7.8)
ERYTHROCYTE [DISTWIDTH] IN BLOOD BY AUTOMATED COUNT: 15 % (ref 11.9–14.6)
GLOBULIN SER CALC-MCNC: 4.6 G/DL (ref 2.3–3.5)
GLUCOSE SERPL-MCNC: 101 MG/DL (ref 65–100)
HCT VFR BLD AUTO: 39.8 % (ref 41.1–50.3)
HGB BLD-MCNC: 12.4 G/DL (ref 13.6–17.2)
LYMPHOCYTES # BLD: 1.1 K/UL (ref 0.5–4.6)
LYMPHOCYTES NFR BLD: 17 % (ref 13–44)
MCH RBC QN AUTO: 31.7 PG (ref 26.1–32.9)
MCHC RBC AUTO-ENTMCNC: 31.2 G/DL (ref 31.4–35)
MCV RBC AUTO: 101.8 FL (ref 79.6–97.8)
MONOCYTES # BLD: 0.6 K/UL (ref 0.1–1.3)
MONOCYTES NFR BLD: 9 % (ref 4–12)
NEUTS SEG # BLD: 4.3 K/UL (ref 1.7–8.2)
NEUTS SEG NFR BLD: 71 % (ref 43–78)
NRBC # BLD: 0 K/UL (ref 0–0.2)
PLATELET # BLD AUTO: 159 K/UL (ref 150–450)
PLATELET COMMENTS,PCOM: ADEQUATE
POTASSIUM SERPL-SCNC: 4.1 MMOL/L (ref 3.5–5.1)
PROT SERPL-MCNC: 7.9 G/DL (ref 6.3–8.2)
RBC # BLD AUTO: 3.91 M/UL (ref 4.23–5.67)
RBC MORPH BLD: ABNORMAL
SODIUM SERPL-SCNC: 139 MMOL/L (ref 136–145)
WBC # BLD AUTO: 6.2 K/UL (ref 4.3–11.1)
WBC MORPH BLD: ABNORMAL

## 2017-11-10 PROCEDURE — 80053 COMPREHEN METABOLIC PANEL: CPT | Performed by: INTERNAL MEDICINE

## 2017-11-10 PROCEDURE — 36415 COLL VENOUS BLD VENIPUNCTURE: CPT | Performed by: INTERNAL MEDICINE

## 2017-11-10 PROCEDURE — 85025 COMPLETE CBC W/AUTO DIFF WBC: CPT | Performed by: INTERNAL MEDICINE

## 2018-02-21 PROBLEM — I48.19 PERSISTENT ATRIAL FIBRILLATION (HCC): Status: ACTIVE | Noted: 2018-02-21

## 2018-03-12 NOTE — PROGRESS NOTES
Daily Progress Note -- Hematology/ Medical Oncology        Patient Name: Grisel Lai    Date of Visit: 2017  : 1945  Age:72 y.o.        Interval history:  Platelets decreased to 12,000 yesterday  Transfused 1 unit   Platelets today 05,725  BC with GNR- started on antibiotics    Medications:   Current Facility-Administered Medications   Medication Dose Route Frequency Provider Last Rate Last Dose    digoxin (LANOXIN) tablet 0.25 mg  0.25 mg Oral DAILY Dilan Sainz MD   0.25 mg at 17 4105    povidone-iodine (BETADINE) 10 % topical solution   Topical DAILY Nithya Medina MD        carvedilol (COREG) tablet 3.125 mg  3.125 mg Oral BID WITH MEALS Carmen Delcid NP   3.125 mg at 17 2140    aztreonam (AZACTAM) 2 g in 0.9% sodium chloride (MBP/ADV) 100 mL MBP  2 g IntraVENous Q8H Lalita Steen  mL/hr at 17 0636 2 g at 17 0636    vancomycin (VANCOCIN) 1250 mg in  ml infusion  1,250 mg IntraVENous Q12H Lalita Steen .7 mL/hr at 17 0342 1,250 mg at 17 0342    0.9% sodium chloride infusion 250 mL  250 mL IntraVENous PRN Cristela Rosado MD        insulin lispro (HUMALOG) injection   SubCUTAneous AC&HS Chacho Baez MD   Stopped at 17 2357    acetaminophen (TYLENOL) tablet 650 mg  650 mg Oral Q4H PRN Emily Bennett NP   650 mg at 17 2247    guaiFENesin ER (MUCINEX) tablet 1,200 mg  1,200 mg Oral BID Levi Montano NP   1,200 mg at 17 0828    traZODone (DESYREL) tablet 50 mg  50 mg Oral QHS PRN Levi Montano NP   50 mg at 09/10/17 2222    warfarin (COUMADIN) tablet 10 mg  10 mg Oral QHS Trini Barry MD   10 mg at 17 234    predniSONE (DELTASONE) tablet 40 mg  40 mg Oral DAILY WITH BREAKFAST Razia Hamm NP   40 mg at 17 0828    [START ON 9/15/2017] predniSONE (DELTASONE) tablet 30 mg  30 mg Oral DAILY WITH BREAKFAST Razia Hamm NP        [START ON 2017] predniSONE (Duncan Fast) Problem: Fluid Volume Excess, Risk for  Goal: # Absence of Rapid Weight Gain (no more than 2kg in 24 hours)  FVE Risk Patients may gain weight (but not more than 2 kg) but may not require aggressive treatment if in the absence of dyspnea; FVE (actual) patients should be monitored to achieve no weight gain.    Outcome: Outcome Met, Continue evaluating goal progress toward completion  MARS set clotted off at 1215.  Unable to return the blood.  Akua Pang updated.  Will keep the MARS machine off and set up with IR to get permanent dialysis catheter placed.        tablet 20 mg  20 mg Oral DAILY WITH BREAKFAST Romaine Todd NP        [START ON 9/29/2017] predniSONE (DELTASONE) tablet 10 mg  10 mg Oral DAILY WITH BREAKFAST Romaine Todd NP        midazolam (VERSED) injection 2 mg  2 mg IntraVENous Q2H PRN Palak Rooney MD   2 mg at 09/05/17 0501    morphine injection 2 mg  2 mg IntraVENous Q4H PRN Fatmata Alvarez MD   2 mg at 09/06/17 0630    NUTRITIONAL SUPPORT ELECTROLYTE PRN ORDERS   Does Not Apply PRN [place].  Ash Ponce MD        famotidine (PF) (PEPCID) 20 mg in sodium chloride 0.9 % 10 mL injection  20 mg IntraVENous DAILY Kendall Archer MD   20 mg at 09/13/17 0828    0.9% sodium chloride infusion 250 mL  250 mL IntraVENous PRN Romaine Todd NP        acetaminophen (TYLENOL) suppository 650 mg  650 mg Rectal Q4H PRN Howard Alves MD   650 mg at 08/30/17 1555    sodium chloride (NS) flush 20 mL  20 mL InterCATHeter Jeimy Henry MD   20 mL at 09/13/17 0530    heparin (porcine) pf 600 Units  600 Units InterCATHeter Q8H Kendall Archer MD   600 Units at 09/13/17 0531    sodium chloride (NS) flush 20 mL  20 mL InterCATHeter PRN Kendall Archer MD   20 mL at 09/08/17 0859    heparin (porcine) pf 600 Units  600 Units InterCATHeter PRN Kendall Archer MD   600 Units at 09/08/17 0859    albuterol (PROVENTIL VENTOLIN) nebulizer solution 2.5 mg  2.5 mg Nebulization Q4H PRN Howard Alves MD   2.5 mg at 09/11/17 0736    dextrose (D50W) injection syrg 25 g  25 g IntraVENous PRN Denise Snow MD   25 g at 08/25/17 1618    ferrous sulfate tablet 325 mg  1 Tab Oral DAILY WITH BREAKFAST Josephine Monae NP   325 mg at 09/13/17 0159    gabapentin (NEURONTIN) capsule 600 mg  600 mg Oral TID Fatmata Alvarez MD   600 mg at 09/13/17 0539    albuterol (PROVENTIL HFA, VENTOLIN HFA, PROAIR HFA) inhaler 2 Puff  2 Puff Inhalation Q4H PRN Fatmata Alvarez MD   2 Puff at 08/30/17 1724    tiotropium (SPIRIVA) inhalation capsule 18 mcg  1 Cap Inhalation DAILY Sarbjit A Isha Sanchez MD   18 mcg at 09/13/17 0740    sodium chloride (NS) flush 5-10 mL  5-10 mL IntraVENous Sangeeta Junior MD   10 mL at 09/13/17 0530    sodium chloride (NS) flush 5-10 mL  5-10 mL IntraVENous PRN Dary Garcia MD        nitroglycerin (NITROSTAT) tablet 0.4 mg  0.4 mg SubLINGual Q5MIN PRN Dary Garcia MD        ondansetron [facility]) injection 4 mg  4 mg IntraVENous Q4H PRN Dary Garcia MD   4 mg at 09/03/17 1740    budesonide (PULMICORT) 500 mcg/2 ml nebulizer suspension  500 mcg Nebulization BID RT Dary Garcia MD   500 mcg at 09/13/17 0740    And    albuterol CONCENTRATE 2.5mg/0.5 mL neb soln  2.5 mg Nebulization Q6H RT Dary Garcia MD   2.5 mg at 09/13/17 0740       Allergies: Allergies   Allergen Reactions    Levaquin [Levofloxacin] Other (comments)     GI upset    Metformin Other (comments)     Gi upset       Review of Systems: The Review of Systems is documented in full in the internal medical record. All systems are negative other than for those noted above. Physical Examination:  General Appearance: Ill appearing patient in no acute distress. Vital signs:   Visit Vitals    /74 (BP 1 Location: Left arm, BP Patient Position: At rest)    Pulse 98    Temp 98 °F (36.7 °C)    Resp 20    Ht 5' 6\" (1.676 m)    Wt 173 lb 4.5 oz (78.6 kg)    SpO2 100%    BMI 27.97 kg/m2     HEENT: No oral or pharyngeal masses. There is no ulceration or thrush. Lymph nodes: There is no cervical, supraclavicular, axillary adenopathy. Lungs: Scattered expiratory wheezes bilaterally. On NC. Heart: There is no jugular venous distention. Regular, irregular. Generalized edema. Abdomen: Soft, non-tender, bowel sounds present and normal, no appreciated hepatosplenomegaly. No palpable masses. Skin: No rash, petechiae or ecchymoses. No evidence of malignancy. Neuro: Alert today with no obvious focal deficits.    .     Labs/Imaging:  Lab Results   Component Value Date/Time    WBC 10.5 09/13/2017 03:51 AM    HGB 8.4 09/13/2017 03:51 AM    HCT 26.1 09/13/2017 03:51 AM    PLATELET 84 07/76/5537 03:51 AM    MCV 92.2 09/13/2017 03:51 AM       Lab Results   Component Value Date/Time    Sodium 142 09/13/2017 03:51 AM    Potassium 3.6 09/13/2017 03:51 AM    Chloride 106 09/13/2017 03:51 AM    CO2 30 09/13/2017 03:51 AM    Anion gap 6 09/13/2017 03:51 AM    Glucose 93 09/13/2017 03:51 AM    BUN 31 09/13/2017 03:51 AM    Creatinine 0.67 09/13/2017 03:51 AM    GFR est AA >60 09/13/2017 03:51 AM    GFR est non-AA >60 09/13/2017 03:51 AM    Calcium 7.4 09/13/2017 03:51 AM    AST (SGOT) 405 09/12/2017 06:31 AM    Alk. phosphatase 122 09/12/2017 06:31 AM    Protein, total 6.5 09/12/2017 06:31 AM    Albumin 2.3 09/12/2017 06:31 AM    Globulin 4.2 09/12/2017 06:31 AM    A-G Ratio 0.5 09/12/2017 06:31 AM    ALT (SGPT) 578 09/12/2017 06:31 AM     Initial consult HPI:   He has a history of mechanical AVR in 2000. He continues on chronic anticoagulation with coumadin, chronic systolic CHF/ICM EF 09%, COPD, PVD, DM II, HTN, iron deficiency, and dyslipidemia who developed worsening dyspnea at the beginning of the week. He presented to the ER and was found to be in atrial flutter with RVR now status post cardioversion and amiodarone. Of note, his platelets were 24,192 on admission and 30,000 today. No other lab comparisons here but can see through care everywhere that his platelets were 93,431 at [facility] about a year ago. He has no recollection of ever being told he has low platelets. He takes iron daily for his history of PRATIK and reports black stools due to this. Hgb on admission was 10.2 and 12.7 today. We have been consulted for his thrombocytopenia    PLAN:  - Presumed ITP:  9/7 Transfuse platelets as needed - keep above 50,000. Coumadin necessary due to mechanical heart valve. Continue daily CBC to monitor platelet count. BMBx can be done as outpatient or when out of ICU.  Change solumedrol to prednisone taper. Begin with 40mg daily and taper down 10mg weekly. - Thrombocytopenia  9/13 Platelets 19,465 yesterday. Transfused 1 unit. Today platelets 15,380. HIT panel pending. IVIG today and tomorrow. Continue with steroids. We will follow up on labs tomorrow. [facility]  [address] [place] [address]  P [phone]      Attending Addendum:  I personally evaluated the patient with Sb Pinon, N.P.,  and agree with the assessment, findings and plan as documented. Appears stable, start IVIG today.               James Cole MD  [facility]  [address] [address]  Office : [phone]  Fax : [phone]

## 2018-05-07 PROBLEM — I50.43 ACUTE ON CHRONIC COMBINED SYSTOLIC AND DIASTOLIC ACC/AHA STAGE C CONGESTIVE HEART FAILURE (HCC): Status: ACTIVE | Noted: 2018-05-07

## 2018-05-10 ENCOUNTER — HOSPITAL ENCOUNTER (OUTPATIENT)
Dept: LAB | Age: 73
Discharge: HOME | End: 2018-05-10
Payer: MEDICARE

## 2018-05-10 DIAGNOSIS — D69.6 THROMBOCYTOPENIA (HCC): ICD-10-CM

## 2018-05-10 LAB
ALBUMIN SERPL-MCNC: 3 G/DL (ref 3.2–4.6)
ALBUMIN/GLOB SERPL: 0.7 {RATIO} (ref 1.2–3.5)
ALP SERPL-CCNC: 92 U/L (ref 50–136)
ALT SERPL-CCNC: 36 U/L (ref 12–65)
ANION GAP SERPL CALC-SCNC: 7 MMOL/L (ref 7–16)
AST SERPL-CCNC: 25 U/L (ref 15–37)
BASOPHILS # BLD: 0 K/UL (ref 0–0.2)
BASOPHILS NFR BLD: 1 % (ref 0–2)
BILIRUB SERPL-MCNC: 0.3 MG/DL (ref 0.2–1.1)
BUN SERPL-MCNC: 13 MG/DL (ref 8–23)
CALCIUM SERPL-MCNC: 8.8 MG/DL (ref 8.3–10.4)
CHLORIDE SERPL-SCNC: 104 MMOL/L (ref 98–107)
CO2 SERPL-SCNC: 28 MMOL/L (ref 21–32)
CREAT SERPL-MCNC: 1.34 MG/DL (ref 0.8–1.5)
DIFFERENTIAL METHOD BLD: ABNORMAL
EOSINOPHIL # BLD: 0.1 K/UL (ref 0–0.8)
EOSINOPHIL NFR BLD: 1 % (ref 0.5–7.8)
ERYTHROCYTE [DISTWIDTH] IN BLOOD BY AUTOMATED COUNT: 15.6 % (ref 11.9–14.6)
GLOBULIN SER CALC-MCNC: 4.3 G/DL (ref 2.3–3.5)
GLUCOSE SERPL-MCNC: 128 MG/DL (ref 65–100)
HCT VFR BLD AUTO: 41.9 % (ref 41.1–50.3)
HGB BLD-MCNC: 12.9 G/DL (ref 13.6–17.2)
LYMPHOCYTES # BLD: 1.1 K/UL (ref 0.5–4.6)
LYMPHOCYTES NFR BLD: 18 % (ref 13–44)
MCH RBC QN AUTO: 29.4 PG (ref 26.1–32.9)
MCHC RBC AUTO-ENTMCNC: 30.8 G/DL (ref 31.4–35)
MCV RBC AUTO: 95.4 FL (ref 79.6–97.8)
MONOCYTES # BLD: 0.5 K/UL (ref 0.1–1.3)
MONOCYTES NFR BLD: 9 % (ref 4–12)
NEUTS SEG # BLD: 4.2 K/UL (ref 1.7–8.2)
NEUTS SEG NFR BLD: 71 % (ref 43–78)
NRBC # BLD: 0 K/UL (ref 0–0.2)
PLATELET # BLD AUTO: 109 K/UL (ref 150–450)
PMV BLD AUTO: 13.2 FL (ref 10.8–14.1)
POTASSIUM SERPL-SCNC: 4 MMOL/L (ref 3.5–5.1)
PROT SERPL-MCNC: 7.3 G/DL (ref 6.3–8.2)
RBC # BLD AUTO: 4.39 M/UL (ref 4.23–5.67)
SODIUM SERPL-SCNC: 139 MMOL/L (ref 136–145)
WBC # BLD AUTO: 5.9 K/UL (ref 4.3–11.1)

## 2018-05-10 PROCEDURE — 85025 COMPLETE CBC W/AUTO DIFF WBC: CPT | Performed by: INTERNAL MEDICINE

## 2018-05-10 PROCEDURE — 36415 COLL VENOUS BLD VENIPUNCTURE: CPT | Performed by: INTERNAL MEDICINE

## 2018-05-10 PROCEDURE — 80053 COMPREHEN METABOLIC PANEL: CPT | Performed by: INTERNAL MEDICINE

## 2018-05-11 PROBLEM — E11.21 TYPE 2 DIABETES WITH NEPHROPATHY (HCC): Status: ACTIVE | Noted: 2018-05-11

## 2019-05-20 ENCOUNTER — APPOINTMENT (OUTPATIENT)
Dept: GENERAL RADIOLOGY | Age: 74
DRG: 190 | End: 2019-05-20
Attending: EMERGENCY MEDICINE
Payer: MEDICARE

## 2019-05-20 ENCOUNTER — HOSPITAL ENCOUNTER (INPATIENT)
Age: 74
LOS: 3 days | Discharge: HOME HEALTH | DRG: 190 | End: 2019-05-23
Attending: EMERGENCY MEDICINE | Admitting: HOSPITALIST
Payer: MEDICARE

## 2019-05-20 DIAGNOSIS — J44.1 ACUTE EXACERBATION OF CHRONIC OBSTRUCTIVE PULMONARY DISEASE (COPD) (HCC): Primary | ICD-10-CM

## 2019-05-20 DIAGNOSIS — D69.6 THROMBOCYTOPENIA (HCC): ICD-10-CM

## 2019-05-20 PROBLEM — R06.02 SHORTNESS OF BREATH: Status: ACTIVE | Noted: 2019-05-20

## 2019-05-20 PROBLEM — I50.9 CHF (CONGESTIVE HEART FAILURE) (HCC): Chronic | Status: ACTIVE | Noted: 2019-05-20

## 2019-05-20 LAB
ALBUMIN SERPL-MCNC: 3.5 G/DL (ref 3.2–4.6)
ALBUMIN/GLOB SERPL: 0.7 {RATIO} (ref 1.2–3.5)
ALP SERPL-CCNC: 99 U/L (ref 50–136)
ALT SERPL-CCNC: 18 U/L (ref 12–65)
ANION GAP SERPL CALC-SCNC: 11 MMOL/L (ref 7–16)
AST SERPL-CCNC: 19 U/L (ref 15–37)
ATRIAL RATE: 70 BPM
BASOPHILS # BLD: 0.1 K/UL (ref 0–0.2)
BASOPHILS NFR BLD: 0 % (ref 0–2)
BILIRUB SERPL-MCNC: 0.5 MG/DL (ref 0.2–1.1)
BNP SERPL-MCNC: 127 PG/ML
BUN SERPL-MCNC: 20 MG/DL (ref 8–23)
CALCIUM SERPL-MCNC: 9.3 MG/DL (ref 8.3–10.4)
CALCULATED P AXIS, ECG09: 74 DEGREES
CALCULATED R AXIS, ECG10: 114 DEGREES
CALCULATED T AXIS, ECG11: 53 DEGREES
CHLORIDE SERPL-SCNC: 98 MMOL/L (ref 98–107)
CO2 SERPL-SCNC: 27 MMOL/L (ref 21–32)
CREAT SERPL-MCNC: 1.25 MG/DL (ref 0.8–1.5)
DIAGNOSIS, 93000: NORMAL
DIFFERENTIAL METHOD BLD: ABNORMAL
EOSINOPHIL # BLD: 0.1 K/UL (ref 0–0.8)
EOSINOPHIL NFR BLD: 0 % (ref 0.5–7.8)
ERYTHROCYTE [DISTWIDTH] IN BLOOD BY AUTOMATED COUNT: 14.8 % (ref 11.9–14.6)
GLOBULIN SER CALC-MCNC: 4.7 G/DL (ref 2.3–3.5)
GLUCOSE BLD STRIP.AUTO-MCNC: 100 MG/DL (ref 65–100)
GLUCOSE SERPL-MCNC: 85 MG/DL (ref 65–100)
HCT VFR BLD AUTO: 42 % (ref 41.1–50.3)
HGB BLD-MCNC: 12.8 G/DL (ref 13.6–17.2)
IMM GRANULOCYTES # BLD AUTO: 0.1 K/UL (ref 0–0.5)
IMM GRANULOCYTES NFR BLD AUTO: 0 % (ref 0–5)
INR PPP: 3.3
LYMPHOCYTES # BLD: 1.1 K/UL (ref 0.5–4.6)
LYMPHOCYTES NFR BLD: 9 % (ref 13–44)
MCH RBC QN AUTO: 27.1 PG (ref 26.1–32.9)
MCHC RBC AUTO-ENTMCNC: 30.5 G/DL (ref 31.4–35)
MCV RBC AUTO: 88.8 FL (ref 79.6–97.8)
MONOCYTES # BLD: 1.1 K/UL (ref 0.1–1.3)
MONOCYTES NFR BLD: 9 % (ref 4–12)
NEUTS SEG # BLD: 9.5 K/UL (ref 1.7–8.2)
NEUTS SEG NFR BLD: 81 % (ref 43–78)
NRBC # BLD: 0 K/UL (ref 0–0.2)
P-R INTERVAL, ECG05: 178 MS
PLATELET # BLD AUTO: 27 K/UL (ref 150–450)
PMV BLD AUTO: ABNORMAL FL (ref 9.4–12.3)
POTASSIUM SERPL-SCNC: 4.3 MMOL/L (ref 3.5–5.1)
PROT SERPL-MCNC: 8.2 G/DL (ref 6.3–8.2)
PROTHROMBIN TIME: 32.7 SEC (ref 11.7–14.5)
Q-T INTERVAL, ECG07: 446 MS
QRS DURATION, ECG06: 152 MS
QTC CALCULATION (BEZET), ECG08: 481 MS
RBC # BLD AUTO: 4.73 M/UL (ref 4.23–5.6)
SODIUM SERPL-SCNC: 136 MMOL/L (ref 136–145)
TROPONIN I SERPL-MCNC: <0.02 NG/ML (ref 0.02–0.05)
VENTRICULAR RATE, ECG03: 70 BPM
WBC # BLD AUTO: 11.8 K/UL (ref 4.3–11.1)

## 2019-05-20 PROCEDURE — 65660000000 HC RM CCU STEPDOWN

## 2019-05-20 PROCEDURE — 84484 ASSAY OF TROPONIN QUANT: CPT

## 2019-05-20 PROCEDURE — 96374 THER/PROPH/DIAG INJ IV PUSH: CPT | Performed by: EMERGENCY MEDICINE

## 2019-05-20 PROCEDURE — 94760 N-INVAS EAR/PLS OXIMETRY 1: CPT

## 2019-05-20 PROCEDURE — 71046 X-RAY EXAM CHEST 2 VIEWS: CPT

## 2019-05-20 PROCEDURE — 74011250637 HC RX REV CODE- 250/637: Performed by: NURSE PRACTITIONER

## 2019-05-20 PROCEDURE — 74011250636 HC RX REV CODE- 250/636: Performed by: NURSE PRACTITIONER

## 2019-05-20 PROCEDURE — 74011000250 HC RX REV CODE- 250: Performed by: NURSE PRACTITIONER

## 2019-05-20 PROCEDURE — 74011000250 HC RX REV CODE- 250: Performed by: HOSPITALIST

## 2019-05-20 PROCEDURE — 85610 PROTHROMBIN TIME: CPT

## 2019-05-20 PROCEDURE — 83880 ASSAY OF NATRIURETIC PEPTIDE: CPT

## 2019-05-20 PROCEDURE — 99285 EMERGENCY DEPT VISIT HI MDM: CPT | Performed by: EMERGENCY MEDICINE

## 2019-05-20 PROCEDURE — 93005 ELECTROCARDIOGRAM TRACING: CPT | Performed by: EMERGENCY MEDICINE

## 2019-05-20 PROCEDURE — 82962 GLUCOSE BLOOD TEST: CPT

## 2019-05-20 PROCEDURE — 94640 AIRWAY INHALATION TREATMENT: CPT

## 2019-05-20 PROCEDURE — 80053 COMPREHEN METABOLIC PANEL: CPT

## 2019-05-20 PROCEDURE — 85025 COMPLETE CBC W/AUTO DIFF WBC: CPT

## 2019-05-20 PROCEDURE — 74011250636 HC RX REV CODE- 250/636: Performed by: EMERGENCY MEDICINE

## 2019-05-20 PROCEDURE — 74011250637 HC RX REV CODE- 250/637: Performed by: HOSPITALIST

## 2019-05-20 RX ORDER — FAMOTIDINE 20 MG/1
10 TABLET, FILM COATED ORAL 2 TIMES DAILY
Status: DISCONTINUED | OUTPATIENT
Start: 2019-05-20 | End: 2019-05-21

## 2019-05-20 RX ORDER — DOXYCYCLINE 100 MG/1
100 CAPSULE ORAL EVERY 12 HOURS
Status: DISCONTINUED | OUTPATIENT
Start: 2019-05-20 | End: 2019-05-23

## 2019-05-20 RX ORDER — FUROSEMIDE 10 MG/ML
40 INJECTION INTRAMUSCULAR; INTRAVENOUS DAILY
Status: DISCONTINUED | OUTPATIENT
Start: 2019-05-20 | End: 2019-05-21

## 2019-05-20 RX ORDER — BUDESONIDE 0.5 MG/2ML
500 INHALANT ORAL
Status: DISCONTINUED | OUTPATIENT
Start: 2019-05-20 | End: 2019-05-23 | Stop reason: HOSPADM

## 2019-05-20 RX ORDER — POTASSIUM CHLORIDE 20 MEQ/1
20 TABLET, EXTENDED RELEASE ORAL DAILY
Status: DISCONTINUED | OUTPATIENT
Start: 2019-05-21 | End: 2019-05-23 | Stop reason: HOSPADM

## 2019-05-20 RX ORDER — DIPHENHYDRAMINE HCL 25 MG
25 CAPSULE ORAL
Status: DISCONTINUED | OUTPATIENT
Start: 2019-05-20 | End: 2019-05-23 | Stop reason: HOSPADM

## 2019-05-20 RX ORDER — HYDROCODONE BITARTRATE AND ACETAMINOPHEN 5; 325 MG/1; MG/1
1 TABLET ORAL
Status: DISCONTINUED | OUTPATIENT
Start: 2019-05-20 | End: 2019-05-23 | Stop reason: HOSPADM

## 2019-05-20 RX ORDER — IPRATROPIUM BROMIDE AND ALBUTEROL SULFATE 2.5; .5 MG/3ML; MG/3ML
3 SOLUTION RESPIRATORY (INHALATION)
Status: DISCONTINUED | OUTPATIENT
Start: 2019-05-20 | End: 2019-05-21

## 2019-05-20 RX ORDER — SODIUM CHLORIDE 0.9 % (FLUSH) 0.9 %
5-40 SYRINGE (ML) INJECTION EVERY 8 HOURS
Status: DISCONTINUED | OUTPATIENT
Start: 2019-05-20 | End: 2019-05-23 | Stop reason: HOSPADM

## 2019-05-20 RX ORDER — INSULIN LISPRO 100 [IU]/ML
INJECTION, SOLUTION INTRAVENOUS; SUBCUTANEOUS
Status: DISCONTINUED | OUTPATIENT
Start: 2019-05-20 | End: 2019-05-23 | Stop reason: HOSPADM

## 2019-05-20 RX ORDER — GUAIFENESIN 600 MG/1
1200 TABLET, EXTENDED RELEASE ORAL EVERY 12 HOURS
Status: DISCONTINUED | OUTPATIENT
Start: 2019-05-20 | End: 2019-05-21

## 2019-05-20 RX ORDER — WARFARIN 7.5 MG/1
7.5 TABLET ORAL
Status: DISCONTINUED | OUTPATIENT
Start: 2019-05-21 | End: 2019-05-21

## 2019-05-20 RX ORDER — ONDANSETRON 2 MG/ML
4 INJECTION INTRAMUSCULAR; INTRAVENOUS
Status: DISCONTINUED | OUTPATIENT
Start: 2019-05-20 | End: 2019-05-23 | Stop reason: HOSPADM

## 2019-05-20 RX ORDER — SODIUM CHLORIDE 0.9 % (FLUSH) 0.9 %
5-40 SYRINGE (ML) INJECTION AS NEEDED
Status: DISCONTINUED | OUTPATIENT
Start: 2019-05-20 | End: 2019-05-23 | Stop reason: HOSPADM

## 2019-05-20 RX ORDER — MONTELUKAST SODIUM 10 MG/1
10 TABLET ORAL DAILY
Status: DISCONTINUED | OUTPATIENT
Start: 2019-05-21 | End: 2019-05-21

## 2019-05-20 RX ORDER — NALOXONE HYDROCHLORIDE 0.4 MG/ML
0.4 INJECTION, SOLUTION INTRAMUSCULAR; INTRAVENOUS; SUBCUTANEOUS AS NEEDED
Status: DISCONTINUED | OUTPATIENT
Start: 2019-05-20 | End: 2019-05-23 | Stop reason: HOSPADM

## 2019-05-20 RX ORDER — METOPROLOL SUCCINATE 50 MG/1
100 TABLET, EXTENDED RELEASE ORAL 2 TIMES DAILY
Status: DISCONTINUED | OUTPATIENT
Start: 2019-05-20 | End: 2019-05-21

## 2019-05-20 RX ORDER — LISINOPRIL 5 MG/1
10 TABLET ORAL DAILY
Status: DISCONTINUED | OUTPATIENT
Start: 2019-05-21 | End: 2019-05-23 | Stop reason: HOSPADM

## 2019-05-20 RX ORDER — LORATADINE 10 MG/1
10 TABLET ORAL DAILY
Status: DISCONTINUED | OUTPATIENT
Start: 2019-05-21 | End: 2019-05-21

## 2019-05-20 RX ORDER — FUROSEMIDE 10 MG/ML
40 INJECTION INTRAMUSCULAR; INTRAVENOUS
Status: COMPLETED | OUTPATIENT
Start: 2019-05-20 | End: 2019-05-20

## 2019-05-20 RX ORDER — BISACODYL 5 MG
10 TABLET, DELAYED RELEASE (ENTERIC COATED) ORAL DAILY PRN
Status: DISCONTINUED | OUTPATIENT
Start: 2019-05-20 | End: 2019-05-23 | Stop reason: HOSPADM

## 2019-05-20 RX ORDER — ACETAMINOPHEN 325 MG/1
650 TABLET ORAL
Status: DISCONTINUED | OUTPATIENT
Start: 2019-05-20 | End: 2019-05-23 | Stop reason: HOSPADM

## 2019-05-20 RX ORDER — GABAPENTIN 300 MG/1
600 CAPSULE ORAL 3 TIMES DAILY
Status: DISCONTINUED | OUTPATIENT
Start: 2019-05-20 | End: 2019-05-23 | Stop reason: HOSPADM

## 2019-05-20 RX ADMIN — METOPROLOL SUCCINATE 100 MG: 50 TABLET, EXTENDED RELEASE ORAL at 18:12

## 2019-05-20 RX ADMIN — WARFARIN SODIUM 2.5 MG: 2 TABLET ORAL at 18:27

## 2019-05-20 RX ADMIN — Medication 5 ML: at 21:41

## 2019-05-20 RX ADMIN — FUROSEMIDE 40 MG: 10 INJECTION, SOLUTION INTRAMUSCULAR; INTRAVENOUS at 18:12

## 2019-05-20 RX ADMIN — FAMOTIDINE 10 MG: 20 TABLET ORAL at 18:12

## 2019-05-20 RX ADMIN — DOXYCYCLINE HYCLATE 100 MG: 100 CAPSULE ORAL at 18:27

## 2019-05-20 RX ADMIN — Medication 1 AMPULE: at 21:41

## 2019-05-20 RX ADMIN — GABAPENTIN 600 MG: 300 CAPSULE ORAL at 21:41

## 2019-05-20 RX ADMIN — Medication 5 ML: at 18:13

## 2019-05-20 RX ADMIN — METHYLPREDNISOLONE SODIUM SUCCINATE 40 MG: 40 INJECTION, POWDER, FOR SOLUTION INTRAMUSCULAR; INTRAVENOUS at 21:41

## 2019-05-20 RX ADMIN — FUROSEMIDE 40 MG: 10 INJECTION, SOLUTION INTRAMUSCULAR; INTRAVENOUS at 15:36

## 2019-05-20 RX ADMIN — GUAIFENESIN 1200 MG: 600 TABLET, EXTENDED RELEASE ORAL at 21:41

## 2019-05-20 RX ADMIN — IPRATROPIUM BROMIDE AND ALBUTEROL SULFATE 3 ML: .5; 3 SOLUTION RESPIRATORY (INHALATION) at 20:29

## 2019-05-20 RX ADMIN — BUDESONIDE 500 MCG: 0.5 INHALANT RESPIRATORY (INHALATION) at 20:29

## 2019-05-20 NOTE — PROGRESS NOTES
TRANSFER - IN REPORT: 
 
Verbal report received from Beverley Weathers RN on Cortez Willie  being received from ER for routine progression of care Report consisted of patients Situation, Background, Assessment and  
Recommendations(SBAR). Information from the following report(s) ED Summary was reviewed with the receiving nurse. Opportunity for questions and clarification was provided.

## 2019-05-20 NOTE — PROGRESS NOTES
Warfarin dosing per pharmacist 
 
Wendi Park is a 75 y.o. male. Height: 5' 6\" (167.6 cm)    Weight: 69.4 kg (153 lb) Indication:  AFIB and mechanical AVR Goal INR:  2.5-3.5 Home dose:  5mg on Mon, Wed, Sat and 7.5mg on all other days Risk factors or significant drug interactions:  ---- Other anticoagulants:  none Daily Monitoring Date  INR     Warfarin dose HGB              Notes 5/20  3.3  2.5mg  12.8 Pharmacist to assist in dosing and monitoring of warfarin therapy for Mr. Rylee Pratt during this admission. I will resume a dose that is lower than home dose for now given clinical status and need to assess INR trend. INR goal was not specified in the consult, however, based on his risk factors, will target INR 2.5-3.5 per protocol. Thank you, Liliana Abernathy, PharmD, [facility] Clinical Pharmacist 
[phone]

## 2019-05-20 NOTE — ED PROVIDER NOTES
HPI: 
34-year-old male with history of mechanical heart valve, A. Fib on Coumadin, CHF on 20 mg of Lasix, COPD not on oxygen, where BiPAP at night has had a cough, congestion, unable to lay flat at night for the past 4-5 days. Leg swelling. No abdominal pain. Denied chest pain. There is short of breath. Complian with all of his medication including his Lasix and Coumadin. Denies any hemoptysis. Brought in by [facility]. Oxygen was 84% on room air. Normally not on oxygen. 95% on 3 L. 
 
ROS Constitutional: No fever, no chills Skin: no rash Eye: No vision changes ENMT:  
Respiratory: + shortness of breath, + cough Cardiovascular: No chest pain, no palpitations Gastrointestinal: No vomiting, no nausea, no diarrhea, no abdominal pain : No dysuria MSK: No back pain, no muscle pain Neuro: No headache, no change in mental status, no numbness, no tingling, no weakness Psych:  
Endocrine:  
All other review of systems positive per history of present illness and the above otherwise negative or noncontributory. Visit Vitals /56 Pulse 73 Temp 98.4 °F (36.9 °C) Resp 18 Ht 5' 6\" (1.676 m) Wt 69.4 kg (153 lb) SpO2 97% BMI 24.69 kg/m² Past Medical History:  
Diagnosis Date  Acute diastolic CHF (congestive heart failure) ([facility] [address] 9/15/2016  Arthritis  Chicken pox  Coronary artery disease 4/16/2014  DJD (degenerative joint disease)  Emphysema  Hx of aortic valve replacement, mechanical   
   doing well,. Had to hold 3 days for removal of skin ca. Will have to have add. resection.  Hypercholesterolemia  Hypertension  Palpitations Holter showed  PVC's, PAC's, one short run SVT.  Pneumonia  Systolic CHF, acute ([address] 11/15/2016 Past Surgical History:  
Procedure Laterality Date  HX AORTIC VALVE REPLACEMENT N/A 2000 Aortic Valve Replacement w/ Mechanical Valve  HX HEART CATHETERIZATION  07/21/2004 [facility] Prior to Admission Medications Prescriptions Last Dose Informant Patient Reported? Taking? AMOXICILLIN PO   Yes No  
Sig: Take  by mouth two (2) times a day. Cetirizine (ZYRTEC) 10 mg cap   Yes No  
Sig: Take 10 mg by mouth daily. PREDNISONE PO   Yes No  
Sig: Take  by mouth two (2) times a day. acetaminophen (TYLENOL) 325 mg tablet   Yes No  
Sig: Take  by mouth every four (4) hours as needed for Pain. albuterol (PROVENTIL HFA) 90 mcg/actuation inhaler   No No  
Sig: Take 2 Puffs by inhalation every four (4) hours as needed for Wheezing or Shortness of Breath. Indications: Chronic Obstructive Pulmonary Disease  
budesonide-formoterol (SYMBICORT) 160-4.5 mcg/actuation HFA inhaler   Yes No  
Sig: Take 2 Puffs by inhalation. enalapril (VASOTEC) 5 mg tablet   No No  
Sig: Take 1 Tab by mouth daily. furosemide (LASIX) 20 mg tablet   Yes No  
Sig: Take 20 mg by mouth daily. gabapentin (NEURONTIN) 300 mg capsule   No No  
Sig: Take 2 Caps by mouth three (3) times daily. Indications: NEUROPATHIC PAIN  
guaiFENesin ER (MUCINEX) 1,200 mg Ta12 ER tablet   No No  
Sig: Take 1 Tab by mouth every twelve (12) hours. metoprolol succinate (TOPROL-XL) 100 mg tablet   No No  
Sig: Take 1 Tab by mouth two (2) times a day. Indications: VENTRICULAR RATE CONTROL IN ATRIAL FIBRILLATION  
montelukast (SINGULAIR) 10 mg tablet   No No  
Sig: Take 1 Tab by mouth daily. nitroglycerin (NITROSTAT) 0.4 mg SL tablet   No No  
Si Tab by SubLINGual route every five (5) minutes as needed for Chest Pain. Indications: ANGINA  
potassium chloride (K-DUR, KLOR-CON) 20 mEq tablet   No No  
Sig: Take 1 Tab by mouth daily. Indications: hypokalemia prevention  
raNITIdine (ZANTAC) 150 mg tablet   Yes No  
Sig: Take 150 mg by mouth two (2) times a day. tiotropium (SPIRIVA) 18 mcg inhalation capsule   No No  
Sig: Take 1 Cap by inhalation daily.  Indications: BRONCHOSPASM PREVENTION WITH COPD  
 warfarin (COUMADIN) 5 mg tablet   Yes No  
Sig: Take 5 mg by mouth daily. Facility-Administered Medications: None Adult Exam  
General: alert Head: normocephalic, atraumatic ENT: moist mucous membranes Neck: supple, non-tender; full range of motion Cardiovascular: regular rate and rhythm, normal peripheral perfusion, 1+  Edema to the knees Respiratory: Cough. A wet sounding. No wheezing at this time. Oxygenation 95% on 3 L Gastrointestinal: soft, non-tender; no rebound or guarding, no peritoneal signs, no distension Back: non-tender, full range of motion Musculoskeletal: normal range of motion, normal strength, no gross deformities Neurological: alert and oriented x 4, no gross focal deficits; normal speech Psychiatric: cooperative; appropriate mood and affect MDM: EKG rate of 70. Sinus. Right axis deviation. Nonspecific intraventricular conduction block. No STEMI noted. Chest x-ray unremarkable over given exam, history of CHF, fluid overload, COPD likely a combination of COPD CHF. We'll give 40 of IV Lasix. He was hypoxic on initial arrival for EMS. Improve on 3 L. Given his hypoxia, shortness of breath not on oxygen baseline was suspicious for combination of COPD and CHF patient will be admitted to the hospitalist at this time for further management. Xr Chest Pa Lat Result Date: 5/20/2019 Clinical History: The patient is a 75years year old Male presenting with symptoms of shob Comparison:  9/20/2017 Findings:  Frontal and lateral views of the chest were obtained. Diffuse changes of COPD are demonstrated. There is scarring of both lung bases with chronic blunting of the lateral costophrenic sulci. No focal infiltrate or consolidative process is seen. No pleural effusions are seen. The cardiomediastinal silhouette is within normal limits. There are no acute osseous abnormalities. Median sternotomy changes are seen for valvuloplasty. Impression:  COPD with chronic basilar scarring. CPT code(s) 38070 Recent Results (from the past 24 hour(s)) TROPONIN I Collection Time: 05/20/19 12:39 PM  
Result Value Ref Range Troponin-I, Qt. <0.02 (L) 0.02 - 0.05 NG/ML  
BNP Collection Time: 05/20/19 12:39 PM  
Result Value Ref Range  (H) 0 pg/mL METABOLIC PANEL, COMPREHENSIVE Collection Time: 05/20/19 12:39 PM  
Result Value Ref Range Sodium 136 136 - 145 mmol/L Potassium 4.3 3.5 - 5.1 mmol/L Chloride 98 98 - 107 mmol/L  
 CO2 27 21 - 32 mmol/L Anion gap 11 7 - 16 mmol/L Glucose 85 65 - 100 mg/dL BUN 20 8 - 23 MG/DL Creatinine 1.25 0.8 - 1.5 MG/DL  
 GFR est AA >60 >60 ml/min/1.73m2 GFR est non-AA 60 (L) >60 ml/min/1.73m2 Calcium 9.3 8.3 - 10.4 MG/DL Bilirubin, total 0.5 0.2 - 1.1 MG/DL  
 ALT (SGPT) 18 12 - 65 U/L  
 AST (SGOT) 19 15 - 37 U/L Alk. phosphatase 99 50 - 136 U/L Protein, total 8.2 6.3 - 8.2 g/dL Albumin 3.5 3.2 - 4.6 g/dL Globulin 4.7 (H) 2.3 - 3.5 g/dL A-G Ratio 0.7 (L) 1.2 - 3.5 EKG, 12 LEAD, INITIAL Collection Time: 05/20/19 12:40 PM  
Result Value Ref Range Ventricular Rate 70 BPM  
 Atrial Rate 70 BPM  
 P-R Interval 178 ms QRS Duration 152 ms Q-T Interval 446 ms  
 QTC Calculation (Bezet) 481 ms Calculated P Axis 74 degrees Calculated R Axis 114 degrees Calculated T Axis 53 degrees Diagnosis Normal sinus rhythm Non-specific intra-ventricular conduction block Abnormal ECG When compared with ECG of 19-SEP-2017 07:32, 
Previous ECG has undetermined rhythm, needs review T wave inversion no longer evident in Inferior leads Nonspecific T wave abnormality no longer evident in Lateral leads CBC WITH AUTOMATED DIFF Collection Time: 05/20/19  3:14 PM  
Result Value Ref Range WBC 11.8 (H) 4.3 - 11.1 K/uL  
 RBC 4.73 4.23 - 5.6 M/uL  
 HGB 12.8 (L) 13.6 - 17.2 g/dL HCT 42.0 41.1 - 50.3 %  MCV 88.8 79.6 - 97.8 FL  
 MCH 27.1 26.1 - 32.9 PG  
 MCHC 30.5 (L) 31.4 - 35.0 g/dL  
 RDW 14.8 (H) 11.9 - 14.6 % PLATELET 27 (LL) 736 - 450 K/uL MPV Unable to calculate. Recommend adding IPF. 9.4 - 12.3 FL ABSOLUTE NRBC 0.00 0.0 - 0.2 K/uL  
 DF AUTOMATED NEUTROPHILS 81 (H) 43 - 78 % LYMPHOCYTES 9 (L) 13 - 44 % MONOCYTES 9 4.0 - 12.0 % EOSINOPHILS 0 (L) 0.5 - 7.8 % BASOPHILS 0 0.0 - 2.0 % IMMATURE GRANULOCYTES 0 0.0 - 5.0 %  
 ABS. NEUTROPHILS 9.5 (H) 1.7 - 8.2 K/UL  
 ABS. LYMPHOCYTES 1.1 0.5 - 4.6 K/UL  
 ABS. MONOCYTES 1.1 0.1 - 1.3 K/UL  
 ABS. EOSINOPHILS 0.1 0.0 - 0.8 K/UL  
 ABS. BASOPHILS 0.1 0.0 - 0.2 K/UL  
 ABS. IMM. GRANS. 0.1 0.0 - 0.5 K/UL Dragon voice recognition software was used to create this note. Although the note has been reviewed and corrected where necessary, additional errors may have been overlooked and remain in the text.

## 2019-05-20 NOTE — ED NOTES
TRANSFER - OUT REPORT: 
 
Verbal report given to Andrew Norris RN on Caren Angel  being transferred to [address] [place] [address] for routine progression of care Report consisted of patients Situation, Background, Assessment and  
Recommendations(SBAR). Information from the following report(s) SBAR was reviewed with the receiving nurse. Lines:  
Peripheral IV 05/20/19 Left Antecubital (Active) Site Assessment Clean, dry, & intact 5/20/2019  4:26 PM  
Phlebitis Assessment 0 5/20/2019  4:26 PM  
Infiltration Assessment 0 5/20/2019  4:26 PM  
Dressing Status Clean, dry, & intact 5/20/2019  4:26 PM  
Dressing Type Transparent 5/20/2019  4:26 PM  
Hub Color/Line Status Pink 5/20/2019  4:26 PM  
  
 
Opportunity for questions and clarification was provided. Patient transported with: 
 O2 @ 4 liters

## 2019-05-20 NOTE — PROGRESS NOTES
TRANSFER - IN REPORT: 
 
Verbal report received from [facility] PEREZ AMAYA (name) on Attila Mcneil  being received from ED (unit) for routine progression of care Report consisted of patients Situation, Background, Assessment and  
Recommendations(SBAR). Information from the following report(s) SBAR, Kardex and ED Summary was reviewed with the receiving nurse. Opportunity for questions and clarification was provided. Assessment completed upon patients arrival to unit and care assumed. SBAR given to primary receiving RN, Evelyn Espinoza.

## 2019-05-20 NOTE — PROGRESS NOTES
Patient seen and examined by me. SOB and dry cough for 4 days On exam: diffusely diminished breath sounds Lab ordered and reviewed. Dx: Ac hypoxic resp failure dt copd exacerbation. Has mild fluid overload. Recurrence of ITP Rx: 
Doxy, steroids, BD, O2 Onco consult Signed By: Lis Jaffe MD   
 May 20, 2019

## 2019-05-20 NOTE — H&P
Hospitalist H&P Note Admit Date:  2019  2:49 PM  
Name:  Maida Wheeler Age:  75 y.o. 
:  1945 MRN:  [id number] PCP:  Shannen Arriola MD 
Treatment Team: Attending Provider: Yue Spaulding MD; Primary Nurse: Tyson Lucia RN 
 
HPI:  
Patient history was obtained from the ER provider prior to seeing the patient. Pt is a 74 yo male with PMH DM2, HTN, CHF, COPD, A. Fib, mech AVR, TCP. Pt does not use oxygen at home but does wear bipap at night. Pt presented to the ED via EMS with report of cough and congestion, inability to lay flat x 4-5 days, worse since yesterday. Pt denies any chest pain, N/V/D, no fever, no emesis, vision change, headache. Pt denies any dysuria. Pt reports being compliant with meds and treatments. Pt was found to have an oxygen sat of 84% on room air, improved to 95% on 3L. Pt also notes approx 20lb weight loss over several months. 10 systems reviewed and negative except as noted in HPI. Past Medical History:  
Diagnosis Date  Acute diastolic CHF (congestive heart failure) ([address] 9/15/2016  Arthritis  Chicken pox  Coronary artery disease 2014  DJD (degenerative joint disease)  Emphysema  Hx of aortic valve replacement, mechanical   
   doing well,. Had to hold 3 days for removal of skin ca. Will have to have add. resection.  Hypercholesterolemia  Hypertension  Palpitations Holter showed  PVC's, PAC's, one short run SVT.  Pneumonia  Systolic CHF, acute ([address] 11/15/2016 Past Surgical History:  
Procedure Laterality Date  HX AORTIC VALVE REPLACEMENT N/A  Aortic Valve Replacement w/ Mechanical Valve  HX HEART CATHETERIZATION  2004 [facility] Allergies Allergen Reactions  Levaquin [Levofloxacin] Other (comments) GI upset  Metformin Other (comments) Gi upset Social History Tobacco Use  Smoking status: Former Smoker   Packs/day: 2.00  
 Years: 40.00 Pack years: 80.00 Last attempt to quit:  Years since quittin.3  Smokeless tobacco: Never Used Substance Use Topics  Alcohol use: No  
  
History reviewed. No pertinent family history. Immunization History Administered Date(s) Administered  Influenza Vaccine 2011, 2013, 2013, 2016  Pneumococcal Conjugate (PCV-13) 2015  Pneumococcal Vaccine (Unspecified Type) 2014  TB Skin Test (PPD) Intradermal 2017  Zoster Vaccine, Live 2016 PTA Medications: 
Prior to Admission Medications Prescriptions Last Dose Informant Patient Reported? Taking? Cetirizine (ZYRTEC) 10 mg cap   Yes No  
Sig: Take 10 mg by mouth daily. acetaminophen (TYLENOL) 325 mg tablet   Yes No  
Sig: Take  by mouth every four (4) hours as needed for Pain. albuterol (PROVENTIL HFA) 90 mcg/actuation inhaler   No No  
Sig: Take 2 Puffs by inhalation every four (4) hours as needed for Wheezing or Shortness of Breath. Indications: Chronic Obstructive Pulmonary Disease  
budesonide-formoterol (SYMBICORT) 160-4.5 mcg/actuation HFA inhaler   Yes No  
Sig: Take 2 Puffs by inhalation. enalapril (VASOTEC) 5 mg tablet   No No  
Sig: Take 1 Tab by mouth daily. furosemide (LASIX) 20 mg tablet   Yes No  
Sig: Take 20 mg by mouth daily. gabapentin (NEURONTIN) 300 mg capsule   No No  
Sig: Take 2 Caps by mouth three (3) times daily. Indications: NEUROPATHIC PAIN  
guaiFENesin ER (MUCINEX) 1,200 mg Ta12 ER tablet   No No  
Sig: Take 1 Tab by mouth every twelve (12) hours. metoprolol succinate (TOPROL-XL) 100 mg tablet   No No  
Sig: Take 1 Tab by mouth two (2) times a day. Indications: VENTRICULAR RATE CONTROL IN ATRIAL FIBRILLATION  
montelukast (SINGULAIR) 10 mg tablet   No No  
Sig: Take 1 Tab by mouth daily.   
nitroglycerin (NITROSTAT) 0.4 mg SL tablet   No No  
Si Tab by SubLINGual route every five (5) minutes as needed for Chest Pain. Indications: ANGINA  
potassium chloride (K-DUR, KLOR-CON) 20 mEq tablet   No No  
Sig: Take 1 Tab by mouth daily. Indications: hypokalemia prevention  
raNITIdine (ZANTAC) 150 mg tablet   Yes No  
Sig: Take 150 mg by mouth two (2) times a day. tiotropium (SPIRIVA) 18 mcg inhalation capsule   No No  
Sig: Take 1 Cap by inhalation daily. Indications: BRONCHOSPASM PREVENTION WITH COPD  
warfarin (COUMADIN) 5 mg tablet   Yes No  
Sig: Take 5 mg by mouth daily. Facility-Administered Medications: None Objective:  
 
Patient Vitals for the past 24 hrs: 
 Temp Pulse Resp BP SpO2  
05/20/19 1536  73  125/56   
05/20/19 1458     97 % 05/20/19 1454  78  125/56   
05/20/19 1228 98.4 °F (36.9 °C) 89 18 150/86 95 % Oxygen Therapy O2 Sat (%): 97 % (05/20/19 1458) Pulse via Oximetry: 78 beats per minute (05/20/19 1458) O2 Device: (requiring oxygen) (05/20/19 1616) O2 Flow Rate (L/min): 2 l/min (05/20/19 1458) No intake or output data in the 24 hours ending 05/20/19 1724 Physical Exam: 
General:    Well nourished. Alert. Eyes:   Normal sclera. Extraocular movements intact. ENT:  Normocephalic, atraumatic. Dry mucous membranes CV:   RRR. No m/r/g. Peripheral pulses 2+. Lungs:  Coarse no wheeze or rhonchi Abdomen: Soft, nontender, nondistended. Extremities: Warm and dry. No cyanosis. Trace edema BLE. Neurologic: CN II-XII grossly intact. Sensation intact. Skin:     No rashes or jaundice. Normal coloration Psych:  Normal mood and affect. I reviewed the labs, imaging, EKGs, telemetry, and other studies done this admission. Data Review:  
Recent Results (from the past 24 hour(s)) TROPONIN I Collection Time: 05/20/19 12:39 PM  
Result Value Ref Range Troponin-I, Qt. <0.02 (L) 0.02 - 0.05 NG/ML  
BNP Collection Time: 05/20/19 12:39 PM  
Result Value Ref Range  (H) 0 pg/mL METABOLIC PANEL, COMPREHENSIVE  Collection Time: 05/20/19 12:39 PM  
 Result Value Ref Range Sodium 136 136 - 145 mmol/L Potassium 4.3 3.5 - 5.1 mmol/L Chloride 98 98 - 107 mmol/L  
 CO2 27 21 - 32 mmol/L Anion gap 11 7 - 16 mmol/L Glucose 85 65 - 100 mg/dL BUN 20 8 - 23 MG/DL Creatinine 1.25 0.8 - 1.5 MG/DL  
 GFR est AA >60 >60 ml/min/1.73m2 GFR est non-AA 60 (L) >60 ml/min/1.73m2 Calcium 9.3 8.3 - 10.4 MG/DL Bilirubin, total 0.5 0.2 - 1.1 MG/DL  
 ALT (SGPT) 18 12 - 65 U/L  
 AST (SGOT) 19 15 - 37 U/L Alk. phosphatase 99 50 - 136 U/L Protein, total 8.2 6.3 - 8.2 g/dL Albumin 3.5 3.2 - 4.6 g/dL Globulin 4.7 (H) 2.3 - 3.5 g/dL A-G Ratio 0.7 (L) 1.2 - 3.5 EKG, 12 LEAD, INITIAL Collection Time: 05/20/19 12:40 PM  
Result Value Ref Range Ventricular Rate 70 BPM  
 Atrial Rate 70 BPM  
 P-R Interval 178 ms QRS Duration 152 ms Q-T Interval 446 ms  
 QTC Calculation (Bezet) 481 ms Calculated P Axis 74 degrees Calculated R Axis 114 degrees Calculated T Axis 53 degrees Diagnosis Normal sinus rhythm Non-specific intra-ventricular conduction block Abnormal ECG When compared with ECG of 19-SEP-2017 07:32, Significant changes have occurred Confirmed by ST ANTONELLA OLVERA MD (), DEANDRA WALLACE ([id number]) on 5/20/2019 4:37:45 PM 
  
CBC WITH AUTOMATED DIFF Collection Time: 05/20/19  3:14 PM  
Result Value Ref Range WBC 11.8 (H) 4.3 - 11.1 K/uL  
 RBC 4.73 4.23 - 5.6 M/uL  
 HGB 12.8 (L) 13.6 - 17.2 g/dL HCT 42.0 41.1 - 50.3 % MCV 88.8 79.6 - 97.8 FL  
 MCH 27.1 26.1 - 32.9 PG  
 MCHC 30.5 (L) 31.4 - 35.0 g/dL  
 RDW 14.8 (H) 11.9 - 14.6 % PLATELET 27 (LL) 649 - 450 K/uL MPV Unable to calculate. Recommend adding IPF. 9.4 - 12.3 FL ABSOLUTE NRBC 0.00 0.0 - 0.2 K/uL  
 DF AUTOMATED NEUTROPHILS 81 (H) 43 - 78 % LYMPHOCYTES 9 (L) 13 - 44 % MONOCYTES 9 4.0 - 12.0 % EOSINOPHILS 0 (L) 0.5 - 7.8 % BASOPHILS 0 0.0 - 2.0 % IMMATURE GRANULOCYTES 0 0.0 - 5.0 % ABS. NEUTROPHILS 9.5 (H) 1.7 - 8.2 K/UL  
 ABS. LYMPHOCYTES 1.1 0.5 - 4.6 K/UL  
 ABS. MONOCYTES 1.1 0.1 - 1.3 K/UL  
 ABS. EOSINOPHILS 0.1 0.0 - 0.8 K/UL  
 ABS. BASOPHILS 0.1 0.0 - 0.2 K/UL  
 ABS. IMM. GRANS. 0.1 0.0 - 0.5 K/UL PROTHROMBIN TIME + INR Collection Time: 05/20/19  3:14 PM  
Result Value Ref Range Prothrombin time 32.7 (H) 11.7 - 14.5 sec INR 3.3 All Micro Results None Other Studies: Xr Chest Pa Lat Result Date: 5/20/2019 Clinical History: The patient is a 75years year old Male presenting with symptoms of shob Comparison:  9/20/2017 Findings:  Frontal and lateral views of the chest were obtained. Diffuse changes of COPD are demonstrated. There is scarring of both lung bases with chronic blunting of the lateral costophrenic sulci. No focal infiltrate or consolidative process is seen. No pleural effusions are seen. The cardiomediastinal silhouette is within normal limits. There are no acute osseous abnormalities. Median sternotomy changes are seen for valvuloplasty. Impression:  COPD with chronic basilar scarring. CPT code(s) 65325 Assessment and Plan:  
 
Hospital Problems as of 5/20/2019 Date Reviewed: 1/11/2019 Codes Class Noted - Resolved POA Shortness of breath ICD-10-CM: R06.02 
ICD-9-CM: 786.05  5/20/2019 - Present Yes  
   
 CHF (congestive heart failure) (HCC) (Chronic) ICD-10-CM: I50.9 ICD-9-CM: 428.0  5/20/2019 - Present Yes Type 2 diabetes with nephropathy ([facility] ICD-10-CM: E11.21 
ICD-9-CM: 250.40, 583.81  5/11/2018 - Present Yes Persistent atrial fibrillation (HCC) ICD-10-CM: I48.1 ICD-9-CM: 427.31  2/21/2018 - Present Yes Respiratory failure ([facility] ICD-10-CM: J96.90 ICD-9-CM: 518.81  9/13/2017 - Present Yes * (Principal) COPD (chronic obstructive pulmonary disease) (HCC) (Chronic) ICD-10-CM: J44.9 ICD-9-CM: 951  8/30/2017 - Present Yes Thrombocytopenia ([address] ICD-10-CM: D69.6 ICD-9-CM: 287.5  8/26/2017 - Present Yes HTN (hypertension), benign (Chronic) ICD-10-CM: I10 
ICD-9-CM: 401.1  8/23/2017 - Present Yes History of mechanical aortic valve replacement (Chronic) ICD-10-CM: Z95.2 ICD-9-CM: V43.3  8/23/2017 - Present Yes Overview Signed 8/30/2017 10:30 AM by Reino Gilford, NP Placement 2000, on chronicCoumadin PLAN: 
COPD 
-Oxygen to keep sat >90% -Duoneb 
-Con home inhalers -IV solumedrol CHF 
-I&O and daily weight 
-Monitor fluid status 
-Get an updated echo HTN 
-Cont home med 
-Mon BP 
 
DM 
-BG AC 
-[facility] 
-as above Afib 
-Pharmacy to dose warfarin 
-Remote tele Discharge planning:  home DVT ppx:  coumadin Code status:  Full Decision Maker:  Spouse is MPOA Admit status: Inpatient Case reviewed with supervising physician - ELIECER Newsome MD 
 
Estimated LOS:  Greater than 2 midnights Risk:  high Signed: 
Jessica Tejeda NP

## 2019-05-20 NOTE — ED TRIAGE NOTES
EMS called to [facility] in [facility] for shob. Hx COPD and CHF. Increased shob over the last 3 days. CXR done at [facility] which should CHF exacerbation. RA sat 83%. On 4L with 1\" nitro paste and 97%. BP initially 150/98, last  systolic. 20g LAC. T 98.3, bgl 110, sinus arrhythmia for EMS. Wears CPAP to sleep at night no oxygen during the day.

## 2019-05-21 LAB
ANION GAP SERPL CALC-SCNC: 7 MMOL/L (ref 7–16)
APTT PPP: 53.6 SEC (ref 24.7–39.8)
BASOPHILS # BLD: 0 K/UL (ref 0–0.2)
BASOPHILS NFR BLD: 0 % (ref 0–2)
BILIRUB DIRECT SERPL-MCNC: <0.1 MG/DL
BILIRUB INDIRECT SERPL-MCNC: NORMAL MG/DL (ref 0–1.1)
BILIRUB SERPL-MCNC: 0.4 MG/DL (ref 0.2–1.1)
BUN SERPL-MCNC: 19 MG/DL (ref 8–23)
CALCIUM SERPL-MCNC: 9 MG/DL (ref 8.3–10.4)
CHLORIDE SERPL-SCNC: 96 MMOL/L (ref 98–107)
CO2 SERPL-SCNC: 31 MMOL/L (ref 21–32)
CREAT SERPL-MCNC: 1.11 MG/DL (ref 0.8–1.5)
D DIMER PPP FEU-MCNC: 0.51 UG/ML(FEU)
DIFFERENTIAL METHOD BLD: ABNORMAL
EOSINOPHIL # BLD: 0 K/UL (ref 0–0.8)
EOSINOPHIL NFR BLD: 0 % (ref 0.5–7.8)
ERYTHROCYTE [DISTWIDTH] IN BLOOD BY AUTOMATED COUNT: 14.6 % (ref 11.9–14.6)
FIBRINOGEN PPP-MCNC: 760 MG/DL (ref 190–501)
GLUCOSE BLD STRIP.AUTO-MCNC: 149 MG/DL (ref 65–100)
GLUCOSE BLD STRIP.AUTO-MCNC: 153 MG/DL (ref 65–100)
GLUCOSE BLD STRIP.AUTO-MCNC: 188 MG/DL (ref 65–100)
GLUCOSE BLD STRIP.AUTO-MCNC: 255 MG/DL (ref 65–100)
GLUCOSE SERPL-MCNC: 155 MG/DL (ref 65–100)
HAPTOGLOB SERPL-MCNC: 213 MG/DL (ref 30–200)
HCT VFR BLD AUTO: 36.3 % (ref 41.1–50.3)
HGB BLD-MCNC: 11.3 G/DL (ref 13.6–17.2)
HGB RETIC QN AUTO: 29 PG (ref 29–35)
IMM GRANULOCYTES # BLD AUTO: 0 K/UL (ref 0–0.5)
IMM GRANULOCYTES NFR BLD AUTO: 0 % (ref 0–5)
IMM RETICS NFR: 13.1 % (ref 2.3–13.4)
INR PPP: 3.3
LDH SERPL L TO P-CCNC: 254 U/L (ref 110–210)
LYMPHOCYTES # BLD: 0.5 K/UL (ref 0.5–4.6)
LYMPHOCYTES NFR BLD: 6 % (ref 13–44)
MCH RBC QN AUTO: 27.5 PG (ref 26.1–32.9)
MCHC RBC AUTO-ENTMCNC: 31.1 G/DL (ref 31.4–35)
MCV RBC AUTO: 88.3 FL (ref 79.6–97.8)
MONOCYTES # BLD: 0.2 K/UL (ref 0.1–1.3)
MONOCYTES NFR BLD: 2 % (ref 4–12)
NEUTS SEG # BLD: 7.1 K/UL (ref 1.7–8.2)
NEUTS SEG NFR BLD: 92 % (ref 43–78)
NRBC # BLD: 0 K/UL (ref 0–0.2)
PLATELET # BLD AUTO: 120 K/UL (ref 150–450)
PLATELET COMMENTS,PCOM: ABNORMAL
PMV BLD AUTO: 11.6 FL (ref 9.4–12.3)
POTASSIUM SERPL-SCNC: 4.1 MMOL/L (ref 3.5–5.1)
PROTHROMBIN TIME: 32.9 SEC (ref 11.7–14.5)
RBC # BLD AUTO: 4.11 M/UL (ref 4.23–5.6)
RBC MORPH BLD: ABNORMAL
RETICS # AUTO: 0.05 M/UL (ref 0.03–0.1)
RETICS/RBC NFR AUTO: 1.1 % (ref 0.3–2)
SODIUM SERPL-SCNC: 134 MMOL/L (ref 136–145)
WBC # BLD AUTO: 7.8 K/UL (ref 4.3–11.1)
WBC MORPH BLD: ABNORMAL

## 2019-05-21 PROCEDURE — 97165 OT EVAL LOW COMPLEX 30 MIN: CPT

## 2019-05-21 PROCEDURE — 77010033678 HC OXYGEN DAILY

## 2019-05-21 PROCEDURE — 74011000302 HC RX REV CODE- 302: Performed by: HOSPITALIST

## 2019-05-21 PROCEDURE — 74011250636 HC RX REV CODE- 250/636: Performed by: NURSE PRACTITIONER

## 2019-05-21 PROCEDURE — 77030012341 HC CHMB SPCR OPTC MDI VYRM -A

## 2019-05-21 PROCEDURE — 36415 COLL VENOUS BLD VENIPUNCTURE: CPT

## 2019-05-21 PROCEDURE — 74011000250 HC RX REV CODE- 250: Performed by: HOSPITALIST

## 2019-05-21 PROCEDURE — 85610 PROTHROMBIN TIME: CPT

## 2019-05-21 PROCEDURE — 85025 COMPLETE CBC W/AUTO DIFF WBC: CPT

## 2019-05-21 PROCEDURE — 74011250636 HC RX REV CODE- 250/636: Performed by: HOSPITALIST

## 2019-05-21 PROCEDURE — 74011000250 HC RX REV CODE- 250: Performed by: NURSE PRACTITIONER

## 2019-05-21 PROCEDURE — 85379 FIBRIN DEGRADATION QUANT: CPT

## 2019-05-21 PROCEDURE — 94640 AIRWAY INHALATION TREATMENT: CPT

## 2019-05-21 PROCEDURE — 83615 LACTATE (LD) (LDH) ENZYME: CPT

## 2019-05-21 PROCEDURE — 74011250637 HC RX REV CODE- 250/637: Performed by: HOSPITALIST

## 2019-05-21 PROCEDURE — 83010 ASSAY OF HAPTOGLOBIN QUANT: CPT

## 2019-05-21 PROCEDURE — 74011250637 HC RX REV CODE- 250/637: Performed by: NURSE PRACTITIONER

## 2019-05-21 PROCEDURE — 86580 TB INTRADERMAL TEST: CPT | Performed by: HOSPITALIST

## 2019-05-21 PROCEDURE — 82962 GLUCOSE BLOOD TEST: CPT

## 2019-05-21 PROCEDURE — 94760 N-INVAS EAR/PLS OXIMETRY 1: CPT

## 2019-05-21 PROCEDURE — 97161 PT EVAL LOW COMPLEX 20 MIN: CPT

## 2019-05-21 PROCEDURE — 85384 FIBRINOGEN ACTIVITY: CPT

## 2019-05-21 PROCEDURE — 99222 1ST HOSP IP/OBS MODERATE 55: CPT | Performed by: INTERNAL MEDICINE

## 2019-05-21 PROCEDURE — C8929 TTE W OR WO FOL WCON,DOPPLER: HCPCS

## 2019-05-21 PROCEDURE — 85730 THROMBOPLASTIN TIME PARTIAL: CPT

## 2019-05-21 PROCEDURE — 85046 RETICYTE/HGB CONCENTRATE: CPT

## 2019-05-21 PROCEDURE — 74011636637 HC RX REV CODE- 636/637: Performed by: NURSE PRACTITIONER

## 2019-05-21 PROCEDURE — 65270000029 HC RM PRIVATE

## 2019-05-21 PROCEDURE — 82248 BILIRUBIN DIRECT: CPT

## 2019-05-21 PROCEDURE — 80048 BASIC METABOLIC PNL TOTAL CA: CPT

## 2019-05-21 RX ORDER — ALBUTEROL SULFATE 0.83 MG/ML
5 SOLUTION RESPIRATORY (INHALATION)
Status: DISCONTINUED | OUTPATIENT
Start: 2019-05-21 | End: 2019-05-23 | Stop reason: HOSPADM

## 2019-05-21 RX ORDER — FUROSEMIDE 40 MG/1
40 TABLET ORAL DAILY
Status: DISCONTINUED | OUTPATIENT
Start: 2019-05-22 | End: 2019-05-23 | Stop reason: HOSPADM

## 2019-05-21 RX ORDER — METOPROLOL SUCCINATE 50 MG/1
100 TABLET, EXTENDED RELEASE ORAL DAILY
Status: DISCONTINUED | OUTPATIENT
Start: 2019-05-22 | End: 2019-05-23 | Stop reason: HOSPADM

## 2019-05-21 RX ORDER — GUAIFENESIN 600 MG/1
1200 TABLET, EXTENDED RELEASE ORAL
Status: DISCONTINUED | OUTPATIENT
Start: 2019-05-21 | End: 2019-05-23 | Stop reason: HOSPADM

## 2019-05-21 RX ADMIN — DOXYCYCLINE HYCLATE 100 MG: 100 CAPSULE ORAL at 21:02

## 2019-05-21 RX ADMIN — METHYLPREDNISOLONE SODIUM SUCCINATE 40 MG: 40 INJECTION, POWDER, FOR SOLUTION INTRAMUSCULAR; INTRAVENOUS at 21:02

## 2019-05-21 RX ADMIN — METOPROLOL SUCCINATE 100 MG: 50 TABLET, EXTENDED RELEASE ORAL at 09:31

## 2019-05-21 RX ADMIN — FUROSEMIDE 40 MG: 10 INJECTION, SOLUTION INTRAMUSCULAR; INTRAVENOUS at 09:31

## 2019-05-21 RX ADMIN — Medication 5 ML: at 21:03

## 2019-05-21 RX ADMIN — PERFLUTREN 1 ML: 6.52 INJECTION, SUSPENSION INTRAVENOUS at 11:42

## 2019-05-21 RX ADMIN — LORATADINE 10 MG: 10 TABLET ORAL at 09:31

## 2019-05-21 RX ADMIN — Medication 1 AMPULE: at 21:02

## 2019-05-21 RX ADMIN — GABAPENTIN 600 MG: 300 CAPSULE ORAL at 09:31

## 2019-05-21 RX ADMIN — METHYLPREDNISOLONE SODIUM SUCCINATE 40 MG: 40 INJECTION, POWDER, FOR SOLUTION INTRAMUSCULAR; INTRAVENOUS at 09:31

## 2019-05-21 RX ADMIN — BUDESONIDE 500 MCG: 0.5 INHALANT RESPIRATORY (INHALATION) at 20:24

## 2019-05-21 RX ADMIN — Medication 5 ML: at 14:48

## 2019-05-21 RX ADMIN — GABAPENTIN 600 MG: 300 CAPSULE ORAL at 16:45

## 2019-05-21 RX ADMIN — INSULIN LISPRO 2 UNITS: 100 INJECTION, SOLUTION INTRAVENOUS; SUBCUTANEOUS at 05:56

## 2019-05-21 RX ADMIN — TIOTROPIUM BROMIDE 18 MCG: 18 CAPSULE ORAL; RESPIRATORY (INHALATION) at 09:20

## 2019-05-21 RX ADMIN — DOXYCYCLINE HYCLATE 100 MG: 100 CAPSULE ORAL at 09:31

## 2019-05-21 RX ADMIN — IPRATROPIUM BROMIDE AND ALBUTEROL SULFATE 3 ML: .5; 3 SOLUTION RESPIRATORY (INHALATION) at 01:26

## 2019-05-21 RX ADMIN — FAMOTIDINE 10 MG: 20 TABLET ORAL at 09:31

## 2019-05-21 RX ADMIN — GABAPENTIN 600 MG: 300 CAPSULE ORAL at 21:02

## 2019-05-21 RX ADMIN — GUAIFENESIN 1200 MG: 600 TABLET, EXTENDED RELEASE ORAL at 09:32

## 2019-05-21 RX ADMIN — Medication 10 ML: at 05:56

## 2019-05-21 RX ADMIN — TUBERCULIN PURIFIED PROTEIN DERIVATIVE 5 UNITS: 5 INJECTION, SOLUTION INTRADERMAL at 14:46

## 2019-05-21 RX ADMIN — INSULIN LISPRO 6 UNITS: 100 INJECTION, SOLUTION INTRAVENOUS; SUBCUTANEOUS at 17:03

## 2019-05-21 RX ADMIN — ALBUTEROL SULFATE 5 MG: 2.5 SOLUTION RESPIRATORY (INHALATION) at 20:24

## 2019-05-21 RX ADMIN — Medication 1 AMPULE: at 09:31

## 2019-05-21 RX ADMIN — BUDESONIDE 500 MCG: 0.5 INHALANT RESPIRATORY (INHALATION) at 09:17

## 2019-05-21 RX ADMIN — MONTELUKAST SODIUM 10 MG: 10 TABLET, FILM COATED ORAL at 09:31

## 2019-05-21 RX ADMIN — IPRATROPIUM BROMIDE AND ALBUTEROL SULFATE 3 ML: .5; 3 SOLUTION RESPIRATORY (INHALATION) at 09:17

## 2019-05-21 RX ADMIN — WARFARIN SODIUM 2.5 MG: 2 TABLET ORAL at 17:58

## 2019-05-21 RX ADMIN — POTASSIUM CHLORIDE 20 MEQ: 20 TABLET, EXTENDED RELEASE ORAL at 09:31

## 2019-05-21 NOTE — PROGRESS NOTES
Patient admitted from home where he lives with his wife. He is independent in all ADLs at baseline. Patient has a [employer] through [employer]. He was last admitted in 2017 and went to the 9th floor [facility] for acute rehab after that admission. We are pending PT/OT evaluations for discharge planning recommendations. Case Management will continue to follow. Care Management Interventions PCP Verified by CM: Yes 
Palliative Care Criteria Met (RRAT>21 & CHF Dx)?: Yes(RRAT- 32, CHF ) Palliative Consult Recommended?: Yes Transition of Care Consult (CM Consult): Discharge Planning Discharge Durable Medical Equipment: No 
Physical Therapy Consult: Yes Occupational Therapy Consult: Yes Speech Therapy Consult: No 
Current Support Network: Lives with Spouse, Own Home Confirm Follow Up Transport: Self Plan discussed with Pt/Family/Caregiver: Yes Freedom of Choice Offered: Yes Discharge Location Discharge Placement: Home with home health

## 2019-05-21 NOTE — PROGRESS NOTES
Problem: Mobility Impaired (Adult and Pediatric) Goal: *Acute Goals and Plan of Care (Insert Text) Description LTG: 
(1.)Mr. Jimi Umana will ascend/descend 4 steps w/ rail with SUPERVISION within 7 treatment day(s). (2.)Mr. Jimi Umana will transfer from bed to chair and chair to bed with INDEPENDENT using the least restrictive device within 7 treatment day(s). (3.)Mr. Jimi Umana will ambulate with MODIFIED INDEPENDENCE for 400 feet with Charles@ >93% within 7 treatment day(s). ________________________________________________________________________________________________ Note: PHYSICAL THERAPY: Initial Assessment and PM 5/21/2019 INPATIENT:   
[place] / [place] / Product Type: Medicare /   
  
NAME/AGE/GENDER: Daisy Morgan is a 75 y.o. male PRIMARY DIAGNOSIS: Shortness of breath [R06.02] COPD (chronic obstructive pulmonary disease) ([facility] COPD (chronic obstructive pulmonary disease) ([facility] ICD-10: Treatment Diagnosis:  
 Generalized Muscle Weakness (M62.81) Other abnormalities of gait and mobility (R26.89) Precaution/Allergies: 
Levaquin [levofloxacin] and Metformin ASSESSMENT:  
 
Mr. Jimi Umana is a 74 y/o male adm. W/  exacerbation, referred to PT for evaluation & treatment. Pt. Is currently supervision for mobility secondary to decreased strength, endurance, & balance. Pt. Typically is independent w/o A/D for community levels, so is mobilizing below his baseline & benefits from cont. PT services to address. This section established at most recent assessment PROBLEM LIST (Impairments causing functional limitations): 
Decreased Strength Decreased ADL/Functional Activities Decreased Ambulation Ability/Technique Decreased Balance Decreased Activity Tolerance Decreased Work Simplification/Energy Conservation Techniques INTERVENTIONS PLANNED: (Benefits and precautions of physical therapy have been discussed with the patient.) Balance Exercise Gait Training Therapeutic Activites Therapeutic Exercise/Strengthening Transfer Training TREATMENT PLAN: Frequency/Duration: 3 times a week for duration of hospital stay Rehabilitation Potential For Stated Goals: Excellent REHAB RECOMMENDATIONS (at time of discharge pending progress):   
Placement: It is my opinion, based on this patient's performance to date, that Mr. Rolando Ott may benefit from [address] after discharge due to the functional deficits listed above that are likely to improve with skilled rehabilitation because because he/she is able to safely attend regular and reoccurring therapy sessions at an outpatient facility, because he/she will benefit from the individualized approach tailored to his/her deficits and to improve endurance . Equipment:  
None at this time HISTORY:  
History of Present Injury/Illness (Reason for Referral): 
Pt. Adm. W/ cough, congestion, & orthopnea x4-5 days, adm. For COPD exacerbation. Past Medical History/Comorbidities: Mr. Rolando Ott  has a past medical history of Acute diastolic CHF (congestive heart failure) ([facility] (9/15/2016), Arthritis, Chicken pox, Coronary artery disease (4/16/2014), DJD (degenerative joint disease), Emphysema, aortic valve replacement, mechanical, Hypercholesterolemia, Hypertension, Palpitations, Pneumonia, and Systolic CHF, acute ([facility] (11/15/2016). Mr. Rolando Ott  has a past surgical history that includes hx aortic valve replacement (N/A, 2000) and hx heart catheterization (07/21/2004). Social History/Living Environment:  
Home Environment: Private residence # Steps to Enter: 3 One/Two Story Residence: One story Living Alone: No 
Support Systems: Spouse/Significant Other/Partner Patient Expects to be Discharged to[de-identified] Private residence Current DME Used/Available at Home: Cane, straight, Shower chair, Raised toilet seat, Walker, rolling Tub or Shower Type: Shower Prior Level of Function/Work/Activity: 
Pt. Was independent w/o A/D, active in the community. Number of Personal Factors/Comorbidities that affect the Plan of Care: 3+: HIGH COMPLEXITY EXAMINATION:  
Most Recent Physical Functioning:  
Gross Assessment: 
  
         
  
Posture: 
Posture (WDL): Exceptions to [facility] Posture Assessment: Rounded shoulders Balance: 
Standing - Static: Good Standing - Dynamic : Fair Bed Mobility: 
Rolling: Independent Supine to Sit: Independent Sit to Supine: Independent Scooting: Independent Wheelchair Mobility: 
  
Transfers: 
Sit to/from stand mod I Gait: 
  
Speed/Evelin: Pace decreased (<100 feet/min) Gait Abnormalities: Altered arm swing;Decreased step clearance Distance (ft): 400 Feet (ft) Assistive Device: Other (comment)(no A/D) Ambulation - Level of Assistance: Supervision Body Structures Involved: 
Lungs Muscles Body Functions Affected: 
Respiratory Neuromusculoskeletal 
Movement Related Activities and Participation Affected: Mobility Self Care Community, Social and [place] Number of elements that affect the Plan of Care: 4+: HIGH COMPLEXITY CLINICAL PRESENTATION:  
Presentation: Stable and uncomplicated: LOW COMPLEXITY CLINICAL DECISION MAKIN[address] [place] [address] AM-PAC? ?6 Clicks? Basic Mobility Inpatient Short Form How much difficulty does the patient currently have. .. Unable A Lot A Little None 1. Turning over in bed (including adjusting bedclothes, sheets and blankets)? ? 1   ? 2   ? 3   ? 4  
2. Sitting down on and standing up from a chair with arms ( e.g., wheelchair, bedside commode, etc.)   ? 1   ? 2   ? 3   ? 4  
3. Moving from lying on back to sitting on the side of the bed?   ? 1   ? 2   ? 3   ? 4 How much help from another person does the patient currently need. .. Total A Lot A Little None 4. Moving to and from a bed to a chair (including a wheelchair)?    ? 1   ? 2   ? 3   ? 4  
 5.  Need to walk in hospital room? ? 1   ? 2   ? 3   ? 4  
6. Climbing 3-5 steps with a railing? ? 1   ? 2   ? 3   ? 4  
© 2007, Trustees of [address] [place] [address] [address] [address], under license to [employer]. All rights reserved Score:  Initial: 2 Most Recent: X (Date: -- ) Interpretation of Tool:  Represents activities that are increasingly more difficult (i.e. Bed mobility, Transfers, Gait). Medical Necessity:    
Patient demonstrates excellent 
 rehab potential due to higher previous functional level. Reason for Services/Other Comments: 
Patient continues to demonstrate capacity to improve strength, balance, & endurance which will increase independence, decrease amount of assistance required from caregiver and increase safety Candance Huger Use of outcome tool(s) and clinical judgement create a POC that gives a: Clear prediction of patient's progress: LOW COMPLEXITY  
  
 
 
 
TREATMENT:  
(In addition to Assessment/Re-Assessment sessions the following treatments were rendered) Pre-treatment Symptoms/Complaints:  \"I've been up all morning! \" Pain: Initial:  
Pain Intensity 1: 0  Post Session:  no c/o pain Assessment/Reassessment only, no treatment provided today Braces/Orthotics/Lines/Etc:  
O2 Device: Nasal cannula Treatment/Session Assessment:   
Response to Treatment:  HR 76-79, O2 97% throughout treatment on 3 LPM NC O2 Interdisciplinary Collaboration:  
Physical Therapist 
Registered Nurse After treatment position/precautions:  
Bed/Chair-wheels locked Bed in low position Call light within reach Family at bedside Nurse at bedside Pt. Sitting on edge of bed Compliance with Program/Exercises: Will assess as treatment progresses Recommendations/Intent for next treatment session: \"Next visit will focus on advancements to more challenging activities and reduction in assistance provided\". Total Treatment Duration: PT Patient Time In/Time Out Time In: 0324 Time Out: 6645 Elizabeth Arenas, PT

## 2019-05-21 NOTE — CONSULTS
[facility] Hematology & Oncology Inpatient Hematology / Oncology Consult NoteReason for Consult:  Shortness of breath [R06.02] Referring Physician:  Danielle Dickerson MD 
History of Present Illness: Mr. Rolando Ott is a 75 y.o. male admitted on 5/20/2019 with COPD exacerbation. He is a known patient of Dr. Corky Dunham with history of thrombocytopenia, monitored since 2017. His PMH includes hx mechanical AVR on coumadin, chronic sCHF, COPD, PVD, DM, HTN, PRATIK. He presented to ED with c/o cough/congestion and inability to lie flat x 4-5 days. He also reports a 20# wt loss over the past several months. O2 was found to be 84% on RA. He was admitted for COPD exacerbation and started on Solumedrol and Doxycycline. On admission, plt 27k (was 109k on 5/10). Today, up to 120k. We were consulted for evaluation and recommendations for his thrombocytopenia. Heme history (copied): 
75 y.o. of extensive cardiac history, mechanical valve replacement, CHF, admitted for afib RVR with ICU stay with cardioversion, initially seen by Dr. Edie Cevallos for thrombocytopenia, had tried steroid and IVIG without clear response, however responded to plt transfusion, discussed further eval with marrow biopsy however can not stop coumadin due to mechanical valve, meanwhile ptl increased to 65, possible to improve as his general condition recovers, wean prednisone to 10mg daily then off in 10/2017, and his plt continued to recover to 217, we discussed his TCP was likely multifactorial given the shock in ICU, continue coumadin with cardiology. Monitored plt being stable, discussed follow PCP/cardiology and return prn and pt desired to follow in 6 months. He was admitted to [facility] recently for PNA. Lab showed plt 109, discussed no hem intervention needed for now and continue to monitor, follow PCP and cardiology and RTC as needed. All questions are answered to his satisfaction. He will call for further questions and concerns. Review of Systems: 
Constitutional +fatigue +weight loss. Denies fever, chills, appetite changes, fatigue, night sweats. HEENT Denies trauma, blurry vision, hearing loss, ear pain, nosebleeds, sore throat, neck pain and ear discharge. Skin Denies lesions or rashes. Lungs +cough +dyspnea. Denies hemoptysis. Cardiovascular Denies chest pain, palpitations, or lower extremity edema. Gastrointestinal Denies nausea, vomiting, changes in bowel habits, bloody or black stools, abdominal pain.  Denies dysuria, frequency or hesitancy of urination. Neuro Denies headaches, visual changes or ataxia. Denies dizziness, tingling, tremors, sensory change, speech change, focal weakness or headaches. Hematology Denies easy bruising or bleeding, denies gingival bleeding or epistaxis. Endo Denies heat/cold intolerance, denies diabetes or thyroid abnormalities. MSK Denies back pain, arthralgias, myalgias or frequent falls. Psychiatric/Behavioral Denies depression and substance abuse. The patient is not nervous/anxious. Allergies Allergen Reactions  Levaquin [Levofloxacin] Other (comments) GI upset  Metformin Other (comments) Gi upset Past Medical History:  
Diagnosis Date  Acute diastolic CHF (congestive heart failure) ([address] 9/15/2016  Arthritis  Chicken pox  Coronary artery disease 4/16/2014  DJD (degenerative joint disease)  Emphysema  Hx of aortic valve replacement, mechanical   
   doing well,. Had to hold 3 days for removal of skin ca. Will have to have add. resection.  Hypercholesterolemia  Hypertension  Palpitations Holter showed  PVC's, PAC's, one short run SVT.  Pneumonia  Systolic CHF, acute ([address] 11/15/2016 Past Surgical History:  
Procedure Laterality Date  HX AORTIC VALVE REPLACEMENT N/A 2000 Aortic Valve Replacement w/ Mechanical Valve  HX HEART CATHETERIZATION  07/21/2004 [facility] History reviewed. No pertinent family history. Social History Socioeconomic History  Marital status:  Spouse name: Not on file  Number of children: Not on file  Years of education: Not on file  Highest education level: Not on file Occupational History  Not on file Social Needs  Financial resource strain: Not on file  Food insecurity:  
  Worry: Not on file Inability: Not on file  Transportation needs:  
  Medical: Not on file Non-medical: Not on file Tobacco Use  Smoking status: Former Smoker Packs/day: 2.00 Years: 40.00 Pack years: 80.00 Last attempt to quit:  Years since quittin.3  Smokeless tobacco: Never Used Substance and Sexual Activity  Alcohol use: No  
 Drug use: No  
 Sexual activity: Never Lifestyle  Physical activity:  
  Days per week: Not on file Minutes per session: Not on file  Stress: Not on file Relationships  Social connections:  
  Talks on phone: Not on file Gets together: Not on file Attends [employer] service: Not on file Active member of club or organization: Not on file Attends meetings of clubs or organizations: Not on file Relationship status: Not on file  Intimate partner violence:  
  Fear of current or ex partner: Not on file Emotionally abused: Not on file Physically abused: Not on file Forced sexual activity: Not on file Other Topics Concern  Not on file Social History Narrative  Not on file Current Facility-Administered Medications Medication Dose Route Frequency Provider Last Rate Last Dose  [START ON 2019] furosemide (LASIX) tablet 40 mg  40 mg Oral DAILY Odilon Pinto MD      
 tuberculin injection 5 Units  5 Units IntraDERMal Funmilayo Bueno MD      
 guaiFENesin ER [facility]) tablet 1,200 mg  1,200 mg Oral BID PRN Odilon Pinto MD      
  [START ON 5/22/2019] metoprolol succinate (TOPROL-XL) XL tablet 100 mg  100 mg Oral DAILY Adrien Perkins MD      
 albuterol (PROVENTIL VENTOLIN) nebulizer solution 5 mg  5 mg Nebulization Q6H RT Adrien Perkins MD      
 warfarin (COUMADIN) tablet 2.5 mg  2.5 mg Oral QPM Adrien Perkins MD      
 perflutren lipid microspheres (DEFINITY) in NS bolus IV  1 mL IntraVENous PRN Adrien Perkins MD   1 mL at 05/21/19 1142  lisinopril (PRINIVIL, ZESTRIL) tablet 10 mg  10 mg Oral DAILY BalingitDarion, NP      
 gabapentin (NEURONTIN) capsule 600 mg  600 mg Oral TID BalingitDarion, NP   600 mg at 05/21/19 4828  tiotropium (SPIRIVA) inhalation capsule 18 mcg  1 Cap Inhalation DAILY Darion Sanchez, NP   18 mcg at 05/21/19 0920  potassium chloride (K-DUR, KLOR-CON) SR tablet 20 mEq  20 mEq Oral DAILY BalingitDarion, NP   20 mEq at 05/21/19 0931  
 sodium chloride (NS) flush 5-40 mL  5-40 mL IntraVENous Q8H JeromyingitDarion, NP   10 mL at 05/21/19 3447  sodium chloride (NS) flush 5-40 mL  5-40 mL IntraVENous PRN Jeromyingit Revkah, NP      
 acetaminophen (TYLENOL) tablet 650 mg  650 mg Oral Q4H PRN Jeromyingit, Revkah, NP      
 HYDROcodone-acetaminophen (NORCO) 5-325 mg per tablet 1 Tab  1 Tab Oral Q4H PRN JeromyingitRevkah, NP      
 naloxone (NARCAN) injection 0.4 mg  0.4 mg IntraVENous PRN Balingit, Revkah, NP      
 diphenhydrAMINE (BENADRYL) capsule 25 mg  25 mg Oral Q4H PRN Balingit, Revkah, NP      
 ondansetron (ZOFRAN) injection 4 mg  4 mg IntraVENous Q4H PRN Balingit, Revkah, NP      
 bisacodyl (DULCOLAX) tablet 10 mg  10 mg Oral DAILY PRN JeromyingitRevkah, NP      
 methylPREDNISolone (PF) (SOLU-MEDROL) injection 40 mg  40 mg IntraVENous Q12H Balingit, Revkah, NP   40 mg at 05/21/19 0931  
 insulin lispro (HUMALOG) injection   SubCUTAneous TIDAC Darion Sanchez NP   2 Units at 05/21/19 5671  budesonide (PULMICORT) 500 mcg/2 ml nebulizer suspension  500 mcg Nebulization BID RT Edward Thompson MD   500 mcg at 19 7125  
 doxycycline (VIBRAMYCIN) capsule 100 mg  100 mg Oral Q12H Edward Thompson MD   100 mg at 19 0931  
 alcohol 62% (NOZIN) nasal  1 Ampule  1 Ampule Topical Q12H Edward Thompson MD   1 Ampule at 19 3980 OBJECTIVE: 
Patient Vitals for the past 8 hrs: 
 BP Temp Pulse Resp SpO2  
19 1139 133/67 98.8 °F (37.1 °C) 76 20 97 % 19 0921     95 % 19 0726 130/72 97.5 °F (36.4 °C) 79 20 98 % Temp (24hrs), Av.5 °F (36.9 °C), Min:97.5 °F (36.4 °C), Max:98.9 °F (37.2 °C) 
 
 0701 -  1900 In: 120 [P.O.:120] Out: - Physical Exam: 
Constitutional: Well developed, well nourished male in no acute distress, sitting comfortably in the hospital bed. Family at bedside. HEENT: Normocephalic and atraumatic. Oropharynx is clear, mucous membranes are moist.  Extraocular muscles are intact. Sclerae anicteric. Neck supple without JVD. No thyromegaly present. Skin Warm and dry. No bruising and no rash noted. No erythema. No pallor. Respiratory Lungs with expiratory wheezes bilaterally, normal air exchange without accessory muscle use. CVS Normal rate, regular rhythm and normal S1 and S2. No murmurs, gallops, or rubs. Abdomen Soft, nontender and nondistended, normoactive bowel sounds. No palpable mass. No hepatosplenomegaly. Neuro Grossly nonfocal with no obvious sensory or motor deficits. MSK Normal range of motion in general.  No edema and no tenderness. Psych Appropriate mood and affect. Labs:   
Recent Results (from the past 24 hour(s)) TROPONIN I Collection Time: 19 12:39 PM  
Result Value Ref Range Troponin-I, Qt. <0.02 (L) 0.02 - 0.05 NG/ML  
BNP Collection Time: 19 12:39 PM  
Result Value Ref Range  (H) 0 pg/mL METABOLIC PANEL, COMPREHENSIVE Collection Time: 19 12:39 PM  
Result Value Ref Range Sodium 136 136 - 145 mmol/L Potassium 4.3 3.5 - 5.1 mmol/L Chloride 98 98 - 107 mmol/L  
 CO2 27 21 - 32 mmol/L Anion gap 11 7 - 16 mmol/L Glucose 85 65 - 100 mg/dL BUN 20 8 - 23 MG/DL Creatinine 1.25 0.8 - 1.5 MG/DL  
 GFR est AA >60 >60 ml/min/1.73m2 GFR est non-AA 60 (L) >60 ml/min/1.73m2 Calcium 9.3 8.3 - 10.4 MG/DL Bilirubin, total 0.5 0.2 - 1.1 MG/DL  
 ALT (SGPT) 18 12 - 65 U/L  
 AST (SGOT) 19 15 - 37 U/L Alk. phosphatase 99 50 - 136 U/L Protein, total 8.2 6.3 - 8.2 g/dL Albumin 3.5 3.2 - 4.6 g/dL Globulin 4.7 (H) 2.3 - 3.5 g/dL A-G Ratio 0.7 (L) 1.2 - 3.5 EKG, 12 LEAD, INITIAL Collection Time: 05/20/19 12:40 PM  
Result Value Ref Range Ventricular Rate 70 BPM  
 Atrial Rate 70 BPM  
 P-R Interval 178 ms QRS Duration 152 ms Q-T Interval 446 ms  
 QTC Calculation (Bezet) 481 ms Calculated P Axis 74 degrees Calculated R Axis 114 degrees Calculated T Axis 53 degrees Diagnosis Normal sinus rhythm Non-specific intra-ventricular conduction block Abnormal ECG When compared with ECG of 19-SEP-2017 07:32, Significant changes have occurred Confirmed by ST ANTONELLA OLVERA MD (), DEANDRA WALLACE ([id number]) on 5/20/2019 4:37:45 PM 
  
CBC WITH AUTOMATED DIFF Collection Time: 05/20/19  3:14 PM  
Result Value Ref Range WBC 11.8 (H) 4.3 - 11.1 K/uL  
 RBC 4.73 4.23 - 5.6 M/uL  
 HGB 12.8 (L) 13.6 - 17.2 g/dL HCT 42.0 41.1 - 50.3 % MCV 88.8 79.6 - 97.8 FL  
 MCH 27.1 26.1 - 32.9 PG  
 MCHC 30.5 (L) 31.4 - 35.0 g/dL  
 RDW 14.8 (H) 11.9 - 14.6 % PLATELET 27 (LL) 113 - 450 K/uL MPV Unable to calculate. Recommend adding IPF. 9.4 - 12.3 FL ABSOLUTE NRBC 0.00 0.0 - 0.2 K/uL  
 DF AUTOMATED NEUTROPHILS 81 (H) 43 - 78 % LYMPHOCYTES 9 (L) 13 - 44 % MONOCYTES 9 4.0 - 12.0 % EOSINOPHILS 0 (L) 0.5 - 7.8 % BASOPHILS 0 0.0 - 2.0 % IMMATURE GRANULOCYTES 0 0.0 - 5.0 %  
 ABS. NEUTROPHILS 9.5 (H) 1.7 - 8.2 K/UL ABS. LYMPHOCYTES 1.1 0.5 - 4.6 K/UL  
 ABS. MONOCYTES 1.1 0.1 - 1.3 K/UL  
 ABS. EOSINOPHILS 0.1 0.0 - 0.8 K/UL  
 ABS. BASOPHILS 0.1 0.0 - 0.2 K/UL  
 ABS. IMM. GRANS. 0.1 0.0 - 0.5 K/UL PROTHROMBIN TIME + INR Collection Time: 05/20/19  3:14 PM  
Result Value Ref Range Prothrombin time 32.7 (H) 11.7 - 14.5 sec INR 3.3 GLUCOSE, POC Collection Time: 05/20/19  6:14 PM  
Result Value Ref Range Glucose (POC) 100 65 - 100 mg/dL GLUCOSE, POC Collection Time: 05/21/19  5:41 AM  
Result Value Ref Range Glucose (POC) 153 (H) 65 - 100 mg/dL METABOLIC PANEL, BASIC Collection Time: 05/21/19  6:34 AM  
Result Value Ref Range Sodium 134 (L) 136 - 145 mmol/L Potassium 4.1 3.5 - 5.1 mmol/L Chloride 96 (L) 98 - 107 mmol/L  
 CO2 31 21 - 32 mmol/L Anion gap 7 7 - 16 mmol/L Glucose 155 (H) 65 - 100 mg/dL BUN 19 8 - 23 MG/DL Creatinine 1.11 0.8 - 1.5 MG/DL  
 GFR est AA >60 >60 ml/min/1.73m2 GFR est non-AA >60 >60 ml/min/1.73m2 Calcium 9.0 8.3 - 10.4 MG/DL PROTHROMBIN TIME + INR Collection Time: 05/21/19  6:34 AM  
Result Value Ref Range Prothrombin time 32.9 (H) 11.7 - 14.5 sec INR 3.3 PTT Collection Time: 05/21/19  6:34 AM  
Result Value Ref Range aPTT 53.6 (H) 24.7 - 39.8 SEC FIBRINOGEN Collection Time: 05/21/19  6:34 AM  
Result Value Ref Range Fibrinogen 760 (H) 190 - 501 mg/dL D DIMER Collection Time: 05/21/19  6:34 AM  
Result Value Ref Range D DIMER 0.51 <0.56 ug/ml(FEU) CBC WITH AUTOMATED DIFF Collection Time: 05/21/19  9:37 AM  
Result Value Ref Range WBC 7.8 4.3 - 11.1 K/uL  
 RBC 4.11 (L) 4.23 - 5.6 M/uL  
 HGB 11.3 (L) 13.6 - 17.2 g/dL HCT 36.3 (L) 41.1 - 50.3 % MCV 88.3 79.6 - 97.8 FL  
 MCH 27.5 26.1 - 32.9 PG  
 MCHC 31.1 (L) 31.4 - 35.0 g/dL  
 RDW 14.6 11.9 - 14.6 % PLATELET 371 (L) 885 - 450 K/uL MPV 11.6 9.4 - 12.3 FL ABSOLUTE NRBC 0.00 0.0 - 0.2 K/uL  
 DF AUTOMATED NEUTROPHILS 92 (H) 43 - 78 % LYMPHOCYTES 6 (L) 13 - 44 % MONOCYTES 2 (L) 4.0 - 12.0 % EOSINOPHILS 0 (L) 0.5 - 7.8 % BASOPHILS 0 0.0 - 2.0 % IMMATURE GRANULOCYTES 0 0.0 - 5.0 %  
 ABS. NEUTROPHILS 7.1 1.7 - 8.2 K/UL  
 ABS. LYMPHOCYTES 0.5 0.5 - 4.6 K/UL  
 ABS. MONOCYTES 0.2 0.1 - 1.3 K/UL  
 ABS. EOSINOPHILS 0.0 0.0 - 0.8 K/UL  
 ABS. BASOPHILS 0.0 0.0 - 0.2 K/UL  
 ABS. IMM. GRANS. 0.0 0.0 - 0.5 K/UL  
 RBC COMMENTS SLIGHT 
ANISOCYTOSIS + POIKILOCYTOSIS 
    
 WBC COMMENTS Result Confirmed By Smear PLATELET COMMENTS DECREASED    
GLUCOSE, POC Collection Time: 05/21/19 11:38 AM  
Result Value Ref Range Glucose (POC) 149 (H) 65 - 100 mg/dL Imaging: XR CHEST PA LAT [[id number]] Collected: 05/20/19 1312 Order Status: Completed Updated: 05/20/19 1315 Narrative:    
Clinical History: The patient is a 75years year old Male presenting with 
symptoms of shob Comparison:  9/20/2017 Findings:  Frontal and lateral views of the chest were obtained. Diffuse changes of COPD are demonstrated. There is scarring of both lung bases 
with chronic blunting of the lateral costophrenic sulci. No focal infiltrate or 
consolidative process is seen.  No pleural effusions are seen.  The 
cardiomediastinal silhouette is within normal limits.  There are no acute 
osseous abnormalities. Median sternotomy changes are seen for valvuloplasty. Impression:    
Impression:   
 
COPD with chronic basilar scarring. ASSESSMENT: 
Problem List  Date Reviewed: 1/11/2019 Codes Class Noted Shortness of breath ICD-10-CM: R06.02 
ICD-9-CM: 786.05  5/20/2019 CHF (congestive heart failure) (HCC) (Chronic) ICD-10-CM: I50.9 ICD-9-CM: 428.0  5/20/2019 Type 2 diabetes with nephropathy ([facility] [address] ICD-10-CM: E11.21 
ICD-9-CM: 250.40, 583.81  5/11/2018  Acute on chronic combined systolic and diastolic ACC/AHA stage C congestive heart failure (HCC) ICD-10-CM: I50.43 
 ICD-9-CM: 428.43, 428.0  5/7/2018 Persistent atrial fibrillation (HCC) ICD-10-CM: I48.1 ICD-9-CM: 427.31  2/21/2018 Debility ICD-10-CM: R53.81 ICD-9-CM: 799.3  11/3/2017 Diabetes mellitus without complication ([place] [address].) FBT-41-KQ: E11.9 ICD-9-CM: 250.00  11/3/2017 Respiratory failure ([place] [address].) ICD-10-CM: J96.90 ICD-9-CM: 518.81  9/13/2017 MRSA nasal colonization ICD-10-CM: Z22.200 ICD-9-CM: V02.54  9/8/2017 * (Principal) COPD (chronic obstructive pulmonary disease) (HCC) (Chronic) ICD-10-CM: J44.9 ICD-9-CM: 640  8/30/2017 Acute respiratory failure with hypercapnia ([place] [address].) ICD-10-CM: J96.02 
ICD-9-CM: 518.81  8/29/2017 History of tobacco use (Chronic) ICD-10-CM: I42.312 ICD-9-CM: V15.82  8/29/2017 Hyponatremia ICD-10-CM: E87.1 ICD-9-CM: 276.1  8/29/2017 Acute encephalopathy ICD-10-CM: G93.40 ICD-9-CM: 348.30  8/29/2017 Thrombocytopenia ([place] [address].) ICD-10-CM: D69.6 ICD-9-CM: 287.5  8/26/2017 Anticoagulant long-term use (Chronic) ICD-10-CM: Z79.01 
ICD-9-CM: V58.61  8/23/2017 HTN (hypertension), benign (Chronic) ICD-10-CM: I10 
ICD-9-CM: 401.1  8/23/2017 Peripheral vascular disease (HCC) (Chronic) ICD-10-CM: I73.9 ICD-9-CM: 443.9  8/23/2017 Dyslipidemia (Chronic) ICD-10-CM: V68.8 ICD-9-CM: 272.4  8/23/2017 History of mechanical aortic valve replacement (Chronic) ICD-10-CM: Z95.2 ICD-9-CM: V43.3  8/23/2017 Overview Signed 8/30/2017 10:30 AM by Elizabeth Miles NP Placement 2000, on chronicCoumadin Centrilobular emphysema (HCC) (Chronic) ICD-10-CM: J43.2 ICD-9-CM: 492.8  8/23/2017 Overview Signed 9/8/2017  9:24 AM by Socorro Adams NP With last PFTs FVC 1.70 L or 44% predicted, FEV1 0.86 L or 29% predicted, FEV1/FVC 51%. This study was a postbronchodilator study performed at the [facility] in [facility] on 8/22/2016 Ischemic cardiomyopathy (Chronic) ICD-10-CM: I25.5 ICD-9-CM: 414.8  8/23/2017 Overview Signed 8/30/2017 10:29 AM by Norris Vance NP  
  8/23/17 ECHO: 
EF 40 % to 45 %. There were no regional wall motion abnormalities. Moderate hypokinesis of the mid-apical anterior and apical wall(s). Atrial flutter with rapid ventricular response ([address] ICD-10-CM: I48.92 
ICD-9-CM: 427.32  8/23/2017 LV dysfunction (Chronic) ICD-10-CM: I51.9 ICD-9-CM: 429.9  10/19/2016 Atherosclerosis of native arteries of the extremities with intermittent claudication (Chronic) ICD-10-CM: Z75.551 ICD-9-CM: 440.21  4/15/2014 RECOMMENDATIONS: 
Thrombocytopenia 
- monitored since 2017 by Dr. Yoselyn Gavin - felt to be multifactorial 
- Plt up to 120k today. Recent drop in plt most likely r/t current infx and should continue to improve over time given steroids/antibx - Check DIC labs, hemolysis labs, and peripheral smear COPD exacerbation 
- mgmt per primary team 
- on solumedrol and doxycycline Hx mechanical AVR 
- on warfarin Lab studies and imaging studies were personally reviewed. Thank you for allowing us to participate in the care of Mr. Sandy Lemus. Fazal Bhakta NP [facility] Hematology & Oncology [address] [address] Office : [phone] Fax : [phone]

## 2019-05-21 NOTE — PROGRESS NOTES
Received report from Carmine, [facility]. Patient awake in bed. Respirations present. On 2.5 L NC. No signs of distress. Bed low and locked. Call light within reach. No needs expressed at this time. Will continue to monitor.

## 2019-05-21 NOTE — PROGRESS NOTES
OCCUPATIONAL THERAPY: Initial Assessment and Discharge 5/21/2019 INPATIENT:   
[place] / [place] / Product Type: Medicare /  
  
NAME/AGE/GENDER: Norbert Saucedo is a 75 y.o. male PRIMARY DIAGNOSIS:  Shortness of breath [R06.02] COPD (chronic obstructive pulmonary disease) ([facility] COPD (chronic obstructive pulmonary disease) ([facility] ICD-10: Treatment Diagnosis:  
 Generalized Muscle Weakness (M62.81) Precautions/Allergies: 
   Levaquin [levofloxacin] and Metformin ASSESSMENT:  
Mr. Dawn Garibay was admitted with COPD exacerbation, hypoxia. Pt lives with his wife and is independent and active at baseline, still mows the lawn and does yard and house work, does not use an AD for mobility. This session, pt demonstrated independence with ADLs and mobility for ADLs. Pt educated on energy conservation techniques, stated, \"I know all that\" and verbalized several strategies that he uses. Pt does not require further skilled OT services at this time, no d/c needs. This section established at most recent assessment PROBLEM LIST (Impairments causing functional limitations): Increased Shortness of Breath INTERVENTIONS PLANNED: (Benefits and precautions of occupational therapy have been discussed with the patient.) 
N/a   
 
 
 
REHAB RECOMMENDATIONS (at time of discharge pending progress):   
Placement: It is my opinion, based on this patient's performance to date, that Mr. Dawn Garibay may benefit from being discharged with NO further skilled therapy due to a proven ability to function at baseline. Equipment:  
None at this time OCCUPATIONAL PROFILE AND HISTORY:  
History of Present Injury/Illness (Reason for Referral): 
See H&P Past Medical History/Comorbidities: Mr. Dawn Garibay  has a past medical history of Acute diastolic CHF (congestive heart failure) ([facility] (9/15/2016), Arthritis, Chicken pox, Coronary artery disease (4/16/2014), DJD (degenerative joint disease), Emphysema, aortic valve replacement, mechanical, Hypercholesterolemia, Hypertension, Palpitations, Pneumonia, and Systolic CHF, acute ([facility] (11/15/2016). Mr. Jesica Yeager  has a past surgical history that includes hx aortic valve replacement (N/A, 2000) and hx heart catheterization (07/21/2004). Social History/Living Environment:  
Home Environment: Private residence # Steps to Enter: 3 One/Two Story Residence: One story Living Alone: No 
Support Systems: Spouse/Significant Other/Partner Patient Expects to be Discharged to[de-identified] Private residence Current DME Used/Available at Home: Cane, straight, Shower chair, Raised toilet seat, Walker, rolling Tub or Shower Type: Shower Prior Level of Function/Work/Activity: 
Independent, lives w/ wife, does not use an AD Number of Personal Factors/Comorbidities that affect the Plan of Care: Brief history (0):  LOW COMPLEXITY ASSESSMENT OF OCCUPATIONAL PERFORMANCE[de-identified]  
Activities of Daily Living:  
Basic ADLs (From Assessment) Complex ADLs (From Assessment) Feeding: Independent Oral Facial Hygiene/Grooming: Independent Bathing: Independent Upper Body Dressing: Independent Lower Body Dressing: Independent Toileting: Independent Instrumental ADL Meal Preparation: Independent Homemaking: Independent Grooming/Bathing/Dressing Activities of Daily Living Cognitive Retraining Safety/Judgement: Awareness of environment; Fall prevention Functional Transfers Bathroom Mobility: Independent Toilet Transfer : Independent Bed/Mat Mobility Rolling: Independent Supine to Sit: Independent Sit to Supine: Independent Sit to Stand: Independent(Simultaneous filing. User may not have seen previous data.) Stand to Sit: Independent(Simultaneous filing. User may not have seen previous data.) Scooting: Independent Most Recent Physical Functioning:  
Gross Assessment: AROM: Within functional limits Strength: Within functional limits Posture: 
Posture (WDL): Exceptions to [facility] Posture Assessment: Rounded shoulders Balance: 
Sitting: Intact(Simultaneous filing. User may not have seen previous data.) Standing: Intact(Simultaneous filing. User may not have seen previous data.) Standing - Static: Good Standing - Dynamic : Fair Bed Mobility: 
Rolling: Independent Supine to Sit: Independent Sit to Supine: Independent Scooting: Independent Wheelchair Mobility: 
  
Transfers: 
Sit to Stand: Independent(Simultaneous filing. User may not have seen previous data.) Stand to Sit: Independent(Simultaneous filing. User may not have seen previous data.) Patient Vitals for the past 6 hrs: 
 BP SpO2 O2 Flow Rate (L/min) Pulse 19 0921  95 % 3 l/min   
19 1139 133/67 97 %  76  
19 1437 129/69 96 %  75 Mental Status Neurologic State: Alert Orientation Level: Oriented X4 Cognition: Appropriate decision making, Appropriate for age attention/concentration, Appropriate safety awareness, Follows commands Perception: Appears intact Perseveration: No perseveration noted Safety/Judgement: Awareness of environment, Fall prevention Physical Skills Involved: Activity Tolerance Cognitive Skills Affected (resulting in the inability to perform in a timely and safe manner): 
none  Psychosocial Skills Affected: 
none Number of elements that affect the Plan of Care: 1-3:  LOW COMPLEXITY CLINICAL DECISION MAKIN [facility] [address] [address] AM-PAC? ?6 Clicks? Daily Activity Inpatient Short Form How much help from another person does the patient currently need. .. Total A Lot A Little None 1. Putting on and taking off regular lower body clothing? ? 1   ? 2   ? 3   ? 4  
2. Bathing (including washing, rinsing, drying)? ? 1   ? 2   ? 3   ? 4  
3.   Toileting, which includes using toilet, bedpan or urinal?   ? 1   ? 2 ? 3   ? 4  
4. Putting on and taking off regular upper body clothing? ? 1   ? 2   ? 3   ? 4  
5. Taking care of personal grooming such as brushing teeth? ? 1   ? 2   ? 3   ? 4  
6. Eating meals? ? 1   ? 2   ? 3   ? 4  
© 2007, Trustees of [facility] [employer], under license to [employer]. All rights reserved Score:  Initial: 24 Most Recent: X (Date: -- ) Interpretation of Tool:  Represents activities that are increasingly more difficult (i.e. Bed mobility, Transfers, Gait). Use of outcome tool(s) and clinical judgement create a POC that gives a: LOW COMPLEXITY  
 
 
 
TREATMENT:  
(In addition to Assessment/Re-Assessment sessions the following treatments were rendered) Pre-treatment Symptoms/Complaints:   
Pain: Initial:  
Pain Intensity 1: 0  Post Session:  0 Assessment/Reassessment only, no treatment provided today Braces/Orthotics/Lines/Etc:  
O2 Device: Nasal cannula Treatment/Session Assessment:   
Response to Treatment:  no adverse reaction Interdisciplinary Collaboration: Occupational Therapist 
Registered Nurse After treatment position/precautions:  
Bed/Chair-wheels locked Bed in low position Call light within reach RN notified Family at bedside Total Treatment Duration: OT Patient Time In/Time Out Time In: 2057 Time Out: 1452 Juan Pierce OT

## 2019-05-21 NOTE — PROGRESS NOTES
Hospitalist  
Admit Date:  2019  2:49 PM  
Name:  Maida Wheeler Age:  75 y.o. 
:  1945 MRN:  [id number] PCP:  Shannen Arriola MD 
 
 
subj Pt is a 74 yo male with PMH DM2, HTN, CHF, COPD, A. Fib, mech AVR, TCP. Pt does not use oxygen at home but does wear bipap at night. Pt presented to the ED via EMS with report of cough and congestion, inability to lay flat x 4-5 days, worse since yesterday. Pt denies any chest pain, N/V/D, no fever, no emesis, vision change, headache. Pt denies any dysuria. Pt reports being compliant with meds and treatments. Pt was found to have an oxygen sat of 84% on room air, improved to 95% on 3L. Pt also notes approx 20lb weight loss over several months. Today, feels better, down to 2L Objective:  
 
Patient Vitals for the past 24 hrs: 
 Temp Pulse Resp BP SpO2  
19     95 % 19 0726 97.5 °F (36.4 °C) 79 20 130/72 98 % 19 0348 98.8 °F (37.1 °C) 71 19 123/61 96 % 19 0126     93 % 19 0046  75     
19 2343 98.9 °F (37.2 °C) 82 19 137/74 97 % 19 202     98 % 19 2023  78     
19 202 98.8 °F (37.1 °C) 73 19 94/52 98 % 19 1753 98.3 °F (36.8 °C) 83 18 115/63 98 % 19 1536  73  125/56   
19 1458     97 % 19 1454  78  125/56   
19 1228 98.4 °F (36.9 °C) 89 18 150/86 95 % Oxygen Therapy O2 Sat (%): 95 % (19) Pulse via Oximetry: 88 beats per minute (19) O2 Device: Nasal cannula (19) O2 Flow Rate (L/min): 3 l/min (19) Intake/Output Summary (Last 24 hours) at 2019 1036 Last data filed at 2019 0567 Gross per 24 hour Intake 120 ml Output 300 ml Net -180 ml Physical Exam: 
General:    Well nourished. Alert. Mild distress, pale CV:   RRR. No m/r/g. Lungs:  Milder exp Misha David Abdomen: Soft, nontender, nondistended. Extremities: Warm and dry. No cyanosis. Trace edema BLE. Neurologic: grossly intact. Skin:     No rashes or jaundice. Normal coloration Psych:  Normal mood and affect. I reviewed the labs, imaging, EKGs, telemetry, and other studies done this admission. Data Review:  
Recent Results (from the past 24 hour(s)) TROPONIN I Collection Time: 05/20/19 12:39 PM  
Result Value Ref Range Troponin-I, Qt. <0.02 (L) 0.02 - 0.05 NG/ML  
BNP Collection Time: 05/20/19 12:39 PM  
Result Value Ref Range  (H) 0 pg/mL METABOLIC PANEL, COMPREHENSIVE Collection Time: 05/20/19 12:39 PM  
Result Value Ref Range Sodium 136 136 - 145 mmol/L Potassium 4.3 3.5 - 5.1 mmol/L Chloride 98 98 - 107 mmol/L  
 CO2 27 21 - 32 mmol/L Anion gap 11 7 - 16 mmol/L Glucose 85 65 - 100 mg/dL BUN 20 8 - 23 MG/DL Creatinine 1.25 0.8 - 1.5 MG/DL  
 GFR est AA >60 >60 ml/min/1.73m2 GFR est non-AA 60 (L) >60 ml/min/1.73m2 Calcium 9.3 8.3 - 10.4 MG/DL Bilirubin, total 0.5 0.2 - 1.1 MG/DL  
 ALT (SGPT) 18 12 - 65 U/L  
 AST (SGOT) 19 15 - 37 U/L Alk. phosphatase 99 50 - 136 U/L Protein, total 8.2 6.3 - 8.2 g/dL Albumin 3.5 3.2 - 4.6 g/dL Globulin 4.7 (H) 2.3 - 3.5 g/dL A-G Ratio 0.7 (L) 1.2 - 3.5 EKG, 12 LEAD, INITIAL Collection Time: 05/20/19 12:40 PM  
Result Value Ref Range Ventricular Rate 70 BPM  
 Atrial Rate 70 BPM  
 P-R Interval 178 ms QRS Duration 152 ms Q-T Interval 446 ms  
 QTC Calculation (Bezet) 481 ms Calculated P Axis 74 degrees Calculated R Axis 114 degrees Calculated T Axis 53 degrees Diagnosis Normal sinus rhythm Non-specific intra-ventricular conduction block Abnormal ECG When compared with ECG of 19-SEP-2017 07:32, Significant changes have occurred Confirmed by ST ANTONELLA OLVERA MD (), DEANDRA WALLACE ([id number]) on 5/20/2019 4:37:45 PM 
  
CBC WITH AUTOMATED DIFF Collection Time: 05/20/19  3:14 PM  
Result Value Ref Range WBC 11.8 (H) 4.3 - 11.1 K/uL  
 RBC 4.73 4.23 - 5.6 M/uL  
 HGB 12.8 (L) 13.6 - 17.2 g/dL HCT 42.0 41.1 - 50.3 % MCV 88.8 79.6 - 97.8 FL  
 MCH 27.1 26.1 - 32.9 PG  
 MCHC 30.5 (L) 31.4 - 35.0 g/dL  
 RDW 14.8 (H) 11.9 - 14.6 % PLATELET 27 (LL) 722 - 450 K/uL MPV Unable to calculate. Recommend adding IPF. 9.4 - 12.3 FL ABSOLUTE NRBC 0.00 0.0 - 0.2 K/uL  
 DF AUTOMATED NEUTROPHILS 81 (H) 43 - 78 % LYMPHOCYTES 9 (L) 13 - 44 % MONOCYTES 9 4.0 - 12.0 % EOSINOPHILS 0 (L) 0.5 - 7.8 % BASOPHILS 0 0.0 - 2.0 % IMMATURE GRANULOCYTES 0 0.0 - 5.0 %  
 ABS. NEUTROPHILS 9.5 (H) 1.7 - 8.2 K/UL  
 ABS. LYMPHOCYTES 1.1 0.5 - 4.6 K/UL  
 ABS. MONOCYTES 1.1 0.1 - 1.3 K/UL  
 ABS. EOSINOPHILS 0.1 0.0 - 0.8 K/UL  
 ABS. BASOPHILS 0.1 0.0 - 0.2 K/UL  
 ABS. IMM. GRANS. 0.1 0.0 - 0.5 K/UL PROTHROMBIN TIME + INR Collection Time: 05/20/19  3:14 PM  
Result Value Ref Range Prothrombin time 32.7 (H) 11.7 - 14.5 sec INR 3.3 GLUCOSE, POC Collection Time: 05/20/19  6:14 PM  
Result Value Ref Range Glucose (POC) 100 65 - 100 mg/dL GLUCOSE, POC Collection Time: 05/21/19  5:41 AM  
Result Value Ref Range Glucose (POC) 153 (H) 65 - 100 mg/dL METABOLIC PANEL, BASIC Collection Time: 05/21/19  6:34 AM  
Result Value Ref Range Sodium 134 (L) 136 - 145 mmol/L Potassium 4.1 3.5 - 5.1 mmol/L Chloride 96 (L) 98 - 107 mmol/L  
 CO2 31 21 - 32 mmol/L Anion gap 7 7 - 16 mmol/L Glucose 155 (H) 65 - 100 mg/dL BUN 19 8 - 23 MG/DL Creatinine 1.11 0.8 - 1.5 MG/DL  
 GFR est AA >60 >60 ml/min/1.73m2 GFR est non-AA >60 >60 ml/min/1.73m2 Calcium 9.0 8.3 - 10.4 MG/DL PROTHROMBIN TIME + INR Collection Time: 05/21/19  6:34 AM  
Result Value Ref Range Prothrombin time 32.9 (H) 11.7 - 14.5 sec INR 3.3 All Micro Results None Other Studies: Xr Chest Pa Lat Result Date: 5/20/2019 Clinical History: The patient is a 75years year old Male presenting with symptoms of shob Comparison:  9/20/2017 Findings:  Frontal and lateral views of the chest were obtained. Diffuse changes of COPD are demonstrated. There is scarring of both lung bases with chronic blunting of the lateral costophrenic sulci. No focal infiltrate or consolidative process is seen. No pleural effusions are seen. The cardiomediastinal silhouette is within normal limits. There are no acute osseous abnormalities. Median sternotomy changes are seen for valvuloplasty. Impression:  COPD with chronic basilar scarring. CPT code(s) 55147 Assessment and Plan:  
 
Hospital Problems as of 5/21/2019 Date Reviewed: 1/11/2019 Codes Class Noted - Resolved POA Shortness of breath ICD-10-CM: R06.02 
ICD-9-CM: 786.05  5/20/2019 - Present Yes  
   
 CHF (congestive heart failure) (HCC) (Chronic) ICD-10-CM: I50.9 ICD-9-CM: 428.0  5/20/2019 - Present Yes Type 2 diabetes with nephropathy ([facility] ICD-10-CM: E11.21 
ICD-9-CM: 250.40, 583.81  5/11/2018 - Present Yes Persistent atrial fibrillation (HCC) ICD-10-CM: I48.1 ICD-9-CM: 427.31  2/21/2018 - Present Yes Respiratory failure [facility] ICD-10-CM: J96.90 ICD-9-CM: 518.81  9/13/2017 - Present Yes * (Principal) COPD (chronic obstructive pulmonary disease) (HCC) (Chronic) ICD-10-CM: J44.9 ICD-9-CM: 358  8/30/2017 - Present Yes Thrombocytopenia ([facility] ICD-10-CM: D69.6 ICD-9-CM: 287.5  8/26/2017 - Present Yes HTN (hypertension), benign (Chronic) ICD-10-CM: I10 
ICD-9-CM: 401.1  8/23/2017 - Present Yes History of mechanical aortic valve replacement (Chronic) ICD-10-CM: Z95.2 ICD-9-CM: V43.3  8/23/2017 - Present Yes Overview Signed 8/30/2017 10:30 AM by Vic Mcfadden NP Placement 2000, on chronicCoumadin PLAN: 
 
Dx: 
1- Ac hypoxic resp failure dt copd exacerbation. Had mild fluid overload w/ hx of CHF 2- Recurrence of ITP 3- Mechanical aortic valve, on warfarin  
4- DM and HTN, controlled Rx: 
Doxy, steroids, BD, O2 Change lasix to po 
IO and weight monitor Monitor INR, I spoke to pharmacy who dose warfarin about mech valve inidication Onco consulted Dispo: Home health soon Signed: 
Leixe Adams MD

## 2019-05-21 NOTE — PROGRESS NOTES
Received report from Carmine, [facility]. Patient awake in bed. Respirations present. ON 3.5 L NC. No signs of distress. Bed low and locked. Call light within reach. Will continue to monitor.

## 2019-05-21 NOTE — PROGRESS NOTES
Warfarin dosing per pharmacist 
 
Quintin Dhillon is a 75 y.o. male. Height: 5' 6\" (167.6 cm)    Weight: 72.8 kg (160 lb 6.4 oz) Indication:  AFIB and mechanical AVR Goal INR:  2.5-3.5 Home dose:  5mg on Mon, Wed, Sat and 7.5mg on all other days Risk factors or significant drug interactions:  DOXYCYCLINE started on 5/20 PM 
 
Other anticoagulants:  none Daily Monitoring Date  INR     Warfarin dose HGB              Notes 5/20  3.3  2.5mg  12.8  
5/21  3.3  2.5 mg  ---- Pharmacist to assist in dosing and monitoring of warfarin therapy for Mr. Francesca Carpenter during this admission. I will resume a dose that is lower than home dose for now given new DD interaction with doxycycline. INR goal was not specified in the consult, however, based on his risk factors, I will target INR 2.5-3.5 per protocol. Thank you, Tara L. Cathay Castleman, PharmD, BCCCP Clinical Pharmacist 
[phone]

## 2019-05-21 NOTE — PROGRESS NOTES
Spiritual Care Visit, initial visit. Visited with patient at bedside. Affirmed his health and ivon. Samantha Holbrook was present, so  left patient with him. Visit by Russ Bartholomew, Staff .  M.Ed., Bennie.B., B.A.

## 2019-05-22 LAB
BASOPHILS # BLD: 0 K/UL (ref 0–0.2)
BASOPHILS NFR BLD: 0 % (ref 0–2)
DIFFERENTIAL METHOD BLD: ABNORMAL
EOSINOPHIL # BLD: 0 K/UL (ref 0–0.8)
EOSINOPHIL NFR BLD: 0 % (ref 0.5–7.8)
ERYTHROCYTE [DISTWIDTH] IN BLOOD BY AUTOMATED COUNT: 14.4 % (ref 11.9–14.6)
GLUCOSE BLD STRIP.AUTO-MCNC: 111 MG/DL (ref 65–100)
GLUCOSE BLD STRIP.AUTO-MCNC: 189 MG/DL (ref 65–100)
GLUCOSE BLD STRIP.AUTO-MCNC: 194 MG/DL (ref 65–100)
HCT VFR BLD AUTO: 36.2 % (ref 41.1–50.3)
HGB BLD-MCNC: 11.4 G/DL (ref 13.6–17.2)
IMM GRANULOCYTES # BLD AUTO: 0 K/UL (ref 0–0.5)
IMM GRANULOCYTES NFR BLD AUTO: 0 % (ref 0–5)
INR PPP: 3.3
LYMPHOCYTES # BLD: 0.4 K/UL (ref 0.5–4.6)
LYMPHOCYTES NFR BLD: 5 % (ref 13–44)
MAGNESIUM SERPL-MCNC: 2.3 MG/DL (ref 1.8–2.4)
MCH RBC QN AUTO: 27.5 PG (ref 26.1–32.9)
MCHC RBC AUTO-ENTMCNC: 31.5 G/DL (ref 31.4–35)
MCV RBC AUTO: 87.2 FL (ref 79.6–97.8)
MONOCYTES # BLD: 0.5 K/UL (ref 0.1–1.3)
MONOCYTES NFR BLD: 5 % (ref 4–12)
NEUTS SEG # BLD: 7.5 K/UL (ref 1.7–8.2)
NEUTS SEG NFR BLD: 89 % (ref 43–78)
NRBC # BLD: 0 K/UL (ref 0–0.2)
PATH REV BLD -IMP: NORMAL
PHOSPHATE SERPL-MCNC: 2.8 MG/DL (ref 2.3–3.7)
PLATELET # BLD AUTO: 182 K/UL (ref 150–450)
PMV BLD AUTO: 10.6 FL (ref 9.4–12.3)
PROTHROMBIN TIME: 33 SEC (ref 11.7–14.5)
RBC # BLD AUTO: 4.15 M/UL (ref 4.23–5.6)
WBC # BLD AUTO: 8.4 K/UL (ref 4.3–11.1)

## 2019-05-22 PROCEDURE — 94640 AIRWAY INHALATION TREATMENT: CPT

## 2019-05-22 PROCEDURE — 94761 N-INVAS EAR/PLS OXIMETRY MLT: CPT

## 2019-05-22 PROCEDURE — 74011250636 HC RX REV CODE- 250/636: Performed by: NURSE PRACTITIONER

## 2019-05-22 PROCEDURE — 77010033678 HC OXYGEN DAILY

## 2019-05-22 PROCEDURE — 99231 SBSQ HOSP IP/OBS SF/LOW 25: CPT | Performed by: INTERNAL MEDICINE

## 2019-05-22 PROCEDURE — 36415 COLL VENOUS BLD VENIPUNCTURE: CPT

## 2019-05-22 PROCEDURE — 74011250637 HC RX REV CODE- 250/637: Performed by: HOSPITALIST

## 2019-05-22 PROCEDURE — 82962 GLUCOSE BLOOD TEST: CPT

## 2019-05-22 PROCEDURE — 74011636637 HC RX REV CODE- 636/637: Performed by: NURSE PRACTITIONER

## 2019-05-22 PROCEDURE — 94760 N-INVAS EAR/PLS OXIMETRY 1: CPT

## 2019-05-22 PROCEDURE — 65270000029 HC RM PRIVATE

## 2019-05-22 PROCEDURE — 85610 PROTHROMBIN TIME: CPT

## 2019-05-22 PROCEDURE — 84100 ASSAY OF PHOSPHORUS: CPT

## 2019-05-22 PROCEDURE — 85025 COMPLETE CBC W/AUTO DIFF WBC: CPT

## 2019-05-22 PROCEDURE — 74011000250 HC RX REV CODE- 250: Performed by: HOSPITALIST

## 2019-05-22 PROCEDURE — 83735 ASSAY OF MAGNESIUM: CPT

## 2019-05-22 PROCEDURE — 74011250637 HC RX REV CODE- 250/637: Performed by: NURSE PRACTITIONER

## 2019-05-22 PROCEDURE — 74011250636 HC RX REV CODE- 250/636: Performed by: HOSPITALIST

## 2019-05-22 RX ORDER — FUROSEMIDE 10 MG/ML
40 INJECTION INTRAMUSCULAR; INTRAVENOUS ONCE
Status: COMPLETED | OUTPATIENT
Start: 2019-05-22 | End: 2019-05-22

## 2019-05-22 RX ADMIN — BUDESONIDE 500 MCG: 0.5 INHALANT RESPIRATORY (INHALATION) at 08:26

## 2019-05-22 RX ADMIN — GABAPENTIN 600 MG: 300 CAPSULE ORAL at 17:06

## 2019-05-22 RX ADMIN — ALBUTEROL SULFATE 5 MG: 2.5 SOLUTION RESPIRATORY (INHALATION) at 08:26

## 2019-05-22 RX ADMIN — ALBUTEROL SULFATE 2.5 MG: 2.5 SOLUTION RESPIRATORY (INHALATION) at 14:58

## 2019-05-22 RX ADMIN — ALBUTEROL SULFATE 5 MG: 2.5 SOLUTION RESPIRATORY (INHALATION) at 20:04

## 2019-05-22 RX ADMIN — LISINOPRIL 10 MG: 5 TABLET ORAL at 08:34

## 2019-05-22 RX ADMIN — GABAPENTIN 600 MG: 300 CAPSULE ORAL at 08:34

## 2019-05-22 RX ADMIN — Medication 10 ML: at 21:15

## 2019-05-22 RX ADMIN — METHYLPREDNISOLONE SODIUM SUCCINATE 40 MG: 40 INJECTION, POWDER, FOR SOLUTION INTRAMUSCULAR; INTRAVENOUS at 12:04

## 2019-05-22 RX ADMIN — INSULIN LISPRO 2 UNITS: 100 INJECTION, SOLUTION INTRAVENOUS; SUBCUTANEOUS at 17:07

## 2019-05-22 RX ADMIN — WARFARIN SODIUM 2.5 MG: 2 TABLET ORAL at 17:10

## 2019-05-22 RX ADMIN — METHYLPREDNISOLONE SODIUM SUCCINATE 40 MG: 40 INJECTION, POWDER, FOR SOLUTION INTRAMUSCULAR; INTRAVENOUS at 21:16

## 2019-05-22 RX ADMIN — POTASSIUM CHLORIDE 20 MEQ: 20 TABLET, EXTENDED RELEASE ORAL at 08:34

## 2019-05-22 RX ADMIN — INSULIN LISPRO 2 UNITS: 100 INJECTION, SOLUTION INTRAVENOUS; SUBCUTANEOUS at 05:49

## 2019-05-22 RX ADMIN — DOXYCYCLINE HYCLATE 100 MG: 100 CAPSULE ORAL at 08:34

## 2019-05-22 RX ADMIN — FUROSEMIDE 40 MG: 40 TABLET ORAL at 08:34

## 2019-05-22 RX ADMIN — FUROSEMIDE 40 MG: 10 INJECTION, SOLUTION INTRAMUSCULAR; INTRAVENOUS at 12:04

## 2019-05-22 RX ADMIN — Medication 10 ML: at 13:51

## 2019-05-22 RX ADMIN — BUDESONIDE 500 MCG: 0.5 INHALANT RESPIRATORY (INHALATION) at 20:04

## 2019-05-22 RX ADMIN — Medication 1 AMPULE: at 21:14

## 2019-05-22 RX ADMIN — Medication 1 AMPULE: at 12:05

## 2019-05-22 RX ADMIN — Medication 10 ML: at 05:49

## 2019-05-22 RX ADMIN — DOXYCYCLINE HYCLATE 100 MG: 100 CAPSULE ORAL at 21:15

## 2019-05-22 RX ADMIN — ALBUTEROL SULFATE 5 MG: 2.5 SOLUTION RESPIRATORY (INHALATION) at 02:01

## 2019-05-22 RX ADMIN — METOPROLOL SUCCINATE 100 MG: 50 TABLET, EXTENDED RELEASE ORAL at 08:34

## 2019-05-22 RX ADMIN — GABAPENTIN 600 MG: 300 CAPSULE ORAL at 21:15

## 2019-05-22 NOTE — PROGRESS NOTES
Bedside report received from night nurse Jesenia. Assessment done as noted  Respiration even and unlabored 20/min; denies pain or nausea at present. Encouraged to call with needs.

## 2019-05-22 NOTE — PROGRESS NOTES
Patient resting quietly in bed, no c/o discomfort, watching TV. Occasional non productive cough noted. Will monitor.

## 2019-05-22 NOTE — PROGRESS NOTES
[employer] [facility] Inpatient Hematology / Oncology Daily Progress NoteReason for Consult:  Shortness of breath [R06.02] Referring Physician:  David Rodriguez MD 
24 Hour Events: 
Afebrile, VSS Plt up to 182k ROS: 
Constitutional: Negative for fever, chills. CV: Negative for chest pain, palpitations, edema. Respiratory: +cough. Negative for dyspnea, wheezing. GI: Negative for nausea, abdominal pain, diarrhea. 10 point review of systems is otherwise negative with the exception of the elements mentioned above in the HPI. Allergies Allergen Reactions  Levaquin [Levofloxacin] Other (comments) GI upset  Metformin Other (comments) Gi upset Past Medical History:  
Diagnosis Date  Acute diastolic CHF (congestive heart failure) ([facility] 9/15/2016  Arthritis  Chicken pox  Coronary artery disease 4/16/2014  DJD (degenerative joint disease)  Emphysema  Hx of aortic valve replacement, mechanical   
   doing well,. Had to hold 3 days for removal of skin ca. Will have to have add. resection.  Hypercholesterolemia  Hypertension  Palpitations Holter showed  PVC's, PAC's, one short run SVT.  Pneumonia  Systolic CHF, acute ([facility] 11/15/2016 Past Surgical History:  
Procedure Laterality Date  HX AORTIC VALVE REPLACEMENT N/A 2000 Aortic Valve Replacement w/ Mechanical Valve  HX HEART CATHETERIZATION  07/21/2004 [facility] History reviewed. No pertinent family history. Social History Socioeconomic History  Marital status:  Spouse name: Not on file  Number of children: Not on file  Years of education: Not on file  Highest education level: Not on file Occupational History  Not on file Social Needs  Financial resource strain: Not on file  Food insecurity:  
  Worry: Not on file Inability: Not on file  Transportation needs:  
  Medical: Not on file Non-medical: Not on file Tobacco Use  Smoking status: Former Smoker Packs/day: 2.00 Years: 40.00 Pack years: 80.00 Last attempt to quit:  Years since quittin.4  Smokeless tobacco: Never Used Substance and Sexual Activity  Alcohol use: No  
 Drug use: No  
 Sexual activity: Never Lifestyle  Physical activity:  
  Days per week: Not on file Minutes per session: Not on file  Stress: Not on file Relationships  Social connections:  
  Talks on phone: Not on file Gets together: Not on file Attends Protestant service: Not on file Active member of club or organization: Not on file Attends meetings of clubs or organizations: Not on file Relationship status: Not on file  Intimate partner violence:  
  Fear of current or ex partner: Not on file Emotionally abused: Not on file Physically abused: Not on file Forced sexual activity: Not on file Other Topics Concern  Not on file Social History Narrative  Not on file Current Facility-Administered Medications Medication Dose Route Frequency Provider Last Rate Last Dose  furosemide (LASIX) injection 40 mg  40 mg IntraVENous ONCE Mason Jones MD      
 furosemide (LASIX) tablet 40 mg  40 mg Oral DAILY Mason Jones MD   40 mg at 19 5690  tuberculin injection 5 Units  5 Units IntraDERMal Jennifer Katz MD   5 Units at 19 1446  
 guaiFENesin ER (MUCINEX) tablet 1,200 mg  1,200 mg Oral BID PRN Mason Jones MD      
 metoprolol succinate (TOPROL-XL) XL tablet 100 mg  100 mg Oral DAILY Mason Jones MD   100 mg at 19 0834  
 albuterol (PROVENTIL VENTOLIN) nebulizer solution 5 mg  5 mg Nebulization Q6H RT Mason Jones MD   5 mg at 19 1807  warfarin (COUMADIN) tablet 2.5 mg  2.5 mg Oral QPM Mason Jones MD   2.5 mg at 19 [id number]  lisinopril (PRINIVIL, ZESTRIL) tablet 10 mg  10 mg Oral DAILY Daniel Darion NP   10 mg at 05/22/19 0834  
 gabapentin (NEURONTIN) capsule 600 mg  600 mg Oral TID Darion Sanchez NP   600 mg at 05/22/19 7462  tiotropium (SPIRIVA) inhalation capsule 18 mcg  1 Cap Inhalation DAILY Darion Sanchez NP   18 mcg at 05/21/19 0920  potassium chloride (K-DUR, KLOR-CON) SR tablet 20 mEq  20 mEq Oral DAILY Darion Sanchez NP   20 mEq at 05/22/19 2696  sodium chloride (NS) flush 5-40 mL  5-40 mL IntraVENous Q8H Darion Sanchez, NP   10 mL at 05/22/19 0549  
 sodium chloride (NS) flush 5-40 mL  5-40 mL IntraVENous PRN Darion Sanchez, NP      
 acetaminophen (TYLENOL) tablet 650 mg  650 mg Oral Q4H PRN Michealit, Darion, NP      
 HYDROcodone-acetaminophen (NORCO) 5-325 mg per tablet 1 Tab  1 Tab Oral Q4H PRN Darion Sanchez, NP      
 naloxone (NARCAN) injection 0.4 mg  0.4 mg IntraVENous PRN Darion Sanchez, NP      
 diphenhydrAMINE (BENADRYL) capsule 25 mg  25 mg Oral Q4H PRN Darion Sanchez, NP      
 ondansetron (ZOFRAN) injection 4 mg  4 mg IntraVENous Q4H PRN Darion Sanchez, NP      
 bisacodyl (DULCOLAX) tablet 10 mg  10 mg Oral DAILY PRN Darion Sanchez, NP      
 methylPREDNISolone (PF) (SOLU-MEDROL) injection 40 mg  40 mg IntraVENous Q12H Darion Sanchez, NP   40 mg at 05/21/19 2102  
 insulin lispro (HUMALOG) injection   SubCUTAneous TIDAC Darion Sanchez NP   2 Units at 05/22/19 0549  
 budesonide (PULMICORT) 500 mcg/2 ml nebulizer suspension  500 mcg Nebulization BID RT Mio Huff MD   500 mcg at 05/22/19 1750  
 doxycycline (VIBRAMYCIN) capsule 100 mg  100 mg Oral Q12H Mio Huff MD   100 mg at 05/22/19 0834  
 alcohol 62% (NOZIN) nasal  1 Ampule  1 Ampule Topical Q12H Mio Huff MD   1 Ampule at 05/21/19 2102 OBJECTIVE: 
Patient Vitals for the past 8 hrs: 
 BP Temp Pulse Resp SpO2 Weight 05/22/19 1022     96 %   
05/22/19 0826     100 %   
05/22/19 0718 147/65 98.5 °F (36.9 °C) 83 19 95 %   
 19 0414 122/67 98.6 °F (37 °C) 87 20 95 % 158 lb 6.4 oz (71.8 kg) Temp (24hrs), Av.4 °F (36.9 °C), Min:97.7 °F (36.5 °C), Max:98.8 °F (37.1 °C) 
 
701 -  1900 In: 240 [P.O.:240] Out: - Physical Exam: 
Constitutional: Well developed, well nourished male in no acute distress, standing up in room. HEENT: Normocephalic and atraumatic. Oropharynx is clear, mucous membranes are moist.  Extraocular muscles are intact. Sclerae anicteric. Neck supple without JVD. No thyromegaly present. Skin Warm and dry. No bruising and no rash noted. No erythema. No pallor. Respiratory Lungs with expiratory wheezes bilaterally, normal air exchange without accessory muscle use. CVS Normal rate, regular rhythm and normal S1 and S2. No murmurs, gallops, or rubs. Abdomen Soft, nontender and nondistended, normoactive bowel sounds. No palpable mass. No hepatosplenomegaly. Neuro Grossly nonfocal with no obvious sensory or motor deficits. MSK Normal range of motion in general.  No edema and no tenderness. Psych Appropriate mood and affect. Labs:   
Recent Results (from the past 24 hour(s)) GLUCOSE, POC Collection Time: 19 11:38 AM  
Result Value Ref Range Glucose (POC) 149 (H) 65 - 100 mg/dL LD Collection Time: 19 12:25 PM  
Result Value Ref Range  (H) 110 - 210 U/L  
RETICULOCYTE COUNT Collection Time: 19 12:25 PM  
Result Value Ref Range Reticulocyte count 1.1 0.3 - 2.0 % Absolute Retic Cnt. 0.0519 0.026 - 0.095 M/ul Immature Retic Fraction 13.1 2.3 - 13.4 % Retic Hgb Conc. 29 29 - 35 pg HAPTOGLOBIN Collection Time: 19 12:25 PM  
Result Value Ref Range Haptoglobin 213 (H) 30 - 200 mg/dL BILIRUBIN, FRACTIONATED Collection Time: 19 12:25 PM  
Result Value Ref Range Bilirubin, total 0.4 0.2 - 1.1 MG/DL Bilirubin, direct <0.1 <0.4 MG/DL  Bilirubin, indirect Cannot be calculated 0.0 - 1.1 MG/DL  
 GLUCOSE, POC Collection Time: 05/21/19  4:25 PM  
Result Value Ref Range Glucose (POC) 255 (H) 65 - 100 mg/dL GLUCOSE, POC Collection Time: 05/21/19  8:12 PM  
Result Value Ref Range Glucose (POC) 188 (H) 65 - 100 mg/dL CBC WITH AUTOMATED DIFF Collection Time: 05/22/19  4:53 AM  
Result Value Ref Range WBC 8.4 4.3 - 11.1 K/uL  
 RBC 4.15 (L) 4.23 - 5.6 M/uL  
 HGB 11.4 (L) 13.6 - 17.2 g/dL HCT 36.2 (L) 41.1 - 50.3 % MCV 87.2 79.6 - 97.8 FL  
 MCH 27.5 26.1 - 32.9 PG  
 MCHC 31.5 31.4 - 35.0 g/dL  
 RDW 14.4 11.9 - 14.6 % PLATELET 801 238 - 478 K/uL MPV 10.6 9.4 - 12.3 FL ABSOLUTE NRBC 0.00 0.0 - 0.2 K/uL  
 DF AUTOMATED NEUTROPHILS 89 (H) 43 - 78 % LYMPHOCYTES 5 (L) 13 - 44 % MONOCYTES 5 4.0 - 12.0 % EOSINOPHILS 0 (L) 0.5 - 7.8 % BASOPHILS 0 0.0 - 2.0 % IMMATURE GRANULOCYTES 0 0.0 - 5.0 %  
 ABS. NEUTROPHILS 7.5 1.7 - 8.2 K/UL  
 ABS. LYMPHOCYTES 0.4 (L) 0.5 - 4.6 K/UL  
 ABS. MONOCYTES 0.5 0.1 - 1.3 K/UL  
 ABS. EOSINOPHILS 0.0 0.0 - 0.8 K/UL  
 ABS. BASOPHILS 0.0 0.0 - 0.2 K/UL  
 ABS. IMM. GRANS. 0.0 0.0 - 0.5 K/UL PROTHROMBIN TIME + INR Collection Time: 05/22/19  4:53 AM  
Result Value Ref Range Prothrombin time 33.0 (H) 11.7 - 14.5 sec INR 3.3 GLUCOSE, POC Collection Time: 05/22/19  5:44 AM  
Result Value Ref Range Glucose (POC) 189 (H) 65 - 100 mg/dL Imaging: XR CHEST PA LAT [[id number]] Collected: 05/20/19 1312 Order Status: Completed Updated: 05/20/19 1315 Narrative:    
Clinical History: The patient is a 75years year old Male presenting with 
symptoms of shob Comparison:  9/20/2017 Findings:  Frontal and lateral views of the chest were obtained. Diffuse changes of COPD are demonstrated. There is scarring of both lung bases 
with chronic blunting of the lateral costophrenic sulci.  No focal infiltrate or 
consolidative process is seen.  No pleural effusions are seen. Master Franklin 
 cardiomediastinal silhouette is within normal limits.  There are no acute 
osseous abnormalities. Median sternotomy changes are seen for valvuloplasty. Impression:    
Impression:   
 
COPD with chronic basilar scarring. ASSESSMENT: 
Problem List  Date Reviewed: 1/11/2019 Codes Class Noted Shortness of breath ICD-10-CM: R06.02 
ICD-9-CM: 786.05  5/20/2019 CHF (congestive heart failure) (HCC) (Chronic) ICD-10-CM: I50.9 ICD-9-CM: 428.0  5/20/2019 Type 2 diabetes with nephropathy ([facility] ICD-10-CM: E11.21 
ICD-9-CM: 250.40, 583.81  5/11/2018 Acute on chronic combined systolic and diastolic ACC/AHA stage C congestive heart failure (HCC) ICD-10-CM: I50.43 ICD-9-CM: 428.43, 428.0  5/7/2018 Persistent atrial fibrillation (HCC) ICD-10-CM: I48.1 ICD-9-CM: 427.31  2/21/2018 Debility ICD-10-CM: R53.81 ICD-9-CM: 799.3  11/3/2017 Diabetes mellitus without complication ([facility] KJA-15-DU: E11.9 ICD-9-CM: 250.00  11/3/2017 Respiratory failure ([facility] ICD-10-CM: J96.90 ICD-9-CM: 518.81  9/13/2017 MRSA nasal colonization ICD-10-CM: Z22.200 ICD-9-CM: V02.54  9/8/2017 * (Principal) COPD (chronic obstructive pulmonary disease) (HCC) (Chronic) ICD-10-CM: J44.9 ICD-9-CM: 448  8/30/2017 Acute respiratory failure with hypercapnia ([facility] ICD-10-CM: J96.02 
ICD-9-CM: 518.81  8/29/2017 History of tobacco use (Chronic) ICD-10-CM: Y84.947 ICD-9-CM: V15.82  8/29/2017 Hyponatremia ICD-10-CM: E87.1 ICD-9-CM: 276.1  8/29/2017 Acute encephalopathy ICD-10-CM: G93.40 ICD-9-CM: 348.30  8/29/2017 Thrombocytopenia ([address] ICD-10-CM: D69.6 ICD-9-CM: 287.5  8/26/2017 Anticoagulant long-term use (Chronic) ICD-10-CM: Z79.01 
ICD-9-CM: V58.61  8/23/2017 HTN (hypertension), benign (Chronic) ICD-10-CM: I10 
ICD-9-CM: 401.1  8/23/2017 Peripheral vascular disease (HCC) (Chronic) ICD-10-CM: I73.9 ICD-9-CM: 443.9  8/23/2017 Dyslipidemia (Chronic) ICD-10-CM: U65.0 ICD-9-CM: 272.4  8/23/2017 History of mechanical aortic valve replacement (Chronic) ICD-10-CM: Z95.2 ICD-9-CM: V43.3  8/23/2017 Overview Signed 8/30/2017 10:30 AM by Lacey Ibanez NP Placement 2000, on chronicCoumadin Centrilobular emphysema (HCC) (Chronic) ICD-10-CM: J43.2 ICD-9-CM: 492.8  8/23/2017 Overview Signed 9/8/2017  9:24 AM by Renay Kent NP With last PFTs FVC 1.70 L or 44% predicted, FEV1 0.86 L or 29% predicted, FEV1/FVC 51%. This study was a postbronchodilator study performed at the [facility] on 8/22/2016 Ischemic cardiomyopathy (Chronic) ICD-10-CM: I25.5 ICD-9-CM: 414.8  8/23/2017 Overview Signed 8/30/2017 10:29 AM by Lacey Ibanez NP  
  8/23/17 ECHO: 
EF 40 % to 45 %. There were no regional wall motion abnormalities. Moderate hypokinesis of the mid-apical anterior and apical wall(s). Atrial flutter with rapid ventricular response ([address] ICD-10-CM: I48.92 
ICD-9-CM: 427.32  8/23/2017 LV dysfunction (Chronic) ICD-10-CM: I51.9 ICD-9-CM: 429.9  10/19/2016 Atherosclerosis of native arteries of the extremities with intermittent claudication (Chronic) ICD-10-CM: W55.956 ICD-9-CM: 440.21  4/15/2014 Mr. Pennie Doss is a 75 y.o. male admitted on 5/20/2019 with COPD exacerbation. He is a known patient of Dr. Prasanna Her with history of thrombocytopenia, monitored since 2017. His PMH includes hx mechanical AVR on coumadin, chronic sCHF, COPD, PVD, DM, HTN, PRATIK. He presented to ED with c/o cough/congestion and inability to lie flat x 4-5 days. He also reports a 20# wt loss over the past several months. O2 was found to be 84% on RA. He was admitted for COPD exacerbation and started on Solumedrol and Doxycycline. On admission, plt 27k (was 109k on 5/10). Today, up to 120k. We were consulted for evaluation and recommendations for his thrombocytopenia. RECOMMENDATIONS: 
Thrombocytopenia 
- monitored since 2017 by Dr. Raudel Smith - felt to be multifactorial 
- Plt up to 120k today. Recent drop in plt most likely r/t current infx and should continue to improve over time given steroids/antibx - Check DIC labs, hemolysis labs, and peripheral smear 5/22 Plt up to 182k - most likely from steroids. Would recommend longer steroid taper and close f/u with Dr. Raudel Smith after discharge to determine plan for steroids. COPD exacerbation 
- mgmt per primary team 
- on solumedrol and doxycycline Hx mechanical AVR 
- on warfarin Lab studies and imaging studies were personally reviewed. Thank you for allowing us to participate in the care of Mr. Max Fields. We will sign off; please do not hesitate to call with any questions. Mr. Max Fields will need close f/u with Dr. Raudel Smith after discharge. Kassandra Nichols NP [address] [place] [address] Hematology & Oncology [address] [place] [address] Office : [phone] Fax : [phone]

## 2019-05-22 NOTE — PROGRESS NOTES
Oxygen Qualifier Room air: SpO2 with O2 and liter flow Resting SpO2  96%  96% Ambulating SpO2  89% 90% on 1L Completed by: 
 
Paul Jacobs

## 2019-05-22 NOTE — PROGRESS NOTES
Warfarin dosing per pharmacist 
 
Milton Keane is a 75 y.o. male. Height: 5' 6\" (167.6 cm)    Weight: 71.8 kg (158 lb 6.4 oz) Indication:  AFIB and mechanical AVR Goal INR:  2.5-3.5 Home dose:  5mg on Mon, Wed, Sat and 7.5mg on all other days Risk factors or significant drug interactions:  DOXYCYCLINE started on 5/20 PM 
 
Other anticoagulants:  none Daily Monitoring Date  INR     Warfarin dose HGB              Notes 5/20  3.3  2.5 mg  12.8  
5/21  3.3  2.5 mg  ---- 
5/22  3.3  2.5 mg  11.4 Pharmacist to assist in dosing and monitoring of warfarin therapy for Mr. Vivi Norris during this admission. Dose resumed lower than home dose for now given new DD interaction with doxycycline. Target INR based on his risk factors = 2.5-3.5 per protocol. INR 3.3 again. Continue 2.5 mg. Following daily INR. Thank you, Donny Ambrocio, Pharm. D. Clinical Pharmacist 
[phone]

## 2019-05-22 NOTE — PROGRESS NOTES
Patient stated he did not want to wear his home trilogy at this time. Patient is aware that he can change his mind and be placed on it at any time and knows to call if he wants to wear it. Patient's SpO2 on 2LNC is 99%. Will continue to monitor. 05/21/19 2328 Oxygen Therapy O2 Sat (%) 99 % Pulse via Oximetry 91 beats per minute O2 Device Nasal cannula O2 Flow Rate (L/min) 2 l/min

## 2019-05-22 NOTE — PROGRESS NOTES
Received bedside shift report from off going nurse, Michael Hebert. Patient resting quietly in bed, eyes closed. Respiration even and unlabored on room air. Bed low and locked. Bedside table personal belongings and call light all within reach.

## 2019-05-23 VITALS
BODY MASS INDEX: 24.4 KG/M2 | DIASTOLIC BLOOD PRESSURE: 69 MMHG | TEMPERATURE: 97.5 F | OXYGEN SATURATION: 93 % | RESPIRATION RATE: 18 BRPM | SYSTOLIC BLOOD PRESSURE: 120 MMHG | WEIGHT: 151.8 LBS | HEART RATE: 84 BPM | HEIGHT: 66 IN

## 2019-05-23 LAB
GLUCOSE BLD STRIP.AUTO-MCNC: 137 MG/DL (ref 65–100)
GLUCOSE BLD STRIP.AUTO-MCNC: 184 MG/DL (ref 65–100)
INR PPP: 3.6
PROTHROMBIN TIME: 35.7 SEC (ref 11.7–14.5)

## 2019-05-23 PROCEDURE — 94761 N-INVAS EAR/PLS OXIMETRY MLT: CPT

## 2019-05-23 PROCEDURE — 74011250637 HC RX REV CODE- 250/637: Performed by: HOSPITALIST

## 2019-05-23 PROCEDURE — 82962 GLUCOSE BLOOD TEST: CPT

## 2019-05-23 PROCEDURE — 74011000250 HC RX REV CODE- 250: Performed by: HOSPITALIST

## 2019-05-23 PROCEDURE — 74011250636 HC RX REV CODE- 250/636: Performed by: NURSE PRACTITIONER

## 2019-05-23 PROCEDURE — 74011250637 HC RX REV CODE- 250/637: Performed by: NURSE PRACTITIONER

## 2019-05-23 PROCEDURE — 94760 N-INVAS EAR/PLS OXIMETRY 1: CPT

## 2019-05-23 PROCEDURE — 94640 AIRWAY INHALATION TREATMENT: CPT

## 2019-05-23 PROCEDURE — 36415 COLL VENOUS BLD VENIPUNCTURE: CPT

## 2019-05-23 PROCEDURE — 85610 PROTHROMBIN TIME: CPT

## 2019-05-23 RX ORDER — PREDNISONE 20 MG/1
20 TABLET ORAL DAILY
Qty: 7 TAB | Refills: 0 | Status: SHIPPED | OUTPATIENT
Start: 2019-05-23 | End: 2019-05-31 | Stop reason: ALTCHOICE

## 2019-05-23 RX ADMIN — POTASSIUM CHLORIDE 20 MEQ: 20 TABLET, EXTENDED RELEASE ORAL at 08:46

## 2019-05-23 RX ADMIN — BUDESONIDE 500 MCG: 0.5 INHALANT RESPIRATORY (INHALATION) at 09:43

## 2019-05-23 RX ADMIN — METOPROLOL SUCCINATE 100 MG: 50 TABLET, EXTENDED RELEASE ORAL at 08:47

## 2019-05-23 RX ADMIN — LISINOPRIL 10 MG: 5 TABLET ORAL at 08:47

## 2019-05-23 RX ADMIN — TIOTROPIUM BROMIDE 18 MCG: 18 CAPSULE ORAL; RESPIRATORY (INHALATION) at 09:44

## 2019-05-23 RX ADMIN — Medication 1 AMPULE: at 08:47

## 2019-05-23 RX ADMIN — ALBUTEROL SULFATE 2.5 MG: 2.5 SOLUTION RESPIRATORY (INHALATION) at 09:43

## 2019-05-23 RX ADMIN — FUROSEMIDE 40 MG: 40 TABLET ORAL at 08:48

## 2019-05-23 RX ADMIN — Medication 10 ML: at 06:23

## 2019-05-23 RX ADMIN — GABAPENTIN 600 MG: 300 CAPSULE ORAL at 08:47

## 2019-05-23 RX ADMIN — METHYLPREDNISOLONE SODIUM SUCCINATE 40 MG: 40 INJECTION, POWDER, FOR SOLUTION INTRAMUSCULAR; INTRAVENOUS at 08:48

## 2019-05-23 NOTE — PROGRESS NOTES
Completed bedside shift report with oncoming nurse, Susan Simental. Patient resting in chair. Respirations even and unlabored on room air. Denies needs at this time. Bed low and locked. Bedside table, personal belonging all within reach.

## 2019-05-23 NOTE — DISCHARGE SUMMARY
Physician Discharge Summary Patient ID: 
Milton Keane [id number] 
58 y.o. 
1945 Admit date: 5/20/2019 Discharge date: 5- Diagnosis: 
1- Ac hypoxic respiratory failure due to COPD exacerbation. Had mild fluid overload w/ hx of CHF. Improved with steroids, bronchodilators, doxycycline and gentle diuresis. Did well on room air before discharge. 2- Thrombocytopenia, acute on chronic, to follow with Hematology who saw patient in hospital. 
3- Mechanical aortic valve, on warfarin, mildly super-therapeutic before discharge due to use of doxycycline which is stopped. To continue at 5 mg daily for 4 days starting the day after discharge and monitor INR and adjust dose by [facility]. 4- DM and HTN, controlled 5- Obstructive sleep apnea, broken home mask replaced in hospital. 
 
Hospital course:  
74 yo male with PMH DM2, HTN, CHF, COPD, A. Fib, mech AVR, TCP. Pt does not use oxygen at home but does wear bipap at night. Pt presented to the ED via EMS with report of cough and congestion, inability to lay flat x 4-5 days, worse since yesterday. Pt denies any chest pain, N/V/D, no fever, no emesis, vision change, headache. Pt denies any dysuria. Pt reports being compliant with meds and treatments. Pt was found to have an oxygen sat of 84% on room air, improved to 95% on 3L. Pt also notes approx 20lb weight loss over several months. Patient admitted and treated as above. On day of discharge, patient exam showed diminished bs w/ no wheezing or crackles. PCP: Mike Polo MD 
 
Patient Instructions:  
Current Discharge Medication List  
  
START taking these medications Details  
predniSONE (DELTASONE) 20 mg tablet Take 20 mg by mouth daily. Qty: 7 Tab, Refills: 0 CONTINUE these medications which have NOT CHANGED Details  
furosemide (LASIX) 20 mg tablet Take 20 mg by mouth daily. montelukast (SINGULAIR) 10 mg tablet Take 1 Tab by mouth daily. Qty: 30 Tab, Refills: 3 Cetirizine (ZYRTEC) 10 mg cap Take 10 mg by mouth daily. raNITIdine (ZANTAC) 150 mg tablet Take 150 mg by mouth two (2) times a day. warfarin (COUMADIN) 5 mg tablet Take 5 mg by mouth daily. albuterol (PROVENTIL HFA) 90 mcg/actuation inhaler Take 2 Puffs by inhalation every four (4) hours as needed for Wheezing or Shortness of Breath. Indications: Chronic Obstructive Pulmonary Disease Qty: 1 Inhaler, Refills: 3 Associated Diagnoses: Centrilobular emphysema (HCC)  
  
gabapentin (NEURONTIN) 300 mg capsule Take 2 Caps by mouth three (3) times daily. Indications: NEUROPATHIC PAIN Qty: 90 Cap, Refills: 2 Associated Diagnoses: Acute encephalopathy  
  
tiotropium (SPIRIVA) 18 mcg inhalation capsule Take 1 Cap by inhalation daily. Indications: BRONCHOSPASM PREVENTION WITH COPD Qty: 30 Cap, Refills: 2 Associated Diagnoses: Centrilobular emphysema (HCC)  
  
enalapril (VASOTEC) 5 mg tablet Take 1 Tab by mouth daily. Qty: 30 Tab, Refills: 2  
  
guaiFENesin ER (MUCINEX) 1,200 mg Ta12 ER tablet Take 1 Tab by mouth every twelve (12) hours. Qty: 60 Tab, Refills: 1 Associated Diagnoses: Centrilobular emphysema ([address] metoprolol succinate (TOPROL-XL) 100 mg tablet Take 1 Tab by mouth two (2) times a day. Indications: VENTRICULAR RATE CONTROL IN ATRIAL FIBRILLATION Qty: 60 Tab, Refills: 2 Associated Diagnoses: Atrial flutter with rapid ventricular response (HCC)  
  
nitroglycerin (NITROSTAT) 0.4 mg SL tablet 1 Tab by SubLINGual route every five (5) minutes as needed for Chest Pain. Indications: ANGINA Qty: 15 Tab, Refills: 0 Associated Diagnoses: Centrilobular emphysema ([address] potassium chloride (K-DUR, KLOR-CON) 20 mEq tablet Take 1 Tab by mouth daily. Indications: hypokalemia prevention 
Qty: 30 Tab, Refills: 1 Associated Diagnoses: Ischemic cardiomyopathy; Acute respiratory failure with hypercapnia ([address] budesonide-formoterol (SYMBICORT) 160-4.5 mcg/actuation HFA inhaler Take 2 Puffs by inhalation two (2) times a day. acetaminophen (TYLENOL) 325 mg tablet Take  by mouth every four (4) hours as needed for Pain. Instructions:  
 
 check INR in 2-3 days and report to your doctor, Use warfarin at 5 mg daily for 3 days then your previous doses Diet:  Low salt, low fat, diabetic. Fluid <1.5 L/day. Activity: As tolerated. Condition: Stable Follow-up with primary physician in 1-2 week. Time 35 min Please send copy to primary physician. Signed: 
Megan Spain MD 
5/23/2019 10:45 AM

## 2019-05-23 NOTE — PROGRESS NOTES
Warfarin dosing per pharmacist 
 
Deonte August is a 75 y.o. male. Height: 5' 6\" (167.6 cm)    Weight: 68.9 kg (151 lb 12.8 oz) Indication:  AFIB and mechanical AVR Goal INR:  2.5-3.5 Home dose:  5mg on Mon, Wed, Sat and 7.5mg on all other days Risk factors or significant drug interactions:  DOXYCYCLINE started on 5/20 PM 
 
Other anticoagulants:  none Daily Monitoring Date  INR     Warfarin dose HGB              Notes 5/20  3.3  2.5 mg  12.8  
5/21  3.3  2.5 mg  ---- 
5/22  3.3  2.5 mg  11.4  
5/23  3.6  Hold  ---- Pharmacist to assist in dosing and monitoring of warfarin therapy for Mr. Blas Vivar during this admission. Dose resumed lower than home dose for now given new DD interaction with doxycycline. Target INR based on his risk factors = 2.5-3.5 per protocol. INR up to 3.6, likely due to doxy interaction. Will hold tonight's dose of warfarin. Following daily INR. Thank you, Tammy Bolaños, PharmD

## 2019-05-23 NOTE — PROGRESS NOTES
Patient wears home Trilogy at night while sleeping. RT spoke with patient about machine and patient stated he didn't need any assistance with placing machine on and off. Patient knows if assistance is needed to just call for RT.

## 2019-05-23 NOTE — PROGRESS NOTES
Met with patient regarding discharge plan. Patient remains independent. He denies any need for home health. He states that he is aware of his symptoms and when to seek medical assistance. States he quit smoking in 1999. His wife is very attentive and keeps him on a strict diet. Patient gets his mediations through the [place]. He has his home Trilogy for nocturnal use. No discharge planning needs identified. Case Management will remain available to assist as needed. Care Management Interventions PCP Verified by CM: Yes 
Palliative Care Criteria Met (RRAT>21 & CHF Dx)?: Yes(RRAT- 32, CHF ) Palliative Consult Recommended?: Yes Transition of Care Consult (CM Consult): Discharge Planning Discharge Durable Medical Equipment: No 
Physical Therapy Consult: Yes Occupational Therapy Consult: Yes Speech Therapy Consult: No 
Current Support Network: Lives with Spouse, Own Home Confirm Follow Up Transport: Self Plan discussed with Pt/Family/Caregiver: Yes Freedom of Choice Offered: Yes Discharge Location Discharge Placement: Home

## 2019-05-23 NOTE — PROGRESS NOTES
Changed patient's dressing on left forearm. No drainage noted. Cl;eaned with wound cleanser. Redressed with Kil[place] and 4 X 4. Patient tolerated well.

## 2019-05-23 NOTE — PROGRESS NOTES
Paged Dr. Cem Montiel. Informed of Critical INR of 3.6. Ordered by Dr. Walls Crimes to hold night does of Coumadin 2.5 mg. Read back and confirmed.

## 2019-05-23 NOTE — DISCHARGE INSTRUCTIONS
Diabetic cardiac diet    acitivity a tolerated,     PCP in 1-2 weeks  ,   check INR in 2-3 days and report to your doctor    [address] 5MG ON Friday       Patient Education        Chronic Obstructive Pulmonary Disease (COPD): Care Instructions  Your Care Instructions    Chronic obstructive pulmonary disease (COPD) is a general term for a group of lung diseases, including emphysema and chronic bronchitis. People with COPD have decreased airflow in and out of the lungs, which makes it hard to breathe. The airways also can get clogged with thick mucus. Cigarette smoking is a major cause of COPD. Although there is no cure for COPD, you can slow its progress. Following your treatment plan and taking care of yourself can help you feel better and live longer. Follow-up care is a key part of your treatment and safety. Be sure to make and go to all appointments, and call your doctor if you are having problems. It's also a good idea to know your test results and keep a list of the medicines you take. How can you care for yourself at home?   Staying healthy    · Do not smoke. This is the most important step you can take to prevent more damage to your lungs. If you need help quitting, talk to your doctor about stop-smoking programs and medicines. These can increase your chances of quitting for good.     · Avoid colds and flu. Get a pneumococcal vaccine shot. If you have had one before, ask your doctor whether you need a second dose. Get the flu vaccine every fall. If you must be around people with colds or the flu, wash your hands often.     · Avoid secondhand smoke, air pollution, and high altitudes. Also avoid cold, dry air and hot, humid air. Stay at home with your windows closed when air pollution is bad.    Medicines and oxygen therapy    · Take your medicines exactly as prescribed.  Call your doctor if you think you are having a problem with your medicine.     · You may be taking medicines such as:  ? Bronchodilators. These help open your airways and make breathing easier. Bronchodilators are either short-acting (work for 6 to 9 hours) or long-acting (work for 24 hours). You inhale most bronchodilators, so they start to act quickly. Always carry your quick-relief inhaler with you in case you need it while you are away from home. ? Corticosteroids (prednisone, budesonide). These reduce airway inflammation. They come in pill or inhaled form. You must take these medicines every day for them to work well.     · A spacer may help you get more inhaled medicine to your lungs. Ask your doctor or pharmacist if a spacer is right for you. If it is, ask how to use it properly.     · Do not take any vitamins, over-the-counter medicine, or herbal products without talking to your doctor first.     · If your doctor prescribed antibiotics, take them as directed. Do not stop taking them just because you feel better. You need to take the full course of antibiotics.     · Oxygen therapy boosts the amount of oxygen in your blood and helps you breathe easier. Use the flow rate your doctor has recommended, and do not change it without talking to your doctor first.   Activity    · Get regular exercise. Walking is an easy way to get exercise. Start out slowly, and walk a little more each day.     · Pay attention to your breathing. You are exercising too hard if you cannot talk while you are exercising.     · Take short rest breaks when doing household chores and other activities.     · Learn breathing methods--such as breathing through pursed lips--to help you become less short of breath.     · If your doctor has not set you up with a pulmonary rehabilitation program, talk to him or her about whether rehab is right for you. Rehab includes exercise programs, education about your disease and how to manage it, help with diet and other changes, and emotional support. Diet    · Eat regular, healthy meals.  Use bronchodilators about 1 hour before you eat to make it easier to eat. Eat several small meals instead of three large ones. Drink beverages at the end of the meal. Avoid foods that are hard to chew.     · Eat foods that contain protein so that you do not lose muscle mass.     · Talk with your doctor if you gain too much weight or if you lose weight without trying.    Mental health    · Talk to your family, friends, or a therapist about your feelings. It is normal to feel frightened, angry, hopeless, helpless, and even guilty. Talking openly about bad feelings can help you cope. If these feelings last, talk to your doctor. When should you call for help? Call 911 anytime you think you may need emergency care. For example, call if:    · You have severe trouble breathing.    Call your doctor now or seek immediate medical care if:    · You have new or worse trouble breathing.     · You cough up blood.     · You have a fever.    Watch closely for changes in your health, and be sure to contact your doctor if:    · You cough more deeply or more often, especially if you notice more mucus or a change in the color of your mucus.     · You have new or worse swelling in your legs or belly.     · You are not getting better as expected. Where can you learn more? Go to http://kristina-karis.info/. Anny Arellano in the search box to learn more about \"Chronic Obstructive Pulmonary Disease (COPD): Care Instructions. \"  Current as of: September 5, 2018  Content Version: 11.9  © 7060-9110 [employer]. Care instructions adapted under license by [employer] (which disclaims liability or warranty for this information). If you have questions about a medical condition or this instruction, always ask your healthcare professional. [place] [address] any warranty or liability for your use of this information.          Patient Education        Heart Failure: Care Instructions  Your Care Instructions    Heart failure occurs when your heart does not pump as much blood as the body needs. Failure does not mean that the heart has stopped pumping but rather that it is not pumping as well as it should. Over time, this causes fluid buildup in your lungs and other parts of your body. Fluid buildup can cause shortness of breath, fatigue, swollen ankles, and other problems. By taking medicines regularly, reducing sodium (salt) in your diet, checking your weight every day, and making lifestyle changes, you can feel better and live longer. Follow-up care is a key part of your treatment and safety. Be sure to make and go to all appointments, and call your doctor if you are having problems. It's also a good idea to know your test results and keep a list of the medicines you take. How can you care for yourself at home? Medicines    · Be safe with medicines. Take your medicines exactly as prescribed. Call your doctor if you think you are having a problem with your medicine.     · Do not take any vitamins, over-the-counter medicine, or herbal products without talking to your doctor first. Vanessa Cordova not take ibuprofen (Advil or Motrin) and naproxen (Aleve) without talking to your doctor first. They could make your heart failure worse.     · You may be taking some of the following medicine. ? Beta-blockers can slow heart rate, decrease blood pressure, and improve your condition. Taking a beta-blocker may lower your chance of needing to be hospitalized. ? Angiotensin-converting enzyme inhibitors (ACEIs) reduce the heart's workload, lower blood pressure, and reduce swelling. Taking an ACEI may lower your chance of needing to be hospitalized again. ? Angiotensin II receptor blockers (ARBs) work like ACEIs. Your doctor may prescribe them instead of ACEIs. ? Diuretics, also called water pills, reduce swelling. ? Potassium supplements replace this important mineral, which is sometimes lost with diuretics.   ? Aspirin and other blood thinners prevent blood clots, which can cause a stroke or heart attack.    You will get more details on the specific medicines your doctor prescribes. Diet    · Your doctor may suggest that you limit sodium to 2,000 milligrams (mg) a day or less. That is less than 1 teaspoon of salt a day, including all the salt you eat in cooking or in packaged foods. People get most of their sodium from processed foods. Fast food and restaurant meals also tend to be very high in sodium.     · Ask your doctor how much liquid you can drink each day. You may have to limit liquids.    Weight    · Weigh yourself without clothing at the same time each day. Record your weight. Call your doctor if you have a sudden weight gain, such as more than 2 to 3 pounds in a day or 5 pounds in a week. (Your doctor may suggest a different range of weight gain.) A sudden weight gain may mean that your heart failure is getting worse.    Activity level    · Start light exercise (if your doctor says it is okay). Even if you can only do a small amount, exercise will help you get stronger, have more energy, and manage your weight and your stress. Walking is an easy way to get exercise. Start out by walking a little more than you did before. Bit by bit, increase the amount you walk.     · When you exercise, watch for signs that your heart is working too hard. You are pushing yourself too hard if you cannot talk while you are exercising. If you become short of breath or dizzy or have chest pain, stop, sit down, and rest.     · If you feel \"wiped out\" the day after you exercise, walk slower or for a shorter distance until you can work up to a better pace.     · Get enough rest at night. Sleeping with 1 or 2 pillows under your upper body and head may help you breathe easier.    Lifestyle changes    · Do not smoke. Smoking can make a heart condition worse. If you need help quitting, talk to your doctor about stop-smoking programs and medicines. These can increase your chances of quitting for good. Quitting smoking may be the most important step you can take to protect your heart.     · Limit alcohol to 2 drinks a day for men and 1 drink a day for women. Too much alcohol can cause health problems.     · Avoid getting sick from colds and the flu. Get a pneumococcal vaccine shot. If you have had one before, ask your doctor whether you need another dose. Get a flu shot each year. If you must be around people with colds or the flu, wash your hands often. When should you call for help? Call 911 if you have symptoms of sudden heart failure such as:    · You have severe trouble breathing.     · You cough up pink, foamy mucus.     · You have a new irregular or rapid heartbeat.    Call your doctor now or seek immediate medical care if:    · You have new or increased shortness of breath.     · You are dizzy or lightheaded, or you feel like you may faint.     · You have sudden weight gain, such as more than 2 to 3 pounds in a day or 5 pounds in a week. (Your doctor may suggest a different range of weight gain.)     · You have increased swelling in your legs, ankles, or feet.     · You are suddenly so tired or weak that you cannot do your usual activities.    Watch closely for changes in your health, and be sure to contact your doctor if you develop new symptoms. Where can you learn more? Go to http://kristina-karis.info/. Enter I476 in the search box to learn more about \"Heart Failure: Care Instructions. \"  Current as of: July 22, 2018  Content Version: 11.9  © 3526-5352 [employer]. Care instructions adapted under license by [employer] (which disclaims liability or warranty for this information). If you have questions about a medical condition or this instruction, always ask your healthcare professional. [place] [address] any warranty or liability for your use of this information.          DISCHARGE SUMMARY from Nurse    PATIENT INSTRUCTIONS:    After general anesthesia or intravenous sedation, for 24 hours or while taking prescription Narcotics:  · Limit your activities  · Do not drive and operate hazardous machinery  · Do not make important personal or business decisions  · Do  not drink alcoholic beverages  · If you have not urinated within 8 hours after discharge, please contact your surgeon on call. Report the following to your surgeon:  · Excessive pain, swelling, redness or odor of or around the surgical area  · Temperature over 100.5  · Nausea and vomiting lasting longer than 4 hours or if unable to take medications  · Any signs of decreased circulation or nerve impairment to extremity: change in color, persistent  numbness, tingling, coldness or increase pain  · Any questions    What to do at Home:      *  Please give a list of your current medications to your Primary Care Provider. *  Please update this list whenever your medications are discontinued, doses are      changed, or new medications (including over-the-counter products) are added. *  Please carry medication information at all times in case of emergency situations. These are general instructions for a healthy lifestyle:    No smoking/ No tobacco products/ Avoid exposure to second hand smoke  Surgeon General's Warning:  Quitting smoking now greatly reduces serious risk to your health. Obesity, smoking, and sedentary lifestyle greatly increases your risk for illness    A healthy diet, regular physical exercise & weight monitoring are important for maintaining a healthy lifestyle    You may be retaining fluid if you have a history of heart failure or if you experience any of the following symptoms:  Weight gain of 3 pounds or more overnight or 5 pounds in a week, increased swelling in our hands or feet or shortness of breath while lying flat in bed.   Please call your doctor as soon as you notice any of these symptoms; do not wait until your next office visit. Recognize signs and symptoms of STROKE:    F-face looks uneven    A-arms unable to move or move unevenly    S-speech slurred or non-existent    T-time-call 911 as soon as signs and symptoms begin-DO NOT go       Back to bed or wait to see if you get better-TIME IS BRAIN. Warning Signs of HEART ATTACK     Call 911 if you have these symptoms:   Chest discomfort. Most heart attacks involve discomfort in the center of the chest that lasts more than a few minutes, or that goes away and comes back. It can feel like uncomfortable pressure, squeezing, fullness, or pain.  Discomfort in other areas of the upper body. Symptoms can include pain or discomfort in one or both arms, the back, neck, jaw, or stomach.  Shortness of breath with or without chest discomfort.  Other signs may include breaking out in a cold sweat, nausea, or lightheadedness. Don't wait more than five minutes to call 911 - MINUTES MATTER! Fast action can save your life. Calling 911 is almost always the fastest way to get lifesaving treatment. Emergency Medical Services staff can begin treatment when they arrive -- up to an hour sooner than if someone gets to the hospital by car. The discharge information has been reviewed with the patient. The patient verbalized understanding. Discharge medications reviewed with the patient and appropriate educational materials and side effects teaching were provided.   ___________________________________________________________________________________________________________________________________

## 2019-05-24 ENCOUNTER — PATIENT OUTREACH (OUTPATIENT)
Dept: CASE MANAGEMENT | Age: 74
End: 2019-05-24

## 2019-05-24 NOTE — PROGRESS NOTES
This note will not be viewable in [address] Transition of Care Discharge Follow-up Questionnaire Date/Time of Call: 
 5/24/19 
 1119am  
What was the patient hospitalized for? COPD Does the patient understand his/her diagnosis and/or treatment and what happened during the hospitalization? Yes, spoke with patients wife, Randy Latham, she states understanding of diagnosis and treatment; and is agreeable to call. Laxmi states patient is doing well, he has gone to the grocery store Did the patient receive discharge instructions? Yes   
CM Assessed Risk for Readmission:  
 
 
Patient stated Risk for Readmission: Low r/t diagnosis and/or comorbidities None stated Review any discharge instructions (see discharge instructions/AVS in [facility]). Ask patient if they understand these. Do they have any questions? Reviewed, understanding is stated, no questions at this time Were home services ordered (nursing, PT, OT, ST, etc.)? No  
   
If so, has the first visit occurred? If not, why? (Assist with coordination of services if necessary.) 
 N/A Was any DME ordered? No 
Patient has trilogy If so, has it been received? If not, why?  (Assist patient in obtaining DME orders &/or equipment if necessary.) N/A Complete a review of all medications (new, continued and discontinued meds per the D/C instructions and medication tab in [facility]). Completed START taking: 
predniSONE 20 mg tablet (DELTASONE) Were all new prescriptions filled? If not, why?  (Assist patient in obtaining medications if necessary  escalate for CCM &/or SW if ongoing issues are verbalized by pt or anticipated) Yes Does the patient understand the purpose and dosing instructions for all medications? (If patient has questions, provide explanation and education.) Yes Does the patient have any problems in performing ADLs? (If patient is unable to perform ADLs  what is the limiting factor(s)? Do they have a support system that can assist? If no support system is present, discuss possible assistance that they may be able to obtain. Escalate for CCM/SW if ongoing issues are verbalized by pt or anticipated) Independent with ADLs Does the patient have all follow-up appointments scheduled? 7 day f/up with PCP?  
(f/up with PCP may be w/in 14 days if patient has a f/up with their specialist w/in 7 days) 7-14 day f/up with specialist?  
(or per discharge instructions) If f/up has not been made  what actions has the care coordinator made to accomplish this? Has transportation been arranged? Yes Dr. Heath Dye 5/31/19 Dr. Ulices Maier 5/30/19 Yes, there are no transportation needs at this time Any other questions or concerns expressed by the patient? No other needs or concerns identified. Laxmi states her gratitude for follow up. Contact information for [facility] was given, instructed to call with new questions or concerns. Schedule next appointment with NYLA RIGGINS Coordinator or refer to RN Case Manager/ per the workflow guidelines. When is care coordinators next follow-up call scheduled? If referred for CCM  what RN care manager was the referral assigned? Community Care Coordinator will follow per workflow guidelines Within 30 days RUBI Call Completed By: Mike Plummer LPN Community Care Coordinator

## 2019-05-24 NOTE — PROGRESS NOTES
oob in chair, alert, oriented. Lung sounds clear, on room air, tolerating well. states ready to go home. Remains on remote tele with nsr reported. Abdomen soft, bs present, voiding w/o any difficulty. Dressing noted forehead from prior skin ca removal, d/i. Denies any pain.

## 2019-05-30 ENCOUNTER — HOSPITAL ENCOUNTER (OUTPATIENT)
Dept: LAB | Age: 74
Discharge: HOME | End: 2019-05-30
Payer: MEDICARE

## 2019-05-30 DIAGNOSIS — D69.6 THROMBOCYTOPENIA (HCC): ICD-10-CM

## 2019-05-30 LAB
ALBUMIN SERPL-MCNC: 3.2 G/DL (ref 3.2–4.6)
ALBUMIN/GLOB SERPL: 0.8 {RATIO} (ref 1.2–3.5)
ALP SERPL-CCNC: 75 U/L (ref 50–136)
ALT SERPL-CCNC: 35 U/L (ref 12–65)
ANION GAP SERPL CALC-SCNC: 9 MMOL/L (ref 7–16)
AST SERPL-CCNC: 21 U/L (ref 15–37)
BASOPHILS # BLD: 0 K/UL (ref 0–0.2)
BASOPHILS NFR BLD: 0 % (ref 0–2)
BILIRUB SERPL-MCNC: 0.3 MG/DL (ref 0.2–1.1)
BUN SERPL-MCNC: 25 MG/DL (ref 8–23)
CALCIUM SERPL-MCNC: 9.4 MG/DL (ref 8.3–10.4)
CHLORIDE SERPL-SCNC: 100 MMOL/L (ref 98–107)
CO2 SERPL-SCNC: 26 MMOL/L (ref 21–32)
CREAT SERPL-MCNC: 1.15 MG/DL (ref 0.8–1.5)
DIFFERENTIAL METHOD BLD: ABNORMAL
EOSINOPHIL # BLD: 0 K/UL (ref 0–0.8)
EOSINOPHIL NFR BLD: 0 % (ref 0.5–7.8)
ERYTHROCYTE [DISTWIDTH] IN BLOOD BY AUTOMATED COUNT: 15.1 % (ref 11.9–14.6)
GLOBULIN SER CALC-MCNC: 3.9 G/DL (ref 2.3–3.5)
GLUCOSE SERPL-MCNC: 140 MG/DL (ref 65–100)
HCT VFR BLD AUTO: 40.8 % (ref 41.1–50.3)
HGB BLD-MCNC: 12.5 G/DL (ref 13.6–17.2)
IMM GRANULOCYTES # BLD AUTO: 0.1 K/UL (ref 0–0.5)
IMM GRANULOCYTES NFR BLD AUTO: 1 % (ref 0–5)
INR PPP: 2.5
LYMPHOCYTES # BLD: 1.1 K/UL (ref 0.5–4.6)
LYMPHOCYTES NFR BLD: 11 % (ref 13–44)
MCH RBC QN AUTO: 26.8 PG (ref 26.1–32.9)
MCHC RBC AUTO-ENTMCNC: 30.6 G/DL (ref 31.4–35)
MCV RBC AUTO: 87.6 FL (ref 79.6–97.8)
MONOCYTES # BLD: 0.5 K/UL (ref 0.1–1.3)
MONOCYTES NFR BLD: 5 % (ref 4–12)
NEUTS SEG # BLD: 8.5 K/UL (ref 1.7–8.2)
NEUTS SEG NFR BLD: 83 % (ref 43–78)
NRBC # BLD: 0 K/UL (ref 0–0.2)
PLATELET # BLD AUTO: 174 K/UL (ref 150–450)
PLATELET COMMENTS,PCOM: ADEQUATE
PMV BLD AUTO: ABNORMAL FL (ref 9.4–12.3)
POTASSIUM SERPL-SCNC: 4.3 MMOL/L (ref 3.5–5.1)
PROT SERPL-MCNC: 7.1 G/DL (ref 6.3–8.2)
PROTHROMBIN TIME: 26 SEC (ref 11.7–14.5)
RBC # BLD AUTO: 4.66 M/UL (ref 4.23–5.67)
RBC MORPH BLD: ABNORMAL
RBC MORPH BLD: ABNORMAL
SODIUM SERPL-SCNC: 135 MMOL/L (ref 136–145)
WBC # BLD AUTO: 10.2 K/UL (ref 4.3–11.1)
WBC MORPH BLD: ABNORMAL

## 2019-05-30 PROCEDURE — 85025 COMPLETE CBC W/AUTO DIFF WBC: CPT

## 2019-05-30 PROCEDURE — 85610 PROTHROMBIN TIME: CPT

## 2019-05-30 PROCEDURE — 80053 COMPREHEN METABOLIC PANEL: CPT

## 2019-05-30 PROCEDURE — 36415 COLL VENOUS BLD VENIPUNCTURE: CPT

## 2019-06-21 ENCOUNTER — PATIENT OUTREACH (OUTPATIENT)
Dept: CASE MANAGEMENT | Age: 74
End: 2019-06-21

## 2019-06-21 NOTE — PROGRESS NOTES
This note will not be viewable in [address] Attempted outreach follow up without success, left message. Per [facility] patient is following given plan of care and has attended all follow up visits. Next appointments: Dr. Les Ruiz 7/11/19; Dr. Corky Dunham 7/1/19. Patient will be graduated from [facility] [email or web address] program.  Will reopen if call is returned.

## 2019-07-01 ENCOUNTER — HOSPITAL ENCOUNTER (OUTPATIENT)
Dept: LAB | Age: 74
Discharge: HOME | End: 2019-07-01
Payer: MEDICARE

## 2019-07-01 DIAGNOSIS — D69.6 THROMBOCYTOPENIA (HCC): ICD-10-CM

## 2019-07-01 LAB
ALBUMIN SERPL-MCNC: 3.5 G/DL (ref 3.2–4.6)
ALBUMIN/GLOB SERPL: 0.8 {RATIO} (ref 1.2–3.5)
ALP SERPL-CCNC: 100 U/L (ref 50–136)
ALT SERPL-CCNC: 19 U/L (ref 12–65)
ANION GAP SERPL CALC-SCNC: 6 MMOL/L (ref 7–16)
AST SERPL-CCNC: 15 U/L (ref 15–37)
BASOPHILS # BLD: 0.1 K/UL (ref 0–0.2)
BASOPHILS NFR BLD: 1 % (ref 0–2)
BILIRUB SERPL-MCNC: 0.4 MG/DL (ref 0.2–1.1)
BUN SERPL-MCNC: 21 MG/DL (ref 8–23)
CALCIUM SERPL-MCNC: 9.8 MG/DL (ref 8.3–10.4)
CHLORIDE SERPL-SCNC: 101 MMOL/L (ref 98–107)
CO2 SERPL-SCNC: 32 MMOL/L (ref 21–32)
CREAT SERPL-MCNC: 1.44 MG/DL (ref 0.8–1.5)
DIFFERENTIAL METHOD BLD: ABNORMAL
EOSINOPHIL # BLD: 0.1 K/UL (ref 0–0.8)
EOSINOPHIL NFR BLD: 1 % (ref 0.5–7.8)
ERYTHROCYTE [DISTWIDTH] IN BLOOD BY AUTOMATED COUNT: 15.4 % (ref 11.9–14.6)
GLOBULIN SER CALC-MCNC: 4.4 G/DL (ref 2.3–3.5)
GLUCOSE SERPL-MCNC: 85 MG/DL (ref 65–100)
HCT VFR BLD AUTO: 42.2 % (ref 41.1–50.3)
HGB BLD-MCNC: 12.8 G/DL (ref 13.6–17.2)
IMM GRANULOCYTES # BLD AUTO: 0 K/UL (ref 0–0.5)
IMM GRANULOCYTES NFR BLD AUTO: 0 % (ref 0–5)
LYMPHOCYTES # BLD: 1.4 K/UL (ref 0.5–4.6)
LYMPHOCYTES NFR BLD: 19 % (ref 13–44)
MCH RBC QN AUTO: 26.3 PG (ref 26.1–32.9)
MCHC RBC AUTO-ENTMCNC: 30.3 G/DL (ref 31.4–35)
MCV RBC AUTO: 86.8 FL (ref 79.6–97.8)
MONOCYTES # BLD: 0.7 K/UL (ref 0.1–1.3)
MONOCYTES NFR BLD: 9 % (ref 4–12)
NEUTS SEG # BLD: 5.3 K/UL (ref 1.7–8.2)
NEUTS SEG NFR BLD: 70 % (ref 43–78)
NRBC # BLD: 0 K/UL (ref 0–0.2)
PLATELET # BLD AUTO: 215 K/UL (ref 150–450)
PLATELET COMMENTS,PCOM: ADEQUATE
PMV BLD AUTO: ABNORMAL FL (ref 9.4–12.3)
POTASSIUM SERPL-SCNC: 4.2 MMOL/L (ref 3.5–5.1)
PROT SERPL-MCNC: 7.9 G/DL (ref 6.3–8.2)
RBC # BLD AUTO: 4.86 M/UL (ref 4.23–5.67)
RBC MORPH BLD: ABNORMAL
SODIUM SERPL-SCNC: 139 MMOL/L (ref 136–145)
WBC # BLD AUTO: 7.6 K/UL (ref 4.3–11.1)
WBC MORPH BLD: ABNORMAL

## 2019-07-01 PROCEDURE — 36415 COLL VENOUS BLD VENIPUNCTURE: CPT

## 2019-07-01 PROCEDURE — 80053 COMPREHEN METABOLIC PANEL: CPT

## 2019-07-01 PROCEDURE — 85025 COMPLETE CBC W/AUTO DIFF WBC: CPT

## 2020-01-01 ENCOUNTER — HOSPITAL ENCOUNTER (OUTPATIENT)
Dept: LAB | Age: 75
Discharge: HOME | End: 2020-05-07
Payer: MEDICARE

## 2020-01-01 ENCOUNTER — HOSPITAL ENCOUNTER (OUTPATIENT)
Dept: LAB | Age: 75
Discharge: HOME | End: 2020-07-08

## 2020-01-01 ENCOUNTER — HOSPITAL ENCOUNTER (OUTPATIENT)
Dept: LAB | Age: 75
Discharge: HOME | End: 2020-05-29
Payer: MEDICARE

## 2020-01-01 DIAGNOSIS — D64.9 ANEMIA, UNSPECIFIED TYPE: ICD-10-CM

## 2020-01-01 DIAGNOSIS — D69.6 THROMBOCYTOPENIA (HCC): ICD-10-CM

## 2020-01-01 LAB
ALBUMIN SERPL ELPH-MCNC: 2.88 G/DL (ref 3.2–5.6)
ALBUMIN SERPL-MCNC: 2.9 G/DL (ref 3.2–4.6)
ALBUMIN/GLOB SERPL: 0.6 {RATIO} (ref 1.2–3.5)
ALBUMIN/GLOB SERPL: 0.7 {RATIO} (ref 1.2–3.5)
ALP SERPL-CCNC: 92 U/L (ref 50–136)
ALPHA1 GLOB SERPL ELPH-MCNC: 0.36 G/DL (ref 0.1–0.4)
ALPHA2 GLOB SERPL ELPH-MCNC: 1.1 G/DL (ref 0.4–1.2)
ALT SERPL-CCNC: 18 U/L (ref 12–65)
ANA SER QL: POSITIVE
ANION GAP SERPL CALC-SCNC: 7 MMOL/L (ref 7–16)
APTT PPP: 42.9 SEC (ref 24.3–35.4)
AST SERPL-CCNC: 18 U/L (ref 15–37)
B-GLOBULIN SERPL QL ELPH: 1.3 G/DL (ref 0.6–1.3)
BASOPHILS # BLD: 0.1 K/UL (ref 0–0.2)
BASOPHILS # BLD: 0.1 K/UL (ref 0–0.2)
BASOPHILS NFR BLD: 1 % (ref 0–2)
BASOPHILS NFR BLD: 1 % (ref 0–2)
BILIRUB SERPL-MCNC: 0.4 MG/DL (ref 0.2–1.1)
BUN SERPL-MCNC: 10 MG/DL (ref 8–23)
CALCIUM SERPL-MCNC: 9.3 MG/DL (ref 8.3–10.4)
CENTROMERE B AB SER-ACNC: <0.2 AI (ref 0–0.9)
CHLORIDE SERPL-SCNC: 100 MMOL/L (ref 98–107)
CHROMATIN AB SERPL-ACNC: <0.2 AI (ref 0–0.9)
CO2 SERPL-SCNC: 26 MMOL/L (ref 21–32)
CREAT SERPL-MCNC: 1 MG/DL (ref 0.8–1.5)
DIFFERENTIAL METHOD BLD: ABNORMAL
DIFFERENTIAL METHOD BLD: ABNORMAL
DSDNA AB SER-ACNC: <1 IU/ML (ref 0–9)
ENA JO1 AB SER-ACNC: >8 AI (ref 0–0.9)
ENA RNP AB SER-ACNC: 0.3 AI (ref 0–0.9)
ENA SCL70 AB SER-ACNC: <0.2 AI (ref 0–0.9)
ENA SM AB SER-ACNC: <0.2 AI (ref 0–0.9)
ENA SS-A AB SER-ACNC: 0.8 AI (ref 0–0.9)
ENA SS-B AB SER-ACNC: <0.2 AI (ref 0–0.9)
EOSINOPHIL # BLD: 0.1 K/UL (ref 0–0.8)
EOSINOPHIL # BLD: 0.1 K/UL (ref 0–0.8)
EOSINOPHIL NFR BLD: 1 % (ref 0.5–7.8)
EOSINOPHIL NFR BLD: 1 % (ref 0.5–7.8)
ERYTHROCYTE [DISTWIDTH] IN BLOOD BY AUTOMATED COUNT: 15.8 % (ref 11.9–14.6)
ERYTHROCYTE [DISTWIDTH] IN BLOOD BY AUTOMATED COUNT: 16 % (ref 11.9–14.6)
ERYTHROCYTE [SEDIMENTATION RATE] IN BLOOD: 101 MM/HR (ref 0–20)
FERRITIN SERPL-MCNC: 91 NG/ML (ref 8–388)
FIBRINOGEN PPP-MCNC: 901 MG/DL (ref 190–501)
FOLATE SERPL-MCNC: 7 NG/ML (ref 3.1–17.5)
GAMMA GLOB MFR SERPL ELPH: 1.25 G/DL (ref 0.5–1.6)
GLOBULIN SER CALC-MCNC: 5 G/DL (ref 2.3–3.5)
GLUCOSE SERPL-MCNC: 119 MG/DL (ref 65–100)
GRAM STN SPEC: NORMAL
HCT VFR BLD AUTO: 33.4 % (ref 41.1–50.3)
HCT VFR BLD AUTO: 34.6 % (ref 41.1–50.3)
HGB BLD-MCNC: 10.2 G/DL (ref 13.6–17.2)
HGB BLD-MCNC: 10.6 G/DL (ref 13.6–17.2)
HGB RETIC QN AUTO: 26 PG (ref 29–35)
IGA SERPL-MCNC: 322 MG/DL (ref 85–499)
IGG SERPL-MCNC: 1044 MG/DL (ref 610–1616)
IGM SERPL-MCNC: 152 MG/DL (ref 35–242)
IMM GRANULOCYTES # BLD AUTO: 0 K/UL (ref 0–0.5)
IMM GRANULOCYTES # BLD AUTO: 0.1 K/UL (ref 0–0.5)
IMM GRANULOCYTES NFR BLD AUTO: 0 % (ref 0–5)
IMM GRANULOCYTES NFR BLD AUTO: 1 % (ref 0–5)
IMM RETICS NFR: 18.1 % (ref 2.3–13.4)
INR PPP: 2.4
IRON SATN MFR SERPL: 13 %
IRON SERPL-MCNC: 35 UG/DL (ref 35–150)
LYMPHOCYTES # BLD: 0.6 K/UL (ref 0.5–4.6)
LYMPHOCYTES # BLD: 0.7 K/UL (ref 0.5–4.6)
LYMPHOCYTES NFR BLD: 7 % (ref 13–44)
LYMPHOCYTES NFR BLD: 9 % (ref 13–44)
M PROTEIN SERPL ELPH-MCNC: ABNORMAL G/DL
MCH RBC QN AUTO: 24.7 PG (ref 26.1–32.9)
MCH RBC QN AUTO: 25.1 PG (ref 26.1–32.9)
MCHC RBC AUTO-ENTMCNC: 30.5 G/DL (ref 31.4–35)
MCHC RBC AUTO-ENTMCNC: 30.6 G/DL (ref 31.4–35)
MCV RBC AUTO: 80.9 FL (ref 79.6–97.8)
MCV RBC AUTO: 81.8 FL (ref 79.6–97.8)
MONOCYTES # BLD: 0.4 K/UL (ref 0.1–1.3)
MONOCYTES # BLD: 0.5 K/UL (ref 0.1–1.3)
MONOCYTES NFR BLD: 5 % (ref 4–12)
MONOCYTES NFR BLD: 7 % (ref 4–12)
NEUTS SEG # BLD: 6.3 K/UL (ref 1.7–8.2)
NEUTS SEG # BLD: 7.3 K/UL (ref 1.7–8.2)
NEUTS SEG NFR BLD: 82 % (ref 43–78)
NEUTS SEG NFR BLD: 85 % (ref 43–78)
NRBC # BLD: 0 K/UL (ref 0–0.2)
NRBC # BLD: 0 K/UL (ref 0–0.2)
PATH REV BLD -IMP: NORMAL
PLATELET # BLD AUTO: 241 K/UL (ref 150–450)
PLATELET # BLD AUTO: ABNORMAL K/UL (ref 150–450)
PLATELET COMMENTS,PCOM: ABNORMAL
PLATELET COMMENTS,PCOM: ABNORMAL
PMV BLD AUTO: ABNORMAL FL (ref 9.4–12.3)
PMV BLD AUTO: ABNORMAL FL (ref 9.4–12.3)
POTASSIUM SERPL-SCNC: 3.8 MMOL/L (ref 3.5–5.1)
PROT PATTERN SERPL ELPH-IMP: ABNORMAL
PROT PATTERN SPEC IFE-IMP: ABNORMAL
PROT SERPL-MCNC: 6.9 G/DL (ref 6.3–8.2)
PROT SERPL-MCNC: 7.9 G/DL (ref 6.3–8.2)
PROTHROMBIN TIME: 27.1 SEC (ref 12–14.7)
RBC # BLD AUTO: 4.13 M/UL (ref 4.23–5.67)
RBC # BLD AUTO: 4.23 M/UL (ref 4.23–5.67)
RBC MORPH BLD: ABNORMAL
RETICS # AUTO: 0.04 M/UL (ref 0.03–0.1)
RETICS/RBC NFR AUTO: 0.9 % (ref 0.3–2)
SEE BELOW, 164869: ABNORMAL
SERVICE CMNT-IMP: NORMAL
SODIUM SERPL-SCNC: 133 MMOL/L (ref 136–145)
SRA 100IU/ML UFH SER-ACNC: <1 % (ref 0–20)
SRA UFH SER-IMP: NORMAL
TIBC SERPL-MCNC: 277 UG/DL (ref 250–450)
UNFRAC HEPARIN LOW DOSE: <1 % (ref 0–20)
VIT B12 SERPL-MCNC: 344 PG/ML (ref 193–986)
WBC # BLD AUTO: 7.7 K/UL (ref 4.3–11.1)
WBC # BLD AUTO: 8.6 K/UL (ref 4.3–11.1)
WBC MORPH BLD: ABNORMAL
WBC MORPH BLD: ABNORMAL

## 2020-01-01 PROCEDURE — 36415 COLL VENOUS BLD VENIPUNCTURE: CPT

## 2020-01-01 PROCEDURE — 87205 SMEAR GRAM STAIN: CPT

## 2020-01-01 PROCEDURE — 86038 ANTINUCLEAR ANTIBODIES: CPT

## 2020-01-01 PROCEDURE — 85610 PROTHROMBIN TIME: CPT

## 2020-01-01 PROCEDURE — 86334 IMMUNOFIX E-PHORESIS SERUM: CPT

## 2020-01-01 PROCEDURE — 88305 TISSUE EXAM BY PATHOLOGIST: CPT

## 2020-01-01 PROCEDURE — 85025 COMPLETE CBC W/AUTO DIFF WBC: CPT

## 2020-01-01 PROCEDURE — 88321 CONSLTJ&REPRT SLD PREP ELSWR: CPT

## 2020-01-01 PROCEDURE — 82542 COL CHROMOTOGRAPHY QUAL/QUAN: CPT

## 2020-01-01 PROCEDURE — 85652 RBC SED RATE AUTOMATED: CPT

## 2020-01-01 PROCEDURE — 82728 ASSAY OF FERRITIN: CPT

## 2020-01-01 PROCEDURE — 86225 DNA ANTIBODY NATIVE: CPT

## 2020-01-01 PROCEDURE — 85384 FIBRINOGEN ACTIVITY: CPT

## 2020-01-01 PROCEDURE — 84165 PROTEIN E-PHORESIS SERUM: CPT

## 2020-01-01 PROCEDURE — 85730 THROMBOPLASTIN TIME PARTIAL: CPT

## 2020-01-01 PROCEDURE — 88312 SPECIAL STAINS GROUP 1: CPT

## 2020-01-01 PROCEDURE — 82607 VITAMIN B-12: CPT

## 2020-01-01 PROCEDURE — 85049 AUTOMATED PLATELET COUNT: CPT

## 2020-01-01 PROCEDURE — 82746 ASSAY OF FOLIC ACID SERUM: CPT

## 2020-01-01 PROCEDURE — 83540 ASSAY OF IRON: CPT

## 2020-01-01 PROCEDURE — 85046 RETICYTE/HGB CONCENTRATE: CPT

## 2020-01-01 PROCEDURE — 80053 COMPREHEN METABOLIC PANEL: CPT

## 2020-07-10 PROBLEM — E05.90 HYPERTHYROIDISM: Status: ACTIVE | Noted: 2020-01-01

## 2022-01-01 NOTE — PROGRESS NOTES
09/27/17 1203   Time Spent With Patient   Time In 1130   Time Out 1202   Patient Seen For: AM;Neuro-linguistics   Mental Status   Neurologic State Alert   Orientation Level Oriented X4   Cognition Appropriate decision making   Perception Appears intact   Perseveration No perseveration noted   Safety/Judgement Fall prevention   Pt completed laryngeal exercises x 5 and voice exercises x 6 with min cues with 90% accuracy. Recommend ENT follow up.  Wife reports he actually has an ENT appointment Oct 6   [facility] MA/CCC/SLP Statement Selected

## 2022-11-22 NOTE — PROGRESS NOTES
PHYSICAL THERAPY DAILY NOTE  Time In: 1115  Time Out: 1201  Patient Seen For: AM;Family training;Gait training; Therapeutic exercise;Patient education;Transfer training    Subjective: \"Guess where I'm going tomorrow. \" Patient agreeable to therapy. Objective: Vital Signs:   Patient Vitals for the past 8 hrs:   Temp Pulse Resp BP SpO2   10/02/17 0650 97.9 °F (36.6 °C) 63 16 151/68 93 %   10/02/17 0533 - - - - 96 %           Pain level: No pain reported. Pain location: n/a  Pain interventions: n/a    Patient education: Educated patient and patient's spouse on safety with stair negotiation, ambulation with RW, and sitting on elevated surface (bed at home). Interdisciplinary Communication: Communicated with Flavio Phan OT regarding patient's POC. Contact ([facility])  GROSS ASSESSMENT Daily Assessment            BED/MAT MOBILITY Daily Assessment   Required for safety. Rolling Right : 5 (Supervision)  Rolling Left : 0 (Not tested)  Supine to Sit : 5 (Supervision)  Sit to Supine : 5 (Supervision)       TRANSFERS Daily Assessment   Required for safety. Transfer Type: SPT with walker  Transfer Assistance : 5 (Supervision/setup)  Sit to Stand Assistance: Supervision  Car Transfers: Not tested       GAIT Daily Assessment   Patient ambulated with slow, step-through gait pattern with improved upright posture. Amount of Assistance: 5 (Supervision/setup)  Distance (ft): 200 Feet (ft)  Assistive Device: Walker, rolling       STEPS or STAIRS Daily Assessment   Patient performed stairs with one step at a time method for improved safety secondary to decreased dynamic balance.  Steps/Stairs Ambulated (#): 4  Level of Assist : 5 (Supervision/setup)  Rail Use: Both       BALANCE Daily Assessment    Sitting - Static: Good (unsupported)  Sitting - Dynamic: Good (unsupported)  Standing - Static: Good  Standing - Dynamic : Impaired       WHEELCHAIR MOBILITY Daily Assessment            LOWER EXTREMITY EXERCISES Daily Assessment iodinet message sent to reschedule      Extremity: Both  Exercise Type #1: Seated lower extremity strengthening  Sets Performed: 1  Reps Performed: 15  Level of Assist: Supervision     Patient returned to room sitting in w/c with all needs in reach. Assessment: Patient demonstrated good progress today with functional mobility and safety awareness. Patient's spouse was trained in safety techniques to keep herself and patient safe upon return home. Patient will benefit from further therapy to increase independence with transfers and household ambulation. Plan of Care: Continue with POC and progress as tolerated.        Georgina Vuong  10/2/2017

## 2024-12-06 NOTE — IP AVS SNAPSHOT
[address] 
[phone] Patient: Clover Burger MRN: [id number] XSN:[id number] Current Discharge Medication List  
  
START taking these medications Dose & Instructions Dispensing Information Comments Morning Noon Evening Bedtime  
 amiodarone 200 mg tablet Commonly known as:  CORDARONE Your last dose was: Your next dose is:    
   
   
 Dose:  200 mg Take 1 Tab by mouth daily. Quantity:  30 Tab Refills:  2  
     
   
   
   
  
 enalapril 5 mg tablet Commonly known as:  Lilliana Carey Your last dose was: Your next dose is:    
   
   
 Dose:  5 mg Take 1 Tab by mouth daily. Quantity:  30 Tab Refills:  2  
     
   
   
   
  
 metoprolol succinate 100 mg tablet Commonly known as:  TOPROL-XL Your last dose was: Your next dose is:    
   
   
 Dose:  100 mg Take 1 Tab by mouth two (2) times a day. Indications: VENTRICULAR RATE CONTROL IN ATRIAL FIBRILLATION Quantity:  60 Tab Refills:  2  
     
   
   
   
  
 nitroglycerin 0.4 mg SL tablet Commonly known as:  NITROSTAT Your last dose was: Your next dose is:    
   
   
 Dose:  0.4 mg  
1 Tab by SubLINGual route every five (5) minutes as needed for Chest Pain. Indications: ANGINA Quantity:  15 Tab Refills:  0 CONTINUE these medications which have CHANGED Dose & Instructions Dispensing Information Comments Morning Noon Evening Bedtime  
 furosemide 20 mg tablet Commonly known as:  LASIX What changed:   
- medication strength 
- how much to take Your last dose was: Your next dose is:    
   
   
 Dose:  20 mg Take 1 Tab by mouth daily. Indications: Edema Quantity:  30 Tab Refills:  1  
     
   
   
   
  
 * guaiFENesin 1,200 mg Ta12 ER tablet Commonly known as:  [place] Pt is in ICU at [facility]  She wants to talk to [facility] and will not give me details. Just says she is severely depressed and wants to talk to [facility]. So depressed she has been \"crawling on the floor\". She has an appt with you via Phone next Thursday Dec 9th. Returned call. Pt reports she is not doing well on current antidepressants. Informed her she is under the care of her inpatient provider and she can call when she is discharged and we will schedule an appointment to review medications. What changed:  Another medication with the same name was added. Make sure you understand how and when to take each. Your last dose was: Your next dose is:    
   
   
 Dose:  1200 mg Take 1 Tab by mouth two (2) times a day for 14 days. Quantity:  28 Tab Refills:  1  
     
   
   
   
  
 * guaiFENesin 1,200 mg Ta12 ER tablet Commonly known as:  Roojoom Obie What changed: You were already taking a medication with the same name, and this prescription was added. Make sure you understand how and when to take each. Your last dose was: Your next dose is:    
   
   
 Dose:  1200 mg Take 1 Tab by mouth every twelve (12) hours. Quantity:  60 Tab Refills:  1  
     
   
   
   
  
 potassium chloride 20 mEq tablet Commonly known as:  K-DUR, KLOR-ALEKSANDR What changed:   
- medication strength 
- how much to take Your last dose was: Your next dose is:    
   
   
 Dose:  20 mEq Take 1 Tab by mouth daily. Indications: hypokalemia prevention Quantity:  30 Tab Refills:  1  
     
   
   
   
  
 * predniSONE 10 mg tablet Commonly known as:  Juan Martinez What changed:   
- how much to take - when to take this - Another medication with the same name was removed. Continue taking this medication, and follow the directions you see here. Your last dose was: Your next dose is:    
   
   
 Dose:  5 mg Take 0.5 Tabs by mouth daily for 6 days. Quantity:  30 Tab Refills:  0  
     
   
   
   
  
 * predniSONE 5 mg tablet Commonly known as:  Juanreginald Martinez Start taking on:  10/4/2017 What changed:   
- medication strength 
- how much to take - Another medication with the same name was removed. Continue taking this medication, and follow the directions you see here. Your last dose was: Your next dose is:    
   
   
 Dose:  5 mg Take 1 Tab by mouth daily (with breakfast). Quantity:  30 Tab Refills:  1 * PROVENTIL HFA 90 mcg/actuation inhaler Generic drug:  albuterol What changed:  Another medication with the same name was added. Make sure you understand how and when to take each. Your last dose was: Your next dose is:    
   
   
 Dose:  2 Puff Take 2 Puffs by inhalation. Refills:  0  
     
   
   
   
  
 * albuterol 90 mcg/actuation inhaler Commonly known as:  PROVENTIL HFA What changed: You were already taking a medication with the same name, and this prescription was added. Make sure you understand how and when to take each. Your last dose was: Your next dose is:    
   
   
 Dose:  2 Puff Take 2 Puffs by inhalation every four (4) hours as needed for Wheezing or Shortness of Breath. Indications: Chronic Obstructive Pulmonary Disease Quantity:  1 Inhaler Refills:  3  
     
   
   
   
  
 * tiotropium 18 mcg inhalation capsule Commonly known as:  Gwenetta Lass What changed:  Another medication with the same name was added. Make sure you understand how and when to take each. Your last dose was: Your next dose is:    
   
   
 Dose:  1 Puff Take 1 Puff by inhalation. Refills:  0  
     
   
   
   
  
 * tiotropium 18 mcg inhalation capsule Commonly known as:  Gwenetta Lass What changed: You were already taking a medication with the same name, and this prescription was added. Make sure you understand how and when to take each. Your last dose was: Your next dose is:    
   
   
 Dose:  1 Cap Take 1 Cap by inhalation daily. Indications: BRONCHOSPASM PREVENTION WITH COPD Quantity:  30 Cap Refills:  2  
     
   
   
   
  
 * Notice: This list has 8 medication(s) that are the same as other medications prescribed for you. Read the directions carefully, and ask your doctor or other care provider to review them with you. CONTINUE these medications which have NOT CHANGED Dose & Instructions Dispensing Information Comments Morning Noon Evening Bedtime  
 budesonide-formoterol 160-4.5 mcg/actuation Hfaa Commonly known as:  SYMBICORT Your last dose was: Your next dose is:    
   
   
 Dose:  2 Puff Take 2 Puffs by inhalation. Refills:  0  
     
   
   
   
  
 flunisolide 25 mcg (0.025 %) Spry Commonly known as:  NASAREL Your last dose was: Your next dose is:    
   
   
 Dose:  2 Spray 2 Sprays by Nasal route. Refills:  0  
     
   
   
   
  
 gabapentin 300 mg capsule Commonly known as:  NEURONTIN Your last dose was: Your next dose is:    
   
   
 Dose:  600 mg Take 2 Caps by mouth three (3) times daily. Indications: NEUROPATHIC PAIN Quantity:  90 Cap Refills:  2 Omeprazole delayed release 20 mg tablet Commonly known as:  PRILOSEC D/R Your last dose was: Your next dose is:    
   
   
 Dose:  20 mg Take 20 mg by mouth. Refills:  0 STOP taking these medications   
 aztreonam 2 gram 2 g IVPB  
   
  
 carvedilol 3.125 mg tablet Commonly known as:  COREG  
   
  
 digoxin 0.25 mg tablet Commonly known as:  LANOXIN  
   
  
 ferrous sulfate 325 mg (65 mg iron) tablet  
   
  
 insulin lispro 100 unit/mL injection Commonly known as:  HUMALOG  
   
  
 povidone-iodine 10 % external solution Commonly known as:  BETADINE  
   
  
 simvastatin 80 mg tablet Commonly known as:  ZOCOR  
   
  
 sodium chloride 0.9 % Commonly known as:  NS  
   
  
 traZODone 50 mg tablet Commonly known as:  DESYREL  
   
  
 vancomycin in 0.9% Sodium Cl 1.25 gram/250 mL Soln ASK your doctor about these medications Dose & Instructions Dispensing Information Comments Morning Noon Evening Bedtime  
 glipiZIDE 10 mg tablet Commonly known as:  Monika Mitchell Your last dose was:     
   
Your next dose is:    
   
   
 Dose:  10 mg  
 Take 10 mg by mouth two (2) times a day. Refills:  0 Where to Get Your Medications These medications were sent to [address] [place], [place] [address] [place] [id number] Phone:  [phone]  
  predniSONE 5 mg tablet  
 tiotropium 18 mcg inhalation capsule Information on where to get these meds will be given to you by the nurse or doctor. ! Ask your nurse or doctor about these medications  
  albuterol 90 mcg/actuation inhaler  
 amiodarone 200 mg tablet  
 enalapril 5 mg tablet  
 furosemide 20 mg tablet  
 gabapentin 300 mg capsule  
 guaiFENesin 1,200 mg Ta12 ER tablet  
 guaiFENesin 1,200 mg Ta12 ER tablet  
 metoprolol succinate 100 mg tablet  
 nitroglycerin 0.4 mg SL tablet  
 potassium chloride 20 mEq tablet  
 predniSONE 10 mg tablet negative - no cough